# Patient Record
Sex: FEMALE | Race: BLACK OR AFRICAN AMERICAN | NOT HISPANIC OR LATINO | Employment: FULL TIME | URBAN - METROPOLITAN AREA
[De-identification: names, ages, dates, MRNs, and addresses within clinical notes are randomized per-mention and may not be internally consistent; named-entity substitution may affect disease eponyms.]

---

## 2017-02-01 ENCOUNTER — CARE COORDINATION (OUTPATIENT)
Dept: CARE COORDINATION | Facility: CLINIC | Age: 32
End: 2017-02-01

## 2017-02-01 NOTE — LETTER
[facility] CARE COORDINATION  [address] [facility] [address]  Phone: [phone]      February 16, 2017      Arielle Montenegro  [address]    [facility] [address]    Dear Arielle,  I am the Clinic Care Coordinator that works with your primary care provider's clinic. I have been trying to reach you recently to introduce Clinic Care Coordination and to see if there was anything I could assist you with.  Below is a description of what Clinic Care Coordination is and how I can further assist you.     The Clinic Care Coordinator role is a Registered Nurse and/or  who understands the health care system. The goal of Clinic Care Coordination is to help you manage your health and improve access to the [facility] system in the most efficient manner.  The Registered Nurse can assist you in meeting your health care goals by providing education, coordinating services, and strengthening the communication among your providers. The  can assist you with financial, behavioral, psychosocial, and chemical dependency and counseling/psychiatric resources.    Please feel free to keep this letter and contact information to contact me at [phone] with any further questions or concerns that may arise. We at [facility] are focused on providing you with the highest-quality healthcare experience possible and that all starts with you.       Sincerely,     Hue Robless    Enclosed: I have enclosed a copy of a 24 Hour Access Plan. This has helpful phone numbers for you to call when needed. Please keep this in an easy to access place to use as needed.            24 Hour Access Plan    Presenting Problem Signs and Symptoms Treatment Plan    Questions or concerns during clinic hours    I will call the clinic directly     Questions or concerns outside clinic hours    I will call the 24 hour nurse line at [phone]    Patient needs to schedule an appointment    I will call the 24  hour scheduling team at [phone] or clinic directly    Same day treatment     I will call the clinic first, nurse line if after hours, urgent care and express care if needed                          Clinic Care Coordinator: Hue Donis, RN Care Coordinator [phone]

## 2017-02-01 NOTE — PROGRESS NOTES
Clinic Care Coordination Contact  [facility]/Voicemail    Referral Source: Other, specify ([facility] - Patient Relations Staff)    Follow up.     Clinical Data: Care Coordinator Outreach  Outreach attempted x 1.  Left message on voicemail with call back information and requested return call.  Plan:  Care Coordinator will try to reach patient again in 3-5 business days.    Hue Donis, RN  [facility] Care Coordinator - [facility] [facility] Locations   Direct:  [phone] (voicemail available)

## 2017-02-09 NOTE — PROGRESS NOTES
Clinic Care Coordination Contact  [facility]/Voicemail    Referral Source: Other, specify ([facility] - Patient Relations Staff)  Clinical Data: Care Coordinator Outreach  Outreach attempted x 2.  Patient states that she is currently at an appointment for her daughter and unable to speak right now.   She requests return call from CCRN.     Plan:  Care Coordinator will try to reach patient again in 3-5 business days, as CCRN is out of the office on Friday 02/10/2017 for a conference.   Patient agreed with plan.     Hue Donis RN  [facility]  Clinic Care Coordinator - [place] and [place] Locations   Direct:  [phone] (voicemail available)

## 2017-02-16 NOTE — PROGRESS NOTES
Clinic Care Coordination Contact  [facility]/Voicemail    Referral Source: Other, specify ([facility] - Patient Relations Staff)  Clinical Data: Care Coordinator Outreach  Outreach attempted x 3.  Left message on voicemail with call back information and requested return call.  Plan: Care Coordinator will mail out care coordination [facility] letter with care coordinator contact information and explanation of care coordination services. Care Coordinator will do no further outreaches at this time.      Hue Donis, RN  [facility]  Clinic Care Coordinator - [place] and [place] Locations   Direct:  [phone] (voicemail available)

## 2017-02-24 ENCOUNTER — HOSPITAL ENCOUNTER (EMERGENCY)
Facility: CLINIC | Age: 32
Discharge: HOME | End: 2017-02-24
Attending: EMERGENCY MEDICINE | Admitting: EMERGENCY MEDICINE
Payer: COMMERCIAL

## 2017-02-24 VITALS
HEART RATE: 97 BPM | OXYGEN SATURATION: 97 % | RESPIRATION RATE: 18 BRPM | TEMPERATURE: 97.2 F | SYSTOLIC BLOOD PRESSURE: 109 MMHG | DIASTOLIC BLOOD PRESSURE: 86 MMHG

## 2017-02-24 DIAGNOSIS — Z76.0 ENCOUNTER FOR MEDICATION REFILL: ICD-10-CM

## 2017-02-24 LAB — GLUCOSE BLDC GLUCOMTR-MCNC: 224 MG/DL (ref 70–99)

## 2017-02-24 PROCEDURE — 99283 EMERGENCY DEPT VISIT LOW MDM: CPT

## 2017-02-24 PROCEDURE — 00000146 ZZHCL STATISTIC GLUCOSE BY METER IP

## 2017-02-24 NOTE — ED AVS SNAPSHOT
[facility] Emergency Department    [address]    [facility] [address]    Phone:  [phone]    Fax:  [phone]                                       Arielle Montenegro   MRN: [id number]    Department:  [facility] Emergency Department   Date of Visit:  2/24/2017           Patient Information     Date Of Birth          1985        Your diagnoses for this visit were:     Encounter for medication refill        You were seen by Carmita Pina MD.      Follow-up Information     Schedule an appointment as soon as possible for a visit with [facility].    Why:  please refer to your primary doctor for any future refills        Follow up with [facility] Emergency Department.    Specialty:  EMERGENCY MEDICINE    Why:  As needed    Contact information:    [address] [facility] [address] [phone]      Discharge References/Attachments     INSULIN (ENGLISH)      24 Hour Appointment Hotline       To make an appointment at any [facility] call [phone] ([phone]). If you don't have a family doctor or clinic, we will help you find one. [facility] are conveniently located to serve the needs of you and your family.             Review of your medicines      Our records show that you are taking the medicines listed below. If these are incorrect, please call your family doctor or clinic.        Dose / Directions Last dose taken    acetone (Urine) test Strp   Quantity:  100 each        Use one strip as needed to test urine for ketones.  Test urine for ketones when blood sugars are >250 or during illness with fever, abdominal pain, nausea, or vomiting.   Refills:  11        blood glucose monitoring lancets   Quantity:  1 Box        Use to test blood sugar 4 times daily or as directed.   Refills:  11        blood glucose monitoring test strip   Commonly known as:  no brand specified   Quantity:  1 Box         Use to test blood sugars 4 times daily or as directed   Refills:  11        GLUCOSE MANAGEMENT Tabs   Quantity:  100 tablet        4 tabs po for low blood sugar below 70, may repeat q 10-15 minutes as needed   Refills:  prn        ibuprofen 400 MG tablet   Commonly known as:  ADVIL/MOTRIN   Dose:  400 mg   Quantity:  120 tablet        Take 1 tablet (400 mg) by mouth every 6 hours as needed for moderate pain   Refills:  0        insulin glargine 100 UNIT/ML injection   Commonly known as:  LANTUS SOLOSTAR   Dose:  10 Units   Quantity:  1500 mL        Inject 10 Units Subcutaneous 2 times daily Titrate as directed by 1 unit every 3 days to lower fasting average to < 120 mg/dl.   Refills:  0        insulin pen needle 32G X 6 MM   Commonly known as:  NOVOFINE   Quantity:  100 each        Use 4 daily or as directed.   Refills:  11          ASK your doctor about these medications        Dose / Directions Last dose taken    * insulin aspart 100 UNIT/ML injection   Commonly known as:  NovoLOG FLEXPEN   What changed:  Another medication with the same name was added. Make sure you understand how and when to take each.   Quantity:  1500 mL   Ask about: Which instructions should I use?        Take 2 units per carb with each meal plus corrections 1:50 > 150   Refills:  0        * insulin aspart 100 UNIT/ML injection   Commonly known as:  NovoLOG FLEXPEN   What changed:  You were already taking a medication with the same name, and this prescription was added. Make sure you understand how and when to take each.   Quantity:  15 mL   Ask about: Which instructions should I use?        Take 2 units per carb with each meal plus corrections 1:50>150   Refills:  1        * Notice:  This list has 2 medication(s) that are the same as other medications prescribed for you. Read the directions carefully, and ask your doctor or other care provider to review them with you.            Prescriptions were sent or printed at these locations (1  Prescription)                   Other Prescriptions                Printed at Department/Unit printer (1 of 1)         insulin aspart (NOVOLOG FLEXPEN) 100 UNIT/ML injection                Procedures and tests performed during your visit     Glucose by meter      Orders Needing Specimen Collection     None      Pending Results     No orders found from 2/22/2017 to 2/25/2017.            Pending Culture Results     No orders found from 2/22/2017 to 2/25/2017.             Test Results from your hospital stay     2/24/2017  1:06 PM - Interface, Step On Up Graphics Results      Component Results     Component Value Ref Range & Units Status    Glucose 224 (H) 70 - 99 mg/dL Final                Clinical Quality Measure: Blood Pressure Screening     Your blood pressure was checked while you were in the emergency department today. The last reading we obtained was  BP: 109/86 . Please read the guidelines below about what these numbers mean and what you should do about them.  If your systolic blood pressure (the top number) is less than 120 and your diastolic blood pressure (the bottom number) is less than 80, then your blood pressure is normal. There is nothing more that you need to do about it.  If your systolic blood pressure (the top number) is 120-139 or your diastolic blood pressure (the bottom number) is 80-89, your blood pressure may be higher than it should be. You should have your blood pressure rechecked within a year by a primary care provider.  If your systolic blood pressure (the top number) is 140 or greater or your diastolic blood pressure (the bottom number) is 90 or greater, you may have high blood pressure. High blood pressure is treatable, but if left untreated over time it can put you at risk for heart attack, stroke, or kidney failure. You should have your blood pressure rechecked by a primary care provider within the next 4 weeks.  If your provider in the emergency department today gave you specific instructions to  "follow-up with your doctor or provider even sooner than that, you should follow that instruction and not wait for up to 4 weeks for your follow-up visit.        Thank you for choosing [facility]       Thank you for choosing [facility] for your care. Our goal is always to provide you with excellent care. Hearing back from our patients is one way we can continue to improve our services. Please take a few minutes to complete the written survey that you may receive in the mail after you visit with us. Thank you!        [email or web address] Information     [employer] lets you send messages to your doctor, view your test results, renew your prescriptions, schedule appointments and more. To sign up, go to [email or web address] . Click on \"Log in\" on the left side of the screen, which will take you to the Welcome page. Then click on \"Sign up Now\" on the right side of the page.     You will be asked to enter the access code listed below, as well as some personal information. Please follow the directions to create your username and password.     Your access code is: [email or web address]  Expires: 3/29/2017  3:03 PM     Your access code will  in 90 days. If you need help or a new code, please call your [facility] clinic or [phone].        Care EveryWhere ID     This is your Care EveryWhere ID. This could be used by other organizations to access your [facility] medical records  [id number]        After Visit Summary       This is your record. Keep this with you and show to your community pharmacist(s) and doctor(s) at your next visit.                  "

## 2017-02-24 NOTE — ED NOTES
In Triage: ABC's intact. Alert and oriented x 3.  Pt states she is Type 1 diabetic and ran out of her insulin today. Had insulin this morning.  Blood sugar earlier was 180 which she states is normal for her. Pt states she ate at cafeteria prior to coming to ED. Did not call PCP. Denies pain.

## 2017-02-24 NOTE — ED PROVIDER NOTES
History     Chief Complaint:  Medication Refill      HPI   Arielle Montenegro is a 32 year old female with type 1 diabetes who presents for medication refill. She ran out of her Novolog this afternoon. She normally manages her diabetes with Lantus 10 Units BID and Novolog 2 Units:15 g carb with each meal and 1 Unit correction for every 50 points over 150 mg/dl. She did take her Novolog this morning with breakfast. She ran out of her Novolog. Prior to arrival, she did eat lunch at a cafeteria. Her blood sugar prior to arrival to the emergency department was 180 mg/dl which the patient states is normal for her. She has no further concerns at this time.     Allergies:  No Known Allergies     Medications:    Insulin glargine (lantus solostar) 100 unit/ml pen  Insulin aspart (novolog flexpen) 100 unit/ml soln  Blood glucose monitoring (accu-chek multiclix) lancets  Blood glucose monitoring (no brand specified) test strip  Insulin pen needle (novofine) 32g x 6 mm  Nutritional supplements (glucose management) tabs  Acetone, urine, test strp  Ibuprofen (advil,motrin) 400 mg tablet    Past Medical History:    Encounter for long-term (current) use of insulin (H)  External hemorrhoids  Vitamin D deficiency  Diabetes mellitus type 1 (H)  Hypoglycemia unawareness in type 1 diabetes mellitus (H)  Blindness of right eye; Retinopathy and retinal detachment    Past Surgical History:     x 2  Enucleation, right    Family History:  Positive family history for paternal family with diabetes.    Social History:  Marital status: single  Tobacco use: former smoker  Alcohol use: no  Patient presents to the ED with her two children.        Review of Systems   Constitutional:        Positive for medication refill.   All other systems reviewed and are negative.      Physical Exam   First Vitals:  BP: 109/86  Pulse: 97  Temp: 97.2  F (36.2  C)  Resp: 18  SpO2: 97 %    Physical Exam  General/Appearance: appears stated age,  well-groomed, appears comfortable  Cardiovascular: RRR, nl S1S2, no m/r/g, 2+ pulses in all 4 extremities, cap refill <2sec  Respiratory: CTAB, good air movement throughout, no wheezes/rhonchi/rales, no increased WOB, no retractions  Neuro: GCS 15, alert and oriented, no gross focal neuro deficits    Emergency Department Course     Emergency Department Course:  1257 Nursing notes and vitals reviewed. I obtained a history from the patient. I performed an exam of the patient as documented above. We discussed my clinical impression and patient verbalized understanding and agreement     Findings and plan explained to the Patient. Patient discharged home with instructions regarding supportive care, medications, and reasons to return. The importance of close follow-up was reviewed. The patient was prescribed Novolog insulin.     Impression & Plan      Medical Decision Making:  This patient is a 32 year old type 1 diabetic female who ran out of her Novolog insulin today. I will give her a refill here. I encouraged her to follow up with her PCP for further refills.     Diagnosis:    ICD-10-CM    1. Encounter for medication refill Z76.0 Glucose by meter     Glucose by meter       Disposition:  discharged to home    Discharge Medications:  Discharge Medication List as of 2/24/2017  1:09 PM      START taking these medications    Details   !! insulin aspart (NOVOLOG FLEXPEN) 100 UNIT/ML injection Take 2 units per carb with each meal plus corrections 1:50>150, Disp-15 mL, R-1, Local Print       !! - Potential duplicate medications found. Please discuss with provider.        I, Veronica Moore, am serving as a scribe on 2/24/2017 at 12:57 PM to personally document services performed by Carmita Pina MD, based on my observations and the provider's statements to me.        Veronica Moore  2/24/2017   [facility] EMERGENCY DEPARTMENT       Carmita Pina MD  02/24/17 6986

## 2017-02-24 NOTE — ED AVS SNAPSHOT
[facility] Emergency Department    [address]    [facility] [address]    Phone:  [phone]    Fax:  [phone]                                       Arielle Montenegro   MRN: [id number]    Department:  [facility] Emergency Department   Date of Visit:  2/24/2017           After Visit Summary Signature Page     I have received my discharge instructions, and my questions have been answered. I have discussed any challenges I see with this plan with the nurse or doctor.    ..........................................................................................................................................  Patient/Patient Representative Signature      ..........................................................................................................................................  Patient Representative Print Name and Relationship to Patient    ..................................................               ................................................  Date                                            Time    ..........................................................................................................................................  Reviewed by Signature/Title    ...................................................              ..............................................  Date                                                            Time

## 2017-04-22 ENCOUNTER — HOSPITAL ENCOUNTER (EMERGENCY)
Facility: CLINIC | Age: 32
Discharge: HOME | End: 2017-04-22
Attending: EMERGENCY MEDICINE | Admitting: EMERGENCY MEDICINE
Payer: COMMERCIAL

## 2017-04-22 VITALS
TEMPERATURE: 98.4 F | DIASTOLIC BLOOD PRESSURE: 72 MMHG | HEART RATE: 99 BPM | OXYGEN SATURATION: 100 % | RESPIRATION RATE: 20 BRPM | SYSTOLIC BLOOD PRESSURE: 112 MMHG

## 2017-04-22 DIAGNOSIS — J02.0 ACUTE STREPTOCOCCAL PHARYNGITIS: ICD-10-CM

## 2017-04-22 LAB
ALBUMIN UR-MCNC: 100 MG/DL
APPEARANCE UR: ABNORMAL
BILIRUB UR QL STRIP: NEGATIVE
COLOR UR AUTO: YELLOW
DEPRECATED S PYO AG THROAT QL EIA: ABNORMAL
FLUAV+FLUBV AG SPEC QL: NEGATIVE
FLUAV+FLUBV AG SPEC QL: NORMAL
GLUCOSE UR STRIP-MCNC: >499 MG/DL
HGB UR QL STRIP: NEGATIVE
HYALINE CASTS #/AREA URNS LPF: 3 /LPF (ref 0–2)
KETONES UR STRIP-MCNC: NEGATIVE MG/DL
LEUKOCYTE ESTERASE UR QL STRIP: NEGATIVE
MICRO REPORT STATUS: ABNORMAL
MUCOUS THREADS #/AREA URNS LPF: PRESENT /LPF
NITRATE UR QL: NEGATIVE
PH UR STRIP: 5 PH (ref 5–7)
RBC #/AREA URNS AUTO: 3 /HPF (ref 0–2)
SP GR UR STRIP: 1.02 (ref 1–1.03)
SPECIMEN SOURCE: ABNORMAL
SPECIMEN SOURCE: NORMAL
SQUAMOUS #/AREA URNS AUTO: 1 /HPF (ref 0–1)
URN SPEC COLLECT METH UR: ABNORMAL
UROBILINOGEN UR STRIP-MCNC: 0 MG/DL (ref 0–2)
WBC #/AREA URNS AUTO: 1 /HPF (ref 0–2)

## 2017-04-22 PROCEDURE — 40000275 ZZH STATISTIC RCP TIME EA 10 MIN

## 2017-04-22 PROCEDURE — 87880 STREP A ASSAY W/OPTIC: CPT | Performed by: EMERGENCY MEDICINE

## 2017-04-22 PROCEDURE — 81001 URINALYSIS AUTO W/SCOPE: CPT | Performed by: EMERGENCY MEDICINE

## 2017-04-22 PROCEDURE — 99283 EMERGENCY DEPT VISIT LOW MDM: CPT | Mod: 25

## 2017-04-22 PROCEDURE — 94640 AIRWAY INHALATION TREATMENT: CPT

## 2017-04-22 PROCEDURE — 25000125 ZZHC RX 250: Performed by: EMERGENCY MEDICINE

## 2017-04-22 PROCEDURE — 87804 INFLUENZA ASSAY W/OPTIC: CPT | Performed by: EMERGENCY MEDICINE

## 2017-04-22 RX ORDER — AMOXICILLIN 500 MG/1
500 CAPSULE ORAL 3 TIMES DAILY
Qty: 30 CAPSULE | Refills: 0 | Status: SHIPPED | OUTPATIENT
Start: 2017-04-22 | End: 2017-05-02

## 2017-04-22 RX ORDER — ALBUTEROL SULFATE 90 UG/1
2 AEROSOL, METERED RESPIRATORY (INHALATION) EVERY 4 HOURS PRN
Qty: 1 INHALER | Refills: 0 | Status: SHIPPED | OUTPATIENT
Start: 2017-04-22 | End: 2019-02-12

## 2017-04-22 RX ORDER — IPRATROPIUM BROMIDE AND ALBUTEROL SULFATE 2.5; .5 MG/3ML; MG/3ML
3 SOLUTION RESPIRATORY (INHALATION) ONCE
Status: COMPLETED | OUTPATIENT
Start: 2017-04-22 | End: 2017-04-22

## 2017-04-22 RX ADMIN — IPRATROPIUM BROMIDE AND ALBUTEROL SULFATE 3 ML: .5; 3 SOLUTION RESPIRATORY (INHALATION) at 11:36

## 2017-04-22 ASSESSMENT — ENCOUNTER SYMPTOMS
SORE THROAT: 1
COUGH: 1
ACTIVITY CHANGE: 1
FATIGUE: 1
FEVER: 1
CHILLS: 1
SHORTNESS OF BREATH: 1

## 2017-04-22 NOTE — ED PROVIDER NOTES
History     Chief Complaint:  Cough    HPI   Arielle Leeanne Montenegro is a 32 year old female with a history of type 1 diabetes who presents with 5 days of non-productive cough, accompanied by subjective fever, fatigue, shortness of breath, and 9/10 throat pain, prompting her visit to the ED today. Here, she additionally complains of chills, body aches, and a decreased appetite. She denies any ear pain or any other complaints. She has been treating her symptoms at home with Dayquil and Tylenol. Of note, her son recently had influenza and the patient did not get her flu shot this year.     Allergies:  The patient has no known drug allergies.      Medications:    Insulin aspart (novolog flexpen) 100 unit/ml injection  Insulin glargine (lantus solostar) 100 unit/ml pen  Insulin pen needle (novofine) 32g x 6 mm  Nutritional supplements (glucose management) tabs  Ibuprofen (advil,motrin) 400 mg tablet    Past Medical History:    Blindness of right eye  Diabetes mellitus type 1 (H)   Vitamin D deficiency    Past Surgical History:     x2  Enucleation    Family History:    Diabetes    Social History:  Relationship status: Single  Tobacco use: Former smoker  Alcohol use: Neg  The patient presents with her children.      Review of Systems   Constitutional: Positive for activity change, chills, fatigue and fever.        Positive for body aches,   HENT: Positive for sore throat. Negative for ear pain.    Respiratory: Positive for cough and shortness of breath.    All other systems reviewed and are negative.    Physical Exam   First Vitals:  BP: 112/72  Temp: 98.4  F (36.9  C)  Temp src: Oral  Pulse: 99  Resp: 20  SpO2: 100 % ( 1105- 1136)   Physical Exam  Gen: Pleasant, appears stated age. Sightly ill appearing.    Eye:   Left pupil reactive. Right eye is prosthetic.    Sclera non-injected.    ENT:  Moist mucus membranes.  Normal tongue.  Oropharynx without lesions, not erythematous.  No exudate or tonsillar  hypertrophy.   Normal uvula, in midline.  No anterior cervical ARELI.  Tracheal cartilage non-tender.  Full flexion and extension of neck.  Normal voice.   No stridor.  No drooling.    Cardiac:     Normal rate and regular rhythm.    No murmurs, gallops, or rubs.    Pulmonary:     Clear to auscultation bilaterally.    No wheezes, rales, or rhonchi.    Abdomen:     Normal active bowel sounds.     Abdomen is soft and non-distended, without focal tenderness.    Musculoskeletal:     Normal movement of all extremities without evidence for deficit.    Extremities:    No edema.    Skin:   Warm and dry.    Neurologic:    Non-focal exam without asymmetric weakness or numbness.    Normal tone    Psychiatric:     Normal affect with appropriate interaction with examiner.     Emergency Department Course   Laboratory:  Influenza A/B antigen: A:Negative  B:Negative     Rapid strep screen: Positive (A)    UA: Slightly cloudy, yellow urine: Glucose >499 (A), Protein albumin 100 (A), RBC 3 (H), Mucous Present (A), Hyaline casts 3 (H), o/w WNL    Interventions:  1136: Duoneb, 3 mL, Neb    Emergency Department Course:  Nursing notes and vitals reviewed.  I performed an exam of the patient as documented above.  The above workup was undertaken.  1220: I rechecked the patient and discussed results.    Findings and plan explained to the Patient. Patient discharged home, status improved, with instructions regarding supportive care, medications, and reasons to return as well as the importance of close follow-up was reviewed.     Impression & Plan    Medical Decision Making:  Arielle Montenegro is a 32 year old female with type 1 diabetes who presents today with 5 days of cough, sore throat, and general malaise. On exam, she is well appearing, although she has a high heart rate. She notes that her blood sugars have been elevated. Fortunately, UA is negative for ketones. She does have hyperglycemia which is consistent with her history. Her strep  test was reported as positive by lab, although they are having difficulty displaying the result right now. She well be started on amoxicillin. Otherwise, I do not see any evidence for a dangerous bacterial infection such as retropharyngeal abscess, peritonsillar abscess, Lemierre's syndrome, epiglottitis, or uvulitis. She does not have DKA based on UA. I have recommended returning to the ED for failure to improve, increased pain, difficulty swallowing, or for any other symptoms.     Diagnosis:    ICD-10-CM   1. Acute streptococcal pharyngitis J02.0       Disposition:  discharged to home    Discharge Medication List as of 4/22/2017 12:31 PM      START taking these medications    Details   amoxicillin (AMOXIL) 500 MG capsule Take 1 capsule (500 mg) by mouth 3 times daily for 10 days, Disp-30 capsule, R-0, Local Print           Son JORDAN am serving as a scribe on 4/22/2017 at 11:04 AM to personally document services performed by Minal Pryor MD, based on my observations and the provider's statements to me.    [facility] EMERGENCY DEPARTMENT       Minal Pryor MD  04/22/17 7414

## 2017-04-22 NOTE — ED AVS SNAPSHOT
[facility] Emergency Department    [address]    [facility] [address]    Phone:  [phone]    Fax:  [phone]                                       Arielle Montenegro   MRN: [id number]    Department:  [facility] Emergency Department   Date of Visit:  4/22/2017           After Visit Summary Signature Page     I have received my discharge instructions, and my questions have been answered. I have discussed any challenges I see with this plan with the nurse or doctor.    ..........................................................................................................................................  Patient/Patient Representative Signature      ..........................................................................................................................................  Patient Representative Print Name and Relationship to Patient    ..................................................               ................................................  Date                                            Time    ..........................................................................................................................................  Reviewed by Signature/Title    ...................................................              ..............................................  Date                                                            Time

## 2017-04-22 NOTE — ED AVS SNAPSHOT
[facility] Emergency Department    [address]    [facility] [address]    Phone:  [phone]    Fax:  [phone]                                       Arielle Montenegro   MRN: [id number]    Department:  [facility] Emergency Department   Date of Visit:  4/22/2017           Patient Information     Date Of Birth          1985        Your diagnoses for this visit were:     Acute streptococcal pharyngitis        You were seen by Minal Pryor MD.      Follow-up Information     Follow up with [facility] Emergency Department.    Specialty:  EMERGENCY MEDICINE    Why:  immediately , If symptoms worsen    Contact information:    [address]  [facility] [address] [phone]        Follow up with Darcy Purdy In 3 days.    Why:  As needed        Discharge Instructions       Discharge Instructions  Sore Throat  You were seen today for a sore throat.  Most sore throats are caused by a virus. Antibiotics do not help with viral infections, but you can fight off the virus on your own.  In this case, your sore throat would be treated with medications for your pain and fever.    Strep throat is a kind of sore throat caused by Group A streptococcus bacteria.  This type of sore throat is treated with antibiotics.  If you had a rapid test done today for strep throat and it did not show infection, we always do a culture. If the culture shows you have strep throat, we will call you and get you a prescription for antibiotics.    Return to the Emergency Department if:    If you have difficulty breathing.    If you are drooling because you are unable to swallow.    You become dehydrated due to difficulty drinking. Signs of dehydration include weakness, dry mouth, and urinating less than 3 times per day.    If you develop swelling of the neck or tongue.    If you develop a high fever with either headache or stiff neck.    Treatment:   "    Pain relief -- Non-prescription pain medications, such as Tylenol  (acetaminophen) or Motrin , Advil  (ibuprofen) are usually recommended for pain.  Do not use a medicine that you are allergic to, or if your doctor has told you not to use it.   If you have been given a narcotic such as Vicodin  (hydrocodone with acetaminophen), Percocet  (oxycodone with acetaminophen), codeine, do not drive for four hours after you have taken it.  If the narcotic contains Tylenol  (acetaminophen), do not take Tylenol  with it. All narcotics will cause constipation, so eat a high fiber diet.      If you have been placed on antibiotics, watch for signs of allergic reaction.  These include rash, lip swelling, difficulty breathing, wheezing, and dizziness.  If you develop any of these symptoms, stop the antibiotic immediately and go to an Emergency Department or Urgent Care for evaluation.    Probiotics: If you have been given an antibiotic, you may want to also take a probiotic pill or eat yogurt with live cultures. Probiotics have \"good bacteria\" to help your intestines stay healthy. Studies have shown that probiotics help prevent diarrhea and other intestine problems (including C. diff infection) when you take antibiotics. You can buy these without a prescription in the pharmacy section of the store.   If you were given a prescription for medicine here today, be sure to read all of the information (including the package insert) that comes with your prescription.  This will include important information about the medicine, its side effects, and any warnings that you need to know about.  The pharmacist who fills the prescription can provide more information and answer questions you may have about the medicine.  If you have questions or concerns that the pharmacist cannot address, please call or return to the Emergency Department.               24 Hour Appointment Hotline       To make an appointment at any [facility], call " [phone] ([phone]). If you don't have a family doctor or clinic, we will help you find one. [place] [facility] clinics are conveniently located to serve the needs of you and your family.             Review of your medicines      START taking        Dose / Directions Last dose taken    amoxicillin 500 MG capsule   Commonly known as:  AMOXIL   Dose:  500 mg   Quantity:  30 capsule        Take 1 capsule (500 mg) by mouth 3 times daily for 10 days   Refills:  0          Our records show that you are taking the medicines listed below. If these are incorrect, please call your family doctor or clinic.        Dose / Directions Last dose taken    acetone (Urine) test Strp   Quantity:  100 each        Use one strip as needed to test urine for ketones.  Test urine for ketones when blood sugars are >250 or during illness with fever, abdominal pain, nausea, or vomiting.   Refills:  11        blood glucose monitoring lancets   Quantity:  1 Box        Use to test blood sugar 4 times daily or as directed.   Refills:  11        blood glucose monitoring test strip   Commonly known as:  no brand specified   Quantity:  1 Box        Use to test blood sugars 4 times daily or as directed   Refills:  11        GLUCOSE MANAGEMENT Tabs   Quantity:  100 tablet        4 tabs po for low blood sugar below 70, may repeat q 10-15 minutes as needed   Refills:  prn        ibuprofen 400 MG tablet   Commonly known as:  ADVIL/MOTRIN   Dose:  400 mg   Quantity:  120 tablet        Take 1 tablet (400 mg) by mouth every 6 hours as needed for moderate pain   Refills:  0        * insulin aspart 100 UNIT/ML injection   Commonly known as:  NovoLOG FLEXPEN   Quantity:  1500 mL        Take 2 units per carb with each meal plus corrections 1:50 > 150   Refills:  0        * insulin aspart 100 UNIT/ML injection   Commonly known as:  NovoLOG FLEXPEN   Quantity:  15 mL        Take 2 units per carb with each meal plus corrections 1:50>150   Refills:  1         insulin glargine 100 UNIT/ML injection   Commonly known as:  LANTUS SOLOSTAR   Dose:  10 Units   Quantity:  1500 mL        Inject 10 Units Subcutaneous 2 times daily Titrate as directed by 1 unit every 3 days to lower fasting average to < 120 mg/dl.   Refills:  0        insulin pen needle 32G X 6 MM   Commonly known as:  NOVOFINE   Quantity:  100 each        Use 4 daily or as directed.   Refills:  11        * Notice:  This list has 2 medication(s) that are the same as other medications prescribed for you. Read the directions carefully, and ask your doctor or other care provider to review them with you.            Prescriptions were sent or printed at these locations (1 Prescription)                   Other Prescriptions                Printed at Department/Unit printer (1 of 1)         amoxicillin (AMOXIL) 500 MG capsule                Procedures and tests performed during your visit     Influenza A/B antigen    Rapid strep screen    UA with Microscopic      Orders Needing Specimen Collection     None      Pending Results     Date and Time Order Name Status Description    4/22/2017 1116 Rapid strep screen In process             Pending Culture Results     Date and Time Order Name Status Description    4/22/2017 1116 Rapid strep screen In process             Test Results From Your Hospital Stay        4/22/2017 12:11 PM      Component Results     Component Value Ref Range & Units Status    Influenza A/B Agn Specimen Nasal  Final    Influenza A Negative NEG Final    Influenza B  NEG Final    Negative   Test results must be correlated with clinical data. If necessary, results   should be confirmed by a molecular assay or viral culture.           4/22/2017 11:36 AM         4/22/2017 12:17 PM      Component Results     Component Value Ref Range & Units Status    Color Urine Yellow  Final    Appearance Urine Slightly Cloudy  Final    Glucose Urine >499 (A) NEG mg/dL Final    Bilirubin Urine Negative NEG Final    Ketones  Urine Negative NEG mg/dL Final    Specific Gravity Urine 1.024 1.003 - 1.035 Final    Blood Urine Negative NEG Final    pH Urine 5.0 5.0 - 7.0 pH Final    Protein Albumin Urine 100 (A) NEG mg/dL Final    Urobilinogen mg/dL 0.0 0.0 - 2.0 mg/dL Final    Nitrite Urine Negative NEG Final    Leukocyte Esterase Urine Negative NEG Final    Source Midstream Urine  Final    WBC Urine 1 0 - 2 /HPF Final    RBC Urine 3 (H) 0 - 2 /HPF Final    Squamous Epithelial /HPF Urine 1 0 - 1 /HPF Final    Mucous Urine Present (A) NEG /LPF Final    Hyaline Casts 3 (H) 0 - 2 /LPF Final                Clinical Quality Measure: Blood Pressure Screening     Your blood pressure was checked while you were in the emergency department today. The last reading we obtained was  BP: 112/72 . Please read the guidelines below about what these numbers mean and what you should do about them.  If your systolic blood pressure (the top number) is less than 120 and your diastolic blood pressure (the bottom number) is less than 80, then your blood pressure is normal. There is nothing more that you need to do about it.  If your systolic blood pressure (the top number) is 120-139 or your diastolic blood pressure (the bottom number) is 80-89, your blood pressure may be higher than it should be. You should have your blood pressure rechecked within a year by a primary care provider.  If your systolic blood pressure (the top number) is 140 or greater or your diastolic blood pressure (the bottom number) is 90 or greater, you may have high blood pressure. High blood pressure is treatable, but if left untreated over time it can put you at risk for heart attack, stroke, or kidney failure. You should have your blood pressure rechecked by a primary care provider within the next 4 weeks.  If your provider in the emergency department today gave you specific instructions to follow-up with your doctor or provider even sooner than that, you should follow that instruction and  "not wait for up to 4 weeks for your follow-up visit.        Thank you for choosing [facility]       Thank you for choosing [facility] for your care. Our goal is always to provide you with excellent care. Hearing back from our patients is one way we can continue to improve our services. Please take a few minutes to complete the written survey that you may receive in the mail after you visit with us. Thank you!        [employer] Information     [employer] lets you send messages to your doctor, view your test results, renew your prescriptions, schedule appointments and more. To sign up, go to [email or web address] . Click on \"Log in\" on the left side of the screen, which will take you to the Welcome page. Then click on \"Sign up Now\" on the right side of the page.     You will be asked to enter the access code listed below, as well as some personal information. Please follow the directions to create your username and password.     Your access code is: [email or web address]  Expires: 2017 12:31 PM     Your access code will  in 90 days. If you need help or a new code, please call your [facility] clinic or [phone].        Care EveryWhere ID     This is your Care EveryWhere ID. This could be used by other organizations to access your [facility] medical records  [id number]        After Visit Summary       This is your record. Keep this with you and show to your community pharmacist(s) and doctor(s) at your next visit.                  "

## 2017-04-22 NOTE — DISCHARGE INSTRUCTIONS
Discharge Instructions  Sore Throat  You were seen today for a sore throat.  Most sore throats are caused by a virus. Antibiotics do not help with viral infections, but you can fight off the virus on your own.  In this case, your sore throat would be treated with medications for your pain and fever.    Strep throat is a kind of sore throat caused by Group A streptococcus bacteria.  This type of sore throat is treated with antibiotics.  If you had a rapid test done today for strep throat and it did not show infection, we always do a culture. If the culture shows you have strep throat, we will call you and get you a prescription for antibiotics.    Return to the Emergency Department if:    If you have difficulty breathing.    If you are drooling because you are unable to swallow.    You become dehydrated due to difficulty drinking. Signs of dehydration include weakness, dry mouth, and urinating less than 3 times per day.    If you develop swelling of the neck or tongue.    If you develop a high fever with either headache or stiff neck.    Treatment:      Pain relief -- Non-prescription pain medications, such as Tylenol  (acetaminophen) or Motrin , Advil  (ibuprofen) are usually recommended for pain.  Do not use a medicine that you are allergic to, or if your doctor has told you not to use it.   If you have been given a narcotic such as Vicodin  (hydrocodone with acetaminophen), Percocet  (oxycodone with acetaminophen), codeine, do not drive for four hours after you have taken it.  If the narcotic contains Tylenol  (acetaminophen), do not take Tylenol  with it. All narcotics will cause constipation, so eat a high fiber diet.      If you have been placed on antibiotics, watch for signs of allergic reaction.  These include rash, lip swelling, difficulty breathing, wheezing, and dizziness.  If you develop any of these symptoms, stop the antibiotic immediately and go to an Emergency Department or Urgent Care for  "evaluation.    Probiotics: If you have been given an antibiotic, you may want to also take a probiotic pill or eat yogurt with live cultures. Probiotics have \"good bacteria\" to help your intestines stay healthy. Studies have shown that probiotics help prevent diarrhea and other intestine problems (including C. diff infection) when you take antibiotics. You can buy these without a prescription in the pharmacy section of the store.   If you were given a prescription for medicine here today, be sure to read all of the information (including the package insert) that comes with your prescription.  This will include important information about the medicine, its side effects, and any warnings that you need to know about.  The pharmacist who fills the prescription can provide more information and answer questions you may have about the medicine.  If you have questions or concerns that the pharmacist cannot address, please call or return to the Emergency Department.             "

## 2017-05-11 ENCOUNTER — OFFICE VISIT (OUTPATIENT)
Dept: INTERNAL MEDICINE | Facility: CLINIC | Age: 32
End: 2017-05-11
Payer: COMMERCIAL

## 2017-05-11 VITALS
SYSTOLIC BLOOD PRESSURE: 90 MMHG | DIASTOLIC BLOOD PRESSURE: 60 MMHG | HEIGHT: 63 IN | HEART RATE: 80 BPM | WEIGHT: 139 LBS | TEMPERATURE: 98.2 F | BODY MASS INDEX: 24.63 KG/M2 | OXYGEN SATURATION: 97 %

## 2017-05-11 DIAGNOSIS — R53.83 FATIGUE, UNSPECIFIED TYPE: Primary | ICD-10-CM

## 2017-05-11 DIAGNOSIS — E10.65 TYPE 1 DIABETES MELLITUS WITH HYPERGLYCEMIA (H): ICD-10-CM

## 2017-05-11 LAB
BASOPHILS # BLD AUTO: 0 10E9/L (ref 0–0.2)
BASOPHILS NFR BLD AUTO: 0.5 %
DIFFERENTIAL METHOD BLD: NORMAL
EOSINOPHIL # BLD AUTO: 0.2 10E9/L (ref 0–0.7)
EOSINOPHIL NFR BLD AUTO: 3.3 %
ERYTHROCYTE [DISTWIDTH] IN BLOOD BY AUTOMATED COUNT: 13.2 % (ref 10–15)
HBA1C MFR BLD: 10 % (ref 4.3–6)
HCG SERPL QL: NEGATIVE
HCT VFR BLD AUTO: 38.6 % (ref 35–47)
HGB BLD-MCNC: 13.1 G/DL (ref 11.7–15.7)
LYMPHOCYTES # BLD AUTO: 2.2 10E9/L (ref 0.8–5.3)
LYMPHOCYTES NFR BLD AUTO: 40.3 %
MCH RBC QN AUTO: 29.4 PG (ref 26.5–33)
MCHC RBC AUTO-ENTMCNC: 33.9 G/DL (ref 31.5–36.5)
MCV RBC AUTO: 87 FL (ref 78–100)
MONOCYTES # BLD AUTO: 0.2 10E9/L (ref 0–1.3)
MONOCYTES NFR BLD AUTO: 3.5 %
NEUTROPHILS # BLD AUTO: 2.9 10E9/L (ref 1.6–8.3)
NEUTROPHILS NFR BLD AUTO: 52.4 %
PLATELET # BLD AUTO: 233 10E9/L (ref 150–450)
RBC # BLD AUTO: 4.45 10E12/L (ref 3.8–5.2)
WBC # BLD AUTO: 5.5 10E9/L (ref 4–11)

## 2017-05-11 PROCEDURE — 99214 OFFICE O/P EST MOD 30 MIN: CPT | Performed by: FAMILY MEDICINE

## 2017-05-11 PROCEDURE — 36415 COLL VENOUS BLD VENIPUNCTURE: CPT | Performed by: FAMILY MEDICINE

## 2017-05-11 PROCEDURE — 80050 GENERAL HEALTH PANEL: CPT | Performed by: FAMILY MEDICINE

## 2017-05-11 PROCEDURE — 84703 CHORIONIC GONADOTROPIN ASSAY: CPT | Performed by: FAMILY MEDICINE

## 2017-05-11 PROCEDURE — 83036 HEMOGLOBIN GLYCOSYLATED A1C: CPT | Performed by: FAMILY MEDICINE

## 2017-05-11 NOTE — NURSING NOTE
"Chief Complaint   Patient presents with     Fatigue   onset today , needs insulin refilled     Initial There were no vitals taken for this visit. Estimated body mass index is 24.55 kg/(m^2) as calculated from the following:    Height as of 8/29/16: 5' 4\" (1.626 m).    Weight as of 11/3/16: 143 lb (64.9 kg).  Medication Reconciliation: complete    "

## 2017-05-11 NOTE — PROGRESS NOTES
CHIEF COMPLAINT    Diabetes, fatigue.      HISTORY    I haven't seen her before. She has DM 1. Uses Insulin but is out of Lantus. Her A1C's have been high - 10.8 in .    She also c/o being excessively tired for 3-4 weeks. No other specific adverse SX.    She was uncertain of LMP.    Patient Active Problem List   Diagnosis     Blindness of right eye     Diabetes mellitus type 1 (H)     Hypoglycemia unawareness in type 1 diabetes mellitus (H)     Health Care Home     Vitamin D deficiency     External hemorrhoids     Encounter for long-term (current) use of insulin (H)     Current Outpatient Prescriptions   Medication Sig Dispense Refill     insulin aspart (NOVOLOG FLEXPEN) 100 UNIT/ML injection Take 2 units per carb with each meal plus corrections 1:50 > 150 15 mL 2     insulin glargine (LANTUS SOLOSTAR) 100 UNIT/ML injection Inject 50 Units Subcutaneous daily 15 mL 2     blood glucose monitoring (ACCU-CHEK MULTICLIX) lancets Use to test blood sugar 4 times daily or as directed. 1 Box 11     blood glucose monitoring (NO BRAND SPECIFIED) test strip Use to test blood sugars 4 times daily or as directed 1 Box 11     insulin pen needle (NOVOFINE) 32G X 6 MM Use 4 daily or as directed. 100 each 11     Nutritional Supplements (GLUCOSE MANAGEMENT) TABS 4 tabs po for low blood sugar below 70, may repeat q 10-15 minutes as needed 100 tablet prn     albuterol (ALBUTEROL) 108 (90 BASE) MCG/ACT Inhaler Inhale 2 puffs into the lungs every 4 hours as needed for shortness of breath / dyspnea 1 Inhaler 0     [DISCONTINUED] insulin aspart (NOVOLOG FLEXPEN) 100 UNIT/ML injection Take 2 units per carb with each meal plus corrections 1:50>150 15 mL 1     acetone, Urine, test STRP Use one strip as needed to test urine for ketones.  Test urine for ketones when blood sugars are >250 or during illness with fever, abdominal pain, nausea, or vomiting. 100 each 11     [DISCONTINUED] insulin glargine (LANTUS SOLOSTAR) 100 UNIT/ML PEN  "Inject 10 Units Subcutaneous 2 times daily Titrate as directed by 1 unit every 3 days to lower fasting average to < 120 mg/dl. 1500 mL 0     [DISCONTINUED] insulin aspart (NOVOLOG FLEXPEN) 100 UNIT/ML soln Take 2 units per carb with each meal plus corrections 1:50 > 150 1500 mL 0     ibuprofen (ADVIL,MOTRIN) 400 MG tablet Take 1 tablet (400 mg) by mouth every 6 hours as needed for moderate pain 120 tablet 0       REVIEW OF SYSTEMS    No fever  No SOB  No CP  No abd pain  No heavy periods      Past Medical History:   Diagnosis Date     Blindness of right eye 2007     Diabetes mellitus type 1 (H)     Diagnosed as a child       EXAM  BP 90/60  Pulse 80  Temp 98.2  F (36.8  C) (Oral)  Ht 5' 3\" (1.6 m)  Wt 139 lb (63 kg)  LMP   SpO2 97%  BMI 24.62 kg/m2    NAD, alert  R eye shows scarring  Phar: neg  Neck: no mass  Chest: cl  CV: RSR w/o murmur  Abd: non distended, non tender  No edema      (R53.83) Fatigue, unspecified type  (primary encounter diagnosis)  Comment:   Plan: TSH with free T4 reflex, HCG, qualitative            (E10.65) Type 1 diabetes mellitus with hyperglycemia (H)  Comment:   Insulin refilled.  Basic Labs checked.  She could consider seeing Dr Hopper. Closer to her home. Referral made.  Plan: insulin aspart (NOVOLOG FLEXPEN) 100 UNIT/ML         injection, insulin glargine (LANTUS SOLOSTAR)         100 UNIT/ML injection, ENDOCRINOLOGY ADULT         REFERRAL, Comprehensive metabolic panel (BMP +         Alb, Alk Phos, ALT, AST, Total. Bili, TP), CBC         with platelets and differential, TSH with free         T4 reflex, Hemoglobin A1c                  "

## 2017-05-11 NOTE — MR AVS SNAPSHOT
After Visit Summary   5/11/2017    Arielle Montenegro    MRN: [id number]           Patient Information     Date Of Birth          1985        Visit Information        Provider Department      5/11/2017 3:00 PM Shar Hernández MD [facility]        Today's Diagnoses     Fatigue, unspecified type    -  1    Type 1 diabetes mellitus with hyperglycemia (H)          Care Instructions      Dr Parmerkar.  Endocrinologist.        Follow-ups after your visit        Additional Services     ENDOCRINOLOGY ADULT REFERRAL       Your provider has referred you to: FMG: [facility] [phone]   [email or web address]      Please be aware that coverage of these services is subject to the terms and limitations of your health insurance plan.  Call member services at your health plan with any benefit or coverage questions.      Please bring the following to your appointment:    >>   Any x-rays, CTs or MRIs which have been performed.  Contact the facility where they were done to arrange for  prior to your scheduled appointment.    >>   List of current medications   >>   This referral request   >>   Any documents/labs given to you for this referral                  Your next 10 appointments already scheduled     May 11, 2017  3:00 PM CDT   [facility] with Shar [facility]dez [facility]   [facility]    [address] [place] [address] [phone]           Bring a current list of meds and any records pertaining to this visit.  For Physicals, please bring immunization records and any forms needing to be filled out.  Please arrive 10 minutes early to complete paperwork.              Who to contact     If you have questions or need follow up information about today's clinic visit or your schedule please contact [facility] directly at [phone].  Normal or non-critical  "lab and imaging results will be communicated to you by MyChart, letter or phone within 4 business days after the clinic has received the results. If you do not hear from us within 7 days, please contact the clinic through [employer] or phone. If you have a critical or abnormal lab result, we will notify you by phone as soon as possible.  Submit refill requests through "[employer] or call your pharmacy and they will forward the refill request to us. Please allow 3 business days for your refill to be completed.          Additional Information About Your Visit        Xsilon[employer]Know[employer]na Information     "[employer] lets you send messages to your doctor, view your test results, renew your prescriptions, schedule appointments and more. To sign up, go to [email or web address]/"[employer] . Click on \"Log in\" on the left side of the screen, which will take you to the Welcome page. Then click on \"Sign up Now\" on the right side of the page.     You will be asked to enter the access code listed below, as well as some personal information. Please follow the directions to create your username and password.     Your access code is: [email or web address]  Expires: 2017 12:31 PM     Your access code will  in 90 days. If you need help or a new code, please call your [place] clinic or [phone].        Care EveryWhere ID     This is your Care EveryWhere ID. This could be used by other organizations to access your [place] medical records  [id number]        Your Vitals Were     Pulse Temperature Height Pulse Oximetry BMI (Body Mass Index)       80 98.2  F (36.8  C) (Oral) 5' 3\" (1.6 m) 97% 24.62 kg/m2        Blood Pressure from Last 3 Encounters:   17 90/60   17 112/72   17 109/86    Weight from Last 3 Encounters:   17 139 lb (63 kg)   16 143 lb (64.9 kg)   10/28/16 138 lb (62.6 kg)              We Performed the Following     CBC with platelets and differential     Comprehensive metabolic panel (BMP + Alb, Alk Phos, ALT, AST, " Total. Bili, TP)     ENDOCRINOLOGY ADULT REFERRAL     Hemoglobin A1c     TSH with free T4 reflex          Today's Medication Changes          These changes are accurate as of: 5/11/17  2:58 PM.  If you have any questions, ask your nurse or doctor.               These medicines have changed or have updated prescriptions.        Dose/Directions    insulin glargine 100 UNIT/ML injection   Commonly known as:  LANTUS SOLOSTAR   This may have changed:    - how much to take  - when to take this  - additional instructions   Used for:  Type 1 diabetes mellitus with hyperglycemia (H)   Changed by:  Shar Hernández MD        Dose:  50 Units   Inject 50 Units Subcutaneous daily   Quantity:  15 mL   Refills:  2            Where to get your medicines      These medications were sent to [facility] [place], [place] - [address]  [facility] [address] [address]     Phone:  [phone]     insulin aspart 100 UNIT/ML injection    insulin glargine 100 UNIT/ML injection                Primary Care Provider Fax #    [facility] [place] [phone]       No address on file        Thank you!     Thank you for choosing [facility]  for your care. Our goal is always to provide you with excellent care. Hearing back from our patients is one way we can continue to improve our services. Please take a few minutes to complete the written survey that you may receive in the mail after your visit with us. Thank you!             Your Updated Medication List - Protect others around you: Learn how to safely use, store and throw away your medicines at www.disposemymeds.org.          This list is accurate as of: 5/11/17  2:58 PM.  Always use your most recent med list.                   Brand Name Dispense Instructions for use    acetone (Urine) test Strp     100 each    Use one strip as needed to test urine for ketones.  Test urine for ketones when blood sugars are >250 or during illness with  fever, abdominal pain, nausea, or vomiting.       albuterol 108 (90 BASE) MCG/ACT Inhaler    albuterol    1 Inhaler    Inhale 2 puffs into the lungs every 4 hours as needed for shortness of breath / dyspnea       blood glucose monitoring lancets     1 Box    Use to test blood sugar 4 times daily or as directed.       blood glucose monitoring test strip    no brand specified    1 Box    Use to test blood sugars 4 times daily or as directed       GLUCOSE MANAGEMENT Tabs     100 tablet    4 tabs po for low blood sugar below 70, may repeat q 10-15 minutes as needed       ibuprofen 400 MG tablet    ADVIL/MOTRIN    120 tablet    Take 1 tablet (400 mg) by mouth every 6 hours as needed for moderate pain       * insulin aspart 100 UNIT/ML injection    NovoLOG FLEXPEN    15 mL    Take 2 units per carb with each meal plus corrections 1:50>150       * insulin aspart 100 UNIT/ML injection    NovoLOG FLEXPEN    15 mL    Take 2 units per carb with each meal plus corrections 1:50 > 150       insulin glargine 100 UNIT/ML injection    LANTUS SOLOSTAR    15 mL    Inject 50 Units Subcutaneous daily       insulin pen needle 32G X 6 MM    NOVOFINE    100 each    Use 4 daily or as directed.       * Notice:  This list has 2 medication(s) that are the same as other medications prescribed for you. Read the directions carefully, and ask your doctor or other care provider to review them with you.

## 2017-05-12 LAB
ALBUMIN SERPL-MCNC: 3.2 G/DL (ref 3.4–5)
ALP SERPL-CCNC: 103 U/L (ref 40–150)
ALT SERPL W P-5'-P-CCNC: 18 U/L (ref 0–50)
ANION GAP SERPL CALCULATED.3IONS-SCNC: 7 MMOL/L (ref 3–14)
AST SERPL W P-5'-P-CCNC: 10 U/L (ref 0–45)
BILIRUB SERPL-MCNC: 0.4 MG/DL (ref 0.2–1.3)
BUN SERPL-MCNC: 16 MG/DL (ref 7–30)
CALCIUM SERPL-MCNC: 8.6 MG/DL (ref 8.5–10.1)
CHLORIDE SERPL-SCNC: 104 MMOL/L (ref 94–109)
CO2 SERPL-SCNC: 26 MMOL/L (ref 20–32)
CREAT SERPL-MCNC: 0.7 MG/DL (ref 0.52–1.04)
GFR SERPL CREATININE-BSD FRML MDRD: ABNORMAL ML/MIN/1.7M2
GLUCOSE SERPL-MCNC: 393 MG/DL (ref 70–99)
POTASSIUM SERPL-SCNC: 4.2 MMOL/L (ref 3.4–5.3)
PROT SERPL-MCNC: 6.8 G/DL (ref 6.8–8.8)
SODIUM SERPL-SCNC: 137 MMOL/L (ref 133–144)
TSH SERPL DL<=0.005 MIU/L-ACNC: 1.19 MU/L (ref 0.4–4)

## 2017-10-05 ENCOUNTER — OFFICE VISIT (OUTPATIENT)
Dept: FAMILY MEDICINE | Facility: CLINIC | Age: 32
End: 2017-10-05
Payer: COMMERCIAL

## 2017-10-05 VITALS
HEIGHT: 63 IN | TEMPERATURE: 98.1 F | SYSTOLIC BLOOD PRESSURE: 100 MMHG | WEIGHT: 138 LBS | OXYGEN SATURATION: 97 % | DIASTOLIC BLOOD PRESSURE: 64 MMHG | BODY MASS INDEX: 24.45 KG/M2 | HEART RATE: 103 BPM

## 2017-10-05 DIAGNOSIS — E10.65 TYPE 1 DIABETES MELLITUS WITH HYPERGLYCEMIA (H): Primary | ICD-10-CM

## 2017-10-05 LAB — HBA1C MFR BLD: 11.7 % (ref 4.3–6)

## 2017-10-05 PROCEDURE — 99207 C FOOT EXAM  NO CHARGE: CPT | Performed by: FAMILY MEDICINE

## 2017-10-05 PROCEDURE — 83036 HEMOGLOBIN GLYCOSYLATED A1C: CPT | Performed by: FAMILY MEDICINE

## 2017-10-05 PROCEDURE — 36415 COLL VENOUS BLD VENIPUNCTURE: CPT | Performed by: FAMILY MEDICINE

## 2017-10-05 PROCEDURE — 99214 OFFICE O/P EST MOD 30 MIN: CPT | Performed by: FAMILY MEDICINE

## 2017-10-05 RX ORDER — AMPICILLIN TRIHYDRATE 250 MG
500 CAPSULE ORAL
Qty: 180 CAPSULE | Refills: 3 | Status: SHIPPED | OUTPATIENT
Start: 2017-10-05 | End: 2019-09-20

## 2017-10-05 RX ORDER — MULTIPLE VITAMINS W/ MINERALS TAB 9MG-400MCG
1 TAB ORAL DAILY
Qty: 100 TABLET | Refills: 3 | Status: SHIPPED | OUTPATIENT
Start: 2017-10-05 | End: 2019-09-20

## 2017-10-05 NOTE — MR AVS SNAPSHOT
After Visit Summary   10/5/2017    Arielle Montenegro    MRN: [id number]           Patient Information     Date Of Birth          1985        Visit Information        Provider Department      10/5/2017 11:40 AM Ronna Lares,  [facility]        Today's Diagnoses     Type 1 diabetes mellitus with hyperglycemia (H)    -  1       Follow-ups after your visit        Additional Services     DIABETES EDUCATOR REFERRAL       DIABETES SELF MANAGEMENT TRAINING (DSMT)      Your provider has referred you to Diabetes Education: FMG: Diabetes Education - [facility] [phone]   [email or web address]    Type of training and number of hours: Previous Diagnosis: Follow-up DSMT - 2 hours.    Medicare covers: 10 hours of initial DSMT in 12 month period from the time of first visit, plus 2 hours of follow-up DSMT annually, and additional hours as requested for insulin training.    Diabetes Type: Type 1             Diabetes Co-Morbidities: none               A1C Goal:  <7.0       A1C is: Lab Results       Component                Value               Date                       A1C                      11.7                10/05/2017              If an urgent visit is needed or A1C is above 12, Care Team to call the Diabetes Education Team at [phone] or send an In Basket message to the Diabetes Education Pool (P DIAB ED-PATIENT CARE).    Diabetes Education Topics: Comprehensive Knowledge Assessment and Instruction and Continuous Glucose Monitor: Diagnostic CGM (minimum of 72 hours) and Personal CGM    Special Educational Needs Requiring Individual DSMT: None       MEDICAL NUTRITION THERAPY (MNT) for Diabetes    Medical Nutrition Therapy with a Registered Dietitian can be provided in coordination with Diabetes Self-Management Training to assist in achieving optimal diabetes management.    MNT Type and Hours: Do not initiate MNT at  this time.                       Medicare will cover: 3 hours initial MNT in 12 month period after first visit, plus 2 hours of follow-up MNT annually    Please be aware that coverage of these services is subject to the terms and limitations of your health insurance plan.  Call member services at your health plan to determine Diabetes Self-Management Training benefits and ask which blood glucose monitor brands are covered by your plan.      Please bring the following with you to your appointment:    (1)  List of current medications   (2)  List of Blood Glucose Monitor brands that are covered by your insurance plan  (3)  Blood Glucose Monitor and log book  (4)   Food records for the 3 days prior to your visit    The Certified Diabetes Educator may make diabetes medication adjustments per the CDE Protocol and Collaborative Practice Agreement.            ENDOCRINOLOGY ADULT REFERRAL       Your provider has referred you to: FMG: [facility] [phone]   [email or web address]/      Please be aware that coverage of these services is subject to the terms and limitations of your health insurance plan.  Call member services at your health plan with any benefit or coverage questions.      Please bring the following to your appointment:    >>   Any x-rays, CTs or MRIs which have been performed.  Contact the facility where they were done to arrange for  prior to your scheduled appointment.    >>   List of current medications   >>   This referral request   >>   Any documents/labs given to you for this referral                  Your next 10 appointments already scheduled     Oct 13, 2017 11:00 AM CDT   [facility]    [address]  [facility] [id number] [phone]            Oct 13, 2017  1:30 PM CDT   Office Visit with Lanette Antunez   [facility]  "[place] [facility]    [address]  [facility] [address]   [phone]           Bring a current list of meds and any records pertaining to this visit. For Physicals, please bring immunization records and any forms needing to be filled out. Please arrive 10 minutes early to complete paperwork.              Future tests that were ordered for you today     Open Future Orders        Priority Expected Expires Ordered    Albumin Random Urine Quantitative with Creat Ratio Routine  10/5/2018 10/5/2017            Who to contact     If you have questions or need follow up information about today's clinic visit or your schedule please contact [facility] directly at [phone].  Normal or non-critical lab and imaging results will be communicated to you by MyChart, letter or phone within 4 business days after the clinic has received the results. If you do not hear from us within 7 days, please contact the clinic through [employer] or phone. If you have a critical or abnormal lab result, we will notify you by phone as soon as possible.  Submit refill requests through [employer] or call your pharmacy and they will forward the refill request to us. Please allow 3 business days for your refill to be completed.          Additional Information About Your Visit        [employer] Information     [employer] lets you send messages to your doctor, view your test results, renew your prescriptions, schedule appointments and more. To sign up, go to [email or web address] . Click on \"Log in\" on the left side of the screen, which will take you to the Welcome page. Then click on \"Sign up Now\" on the right side of the page.     You will be asked to enter the access code listed below, as well as some personal information. Please follow the directions to create your username and password.     Your access code is: [email or web address]  Expires: 2018  8:03 AM     Your access code will  in 90 days. If you " "need help or a new code, please call your [place] clinic or [phone].        Care EveryWhere ID     This is your Care EveryWhere ID. This could be used by other organizations to access your [place] medical records  [id number]        Your Vitals Were     Pulse Temperature Height Pulse Oximetry BMI (Body Mass Index)       103 98.1  F (36.7  C) (Oral) 5' 3\" (1.6 m) 97% 24.45 kg/m2        Blood Pressure from Last 3 Encounters:   10/05/17 100/64   05/11/17 90/60   04/22/17 112/72    Weight from Last 3 Encounters:   10/05/17 138 lb (62.6 kg)   05/11/17 139 lb (63 kg)   11/03/16 143 lb (64.9 kg)              We Performed the Following     DIABETES EDUCATOR REFERRAL     ENDOCRINOLOGY ADULT REFERRAL     FOOT EXAM     Hemoglobin A1c          Today's Medication Changes          These changes are accurate as of: 10/5/17 11:59 PM.  If you have any questions, ask your nurse or doctor.               Start taking these medicines.        Dose/Directions    blood glucose lancets standard   Commonly known as:  no brand specified   Used for:  Type 1 diabetes mellitus with hyperglycemia (H)   Started by:  Ronna Lares DO        Use to test blood sugar 4 times daily or as directed.   Quantity:  100 each   Refills:  11       cinnamon 500 MG Caps   Used for:  Type 1 diabetes mellitus with hyperglycemia (H)   Started by:  Ronna Lares DO        Dose:  500 mg   Take 500 mg by mouth 2 times daily   Quantity:  180 capsule   Refills:  3       multivitamin, therapeutic with minerals Tabs tablet   Used for:  Type 1 diabetes mellitus with hyperglycemia (H)   Started by:  Ronna Lares DO        Dose:  1 tablet   Take 1 tablet by mouth daily   Quantity:  100 tablet   Refills:  3         These medicines have changed or have updated prescriptions.        Dose/Directions    * blood glucose monitoring test strip   Commonly known as:  no brand specified   This may have changed:  Another medication with the same name was " added. Make sure you understand how and when to take each.   Used for:  Hypoglycemia unawareness in type 1 diabetes mellitus (H), Type 1 diabetes mellitus with hyperglycemia (H), Blindness of right eye   Changed by:  Ainsley Oakley APRN CNP        Use to test blood sugars 4 times daily or as directed   Quantity:  1 Box   Refills:  11       * blood glucose monitoring test strip   Commonly known as:  no brand specified   This may have changed:  You were already taking a medication with the same name, and this prescription was added. Make sure you understand how and when to take each.   Used for:  Type 1 diabetes mellitus with hyperglycemia (H)   Changed by:  Ronna Lares DO        Use to test blood sugars 4 times daily or as directed   Quantity:  100 strip   Refills:  11       insulin glargine 100 UNIT/ML injection   Commonly known as:  LANTUS SOLOSTAR   This may have changed:  how much to take   Used for:  Type 1 diabetes mellitus with hyperglycemia (H)   Changed by:  Ronna Lares DO        Dose:  60 Units   Inject 60 Units Subcutaneous daily   Quantity:  18 mL   Refills:  3       * Notice:  This list has 2 medication(s) that are the same as other medications prescribed for you. Read the directions carefully, and ask your doctor or other care provider to review them with you.         Where to get your medicines      These medications were sent to [facility] [place], [place] - [address]  [address] [facility] [address]     Phone:  [phone]     blood glucose lancets standard    blood glucose monitoring test strip    cinnamon 500 MG Caps    insulin aspart 100 UNIT/ML injection    insulin glargine 100 UNIT/ML injection    insulin pen needle 32G X 6 MM    multivitamin, therapeutic with minerals Tabs tablet                Primary Care Provider Office Phone # Fax #    [facility] [phone] [facility]  [address]        Equal Access to Services     [facility] Center[facility] : Hadii denita estrada rebeccajose raul Aspen, walatishada luqlollyha, qaybta austenkylerdebra gannon, byron marmolejo. So [facility] [phone].    ATENCIÓN: Si habla español, tiene a ayala disposición servicios gratuitos de asistencia lingüística. Llame al [phone].    We comply with applicable federal civil rights laws and [place] laws. We do not discriminate on the basis of race, color, national origin, age, disability, sex, sexual orientation, or gender identity.            Thank you!     Thank you for choosing [facility]  for your care. Our goal is always to provide you with excellent care. Hearing back from our patients is one way we can continue to improve our services. Please take a few minutes to complete the written survey that you may receive in the mail after your visit with us. Thank you!             Your Updated Medication List - Protect others around you: Learn how to safely use, store and throw away your medicines at www.disposemymeds.org.          This list is accurate as of: 10/5/17 11:59 PM.  Always use your most recent med list.                   Brand Name Dispense Instructions for use Diagnosis    acetone (Urine) test Strp     100 each    Use one strip as needed to test urine for ketones.  Test urine for ketones when blood sugars are >250 or during illness with fever, abdominal pain, nausea, or vomiting.    Type 1 diabetes mellitus with complication (H), Uncontrolled type 1 diabetes mellitus with complication (H), Encounter for long-term (current) use of insulin (H), Hypoglycemia unawareness in type 1 diabetes mellitus (H), Type 1 diabetes mellitus with hyperglycemia (H), Blindness of right eye       albuterol 108 (90 BASE) MCG/ACT Inhaler    PROAIR HFA    1 Inhaler    Inhale 2 puffs into the lungs every 4 hours as needed for shortness of breath / dyspnea        blood glucose lancets standard    no brand specified    100  each    Use to test blood sugar 4 times daily or as directed.    Type 1 diabetes mellitus with hyperglycemia (H)       blood glucose monitoring lancets     1 Box    Use to test blood sugar 4 times daily or as directed.    Hypoglycemia unawareness in type 1 diabetes mellitus (H), Type 1 diabetes mellitus with hyperglycemia (H), Blindness of right eye       * blood glucose monitoring test strip    no brand specified    1 Box    Use to test blood sugars 4 times daily or as directed    Hypoglycemia unawareness in type 1 diabetes mellitus (H), Type 1 diabetes mellitus with hyperglycemia (H), Blindness of right eye       * blood glucose monitoring test strip    no brand specified    100 strip    Use to test blood sugars 4 times daily or as directed    Type 1 diabetes mellitus with hyperglycemia (H)       cinnamon 500 MG Caps     180 capsule    Take 500 mg by mouth 2 times daily    Type 1 diabetes mellitus with hyperglycemia (H)       GLUCOSE MANAGEMENT Tabs     100 tablet    4 tabs po for low blood sugar below 70, may repeat q 10-15 minutes as needed    Type 1 diabetes mellitus with complication (H), Uncontrolled type 1 diabetes mellitus with complication (H), Encounter for long-term (current) use of insulin (H), Hypoglycemia unawareness in type 1 diabetes mellitus (H), Type 1 diabetes mellitus with hyperglycemia (H), Blindness of right eye       ibuprofen 400 MG tablet    ADVIL/MOTRIN    120 tablet    Take 1 tablet (400 mg) by mouth every 6 hours as needed for moderate pain    External hemorrhoids       insulin aspart 100 UNIT/ML injection    NovoLOG FLEXPEN    15 mL    Take 2 units per carb with each meal plus corrections 1:50 > 150    Type 1 diabetes mellitus with hyperglycemia (H)       insulin glargine 100 UNIT/ML injection    LANTUS SOLOSTAR    18 mL    Inject 60 Units Subcutaneous daily    Type 1 diabetes mellitus with hyperglycemia (H)       insulin pen needle 32G X 6 MM    NOVOFINE    100 each    Use 4 daily or  as directed.    Type 1 diabetes mellitus with hyperglycemia (H)       multivitamin, therapeutic with minerals Tabs tablet     100 tablet    Take 1 tablet by mouth daily    Type 1 diabetes mellitus with hyperglycemia (H)       * Notice:  This list has 2 medication(s) that are the same as other medications prescribed for you. Read the directions carefully, and ask your doctor or other care provider to review them with you.

## 2017-10-05 NOTE — PROGRESS NOTES
SUBJECTIVE:   Arielle Montenegro is a 32 year old female who presents to clinic today for the following health issues:      Diabetes Follow-up      Patient is checking blood sugars: not at all    Diabetic concerns: discoloration of skin in hand and feet with spots on them     Symptoms of hypoglycemia (low blood sugar): none     Paresthesias (numbness or burning in feet) or sores: No     Date of last diabetic eye exam: few months ago        Amount of exercise or physical activity: None    Problems taking medications regularly: No    Medication side effects: none  Diet: regular (no restrictions)    Type 1 diabetes with very poor control recently.  Patient had an A1C of 7.2 just two years ago and now is at 11.7.  She states she was under good control during her pregnancy and then after delivery stopped taking care of herself.  She is not checking blood sugars at all.  She has noticed multiple hyperpigmented spots on her skin recently.  Admits to some mild numbness and tingling in her hands and feet.  No vision changes, though she is blind in the right eye.      Problem list and histories reviewed & adjusted, as indicated.  Additional history: as documented    Patient Active Problem List   Diagnosis     Blindness of right eye     Diabetes mellitus type 1 (H)     Hypoglycemia unawareness in type 1 diabetes mellitus (H)     Health Care Home     Vitamin D deficiency     External hemorrhoids     Encounter for long-term (current) use of insulin (H)     Past Surgical History:   Procedure Laterality Date      SECTION  13      SECTION N/A 2015    Procedure:  SECTION;  Surgeon: John Hudson MD;  Location:  L+D     ENUCLEATION Right 3/20/2015    Procedure: ENUCLEATION;  Surgeon: Edilson Islas MD;  Location: [facility]       Social History   Substance Use Topics     Smoking status: Former Smoker     Types: Cigarettes     Smokeless tobacco: Never Used      Comment: smokes 2  "cigarettes/day-none for several months     Alcohol use No     Family History   Problem Relation Age of Onset     Family History Negative Mother      Family History Negative Father      DIABETES Other      father's side             Reviewed and updated as needed this visit by clinical staff     Reviewed and updated as needed this visit by Provider         ROS:  Constitutional, HEENT, cardiovascular, pulmonary, gi and gu systems are negative, except as otherwise noted.      OBJECTIVE:   /64 (BP Location: Right arm, Patient Position: Chair, Cuff Size: Adult Regular)  Pulse 103  Temp 98.1  F (36.7  C) (Oral)  Ht 5' 3\" (1.6 m)  Wt 138 lb (62.6 kg)  SpO2 97%  BMI 24.45 kg/m2  Body mass index is 24.45 kg/(m^2).  GENERAL: healthy, alert and no distress  NECK: no adenopathy, no asymmetry, masses, or scars and thyroid normal to palpation  RESP: lungs clear to auscultation - no rales, rhonchi or wheezes  CV: regular rate and rhythm, normal S1 S2, no S3 or S4, no murmur, click or rub, no peripheral edema and peripheral pulses strong  Diabetic foot exam: normal DP and PT pulses, no trophic changes or ulcerative lesions and normal sensory exam  SKIN: Multiple small circular areas of skin hyperpigmentaion on the hands and feet (largest is about 1cm)     Diagnostic Test Results:  Results for orders placed or performed in visit on 10/05/17   Hemoglobin A1c   Result Value Ref Range    Hemoglobin A1C 11.7 (H) 4.3 - 6.0 %       ASSESSMENT/PLAN:     1. Type 1 diabetes mellitus with hyperglycemia (H)  - Uncontrolled diabetes   - Will increase Lantus to 60 U QHS  - Got pt a new glucometer, strips, and lancets and encouraged her to check her sugars QID  - Continue novolog sliding scale  - Will get her in with endocrinology and DM education ASAP to help improve her A1C   - Patient seems motivated to turn around her behavior today.  She was shocked that her A1C was that high and wants to get better   - insulin glargine (LANTUS " SOLOSTAR) 100 UNIT/ML injection; Inject 60 Units Subcutaneous daily  Dispense: 18 mL; Refill: 3  - insulin aspart (NOVOLOG FLEXPEN) 100 UNIT/ML injection; Take 2 units per carb with each meal plus corrections 1:50 > 150  Dispense: 15 mL; Refill: 2  - ENDOCRINOLOGY ADULT REFERRAL  - blood glucose monitoring (NO BRAND SPECIFIED) test strip; Use to test blood sugars 4 times daily or as directed  Dispense: 100 strip; Refill: 11  - blood glucose (NO BRAND SPECIFIED) lancets standard; Use to test blood sugar 4 times daily or as directed.  Dispense: 100 each; Refill: 11  - insulin pen needle (NOVOFINE) 32G X 6 MM; Use 4 daily or as directed.  Dispense: 100 each; Refill: 11  - DIABETES EDUCATOR REFERRAL  - multivitamin, therapeutic with minerals (MULTI-VITAMIN) TABS tablet; Take 1 tablet by mouth daily  Dispense: 100 tablet; Refill: 3  - cinnamon 500 MG CAPS; Take 500 mg by mouth 2 times daily  Dispense: 180 capsule; Refill: 3  - Albumin Random Urine Quantitative with Creat Ratio; Future  - FOOT EXAM    Follow up with me in 1-2 months.     Ronna Lares, DO  [facility]

## 2017-10-05 NOTE — NURSING NOTE
"Chief Complaint   Patient presents with     Diabetes     follow up        Initial /64 (BP Location: Right arm, Patient Position: Chair, Cuff Size: Adult Regular)  Pulse 103  Temp 98.1  F (36.7  C) (Oral)  Ht 5' 3\" (1.6 m)  Wt 138 lb (62.6 kg)  SpO2 97%  BMI 24.45 kg/m2 Estimated body mass index is 24.45 kg/(m^2) as calculated from the following:    Height as of this encounter: 5' 3\" (1.6 m).    Weight as of this encounter: 138 lb (62.6 kg).  Medication Reconciliation: complete   Minal Vazquez, DOV      "

## 2017-10-10 ENCOUNTER — TELEPHONE (OUTPATIENT)
Dept: FAMILY MEDICINE | Facility: CLINIC | Age: 32
End: 2017-10-10

## 2017-10-10 DIAGNOSIS — E10.9 TYPE 1 DIABETES MELLITUS WITHOUT COMPLICATION (H): Primary | ICD-10-CM

## 2017-10-10 NOTE — TELEPHONE ENCOUNTER
METRIC FAILED: DM and CV    RECOMMENDATIONS: add statin, need to update epic, pt ALSO due for annual lipids, routed to MP, can add statin intentionally not prescribed if appropriate    Recent Labs   Lab Test  06/28/16   1030   CHOL  168   HDL  78   LDL  86     Lab Results   Component Value Date    AST 10 05/11/2017     Lab Results   Component Value Date    ALT 18 05/11/2017     Lanette Leyva, RN, BSN  Message handled by Nurse Triage.

## 2017-10-11 NOTE — TELEPHONE ENCOUNTER
Patient informed. She was able to schedule a lab only for tomorrow.     Ericka HORNE RN, BSN, PHN  [facility] RN

## 2017-12-22 ENCOUNTER — TELEPHONE (OUTPATIENT)
Dept: FAMILY MEDICINE | Facility: CLINIC | Age: 32
End: 2017-12-22

## 2017-12-22 DIAGNOSIS — E10.65 TYPE 1 DIABETES MELLITUS WITH HYPERGLYCEMIA (H): ICD-10-CM

## 2017-12-22 NOTE — TELEPHONE ENCOUNTER
Pt calls, wants insulin sent to [facility],agrees to make f/u DM appointment with DR Lares    Lab Results   Component Value Date    A1C 11.7 10/05/2017    A1C 10.0 05/11/2017     Recent Labs   Lab Test  06/28/16   1030   CHOL  168   HDL  78   LDL  86     BP Readings from Last 2 Encounters:   10/05/17 100/64   05/11/17 90/60     Prescription approved per [facility] Refill Protocol.  Lanette Leyva RN, BSN

## 2017-12-26 ENCOUNTER — HOSPITAL ENCOUNTER (EMERGENCY)
Facility: CLINIC | Age: 32
Discharge: LEFT BEFORE DISCHARGE | End: 2017-12-26
Attending: EMERGENCY MEDICINE | Admitting: EMERGENCY MEDICINE
Payer: COMMERCIAL

## 2017-12-26 VITALS
RESPIRATION RATE: 18 BRPM | HEART RATE: 92 BPM | TEMPERATURE: 98.8 F | SYSTOLIC BLOOD PRESSURE: 118 MMHG | DIASTOLIC BLOOD PRESSURE: 69 MMHG | OXYGEN SATURATION: 100 %

## 2017-12-26 DIAGNOSIS — R10.13 ABDOMINAL PAIN, EPIGASTRIC: ICD-10-CM

## 2017-12-26 DIAGNOSIS — R73.9 HYPERGLYCEMIA: ICD-10-CM

## 2017-12-26 LAB
ALBUMIN SERPL-MCNC: 2.9 G/DL (ref 3.4–5)
ALBUMIN UR-MCNC: NEGATIVE MG/DL
ALP SERPL-CCNC: 124 U/L (ref 40–150)
ALT SERPL W P-5'-P-CCNC: 15 U/L (ref 0–50)
ANION GAP SERPL CALCULATED.3IONS-SCNC: 6 MMOL/L (ref 3–14)
APPEARANCE UR: CLEAR
AST SERPL W P-5'-P-CCNC: 13 U/L (ref 0–45)
BASOPHILS # BLD AUTO: 0.1 10E9/L (ref 0–0.2)
BASOPHILS NFR BLD AUTO: 0.5 %
BILIRUB SERPL-MCNC: 0.1 MG/DL (ref 0.2–1.3)
BILIRUB UR QL STRIP: NEGATIVE
BUN SERPL-MCNC: 14 MG/DL (ref 7–30)
CALCIUM SERPL-MCNC: 8.7 MG/DL (ref 8.5–10.1)
CHLORIDE SERPL-SCNC: 100 MMOL/L (ref 94–109)
CO2 SERPL-SCNC: 28 MMOL/L (ref 20–32)
COLOR UR AUTO: ABNORMAL
CREAT SERPL-MCNC: 1.02 MG/DL (ref 0.52–1.04)
DIFFERENTIAL METHOD BLD: NORMAL
EOSINOPHIL # BLD AUTO: 0.2 10E9/L (ref 0–0.7)
EOSINOPHIL NFR BLD AUTO: 1.5 %
ERYTHROCYTE [DISTWIDTH] IN BLOOD BY AUTOMATED COUNT: 13.4 % (ref 10–15)
GFR SERPL CREATININE-BSD FRML MDRD: 63 ML/MIN/1.7M2
GLUCOSE SERPL-MCNC: 372 MG/DL (ref 70–99)
GLUCOSE UR STRIP-MCNC: >499 MG/DL
HCG UR QL: NEGATIVE
HCT VFR BLD AUTO: 36.4 % (ref 35–47)
HGB BLD-MCNC: 12.5 G/DL (ref 11.7–15.7)
HGB UR QL STRIP: NEGATIVE
IMM GRANULOCYTES # BLD: 0 10E9/L (ref 0–0.4)
IMM GRANULOCYTES NFR BLD: 0.3 %
KETONES UR STRIP-MCNC: NEGATIVE MG/DL
LEUKOCYTE ESTERASE UR QL STRIP: NEGATIVE
LIPASE SERPL-CCNC: 186 U/L (ref 73–393)
LYMPHOCYTES # BLD AUTO: 2.6 10E9/L (ref 0.8–5.3)
LYMPHOCYTES NFR BLD AUTO: 23.7 %
MCH RBC QN AUTO: 30 PG (ref 26.5–33)
MCHC RBC AUTO-ENTMCNC: 34.3 G/DL (ref 31.5–36.5)
MCV RBC AUTO: 87 FL (ref 78–100)
MONOCYTES # BLD AUTO: 0.4 10E9/L (ref 0–1.3)
MONOCYTES NFR BLD AUTO: 3.5 %
NEUTROPHILS # BLD AUTO: 7.7 10E9/L (ref 1.6–8.3)
NEUTROPHILS NFR BLD AUTO: 70.5 %
NITRATE UR QL: NEGATIVE
NRBC # BLD AUTO: 0 10*3/UL
NRBC BLD AUTO-RTO: 0 /100
PH UR STRIP: 7 PH (ref 5–7)
PLATELET # BLD AUTO: 296 10E9/L (ref 150–450)
POTASSIUM SERPL-SCNC: 4 MMOL/L (ref 3.4–5.3)
PROT SERPL-MCNC: 6.5 G/DL (ref 6.8–8.8)
RBC # BLD AUTO: 4.17 10E12/L (ref 3.8–5.2)
RBC #/AREA URNS AUTO: 1 /HPF (ref 0–2)
SODIUM SERPL-SCNC: 134 MMOL/L (ref 133–144)
SOURCE: ABNORMAL
SP GR UR STRIP: 1.02 (ref 1–1.03)
SQUAMOUS #/AREA URNS AUTO: 1 /HPF (ref 0–1)
UROBILINOGEN UR STRIP-MCNC: 0 MG/DL (ref 0–2)
WBC # BLD AUTO: 11 10E9/L (ref 4–11)
WBC #/AREA URNS AUTO: <1 /HPF (ref 0–2)

## 2017-12-26 PROCEDURE — 81025 URINE PREGNANCY TEST: CPT | Performed by: EMERGENCY MEDICINE

## 2017-12-26 PROCEDURE — 83690 ASSAY OF LIPASE: CPT | Performed by: EMERGENCY MEDICINE

## 2017-12-26 PROCEDURE — 99283 EMERGENCY DEPT VISIT LOW MDM: CPT

## 2017-12-26 PROCEDURE — 81001 URINALYSIS AUTO W/SCOPE: CPT | Performed by: EMERGENCY MEDICINE

## 2017-12-26 PROCEDURE — 85025 COMPLETE CBC W/AUTO DIFF WBC: CPT | Performed by: EMERGENCY MEDICINE

## 2017-12-26 PROCEDURE — 80053 COMPREHEN METABOLIC PANEL: CPT | Performed by: EMERGENCY MEDICINE

## 2017-12-26 ASSESSMENT — ENCOUNTER SYMPTOMS
BLOOD IN STOOL: 0
VOMITING: 0
BACK PAIN: 1
APPETITE CHANGE: 1
ABDOMINAL PAIN: 1
DIARRHEA: 0
RHINORRHEA: 0
COUGH: 0
FATIGUE: 1
FEVER: 0
DYSURIA: 0
NAUSEA: 1
CONSTIPATION: 0

## 2017-12-26 NOTE — ED PROVIDER NOTES
History     Chief Complaint:  Fatigue and Abdominal Pain      HPI   Arielle Montenegro is a 32 year old female with a history of type I DM on insulin who presents to the emergency department for evaluation of abdominal pain and generalized fatigue. The patient states that she has had approximately one week of generalized epigastric abdominal pain, which radiates to her low  back. This pain is exacerbated after eating. She additionally reports that she has been generally very fatigued with these symptoms, with poor PO intake and nausea as well.     The patient has attempted to take multivitamins for her symptoms, with some relief of her fatigue, though no change in her abdominal pain. She denies any recent fevers, cough, congestion, rhinorrhea, dysuria, vomiting, constipation, diarrhea, or black or bloody stools. She does note a chance of pregnancy, explaining that she generally has irregular periods. No known ill contacts.     Allergies:  No Known Allergies     Medications:    Insulin (Lantus and Novolog)  Albuterol  Multivitamin    Past Medical History:    Blindness  DM, type I  External hemorrhoids  2 children    Past Surgical History:     x2  Enucleation    Family History:    No past pertinent family history.    Social History:  Presents alone.   Former Smoker.   Negative for alcohol use.  Marital Status:  Single [1]    Review of Systems   Constitutional: Positive for appetite change and fatigue. Negative for fever.   HENT: Negative for congestion and rhinorrhea.    Respiratory: Negative for cough.    Gastrointestinal: Positive for abdominal pain and nausea. Negative for blood in stool, constipation, diarrhea and vomiting.   Genitourinary: Negative for dysuria.   Musculoskeletal: Positive for back pain.   All other systems reviewed and are negative.  Patient presents to the ED reporting fatigue and LLQ abdominal pain that radiates through to the back for the past few days.     Physical Exam   First  "Vitals:  BP: 128/90  Pulse: 92  Temp: 98.8  F (37.1  C)  Resp: 18  SpO2: 100 %    Physical Exam  GEN: patient conversive, no distress  HEAD: atraumatic, normocephalic  EYES: pupils reactive, conjunctivae normal, strabismus  ENT: TMs flat and white bilaterally, oropharynx normal with no erythema or exudate, mucus membranes moist  NECK: no cervical LAD  RESPIRATORY: no tachypnea, breath sounds clear to auscultation (no rales, wheezes, rhonchi)  CVS: normal S1/S2, no murmurs/rubs/gallops  ABDOMEN: soft, ?epigastric tenderness, no masses or organomegaly, no rebound, decreased bowel sounds, no RLQ or LLQ tenderness  BACK: no costovertebral angle tenderness  EXTREMITIES: intact pulses x 2 (radial pulses intact),  no edema  MUSCULOSKELETAL: no deformities  SKIN: warm and dry  NEURO:  Overall symmetrical exam  HEME: no bruising     Emergency Department Course   Laboratory:  The following was not available until after the patient left the ED  CBC: WBC: 11.0, HGB: 12.5, PLT: 296  CMP: Glucose 372 (H), Bilirubin 0.1 (L), Albumin 2.9 (L), Protein total 6.5 (L), o/w WNL (Creatinine: 1.02)    Lipase: 186    UA with micro: glucose >499 (H) o/w negative   HCG qualitative urine: Negative     Interventions:  NS 1L IV- ordered, not given (eloped)  Pepcid 20 mg IV - ordered, not given (eloped)    Emergency Department Course:  The patient provided a urine sample here in the emergency department. This was sent for laboratory testing, findings above.     Nursing notes and vitals reviewed. 1719 I performed an exam of the patient as documented above.     Blood drawn. This was sent to the lab for further testing, results above.    1746 Patient pulled out IV, stating to nursing staff that she \"don't have time for an IV. [I] need to leave.\"    The patient has eloped the department without receiving discharge instructions.   patient left the ED prior to recheck by this provider.    Impression & Plan    Medical Decision Making:  Arielle Fallon " Lan is a 32 year old female who complains of fatigue and epigastric abdominal pain. The triage notes that this is left lower quadrant pain, though on palpation, she had some epigastric tenderness but no RLQ or LLQ pain. She states that she is not able to eat very much. She had been in triage for a number of hours and had already given us a urine. She was concerned that she could be pregnant. I did see her and ordered a CBC, a CMP, and a lipase (patient is a type I DM and a sugar had not been checked in triage).   I informed here that her uranalysis was negative for pregnancy (based upon triage UA). She did have a high glucose in the urine at 499, which was concerning, though her pregnancy test was negative. Finger stick sugar was not checked in triage.     Patient's blood was drawn and the results pending when she eloped.    The patient decided to elope after the IV and labs was ordered. She is no longer in the ED. Her vital signs are stable on evaluation, she did not have any peritoneal signs. I did suspect gastritis and possibly some stomach upset from some hyperglycemia. Plan was for IV hydration with IV fluids and to have the patient rechecked, though again, she has eloped.     Labs came back after the patient eloped.    Diagnosis:    ICD-10-CM   1. Abdominal pain, epigastric R10.13   2. Hyperglycemia R73.9     Disposition:  Eloped the department     Kaley JORDAN, am serving as a scribe on 12/26/2017 at 5:19 PM to personally document services performed by Anne Gibbons MD based on my observations and the provider's statements to me.     Kaley Bojorquez  12/26/2017   [facility] EMERGENCY DEPARTMENT       Anne Gibbons MD  12/26/17 [id number]

## 2017-12-26 NOTE — ED NOTES
Patient presents to the ED reporting fatigue and LLQ abdominal pain that radiates through to the back for the past few days.

## 2018-01-18 ENCOUNTER — TELEPHONE (OUTPATIENT)
Dept: FAMILY MEDICINE | Facility: CLINIC | Age: 33
End: 2018-01-18

## 2018-01-18 NOTE — TELEPHONE ENCOUNTER
Panel Management Review      Patient has the following on her problem list:     Diabetes    ASA: Passed    Last A1C  Lab Results   Component Value Date    A1C 11.7 10/05/2017    A1C 10.0 05/11/2017    A1C 10.8 10/28/2016    A1C 10.0 06/28/2016    A1C 9.4 04/24/2015     A1C tested: MONITOR    Last LDL:    Lab Results   Component Value Date    CHOL 168 06/28/2016     Lab Results   Component Value Date    HDL 78 06/28/2016     Lab Results   Component Value Date    LDL 86 06/28/2016     No results found for: TRIG  No results found for: CHOLHDLRATIO  No results found for: NHDL    Is the patient on a Statin? NO             Is the patient on Aspirin? NO        Last three blood pressure readings:  BP Readings from Last 3 Encounters:   12/26/17 118/69   10/05/17 100/64   05/11/17 90/60       Date of last diabetes office visit: has a future diabetes ed 1/31/18     Tobacco History:     History   Smoking Status     Former Smoker     Types: Cigarettes   Smokeless Tobacco     Never Used     Comment: smokes 2 cigarettes/day-none for several months             Composite cancer screening  Chart review shows that this patient is due/due soon for the following Pap Smear  Summary:    Patient is due/failing the following:   LDL, PAP and PHYSICAL    Action needed:   Patient needs office visit for px, pap and ldl.    Type of outreach:    Phone, left message for patient to call back.     Questions for provider review:    None                                                                                                                                    Minal Vazquez CMA       Chart routed to Care Team .

## 2018-01-25 DIAGNOSIS — E10.649 HYPOGLYCEMIA UNAWARENESS IN TYPE 1 DIABETES MELLITUS (H): Primary | ICD-10-CM

## 2018-01-25 NOTE — TELEPHONE ENCOUNTER
Requested Prescriptions   Pending Prescriptions Disp Refills     ACCU-CHEK ACTIVE STRIPS test strip [Pharmacy Med Name: ACCU-CHEK SMART     SEBASTIAN]  Last Written Prescription Date:  10/5/2017  Last Fill Quantity: 100 strip,  # refills: 11   Last Office Visit with [facility] provider:  10/5/2017     Future Office Visit:    Next 5 appointments (look out 90 days)     Feb 09, 2018  2:00 PM CST   [facility]    [address] [place] [id number] [phone]                100 strip 11     Sig: USE ONE STRIP TO CHECK GLUCOSE 4 TIMES DAILY    Diabetic Supplies Protocol Failed    1/25/2018  4:18 PM       Failed - Patient has had appt within past 6 mos    IV to PO - Antibiotics     None             Passed - Patient is 18 years of age or older

## 2018-01-26 ENCOUNTER — TELEPHONE (OUTPATIENT)
Dept: FAMILY MEDICINE | Facility: CLINIC | Age: 33
End: 2018-01-26

## 2018-01-26 DIAGNOSIS — E10.649 HYPOGLYCEMIA UNAWARENESS IN TYPE 1 DIABETES MELLITUS (H): ICD-10-CM

## 2018-01-26 DIAGNOSIS — H54.40 BLINDNESS OF RIGHT EYE: ICD-10-CM

## 2018-01-26 DIAGNOSIS — E10.65 TYPE 1 DIABETES MELLITUS WITH HYPERGLYCEMIA (H): ICD-10-CM

## 2018-01-26 NOTE — TELEPHONE ENCOUNTER
Pharmacist calls, insurance changed.  Needs diabetes testing supplies brand changed.   Prescriptions approved per [facility] Refill Protocol.  Signed Prescriptions:                        Disp   Refills    blood glucose monitoring (NO BRAND SPECIFI*100 st*2        Sig: Use to test blood sugars 4 times daily or as directed  Authorizing Provider: ODALIS LU  Ordering User: SCHUYLER MUÑIZ    blood glucose monitoring (ONE TOUCH DELICA*100 ea*2        Sig: Use to test blood sugars 4 times daily or as           directed.  Authorizing Provider: ODALIS LU  Ordering User: SCHUYLER MUÑIZ    blood glucose monitoring (NO BRAND SPECIFI*1 kit  0        Sig: Use to test blood sugar.  One Touch Ultra or Verio.  Authorizing Provider: ODALIS LU  Ordering User: SCHUYLER MUÑIZ RN

## 2018-01-26 NOTE — TELEPHONE ENCOUNTER
[facility] calling and states she last had Smartview strips.  State these are strips she would like.  Sent new RX to pharmacy and took Accu-Chek Active off list to avoid future issues.  Quiana Baugh RN

## 2018-01-26 NOTE — TELEPHONE ENCOUNTER
Last visit for diabetes 10/10/17 with Dr. Lares.  Advised 1-2 month recheck.  Upcoming appt 2/9/18.  One refill sent.  Quiana Baugh RN

## 2018-02-26 ENCOUNTER — TELEPHONE (OUTPATIENT)
Dept: FAMILY MEDICINE | Facility: CLINIC | Age: 33
End: 2018-02-26

## 2018-02-26 NOTE — TELEPHONE ENCOUNTER
Panel Management Review      Patient has the following on her problem list:     Diabetes    ASA: Not Required     Last A1C  Lab Results   Component Value Date    A1C 11.7 10/05/2017    A1C 10.0 05/11/2017    A1C 10.8 10/28/2016    A1C 10.0 06/28/2016    A1C 9.4 04/24/2015     A1C tested: FAILED    Last LDL:    Lab Results   Component Value Date    CHOL 168 06/28/2016     Lab Results   Component Value Date    HDL 78 06/28/2016     Lab Results   Component Value Date    LDL 86 06/28/2016     No results found for: TRIG  No results found for: CHOLHDLRATIO  No results found for: NHDL    Is the patient on a Statin? NO             Is the patient on Aspirin? NO        Last three blood pressure readings:  BP Readings from Last 3 Encounters:   12/26/17 118/69   10/05/17 100/64   05/11/17 90/60       Date of last diabetes office visit: 10/5/1     Tobacco History:     History   Smoking Status     Former Smoker     Types: Cigarettes   Smokeless Tobacco     Never Used     Comment: smokes 2 cigarettes/day-none for several months           Composite cancer screening  Chart review shows that this patient is due/due soon for the following Pap Smear  Summary:    Patient is due/failing the following:   PAP and PHYSICAL    Action needed:   Patient needs office visit for px and pap.    Type of outreach:    Phone, spoke to patient.  made appt wednesday 2/28/18    Questions for provider review:    None                                                                                                                                    Minal Vazquez CMA       Chart routed to none .

## 2018-05-15 DIAGNOSIS — E10.65 TYPE 1 DIABETES MELLITUS WITH HYPERGLYCEMIA (H): ICD-10-CM

## 2018-05-15 NOTE — TELEPHONE ENCOUNTER
"Requested Prescriptions   Pending Prescriptions Disp Refills     insulin aspart (NOVOLOG FLEXPEN) 100 UNIT/ML injection 15 mL 0     Sig: Take 2 units per carb with each meal plus corrections 1:50 > 150    Short Acting Insulin Protocol Failed    5/15/2018  8:21 AM       Failed - LDL on file in past 12 months    Recent Labs   Lab Test  06/28/16   1030   LDL  86            Failed - Microalbumin on file in past 12 months    Recent Labs   Lab Test  09/30/14   1259   MICROL  172   UMALCR  94.51*            Failed - HgbA1C in past 3 or 6 months    If HgbA1C is 8 or greater, it needs to be on file within the past 3 months.  If less than 8, must be on file within the past 6 months.     Recent Labs   Lab Test  10/05/17   1119   A1C  11.7*            Failed - Recent (6 mo) or future (30 days) visit within the authorizing provider's specialty    Patient had office visit in the last 6 months or has a visit in the next 30 days with authorizing provider or within the authorizing provider's specialty.  See \"Patient Info\" tab in inbasket, or \"Choose Columns\" in Meds & Orders section of the refill encounter.           Passed - Blood pressure less than 140/90 in past 6 months    BP Readings from Last 3 Encounters:   12/26/17 118/69   10/05/17 100/64   05/11/17 90/60                Passed - Serum creatinine on file in past 12 months    Recent Labs   Lab Test  12/26/17   1721   CR  1.02            Passed - Patient is age 18 or older        Routing refill request to provider for review/approval because:  Labs not current:  microalbumin last done 2014, FLP last done 2016.        "

## 2018-05-16 DIAGNOSIS — E10.65 TYPE 1 DIABETES MELLITUS WITH HYPERGLYCEMIA (H): ICD-10-CM

## 2018-05-16 NOTE — TELEPHONE ENCOUNTER
"Requested Prescriptions   Pending Prescriptions Disp Refills     insulin pen needle (NOVOFINE) 32G X 6 MM    Last Written Prescription Date:  10/5/17  Last Fill Quantity: 100,  # refills: 11   Last office visit: No previous visit found with prescribing provider:  Luda 2/9/18   Future Office Visit:   100 each 11     Sig: Use 4 daily or as directed.    Diabetic Supplies Protocol Failed    5/16/2018  8:51 AM       Failed - Recent (6 mo) or future (30 days) visit within the authorizing provider's specialty    Patient had office visit in the last 6 months or has a visit in the next 30 days with authorizing provider.  See \"Patient Info\" tab in inbasket, or \"Choose Columns\" in Meds & Orders section of the refill encounter.           Passed - Patient is 18 years of age or older        "

## 2018-05-16 NOTE — TELEPHONE ENCOUNTER
Per [facility] pharmacy new prescription request.    They are requesting   BD ultra fine pen needles kori 4 mm 1 5/32 32 G  #4oo .    FROM:  insulin pen needle (NOVOFINE) 32G X 6 MM

## 2018-05-17 NOTE — TELEPHONE ENCOUNTER
Prescription approved per [facility] Refill Protocol.    Ericka HORNE RN, BSN, PHN  [place] [facility] RN

## 2018-05-17 NOTE — TELEPHONE ENCOUNTER
Needs to schedule appt ASAP (with any provider) for diabetes check--long overdue and poor control noted according to last A1c.   Please advise patient.

## 2018-06-14 ENCOUNTER — OFFICE VISIT (OUTPATIENT)
Dept: FAMILY MEDICINE | Facility: CLINIC | Age: 33
End: 2018-06-14
Payer: COMMERCIAL

## 2018-06-14 VITALS
HEIGHT: 62 IN | BODY MASS INDEX: 25.8 KG/M2 | TEMPERATURE: 97.9 F | RESPIRATION RATE: 16 BRPM | SYSTOLIC BLOOD PRESSURE: 118 MMHG | HEART RATE: 82 BPM | DIASTOLIC BLOOD PRESSURE: 62 MMHG | WEIGHT: 140.2 LBS

## 2018-06-14 DIAGNOSIS — Z91.199 MEDICAL NON-COMPLIANCE: ICD-10-CM

## 2018-06-14 DIAGNOSIS — E10.43 TYPE 1 DIABETES MELLITUS WITH DIABETIC AUTONOMIC NEUROPATHY (H): Primary | ICD-10-CM

## 2018-06-14 LAB — HBA1C MFR BLD: 11.4 % (ref 0–5.6)

## 2018-06-14 PROCEDURE — 80053 COMPREHEN METABOLIC PANEL: CPT | Performed by: FAMILY MEDICINE

## 2018-06-14 PROCEDURE — 99214 OFFICE O/P EST MOD 30 MIN: CPT | Performed by: FAMILY MEDICINE

## 2018-06-14 PROCEDURE — 83036 HEMOGLOBIN GLYCOSYLATED A1C: CPT | Performed by: FAMILY MEDICINE

## 2018-06-14 PROCEDURE — 36415 COLL VENOUS BLD VENIPUNCTURE: CPT | Performed by: FAMILY MEDICINE

## 2018-06-14 PROCEDURE — 80061 LIPID PANEL: CPT | Performed by: FAMILY MEDICINE

## 2018-06-14 RX ORDER — GABAPENTIN 300 MG/1
300 CAPSULE ORAL 3 TIMES DAILY
Qty: 270 CAPSULE | Refills: 1 | Status: SHIPPED | OUTPATIENT
Start: 2018-06-14 | End: 2019-09-20

## 2018-06-14 RX ORDER — INSULIN GLARGINE 100 [IU]/ML
60 INJECTION, SOLUTION SUBCUTANEOUS DAILY
Qty: 15 ML | Refills: 0 | Status: SHIPPED | OUTPATIENT
Start: 2018-06-14 | End: 2018-10-29

## 2018-06-14 NOTE — MR AVS SNAPSHOT
After Visit Summary   6/14/2018    Arielle Montenegro    MRN: [id number]           Patient Information     Date Of Birth          1985        Visit Information        Provider Department      6/14/2018 11:20 AM Ronna Lares DO [facility]        Today's Diagnoses     Type 1 diabetes mellitus with diabetic autonomic neuropathy (H)    -  1    Type 1 diabetes mellitus with hyperglycemia (H)           Follow-ups after your visit        Follow-up notes from your care team     Return in about 4 weeks (around 7/12/2018) for Diabetes Follow Up.      Your next 10 appointments already scheduled     Jun 14, 2018 11:20 AM CDT   [facility] Visit with Ronna Lares DO   [facility]    [address] [place] [address] [phone]           Bring a current list of meds and any records pertaining to this visit. For Physicals, please bring immunization records and any forms needing to be filled out. Please arrive 10 minutes early to complete paperwork.            Jul 12, 2018 11:00 AM CDT   SHORT with Ronna Lares DO   [facility]    [address] [facility] [address] [phone]              Who to contact     If you have questions or need follow up information about today's clinic visit or your schedule please contact [facility] directly at [phone].  Normal or non-critical lab and imaging results will be communicated to you by MyChart, letter or phone within 4 business days after the clinic has received the results. If you do not hear from us within 7 days, please contact the clinic through MyChart or phone. If you have a critical or abnormal lab result, we will notify you by phone as soon as possible.  Submit refill requests through [employer] or call your pharmacy and they will forward the refill request to us. Please  "allow 3 business days for your refill to be completed.          Additional Information About Your Visit        Care EveryWhere ID     This is your Care EveryWhere ID. This could be used by other organizations to access your [facility] medical records  [id number]        Your Vitals Were     Pulse Temperature Respirations Height BMI (Body Mass Index)       82 97.9  F (36.6  C) (Oral) 16 5' 2\" (1.575 m) 25.64 kg/m2        Blood Pressure from Last 3 Encounters:   06/14/18 118/62   12/26/17 118/69   10/05/17 100/64    Weight from Last 3 Encounters:   06/14/18 140 lb 3.2 oz (63.6 kg)   10/05/17 138 lb (62.6 kg)   05/11/17 139 lb (63 kg)              We Performed the Following     Comprehensive metabolic panel     Hemoglobin A1c     Lipid panel reflex to direct LDL Non-fasting          Today's Medication Changes          These changes are accurate as of 6/14/18 11:17 AM.  If you have any questions, ask your nurse or doctor.               Start taking these medicines.        Dose/Directions    gabapentin 300 MG capsule   Commonly known as:  NEURONTIN   Used for:  Type 1 diabetes mellitus with diabetic autonomic neuropathy (H)   Started by:  Ronna Lares DO        Dose:  300 mg   Take 1 capsule (300 mg) by mouth 3 times daily   Quantity:  270 capsule   Refills:  1         These medicines have changed or have updated prescriptions.        Dose/Directions    blood glucose monitoring test strip   Commonly known as:  no brand specified   This may have changed:  Another medication with the same name was removed. Continue taking this medication, and follow the directions you see here.   Used for:  Type 1 diabetes mellitus with hyperglycemia (H)   Changed by:  Ronna Lares DO        Use to test blood sugars 4 times daily or as directed   Quantity:  100 strip   Refills:  2         Stop taking these medicines if you haven't already. Please contact your care team if you have questions.     blood glucose monitoring " lancets   Stopped by:  Ronna Lares, DO                Where to get your medicines      These medications were sent to [facility], [place] - [address]  [address] [facility] [address]     Phone:  [phone]     blood glucose monitoring test strip    gabapentin 300 MG capsule                Primary Care Provider Office Phone # Fax #    [facility] [phone] [address] [facility] [address]        Equal Access to Services     MATT SAN : Hadii aad ku hadasho Soomaali, waaxda luqadaha, qaybta kaalmada adeegyada, waxay idiin hayaan adeeg kharajesse vega . So [facility] [phone].    ATENCIÓN: Si habla español, tiene a ayala disposición servicios gratuitos de asistencia lingüística. Llame al [phone].    We comply with applicable federal civil rights laws and [place] laws. We do not discriminate on the basis of race, color, national origin, age, disability, sex, sexual orientation, or gender identity.            Thank you!     Thank you for choosing [facility]  for your care. Our goal is always to provide you with excellent care. Hearing back from our patients is one way we can continue to improve our services. Please take a few minutes to complete the written survey that you may receive in the mail after your visit with us. Thank you!             Your Updated Medication List - Protect others around you: Learn how to safely use, store and throw away your medicines at www.disposemymeds.org.          This list is accurate as of 6/14/18 11:17 AM.  Always use your most recent med list.                   Brand Name Dispense Instructions for use Diagnosis    acetone (Urine) test Strp     100 each    Use one strip as needed to test urine for ketones.  Test urine for ketones when blood sugars are >250 or during illness with fever, abdominal pain, nausea, or vomiting.    Type 1 diabetes mellitus with complication  (H), Uncontrolled type 1 diabetes mellitus with complication (H), Encounter for long-term (current) use of insulin (H), Hypoglycemia unawareness in type 1 diabetes mellitus (H), Type 1 diabetes mellitus with hyperglycemia (H), Blindness of right eye       albuterol 108 (90 Base) MCG/ACT Inhaler    PROAIR HFA    1 Inhaler    Inhale 2 puffs into the lungs every 4 hours as needed for shortness of breath / dyspnea        blood glucose lancets standard    no brand specified    100 each    Use to test blood sugar 4 times daily or as directed.    Type 1 diabetes mellitus with hyperglycemia (H)       blood glucose monitoring meter device kit    no brand specified    1 kit    Use to test blood sugar.  One Touch Ultra or Verio.    Type 1 diabetes mellitus with hyperglycemia (H)       blood glucose monitoring test strip    no brand specified    100 strip    Use to test blood sugars 4 times daily or as directed    Type 1 diabetes mellitus with hyperglycemia (H)       cinnamon 500 MG Caps     180 capsule    Take 500 mg by mouth 2 times daily    Type 1 diabetes mellitus with hyperglycemia (H)       gabapentin 300 MG capsule    NEURONTIN    270 capsule    Take 1 capsule (300 mg) by mouth 3 times daily    Type 1 diabetes mellitus with diabetic autonomic neuropathy (H)       GLUCOSE MANAGEMENT Tabs     100 tablet    4 tabs po for low blood sugar below 70, may repeat q 10-15 minutes as needed    Type 1 diabetes mellitus with complication (H), Uncontrolled type 1 diabetes mellitus with complication (H), Encounter for long-term (current) use of insulin (H), Hypoglycemia unawareness in type 1 diabetes mellitus (H), Type 1 diabetes mellitus with hyperglycemia (H), Blindness of right eye       ibuprofen 400 MG tablet    ADVIL/MOTRIN    120 tablet    Take 1 tablet (400 mg) by mouth every 6 hours as needed for moderate pain    External hemorrhoids       insulin aspart 100 UNIT/ML injection    NovoLOG FLEXPEN    15 mL    Take 2 units per  carb with each meal plus corrections 1:50 > 150. Need to schedule appointment within one month for diabetes check.    Type 1 diabetes mellitus with hyperglycemia (H)       insulin glargine 100 UNIT/ML injection    LANTUS SOLOSTAR    18 mL    Inject 60 Units Subcutaneous daily    Type 1 diabetes mellitus with hyperglycemia (H)       * insulin pen needle 32G X 6 MM    NOVOFINE    100 each    Use 4 daily or as directed.    Type 1 diabetes mellitus with hyperglycemia (H)       * insulin pen needle 32G X 4 MM    BD EFRAIN U/F    400 each    Use 4 pen needles daily or as directed.    Type 1 diabetes mellitus with hyperglycemia (H)       multivitamin, therapeutic with minerals Tabs tablet     100 tablet    Take 1 tablet by mouth daily    Type 1 diabetes mellitus with hyperglycemia (H)       * Notice:  This list has 2 medication(s) that are the same as other medications prescribed for you. Read the directions carefully, and ask your doctor or other care provider to review them with you.

## 2018-06-14 NOTE — PROGRESS NOTES
SUBJECTIVE:   Arielle Montenegro is a 33 year old female who presents to clinic today for the following health issues:      History of Present Illness     Diabetes:     Frequency of checking blood sugars::  2 times a day    Diabetic concerns::  Blood sugar frequently over 200    Hypoglycemia symptoms::  None    Paraesthesia present::  YES    Eye Exam in the last year::  Yes        Patient is a type 1 diabetic and has been extremely non-compliant recently.  Prior to her most recent pregnancy her A1C was 7.  Last check her A1C was 11 last .  She has failed to follow up with endocrinology and diabetic education.  She has not seen me since the  despite recommendations to return in a month.  She states that she is taking Lantus, but hasn't filled this Rx since December.  It looks like she has only been taking the Novolog.  She has noticed extreme pain and burning in both her feet for the past 2 months.      Problem list and histories reviewed & adjusted, as indicated.  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 (H)     Health Care Home     Vitamin D deficiency     External hemorrhoids     Encounter for long-term (current) use of insulin (H)     Medical non-compliance     Past Surgical History:   Procedure Laterality Date      SECTION  13      SECTION N/A 2015    Procedure:  SECTION;  Surgeon: John Hudson MD;  Location: [facility] L[facility]     ENUCLEATION Right 3/20/2015    Procedure: ENUCLEATION;  Surgeon: Edilson Islas MD;  Location: [facility]       Social History   Substance Use Topics     Smoking status: Former Smoker     Types: Cigarettes     Smokeless tobacco: Never Used      Comment: smokes 2 cigarettes/day-none for several months     Alcohol use No     Family History   Problem Relation Age of Onset     Family  "History Negative Mother      Family History Negative Father      DIABETES Other      father's side           ROS:  Constitutional, HEENT, cardiovascular, pulmonary, gi and gu systems are negative, except as otherwise noted.    OBJECTIVE:     /62 (BP Location: Right arm, Patient Position: Chair, Cuff Size: Adult Regular)  Pulse 82  Temp 97.9  F (36.6  C) (Oral)  Resp 16  Ht 5' 2\" (1.575 m)  Wt 140 lb 3.2 oz (63.6 kg)  BMI 25.64 kg/m2  Body mass index is 25.64 kg/(m^2).  GENERAL: healthy, alert and no distress  RESP: lungs clear to auscultation - no rales, rhonchi or wheezes  CV: regular rate and rhythm, normal S1 S2, no S3 or S4, no murmur, click or rub, no peripheral edema and peripheral pulses strong  Diabetic foot exam: normal DP and PT pulses, normal monofilament exam, pt reports pain in her feet bilaterally throughout but has worst pain in toes, specifically the left little toe, there is a small callus built up on her lateral left 5th toe.    ASSESSMENT/PLAN:       ICD-10-CM    1. Type 1 diabetes mellitus with diabetic autonomic neuropathy (H) E10.43 Hemoglobin A1c     Comprehensive metabolic panel     Lipid panel reflex to direct LDL Non-fasting     gabapentin (NEURONTIN) 300 MG capsule     BASAGLAR 100 UNIT/ML injection   2. Medical non-compliance Z91.19      Significant medical non-compliance.  Will get labs done today.  Patient insists that she has been taking Lantus, but I confirmed with pharmacy and she hasn't picked up any long acting insulin since December.  Refilled one month of Basaglar and Novolog today.  She MUST come back in next month for further refills.  We had a long discussion about how uncontrolled diabetes has now led to likely permanent neuropathy and could progress to ulcerations, infections, and even amputations.  Patient has been struggling to care for herself since she has been focused on her many children and has been going through a stressful housing situation.  I " encouraged her to prioritize her health now so that she can continue to care for her kids in the future.    Follow up in 1 month.  Ideally I would like her to be seen by endocrinology, but non-compliance has prevented that so far.     Ronna Lares, DO  [facility]

## 2018-06-15 LAB
ALBUMIN SERPL-MCNC: 3.1 G/DL (ref 3.4–5)
ALP SERPL-CCNC: 112 U/L (ref 40–150)
ALT SERPL W P-5'-P-CCNC: 19 U/L (ref 0–50)
ANION GAP SERPL CALCULATED.3IONS-SCNC: 12 MMOL/L (ref 3–14)
AST SERPL W P-5'-P-CCNC: 12 U/L (ref 0–45)
BILIRUB SERPL-MCNC: 0.3 MG/DL (ref 0.2–1.3)
BUN SERPL-MCNC: 18 MG/DL (ref 7–30)
CALCIUM SERPL-MCNC: 9 MG/DL (ref 8.5–10.1)
CHLORIDE SERPL-SCNC: 103 MMOL/L (ref 94–109)
CHOLEST SERPL-MCNC: 196 MG/DL
CO2 SERPL-SCNC: 22 MMOL/L (ref 20–32)
CREAT SERPL-MCNC: 0.64 MG/DL (ref 0.52–1.04)
GFR SERPL CREATININE-BSD FRML MDRD: >90 ML/MIN/1.7M2
GLUCOSE SERPL-MCNC: 387 MG/DL (ref 70–99)
HDLC SERPL-MCNC: 67 MG/DL
LDLC SERPL CALC-MCNC: 113 MG/DL
NONHDLC SERPL-MCNC: 129 MG/DL
POTASSIUM SERPL-SCNC: 3.9 MMOL/L (ref 3.4–5.3)
PROT SERPL-MCNC: 6.8 G/DL (ref 6.8–8.8)
SODIUM SERPL-SCNC: 137 MMOL/L (ref 133–144)
TRIGL SERPL-MCNC: 79 MG/DL

## 2018-06-15 NOTE — PROGRESS NOTES
Patient stated at her appt that she does not wish to know results of her labs.  Will discuss at her visit next month

## 2018-07-20 ENCOUNTER — PATIENT OUTREACH (OUTPATIENT)
Dept: CARE COORDINATION | Facility: CLINIC | Age: 33
End: 2018-07-20

## 2018-07-20 DIAGNOSIS — Z91.199 MEDICAL NON-COMPLIANCE: Primary | ICD-10-CM

## 2018-07-20 NOTE — PROGRESS NOTES
Clinic Care Coordination Contact  [facility]/Voicemail    Referral Source: PCP  Clinical Data: Care Coordinator Outreach - 8 no show visits in the past 12 months, CCRN asked by pcp, clinic administrator to call patient and assess for barriers and see if there is anything care coordination can assist with     Outreach attempted x 1.  Left message on voicemail with call back information and requested return call.  Plan: Care Coordinator will try to reach patient again in 1-2 business days.    Tess Salomon Care Coordinator RN  [facility]  [phone]  July 20, 2018

## 2018-07-20 NOTE — LETTER
Health Care Home - Access Care Plan    About Me  Patient Name:  Arielle Montenegro    YOB: 1985  Age:                             33 year old   [facility] MRN:            [id number] Telephone Information:     Home Phone [phone]   Mobile [phone]   Home Phone [phone]       Address:    [address] [address] Email address:  No e-mail address on record      Emergency Contact(s)  Name Relationship Lgl Grd Work Phone Home Phone Mobile Phone   1. MIGUE SHARMA Brother  none     2. KYLEE SHARMA Relative  none [phone] none             Health Maintenance: Routine Health maintenance Reviewed: Due/Overdue   Health Maintenance Due   Topic Date Due     EYE EXAM Q1 YEAR  01/01/1987     PNEUMOVAX 1X HI RISK PATIENT < 65 (NO IB MSG)  01/01/1988     MICROALBUMIN Q1 YEAR  09/30/2015     PAP SCREENING Q3 YR (SYSTEM ASSIGNED)  07/24/2017       My Access Plan  Medical Emergency 911   Questions or concerns during clinic hours Primary Clinic Line, I will call the clinic directly: [facility] - [phone]   24 Hour Appointment Line [phone] or  [phone]) (toll free)   24 Hour Nurse Line [phone] (toll free)   Questions or concerns outside clinic hours 24 Hour Appointment Line, I will call the after-hours on-call line:   [facility] [phone] or [phone]) (toll-free)   Preferred Urgent Care [facility] Greg Vega, [phone]   Preferred Hospital [facility]  [phone]   Preferred Pharmacy [facility] [place], [place] - [address]     Behavioral Health Crisis Line The National Suicide Prevention Lifeline at 1-144.600.8789 or 911     My Care Team Members  Patient Care Team       Relationship Specialty Notifications Start End    [facility] PCP - General   8/31/17     Phone: [phone] Fax: [phone] [address] [facility]  [place] [address]    Medicine, [facility]     5/27/14     Comment:  Merged    Phone: [phone] Fax: [phone]         [address] [facility] [address]           My Medical and Care Information  Problem List   Patient Active Problem List   Diagnosis     Blindness of right eye     Diabetes mellitus type 1 (H)     Hypoglycemia unawareness in type 1 diabetes mellitus (H)     Health Care Home     Vitamin D deficiency     External hemorrhoids     Encounter for long-term (current) use of insulin (H)     Medical non-compliance      Current Medications and Allergies:  See printed Medication Report

## 2018-07-20 NOTE — LETTER
[facility] CARE COORDINATION  [address] [address]  [phone]    July 25, 2018    Arielle Montenegro  [address] [facility] [address] [address]      Dear Arielle,    I am a clinic care coordinator who works with [facility] I have been trying to reach you recently to introduce Clinic Care Coordination and to see if there was anything I could assist you with.  I recently tried to call and was unable to reach you. I wanted to introduce myself and provide you with my contact information so that you can call me with questions or concerns about your health care. Below is a description of clinic care coordination and how I can further assist you.     The clinic care coordinator is a registered nurse and/or  who understand the health care system. The goal of clinic care coordination is to help you manage your health and improve access to the [facility] system in the most efficient manner. The registered nurse can assist you in meeting your health care goals by providing education, coordinating services, and strengthening the communication among your providers. The  can assist you with financial, behavioral, psychosocial, chemical dependency, counseling, and/or psychiatric resources.    Please feel free to contact me at [phone], with any questions or concerns. We at [facility] are focused on providing you with the highest-quality healthcare experience possible and that all starts with you.     Sincerely,     Tess Salomon Care Coordinator RN  [facility]  [phone]  July 25, 2018     Enclosed: I have enclosed a copy of a 24 Hour Access Plan. This has helpful phone numbers for you to call when needed. Please keep this in an easy to access place to use as needed.

## 2018-07-24 NOTE — PROGRESS NOTES
Clinic Care Coordination Contact  [facility]/Voicemail    Referral Source: PCP  Clinical Data: Care Coordinator Outreach  8 no show visits in the past 12 months, CCRN asked by pcp, clinic administrator to call patient and assess for barriers and see if there is anything care coordination can assist with     Outreach attempted x 2.  Left message on voicemail with call back information and requested return call.    Plan: Care Coordinator CCRN will mail unable to contact letter in 1 business day if no return call from patient.    Tess Salomon Care Coordinator RN  [facility]  [phone]  July 24, 2018

## 2018-07-25 NOTE — PROGRESS NOTES
Clinic Care Coordination Contact  [facility]/Voicemail    Referral Source: PCP  Clinical Data: Care Coordinator Outreach - 8 No Shows in 12 months calling to see if barriers exist and anything care coordination call help with   Outreach attempted x 3.      Plan: Care Coordinator will mail unable to contact letter and [facility] care plan - if patient returns call will assess for barriers otherwise no planned follow up at this time. Care Coordinator will do no further outreaches at this time.    Tess Salomon Care Coordinator RN  [facility]  [phone]  July 25, 2018

## 2018-09-13 ENCOUNTER — TELEPHONE (OUTPATIENT)
Dept: FAMILY MEDICINE | Facility: CLINIC | Age: 33
End: 2018-09-13

## 2018-09-13 NOTE — LETTER
[facility]  [address] [address]  [phone]  September 13, 2018    Arielle Montenegro  [address] [address]    Dear Arielle,    I care about your health and have reviewed your health plan. I have reviewed your medical conditions, medication list, and lab results and am making recommendations based on this review, to better manage your health.    You are in particular need of attention regarding:  -Diabetes  -Cervical Cancer Screening    I am recommending that you:  {recommendations:-schedule a LAB ONLY APPOINTMENT to recheck your: A1c test within the next 1-4 weeks.  -schedule a PAP SMEAR EXAM which is due.  Please disregard this reminder if you have had this exam elsewhere within the last year.  It would be helpful for us to have a copy of your recent pap smear report in our file so that we can best coordinate your care.    Here is a list of Health Maintenance topics that are due now or due soon:  Health Maintenance Due   Topic Date Due     EYE EXAM Q1 YEAR  01/01/1987     PNEUMOVAX 1X HI RISK PATIENT < 65 (NO IB MSG)  01/01/1988     MICROALBUMIN Q1 YEAR  09/30/2015     PAP SCREENING Q3 YR (SYSTEM ASSIGNED)  07/24/2017     INFLUENZA VACCINE (1) 09/01/2018       Please call us at [phone] (or use [employer]) to address the above recommendations.     Thank you for trusting [facility] and we appreciate the opportunity to serve you.  We look forward to supporting your healthcare needs in the future.    Healthy Regards,    Ronna Lares DO/ZA

## 2018-09-13 NOTE — TELEPHONE ENCOUNTER
Panel Management Review      Patient has the following on her problem list:     Diabetes    ASA: Not Required     Last A1C  Lab Results   Component Value Date    A1C 11.4 06/14/2018    A1C 11.7 10/05/2017    A1C 10.0 05/11/2017    A1C 10.8 10/28/2016    A1C 10.0 06/28/2016     A1C tested: FAILED    Last LDL:    Lab Results   Component Value Date    CHOL 196 06/14/2018     Lab Results   Component Value Date    HDL 67 06/14/2018     Lab Results   Component Value Date     06/14/2018     Lab Results   Component Value Date    TRIG 79 06/14/2018     No results found for: CHOLHDLRATIO  Lab Results   Component Value Date    NHDL 129 06/14/2018       Is the patient on a Statin? NO             Is the patient on Aspirin? NO        Last three blood pressure readings:  BP Readings from Last 3 Encounters:   06/14/18 118/62   12/26/17 118/69   10/05/17 100/64       Date of last diabetes office visit: 6/14/2018     Tobacco History:     History   Smoking Status     Former Smoker     Types: Cigarettes   Smokeless Tobacco     Never Used     Comment: smokes 2 cigarettes/day-none for several months           Composite cancer screening  Chart review shows that this patient is due/due soon for the following Pap Smear  Summary:    Patient is due/failing the following:   A1C and PAP    Action needed:   Patient needs office visit for diabetes follow up and pap.    Type of outreach:    routed to panel pool for outreach    Questions for provider review:    None                                                                                                                                    Laura Luna CMA       Chart routed to Care Team .

## 2018-10-29 ENCOUNTER — OFFICE VISIT (OUTPATIENT)
Dept: FAMILY MEDICINE | Facility: CLINIC | Age: 33
End: 2018-10-29
Payer: COMMERCIAL

## 2018-10-29 ENCOUNTER — PATIENT OUTREACH (OUTPATIENT)
Dept: CARE COORDINATION | Facility: CLINIC | Age: 33
End: 2018-10-29

## 2018-10-29 VITALS
SYSTOLIC BLOOD PRESSURE: 107 MMHG | DIASTOLIC BLOOD PRESSURE: 73 MMHG | WEIGHT: 146 LBS | TEMPERATURE: 98.3 F | HEART RATE: 97 BPM | RESPIRATION RATE: 20 BRPM | OXYGEN SATURATION: 97 % | HEIGHT: 62 IN | BODY MASS INDEX: 26.87 KG/M2

## 2018-10-29 DIAGNOSIS — E10.43 TYPE 1 DIABETES MELLITUS WITH DIABETIC AUTONOMIC NEUROPATHY (H): ICD-10-CM

## 2018-10-29 LAB — HBA1C MFR BLD: 11 % (ref 0–5.6)

## 2018-10-29 PROCEDURE — 36415 COLL VENOUS BLD VENIPUNCTURE: CPT | Performed by: FAMILY MEDICINE

## 2018-10-29 PROCEDURE — 80048 BASIC METABOLIC PNL TOTAL CA: CPT | Performed by: FAMILY MEDICINE

## 2018-10-29 PROCEDURE — 83036 HEMOGLOBIN GLYCOSYLATED A1C: CPT | Performed by: FAMILY MEDICINE

## 2018-10-29 PROCEDURE — 99214 OFFICE O/P EST MOD 30 MIN: CPT | Performed by: FAMILY MEDICINE

## 2018-10-29 RX ORDER — GLUCOSAMINE HCL/CHONDROITIN SU 500-400 MG
CAPSULE ORAL
Qty: 100 EACH | Refills: 3 | Status: SHIPPED | OUTPATIENT
Start: 2018-10-29 | End: 2020-12-01

## 2018-10-29 RX ORDER — INSULIN GLARGINE 100 [IU]/ML
60 INJECTION, SOLUTION SUBCUTANEOUS DAILY
Qty: 15 ML | Refills: 0 | Status: SHIPPED | OUTPATIENT
Start: 2018-10-29 | End: 2020-04-21

## 2018-10-29 RX ORDER — LANCETS
EACH MISCELLANEOUS
Qty: 100 EACH | Refills: 6 | Status: SHIPPED | OUTPATIENT
Start: 2018-10-29 | End: 2019-03-05

## 2018-10-29 ASSESSMENT — ACTIVITIES OF DAILY LIVING (ADL): DEPENDENT_IADLS:: INDEPENDENT

## 2018-10-29 NOTE — MR AVS SNAPSHOT
After Visit Summary   10/29/2018    Arielle Montenegro    MRN: [id number]           Patient Information     Date Of Birth          1985        Visit Information        Provider Department      10/29/2018 1:15 PM Diane Bello MD [facility]        Today's Diagnoses     Type 1 diabetes mellitus with diabetic autonomic neuropathy (H)           Follow-ups after your visit        Additional Services     DIABETES EDUCATOR REFERRAL       DIABETES SELF MANAGEMENT TRAINING (DSMT)      Your provider has referred you to Diabetes Education: FMG: Diabetes Education - All [facility] [phone]   [email or web address]     If an urgent visit is needed or A1C is above 12, Care Team to call the Diabetes  Education Team at [phone] or send an In Basket message to the Diabetes Education Pool (P DIAB ED-PATIENT CARE).    A  will call you to make your appointment. If it has been more than 3 business days since your referral was placed, please call the above phone number to schedule.    Type of training and number of hours: Previous Diagnosis: Follow-up DSMT - 2 hours.    Diabetes Type: Type 1 and Pregnant   Medicare covers: 10 hours of initial DSMT in 12 month period from the time of first visit, plus 2 hours of follow-up DSMT annually, and additional hours as requested for insulin training.         Diabetes Co-Morbidities: very poor compliant.              A1C Goal:  <8.0       A1C is: Lab Results       Component                Value               Date                       A1C                      11.4                06/14/2018              MEDICAL NUTRITION THERAPY (MNT) for Diabetes    Medical Nutrition Therapy with a Registered Dietitian can be provided in coordination with Diabetes Self-Management Training to assist in achieving optimal diabetes management.    MNT Type and Hours:                        Medicare will cover: 3  hours initial MNT in 12 month period after first visit, plus 2 hours of follow-up MNT annually        Diabetes Education Topics: Comprehensive Knowledge Assessment and Instruction    Special Educational Needs Requiring Individual DSMT: None      Please be aware that coverage of these services is subject to the terms and limitations of your health insurance plan.  Call member services at your health plan to determine Diabetes Self-Management Training (Codes  and ) and Medical Nutrition Therapy (Codes 08601 and 44734) benefits and ask which blood glucose monitor brands are covered by your plan.  Please bring the following with you to your appointment:    (1)  List of current medications   (2)  List of Blood Glucose Monitor brands that are covered by your insurance plan  (3)  Blood Glucose Monitor and log book  (4)   Food records for the 3 days prior to your visit    The Certified Diabetes Educator may make diabetes medication adjustments per the CDE Protocol and Collaborative Practice Agreement.                  Follow-up notes from your care team     Return in about 4 weeks (around 11/26/2018).      Who to contact     If you have questions or need follow up information about today's clinic visit or your schedule please contact [facility] directly at [phone].  Normal or non-critical lab and imaging results will be communicated to you by [employer], letter or phone within 4 business days after the clinic has received the results. If you do not hear from us within 7 days, please contact the clinic through [employer] or phone. If you have a critical or abnormal lab result, we will notify you by phone as soon as possible.  Submit refill requests through [email or web address] or call your pharmacy and they will forward the refill request to us. Please allow 3 business days for your refill to be completed.          Additional Information About Your Visit        [email or web address] Information     [email or web address] lets you send  "messages to your doctor, view your test results, renew your prescriptions, schedule appointments and more. To sign up, go to [email or web address] . Click on \"Log in\" on the left side of the screen, which will take you to the Welcome page. Then click on \"Sign up Now\" on the right side of the page.     You will be asked to enter the access code listed below, as well as some personal information. Please follow the directions to create your username and password.     Your access code is: [email or web address]  Expires: 2019  1:50 PM     Your access code will  in 90 days. If you need help or a new code, please call your [place] clinic or [phone].        Care EveryWhere ID     This is your Care EveryWhere ID. This could be used by other organizations to access your [place] medical records  [id number]        Your Vitals Were     Pulse Temperature Respirations Height Pulse Oximetry BMI (Body Mass Index)    97 98.3  F (36.8  C) (Oral) 20 5' 2\" (1.575 m) 97% 26.7 kg/m2       Blood Pressure from Last 3 Encounters:   10/29/18 107/73   18 118/62   17 118/69    Weight from Last 3 Encounters:   10/29/18 146 lb (66.2 kg)   18 140 lb 3.2 oz (63.6 kg)   10/05/17 138 lb (62.6 kg)              We Performed the Following     Albumin Random Urine Quantitative with Creat Ratio     Basic metabolic panel     DIABETES EDUCATOR REFERRAL     Hemoglobin A1c          Today's Medication Changes          These changes are accurate as of 10/29/18  1:48 PM.  If you have any questions, ask your nurse or doctor.               Start taking these medicines.        Dose/Directions    alcohol swab prep pads   Used for:  Type 1 diabetes mellitus with diabetic autonomic neuropathy (H)   Started by:  Diane Bello MD        Use to swab area of injection/joelle as directed.   Quantity:  100 each   Refills:  3       blood glucose calibration solution   Commonly known as:  NO BRAND SPECIFIED   Used for:  Type 1 diabetes mellitus " with diabetic autonomic neuropathy (H)   Started by:  Diane Bello MD        To accompany: Blood Glucose Monitor Brands: per insurance.   Quantity:  1 Bottle   Refills:  3       thin lancets   Commonly known as:  NO BRAND SPECIFIED   Used for:  Type 1 diabetes mellitus with diabetic autonomic neuropathy (H)   Started by:  Diane Bello MD        Use with lanceting device. To accompany: Blood Glucose Monitor Brands: per insurance.   Quantity:  100 each   Refills:  6         These medicines have changed or have updated prescriptions.        Dose/Directions    * blood glucose monitoring meter device kit   Commonly known as:  no brand specified   This may have changed:  Another medication with the same name was added. Make sure you understand how and when to take each.   Used for:  Type 1 diabetes mellitus with hyperglycemia (H)   Changed by:  Diane Bello MD        Use to test blood sugar.  One Touch Ultra or Verio.   Quantity:  1 kit   Refills:  0       * blood glucose monitoring meter device kit   Commonly known as:  no brand specified   This may have changed:  You were already taking a medication with the same name, and this prescription was added. Make sure you understand how and when to take each.   Used for:  Type 1 diabetes mellitus with diabetic autonomic neuropathy (H)   Changed by:  Diane Bello MD        Use to test blood sugar 4 times daily or as directed. Preferred blood glucose meter OR supplies to accompany: Blood Glucose Monitor Brands: per insurance.   Quantity:  1 kit   Refills:  0       * blood glucose monitoring test strip   Commonly known as:  no brand specified   This may have changed:  Another medication with the same name was added. Make sure you understand how and when to take each.   Changed by:  Diane Bello MD        Use to test blood sugars 4 times daily or as directed   Quantity:  100 strip   Refills:  2       * blood glucose monitoring test strip   Commonly known as:  no brand specified   This  may have changed:  You were already taking a medication with the same name, and this prescription was added. Make sure you understand how and when to take each.   Used for:  Type 1 diabetes mellitus with diabetic autonomic neuropathy (H)   Changed by:  Diane Bello MD        Use to test blood sugar 4 times daily or as directed. To accompany: Blood Glucose Monitor Brands: per insurance.   Quantity:  100 strip   Refills:  6       * Notice:  This list has 4 medication(s) that are the same as other medications prescribed for you. Read the directions carefully, and ask your doctor or other care provider to review them with you.         Where to get your medicines      These medications were sent to [facility] [address]  [address]  [address]     Phone:  [phone]     alcohol swab prep pads    blood glucose calibration solution    blood glucose monitoring test strip    thin lancets         These medications were sent to [address] [address]     Phone:  [phone]     BASAGLAR 100 UNIT/ML injection         Some of these will need a paper prescription and others can be bought over the counter.  Ask your nurse if you have questions.     Bring a paper prescription for each of these medications     blood glucose monitoring meter device kit                Primary Care Provider Office Phone # Fax #    Diane Bello [phone] [address] [facility] [address]        Equal Access to Services     MATT SAN AH: Hadii aad ku hadasho Sorosa elenaali, waaxda luqadaha, qaybta kaalmada adeegyada, byron marmolejo. So [facility] [phone].    ATENCIÓN: Si habla español, tiene a ayala disposición servicios gratuitos de asistencia lingüística. Llame al [phone].    We comply with applicable federal civil rights laws and [place] laws. We do  not discriminate on the basis of race, color, national origin, age, disability, sex, sexual orientation, or gender identity.            Thank you!     Thank you for choosing [facility]  for your care. Our goal is always to provide you with excellent care. Hearing back from our patients is one way we can continue to improve our services. Please take a few minutes to complete the written survey that you may receive in the mail after your visit with us. Thank you!             Your Updated Medication List - Protect others around you: Learn how to safely use, store and throw away your medicines at www.disposemymeds.org.          This list is accurate as of 10/29/18  1:48 PM.  Always use your most recent med list.                   Brand Name Dispense Instructions for use Diagnosis    acetone (urine) test strip    KETOSTIX    100 each    Use one strip as needed to test urine for ketones.  Test urine for ketones when blood sugars are >250 or during illness with fever, abdominal pain, nausea, or vomiting.    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    Type 1 diabetes mellitus with diabetic autonomic neuropathy (H)       BASAGLAR 100 UNIT/ML injection     15 mL    Inject 60 Units Subcutaneous daily    Type 1 diabetes mellitus with diabetic autonomic neuropathy (H)       blood glucose calibration solution    NO BRAND SPECIFIED    1 Bottle    To accompany: Blood Glucose Monitor Brands: per insurance.     Type 1 diabetes mellitus with diabetic autonomic neuropathy (H)       blood glucose lancets standard    no brand specified    100 each    Use to test blood sugar 4 times daily or as directed.    Type 1 diabetes mellitus with hyperglycemia (H)       * blood glucose monitoring meter device kit    no brand specified    1 kit    Use to test blood sugar.  One Touch Ultra or Verio.    Type 1 diabetes mellitus with hyperglycemia (H)       * blood glucose monitoring meter device kit    no brand specified    1 kit    Use to test blood sugar 4 times daily or as directed. Preferred blood glucose meter OR supplies to accompany: Blood Glucose Monitor Brands: per insurance.    Type 1 diabetes mellitus with diabetic autonomic neuropathy (H)       * blood glucose monitoring test strip    no brand specified    100 strip    Use to test blood sugars 4 times daily or as directed        * blood glucose monitoring test strip    no brand specified    100 strip    Use to test blood sugar 4 times daily or as directed. To accompany: Blood Glucose Monitor Brands: per insurance.    Type 1 diabetes mellitus with diabetic autonomic neuropathy (H)       cinnamon 500 MG Caps     180 capsule    Take 500 mg by mouth 2 times daily    Type 1 diabetes mellitus with hyperglycemia (H)       gabapentin 300 MG capsule    NEURONTIN    270 capsule    Take 1 capsule (300 mg) by mouth 3 times daily    Type 1 diabetes mellitus with diabetic autonomic neuropathy (H)       GLUCOSE MANAGEMENT Tabs     100 tablet    4 tabs po for low blood sugar below 70, may repeat q 10-15 minutes as needed    Type 1 diabetes mellitus with complication (H), Uncontrolled type 1 diabetes mellitus with complication (H), Encounter for long-term (current) use of insulin (H), Hypoglycemia unawareness in type 1 diabetes mellitus (H), Type 1 diabetes mellitus with hyperglycemia (H), Blindness of right eye       ibuprofen 400 MG tablet    ADVIL/MOTRIN    120 tablet    Take 1 tablet (400  mg) by mouth every 6 hours as needed for moderate pain    External hemorrhoids       insulin aspart 100 UNIT/ML injection    NovoLOG FLEXPEN    15 mL    Take 2 units per carb with each meal plus corrections 1:50 > 150. Need to schedule appointment within one month for diabetes check.        * insulin pen needle 32G X 6 MM    NOVOFINE    100 each    Use 4 daily or as directed.    Type 1 diabetes mellitus with hyperglycemia (H)       * insulin pen needle 32G X 4 MM    BD EFRAIN U/F    400 each    Use 4 pen needles daily or as directed.    Type 1 diabetes mellitus with hyperglycemia (H)       multivitamin, therapeutic with minerals Tabs tablet     100 tablet    Take 1 tablet by mouth daily    Type 1 diabetes mellitus with hyperglycemia (H)       thin lancets    NO BRAND SPECIFIED    100 each    Use with lanceting device. To accompany: Blood Glucose Monitor Brands: per insurance.    Type 1 diabetes mellitus with diabetic autonomic neuropathy (H)       * Notice:  This list has 6 medication(s) that are the same as other medications prescribed for you. Read the directions carefully, and ask your doctor or other care provider to review them with you.

## 2018-10-29 NOTE — PROGRESS NOTES
Clinic Care Coordination Contact    Clinic Care Coordination Contact  OUTREACH    Referral Information:  Referral Source: Care Team         Chief Complaint   Patient presents with     Clinic Care Coordination - Initial     Transportation issue        Transportation:  Transportation concerns (GOAL):: Yes    Patient became significantly distressed in clinic due to her medical transportation not arriving on time and her subsequently not being home for her child getting off the school bus. Patient was unwilling to engage with SWCC at this time due to being so distraught over current stressors.    Transportation means:: Medical transport     Psychosocial:  Mental health management concern (GOAL):: Yes    Patient lacks insight into mental health needs and reports minimal social supports.     Advance Care Plan/Directive  Advanced Care Plans/Directives on file:: No  Advanced Care Plan/Directive Status: Not Applicable    Referrals Placed: None     Goals:         Patient/Caregiver understanding: Pt reports understanding and denies any additional questions or concerns at this times. SW CC engaged in AIDET communication during encounter.    Outreach Frequency: weekly      Plan: SWCC will outreach tomorrow to explain SWCC role in more detail and provide support at whatever level patient would like. CC intro letter will be mailed at that time.     Judy Moreno [facility]  Clinic Care Coordinator - [facility]  Ph: [phone]  [email or web address]  (Today's date: 10/29/18)

## 2018-10-29 NOTE — PROGRESS NOTES
SUBJECTIVE:   Arielle Montenegro is a 33 year old female who presents to clinic today for the following health issues:      Diabetes     Diabetes:     Frequency of checking blood sugars::  1 time a day    Diabetic concerns::  None    Hypoglycemia symptoms::  None    Paraesthesia present::  No    Eye Exam in the last year::  NO    Diabetes Management Resources    Diet:  Diabetic  Taking medications regularly:  Yes  Medication side effects:  None  Additional concerns today:  No  History of Present Illness     Diabetes:     Frequency of checking blood sugars::  1 time a day    Diabetic concerns::  None    Hypoglycemia symptoms::  None    Paraesthesia present::  No    Eye Exam in the last year::  NO    Diabetes Management Resources    Diet:  Diabetic  Taking medications regularly:  Yes  Medication side effects:  None  Additional concerns today:  No    Diabetes Follow-up      Patient is checking blood sugars: rarely.  Last time was few months ago.pt has been moving her residence regularly, and has multiple social problems lately, which makes it very hard for her to check her blood sugar, she is asking for glucose monitor and strips and lancets.    Diabetic concerns: blood sugar frequently over 200, and she  have been feeling tired a lot, although her prescription asks for 60 units of long acting insulin, pt only takes about 20 units or so, as she is afraid of running too low.      Symptoms of hypoglycemia (low blood sugar): yes, occasionally she gets low blood sugar so she kept her Basaglar at lower rate than 60 units.     Paresthesias (numbness or burning in feet) or sores: No     Date of last diabetic eye exam: 1 month ago according to the patient.    BP Readings from Last 2 Encounters:   10/29/18 107/73   06/14/18 118/62     Hemoglobin A1C (%)   Date Value   06/14/2018 11.4 (H)   10/05/2017 11.7 (H)     LDL Cholesterol Calculated (mg/dL)   Date Value   06/14/2018 113 (H)     LDL Cholesterol Direct (mg/dL)   Date  Value   2016 86       Diabetes Management Resources  Problem list and histories reviewed & adjusted, as indicated.  Additional history: pt had some problems later finding a ride home which raised a concern about Arielle's         Patient Active Problem List   Diagnosis     Blindness of right eye     Diabetes mellitus type 1 (H)     Hypoglycemia unawareness in type 1 diabetes mellitus (H)     Health Care Home     Vitamin D deficiency     External hemorrhoids     Encounter for long-term (current) use of insulin (H)     Medical non-compliance     Past Surgical History:   Procedure Laterality Date      SECTION  13      SECTION N/A 2015    Procedure:  SECTION;  Surgeon: John Hudson MD;  Location: [facility]     ENUCLEATION Right 3/20/2015    Procedure: ENUCLEATION;  Surgeon: Edilson Islas MD;  Location: [facility]       Social History   Substance Use Topics     Smoking status: Former Smoker     Types: Cigarettes     Smokeless tobacco: Never Used      Comment: smokes 2 cigarettes/day-none for several months     Alcohol use No     Family History   Problem Relation Age of Onset     Family History Negative Mother      Family History Negative Father      Diabetes Other      father's side         Current Outpatient Prescriptions   Medication Sig Dispense Refill     acetone, Urine, test STRP Use one strip as needed to test urine for ketones.  Test urine for ketones when blood sugars are >250 or during illness with fever, abdominal pain, nausea, or vomiting. 100 each 11     albuterol (ALBUTEROL) 108 (90 BASE) MCG/ACT Inhaler Inhale 2 puffs into the lungs every 4 hours as needed for shortness of breath / dyspnea 1 Inhaler 0     alcohol swab prep pads Use to swab area of injection/joelle as directed. 100 each 3     BASAGLAR 100 UNIT/ML injection Inject 60 Units Subcutaneous daily 15 mL 0     blood glucose (NO BRAND SPECIFIED) lancets standard Use to test blood sugar 4 times daily or as  directed. 100 each 11     blood glucose calibration (NO BRAND SPECIFIED) solution To accompany: Blood Glucose Monitor Brands: per insurance. 1 Bottle 3     blood glucose monitoring (NO BRAND SPECIFIED) meter device kit Use to test blood sugar 4 times daily or as directed. Preferred blood glucose meter OR supplies to accompany: Blood Glucose Monitor Brands: per insurance. 1 kit 0     blood glucose monitoring (NO BRAND SPECIFIED) meter device kit Use to test blood sugar.  One Touch Ultra or Verio. 1 kit 0     blood glucose monitoring (NO BRAND SPECIFIED) test strip Use to test blood sugar 4 times daily or as directed. To accompany: Blood Glucose Monitor Brands: per insurance. 100 strip 6     blood glucose monitoring (NO BRAND SPECIFIED) test strip Use to test blood sugars 4 times daily or as directed 100 strip 2     cinnamon 500 MG CAPS Take 500 mg by mouth 2 times daily 180 capsule 3     gabapentin (NEURONTIN) 300 MG capsule Take 1 capsule (300 mg) by mouth 3 times daily 270 capsule 1     ibuprofen (ADVIL,MOTRIN) 400 MG tablet Take 1 tablet (400 mg) by mouth every 6 hours as needed for moderate pain 120 tablet 0     insulin aspart (NOVOLOG FLEXPEN) 100 UNIT/ML injection Take 2 units per carb with each meal plus corrections 1:50 > 150. Need to schedule appointment within one month for diabetes check. 15 mL 0     insulin pen needle (BD EFRAIN U/F) 32G X 4 MM Use 4 pen needles daily or as directed. 400 each 1     insulin pen needle (NOVOFINE) 32G X 6 MM Use 4 daily or as directed. 100 each 11     multivitamin, therapeutic with minerals (MULTI-VITAMIN) TABS tablet Take 1 tablet by mouth daily 100 tablet 3     Nutritional Supplements (GLUCOSE MANAGEMENT) TABS 4 tabs po for low blood sugar below 70, may repeat q 10-15 minutes as needed 100 tablet prn     thin (NO BRAND SPECIFIED) lancets Use with lanceting device. To accompany: Blood Glucose Monitor Brands: per insurance. 100 each 6     [DISCONTINUED] insulin aspart (NOVOLOG  "FLEXPEN) 100 UNIT/ML injection Take 2 units per carb with each meal plus corrections 1:50 > 150. Need to schedule appointment within one month for diabetes check. 15 mL 0     No Known Allergies  Recent Labs   Lab Test  10/29/18   1349  06/14/18   1118  12/26/17   1721  10/05/17   1119  05/11/17   1515   06/28/16   1030   09/30/14   1447   A1C  11.0*  11.4*   --   11.7*  10.0*   < >   --    < >   --    LDL   --   113*   --    --    --    --   86   --    --    HDL   --   67   --    --    --    --   78   --    --    TRIG   --   79   --    --    --    --    --    --    --    ALT   --   19  15   --   18   --    --    < >  16   CR   --   0.64  1.02   --   0.70   < >   --    < >  0.51*   GFRESTIMATED   --   >90  63   --   >90  Non  GFR Calc     < >   --    < >  >90  Non  GFR Calc     GFRESTBLACK   --   >90  76   --   >90   GFR Calc     < >   --    < >  >90   GFR Calc     POTASSIUM   --   3.9  4.0   --   4.2   < >   --    < >  3.6   TSH   --    --    --    --   1.19   --    --    --   1.90    < > = values in this interval not displayed.      BP Readings from Last 3 Encounters:   10/29/18 107/73   06/14/18 118/62   12/26/17 118/69    Wt Readings from Last 3 Encounters:   10/29/18 146 lb (66.2 kg)   06/14/18 140 lb 3.2 oz (63.6 kg)   10/05/17 138 lb (62.6 kg)                    ROS:  CONSTITUTIONAL: NEGATIVE for fever, chills, change in weight  RESP: NEGATIVE for significant cough or SOB  CV: NEGATIVE for chest pain, palpitations or peripheral edema    OBJECTIVE:     /73 (BP Location: Right arm, Patient Position: Chair, Cuff Size: Adult Regular)  Pulse 97  Temp 98.3  F (36.8  C) (Oral)  Resp 20  Ht 5' 2\" (1.575 m)  Wt 146 lb (66.2 kg)  SpO2 97%  BMI 26.7 kg/m2  Body mass index is 26.7 kg/(m^2).  GENERAL: healthy, alert and no distress  RESP: lungs clear to auscultation - no rales, rhonchi or wheezes  CV: regular rate and rhythm, normal S1 S2, no S3 " or S4, no murmur, click or rub, no peripheral edema and peripheral pulses strong  ABDOMEN: soft, nontender, no hepatosplenomegaly, no masses and bowel sounds normal  MS: no gross musculoskeletal defects noted, no edema  Diabetic foot exam: normal DP and PT pulses, no trophic changes or ulcerative lesions and normal sensory exam    Diagnostic Test Results:  Results for orders placed or performed in visit on 10/29/18 (from the past 24 hour(s))   Hemoglobin A1c   Result Value Ref Range    Hemoglobin A1C 11.0 (H) 0 - 5.6 %       ASSESSMENT/PLAN:             1. Type 1 diabetes mellitus with diabetic autonomic neuropathy (H)  Not controlled, I prescribed Basaglar again, and encouraged the patient to use her low dose but slowly increase it by 3 units every few days until her blood sugar under control, also add her short acting insulin to the mix, to much better control on her blood sugar, although pt seems to understand, I still doubt her compliance given her social situation (single mother with 2 children) and her adaptive skills.  Pt lives in [place] now, so I suggested for the patient to transfer care to a clinic close by, but pt was in a [place] to meet with her children.  - BASAGLAR 100 UNIT/ML injection; Inject 60 Units Subcutaneous daily  Dispense: 15 mL; Refill: 0 (renewed same prescription as before)  - Hemoglobin A1c  - DIABETES EDUCATOR REFERRAL  - blood glucose monitoring (NO BRAND SPECIFIED) test strip; Use to test blood sugar 4 times daily or as directed. To accompany: Blood Glucose Monitor Brands: per insurance.  Dispense: 100 strip; Refill: 6  - blood glucose calibration (NO BRAND SPECIFIED) solution; To accompany: Blood Glucose Monitor Brands: per insurance.  Dispense: 1 Bottle; Refill: 3  - thin (NO BRAND SPECIFIED) lancets; Use with lanceting device. To accompany: Blood Glucose Monitor Brands: per insurance.  Dispense: 100 each; Refill: 6  - alcohol swab prep pads; Use to swab area of injection/joelle as  directed.  Dispense: 100 each; Refill: 3  - blood glucose monitoring (NO BRAND SPECIFIED) meter device kit; Use to test blood sugar 4 times daily or as directed. Preferred blood glucose meter OR supplies to accompany: Blood Glucose Monitor Brands: per insurance.  Dispense: 1 kit; Refill: 0  - Basic metabolic panel  - Albumin Random Urine Quantitative with Creat Ratio; Future  - insulin aspart (NOVOLOG FLEXPEN) 100 UNIT/ML injection; Take 2 units per carb with each meal plus corrections 1:50 > 150. Need to schedule appointment within one month for diabetes check.  Dispense: 15 mL; Refill: 0    FUTURE APPOINTMENTS:       - Follow-up visit in 1 month with a new provider in [place] if possible, as it will be easier for her with transportation.  Meanwhile, adjust the Basaglar gradually as above, start on the low dose she was taking (20 units according to patient) and increase it by 3 units until she gets to the optimal dose (as apparently with her latest results her blood sugar has been very high and not controlled on such low dose)    Diane Bello MD  [facility]  Answers for HPI/ROS submitted by the patient on 10/29/2018   PHQ-2 Score: 0

## 2018-10-30 ENCOUNTER — PATIENT OUTREACH (OUTPATIENT)
Dept: CARE COORDINATION | Facility: CLINIC | Age: 33
End: 2018-10-30

## 2018-10-30 ENCOUNTER — TELEPHONE (OUTPATIENT)
Dept: FAMILY MEDICINE | Facility: CLINIC | Age: 33
End: 2018-10-30

## 2018-10-30 DIAGNOSIS — E10.43 TYPE 1 DIABETES MELLITUS WITH DIABETIC AUTONOMIC NEUROPATHY (H): ICD-10-CM

## 2018-10-30 LAB
ANION GAP SERPL CALCULATED.3IONS-SCNC: 11 MMOL/L (ref 3–14)
BUN SERPL-MCNC: 10 MG/DL (ref 7–30)
CALCIUM SERPL-MCNC: 8.8 MG/DL (ref 8.5–10.1)
CHLORIDE SERPL-SCNC: 102 MMOL/L (ref 94–109)
CO2 SERPL-SCNC: 25 MMOL/L (ref 20–32)
CREAT SERPL-MCNC: 0.66 MG/DL (ref 0.52–1.04)
GFR SERPL CREATININE-BSD FRML MDRD: >90 ML/MIN/1.7M2
GLUCOSE SERPL-MCNC: 166 MG/DL (ref 70–99)
POTASSIUM SERPL-SCNC: 3.9 MMOL/L (ref 3.4–5.3)
SODIUM SERPL-SCNC: 138 MMOL/L (ref 133–144)

## 2018-10-30 ASSESSMENT — ACTIVITIES OF DAILY LIVING (ADL): DEPENDENT_IADLS:: INDEPENDENT

## 2018-10-30 NOTE — LETTER
[facility] CARE COORDINATION  [address]  [place] [address]  October 30, 2018    Arielle Montenegro  [address] [address]      Dear Arielle,    I am a clinic care coordinator who works with Diane Bello MD at [facility]. I wanted to thank you for spending the time to talk with me Monday.  I wanted to provide you with my contact information so that you can call me with questions or concerns about your health care. Below is a description of clinic care coordination and how I can further assist you.     The clinic care coordinator is a registered nurse and/or  who understand the health care system. The goal of clinic care coordination is to help you manage your health and improve access to the [facility] system in the most efficient manner. The registered nurse can assist you in meeting your health care goals by providing education, coordinating services, and strengthening the communication among your providers. The  can assist you with financial, behavioral, psychosocial, chemical dependency, counseling, and/or psychiatric resources.    Please feel free to contact me at [phone], with any questions or concerns. We at [facility] are focused on providing you with the highest-quality healthcare experience possible and that all starts with you.     Sincerely,     Judy Moreno    Enclosed: I have enclosed a copy of a 24 Hour Access Plan. This has helpful phone numbers for you to call when needed. Please keep this in an easy to access place to use as needed.

## 2018-10-30 NOTE — PROGRESS NOTES
Clinic Care Coordination Contact  [facility]/Voicemail    Referral Source: Care Team  Clinical Data: Care Coordinator Outreach  Outreach attempted x 1.  Left message on voicemail with call back information and requested return call.  Plan: Care Coordinator will mail out care coordination introduction letter with care coordinator contact information and explanation of care coordination services. Care Coordinator will try to reach patient again in 3-5 business days.    Judy Moreno [facility]  Clinic Care Coordinator - [facility], and [facility]  Ph: [phone]  [email or web address]  (Today's date: 10/30/18)

## 2018-10-30 NOTE — LETTER
Health Care Home - Access Care Plan    About Me  Patient Name:  Arielle Montenegro    YOB: 1985  Age:                             33 year old   [place] MRN:            [id number] Telephone Information:     Home Phone [phone]   Mobile [phone]   Home Phone [phone]       Address:    [address] [address] Email address:  No e-mail address on record      Emergency Contact(s)  Name Relationship Lgl Grd Work Phone Home Phone Mobile Phone   1. ZACHARY,MIGUE Brother  none     2. ZACHARY,KYLEE Relative  none [phone] none             Health Maintenance:      My Access Plan  Medical Emergency 911   Questions or concerns during clinic hours Primary Clinic Line, I will call the clinic directly: [facility] - [phone]   24 Hour Appointment Line [phone] or  [phone]) (toll free)   24 Hour Nurse Line [phone] (toll free)   Questions or concerns outside clinic hours 24 Hour Appointment Line, I will call the after-hours on-call line:   [facility] [phone] or [phone]) (toll-free)   Preferred Urgent Care     Preferred Hospital     Preferred Pharmacy [facility] [place], [place] - [facility] [address]     Behavioral Health Crisis Line The National Suicide Prevention Lifeline at 1-728.912.7219 or 911     My Care Team Members  Patient Care Team       Relationship Specialty Notifications Start End    Diane Bello MD PCP - General Family Practice  10/29/18     Phone: [phone] Fax: [phone]         [address] [facility] [address] [address]    [facility]     5/27/14     Comment:  Merged    Phone: [phone] Fax: [phone]         [address] [address]    Judy Moreno, [facility] Clinic Care Coordinator   10/29/18            My Medical and Care Information  Problem List   Patient Active Problem List   Diagnosis     Blindness of right eye      Diabetes mellitus type 1 (H)     Hypoglycemia unawareness in type 1 diabetes mellitus (H)     Health Care Home     Vitamin D deficiency     External hemorrhoids     Encounter for long-term (current) use of insulin (H)     Medical non-compliance      Current Medications and Allergies:  See printed Medication Report

## 2018-11-01 ENCOUNTER — PATIENT OUTREACH (OUTPATIENT)
Dept: CARE COORDINATION | Facility: CLINIC | Age: 33
End: 2018-11-01

## 2018-11-01 ASSESSMENT — ACTIVITIES OF DAILY LIVING (ADL): DEPENDENT_IADLS:: INDEPENDENT

## 2018-11-01 NOTE — PROGRESS NOTES
Clinic Care Coordination Contact  [facility]/Voicemail    Referral Source: Care Team  Clinical Data: Care Coordinator Outreach  Outreach attempted x 2.  Unable to leave message on voicemail as both numbers were inactive.  Plan: Care Coordinator mailed out care coordination introduction letter on 10/30/18. Care Coordinator will try to reach patient again in 1-2 business days.    Judy Moreno [facility]  Clinic Care Coordinator - [facility], and [facility]  Ph: [phone]  [email or web address]  (Today's date: 11/1/18)

## 2018-11-02 ENCOUNTER — PATIENT OUTREACH (OUTPATIENT)
Dept: CARE COORDINATION | Facility: CLINIC | Age: 33
End: 2018-11-02

## 2018-11-02 ASSESSMENT — ACTIVITIES OF DAILY LIVING (ADL): DEPENDENT_IADLS:: INDEPENDENT

## 2018-11-02 NOTE — PROGRESS NOTES
Clinic Care Coordination Contact  [facility]/Voicemail    Referral Source: Care Team  Clinical Data: Care Coordinator Outreach  Outreach attempted x 3.  Left message on voicemail with call back information and requested return call.  Plan: Care Coordinator mailed out care coordination introduction letter on 10/3/18. Care Coordinator will do no further outreaches at this time.    Judy Moreno [facility]  Clinic Care Coordinator - [facility], and [facility]  Ph: [phone]  [email or web address]  (Today's date: 11/2/18)

## 2018-11-11 ENCOUNTER — HEALTH MAINTENANCE LETTER (OUTPATIENT)
Age: 33
End: 2018-11-11

## 2018-12-03 DIAGNOSIS — E10.43 TYPE 1 DIABETES MELLITUS WITH DIABETIC AUTONOMIC NEUROPATHY (H): ICD-10-CM

## 2018-12-03 NOTE — TELEPHONE ENCOUNTER
"  Requested Prescriptions   Pending Prescriptions Disp Refills     blood glucose monitoring (NO BRAND SPECIFIED) test strip 100 strip 6     Sig: Use to test blood sugar 4 times daily or as directed. To accompany: Blood Glucose Monitor Brands: per insurance.    Diabetic Supplies Protocol Passed    12/3/2018  9:54 AM       Passed - Patient is 18 years of age or older       Passed - Recent (6 mo) or future (30 days) visit within the authorizing provider's specialty    Patient had office visit in the last 6 months or has a visit in the next 30 days with authorizing provider.  See \"Patient Info\" tab in inbasket, or \"Choose Columns\" in Meds & Orders section of the refill encounter.            Last Written Prescription Date:  102918  Last Fill Quantity: 100,  # refills: 6   Last office visit: 10/29/2018 with prescribing provider:  Dr Bello   Future Office Visit:          "

## 2018-12-04 NOTE — TELEPHONE ENCOUNTER
Refill denied with note to César Miguel  pharmacist:We sent 100 strips + 6 refills to [facility] pharmacy in October. Please ask customer if she is requesting Rx transfer.   Kartik Abreu RN

## 2019-02-11 NOTE — PROGRESS NOTES
"  SUBJECTIVE:   Arielle Montenegro is a 34 year old female who presents to clinic today for the following health issues:    Acute Illness   Acute illness concerns: cough  Onset: About 2 weeks ago    Fever: YES- first few days    Chills/Sweats: no     Headache (location?): YES- first few days    Sinus Pressure:no    Conjunctivitis:  no    Ear Pain: no    Rhinorrhea: YES    Congestion: no     Sore Throat: YES     Cough: YES-worsening over time    Wheeze: YES- worsened     Decreased Appetite: no     Nausea: no     Vomiting: no     Diarrhea:  no     Dysuria/Freq.: no    Fatigue/Achiness: YES    Sick/Strep Exposure: no      Therapies Tried and outcome: tried cough medicine, nyquil     when little had bronchitis a lot   In the past inhaler and codeine really helped, coughing at night gets painful. No pain over her heart   No sinus or rhinorrhea now just dry coughing    Diabetic, noted uncontrolled on chart review, type 1, last A1Cs have been above 11, pt does not want lab work done today. She reports her \"sugars are fine\" with this illness and taking all medications as ordered.     No history of heart murmur or cardiac issues that she is aware of   Normal CXR 2016  Pt doesn't want any further testing of murmur today     Establishing at this clinic, not sure who for Primary Care Provider   Has 5 yr old son with ADHD and behavior issues here with her today       Problem list and histories reviewed & adjusted, as indicated.  Additional history: as documented    Patient Active Problem List   Diagnosis     Blindness of right eye     Diabetes mellitus type 1 (H)     Hypoglycemia unawareness in type 1 diabetes mellitus (H)     Health Care Home     Vitamin D deficiency     External hemorrhoids     Encounter for long-term (current) use of insulin (H)     Medical non-compliance     Past Surgical History:   Procedure Laterality Date      SECTION  13      SECTION N/A 2015    Procedure:  SECTION;  " Surgeon: John Hudson MD;  Location: [facility]     ENUCLEATION Right 3/20/2015    Procedure: ENUCLEATION;  Surgeon: Edilson Islas MD;  Location: [facility]       Social History     Tobacco Use     Smoking status: Former Smoker     Types: Cigarettes     Smokeless tobacco: Never Used     Tobacco comment: smokes 2 cigarettes/day-none for several months   Substance Use Topics     Alcohol use: No     Alcohol/week: 0.0 oz     Family History   Problem Relation Age of Onset     Family History Negative Mother      Family History Negative Father      Diabetes Other         father's side         Current Outpatient Medications   Medication Sig Dispense Refill     acetone, Urine, test STRP Use one strip as needed to test urine for ketones.  Test urine for ketones when blood sugars are >250 or during illness with fever, abdominal pain, nausea, or vomiting. 100 each 11     albuterol (PROAIR HFA) 108 (90 Base) MCG/ACT inhaler Inhale 2 puffs into the lungs every 4 hours as needed for shortness of breath / dyspnea 1 Inhaler 0     alcohol swab prep pads Use to swab area of injection/joelle as directed. 100 each 3     BASAGLAR 100 UNIT/ML injection Inject 60 Units Subcutaneous daily 15 mL 0     blood glucose (NO BRAND SPECIFIED) lancets standard Use to test blood sugar 4 times daily or as directed. 100 each 11     blood glucose calibration (NO BRAND SPECIFIED) solution To accompany: Blood Glucose Monitor Brands: per insurance. 1 Bottle 3     blood glucose monitoring (NO BRAND SPECIFIED) meter device kit Use to test blood sugar 4 times daily or as directed. Preferred blood glucose meter OR supplies to accompany: Blood Glucose Monitor Brands: per insurance. 1 kit 0     blood glucose monitoring (NO BRAND SPECIFIED) meter device kit Use to test blood sugar.  One Touch Ultra or Verio. 1 kit 0     blood glucose monitoring (NO BRAND SPECIFIED) test strip Use to test blood sugar 4 times daily or as directed. To accompany: Blood Glucose  Monitor Brands: per insurance. 100 strip 6     blood glucose monitoring (NO BRAND SPECIFIED) test strip Use to test blood sugars 4 times daily or as directed 100 strip 2     cinnamon 500 MG CAPS Take 500 mg by mouth 2 times daily 180 capsule 3     gabapentin (NEURONTIN) 300 MG capsule Take 1 capsule (300 mg) by mouth 3 times daily 270 capsule 1     guaiFENesin-codeine (GUAIFENESIN AC) 100-10 MG/5ML syrup Take 5 mLs by mouth every 4 hours as needed for congestion 120 mL 0     ibuprofen (ADVIL,MOTRIN) 400 MG tablet Take 1 tablet (400 mg) by mouth every 6 hours as needed for moderate pain 120 tablet 0     insulin aspart (NOVOLOG FLEXPEN) 100 UNIT/ML injection Take 2 units per carb with each meal plus corrections 1:50 > 150.   Max is 25 units daily. 15 mL 0     insulin pen needle (BD EFRAIN U/F) 32G X 4 MM Use 4 pen needles daily or as directed. 400 each 1     insulin pen needle (NOVOFINE) 32G X 6 MM Use 4 daily or as directed. 100 each 11     multivitamin, therapeutic with minerals (MULTI-VITAMIN) TABS tablet Take 1 tablet by mouth daily 100 tablet 3     Nutritional Supplements (GLUCOSE MANAGEMENT) TABS 4 tabs po for low blood sugar below 70, may repeat q 10-15 minutes as needed 100 tablet prn     thin (NO BRAND SPECIFIED) lancets Use with lanceting device. To accompany: Blood Glucose Monitor Brands: per insurance. 100 each 6     No Known Allergies  Recent Labs   Lab Test 10/29/18  1349 06/14/18  1118 12/26/17  1721 10/05/17  1119 05/11/17  1515  06/28/16  1030  09/30/14  1447   A1C 11.0* 11.4*  --  11.7* 10.0*   < >  --    < >  --    LDL  --  113*  --   --   --   --  86  --   --    HDL  --  67  --   --   --   --  78  --   --    TRIG  --  79  --   --   --   --   --   --   --    ALT  --  19 15  --  18  --   --    < > 16   CR 0.66 0.64 1.02  --  0.70   < >  --    < > 0.51*   GFRESTIMATED >90 >90 63  --  >90  Non  GFR Calc     < >  --    < > >90  Non  GFR Calc     GFRESTBLACK >90 >90 76  --   ">90   GFR Calc     < >  --    < > >90   GFR Calc     POTASSIUM 3.9 3.9 4.0  --  4.2   < >  --    < > 3.6   TSH  --   --   --   --  1.19  --   --   --  1.90    < > = values in this interval not displayed.      BP Readings from Last 3 Encounters:   02/12/19 100/76   10/29/18 107/73   06/14/18 118/62    Wt Readings from Last 3 Encounters:   02/12/19 67.8 kg (149 lb 6.4 oz)   10/29/18 66.2 kg (146 lb)   06/14/18 63.6 kg (140 lb 3.2 oz)                  Labs reviewed in EPIC    Reviewed and updated as needed this visit by clinical staff       Reviewed and updated as needed this visit by Provider         ROS:  Constitutional, HEENT, cardiovascular, pulmonary, GI, , musculoskeletal, neuro, skin, endocrine systems are negative, except as otherwise noted.    OBJECTIVE:     /76   Pulse 91   Temp 98.2  F (36.8  C) (Oral)   Ht 1.575 m (5' 2\")   Wt 67.8 kg (149 lb 6.4 oz)   SpO2 97%   BMI 27.33 kg/m    Body mass index is 27.33 kg/m .  GENERAL: healthy, alert and no distress  NECK: no adenopathy, no asymmetry, masses, or scars and thyroid normal to palpation  RESP: lungs clear to auscultation - no rales, rhonchi or wheezes  CV: soft systolic murmur, HRR and normal rhythm, No abnormal color and extremities warm, brisk cap refill and normal pulses   ABDOMEN: soft, nontender, no hepatosplenomegaly, no masses and bowel sounds normal  MS: no gross musculoskeletal defects noted, no edema  SKIN: no suspicious lesions or rashes  NEURO: Normal strength and tone, mentation intact and speech normal    Diagnostic Test Results:  Pt declines    ASSESSMENT/PLAN:         ICD-10-CM    1. Post-viral cough syndrome R05    2. Painful cough R05 guaiFENesin-codeine (GUAIFENESIN AC) 100-10 MG/5ML syrup   3. Uncontrolled type 1 diabetes mellitus with hyperglycemia (H) E10.65    4. Bronchitis J40 albuterol (PROAIR HFA) 108 (90 Base) MCG/ACT inhaler   5. Heart murmur R01.1    likely post viral cough. Pt feels " this is bronchitis and in the past inhaler has really helped when she has illnesses or cold weather exacerbates this, needs further follow up and consider Spirometry  Discussed viral versus bacterial illnesses along with typical course of progression, and no signs or symptoms of bacterial infection at this point.    Symptom management: gargle salt water, honey in tea or warm water, plenty of rest and extra fluids. Reviewed signs of dehydration. See patient instructions     New heart murmur discussed sounds benign likely due to illness, monitor for any symptoms and Return to Clinic if having any or if cough not resolving as expected.     Briefly discussed DM 1 uncontrolled risks and she does not want labs today, pt agrees to make an appointment soon for a recheck and will choose Primary Care Provider here    Patient Instructions       Patient Education     Preventing Common Respiratory Infections    Respiratory infections such as colds and influenza ( the flu ) are common in winter. These infections are often caused by viruses. They may share some symptoms, but not all respiratory infections are the same. Some make you more sick than others. You can take steps to prevent common respiratory infections. And if you get sick, you can take care of yourself to keep the infection from getting worse.  What is a cold?    Symptoms include runny nose, coughing and sneezing, and sore throat. Cold symptoms tend to be milder than flu symptoms.    Symptoms tend to come on slowly. They last for a few days to about a week.    With a cold, you can still do most of the things you usually do.  What is the flu?    Symptoms include fever, headache, fatigue, cough, sore throat, runny nose, and muscle aches. Children may have upset stomach and vomiting, but adults usually don t.    Symptoms tend to come on quickly. Some, such as fatigue and cough, can last a few weeks.    With the flu, you may feel worn out and not able to do normal  activities.    It s most likely NOT the flu if an adult has vomiting or diarrhea for a day or two. This so-called  stomach flu  is probably a GI (gastrointestinal) infection.  When the infection gets worse  Without proper care, a respiratory infection can get worse. It can lead to serious complications and death. If you aren t getting better, call your healthcare provider. Complications can include:    Bronchitis (infection of the airways that leads to shortness of breath and coughing up thick yellow or green mucus)    Pneumonia (infection of the lungs in which fluid and mucus settle in the lungs, making breathing difficult)    Worsening of chronic conditions such as heart failure, chronic lung disease, asthma, or diabetes    Severe dehydration (loss of fluids)    Sinus problems    Ear infections   Get a flu vaccine  A flu vaccine protects you from influenza (but not other colds or infections). Get a vaccine each fall, before flu season starts. This can be done at a clinic, healthcare provider s office, drugstore, senior center, or through your workplace.  Get pneumococcal vaccines  Pneumonia can be a complication of influenza. There are 2 pneumococcal pneumonia vaccines that protect against many types of pneumonia. Talk with your healthcare provider about these important vaccines.   Keep germs from spreading  No one likes getting sick. To protect yourself and others from cold and flu germs:    Wash your hands often. Use alcohol-based hand  when you don t have access to soap and water.    Don t touch your eyes, nose, and mouth. This may help you keep germs out of your body.    Try to avoid people with respiratory infections. You may want to stay out of crowds during flu season (winter).    Ask your healthcare provider if you should get a pneumonia vaccination.  How to wash your hands    Use warm water and plenty of soap. Work up a good lather.    Clean your whole hand, under your nails, between your  fingers, and up your wrists. Wash for at least 15 to 20 seconds. Don t just wipe--rub well.    Rinse. Let the water run down your fingertips, not up your wrists.    In a public restroom, use a paper towel to turn off the faucet and open the door.   Date Last Reviewed: 12/1/2016 2000-2018 [employer]. [address] [place] [address], [place] [address]. All rights reserved. This information is not intended as a substitute for professional medical care. Always follow your healthcare professional's instructions.           25 min spent in direct face to face time with this pt, greater than 50% in counseling and coordination of care of above diagnoses      MIKALA Salazar [facility]

## 2019-02-12 ENCOUNTER — OFFICE VISIT (OUTPATIENT)
Dept: FAMILY MEDICINE | Facility: CLINIC | Age: 34
End: 2019-02-12
Payer: COMMERCIAL

## 2019-02-12 VITALS
BODY MASS INDEX: 27.49 KG/M2 | HEART RATE: 91 BPM | TEMPERATURE: 98.2 F | SYSTOLIC BLOOD PRESSURE: 100 MMHG | HEIGHT: 62 IN | WEIGHT: 149.4 LBS | OXYGEN SATURATION: 97 % | DIASTOLIC BLOOD PRESSURE: 76 MMHG

## 2019-02-12 DIAGNOSIS — R01.1 HEART MURMUR: ICD-10-CM

## 2019-02-12 DIAGNOSIS — E10.65 UNCONTROLLED TYPE 1 DIABETES MELLITUS WITH HYPERGLYCEMIA (H): ICD-10-CM

## 2019-02-12 DIAGNOSIS — R05.8 POST-VIRAL COUGH SYNDROME: Primary | ICD-10-CM

## 2019-02-12 DIAGNOSIS — J40 BRONCHITIS: ICD-10-CM

## 2019-02-12 DIAGNOSIS — R52 PAINFUL COUGH: ICD-10-CM

## 2019-02-12 DIAGNOSIS — R05.8 PAINFUL COUGH: ICD-10-CM

## 2019-02-12 PROBLEM — M47.812 DJD (DEGENERATIVE JOINT DISEASE), CERVICAL: Status: ACTIVE | Noted: 2019-02-12

## 2019-02-12 PROCEDURE — 99214 OFFICE O/P EST MOD 30 MIN: CPT | Performed by: NURSE PRACTITIONER

## 2019-02-12 RX ORDER — ALBUTEROL SULFATE 90 UG/1
2 AEROSOL, METERED RESPIRATORY (INHALATION) EVERY 4 HOURS PRN
Qty: 1 INHALER | Refills: 0 | Status: SHIPPED | OUTPATIENT
Start: 2019-02-12 | End: 2020-02-17

## 2019-02-12 RX ORDER — CODEINE PHOSPHATE/GUAIFENESIN 10-100MG/5
5 LIQUID (ML) ORAL EVERY 4 HOURS PRN
Qty: 120 ML | Refills: 0 | Status: SHIPPED | OUTPATIENT
Start: 2019-02-12 | End: 2019-10-18

## 2019-02-12 ASSESSMENT — MIFFLIN-ST. JEOR: SCORE: 1335.92

## 2019-02-12 NOTE — PATIENT INSTRUCTIONS
Patient Education     Preventing Common Respiratory Infections    Respiratory infections such as colds and influenza ( the flu ) are common in winter. These infections are often caused by viruses. They may share some symptoms, but not all respiratory infections are the same. Some make you more sick than others. You can take steps to prevent common respiratory infections. And if you get sick, you can take care of yourself to keep the infection from getting worse.  What is a cold?    Symptoms include runny nose, coughing and sneezing, and sore throat. Cold symptoms tend to be milder than flu symptoms.    Symptoms tend to come on slowly. They last for a few days to about a week.    With a cold, you can still do most of the things you usually do.  What is the flu?    Symptoms include fever, headache, fatigue, cough, sore throat, runny nose, and muscle aches. Children may have upset stomach and vomiting, but adults usually don t.    Symptoms tend to come on quickly. Some, such as fatigue and cough, can last a few weeks.    With the flu, you may feel worn out and not able to do normal activities.    It s most likely NOT the flu if an adult has vomiting or diarrhea for a day or two. This so-called  stomach flu  is probably a GI (gastrointestinal) infection.  When the infection gets worse  Without proper care, a respiratory infection can get worse. It can lead to serious complications and death. If you aren t getting better, call your healthcare provider. Complications can include:    Bronchitis (infection of the airways that leads to shortness of breath and coughing up thick yellow or green mucus)    Pneumonia (infection of the lungs in which fluid and mucus settle in the lungs, making breathing difficult)    Worsening of chronic conditions such as heart failure, chronic lung disease, asthma, or diabetes    Severe dehydration (loss of fluids)    Sinus problems    Ear infections   Get a flu vaccine  A flu vaccine protects  you from influenza (but not other colds or infections). Get a vaccine each fall, before flu season starts. This can be done at a clinic, healthcare provider s office, drugstore, [facility] center, or through your workplace.  Get pneumococcal vaccines  Pneumonia can be a complication of influenza. There are 2 pneumococcal pneumonia vaccines that protect against many types of pneumonia. Talk with your healthcare provider about these important vaccines.   Keep germs from spreading  No one likes getting sick. To protect yourself and others from cold and flu germs:    Wash your hands often. Use alcohol-based hand  when you don t have access to soap and water.    Don t touch your eyes, nose, and mouth. This may help you keep germs out of your body.    Try to avoid people with respiratory infections. You may want to stay out of crowds during flu season (winter).    Ask your healthcare provider if you should get a pneumonia vaccination.  How to wash your hands    Use warm water and plenty of soap. Work up a good lather.    Clean your whole hand, under your nails, between your fingers, and up your wrists. Wash for at least 15 to 20 seconds. Don t just wipe--rub well.    Rinse. Let the water run down your fingertips, not up your wrists.    In a public restroom, use a paper towel to turn off the faucet and open the door.   Date Last Reviewed: 12/1/2016 2000-2018 [employer]. [address] [place] [address], [place] [address]. All rights reserved. This information is not intended as a substitute for professional medical care. Always follow your healthcare professional's instructions.

## 2019-03-05 DIAGNOSIS — E10.43 TYPE 1 DIABETES MELLITUS WITH DIABETIC AUTONOMIC NEUROPATHY (H): ICD-10-CM

## 2019-03-05 NOTE — TELEPHONE ENCOUNTER
"blood glucose monitoring (ONE TOUCH DELICA) lancets  Last Written Prescription Date:  10/29/18  Last Fill Quantity: 100 each,  # refills: 6   Last office visit: 10/29/2018 with prescribing provider:  Ace Alva Office Visit:      ONETOUCH VERIO IQ test strip   Last Written Prescription Date:  10/29/18  Last Fill Quantity: 100 strip,  # refills: 6   Last office visit: 10/29/2018 with prescribing provider:  Ace Alva Office Visit:        Requested Prescriptions   Pending Prescriptions Disp Refills     blood glucose monitoring (ONE TOUCH DELICA) lancets [Pharmacy Med Name: ONE TOUCH DELICA 30G LANCETS 100S] 100 each 0     Sig: USE TO TEST BLOOD GLUCOSE FOUR TIMES DAILY OR AS DIRECTED    Diabetic Supplies Protocol Passed - 3/5/2019 12:14 PM       Passed - Medication is active on med list       Passed - Patient is 18 years of age or older       Passed - Recent (6 mo) or future (30 days) visit within the authorizing provider's specialty    Patient had office visit in the last 6 months or has a visit in the next 30 days with authorizing provider.  See \"Patient Info\" tab in inbasket, or \"Choose Columns\" in Meds & Orders section of the refill encounter.            ONETOUCH VERIO IQ test strip [Pharmacy Med Name: ONE TOUCH VERIO TEST ST(NEW)100'S] 100 strip 0     Sig: USE TO TEST BLOOD SUGAR FOUR TIMES DAILY OR AS DIRECTED    Diabetic Supplies Protocol Passed - 3/5/2019 12:14 PM       Passed - Medication is active on med list       Passed - Patient is 18 years of age or older       Passed - Recent (6 mo) or future (30 days) visit within the authorizing provider's specialty    Patient had office visit in the last 6 months or has a visit in the next 30 days with authorizing provider.  See \"Patient Info\" tab in inbasket, or \"Choose Columns\" in Meds & Orders section of the refill encounter.              "

## 2019-03-05 NOTE — TELEPHONE ENCOUNTER
"insulin aspart (NOVOLOG FLEXPEN) 100 UNIT/ML pen  Last Written Prescription Date:  10/30/18  Last Fill Quantity: 15 mL,  # refills: 0   Last office visit: 10/29/2018 with prescribing provider:  Ace Alva Office Visit:      insulin glargine (BASAGLAR KWIKPEN) 100 UNIT/ML pen  Last Written Prescription Date:  10/29/18  Last Fill Quantity: 15 mL,  # refills: 0   Last office visit: 10/29/2018 with prescribing provider:  Ace Alva Office Visit:        Requested Prescriptions   Pending Prescriptions Disp Refills     insulin aspart (NOVOLOG FLEXPEN) 100 UNIT/ML pen [Pharmacy Med Name: NOVOLOG FLEXPEN INJ 3ML (ORANGE)] 15 mL 0     Sig: INJECT 2 UNITS UNDER THE SKIN PER CARB PLUS CORRECTION 1-50 FOR>150. MAX OF 25 UNITS DAILY    Short Acting Insulin Protocol Failed - 3/5/2019 10:01 AM       Failed - Microalbumin on file in past 12 months    Recent Labs   Lab Test 09/30/14  1259   MICROL 172   UMALCR 94.51*            Failed - HgbA1C in past 3 or 6 months    If HgbA1C is 8 or greater, it needs to be on file within the past 3 months.  If less than 8, must be on file within the past 6 months.     Recent Labs   Lab Test 10/29/18  1349   A1C 11.0*            Passed - Blood pressure less than 140/90 in past 6 months    BP Readings from Last 3 Encounters:   02/12/19 100/76   10/29/18 107/73   06/14/18 118/62                Passed - LDL on file in past 12 months    Recent Labs   Lab Test 06/14/18  1118   *            Passed - Serum creatinine on file in past 12 months    Recent Labs   Lab Test 10/29/18  1349   CR 0.66            Passed - Medication is active on med list       Passed - Patient is age 18 or older       Passed - Recent (6 mo) or future (30 days) visit within the authorizing provider's specialty    Patient had office visit in the last 6 months or has a visit in the next 30 days with authorizing provider or within the authorizing provider's specialty.  See \"Patient Info\" tab in inbasket, or \"Choose " "Columns\" in Meds & Orders section of the refill encounter.            insulin glargine (BASAGLAR KWIKPEN) 100 UNIT/ML pen [Pharmacy Med Name: BASAGLAR 100 U/ML KWIKPEN INJ 3ML]  0     Sig: INJECT 60 UNITS EVERY DAY    Long Acting Insulin Protocol Failed - 3/5/2019 10:01 AM       Failed - Microalbumin on file in past 12 months    Recent Labs   Lab Test 09/30/14  1259   MICROL 172   UMALCR 94.51*            Failed - HgbA1C in past 3 or 6 months    If HgbA1C is 8 or greater, it needs to be on file within the past 3 months.  If less than 8, must be on file within the past 6 months.     Recent Labs   Lab Test 10/29/18  1349   A1C 11.0*            Passed - Blood pressure less than 140/90 in past 6 months    BP Readings from Last 3 Encounters:   02/12/19 100/76   10/29/18 107/73   06/14/18 118/62                Passed - LDL on file in past 12 months    Recent Labs   Lab Test 06/14/18  1118   *            Passed - Serum creatinine on file in past 12 months    Recent Labs   Lab Test 10/29/18  1349   CR 0.66            Passed - Medication is active on med list       Passed - Patient is age 18 or older       Passed - Recent (6 mo) or future (30 days) visit within the authorizing provider's specialty    Patient had office visit in the last 6 months or has a visit in the next 30 days with authorizing provider or within the authorizing provider's specialty.  See \"Patient Info\" tab in inbasket, or \"Choose Columns\" in Meds & Orders section of the refill encounter.              "

## 2019-03-06 RX ORDER — BLOOD SUGAR DIAGNOSTIC
STRIP MISCELLANEOUS
Status: SHIPPED
Start: 2019-03-06 | End: 2020-05-21

## 2019-03-06 NOTE — TELEPHONE ENCOUNTER
Establishing at this clinic, not sure who for Primary Care Provider   Has 5 yr old son with ADHD and behavior issues here with her today     See 2/12/19 appointment, pt has new pcp, sent message informing pharmacy  Lanette Leyva RN, BSN  Message handled by Nurse Triage.

## 2019-03-06 NOTE — TELEPHONE ENCOUNTER
Routing refill request to provider for review/approval because:  Labs out of range:  A1C, FLP  Labs not current:  Microalbumin. Last one was from 2014.     Summer Merino RN -- [facility]

## 2019-03-07 ENCOUNTER — PATIENT OUTREACH (OUTPATIENT)
Dept: CARE COORDINATION | Facility: CLINIC | Age: 34
End: 2019-03-07

## 2019-03-07 DIAGNOSIS — Z79.4 ENCOUNTER FOR LONG-TERM (CURRENT) USE OF INSULIN (H): ICD-10-CM

## 2019-03-07 DIAGNOSIS — Z91.199 MEDICAL NON-COMPLIANCE: ICD-10-CM

## 2019-03-07 DIAGNOSIS — E10.43 TYPE 1 DIABETES MELLITUS WITH DIABETIC AUTONOMIC NEUROPATHY (H): Primary | ICD-10-CM

## 2019-03-07 NOTE — PROGRESS NOTES
Clinic Care Coordination Contact  [facility]/Voicemail    Referral Source: PCP  --Seen by Dr. Bello ([place]) on 10/29/19.    --Seen by Nelda Ramirez CNP ([place]) on 2/12/19.  --Patient now requesting refills for insulins from Dr. Bello, but unclear which provider patient identifies as PCP.  --Request from Dr. Bello to outreach to patient given noted non-compliance and assist in determining PCP.    Clinical Data: Care Coordinator Outreach  Outreach attempted x 1.  Left message on voicemail with call back information and requested return call to further discuss who she identifies as PCP and review request for insulin refills.  Plan: Care Coordinator will try to reach patient again in 1-2 business days.    Kedar Kirkland, RN  Clinic Care Coordinator  [facility]  Ph: [phone]

## 2019-03-07 NOTE — TELEPHONE ENCOUNTER
Can we send this message to care coordination, this is non-compliant  patient with very poor controlled diabetes, which is dangerous, and I think care coordination can call her and find out what's going on.   Diane Bello MD  [facility]  [phone]

## 2019-03-07 NOTE — TELEPHONE ENCOUNTER
Routed to CC, see 2/12/19 visit, informs pt establishing care elsewhere?? See pt address and pharmacy requesting  Lanette Leyva RN, BSN  Message handled by Nurse Triage.

## 2019-03-07 NOTE — Clinical Note
Good morning,I wanted to give you both an update that I was covering for CCC RN Cindy Neil last week so tried reaching the patient to discuss her insulin refill request, but was unable to reach her after a couple of attempts.  Based on the recent conversations between you all, I will not be making any further outreach attempts.  Cindy is aware.  If the patient needs any further Care Coordination involvement moving forward, do not hesitate to reach out.Thank you!Kedar Kirkland, RNClinic Care CoordinatorLutheran Hospital [facility] XerxesPh: [phone]

## 2019-03-08 NOTE — PROGRESS NOTES
Clinic Care Coordination Contact  [facility]/Voicemail    Referral Source: PCP  --Seen by Dr. Bello ([place]) on 10/29/19.  --Seen by Nelda Ramirez CNP ([place]) on 2/12/19.  --Patient now requesting refills for insulins from Dr. Bello, but unclear which provider patient identifies as PCP.  --Request from Dr. Bello to outreach to patient given noted non-compliance and assist in determining PCP.    Clinical Data: Care Coordinator Outreach  Outreach attempted x 2.  Left message on voicemail with call back information and requested return call to further discuss who she identifies as PCP and review request for insulin refills.  Plan: Care Coordinator will try to reach patient again in 1-2 business days.    Kedar Kirkland, RN  Clinic Care Coordinator  [facility]  Ph: [phone]

## 2019-03-08 NOTE — TELEPHONE ENCOUNTER
If patient is establishing somewhere, that will be great, and we  Don't have to refer to care coordination anymore.  Medication will not be approved if pt is establishing somewhere else, she can ask the new provider.  Diane Bello MD  [facility]  [phone]

## 2019-03-11 LAB
ALBUMIN (URINE) MG/SPEC: 4.1 MG/DL
ALBUMIN/CREATININE RATIO: 206 MG/G CREAT
CHOLEST SERPL-MCNC: 147.6 MG/DL (ref 0–200)
CREATININE (URINE): 19.9 MG/DL
HDLC SERPL-MCNC: 52.4 MG/DL
LDLC SERPL CALC-MCNC: 72 MG/DL (ref 0–129)
TRIGL SERPL-MCNC: 115.8 MG/DL (ref 0–150)

## 2019-03-11 NOTE — TELEPHONE ENCOUNTER
Pt needs appointment for DM and to establish care here with a Provider if this is her intention--my last visit with her I understood she did not wish to see me, perhaps would like appointment with other NP here?    We can do short term refill to hold her over as needed.     Thanks  Nelda BACH CNP

## 2019-03-11 NOTE — TELEPHONE ENCOUNTER
CC attempted to reach patient x 2 without a return call.   Forwarded refill request to Nelda Ramirez at [facility].   Kartik Abreu RN

## 2019-03-11 NOTE — TELEPHONE ENCOUNTER
Patient called from the pharmacy, we didn't have patient newest number so I added this to the chart. Patient is out of insulin and needs a refill ASAP. She will call to make an appointment at [place] to [facility]. Care with a provider. Bailee Khan

## 2019-03-11 NOTE — TELEPHONE ENCOUNTER
", earlier we forwarded the refill request to [place] provider, Nelda Ramirez CNP,  that saw Arielle for diabetes 2/12/19.    Schedule shows Nelda is in the office today.   I am not sure what more we can do.  If OK to wait for Nelda, please \"done\" the message. Thanks.   Kartik Abreu, RN    "

## 2019-03-12 RX ORDER — INSULIN GLARGINE 100 [IU]/ML
INJECTION, SOLUTION SUBCUTANEOUS
Qty: 9 ML | Refills: 0 | OUTPATIENT
Start: 2019-03-12

## 2019-03-12 NOTE — TELEPHONE ENCOUNTER
NATASHA Lutz: Spoke with patient. She stated that she has found another provider that she will be seeing for her diabetic medications and they will be writing prescriptions for her insulin. Stated that she has already picked up a refill of her insulin. She stated that she does still want to continue to see you for primary care and was notified that she will need OV to establish care.    Bhumi Berman RN  [facility]

## 2019-03-12 NOTE — TELEPHONE ENCOUNTER
See CC response, appears pt establishing care with new clinic, message reviewed, AA Kedar Causey RN Vogelgesang, Mary, RN             Good morning,     I wanted to give you both an update that I was covering for CCC RN Cindy Neil last week so tried reaching the patient to discuss her insulin refill request, but was unable to reach her after a couple of attempts.  Based on the recent conversations between you all, I will not be making any further outreach attempts.  Cindy is aware.  If the patient needs any further Care Coordination involvement moving forward, do not hesitate to reach out.     Thank you!     Kedar Kirkland RN   Clinic Care Coordinator   [facility]   Ph: [phone]    Lanette Leyva RN, BSN  Message handled by Nurse Triage.

## 2019-04-02 ENCOUNTER — TELEPHONE (OUTPATIENT)
Dept: FAMILY MEDICINE | Facility: CLINIC | Age: 34
End: 2019-04-02

## 2019-04-02 NOTE — TELEPHONE ENCOUNTER
Panel Management Review      Patient has the following on her problem list:     Diabetes    ASA: Passed    Last A1C  Lab Results   Component Value Date    A1C 11.0 10/29/2018    A1C 11.4 06/14/2018    A1C 11.7 10/05/2017    A1C 10.0 05/11/2017    A1C 10.8 10/28/2016     A1C tested: FAILED    Last LDL:    Lab Results   Component Value Date    CHOL 196 06/14/2018     Lab Results   Component Value Date    HDL 67 06/14/2018     Lab Results   Component Value Date     06/14/2018     Lab Results   Component Value Date    TRIG 79 06/14/2018     No results found for: CHOLHDLRATIO  Lab Results   Component Value Date    NHDL 129 06/14/2018       Is the patient on a Statin? YES             Is the patient on Aspirin? YES        Last three blood pressure readings:  BP Readings from Last 3 Encounters:   02/12/19 100/76   10/29/18 107/73   06/14/18 118/62       Date of last diabetes office visit: 2/2019     Tobacco History:     History   Smoking Status     Former Smoker     Types: Cigarettes   Smokeless Tobacco     Never Used     Comment: smokes 2 cigarettes/day-none for several months           Composite cancer screening  Chart review shows that this patient is due/due soon for the following None  Summary:    Patient is due/failing the following:   A1C and eye exam    Action needed:   Patient needs office visit for diabetes.   Per last OV note-patient establishing at [facility] and due for f/u apt.    Type of outreach:    Phone, left message for patient to call back.     Questions for provider review:    None                                                                                                                                    JINNY Morel       Chart routed to Care Team .

## 2019-04-23 NOTE — TELEPHONE ENCOUNTER
Type of outreach:  Phone, spoke to patient.  patient moved to [facility] and goes to different clinic

## 2019-05-30 LAB — TSH SERPL-ACNC: 0.88 UIU/ML (ref 0.36–3.74)

## 2019-08-23 ENCOUNTER — OFFICE VISIT (OUTPATIENT)
Dept: DERMATOLOGY | Facility: CLINIC | Age: 34
End: 2019-08-23
Payer: COMMERCIAL

## 2019-08-23 DIAGNOSIS — R21 RASH: Primary | ICD-10-CM

## 2019-08-23 RX ORDER — CEPHALEXIN 500 MG/1
500 CAPSULE ORAL 3 TIMES DAILY
Qty: 21 CAPSULE | Refills: 0 | Status: SHIPPED | OUTPATIENT
Start: 2019-08-23 | End: 2019-09-20

## 2019-08-23 ASSESSMENT — PAIN SCALES - GENERAL: PAINLEVEL: NO PAIN (0)

## 2019-08-23 NOTE — PATIENT INSTRUCTIONS

## 2019-08-23 NOTE — LETTER
8/23/2019       RE: Arielle Montenegro  [address] [facility] [place] [address]     Dear Colleague,    Thank you for referring your patient, Arielle Montenegro, to the [facility]MATOLOG[facility] at [facility]. Please see a copy of my visit note below.    [facility] Dermatology Note      Dermatology Problem List:  1. NUB, left thumb- s/p bx 8/23/19    Encounter Date: Aug 23, 2019    CC:  Chief Complaint   Patient presents with     Derm Problem     Arielle states she has had a rash for about two years.     History of Present Illness:  Ms. Arielle Montenegro is a 34 year old female who presents today I self referral for a rash evaluation as a new patient.     Today she reports a rash on her hands, which has been present for the last 2 years. Worked at a  during this time. First noticed darkening in her skin tone and tender pimples on her hands. Denies pus filled lesions. Recently noted spreading to her elbows, legs and tops of her feet. Denies involvement of her face. No active lesions today, many are in the healing process. Denies picking at these areas. She requests an antibiotic at this time, as she often gets her hands dirty (, cleaning, cooking) and is worried about an open wound on her hand. She washes her hands often.    Otherwise she is feeling well, without additional skin concerns.     Past Medical History:   Patient Active Problem List   Diagnosis     Blindness of right eye     Diabetes mellitus type 1 (H)     Hypoglycemia unawareness in type 1 diabetes mellitus (H)     Health Care Home     Vitamin D deficiency     External hemorrhoids     Encounter for long-term (current) use of insulin (H)     Medical non-compliance     Heart murmur     DJD (degenerative joint disease), cervical     Past Medical History:   Diagnosis Date     Blindness of right eye 2007     Diabetes mellitus type 1 (H)     Diagnosed as a child     Past Surgical  History:   Procedure Laterality Date      SECTION  13      SECTION N/A 2015    Procedure:  SECTION;  Surgeon: John Hudson MD;  Location: [facility]     ENUCLEATION Right 3/20/2015    Procedure: ENUCLEATION;  Surgeon: Edilson Islas MD;  Location: [facility]       Social History:  Patient reports that she has quit smoking. Her smoking use included cigarettes. She has never used smokeless tobacco. She reports that she does not drink alcohol or use drugs.    Family History:  Family History   Problem Relation Age of Onset     Family History Negative Mother      Family History Negative Father      Diabetes Other         father's side     Denies any family history of skin diseases.   Medications:  Current Outpatient Medications   Medication Sig Dispense Refill     BASAGLAR 100 UNIT/ML injection Inject 60 Units Subcutaneous daily 15 mL 0     gabapentin (NEURONTIN) 300 MG capsule Take 1 capsule (300 mg) by mouth 3 times daily 270 capsule 1     insulin aspart (NOVOLOG FLEXPEN) 100 UNIT/ML injection Take 2 units per carb with each meal plus corrections 1:50 > 150.   Max is 25 units daily. 15 mL 0     acetone, Urine, test STRP Use one strip as needed to test urine for ketones.  Test urine for ketones when blood sugars are >250 or during illness with fever, abdominal pain, nausea, or vomiting. 100 each 11     albuterol (PROAIR HFA) 108 (90 Base) MCG/ACT inhaler Inhale 2 puffs into the lungs every 4 hours as needed for shortness of breath / dyspnea 1 Inhaler 0     alcohol swab prep pads Use to swab area of injection/joelle as directed. 100 each 3     blood glucose (NO BRAND SPECIFIED) lancets standard Use to test blood sugar 4 times daily or as directed. 100 each 11     blood glucose calibration (NO BRAND SPECIFIED) solution To accompany: Blood Glucose Monitor Brands: per insurance. 1 Bottle 3     blood glucose monitoring (NO BRAND SPECIFIED) meter device kit Use to test blood sugar 4 times  daily or as directed. Preferred blood glucose meter OR supplies to accompany: Blood Glucose Monitor Brands: per insurance. 1 kit 0     blood glucose monitoring (NO BRAND SPECIFIED) meter device kit Use to test blood sugar.  One Touch Ultra or Verio. 1 kit 0     blood glucose monitoring (NO BRAND SPECIFIED) test strip Use to test blood sugar 4 times daily or as directed. To accompany: Blood Glucose Monitor Brands: per insurance. 100 strip 6     blood glucose monitoring (NO BRAND SPECIFIED) test strip Use to test blood sugars 4 times daily or as directed 100 strip 2     blood glucose monitoring (ONE TOUCH DELICA) lancets USE TO TEST BLOOD GLUCOSE FOUR TIMES DAILY OR AS DIRECTED       cinnamon 500 MG CAPS Take 500 mg by mouth 2 times daily 180 capsule 3     ibuprofen (ADVIL,MOTRIN) 400 MG tablet Take 1 tablet (400 mg) by mouth every 6 hours as needed for moderate pain 120 tablet 0     insulin pen needle (BD EFRAIN U/F) 32G X 4 MM Use 4 pen needles daily or as directed. 400 each 1     insulin pen needle (NOVOFINE) 32G X 6 MM Use 4 daily or as directed. 100 each 11     multivitamin, therapeutic with minerals (MULTI-VITAMIN) TABS tablet Take 1 tablet by mouth daily 100 tablet 3     Nutritional Supplements (GLUCOSE MANAGEMENT) TABS 4 tabs po for low blood sugar below 70, may repeat q 10-15 minutes as needed 100 tablet prn     ONETOUCH VERIO IQ test strip USE TO TEST BLOOD SUGAR FOUR TIMES DAILY OR AS DIRECTED         No Known Allergies    Review of Systems:  -Constitutional: Otherwise feeling well today, in usual state of health.  -Skin: As above in HPI. No additional skin concerns.    Physical exam:  Vitals: There were no vitals taken for this visit.  GEN: This is a well developed, well-nourished female in no acute distress, in a pleasant mood.    SKIN: Focused examination of the bilateral hands, arms, feet and legs was performed. Significant for:   -There are hyperpigmented somewhat lichenified papules to the dorsal hands,  feet, elbows   - Defers further examination   -No other lesions of concern on areas examined.       Impression/Plan:  1. Painful papular eruption of uncertain behavior on dorsal hands, feet and elbows, pain differential diagnosis to r/o DH vs prurigo nodules vs lichenified vv vs other     Punch biopsy:  After discussion of benefits and risks including but not limited to bleeding/bruising, pain/swelling, infection, scar, incomplete removal, nerve damage/numbness, recurrence, and non-diagnostic biopsy, written consent, verbal consent and photographs were obtained. Time-out was performed. The area was cleaned with isopropyl alcohol. 1 mL of 1% lidocaine with epinephrine was injected to obtain adequate anesthesia of the lesion on the left thumb.  A 3 mm punch biopsy was performed. 4-0 ethilon sutures were utilized to approximate the epidermal edges. White petroleum jelly/Vaseline and a bandage was applied to the wound. Explicit verbal and written wound care instructions were provided. The patient left the Dermatology Clinic in good condition. The patient was counseled to follow up for suture removal in approximately 10-14 days.    Will start pt on oral antibiotic d/t pt concern for infection. Start cephalexin 500 mg TID for 1 week    Discussed avoid soaking hands in dirty water. Keep biopsy dry.     Follow-up in 2 weeks, earlier for new or changing lesions.     Staff Involved:  Scribe/Staff    Scribe Disclosure:   NIKKI, Tracy Hand, am serving as a scribe to document services personally performed by Jeanie Persaud PA-C, based on data collection and the provider's statements to me.    Provider Disclosure:   The documentation recorded by the scribe accurately reflects the services I personally performed and the decisions made by me.    All risks, benefits and alternatives were discussed with patient.  Patient is in agreement and understands the assessment and plan.  All questions were answered.  Sun Screen Education was  given.   Return to Clinic in 2 weeks or sooner as needed.   Jeanie Persaud PA-C   [facility]

## 2019-08-23 NOTE — PROGRESS NOTES
[facility] Dermatology Note      Dermatology Problem List:  1Flavio RANDOLPH, left thumb- s/p bx 19    Encounter Date: Aug 23, 2019    CC:  Chief Complaint   Patient presents with     Derm Problem     Arielle states she has had a rash for about two years.     History of Present Illness:  Ms. Arielle Montenegro is a 34 year old female who presents today I self referral for a rash evaluation as a new patient.     Today she reports a rash on her hands, which has been present for the last 2 years. Worked at a  during this time. First noticed darkening in her skin tone and tender pimples on her hands. Denies pus filled lesions. Recently noted spreading to her elbows, legs and tops of her feet. Denies involvement of her face. No active lesions today, many are in the healing process. Denies picking at these areas. She requests an antibiotic at this time, as she often gets her hands dirty (, cleaning, cooking) and is worried about an open wound on her hand. She washes her hands often.    Otherwise she is feeling well, without additional skin concerns.     Past Medical History:   Patient Active Problem List   Diagnosis     Blindness of right eye     Diabetes mellitus type 1 (H)     Hypoglycemia unawareness in type 1 diabetes mellitus (H)     Health Care Home     Vitamin D deficiency     External hemorrhoids     Encounter for long-term (current) use of insulin (H)     Medical non-compliance     Heart murmur     DJD (degenerative joint disease), cervical     Past Medical History:   Diagnosis Date     Blindness of right eye      Diabetes mellitus type 1 (H)     Diagnosed as a child     Past Surgical History:   Procedure Laterality Date      SECTION  13      SECTION N/A 2015    Procedure:  SECTION;  Surgeon: John Hudson MD;  Location: [facility]     ENUCLEATION Right 3/20/2015    Procedure: ENUCLEATION;  Surgeon: Edilson Islas MD;  Location: [facility]        Social History:  Patient reports that she has quit smoking. Her smoking use included cigarettes. She has never used smokeless tobacco. She reports that she does not drink alcohol or use drugs.    Family History:  Family History   Problem Relation Age of Onset     Family History Negative Mother      Family History Negative Father      Diabetes Other         father's side     Denies any family history of skin diseases.   Medications:  Current Outpatient Medications   Medication Sig Dispense Refill     BASAGLAR 100 UNIT/ML injection Inject 60 Units Subcutaneous daily 15 mL 0     gabapentin (NEURONTIN) 300 MG capsule Take 1 capsule (300 mg) by mouth 3 times daily 270 capsule 1     insulin aspart (NOVOLOG FLEXPEN) 100 UNIT/ML injection Take 2 units per carb with each meal plus corrections 1:50 > 150.   Max is 25 units daily. 15 mL 0     acetone, Urine, test STRP Use one strip as needed to test urine for ketones.  Test urine for ketones when blood sugars are >250 or during illness with fever, abdominal pain, nausea, or vomiting. 100 each 11     albuterol (PROAIR HFA) 108 (90 Base) MCG/ACT inhaler Inhale 2 puffs into the lungs every 4 hours as needed for shortness of breath / dyspnea 1 Inhaler 0     alcohol swab prep pads Use to swab area of injection/joelle as directed. 100 each 3     blood glucose (NO BRAND SPECIFIED) lancets standard Use to test blood sugar 4 times daily or as directed. 100 each 11     blood glucose calibration (NO BRAND SPECIFIED) solution To accompany: Blood Glucose Monitor Brands: per insurance. 1 Bottle 3     blood glucose monitoring (NO BRAND SPECIFIED) meter device kit Use to test blood sugar 4 times daily or as directed. Preferred blood glucose meter OR supplies to accompany: Blood Glucose Monitor Brands: per insurance. 1 kit 0     blood glucose monitoring (NO BRAND SPECIFIED) meter device kit Use to test blood sugar.  One Touch Ultra or Verio. 1 kit 0     blood glucose monitoring (NO BRAND  SPECIFIED) test strip Use to test blood sugar 4 times daily or as directed. To accompany: Blood Glucose Monitor Brands: per insurance. 100 strip 6     blood glucose monitoring (NO BRAND SPECIFIED) test strip Use to test blood sugars 4 times daily or as directed 100 strip 2     blood glucose monitoring (ONE TOUCH DELICA) lancets USE TO TEST BLOOD GLUCOSE FOUR TIMES DAILY OR AS DIRECTED       cinnamon 500 MG CAPS Take 500 mg by mouth 2 times daily 180 capsule 3     ibuprofen (ADVIL,MOTRIN) 400 MG tablet Take 1 tablet (400 mg) by mouth every 6 hours as needed for moderate pain 120 tablet 0     insulin pen needle (BD EFRAIN U/F) 32G X 4 MM Use 4 pen needles daily or as directed. 400 each 1     insulin pen needle (NOVOFINE) 32G X 6 MM Use 4 daily or as directed. 100 each 11     multivitamin, therapeutic with minerals (MULTI-VITAMIN) TABS tablet Take 1 tablet by mouth daily 100 tablet 3     Nutritional Supplements (GLUCOSE MANAGEMENT) TABS 4 tabs po for low blood sugar below 70, may repeat q 10-15 minutes as needed 100 tablet prn     ONETOUCH VERIO IQ test strip USE TO TEST BLOOD SUGAR FOUR TIMES DAILY OR AS DIRECTED         No Known Allergies    Review of Systems:  -Constitutional: Otherwise feeling well today, in usual state of health.  -Skin: As above in HPI. No additional skin concerns.    Physical exam:  Vitals: There were no vitals taken for this visit.  GEN: This is a well developed, well-nourished female in no acute distress, in a pleasant mood.    SKIN: Focused examination of the bilateral hands, arms, feet and legs was performed. Significant for:   -There are hyperpigmented somewhat lichenified papules to the dorsal hands, feet, elbows   - Defers further examination   -No other lesions of concern on areas examined.       Impression/Plan:  1. Painful papular eruption of uncertain behavior on dorsal hands, feet and elbows, pain differential diagnosis to r/o DH vs prurigo nodules vs lichenified vv vs other     Punch  biopsy:  After discussion of benefits and risks including but not limited to bleeding/bruising, pain/swelling, infection, scar, incomplete removal, nerve damage/numbness, recurrence, and non-diagnostic biopsy, written consent, verbal consent and photographs were obtained. Time-out was performed. The area was cleaned with isopropyl alcohol. 1 mL of 1% lidocaine with epinephrine was injected to obtain adequate anesthesia of the lesion on the left thumb.  A 3 mm punch biopsy was performed. 4-0 ethilon sutures were utilized to approximate the epidermal edges. White petroleum jelly/Vaseline and a bandage was applied to the wound. Explicit verbal and written wound care instructions were provided. The patient left the Dermatology Clinic in good condition. The patient was counseled to follow up for suture removal in approximately 10-14 days.    Will start pt on oral antibiotic d/t pt concern for infection. Start cephalexin 500 mg TID for 1 week    Discussed avoid soaking hands in dirty water. Keep biopsy dry.     Follow-up in 2 weeks, earlier for new or changing lesions.     Staff Involved:  Scribe/Staff    Scribe Disclosure:   Tracy JORDAN, am serving as a scribe to document services personally performed by Jeanie Persaud PA-C, based on data collection and the provider's statements to me.    Provider Disclosure:   The documentation recorded by the scribe accurately reflects the services I personally performed and the decisions made by me.    All risks, benefits and alternatives were discussed with patient.  Patient is in agreement and understands the assessment and plan.  All questions were answered.  Sun Screen Education was given.   Return to Clinic in 2 weeks or sooner as needed.   Jeanie Persaud PA-C   [facility]

## 2019-08-23 NOTE — NURSING NOTE
Dermatology Rooming Note    Arielle Montenegro's goals for this visit include:   Chief Complaint   Patient presents with     Derm Problem     Arielle states she has had a rash for about two years.     Valery Garcia LPN

## 2019-08-28 LAB — COPATH REPORT: NORMAL

## 2019-09-04 ENCOUNTER — TELEPHONE (OUTPATIENT)
Dept: DERMATOLOGY | Facility: CLINIC | Age: 34
End: 2019-09-04

## 2019-09-04 NOTE — TELEPHONE ENCOUNTER
[facility]    Phone Message    May a detailed message be left on voicemail: yes    Reason for Call: Other: Pt returning a call from [facility] concerning her test results. Please call Pt back ASAP to discuss. Thanks     Action Taken: Message routed to:  [facility] Derm

## 2019-09-05 NOTE — TELEPHONE ENCOUNTER
[facility]    Phone Message    May a detailed message be left on voicemail: yes    Reason for Call: Other: Pt returned a call from [facility] regarding test results. She is very concerned, and would like to get a call back as soon as possible to discuss the results.     Action Taken: Message routed to:  [facility] Dermatology

## 2019-09-05 NOTE — TELEPHONE ENCOUNTER
I spoke to Arielle Montenegro and gave results. Patient understood and had no further questions or concerns.

## 2019-09-06 ENCOUNTER — OFFICE VISIT (OUTPATIENT)
Dept: DERMATOLOGY | Facility: CLINIC | Age: 34
End: 2019-09-06
Payer: COMMERCIAL

## 2019-09-06 DIAGNOSIS — L87.1 REACTIVE PERFORATING COLLAGENOSIS: Primary | ICD-10-CM

## 2019-09-06 RX ORDER — TRETINOIN 0.5 MG/G
CREAM TOPICAL
Qty: 45 G | Refills: 1 | Status: SHIPPED | OUTPATIENT
Start: 2019-09-06 | End: 2020-01-28

## 2019-09-06 ASSESSMENT — PAIN SCALES - GENERAL: PAINLEVEL: MILD PAIN (2)

## 2019-09-06 NOTE — NURSING NOTE
Dermatology Rooming Note    Arielle Montenegro's goals for this visit include:   Chief Complaint   Patient presents with     Derm Problem     Arielle is here for a rash follow up, states it's the same.        Ronna Walter LPN

## 2019-09-06 NOTE — PROGRESS NOTES
[facility] Dermatology Note      Dermatology Problem List:  1. 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 History of DM1. Associated post inflammatory pigment change. Dorsal hands, feet, elbows.     Encounter Date: Sep 6, 2019    CC:  Chief Complaint   Patient presents with     Derm Problem     Arielle is here for a rash follow up, states it's the same.        History of Present Illness:  Ms. Arielle Montenegro is a 34 year old female who presents as a follow-up for reactive perforating collagenosis on left thumb. The patient was last seen 19 when punch biopsy of left thumb was done. She endorses that her rash on her hands is painful with pressure and has noticed a few new spots on the back of both her hands. She is most concerned about the pigmentation changes associated with these lesions. She reports taking her cephalexin 500mg TID but only for 3 days.    Past Medical History:   Patient Active Problem List   Diagnosis     Blindness of right eye     Diabetes mellitus type 1 (H)     Hypoglycemia unawareness in type 1 diabetes mellitus (H)     Health Care Home     Vitamin D deficiency     External hemorrhoids     Encounter for long-term (current) use of insulin (H)     Medical non-compliance     Heart murmur     DJD (degenerative joint disease), cervical     Past Medical History:   Diagnosis Date     Blindness of right eye      Diabetes mellitus type 1 (H)     Diagnosed as a child     Past Surgical History:   Procedure Laterality Date      SECTION  13      SECTION N/A 2015    Procedure:  SECTION;  Surgeon: John Hudson MD;  Location: [facility]     ENUCLEATION Right 3/20/2015    Procedure: ENUCLEATION;  Surgeon: Edilson Islas MD;  Location: [facility]       Social History:  Patient reports that she has quit smoking. Her smoking use included cigarettes. She has never used smokeless tobacco. She reports that she does not drink alcohol or use  drugs.    Family History:  Family History   Problem Relation Age of Onset     Family History Negative Mother      Family History Negative Father      Diabetes Other         father's side       Medications:  Current Outpatient Medications   Medication Sig Dispense Refill     acetone, Urine, test STRP Use one strip as needed to test urine for ketones.  Test urine for ketones when blood sugars are >250 or during illness with fever, abdominal pain, nausea, or vomiting. 100 each 11     albuterol (PROAIR HFA) 108 (90 Base) MCG/ACT inhaler Inhale 2 puffs into the lungs every 4 hours as needed for shortness of breath / dyspnea 1 Inhaler 0     alcohol swab prep pads Use to swab area of injection/joelle as directed. 100 each 3     BASAGLAR 100 UNIT/ML injection Inject 60 Units Subcutaneous daily 15 mL 0     blood glucose (NO BRAND SPECIFIED) lancets standard Use to test blood sugar 4 times daily or as directed. 100 each 11     blood glucose calibration (NO BRAND SPECIFIED) solution To accompany: Blood Glucose Monitor Brands: per insurance. 1 Bottle 3     blood glucose monitoring (NO BRAND SPECIFIED) meter device kit Use to test blood sugar 4 times daily or as directed. Preferred blood glucose meter OR supplies to accompany: Blood Glucose Monitor Brands: per insurance. 1 kit 0     blood glucose monitoring (NO BRAND SPECIFIED) meter device kit Use to test blood sugar.  One Touch Ultra or Verio. 1 kit 0     blood glucose monitoring (NO BRAND SPECIFIED) test strip Use to test blood sugar 4 times daily or as directed. To accompany: Blood Glucose Monitor Brands: per insurance. 100 strip 6     blood glucose monitoring (NO BRAND SPECIFIED) test strip Use to test blood sugars 4 times daily or as directed 100 strip 2     blood glucose monitoring (ONE TOUCH DELICA) lancets USE TO TEST BLOOD GLUCOSE FOUR TIMES DAILY OR AS DIRECTED       cephALEXin (KEFLEX) 500 MG capsule Take 1 capsule (500 mg) by mouth 3 times daily 21 capsule 0      cinnamon 500 MG CAPS Take 500 mg by mouth 2 times daily 180 capsule 3     gabapentin (NEURONTIN) 300 MG capsule Take 1 capsule (300 mg) by mouth 3 times daily 270 capsule 1     ibuprofen (ADVIL,MOTRIN) 400 MG tablet Take 1 tablet (400 mg) by mouth every 6 hours as needed for moderate pain 120 tablet 0     insulin aspart (NOVOLOG FLEXPEN) 100 UNIT/ML injection Take 2 units per carb with each meal plus corrections 1:50 > 150.   Max is 25 units daily. 15 mL 0     insulin pen needle (BD EFRAIN U/F) 32G X 4 MM Use 4 pen needles daily or as directed. 400 each 1     insulin pen needle (NOVOFINE) 32G X 6 MM Use 4 daily or as directed. 100 each 11     multivitamin, therapeutic with minerals (MULTI-VITAMIN) TABS tablet Take 1 tablet by mouth daily 100 tablet 3     Nutritional Supplements (GLUCOSE MANAGEMENT) TABS 4 tabs po for low blood sugar below 70, may repeat q 10-15 minutes as needed 100 tablet prn     ONETOUCH VERIO IQ test strip USE TO TEST BLOOD SUGAR FOUR TIMES DAILY OR AS DIRECTED       No Known Allergies      Review of Systems:  -As per HPI  -Constitutional: Otherwise feeling well today, in usual state of health.  -HEENT: Patient denies nonhealing oral sores.  -Skin: As above in HPI. No additional skin concerns.    Physical exam:  Vitals: There were no vitals taken for this visit.  GEN: This is a well developed, well-nourished female in no acute distress, in a pleasant mood.    SKIN: Focused examination of the bilateral hands, arms, feet and legs was performed.  -Mon skin type: V  -There are hyperpigmented somewhat lichenified papules with central cores to the dorsal hands, feet, elbows  -No other lesions of concern on areas examined.     Impression/Plan:  1. 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 Can also be seen in renal failure, hyperparathyroidism. Noted that this tends to be a chronic problem with no treatment that works in all cases. Systemic treatment can include oral doxycycline  (patient self-discontinued), isotretinoin. Phototherapy can also be considered. Noted that we will begin with a trial of topical tretinoin as patient is most concerned about pigment changes and tretinoin may help in this regard. As pigment changes are secondary to the primary process, however, there will continue to be dark macules as long as new lesions arise.     Tretinoin (RETIN-A) 0.05 % external cream: To hands and feet nightly    Typical side effects discussed, including dry skin most primarily; usage of moisturizer discussed to prevent this adverse effect    CC Referred Self, MD  No address on file on close of this encounter.  Follow-up in 2 months, earlier for new or changing lesions.     Staff Involved:  I, Valeriy Jo MS3, saw and examined the patient in the presence of Dr. Shayla Vora.    I was present with the medical student who participated in the service and in the documentation of the note.  I have verified the history and personally performed the physical exam and medical decision making.  I agree with the assessment and plan of care as documented in the note.    Shayla Vora MD  Dermatology Staff

## 2019-09-06 NOTE — LETTER
2019       RE: Arielle Montenegro  [address] [id number]     Dear Colleague,    Thank you for referring your patient, Arielle Montenegro, to the [facility] DERMATOLOGY at [facility]. Please see a copy of my visit note below.    [facility] Dermatology Note      Dermatology Problem List:  1. 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 History of DM1. Associated post inflammatory pigment change. Dorsal hands, feet, elbows.     Encounter Date: Sep 6, 2019    CC:  Chief Complaint   Patient presents with     Derm Problem     Arielle is here for a rash follow up, states it's the same.        History of Present Illness:  Ms. Arielle Montenegro is a 34 year old female who presents as a follow-up for reactive perforating collagenosis on left thumb. The patient was last seen 19 when punch biopsy of left thumb was done. She endorses that her rash on her hands is painful with pressure and has noticed a few new spots on the back of both her hands. She is most concerned about the pigmentation changes associated with these lesions. She reports taking her cephalexin 500mg TID but only for 3 days.    Past Medical History:   Patient Active Problem List   Diagnosis     Blindness of right eye     Diabetes mellitus type 1 (H)     Hypoglycemia unawareness in type 1 diabetes mellitus (H)     Health Care Home     Vitamin D deficiency     External hemorrhoids     Encounter for long-term (current) use of insulin (H)     Medical non-compliance     Heart murmur     DJD (degenerative joint disease), cervical     Past Medical History:   Diagnosis Date     Blindness of right eye      Diabetes mellitus type 1 (H)     Diagnosed as a child     Past Surgical History:   Procedure Laterality Date      SECTION  13      SECTION N/A 2015    Procedure:  SECTION;  Surgeon: John Hudson MD;  Location: [facility]      ENUCLEATION Right 3/20/2015    Procedure: ENUCLEATION;  Surgeon: Edilson Islas MD;  Location: [facility]       Social History:  Patient reports that she has quit smoking. Her smoking use included cigarettes. She has never used smokeless tobacco. She reports that she does not drink alcohol or use drugs.    Family History:  Family History   Problem Relation Age of Onset     Family History Negative Mother      Family History Negative Father      Diabetes Other         father's side       Medications:  Current Outpatient Medications   Medication Sig Dispense Refill     acetone, Urine, test STRP Use one strip as needed to test urine for ketones.  Test urine for ketones when blood sugars are >250 or during illness with fever, abdominal pain, nausea, or vomiting. 100 each 11     albuterol (PROAIR HFA) 108 (90 Base) MCG/ACT inhaler Inhale 2 puffs into the lungs every 4 hours as needed for shortness of breath / dyspnea 1 Inhaler 0     alcohol swab prep pads Use to swab area of injection/joelel as directed. 100 each 3     BASAGLAR 100 UNIT/ML injection Inject 60 Units Subcutaneous daily 15 mL 0     blood glucose (NO BRAND SPECIFIED) lancets standard Use to test blood sugar 4 times daily or as directed. 100 each 11     blood glucose calibration (NO BRAND SPECIFIED) solution To accompany: Blood Glucose Monitor Brands: per insurance. 1 Bottle 3     blood glucose monitoring (NO BRAND SPECIFIED) meter device kit Use to test blood sugar 4 times daily or as directed. Preferred blood glucose meter OR supplies to accompany: Blood Glucose Monitor Brands: per insurance. 1 kit 0     blood glucose monitoring (NO BRAND SPECIFIED) meter device kit Use to test blood sugar.  One Touch Ultra or Verio. 1 kit 0     blood glucose monitoring (NO BRAND SPECIFIED) test strip Use to test blood sugar 4 times daily or as directed. To accompany: Blood Glucose Monitor Brands: per insurance. 100 strip 6     blood glucose monitoring (NO BRAND SPECIFIED)  test strip Use to test blood sugars 4 times daily or as directed 100 strip 2     blood glucose monitoring (ONE TOUCH DELICA) lancets USE TO TEST BLOOD GLUCOSE FOUR TIMES DAILY OR AS DIRECTED       cephALEXin (KEFLEX) 500 MG capsule Take 1 capsule (500 mg) by mouth 3 times daily 21 capsule 0     cinnamon 500 MG CAPS Take 500 mg by mouth 2 times daily 180 capsule 3     gabapentin (NEURONTIN) 300 MG capsule Take 1 capsule (300 mg) by mouth 3 times daily 270 capsule 1     ibuprofen (ADVIL,MOTRIN) 400 MG tablet Take 1 tablet (400 mg) by mouth every 6 hours as needed for moderate pain 120 tablet 0     insulin aspart (NOVOLOG FLEXPEN) 100 UNIT/ML injection Take 2 units per carb with each meal plus corrections 1:50 > 150.   Max is 25 units daily. 15 mL 0     insulin pen needle (BD EFRAIN U/F) 32G X 4 MM Use 4 pen needles daily or as directed. 400 each 1     insulin pen needle (NOVOFINE) 32G X 6 MM Use 4 daily or as directed. 100 each 11     multivitamin, therapeutic with minerals (MULTI-VITAMIN) TABS tablet Take 1 tablet by mouth daily 100 tablet 3     Nutritional Supplements (GLUCOSE MANAGEMENT) TABS 4 tabs po for low blood sugar below 70, may repeat q 10-15 minutes as needed 100 tablet prn     ONETOUCH VERIO IQ test strip USE TO TEST BLOOD SUGAR FOUR TIMES DAILY OR AS DIRECTED       No Known Allergies      Review of Systems:  -As per HPI  -Constitutional: Otherwise feeling well today, in usual state of health.  -HEENT: Patient denies nonhealing oral sores.  -Skin: As above in HPI. No additional skin concerns.    Physical exam:  Vitals: There were no vitals taken for this visit.  GEN: This is a well developed, well-nourished female in no acute distress, in a pleasant mood.    SKIN: Focused examination of the bilateral hands, arms, feet and legs was performed.  -Mon skin type: V  -There are hyperpigmented somewhat lichenified papules with central cores to the dorsal hands, feet, elbows  -No other lesions of concern on  areas examined.     Impression/Plan:  1. 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 Can also be seen in renal failure, hyperparathyroidism. Noted that this tends to be a chronic problem with no treatment that works in all cases. Systemic treatment can include oral doxycycline (patient self-discontinued), isotretinoin. Phototherapy can also be considered. Noted that we will begin with a trial of topical tretinoin as patient is most concerned about pigment changes and tretinoin may help in this regard. As pigment changes are secondary to the primary process, however, there will continue to be dark macules as long as new lesions arise.     Tretinoin (RETIN-A) 0.05 % external cream: To hands and feet nightly    Typical side effects discussed, including dry skin most primarily; usage of moisturizer discussed to prevent this adverse effect    CC Referred Self, MD  No address on file on close of this encounter.  Follow-up in 2 months, earlier for new or changing lesions.     Staff Involved:  I, Valeriy Jo MS3, saw and examined the patient in the presence of Dr. Shayla Vora.    I was present with the medical student who participated in the service and in the documentation of the note.  I have verified the history and personally performed the physical exam and medical decision making.  I agree with the assessment and plan of care as documented in the note.    Shayla Vora MD  Dermatology Staff

## 2019-09-12 DIAGNOSIS — E10.43 TYPE 1 DIABETES MELLITUS WITH DIABETIC AUTONOMIC NEUROPATHY (H): ICD-10-CM

## 2019-09-12 NOTE — TELEPHONE ENCOUNTER
Medication is being filled for 1 time refill only due to:  Patient needs to be seen because pt due for f/u appt and lab with PCP.

## 2019-09-12 NOTE — TELEPHONE ENCOUNTER
Called pt- to schedule appointment, offered a couple different options,pt seemed interested and then declined to schedule.    Airam Ackerman/MEETA

## 2019-09-12 NOTE — TELEPHONE ENCOUNTER
"Requested Prescriptions   Pending Prescriptions Disp Refills     insulin aspart (NOVOLOG FLEXPEN) 100 UNIT/ML pen 15 mL 0     Sig: Take 2 units per carb with each meal plus corrections 1:50 > 150.   Max is 25 units daily.       Short Acting Insulin Protocol Failed - 9/12/2019 12:19 PM        Failed - Blood pressure less than 140/90 in past 6 months     BP Readings from Last 3 Encounters:   02/12/19 100/76   10/29/18 107/73   06/14/18 118/62                 Failed - LDL on file in past 12 months     Recent Labs   Lab Test 06/14/18  1118   *             Failed - Microalbumin on file in past 12 months     Recent Labs   Lab Test 09/30/14  1259   MICROL 172   UMALCR 94.51*             Failed - HgbA1C in past 3 or 6 months     If HgbA1C is 8 or greater, it needs to be on file within the past 3 months.  If less than 8, must be on file within the past 6 months.     Recent Labs   Lab Test 10/29/18  1349   A1C 11.0*             Failed - Recent (6 mo) or future (30 days) visit within the authorizing provider's specialty     Patient had office visit in the last 6 months or has a visit in the next 30 days with authorizing provider or within the authorizing provider's specialty.  See \"Patient Info\" tab in inbasket, or \"Choose Columns\" in Meds & Orders section of the refill encounter.            Passed - Serum creatinine on file in past 12 months     Recent Labs   Lab Test 10/29/18  1349   CR 0.66             Passed - Medication is active on med list        Passed - Patient is age 18 or older        "

## 2019-09-20 ENCOUNTER — OFFICE VISIT (OUTPATIENT)
Dept: FAMILY MEDICINE | Facility: CLINIC | Age: 34
End: 2019-09-20
Payer: COMMERCIAL

## 2019-09-20 VITALS
HEART RATE: 94 BPM | SYSTOLIC BLOOD PRESSURE: 104 MMHG | WEIGHT: 129.8 LBS | HEIGHT: 62 IN | TEMPERATURE: 98.2 F | DIASTOLIC BLOOD PRESSURE: 62 MMHG | BODY MASS INDEX: 23.89 KG/M2 | OXYGEN SATURATION: 97 %

## 2019-09-20 DIAGNOSIS — K59.01 SLOW TRANSIT CONSTIPATION: ICD-10-CM

## 2019-09-20 DIAGNOSIS — K29.00 ACUTE SUPERFICIAL GASTRITIS WITHOUT HEMORRHAGE: ICD-10-CM

## 2019-09-20 DIAGNOSIS — L20.81 ATOPIC NEURODERMATITIS: ICD-10-CM

## 2019-09-20 DIAGNOSIS — R11.0 NAUSEA: ICD-10-CM

## 2019-09-20 DIAGNOSIS — E10.40 TYPE 1 DIABETES MELLITUS WITH DIABETIC NEUROPATHY (H): Primary | ICD-10-CM

## 2019-09-20 LAB
ALBUMIN UR-MCNC: NEGATIVE MG/DL
APPEARANCE UR: CLEAR
BILIRUB UR QL STRIP: NEGATIVE
COLOR UR AUTO: YELLOW
ERYTHROCYTE [DISTWIDTH] IN BLOOD BY AUTOMATED COUNT: 13.1 % (ref 10–15)
GLUCOSE UR STRIP-MCNC: >=1000 MG/DL
HBA1C MFR BLD: 12.6 % (ref 0–5.6)
HCT VFR BLD AUTO: 37.4 % (ref 35–47)
HGB BLD-MCNC: 12.9 G/DL (ref 11.7–15.7)
HGB UR QL STRIP: NEGATIVE
KETONES UR STRIP-MCNC: 40 MG/DL
LEUKOCYTE ESTERASE UR QL STRIP: NEGATIVE
MCH RBC QN AUTO: 30.3 PG (ref 26.5–33)
MCHC RBC AUTO-ENTMCNC: 34.5 G/DL (ref 31.5–36.5)
MCV RBC AUTO: 88 FL (ref 78–100)
NITRATE UR QL: NEGATIVE
PH UR STRIP: 5.5 PH (ref 5–7)
PLATELET # BLD AUTO: 186 10E9/L (ref 150–450)
RBC # BLD AUTO: 4.26 10E12/L (ref 3.8–5.2)
SOURCE: ABNORMAL
SP GR UR STRIP: <=1.005 (ref 1–1.03)
UROBILINOGEN UR STRIP-ACNC: 0.2 EU/DL (ref 0.2–1)
WBC # BLD AUTO: 7.6 10E9/L (ref 4–11)

## 2019-09-20 PROCEDURE — 85027 COMPLETE CBC AUTOMATED: CPT | Performed by: FAMILY MEDICINE

## 2019-09-20 PROCEDURE — 36415 COLL VENOUS BLD VENIPUNCTURE: CPT | Performed by: FAMILY MEDICINE

## 2019-09-20 PROCEDURE — 99207 C FOOT EXAM  NO CHARGE: CPT | Performed by: FAMILY MEDICINE

## 2019-09-20 PROCEDURE — 80061 LIPID PANEL: CPT | Performed by: FAMILY MEDICINE

## 2019-09-20 PROCEDURE — 80053 COMPREHEN METABOLIC PANEL: CPT | Performed by: FAMILY MEDICINE

## 2019-09-20 PROCEDURE — 83036 HEMOGLOBIN GLYCOSYLATED A1C: CPT | Performed by: FAMILY MEDICINE

## 2019-09-20 PROCEDURE — 99214 OFFICE O/P EST MOD 30 MIN: CPT | Performed by: FAMILY MEDICINE

## 2019-09-20 PROCEDURE — 81003 URINALYSIS AUTO W/O SCOPE: CPT | Performed by: FAMILY MEDICINE

## 2019-09-20 RX ORDER — MOMETASONE FUROATE 1 MG/G
CREAM TOPICAL DAILY
Qty: 45 G | Refills: 0 | Status: SHIPPED | OUTPATIENT
Start: 2019-09-20 | End: 2020-02-17

## 2019-09-20 RX ORDER — POLYETHYLENE GLYCOL 3350 17 G/17G
1 POWDER, FOR SOLUTION ORAL DAILY
Qty: 100 PACKET | Refills: 3 | Status: SHIPPED | OUTPATIENT
Start: 2019-09-20 | End: 2020-01-28

## 2019-09-20 RX ORDER — ONDANSETRON 4 MG/1
4 TABLET, FILM COATED ORAL EVERY 8 HOURS PRN
Qty: 30 TABLET | Refills: 0 | Status: SHIPPED | OUTPATIENT
Start: 2019-09-20 | End: 2019-10-18

## 2019-09-20 ASSESSMENT — MIFFLIN-ST. JEOR: SCORE: 1247.02

## 2019-09-20 NOTE — PROGRESS NOTES
Subjective     Arielle Montenegro is a 34 year old female who presents to clinic today for the following health issues:    HPI   Diabetes Follow-up      How often are you checking your blood sugar? One time daily    What time of day are you checking your blood sugars (select all that apply)?  Before and after meals    Have you had any blood sugars above 200?  Yes     Have you had any blood sugars below 70?  Yes Sometimes    What symptoms do you notice when your blood sugar is low?  Shaky, Dizzy and Weak    What concerns do you have today about your diabetes? Blood sugar varies too much     Do you have any of these symptoms? (Select all that apply)  Weight loss     Have you had a diabetic eye exam in the last 12 months? No    BP Readings from Last 2 Encounters:   09/20/19 104/62   02/12/19 100/76     Hemoglobin A1C (%)   Date Value   09/20/2019 12.6 (H)   10/29/2018 11.0 (H)     LDL Cholesterol Calculated (mg/dL)   Date Value   06/14/2018 113 (H)     LDL Cholesterol Direct (mg/dL)   Date Value   06/28/2016 86       Diabetes Management Resources      How many servings of fruits and vegetables do you eat daily?  2-3    On average, how many sweetened beverages do you drink each day (soda, juice, sweet tea, etc)?   1    How many days per week do you miss taking your medication? 0      Abdominal Pain      Duration: Few weeks     Description (location/character/radiation): Appetite has been very weak       Associated flank pain: None    Intensity:  moderate    Accompanying signs and symptoms:        Fever/Chills: no        Gas/Bloating: no        Nausea/vomitting: no        Diarrhea: no        Dysuria or Hematuria: no     History (previous similar pain/trauma/previous testing): none    Precipitating or alleviating factors:       Pain worse with eating/BM/urination: none       Pain relieved by BM: no     Therapies tried and outcome: None    LMP:  2 weeks ago    Encounter Diagnoses   Name Primary?     Type 1 diabetes  mellitus with diabetic neuropathy (H) Yes     Atopic neurodermatitis      Slow transit constipation      Acute superficial gastritis without hemorrhage      Nausea          Current Outpatient Medications   Medication Sig Dispense Refill     acetone, Urine, test STRP Use one strip as needed to test urine for ketones.  Test urine for ketones when blood sugars are >250 or during illness with fever, abdominal pain, nausea, or vomiting. 100 each 11     albuterol (PROAIR HFA) 108 (90 Base) MCG/ACT inhaler Inhale 2 puffs into the lungs every 4 hours as needed for shortness of breath / dyspnea 1 Inhaler 0     alcohol swab prep pads Use to swab area of injection/joelle as directed. 100 each 3     BASAGLAR 100 UNIT/ML injection Inject 60 Units Subcutaneous daily 15 mL 0     blood glucose (NO BRAND SPECIFIED) lancets standard Use to test blood sugar 4 times daily or as directed. 100 each 11     blood glucose calibration (NO BRAND SPECIFIED) solution To accompany: Blood Glucose Monitor Brands: per insurance. 1 Bottle 3     blood glucose monitoring (NO BRAND SPECIFIED) meter device kit Use to test blood sugar 4 times daily or as directed. Preferred blood glucose meter OR supplies to accompany: Blood Glucose Monitor Brands: per insurance. 1 kit 0     blood glucose monitoring (NO BRAND SPECIFIED) meter device kit Use to test blood sugar.  One Touch Ultra or Verio. 1 kit 0     blood glucose monitoring (NO BRAND SPECIFIED) test strip Use to test blood sugar 4 times daily or as directed. To accompany: Blood Glucose Monitor Brands: per insurance. 100 strip 6     blood glucose monitoring (NO BRAND SPECIFIED) test strip Use to test blood sugars 4 times daily or as directed 100 strip 2     blood glucose monitoring (ONE TOUCH DELICA) lancets USE TO TEST BLOOD GLUCOSE FOUR TIMES DAILY OR AS DIRECTED       insulin aspart (NOVOLOG FLEXPEN) 100 UNIT/ML pen Take 2 units per carb with each meal plus corrections 1:50 > 150. Max is 25 units daily.  "3 mL 0     insulin pen needle (BD EFRAIN U/F) 32G X 4 MM Use 4 pen needles daily or as directed. 400 each 1     insulin pen needle (NOVOFINE) 32G X 6 MM Use 4 daily or as directed. 100 each 11     mometasone (ELOCON) 0.1 % external cream Apply topically daily for 21 days 45 g 0     ondansetron (ZOFRAN) 4 MG tablet Take 1 tablet (4 mg) by mouth every 8 hours as needed for nausea 30 tablet 0     ONETOUCH VERIO IQ test strip USE TO TEST BLOOD SUGAR FOUR TIMES DAILY OR AS DIRECTED       polyethylene glycol (MIRALAX/GLYCOLAX) packet Take 17 g by mouth daily 100 packet 3     ranitidine (ZANTAC) 300 MG tablet Take 1 tablet (300 mg) by mouth At Bedtime 30 tablet 1     tretinoin (RETIN-A) 0.05 % external cream To hands and feet nightly 45 g 1         Reviewed and updated as needed this visit by Provider         Review of Systems   ROS COMP: Constitutional, HEENT, cardiovascular, pulmonary, gi and gu systems are negative, except as otherwise noted.      Objective    /62   Pulse 94   Temp 98.2  F (36.8  C) (Temporal)   Ht 1.575 m (5' 2\")   Wt 58.9 kg (129 lb 12.8 oz)   LMP 09/06/2019 (Approximate)   SpO2 97%   BMI 23.74 kg/m    Body mass index is 23.74 kg/m .  Physical Exam   GENERAL: alert, no distress and fatigued  NECK: no adenopathy, no asymmetry, masses, or scars and thyroid normal to palpation  RESP: lungs clear to auscultation - no rales, rhonchi or wheezes  CV: regular rate and rhythm, normal S1 S2, no S3 or S4, no murmur, click or rub, no peripheral edema and peripheral pulses strong  ABDOMEN: tenderness epigastric, no organomegaly or masses and bowel sounds normal  MS: no gross musculoskeletal defects noted, no edema  SKIN: papular lesions - feet and hands  NEURO: Normal strength and tone, mentation intact and speech normal  PSYCH: mentation appears normal, affect normal/bright  Diabetic foot exam: normal DP and PT pulses, no trophic changes or ulcerative lesions and normal sensory exam    Diagnostic Test " Results:  Labs reviewed in Epic  Results for orders placed or performed in visit on 09/20/19 (from the past 24 hour(s))   Hemoglobin A1c   Result Value Ref Range    Hemoglobin A1C 12.6 (H) 0 - 5.6 %   CBC with platelets   Result Value Ref Range    WBC 7.6 4.0 - 11.0 10e9/L    RBC Count 4.26 3.8 - 5.2 10e12/L    Hemoglobin 12.9 11.7 - 15.7 g/dL    Hematocrit 37.4 35.0 - 47.0 %    MCV 88 78 - 100 fl    MCH 30.3 26.5 - 33.0 pg    MCHC 34.5 31.5 - 36.5 g/dL    RDW 13.1 10.0 - 15.0 %    Platelet Count 186 150 - 450 10e9/L           Assessment & Plan     1. Type 1 diabetes mellitus with diabetic neuropathy (H)  Uncontrolled   had long talk with her about risk of death/ organ damage if she does not work on DIABETES MELLITUS control she agreed to see Endo/ meet with MTM  Follow up in 1 month with Nelda Ramirez.   - Hemoglobin A1c  - CBC with platelets  - Lipid Profile  - *UA reflex to Microscopic  - Comprehensive metabolic panel  - FOOT EXAM  - ENDOCRINOLOGY ADULT REFERRAL    2. Atopic neurodermatitis  Try  Follow up with consultant as planned.   - mometasone (ELOCON) 0.1 % external cream; Apply topically daily for 21 days  Dispense: 45 g; Refill: 0    3. Slow transit constipation  add  - polyethylene glycol (MIRALAX/GLYCOLAX) packet; Take 17 g by mouth daily  Dispense: 100 packet; Refill: 3    4. Acute superficial gastritis without hemorrhage  try  - ranitidine (ZANTAC) 300 MG tablet; Take 1 tablet (300 mg) by mouth At Bedtime  Dispense: 30 tablet; Refill: 1    5. Nausea  use  - ondansetron (ZOFRAN) 4 MG tablet; Take 1 tablet (4 mg) by mouth every 8 hours as needed for nausea  Dispense: 30 tablet; Refill: 0       Regular exercise  See Patient Instructions    No follow-ups on file.    Festus Monroy MD  [facility] [place]

## 2019-09-21 LAB
ALBUMIN SERPL-MCNC: 3.1 G/DL (ref 3.4–5)
ALP SERPL-CCNC: 105 U/L (ref 40–150)
ALT SERPL W P-5'-P-CCNC: 20 U/L (ref 0–50)
ANION GAP SERPL CALCULATED.3IONS-SCNC: 10 MMOL/L (ref 3–14)
AST SERPL W P-5'-P-CCNC: 16 U/L (ref 0–45)
BILIRUB SERPL-MCNC: 0.5 MG/DL (ref 0.2–1.3)
BUN SERPL-MCNC: 13 MG/DL (ref 7–30)
CALCIUM SERPL-MCNC: 8.5 MG/DL (ref 8.5–10.1)
CHLORIDE SERPL-SCNC: 95 MMOL/L (ref 94–109)
CHOLEST SERPL-MCNC: 181 MG/DL
CO2 SERPL-SCNC: 22 MMOL/L (ref 20–32)
CREAT SERPL-MCNC: 0.68 MG/DL (ref 0.52–1.04)
GFR SERPL CREATININE-BSD FRML MDRD: >90 ML/MIN/{1.73_M2}
GLUCOSE SERPL-MCNC: 531 MG/DL (ref 70–99)
HDLC SERPL-MCNC: 71 MG/DL
LDLC SERPL CALC-MCNC: 101 MG/DL
NONHDLC SERPL-MCNC: 110 MG/DL
POTASSIUM SERPL-SCNC: 4.3 MMOL/L (ref 3.4–5.3)
PROT SERPL-MCNC: 6.1 G/DL (ref 6.8–8.8)
SODIUM SERPL-SCNC: 127 MMOL/L (ref 133–144)
TRIGL SERPL-MCNC: 45 MG/DL

## 2019-09-24 ENCOUNTER — TELEPHONE (OUTPATIENT)
Dept: PHARMACY | Facility: CLINIC | Age: 34
End: 2019-09-24

## 2019-09-30 NOTE — TELEPHONE ENCOUNTER
RECORDS RECEIVED FROM: INTERNAL   DATE RECEIVED: 11/27/2019   NOTES (FOR ALL VISITS) STATUS DETAILS   OFFICE NOTES from referring provider Internal 09/20/2019   OFFICE NOTES from other specialist Internal 09/20/2019   ED NOTES Internal 07/05/2019   OPERATIVE REPORT  (thyroid, pituitary, adrenal, parathyroid) N/A    MEDICATION LIST Internal BASAGLAR  NOVOLOGY     IMAGING      DEXASCAN N/A    MRI (BRAIN) N/A    XR (Chest) Internal 04/02/2016   CT (HEAD/NECK/CHEST/ABDOMEN) N/A    NUCLEAR  N/A    ULTRASOUND (HEAD/NECK) N/A    LABS     DIABETES: HBGA1C, CREATININE, FASTING LIPIDS, MICROALBUMIN URINE, POTASSIUM, TSH, T4    THYROID: TSH, T4, CBC, THYRODLONULIN, TOTAL T3, FREE T4, CALCITONIN, CEA Internal   09/20/2019  07/05/2019  10/29/2018

## 2019-10-18 ENCOUNTER — OFFICE VISIT (OUTPATIENT)
Dept: FAMILY MEDICINE | Facility: CLINIC | Age: 34
End: 2019-10-18
Payer: COMMERCIAL

## 2019-10-18 VITALS
OXYGEN SATURATION: 99 % | SYSTOLIC BLOOD PRESSURE: 102 MMHG | HEART RATE: 88 BPM | BODY MASS INDEX: 25.02 KG/M2 | DIASTOLIC BLOOD PRESSURE: 82 MMHG | TEMPERATURE: 98.2 F | WEIGHT: 136.8 LBS

## 2019-10-18 DIAGNOSIS — N60.12 FIBROCYSTIC BREAST CHANGES OF BOTH BREASTS: ICD-10-CM

## 2019-10-18 DIAGNOSIS — Z02.9 ENCOUNTER FOR ADMINISTRATIVE EXAMINATIONS: ICD-10-CM

## 2019-10-18 DIAGNOSIS — Z00.00 ROUTINE GENERAL MEDICAL EXAMINATION AT A HEALTH CARE FACILITY: Primary | ICD-10-CM

## 2019-10-18 DIAGNOSIS — R45.4 OUTBURSTS OF ANGER: ICD-10-CM

## 2019-10-18 DIAGNOSIS — H54.40 BLINDNESS OF RIGHT EYE, UNSPECIFIED LEFT EYE VISUAL IMPAIRMENT CATEGORY: ICD-10-CM

## 2019-10-18 DIAGNOSIS — E10.65 UNCONTROLLED TYPE 1 DIABETES MELLITUS WITH HYPERGLYCEMIA (H): ICD-10-CM

## 2019-10-18 DIAGNOSIS — N60.11 FIBROCYSTIC BREAST CHANGES OF BOTH BREASTS: ICD-10-CM

## 2019-10-18 PROCEDURE — 99214 OFFICE O/P EST MOD 30 MIN: CPT | Mod: 25 | Performed by: NURSE PRACTITIONER

## 2019-10-18 PROCEDURE — 99395 PREV VISIT EST AGE 18-39: CPT | Performed by: NURSE PRACTITIONER

## 2019-10-18 NOTE — PROGRESS NOTES
"   SUBJECTIVE:   CC: Arielle Montenegro is an 34 year old woman who presents for preventive health visit.     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   requirements of the form are a physical exam, vision and hearing exam and titers Tb test  Recommended she do a complete health maintenance exam today since she is overdue and can get lab work done today, pt agreed with the plan  However, pt then left after physical exam when she was upset we can not get results today and complete her forms that are due today--needed also vision and hearing test for her form and also did not complete this    Dr. Bello listed as her Primary Care Provider, pt reports this is her primary clinic no particular Primary Care Provider     Healthy Habits:    Do you get at least three servings of calcium containing foods daily (dairy, green leafy vegetables, etc.)? \"my diet is fine\"    Amount of exercise or daily activities, outside of work: \"don't care to talk about this today\"     Problems taking medications regularly Won't answer    Medication side effects: Won't answer    Have you had an eye exam in the past two years? Won't answer    Do you see a dentist twice per year? Won't answer    Do you have sleep apnea, excessive snoring or daytime drowsiness?Won't answer    Blind in right eye, states she saw an Eye doctor and wears glasses and then can see fine, not sure the date or where and no records for me to view today     Diabetes Follow-up      Pt stated she does not want to address her DM today, reports her sugars often get very high or very low and were low today so just had a snack and juice so will be high. Became upset and wanted to know why Diabetes is something I need to know about because she doesn't think this is part of her health and doesn't want it on her health record    On Chart review pt with History of uncontrolled DM and last visit with Dr. Vega last month A1c 12.6 up from 11 the previous year. " Last visit Glucose was 531 on 9/20. Lipid panel was normal     Noted was in ER in July for alcohol intoxication she also doesn't wish to discuss further today     History noted of skin rashes and indigestion she reports no current symptoms       BP Readings from Last 2 Encounters:   10/18/19 102/82   09/20/19 104/62     Hemoglobin A1C (%)   Date Value   09/20/2019 12.6 (H)   10/29/2018 11.0 (H)     LDL Cholesterol Calculated (mg/dL)   Date Value   09/20/2019 101 (H)   06/14/2018 113 (H)       Diabetes Management Resources       Today's PHQ-2 Score:   PHQ-2 ( 1999 Pf[employer]) 10/18/2019 2/12/2019   Q1: Little interest or pleasure in doing things 0 0   Q2: Feeling down, depressed or hopeless 0 0   PHQ-2 Score 0 0   Q1: Little interest or pleasure in doing things - -   Q2: Feeling down, depressed or hopeless - -   PHQ-2 Score - -       Abuse: Current or Past(Physical, Sexual or Emotional)- no answer  Do you feel safe in your environment? Yes    Social History     Tobacco Use     Smoking status: Former Smoker     Types: Cigarettes     Smokeless tobacco: Never Used     Tobacco comment: smokes 2 cigarettes/day-none for several months   Substance Use Topics     Alcohol use: No     Alcohol/week: 0.0 standard drinks     If you drink alcohol do you typically have >3 drinks per day or >7 drinks per week? Won't answer                         Reviewed orders with patient.  Reviewed health maintenance and updated orders accordingly - Yes  Lab work is in process  Labs reviewed in EPIC  BP Readings from Last 3 Encounters:   10/18/19 102/82   09/20/19 104/62   02/12/19 100/76    Wt Readings from Last 3 Encounters:   10/18/19 62.1 kg (136 lb 12.8 oz)   09/20/19 58.9 kg (129 lb 12.8 oz)   02/12/19 67.8 kg (149 lb 6.4 oz)                  Patient Active Problem List   Diagnosis     Blindness of right eye     Diabetes mellitus type 1 (H)     Hypoglycemia unawareness in type 1 diabetes mellitus (H)     Health Care Home     Vitamin D  deficiency     External hemorrhoids     Encounter for long-term (current) use of insulin (H)     Medical non-compliance     Heart murmur     DJD (degenerative joint disease), cervical     Past Surgical History:   Procedure Laterality Date      SECTION  13      SECTION N/A 2015    Procedure:  SECTION;  Surgeon: John Hudson MD;  Location: [facility]     ENUCLEATION Right 3/20/2015    Procedure: ENUCLEATION;  Surgeon: Edilson Islas MD;  Location: [facility]       Social History     Tobacco Use     Smoking status: Current Every Day Smoker     Packs/day: 0.00     Types: Cigarettes     Smokeless tobacco: Never Used     Tobacco comment: smokes 2 cigarettes/day-none for several months   Substance Use Topics     Alcohol use: No     Alcohol/week: 0.0 standard drinks     Family History   Problem Relation Age of Onset     Family History Negative Mother      Family History Negative Father      Diabetes Other         father's side         Current Outpatient Medications   Medication Sig Dispense Refill     alcohol swab prep pads Use to swab area of injection/joelle as directed. 100 each 3     BASAGLAR 100 UNIT/ML injection Inject 60 Units Subcutaneous daily 15 mL 0     blood glucose (NO BRAND SPECIFIED) lancets standard Use to test blood sugar 4 times daily or as directed. 100 each 11     blood glucose calibration (NO BRAND SPECIFIED) solution To accompany: Blood Glucose Monitor Brands: per insurance. 1 Bottle 3     blood glucose monitoring (NO BRAND SPECIFIED) meter device kit Use to test blood sugar 4 times daily or as directed. Preferred blood glucose meter OR supplies to accompany: Blood Glucose Monitor Brands: per insurance. 1 kit 0     blood glucose monitoring (NO BRAND SPECIFIED) meter device kit Use to test blood sugar.  One Touch Ultra or Verio. 1 kit 0     blood glucose monitoring (NO BRAND SPECIFIED) test strip Use to test blood sugar 4 times daily or as directed. To accompany:  Blood Glucose Monitor Brands: per insurance. 100 strip 6     blood glucose monitoring (NO BRAND SPECIFIED) test strip Use to test blood sugars 4 times daily or as directed 100 strip 2     blood glucose monitoring (ONE TOUCH DELICA) lancets USE TO TEST BLOOD GLUCOSE FOUR TIMES DAILY OR AS DIRECTED       insulin aspart (NOVOLOG FLEXPEN) 100 UNIT/ML pen Take 2 units per carb with each meal plus corrections 1:50 > 150. Max is 25 units daily. 3 mL 0     insulin pen needle (NOVOFINE) 32G X 6 MM Use 4 daily or as directed. 100 each 11     ONETOUCH VERIO IQ test strip USE TO TEST BLOOD SUGAR FOUR TIMES DAILY OR AS DIRECTED       polyethylene glycol (MIRALAX/GLYCOLAX) packet Take 17 g by mouth daily 100 packet 3     tretinoin (RETIN-A) 0.05 % external cream To hands and feet nightly 45 g 1     albuterol (PROAIR HFA) 108 (90 Base) MCG/ACT inhaler Inhale 2 puffs into the lungs every 4 hours as needed for shortness of breath / dyspnea (Patient not taking: Reported on 10/18/2019) 1 Inhaler 0     insulin pen needle (BD EFRAIN U/F) 32G X 4 MM Use 4 pen needles daily or as directed. 400 each 1     ranitidine (ZANTAC) 300 MG tablet Take 1 tablet (300 mg) by mouth At Bedtime (Patient not taking: Reported on 10/18/2019) 30 tablet 1     No Known Allergies  Recent Labs   Lab Test 09/20/19  1425 10/29/18  1349 06/14/18  1118 12/26/17  1721  05/11/17  1515  06/28/16  1030  09/30/14  1447   A1C 12.6* 11.0* 11.4*  --    < > 10.0*   < >  --    < >  --    *  --  113*  --   --   --   --  86  --   --    HDL 71  --  67  --   --   --   --  78  --   --    TRIG 45  --  79  --   --   --   --   --   --   --    ALT 20  --  19 15  --  18  --   --    < > 16   CR 0.68 0.66 0.64 1.02  --  0.70   < >  --    < > 0.51*   GFRESTIMATED >90 >90 >90 63  --  >90  Non  GFR Calc     < >  --    < > >90  Non  GFR Calc     GFRESTBLACK >90 >90 >90 76  --  >90   GFR Calc     < >  --    < > >90   GFR  Calc     POTASSIUM 4.3 3.9 3.9 4.0  --  4.2   < >  --    < > 3.6   TSH  --   --   --   --   --  1.19  --   --   --  1.90    < > = values in this interval not displayed.        Mammogram not appropriate for this patient based on age.    Pertinent mammograms are reviewed under the imaging tab.  History of abnormal Pap smear: Last 3 Pap and HPV Results:  pt declines pap and pelvic      Reviewed and updated as needed this visit by clinical staff  Tobacco  Allergies  Meds         Reviewed and updated as needed this visit by Provider        Past Medical History:   Diagnosis Date     Blindness of right eye      Diabetes mellitus type 1 (H)     Diagnosed as a child      Past Surgical History:   Procedure Laterality Date      SECTION  13      SECTION N/A 2015    Procedure:  SECTION;  Surgeon: Jhon Hudson MD;  Location: [facility]     ENUCLEATION Right 3/20/2015    Procedure: ENUCLEATION;  Surgeon: Edilson Islas MD;  Location:  [facility]     OB History    Para Term  AB Living   3 2 1 1 0 2   SAB TAB Ectopic Multiple Live Births   0 0 0 0 2      # Outcome Date GA Lbr Eladio/2nd Weight Sex Delivery Anes PTL Lv   3 Term 01/09/15 39w6d  3.09 kg (6 lb 13 oz) M CS-LTranv Spinal N SARAH      Name: Tobi      Apgar1: 8  Apgar5: 9   2  13 36w0d  2.58 kg (5 lb 11 oz) F CS-LTranv  N SARAH      Birth Comments: System Generated. Please review and update pregnancy details.      Name: Sissy Tadeo                Obstetric Comments   Baby was breech.    Pt had preeclampsia       ROS:  CONSTITUTIONAL: NEGATIVE for fever, chills, change in weight  INTEGUMENTARU/SKIN: NEGATIVE for worrisome rashes, moles or lesions  EYES: hpi  ENT: NEGATIVE for ear, mouth and throat problems  RESP: NEGATIVE for significant cough or SOB  BREAST: NEGATIVE for masses, tenderness or discharge  CV: NEGATIVE for chest pain, palpitations or peripheral edema  GI: NEGATIVE for nausea, abdominal  pain, heartburn, or change in bowel habits  : NEGATIVE for unusual urinary or vaginal symptoms. Periods are regular.  MUSCULOSKELETAL: NEGATIVE for significant arthralgias or myalgia  NEURO: hpi   ENDOCRINE: HPI   PSYCHIATRIC: NEGATIVE for changes in mood or affect, denies history of anxiety or depression    OBJECTIVE:   /82   Pulse 88   Temp 98.2  F (36.8  C) (Oral)   Wt 62.1 kg (136 lb 12.8 oz)   LMP 08/18/2019   SpO2 99%   BMI 25.02 kg/m    EXAM:  GENERAL: healthy, alert and no distress  EYES: Eyes abnormal with right not aligned, did not want further tests for EOM or pupils   HENT: ear canals and TM's normal, nose and mouth without ulcers or lesions  NECK: no adenopathy, no asymmetry, masses, or scars and thyroid normal to palpation  RESP: lungs clear to auscultation - no rales, rhonchi or wheezes  BREAST: normal without masses, tenderness or nipple discharge, no palpable axillary masses or adenopathy and fibrocystic changes both  CV: regular rate and rhythm, normal S1 S2, no S3 or S4, no murmur, click or rub, no peripheral edema and peripheral pulses strong  ABDOMEN: soft, nontender, no hepatosplenomegaly, no masses and bowel sounds normal   (female): pt declines   MS: no gross musculoskeletal defects noted, no edema  SKIN: no suspicious lesions or rashes  NEURO: Normal strength and tone, mentation intact and speech normal  PSYCH: mentation appears normal, affect appropriate and cooperative for parts of exam, angry and became very agitated with history questions, agitated and declining other parts of health maintenance exam, seems frustrated with  dtr who is playful and curious for the exam   LYMPH: no cervical, supraclavicular, axillary, or inguinal adenopathy    Diagnostic Test Results:  Labs reviewed in Epic  No results found for this or any previous visit (from the past 24 hour(s)).    ASSESSMENT/PLAN:       ICD-10-CM    1. Routine general medical examination at a [facility]  "facility Z00.00    2. Uncontrolled type 1 diabetes mellitus with hyperglycemia (H) E10.65    3. Encounter for administrative examinations Z02.9 CANCELED: Quantiferon TB Gold Plus     CANCELED: Hepatitis B Surface Antibody     CANCELED: Varicella Zoster Virus Antibody IgG     CANCELED: Mumps Immune Status, IgG     CANCELED: Rubeola Antibody IgG     CANCELED: Rubella Antibody IgG Quantitative   4. 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 Since I have only examined her for a cough and other acute things in the past and health maintenance exam is not utd, also with uncontrolled DM explained a general H & P is required in order to give any opinion on the state of her current health, pt verbalized understanding and agreed with the plan, the physical was completed with as much history of pt's health as she would agree to offer. Pt then became upset at the end of the visit regarding blood work necessary to check control of her DM.   Discussed fibrocystic breasts and home breast exams, come for recheck if any areas change would consider early mammogram for her. declines pap today, she is not sure when last one was or if ever had. Strongly recommended due to smoker and possible risks, she declines std check  Ordered titers, Quant Gold that would be quicker than 2 step mantoux process and she agrees she prefers this option. However, pt then left without getting blood work done, see above and below     Of note, Pt became very upset asking why we ask questions regarding her health history and lifestyle habits she said I did not need to know, pt stated, \"I am very healthy! My Diabetes is not part of my health I can do everything and see just fine\"   Long discussion why Diabetes is actually a large part of her health, especially risks and safety issues if not controlled well--glucose was over 500 last OV with Arsen Vega. " She did not want her glucose checked because she felt low symptoms and needed snack and juice just prior to this appointment, she reports she is feeling better and that she has no symptoms of low or high sugars at this time  I was helping her trouble shoot for future lab draw that could be reasonable, pt initially refused recheck of glucose altogether, then was indecisive whether she wanted this or not, then became very agitated and said she wanted to just leave and forget it all, I said this was her choice either way but would need to recheck glucose at some point. MA then notified me regarding pt yelling at her and saying this clinic is terrible and doesn't want to ever see us again, I then checked on her in room and she yelled at me as well. Then when attempting to address her concerns, she took form back out of my hand. I offered we could make a copy for her in case she changes her mind and wants it completed at a later date when labs are done, she said no she will definitely go elsewhere. As she was leaving, I did also offer she could speak with pt relations if pt desires, and pt apparently did.   Pt discussed with Lori Mata after this appointment, who met with me afterwards to review pt's forms together so she can clarify any with pt what is needed for the forms, and verified at this point we do not have enough info for the forms that cannot be instantly completed, and agreed it is Provider's medical opinion that is needed--most likely other Providers would also likely need to evaluate pt's status of uncontrolled DM and/or possible alcoholism and/or behavior/conduct concerns in order to say she is okay to work.     Suspect anger issues or uncontrolled mental health/ irritability or stress adjustment issues/ is possible angry outbursts could be due to hypoglycemia pt appeared stable but would recommend ER if any concerns  pt denied any mental health/psych history when I asked earlier in visit during ros  "when she was initially cooperative     COUNSELING:        attempted to discuss lifestyle habits but pt was not receptive    Estimated body mass index is 25.02 kg/m  as calculated from the following:    Height as of 9/20/19: 1.575 m (5' 2\").    Weight as of this encounter: 62.1 kg (136 lb 12.8 oz).         reports that she has been smoking cigarettes. She has been smoking about 0.00 packs per day. She has never used smokeless tobacco.  Tobacco Cessation Action Plan: Information offered: Patient not interested at this time    Counseling Resources:  ATP IV Guidelines  Pooled Cohorts Equation Calculator  Breast Cancer Risk Calculator  FRAX Risk Assessment  ICSI Preventive Guidelines  Dietary Guidelines for Americans, 2010  USDA's MyPlate  ASA Prophylaxis  Lung CA Screening    In addition to the physical, I spent 30 minutes of face to face time with Arielle Montenegro of which more than 50% was spent on uncontrolled insulin dependent Diabetes, angry outbursts, alcohol abuse, and admin exams/forms     MIKALA Salazar [facility]  "

## 2019-10-18 NOTE — PROGRESS NOTES
"Subjective     Arielle Leeanne Montenegro is a 34 year old female who presents to clinic today for the following health issues:    HPI   Concern - Forms  Onset: ***    Description:   Needs forms filled out for clinicals and TB testing, unsure if she needs the TB gold.    Intensity: {.:985092}    Progression of Symptoms:  {.:985585}    Accompanying Signs & Symptoms:  ***    Previous history of similar problem:   ***    Precipitating factors:   Worsened by: ***    Alleviating factors:  Improved by: ***    Therapies Tried and outcome: ***  {additonal problems for provider to add (Optional):938055}    {HIST REVIEW/ LINKS 2 (Optional):398212}    {Additional problems for the provider to add (optional):098384}  Reviewed and updated as needed this visit by Provider         Review of Systems   {ROS COMP (Optional):681435}      Objective    /82   Pulse 88   Temp 98.2  F (36.8  C) (Oral)   Wt 62.1 kg (136 lb 12.8 oz)   LMP 08/18/2019   SpO2 99%   BMI 25.02 kg/m    Body mass index is 25.02 kg/m .  Physical Exam   {Exam List (Optional):577633}    {Diagnostic Test Results (Optional):192899::\"Diagnostic Test Results:\",\"Labs reviewed in Epic\"}        {PROVIDER CHARTING PREFERENCE:003194}      "

## 2019-10-18 NOTE — Clinical Note
Syed Pisano, I am not sure if I should add on more time I spent with pt or not, normally I would but pt left angry--can you discuss this with your boss and/or Lori? Physical with uncontrolled DM/pt not compliant with care... thanks

## 2019-11-27 ENCOUNTER — PRE VISIT (OUTPATIENT)
Dept: ENDOCRINOLOGY | Facility: CLINIC | Age: 34
End: 2019-11-27

## 2019-12-19 ENCOUNTER — TRANSFERRED RECORDS (OUTPATIENT)
Dept: HEALTH INFORMATION MANAGEMENT | Facility: CLINIC | Age: 34
End: 2019-12-19

## 2019-12-30 ENCOUNTER — DOCUMENTATION ONLY (OUTPATIENT)
Dept: CARE COORDINATION | Facility: CLINIC | Age: 34
End: 2019-12-30

## 2020-01-02 ENCOUNTER — TRANSFERRED RECORDS (OUTPATIENT)
Dept: HEALTH INFORMATION MANAGEMENT | Facility: CLINIC | Age: 35
End: 2020-01-02

## 2020-01-02 LAB
ALT SERPL-CCNC: 14 IU/L (ref 12–68)
AST SERPL-CCNC: 8 IU/L (ref 12–37)
C TRACH DNA SPEC QL PROBE+SIG AMP: NEGATIVE
CREAT SERPL-MCNC: 0.74 MG/DL (ref 0.55–1.02)
GFR SERPL CREATININE-BSD FRML MDRD: >60 ML/MIN
GLUCOSE SERPL-MCNC: 357 MG/DL (ref 74–106)
HBA1C MFR BLD: 7.8 % (ref 0–5.7)
HIV 1&2 EXT: NORMAL
HPV ABSTRACT: NORMAL
N GONORRHOEA DNA SPEC QL PROBE+SIG AMP: NEGATIVE
PAP-ABSTRACT: NORMAL
POTASSIUM SERPL-SCNC: 4.3 MMOL/L (ref 3.5–5.1)
SPECIMEN DESCRIP: NORMAL
SPECIMEN DESCRIPTION: NORMAL

## 2020-01-24 ENCOUNTER — TRANSFERRED RECORDS (OUTPATIENT)
Dept: MULTI SPECIALTY CLINIC | Facility: CLINIC | Age: 35
End: 2020-01-24

## 2020-01-24 LAB
CREAT SERPL-MCNC: 0.56 MG/DL (ref 0.55–1.02)
GFR SERPL CREATININE-BSD FRML MDRD: >60 ML/MIN
GLUCOSE SERPL-MCNC: 58 MG/DL (ref 74–106)
POTASSIUM SERPL-SCNC: 4 MMOL/L (ref 3.5–5.1)

## 2020-01-27 ENCOUNTER — OFFICE VISIT (OUTPATIENT)
Dept: ENDOCRINOLOGY | Facility: CLINIC | Age: 35
End: 2020-01-27

## 2020-01-27 VITALS
DIASTOLIC BLOOD PRESSURE: 81 MMHG | BODY MASS INDEX: 27.62 KG/M2 | HEART RATE: 114 BPM | HEIGHT: 62 IN | SYSTOLIC BLOOD PRESSURE: 117 MMHG | WEIGHT: 150.1 LBS

## 2020-01-27 DIAGNOSIS — O09.91 HIGH-RISK PREGNANCY IN FIRST TRIMESTER: ICD-10-CM

## 2020-01-27 DIAGNOSIS — E10.649 TYPE 1 DIABETES MELLITUS WITH HYPOGLYCEMIA AND WITHOUT COMA (H): Primary | ICD-10-CM

## 2020-01-27 DIAGNOSIS — Z91.148 POOR COMPLIANCE WITH MEDICATION: ICD-10-CM

## 2020-01-27 RX ORDER — FLASH GLUCOSE SENSOR
1 KIT MISCELLANEOUS CONTINUOUS
Qty: 9 EACH | Refills: 3 | Status: SHIPPED | OUTPATIENT
Start: 2020-01-27 | End: 2020-05-21

## 2020-01-27 RX ORDER — FLASH GLUCOSE SCANNING READER
1 EACH MISCELLANEOUS CONTINUOUS
Qty: 1 DEVICE | Refills: 0 | Status: SHIPPED | OUTPATIENT
Start: 2020-01-27 | End: 2020-05-27

## 2020-01-27 ASSESSMENT — MIFFLIN-ST. JEOR: SCORE: 1334.1

## 2020-01-27 ASSESSMENT — PAIN SCALES - GENERAL: PAINLEVEL: NO PAIN (0)

## 2020-01-27 NOTE — PROGRESS NOTES
- Endocrinology Initial Consultation -    Reason for visit/consult: DM1 pregnancy    Primary care provider: Diane Bello    HPI: A 36 yo female here for the evaluation for her DM1 during the pregnancy.   Patient is currently 17 weeks pregnancy, this is the second pregnancy.   First pregnancy was 6 year ago. She was diagnosed DM1 at age 6-7, she was in [place] and she was sick, she went to local doctor there and diagnosed. Insulin since then.     Her A1C has been constantly high 10-11 range persistently prepregnancy.   During this pregnancy, she mentioned her glucose dropped too much, too often, therefore she self decreased insulin. Currently she is on Lantus 10 units (pre pregnancy she was on 20 units per patient) and Novolog carb counting 1 carb choice 2 units.   Of note, she admitted she is not checking glucose and often feeling passing out.   For example, on previous day, she felt passing out for a few hour in the afternoon, and she checked glucose 5 pm, her glucose was 70.   She is requesting today that she wants Ariane. Also she mentioned she wants to keep her glucose higher side.        Current Diabetic Regimens:  Insulin Novolog  Count carb each carb 2 unit    Lantus 10 units (before pregnant: 20 units, was told 50 unit but not doing)    Life Style:  Wake up 6:30 am   Breakfast : not eating  Lunch: varies  Dinner: pasta, chicken  Bedtime: 9 pm     Exercise:  no    DM complications:  Retinopathy: 5 month ago, was ok  Nephropathy:   Neuropathy: no  Most recent LDL:   LDL Cholesterol Calculated   Date Value Ref Range Status   09/20/2019 101 (H) <100 mg/dL Final     Comment:     Above desirable:  100-129 mg/dl  Borderline High:  130-159 mg/dL  High:             160-189 mg/dL  Very high:       >189 mg/dl         Glucose Log: she is not checking, did not bring glucometer.     A1C trends from previous labs:   Hemoglobin A1C   Date Value Ref Range  Status   2019 12.6 (H) 0 - 5.6 % Final     Comment:     Normal <5.7% Prediabetes 5.7-6.4%  Diabetes 6.5% or higher - adopted from ADA   consensus guidelines.     10/29/2018 11.0 (H) 0 - 5.6 % Final     Comment:     Reviewed: OK with previous  Normal <5.7% Prediabetes 5.7-6.4%  Diabetes 6.5% or higher - adopted from ADA   consensus guidelines.     2018 11.4 (H) 0 - 5.6 % Final     Comment:     Normal <5.7% Prediabetes 5.7-6.4%  Diabetes 6.5% or higher - adopted from ADA   consensus guidelines.  Reviewed: OK with previous     10/05/2017 11.7 (H) 4.3 - 6.0 % Final     Comment:     Reviewed: OK with previous   2017 10.0 (H) 4.3 - 6.0 % Final     Comment:     Reviewed: OK with previous   10/28/2016 10.8 (H) 4.3 - 6.0 % Final        Past Medical/Surgical History:  Past Medical History:   Diagnosis Date     Blindness of right eye      Diabetes mellitus type 1 (H)     Diagnosed as a child     Past Surgical History:   Procedure Laterality Date      SECTION  13      SECTION N/A 2015    Procedure:  SECTION;  Surgeon: John Hudson MD;  Location: [facility]     ENUCLEATION Right 3/20/2015    Procedure: ENUCLEATION;  Surgeon: Edilson Islas MD;  Location[facility]       Allergies:  No Known Allergies    Current Medications   Current Outpatient Medications   Medication     albuterol (PROAIR HFA) 108 (90 Base) MCG/ACT inhaler     alcohol swab prep pads     BASAGLAR 100 UNIT/ML injection     blood glucose (NO BRAND SPECIFIED) lancets standard     blood glucose calibration (NO BRAND SPECIFIED) solution     blood glucose monitoring (NO BRAND SPECIFIED) meter device kit     blood glucose monitoring (NO BRAND SPECIFIED) meter device kit     blood glucose monitoring (NO BRAND SPECIFIED) test strip     blood glucose monitoring (NO BRAND SPECIFIED) test strip     blood glucose monitoring (ONE TOUCH DELICA) lancets     insulin aspart (NOVOLOG FLEXPEN) 100 UNIT/ML pen     insulin  pen needle (BD EFRAIN U/F) 32G X 4 MM     insulin pen needle (NOVOFINE) 32G X 6 MM     ONETOUCH VERIO IQ test strip     polyethylene glycol (MIRALAX/GLYCOLAX) packet     ranitidine (ZANTAC) 300 MG tablet     tretinoin (RETIN-A) 0.05 % external cream     No current facility-administered medications for this visit.        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     ROS:  Full review of systems taken with the help of the intake sheet. Otherwise a complete 14 point review of systems was taken and is negative unless stated in the history above.    Physical Exam:   Vitals: There were no vitals taken for this visit.  BMI= There is no height or weight on file to calculate BMI.   General: well appearing, no acute distress, pleasant and conversant,   Mental Status/neuro: alert and oriented  Face: symmetrical, normal facial color  Eyes: anicteric, PERRL, no proptosis or lid lag  Neck: suppler, no lymphadenopahty  Thyroid: normal size and texture, no nodule palpable, no bruits  Heart: regular rhythm, S1S2, no murmur appreciated  Lung: clear to auscultation bilaterally  Abdomen: soft, NT/ND, no hepatomegaly  Legs: no swelling or edema  Feet: no deformities or ulcers, 2+ DP pulses, normal monofilament sensation      Labs : I reviewed data from epic and extract and summarize the pertinent data here.   Lab Results   Component Value Date      09/20/2019      Lab Results   Component Value Date    POTASSIUM 4.3 09/20/2019     Lab Results   Component Value Date    CHLORIDE 95 09/20/2019     Lab Results   Component Value Date    LAEXANDRO 8.5 09/20/2019     Lab Results   Component Value Date    CO2 22 09/20/2019     Lab Results   Component Value Date    BUN 13 09/20/2019     Lab Results   Component Value Date    CR 0.68 09/20/2019     Lab Results   Component Value Date     09/20/2019     Lab Results   Component Value Date    TSH 1.19 05/11/2017     No results found for: T4  Lab Results   Component Value Date    A1C 12.6 09/20/2019       Assessment and Plan  35 year old female with DM1, currently pregnancy 17 weeks,    A1c has been presistently high 10-11. But today 7.5 significanlt improvement.   She reduced the dose of lantus per patient, I think this result may reflect her compliance to insulin.   Since no objective glucose data today, I will Rx Ariane free style and told her to come back in 2 weeks with Janessa or me.       I spent 45 minutes with this patient face to face and explained the conditions and plans (more than 50% of time was counseling/coordination of care) . The patient understood and is satisfied with today's visit. Return to clinic with me in 2 weeks.         Petty Mooney MD  Staff Physician  Endocrinology and Metabolism  License: [id number]

## 2020-01-27 NOTE — LETTER
1/27/2020       RE: Arielle Montenegro  [address] [address]     Dear Colleague,    Thank you for referring your patient, Arielle Montenegro, to the [facility] ENDOCRINOLOGY at [facility]. Please see a copy of my visit note below.                                                                               - Endocrinology Initial Consultation -    Reason for visit/consult: DM1 pregnancy    Primary care provider: Diane Bello    HPI: A 34 yo female here for the evaluation for her DM1 during the pregnancy.   Patient is currently 17 weeks pregnancy, this is the second pregnancy.   First pregnancy was 6 year ago. She was diagnosed DM1 at age 6-7, she was in [place] and she was sick, she went to local doctor there and diagnosed. Insulin since then.     Her A1C has been constantly high 10-11 range persistently prepregnancy.   During this pregnancy, she mentioned her glucose dropped too much, too often, therefore she self decreased insulin. Currently she is on Lantus 10 units (pre pregnancy she was on 20 units per patient) and Novolog carb counting 1 carb choice 2 units.   Of note, she admitted she is not checking glucose and often feeling passing out.   For example, on previous day, she felt passing out for a few hour in the afternoon, and she checked glucose 5 pm, her glucose was 70.   She is requesting today that she wants Ariane. Also she mentioned she wants to keep her glucose higher side.        Current Diabetic Regimens:  Insulin Novolog  Count carb each carb 2 unit    Lantus 10 units (before pregnant: 20 units, was told 50 unit but not doing)    Life Style:  Wake up 6:30 am   Breakfast : not eating  Lunch: varies  Dinner: pasta, chicken  Bedtime: 9 pm     Exercise:  no    DM complications:  Retinopathy: 5 month ago, was ok  Nephropathy:   Neuropathy: no  Most recent LDL:   LDL Cholesterol Calculated   Date Value Ref Range Status   09/20/2019 101 (H) <100  mg/dL Final     Comment:     Above desirable:  100-129 mg/dl  Borderline High:  130-159 mg/dL  High:             160-189 mg/dL  Very high:       >189 mg/dl         Glucose Log: she is not checking, did not bring glucometer.     A1C trends from previous labs:   Hemoglobin A1C   Date Value Ref Range Status   2019 12.6 (H) 0 - 5.6 % Final     Comment:     Normal <5.7% Prediabetes 5.7-6.4%  Diabetes 6.5% or higher - adopted from ADA   consensus guidelines.     10/29/2018 11.0 (H) 0 - 5.6 % Final     Comment:     Reviewed: OK with previous  Normal <5.7% Prediabetes 5.7-6.4%  Diabetes 6.5% or higher - adopted from ADA   consensus guidelines.     2018 11.4 (H) 0 - 5.6 % Final     Comment:     Normal <5.7% Prediabetes 5.7-6.4%  Diabetes 6.5% or higher - adopted from ADA   consensus guidelines.  Reviewed: OK with previous     10/05/2017 11.7 (H) 4.3 - 6.0 % Final     Comment:     Reviewed: OK with previous   2017 10.0 (H) 4.3 - 6.0 % Final     Comment:     Reviewed: OK with previous   10/28/2016 10.8 (H) 4.3 - 6.0 % Final        Past Medical/Surgical History:  Past Medical History:   Diagnosis Date     Blindness of right eye      Diabetes mellitus type 1 (H)     Diagnosed as a child     Past Surgical History:   Procedure Laterality Date      SECTION  13      SECTION N/A 2015    Procedure:  SECTION;  Surgeon: John Hudson MD;  Location: [facility]     ENUCLEATION Right 3/20/2015    Procedure: ENUCLEATION;  Surgeon: Edilson Islas MD;  Location: [facility]       Allergies:  No Known Allergies    Current Medications   Current Outpatient Medications   Medication     albuterol (PROAIR HFA) 108 (90 Base) MCG/ACT inhaler     alcohol swab prep pads     BASAGLAR 100 UNIT/ML injection     blood glucose (NO BRAND SPECIFIED) lancets standard     blood glucose calibration (NO BRAND SPECIFIED) solution     blood glucose monitoring (NO BRAND SPECIFIED) meter device kit     blood  glucose monitoring (NO BRAND SPECIFIED) meter device kit     blood glucose monitoring (NO BRAND SPECIFIED) test strip     blood glucose monitoring (NO BRAND SPECIFIED) test strip     blood glucose monitoring (ONE TOUCH DELICA) lancets     insulin aspart (NOVOLOG FLEXPEN) 100 UNIT/ML pen     insulin pen needle (BD EFRAIN U/F) 32G X 4 MM     insulin pen needle (NOVOFINE) 32G X 6 MM     ONETOUCH VERIO IQ test strip     polyethylene glycol (MIRALAX/GLYCOLAX) packet     ranitidine (ZANTAC) 300 MG tablet     tretinoin (RETIN-A) 0.05 % external cream     No current facility-administered medications for this visit.        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     ROS:  Full review of systems taken with the help of the intake sheet. Otherwise a complete 14 point review of systems was taken and is negative unless stated in the history above.    Physical Exam:   Vitals: There were no vitals taken for this visit.  BMI= There is no height or weight on file to calculate BMI.   General: well appearing, no acute distress, pleasant and conversant,   Mental Status/neuro: alert and oriented  Face: symmetrical, normal facial color  Eyes: anicteric, PERRL, no proptosis or lid lag  Neck: suppler, no lymphadenopahty  Thyroid: normal size and texture, no nodule palpable, no bruits  Heart: regular rhythm, S1S2, no murmur appreciated  Lung: clear to auscultation  bilaterally  Abdomen: soft, NT/ND, no hepatomegaly  Legs: no swelling or edema  Feet: no deformities or ulcers, 2+ DP pulses, normal monofilament sensation      Labs : I reviewed data from epic and extract and summarize the pertinent data here.   Lab Results   Component Value Date     09/20/2019      Lab Results   Component Value Date    POTASSIUM 4.3 09/20/2019     Lab Results   Component Value Date    CHLORIDE 95 09/20/2019     Lab Results   Component Value Date    ALEXANRDO 8.5 09/20/2019     Lab Results   Component Value Date    CO2 22 09/20/2019     Lab Results   Component Value Date    BUN 13 09/20/2019     Lab Results   Component Value Date    CR 0.68 09/20/2019     Lab Results   Component Value Date     09/20/2019     Lab Results   Component Value Date    TSH 1.19 05/11/2017     No results found for: T4  Lab Results   Component Value Date    A1C 12.6 09/20/2019       Assessment and Plan  35 year old female with DM1, currently pregnancy 17 weeks,    A1c has been presistently high 10-11. But today 7.5 significanlt improvement.   She reduced the dose of lantus per patient, I think this result may reflect her compliance to insulin.   Since no objective glucose data today, I will Rx Ariane free style and told her to come back in 2 weeks with Janessa or me.       I spent 45 minutes with this patient face to face and explained the conditions and plans (more than 50% of time was counseling/coordination of care) . The patient understood and is satisfied with today's visit. Return to clinic with me in 2 weeks.         Petty Mooney MD  Staff Physician  Endocrinology and Metabolism  License: [id number]

## 2020-01-28 ENCOUNTER — PRENATAL OFFICE VISIT (OUTPATIENT)
Dept: NURSING | Facility: CLINIC | Age: 35
End: 2020-01-28
Payer: COMMERCIAL

## 2020-01-28 DIAGNOSIS — O09.90 HIGH-RISK PREGNANCY SUPERVISION: Primary | ICD-10-CM

## 2020-01-28 PROBLEM — F41.1 GENERALIZED ANXIETY DISORDER: Status: ACTIVE | Noted: 2018-09-17

## 2020-01-28 PROBLEM — F33.1 MODERATE EPISODE OF RECURRENT MAJOR DEPRESSIVE DISORDER (H): Status: ACTIVE | Noted: 2018-09-17

## 2020-01-28 PROBLEM — Z87.59 HISTORY OF PRE-ECLAMPSIA: Status: ACTIVE | Noted: 2020-01-24

## 2020-01-28 LAB
ABO + RH BLD: NORMAL
ABO + RH BLD: NORMAL
ALBUMIN UR-MCNC: 30 MG/DL
ALT SERPL W P-5'-P-CCNC: 9 U/L (ref 0–50)
APPEARANCE UR: CLEAR
BILIRUB UR QL STRIP: NEGATIVE
BLD GP AB SCN SERPL QL: NORMAL
BLOOD BANK CMNT PATIENT-IMP: NORMAL
COLOR UR AUTO: YELLOW
CREAT UR-MCNC: 129 MG/DL
ERYTHROCYTE [DISTWIDTH] IN BLOOD BY AUTOMATED COUNT: 13.1 % (ref 10–15)
GLUCOSE UR STRIP-MCNC: >=1000 MG/DL
HBA1C MFR BLD: 7.5 % (ref 0–5.6)
HCT VFR BLD AUTO: 34.1 % (ref 35–47)
HGB BLD-MCNC: 11.8 G/DL (ref 11.7–15.7)
HGB UR QL STRIP: ABNORMAL
KETONES UR STRIP-MCNC: NEGATIVE MG/DL
LEUKOCYTE ESTERASE UR QL STRIP: NEGATIVE
MCH RBC QN AUTO: 30.1 PG (ref 26.5–33)
MCHC RBC AUTO-ENTMCNC: 34.6 G/DL (ref 31.5–36.5)
MCV RBC AUTO: 87 FL (ref 78–100)
NITRATE UR QL: NEGATIVE
PH UR STRIP: 6 PH (ref 5–7)
PLATELET # BLD AUTO: 216 10E9/L (ref 150–450)
PROT UR-MCNC: 0.67 G/L
PROT/CREAT 24H UR: 0.52 G/G CR (ref 0–0.2)
RBC # BLD AUTO: 3.92 10E12/L (ref 3.8–5.2)
SOURCE: ABNORMAL
SP GR UR STRIP: 1.02 (ref 1–1.03)
SPECIMEN EXP DATE BLD: NORMAL
UROBILINOGEN UR STRIP-ACNC: 0.2 EU/DL (ref 0.2–1)
WBC # BLD AUTO: 7.4 10E9/L (ref 4–11)

## 2020-01-28 PROCEDURE — 86850 RBC ANTIBODY SCREEN: CPT | Performed by: OBSTETRICS & GYNECOLOGY

## 2020-01-28 PROCEDURE — 99207 ZZC NO CHARGE NURSE ONLY: CPT

## 2020-01-28 PROCEDURE — 86780 TREPONEMA PALLIDUM: CPT | Performed by: OBSTETRICS & GYNECOLOGY

## 2020-01-28 PROCEDURE — 85027 COMPLETE CBC AUTOMATED: CPT | Performed by: OBSTETRICS & GYNECOLOGY

## 2020-01-28 PROCEDURE — 86901 BLOOD TYPING SEROLOGIC RH(D): CPT | Performed by: OBSTETRICS & GYNECOLOGY

## 2020-01-28 PROCEDURE — 84460 ALANINE AMINO (ALT) (SGPT): CPT | Performed by: OBSTETRICS & GYNECOLOGY

## 2020-01-28 PROCEDURE — 84156 ASSAY OF PROTEIN URINE: CPT | Performed by: OBSTETRICS & GYNECOLOGY

## 2020-01-28 PROCEDURE — 81003 URINALYSIS AUTO W/O SCOPE: CPT | Performed by: OBSTETRICS & GYNECOLOGY

## 2020-01-28 PROCEDURE — 87340 HEPATITIS B SURFACE AG IA: CPT | Performed by: OBSTETRICS & GYNECOLOGY

## 2020-01-28 PROCEDURE — 83036 HEMOGLOBIN GLYCOSYLATED A1C: CPT | Performed by: OBSTETRICS & GYNECOLOGY

## 2020-01-28 PROCEDURE — 86900 BLOOD TYPING SEROLOGIC ABO: CPT | Performed by: OBSTETRICS & GYNECOLOGY

## 2020-01-28 PROCEDURE — 87389 HIV-1 AG W/HIV-1&-2 AB AG IA: CPT | Performed by: OBSTETRICS & GYNECOLOGY

## 2020-01-28 PROCEDURE — 86762 RUBELLA ANTIBODY: CPT | Performed by: OBSTETRICS & GYNECOLOGY

## 2020-01-28 PROCEDURE — 36415 COLL VENOUS BLD VENIPUNCTURE: CPT | Performed by: OBSTETRICS & GYNECOLOGY

## 2020-01-28 PROCEDURE — 87086 URINE CULTURE/COLONY COUNT: CPT | Performed by: OBSTETRICS & GYNECOLOGY

## 2020-01-28 NOTE — LETTER
January 30, 2020      Arielle Leeanne Lan  [facility] [facility] [address]        Dear Lan,    Most of your prenatal labs are normal. The ones that are not are the ones related to your type 1 diabetes.  If you have questions about any of them, we can talk more at your next appointment.    If you have any questions or concerns, please call the clinic at [phone].       Sincerely,    Dr. Cyndi Sheth

## 2020-01-28 NOTE — PROGRESS NOTES
Important Information for Provider:     Patient is ob transfer from [facility].. MAMIE signed today. She is unable to stay for her NOB appointment with Dr Sheth, rescheduled to 2/10/2020 with Dr Perez .  Handouts reviewed and given.  Patient was seen at [facility] for her diabetes, next appointment is 2/13/2020.  Patient states she is around 16 weeks.    Prenatal OB Questionnaire  Patient supplied answers from flow sheet for:  Prenatal OB Questionnaire.  Past Medical History  Diabetes?: (!) Yes  Hypertension : preeclampsia first pregnancy  Heart disease, mitral valve prolapse or rheumatic fever?: No  An autoimmune disease such as lupus or rheumatoid arthritis?: No  Kidney disease or urinary tract infection?: No  Epilepsy, seizures or spells?: No  Migraine headaches?: No  A stroke or loss of function or sensation?: No  Any other neurological problems?: No  Have you ever been treated for depression?: No  Are you having problems with crying spells or loss of self-esteem?: No  Have you ever required psychiatric care?: No  Have you ever had hepatitis, liver disease or jaundice?: No  Have you been treated for blood clots in your veins, deep vein thromosis, inflammation in the veins, thrombosis, phlebitis, pulmonary embolism or varicosities?: No  Have you had excessive bleeding after surgery or dental work?: No  Do you bleed more than other women after a cut or scratch?: No  Do you have a history of anemia?: No  Have you ever had thyroid problems or taken thyroid medication?: No   Do you have any endocrine problems?: No  Have you ever been in a major accident or suffered serious trauma?: No  Within the last year, has anyone hit, slapped, kicked or otherwise hurt you?: No  In the last year, has anyone forced you to have sex when you didn't want to?: No    Past Medical History 2   Have you ever received a blood transfusion?: No  Would you refuse a blood transfusion if a doctor judged it to be medically necessary?: No   If you  answered Yes, would you rather die than receive a blood transfusion?: No  If you answered Yes, is this for Episcopalian reasons?: No  Does anyone in your home smoke?: No  Do you use tobacco products?: No  Do you drink beer, wine or hard liquor?: No  Do you use any of the following: marijuana, speed, cocaine, heroin, hallucinogens or other drugs?: No   Is your blood type Rh negative?: No  Have you ever had abnormal antibodies in your blood?: No  Have you ever had asthma?: No  Have you ever had tuberculosis?: No  Do you have any allergies to drugs or over-the-counter medications?: No  Allergies: Dust Mites, Aspartame, Ethanol, Venlafaxine, Hydrochloride, Sertraline: No  Have you had any breast problems?: No  Have you ever ?:No  Have you had any gynecological surgical procedures such as cervical conization, a LEEP procedure, laser treatment, cryosurgery of the cervix or a dilation and curettage, etc?: No  Have you ever had any other surgical procedures?: No  Have you been hospitalized for a nonsurgical reason excluding normal delivery?: No  Have you ever had any anesthetic complications?: No  Have you ever had an abnormal pap smear?: No    Past Medical History (Continued)  Do you have a history of abnormalities of the uterus?: No  Did your mother take KALLIE or any other hormones when she was pregnant with you?: No  Did it take you more than a year to become pregnant?: No  Have you ever been evaluated or treated for infertility?: No  Is there a history of medical problems in your family, which you feel may be important to this pregnancy?: No  Do you have any other problems we have not asked about which you feel may be important to this pregnancy?: No    Symptoms since last menstrual period  Do you have any of the following symptoms: abdominal pain, blood in stools or urine, chest pain, shortness of breath, coughing or vomiting up blood, your heart racing or skipping beats, nausea and vomiting, pain on urination or  vaginal discharge or bleed: (!) Yes  Will the patient be 35 years old or older at the time of delivery?: No    Has the patient, baby's father or anyone in either family had:  Thalassemia (Italian, Greek, Mediterranean or  background only) and an MCV result less than 80?: No  Neural tube defect such as meningomyelocele, spina bifida or anencephaly?: No  Congenital heart defect?: No  Down's Syndrome?: No  Adama-Sachs disease (Sabianist, Cajun, Sinhala-Berrien)?: No  Sickle cell disease or trait ()?: No  Hemophilia or other inherited problems of blood?: No  Muscular dystrophy?: No  Cystic fibrosis?: No  Michael's chorea?: No  Mental retardation/autism?: No  If yes, was the person tested for fragile X?: No  Any other inherited genetic or chromosomal disorder?: No  Maternal metabolic disorder (e.g Insulin-dependent diabetes, PKU)?: No  A child with birth defects not listed above?: No  Recurrent pregnancy loss or stillbirth?: No   Has the patient had any medications/street drugs/alcohol since her last menstrual period?: No  Does the patient or baby's father have any other genetic risks?: No    Infection History   Do you object to being tested for Hepatitis B?: No  Do you object to being tested for HIV?: No   Do you feel that you are at high risk for coming in contact with the AIDS virus?: No  Have you ever been treated for tuberculosis?: No  Have you ever had a positive skin test for tuberculosis?: No  Do you live with someone who has tuberculosis?: No  Have you ever been exposed to tuberculosis?: No  Do you have genital herpes?: No  Does your partner have genital herpes?: No  Have you had a viral illness since your last period?: No  Have you ever had gonorrhea, chlamydia, syphilis, venereal warts, trichomoniasis, pelvic inflammatory disease or any other sexually transmitted disease?: No  Do you know if you are a genital group B streptococcus carrier?: No  Have you had chicken pox/varicella?: No   Have you been  vaccinated against chicken Pox?: No  Have you had any other infectious diseases?: No      Allergies as of 1/28/2020:    Allergies as of 01/28/2020     (No Known Allergies)       Current medications are:  Current Outpatient Medications   Medication Sig Dispense Refill     albuterol (PROAIR HFA) 108 (90 Base) MCG/ACT inhaler Inhale 2 puffs into the lungs every 4 hours as needed for shortness of breath / dyspnea (Patient not taking: Reported on 10/18/2019) 1 Inhaler 0     alcohol swab prep pads Use to swab area of injection/joelle as directed. 100 each 3     BASAGLAR 100 UNIT/ML injection Inject 60 Units Subcutaneous daily (Patient not taking: Reported on 1/27/2020) 15 mL 0     blood glucose (NO BRAND SPECIFIED) lancets standard Use to test blood sugar 4 times daily or as directed. 100 each 11     blood glucose calibration (NO BRAND SPECIFIED) solution To accompany: Blood Glucose Monitor Brands: per insurance. 1 Bottle 3     blood glucose monitoring (NO BRAND SPECIFIED) meter device kit Use to test blood sugar 4 times daily or as directed. Preferred blood glucose meter OR supplies to accompany: Blood Glucose Monitor Brands: per insurance. 1 kit 0     blood glucose monitoring (NO BRAND SPECIFIED) meter device kit Use to test blood sugar.  One Touch Ultra or Verio. 1 kit 0     blood glucose monitoring (NO BRAND SPECIFIED) test strip Use to test blood sugar 4 times daily or as directed. To accompany: Blood Glucose Monitor Brands: per insurance. 100 strip 6     blood glucose monitoring (NO BRAND SPECIFIED) test strip Use to test blood sugars 4 times daily or as directed 100 strip 2     blood glucose monitoring (ONE TOUCH DELICA) lancets USE TO TEST BLOOD GLUCOSE FOUR TIMES DAILY OR AS DIRECTED       Continuous Blood Gluc  (FREESTYLE NILDA 14 DAY READER) TOMASA 1 Device continuous 1 Device 0     Continuous Blood Gluc Sensor (FREESTYLE NILDA 14 DAY SENSOR) MISC 1 Device continuous 9 each 3     insulin aspart (NOVOLOG  FLEXPEN) 100 UNIT/ML pen Inject 5 Units Subcutaneous       insulin aspart (NOVOLOG FLEXPEN) 100 UNIT/ML pen Take 2 units per carb with each meal plus corrections 1:50 > 150. Max is 25 units daily. 3 mL 0     insulin glargine (LANTUS SOLOSTAR) 100 UNIT/ML pen Inject 20 Units Subcutaneous       insulin pen needle (BD EFRAIN U/F) 32G X 4 MM Use 4 pen needles daily or as directed. 400 each 1     insulin pen needle (NOVOFINE) 32G X 6 MM Use 4 daily or as directed. 100 each 11     ONETOUCH VERIO IQ test strip USE TO TEST BLOOD SUGAR FOUR TIMES DAILY OR AS DIRECTED       ranitidine (ZANTAC) 300 MG tablet Take 1 tablet (300 mg) by mouth At Bedtime (Patient not taking: Reported on 10/18/2019) 30 tablet 1         Early ultrasound screening tool:    Does patient have irregular periods?  No  Did patient use hormonal birth control in the three months prior to positive urine pregnancy test? No  Is the patient breastfeeding?  No  Is the patient 10 weeks or greater at time of education visit?  No

## 2020-01-29 VITALS
TEMPERATURE: 98.6 F | SYSTOLIC BLOOD PRESSURE: 105 MMHG | BODY MASS INDEX: 27.68 KG/M2 | HEART RATE: 91 BPM | WEIGHT: 150.4 LBS | DIASTOLIC BLOOD PRESSURE: 79 MMHG | HEIGHT: 62 IN

## 2020-01-29 LAB
HBV SURFACE AG SERPL QL IA: NONREACTIVE
HIV 1+2 AB+HIV1 P24 AG SERPL QL IA: NONREACTIVE
RUBV IGG SERPL IA-ACNC: 41 IU/ML
T PALLIDUM AB SER QL: NONREACTIVE

## 2020-01-29 ASSESSMENT — MIFFLIN-ST. JEOR: SCORE: 1335.46

## 2020-01-31 LAB
BACTERIA SPEC CULT: NORMAL
SPECIMEN SOURCE: NORMAL

## 2020-02-17 ENCOUNTER — PRENATAL OFFICE VISIT (OUTPATIENT)
Dept: OBGYN | Facility: CLINIC | Age: 35
End: 2020-02-17
Payer: COMMERCIAL

## 2020-02-17 VITALS
TEMPERATURE: 98.1 F | HEART RATE: 93 BPM | BODY MASS INDEX: 28.77 KG/M2 | DIASTOLIC BLOOD PRESSURE: 84 MMHG | SYSTOLIC BLOOD PRESSURE: 122 MMHG | WEIGHT: 157.3 LBS

## 2020-02-17 DIAGNOSIS — O09.292 HISTORY OF PRE-ECLAMPSIA IN PRIOR PREGNANCY, CURRENTLY PREGNANT IN SECOND TRIMESTER: ICD-10-CM

## 2020-02-17 DIAGNOSIS — O24.012 TYPE 1 DIABETES MELLITUS COMPLICATING PREGNANCY, ANTEPARTUM, SECOND TRIMESTER: Primary | ICD-10-CM

## 2020-02-17 PROCEDURE — 99207 ZZC FIRST OB VISIT: CPT | Performed by: OBSTETRICS & GYNECOLOGY

## 2020-02-17 RX ORDER — PRENATAL VIT/IRON FUM/FOLIC AC 27MG-0.8MG
1 TABLET ORAL DAILY
COMMUNITY
End: 2020-03-09

## 2020-02-17 NOTE — Clinical Note
Pap smear done on this date: 1/2/20 (approximately), by this group: [facility], results were Normal.

## 2020-02-17 NOTE — Clinical Note
Please abstract the following data from this visit with this patient into the appropriate field in Epic:Tests that can be patient reported without a hard copy:Other Tests found in the patient's chart through Chart Review/Care Everywhere:[facility], HPV done by [facility] on this date: 1/2/2020 and TSH done by [facility] on this date: [id number] to Abstraction: If this section is blank, no results were found via Chart Review/Care Everywhere.

## 2020-02-17 NOTE — NURSING NOTE
"Chief Complaint   Patient presents with     Prenatal Care     approx 16 weeks       Initial /84   Pulse 93   Temp 98.1  F (36.7  C) (Oral)   Wt 71.4 kg (157 lb 4.8 oz)   LMP 2019 (Approximate)   BMI 28.77 kg/m   Estimated body mass index is 28.77 kg/m  as calculated from the following:    Height as of 20: 1.575 m (5' 2\").    Weight as of this encounter: 71.4 kg (157 lb 4.8 oz).  BP completed using cuff size: regular    Questioned patient about current smoking habits.  Pt. quit smoking some time ago.          The following HM Due: NONE      The following patient reported/Care Every where data was sent to:  P ABSTRACT QUALITY INITIATIVES [85055]  Pap smear done on this date: 20 (approximately), by this group: [facility], results were Normal.       patient has appointment for today  Cindy Manning                "

## 2020-02-17 NOTE — PROGRESS NOTES
SUBJECTIVE: Arielle Monteengro is a 35 year old   here for initial OB visit.  Doing ok.   Has had some care at [facility]. Records have been requested, some labs available through [facility].  Longstanding Type 1 DM. Control prior to pregnancy was not good but has been improving.   Has seen nephrology for elevated Cr and urine protein. PrCr ratio 1.08 in 2020 at [place]. Cr 0.56. Has had microalbuminuria dating back to at least .   No recent echo in Epic or Care Everywhere.     Past Medical History:   Diagnosis Date     Blindness of right eye      Diabetes mellitus type 1 (H)     Diagnosed as a child       Past Surgical History:   Procedure Laterality Date      SECTION  13      SECTION N/A 2015    Procedure:  SECTION;  Surgeon: John Hudson MD;  Location: [facility]     ENUCLEATION Right 3/20/2015    Procedure: ENUCLEATION;  Surgeon: Edilson Islas MD;  Location: [facility]       Family History   Problem Relation Age of Onset     Family History Negative Mother      Family History Negative Father      Diabetes Other         father's side       Social History     Socioeconomic History     Marital status: Single     Spouse name: Not on file     Number of children: 1     Years of education: Not on file     Highest education level: Not on file   Occupational History     Employer: UNEMPLOYED   Social Needs     Financial resource strain: Not on file     Food insecurity:     Worry: Not on file     Inability: Not on file     Transportation needs:     Medical: Not on file     Non-medical: Not on file   Tobacco Use     Smoking status: Former Smoker     Packs/day: 0.00     Types: Cigarettes     Smokeless tobacco: Never Used     Tobacco comment: smokes 2 cigarettes/day-none for several months   Substance and Sexual Activity     Alcohol use: No     Alcohol/week: 0.0 standard drinks     Drug use: No     Sexual activity: Yes     Partners: Male     Birth  control/protection: None   Lifestyle     Physical activity:     Days per week: Not on file     Minutes per session: Not on file     Stress: Not on file   Relationships     Social connections:     Talks on phone: Not on file     Gets together: Not on file     Attends [employer]ism service: Not on file     Active member of club or organization: Not on file     Attends meetings of clubs or organizations: Not on file     Relationship status: Not on file     Intimate partner violence:     Fear of current or ex partner: Not on file     Emotionally abused: Not on file     Physically abused: Not on file     Forced sexual activity: Not on file   Other Topics Concern     Parent/sibling w/ CABG, MI or angioplasty before 65F 55M? Not Asked   Social History Narrative    ** Merged History Encounter **         Caffeine intake/servings daily - 0    Calcium intake/servings daily - 3    Exercise 7 times weekly - describe walking    Sunscreen used - No    Seatbelts used - Yes    Guns stored in the home - No    Self Breast Exam - Yes    Pap test up to date -  No    Eye exam up to date -  Yes    Dental exam up to date -  Yes    DEXA scan up to date -  Not Applicable    Flex Sig/Colonoscopy up to date -  Not Applicable    Mammography up to date -  Not Applicable    Immunizations reviewed and up to date - No    Abuse: Current or Past (Physical, Sexual or Emotional) - No    Do you feel safe in your environment - Yes    Do you cope well with stress - Yes    Do you suffer from insomnia - No    Last updated by: KARL PELAEZ  7/18/2012                         Current Outpatient Medications:      aspirin (ASA) 81 MG EC tablet, Take 1 tablet (81 mg) by mouth daily, Disp: 100 tablet, Rfl: 3     Prenatal Vit-Fe Fumarate-FA (PRENATAL MULTIVITAMIN W/IRON) 27-0.8 MG tablet, Take 1 tablet by mouth daily, Disp: , Rfl:      alcohol swab prep pads, Use to swab area of injection/joelle as directed., Disp: 100 each, Rfl: 3     BASAGLAR 100 UNIT/ML  injection, Inject 60 Units Subcutaneous daily (Patient not taking: Reported on 1/27/2020), Disp: 15 mL, Rfl: 0     blood glucose (NO BRAND SPECIFIED) lancets standard, Use to test blood sugar 4 times daily or as directed., Disp: 100 each, Rfl: 11     blood glucose calibration (NO BRAND SPECIFIED) solution, To accompany: Blood Glucose Monitor Brands: per insurance., Disp: 1 Bottle, Rfl: 3     blood glucose monitoring (NO BRAND SPECIFIED) meter device kit, Use to test blood sugar 4 times daily or as directed. Preferred blood glucose meter OR supplies to accompany: Blood Glucose Monitor Brands: per insurance., Disp: 1 kit, Rfl: 0     blood glucose monitoring (NO BRAND SPECIFIED) meter device kit, Use to test blood sugar.  One Touch Ultra or Verio., Disp: 1 kit, Rfl: 0     blood glucose monitoring (NO BRAND SPECIFIED) test strip, Use to test blood sugar 4 times daily or as directed. To accompany: Blood Glucose Monitor Brands: per insurance., Disp: 100 strip, Rfl: 6     blood glucose monitoring (NO BRAND SPECIFIED) test strip, Use to test blood sugars 4 times daily or as directed, Disp: 100 strip, Rfl: 2     blood glucose monitoring (ONE TOUCH DELICA) lancets, USE TO TEST BLOOD GLUCOSE FOUR TIMES DAILY OR AS DIRECTED, Disp: , Rfl:      Continuous Blood Gluc  (FREESTYLE NILDA 14 DAY READER) TOMASA, 1 Device continuous, Disp: 1 Device, Rfl: 0     Continuous Blood Gluc Sensor (FREESTYLE NILDA 14 DAY SENSOR) MISC, 1 Device continuous, Disp: 9 each, Rfl: 3     insulin aspart (NOVOLOG FLEXPEN) 100 UNIT/ML pen, Inject 5 Units Subcutaneous, Disp: , Rfl:      insulin aspart (NOVOLOG FLEXPEN) 100 UNIT/ML pen, Take 2 units per carb with each meal plus corrections 1:50 > 150. Max is 25 units daily., Disp: 3 mL, Rfl: 0     insulin glargine (LANTUS SOLOSTAR) 100 UNIT/ML pen, Inject 20 Units Subcutaneous, Disp: , Rfl:      insulin pen needle (BD EFRAIN U/F) 32G X 4 MM, Use 4 pen needles daily or as directed., Disp: 400 each, Rfl:  1     insulin pen needle (NOVOFINE) 32G X 6 MM, Use 4 daily or as directed., Disp: 100 each, Rfl: 11     ONETOUCH VERIO IQ test strip, USE TO TEST BLOOD SUGAR FOUR TIMES DAILY OR AS DIRECTED, Disp: , Rfl:     No Known Allergies      Past Medical History of Father of Baby: No significant medical history    Review of Systems:   Constitutional, HEENT, cardiovascular, pulmonary, gi and gu systems are negative, except as otherwise noted.     History Since Last Menstrual Period: No Problems    EXAM:   Vitals:    20 0955   BP: 122/84   Pulse: 93   Temp: 98.1  F (36.7  C)   TempSrc: Oral   Weight: 71.4 kg (157 lb 4.8 oz)     Body mass index is 28.77 kg/m .  GENERAL APPEARANCE: healthy, alert and no distress  EYES: EOMI,  PERRL  HENT: Nose and mouth without ulcers or lesions  NECK: no adenopathy, no asymmetry, masses, or scars and thyroid normal to palpation  RESP: lungs clear to auscultation - no rales, rhonchi or wheezes  BREAST: normal without masses, tenderness or nipple discharge and no palpable axillary masses or adenopathy  CV: regular rates and rhythm, normal S1 S2, no S3 or S4 and no murmur, click or rub -  ABDOMEN:  soft, nontender, no HSM or masses and bowel sounds normal  : normal cervix, adnexae, and uterus without masses or discharge  MS: extremities normal- no gross deformities noted, no evidence of inflammation in joints, FROM in all extremities.  SKIN: no suspicious lesions or rashes  NEURO: Normal strength and tone, sensory exam grossly normal, mentation intact and speech normal  PSYCH: mentation appears normal and affect normal/bright  LYMPHATICS: No axillary, cervical, inguinal, or supraclavicular nodes    Pelvix exam:  Deferred    Ultrasound: 19 showed 11 weeks 0 days, inconsistent with EDC by LMP. EDC 2020.     ASSESSMENT/ PLAN:  Arielle Montenegro is a 35 year old   at 19 weeks 4 days by 11 week ultrasound.  Follow up in 4 weeks. Has endocrine follow-up next week .  Normal  exercise.  Normal sexual activity.  Prenatal vitamins.  Anticipated weight gain:  BMI 25-29.9: 15-25 pounds  Flu shot done previously   TDaP 27-36 weeks  Oriented to practice, PNC  Discussed aneuploidy screening, will schedule MFM ultrasound and consult given the complications with her diabetes.   Discussed  will be recommended as she has had two and will likely need delivery prior to labor.   Discussed ultrasound monitoring of pregnancies complicated by diabetes.   H/o MRSA, will need nasal swabs  Recommended low dose ASA for pre-eclampsia recurrence reduction.

## 2020-02-18 ENCOUNTER — TELEPHONE (OUTPATIENT)
Dept: ENDOCRINOLOGY | Facility: CLINIC | Age: 35
End: 2020-02-18

## 2020-02-19 ENCOUNTER — TELEPHONE (OUTPATIENT)
Dept: ENDOCRINOLOGY | Facility: CLINIC | Age: 35
End: 2020-02-19

## 2020-02-19 NOTE — TELEPHONE ENCOUNTER
----- Message from Petty Mooney MD sent at 2/19/2020  8:41 AM CST -----  Regarding: RE: Reschdule high risk pregnant type 1  Thank you.   Endo team: could you try to reach her?    Petty  ----- Message -----  From: Lroeta Jorge  Sent: 2/18/2020   5:37 PM CST  To: Janessa Ordoñez PA-C, Nya Todd, #  Subject: RE: Reschdule high risk pregnant type 1          Hello,    Thank you both for attempting to get the patient to come.    Perhaps a nurse or provider could call her if she is at risk?    Please advise if there is anything else you would like the clinic coordinators to try.    Thank you.  Loreta Jorge, Clinic Coordinator Manager    ----- Message -----  From: Heidi Fan  Sent: 2/18/2020   9:38 AM CST  To: Janessa Ordoñez PA-C, Nya Todd, #  Subject: RE: Reschdule high risk pregnant type 1          I spoke to this patient last week and also tried to get her in urgently and she refused. I responded with that information to whoever had sent the original message. Thanks.  ----- Message -----  From: Nya Todd  Sent: 2/18/2020   8:06 AM CST  To: Janessa Ordoñez PA-C, Petty Mooney MD, #  Subject: RE: Reschdule high risk pregnant type 1          I spoke with Arielle, I tried to schedule her, we have openings today and during the week but she refused and she said she is doing great, and she does not want to see any more doctors.  She is scheduled for 2/27.  Thank you,  Nya  ----- Message -----  From: Loreta Jorge  Sent: 2/17/2020   7:00 PM CST  To: Janessa Ordoñez PA-C, Nya Todd, #  Subject: RE: Reschdule high risk pregnant type 1          Seyd Fay,    Can you please try to give this patient a call tonight and then Reply All on this message what the outcome is for rescheduling.    Thank you.  Loreta  ----- Message -----  From: Janessa Ordoñez PA-C  Sent: 2/16/2020   9:12 PM CST  To: Petty Mooney MD, Clinic [id number]  Subject: RE: Reschdule high risk pregnant type 1          Indeed!'Very high risk  for complications!  ----- Message -----  From: Petty Mooney MD  Sent: 2/13/2020   3:02 PM CST  To: Janessa Ordoñez PA-C, #  Subject: RE: Reschdule high risk pregnant type 1          Thank you, Janessa! This is our worry list patients.  ----- Message -----  From: Janessa Ordoñez PA-C  Sent: 2/13/2020   2:40 PM CST  To: Petty Mooney MD, Clinic [id number]  Subject: Reschdule high risk pregnant type 1              Please advise that I am very worried that she missed her appointment today and about complications for her.  Please see if she is willing to reschedule with myself or any provider next available in clinic.  She also likely needs to see Madina or Ros sooner than later.  Ask her if she has been able to get her Ariane - we really would like her to have Dexcom and hopefully she is willing to meet with Madina /Ros to expedite that process and learn how to use.    Thank you!    Janessa

## 2020-02-19 NOTE — TELEPHONE ENCOUNTER
"Spoke w/ Pt: explained how important it is that she be assessed; states she is checking blood sugar and feels it is \"ok\", A1c \"ok\", has been busy this week and can't come to clinic, will keep appt next week, and is willing to see DE at that time. Sent to clinic coordinators for scheduling.  Belkys Carey RN on 2/19/2020 at 9:18 AM       PLEASE HELP SCHEDULE THE FOLLOWING APPT(S): Diabetes Education Appointment with Nurse Educator ONLY    TIME FRAME NEEDED BY: Other (specify in comments.)       SCHEDULING COMMENTS (optional): same day when here for appt with Janessa Ordoñez, has children in school and can only come to clinic late morning.     "

## 2020-02-19 NOTE — TELEPHONE ENCOUNTER
Ros is out of clinic next week and Madina's schedule is full.    Madina, please advise if you can add this patient on, on 2/27. Patient sees Janessa at 9:30am.    Thanks,  Heidi

## 2020-02-21 ENCOUNTER — AMBULATORY - HEALTHEAST (OUTPATIENT)
Dept: MATERNAL FETAL MEDICINE | Facility: HOSPITAL | Age: 35
End: 2020-02-21

## 2020-02-21 DIAGNOSIS — O26.90 PREGNANCY, ANTEPARTUM, COMPLICATIONS: ICD-10-CM

## 2020-02-28 ENCOUNTER — AMBULATORY - HEALTHEAST (OUTPATIENT)
Dept: MATERNAL FETAL MEDICINE | Facility: HOSPITAL | Age: 35
End: 2020-02-28

## 2020-03-02 ENCOUNTER — OFFICE VISIT - HEALTHEAST (OUTPATIENT)
Dept: MATERNAL FETAL MEDICINE | Facility: HOSPITAL | Age: 35
End: 2020-03-02

## 2020-03-02 ENCOUNTER — RECORDS - HEALTHEAST (OUTPATIENT)
Dept: ULTRASOUND IMAGING | Facility: HOSPITAL | Age: 35
End: 2020-03-02

## 2020-03-02 ENCOUNTER — RECORDS - HEALTHEAST (OUTPATIENT)
Dept: ADMINISTRATIVE | Facility: OTHER | Age: 35
End: 2020-03-02

## 2020-03-02 ENCOUNTER — TRANSCRIBE ORDERS (OUTPATIENT)
Dept: MATERNAL FETAL MEDICINE | Facility: CLINIC | Age: 35
End: 2020-03-02

## 2020-03-02 ENCOUNTER — COMMUNICATION - HEALTHEAST (OUTPATIENT)
Dept: MATERNAL FETAL MEDICINE | Facility: HOSPITAL | Age: 35
End: 2020-03-02

## 2020-03-02 DIAGNOSIS — O26.90 PREGNANCY RELATED CONDITION, ANTEPARTUM: Primary | ICD-10-CM

## 2020-03-02 DIAGNOSIS — O26.90 PREGNANCY, ANTEPARTUM, COMPLICATIONS: ICD-10-CM

## 2020-03-02 DIAGNOSIS — O26.90 PREGNANCY RELATED CONDITIONS, UNSPECIFIED, UNSPECIFIED TRIMESTER: ICD-10-CM

## 2020-03-02 DIAGNOSIS — O24.012 TYPE 1 DIABETES MELLITUS DURING PREGNANCY IN SECOND TRIMESTER: ICD-10-CM

## 2020-03-09 ENCOUNTER — OFFICE VISIT (OUTPATIENT)
Dept: FAMILY MEDICINE | Facility: CLINIC | Age: 35
End: 2020-03-09
Payer: COMMERCIAL

## 2020-03-09 VITALS
TEMPERATURE: 98.1 F | HEART RATE: 107 BPM | BODY MASS INDEX: 28.62 KG/M2 | OXYGEN SATURATION: 97 % | WEIGHT: 156.5 LBS | DIASTOLIC BLOOD PRESSURE: 82 MMHG | SYSTOLIC BLOOD PRESSURE: 120 MMHG

## 2020-03-09 DIAGNOSIS — F32.5 DEPRESSION, MAJOR, IN REMISSION (H): ICD-10-CM

## 2020-03-09 DIAGNOSIS — O24.312 PRE-EXISTING DIABETES MELLITUS DURING PREGNANCY IN SECOND TRIMESTER: ICD-10-CM

## 2020-03-09 DIAGNOSIS — J45.40 MODERATE PERSISTENT REACTIVE AIRWAY DISEASE WITHOUT COMPLICATION: Primary | ICD-10-CM

## 2020-03-09 DIAGNOSIS — R05.9 COUGH: ICD-10-CM

## 2020-03-09 PROBLEM — F33.1 MODERATE EPISODE OF RECURRENT MAJOR DEPRESSIVE DISORDER (H): Status: RESOLVED | Noted: 2018-09-17 | Resolved: 2020-03-09

## 2020-03-09 PROCEDURE — 99214 OFFICE O/P EST MOD 30 MIN: CPT | Performed by: INTERNAL MEDICINE

## 2020-03-09 RX ORDER — ALBUTEROL SULFATE 90 UG/1
2 AEROSOL, METERED RESPIRATORY (INHALATION) EVERY 6 HOURS
Qty: 1 INHALER | Refills: 3 | Status: SHIPPED | OUTPATIENT
Start: 2020-03-09 | End: 2020-12-01

## 2020-03-09 RX ORDER — PRENATAL VIT/IRON FUM/FOLIC AC 27MG-0.8MG
1 TABLET ORAL DAILY
Qty: 90 TABLET | Refills: 1 | Status: SHIPPED | OUTPATIENT
Start: 2020-03-09 | End: 2020-05-26

## 2020-03-09 NOTE — ASSESSMENT & PLAN NOTE
This is a chronic health problem that is well controlled with current medications and lifestyle measures.  Patient states that she was having depression in the past in 2018 but currently she is absolutely fine and denies any depression.  Not on any medications.  PHQ 2 score is 0.

## 2020-03-09 NOTE — PATIENT INSTRUCTIONS
Please take the medications prescribed and sent to your pharmacy.      NON PRESCRIPTION TREATMENT UPPER RESPIRATORY INFECTION    Increase humidity to 30-40% in bedroom at night - vaporizer  Avoid decongestant  Saline nasal spray as needed  Increase fluid intake with warm to hot soups.  Benadryl 25mg 1/2 - 1 hour before bed time  Maintain 8 hr minimum of sleep at night  Return to clinic  if no improvement or worsening symptoms after 7-10 days    ===========================    My Asthma Action Plan    Name: Arielle Montenegro   YOB: 1985  Date: 3/9/2020   My doctor: Bibi Lai MD   My clinic: [facility]        My Control Medicine: COMBINATION INHALED CORTICOSTEROIDS/LONG-ACTING BETA  AGONIST  My Rescue Medicine: Albuterol (Proair/Ventolin/Proventil HFA) 2-4 puffs EVERY 4 HOURS as needed. Use a spacer if recommended by your provider.  COMBINATION SHORT ACTING BETA AGONIST/MUSCARINIC ANTAGONIST  My Oral Steroid Medicine: None at this time My Asthma Severity:   Moderate Persistent  Know your asthma triggers: upper respiratory infections               GREEN ZONE   Good Control    I feel good    No cough or wheeze    Can work, sleep and play without asthma symptoms       Take your asthma control medicine every day.     1. If exercise triggers your asthma, take your rescue medication    15 minutes before exercise or sports, and    During exercise if you have asthma symptoms  2. Spacer to use with inhaler: If you have a spacer, make sure to use it with your inhaler             YELLOW ZONE Getting Worse  I have ANY of these:    I do not feel good    Cough or wheeze    Chest feels tight    Wake up at night   1. Keep taking your Green Zone medications  2. Start taking your rescue medicine:    every 20 minutes for up to 1 hour. Then every 4 hours for 24-48 hours.  3. If you stay in the Yellow Zone for more than 12-24 hours, contact your doctor.  4. If you do not return to  the Green Zone in 12-24 hours or you get worse, start taking your oral steroid medicine if prescribed by your provider.           RED ZONE Medical Alert - Get Help  I have ANY of these:    I feel awful    Medicine is not helping    Breathing getting harder    Trouble walking or talking    Nose opens wide to breathe       1. Take your rescue medicine NOW  2. If your provider has prescribed an oral steroid medicine, start taking it NOW  3. Call your doctor NOW  4. If you are still in the Red Zone after 20 minutes and you have not reached your doctor:    Take your rescue medicine again and    Call 911 or go to the emergency room right away    See your regular doctor within 2 weeks of an Emergency Room or Urgent Care visit for follow-up treatment.          Annual Reminders:  Meet with Asthma Educator,  Flu Shot in the Fall, consider Pneumonia Vaccination for patients with asthma (aged 19 and older).    Pharmacy:    [facility] [place], [place] - [facility] [address]  [id number] - [place], [place] - [address] [facility] AT [id number] - [place], [place] - [address] AT [facility] [address]    Electronically signed by Bibi Lai MD   Date: 03/09/20                      Asthma Triggers  How To Control Things That Make Your Asthma Worse    Triggers are things that make your asthma worse.  Look at the list below to help you find your triggers and what you can do about them.  You can help prevent asthma flare-ups by staying away from your triggers.      Trigger                                                          What you can do   Cigarette Smoke  Tobacco smoke can make asthma worse. Do not allow smoking in your home, car or around you.  Be sure no one smokes at a child s day care or school.  If you smoke, ask your health care provider for ways to help you quit.  Ask family members to quit too.  Ask your health care provider for a referral to  Quit Plan to help you quit smoking, or call 3-637-024-PLAN.     Colds, Flu, Bronchitis  These are common triggers of asthma. Wash your hands often.  Don t touch your eyes, nose or mouth.  Get a flu shot every year.     Dust Mites  These are tiny bugs that live in cloth or carpet. They are too small to see. Wash sheets and blankets in hot water every week.   Encase pillows and mattress in dust mite proof covers.  Avoid having carpet if you can. If you have carpet, vacuum weekly.   Use a dust mask and HEPA vacuum.   Pollen and Outdoor Mold  Some people are allergic to trees, grass, or weed pollen, or molds. Try to keep your windows closed.  Limit time out doors when pollen count is high.   Ask you health care provider about taking medicine during allergy season.     Animal Dander  Some people are allergic to skin flakes, urine or saliva from pets with fur or feathers. Keep pets with fur or feathers out of your home.    If you can t keep the pet outdoors, then keep the pet out of your bedroom.  Keep the bedroom door closed.  Keep pets off cloth furniture and away from stuffed toys.     Mice, Rats, and Cockroaches   Some people are allergic to the waste from these pests.   Cover food and garbage.  Clean up spills and food crumbs.  Store grease in the refrigerator.   Keep food out of the bedroom.   Indoor Mold  This can be a trigger if your home has high moisture. Fix leaking faucets, pipes, or other sources of water.   Clean moldy surfaces.  Dehumidify basement if it is damp and smelly.   Smoke, Strong Odors, and Sprays  These can reduce air quality. Stay away from strong odors and sprays, such as perfume, powder, hair spray, paints, smoke incense, paint, cleaning products, candles and new carpet.   Exercise or Sports  Some people with asthma have this trigger. Be active!  Ask your doctor about taking medicine before sports or exercise to prevent symptoms.    Warm up for 5-10 minutes before and after sports or  exercise.     Other Triggers of Asthma  Cold air:  Cover your nose and mouth with a scarf.  Sometimes laughing or crying can be a trigger.  Some medicines and food can trigger asthma.

## 2020-03-09 NOTE — PROGRESS NOTES
Subjective     Arielle Montenegro is a 35 year old female who presents to clinic today for the following health issues:    HPI   cough      Duration: days, gets every year    Description (location/character/radiation): chest    Intensity:  mild    Accompanying signs and symptoms: cough    History (similar episodes/previous evaluation): pt usually gets a inhaler    Precipitating or alleviating factors: None    Therapies tried and outcome: None     Pre-existing diabetes mellitus during pregnancy  Patient has type 1 diabetes mellitus, pt states that this is the 2nd trimester and visiting OBGYN regularly.  Taking her insulin Lantus and also NovoLog regularly. Recent A1C in 01/2020 was stable at 7.5 improved from the past.     Cough  This is a new new problem started since 3/5/2020 with agravting as exposed to cold air. Pt states that she has visited hospital every year. Pt states that she did not take any medication this episode. Pt has productive cough with white sputum Does states having fever once on the day 1 with fatigue but due to feeling okay. Denies any Sick contacts , rhinitis or Sore throat.  Does have nighttime awakenings with severe coughing, patient is out of her albuterol inhaler which she was using in the past.  She does have triggers with severe attacks every year for which she manages only with albuterol inhaler.    Patient does have a history of hospitalization around 10 years back with recurrent hospitalizations at that time due to respiratory issues always with seasonal changes, does not remember if she was tested for asthma in the past.  No PFTs on file.    Depression, major, in remission (H)  This is a chronic health problem that is well controlled with current medications and lifestyle measures.  Patient states that she was having depression in the past in 2018 but currently she is absolutely fine and denies any depression.  Not on any medications.  PHQ 2 score is 0.      Patient Active Problem  List   Diagnosis     Blindness of right eye     Diabetes mellitus type 1 (H)     Hypoglycemia unawareness in type 1 diabetes mellitus (H)     Health Care Home     Vitamin D deficiency     External hemorrhoids     Encounter for long-term (current) use of insulin (H)     Medical non-compliance     Heart murmur     DJD (degenerative joint disease), cervical     Fibrocystic breast changes of both breasts     Diabetes mellitus with ketoacidosis (H)     Endophthalmitis     Uveitis     Generalized anxiety disorder     History of pre-eclampsia     Pre-existing diabetes mellitus during pregnancy     Diabetes mellitus (H)     Type 1 diabetes mellitus (H)     High-risk pregnancy     Episodic mood disorder (H)     Cannabis abuse     Cough     Depression, major, in remission (H)     Past Surgical History:   Procedure Laterality Date      SECTION  13      SECTION N/A 2015    Procedure:  SECTION;  Surgeon: John Hudson MD;  Location: [facility]     ENUCLEATION Right 3/20/2015    Procedure: ENUCLEATION;  Surgeon: Edilson Islas MD;  Location: [facility]       Social History     Tobacco Use     Smoking status: Former Smoker     Packs/day: 0.00     Types: Cigarettes     Smokeless tobacco: Never Used     Tobacco comment: smokes 2 cigarettes/day-none for several months   Substance Use Topics     Alcohol use: No     Alcohol/week: 0.0 standard drinks     Family History   Problem Relation Age of Onset     Family History Negative Mother      Family History Negative Father      Diabetes Other         father's side           Reviewed and updated as needed this visit by Provider  Tobacco  Allergies  Meds  Problems  Med Hx  Surg Hx  Fam Hx         Review of Systems  Constitutional: Denies fevers or chills  Eyes: Denies changes in vision  Ears/Nose/Throat/Mouth: Denies nasal congestion or sore throat   Cardiovascular: Denies chest pain or palpitations   Respiratory: As mentioned in HPI  above  Gastrointestinal/Hepatic: Denies abd pain, nausea, vomiting   Genitourinary: Denies dysuria or frequency  Musculoskeletal/Rheum: Denies joint pain and swelling   Neurological: Denies headache  Psychiatric: Denies mood disorder   Endocrine: Denies hx of diabetes or thyroid dysfunction  Heme/Oncology/Lymph Nodes: Denies weight changes or enlarged LNs.   Allergic/Immunologic: As mentioned in HPI above      PHYSICAL EXAM  VITAL SIGNS:   /82 (Cuff Size: Adult Regular)   Pulse 107   Temp 98.1  F (36.7  C) (Tympanic)   Wt 71 kg (156 lb 8 oz)   LMP 09/06/2019 (Approximate)   SpO2 97%   BMI 28.62 kg/m    Constitutional: Well developed, Well nourished, No acute distress, Non-toxic appearance.    HENT: Normocephalic, Atraumatic, Bilateral external ears normal, Oropharynx moist, No oral exudates, Nose normal.   Eyes: EOMI, Conjunctiva normal, No discharge.    Neck: Normal range of motion, No tenderness, Supple, No stridor.    Lymphatic: Bilateral Jugulodigastric lymphadenopathy noted 0.5 cm in diameter, nontender.    Cardiovascular: Regular rate and rhythm,  No murmurs, No rubs, No gallops.    Thorax & Lungs: Normal breath sounds, No respiratory distress, No wheezing, No chest tenderness.    Abdomen: Bowel sounds normal, Soft, No tenderness, No masses, No pulsatile masses.  Positive for gravid uterus.   Psychiatric: Affect normal, Judgment normal, Mood normal.        Diagnostic Test Results:  Labs reviewed in Epic        Assessment and Plan  1. Moderate persistent reactive airway disease without complication  2. Cough  New problem, differential diagnosis moderate persistent asthma.  We will give her rescue inhaler and also scheduled Advair Diskus for improvement.  I do not think she needs oral steroids or antibiotics at this time as there are no constitutional symptoms and negative physical exam findings at this time.  Given instructions to call us if no improvement so that we can send an empiric antibiotic  at that time which patient understood and agreed.    - Emphasized on getting the pulmonary function test done after her current pregnancy for a definitive diagnosis which she understood and agreed.  - fluticasone-salmeterol (ADVAIR) 100-50 MCG/DOSE inhaler; Inhale 1 puff into the lungs every 12 hours  Dispense: 1 Inhaler; Refill: 3  - albuterol (PROAIR HFA/PROVENTIL HFA/VENTOLIN HFA) 108 (90 Base) MCG/ACT inhaler; Inhale 2 puffs into the lungs every 6 hours  Dispense: 1 Inhaler; Refill: 3      3. Pre-existing diabetes mellitus during pregnancy in second trimester  Improving, Continue the current Insulin Lantus and Novolog. Counseled on avoiding teratogenic medications.  - Prenatal Vit-Fe Fumarate-FA (PRENATAL MULTIVITAMIN W/IRON) 27-0.8 MG tablet; Take 1 tablet by mouth daily  Dispense: 90 tablet; Refill: 1    4. Depression, major, in remission (H)  Well-controlled, continue current lifestyle modifications.  Given the depression action plan which was not given to the patient in the past.       Patient Instructions     Please take the medications prescribed and sent to your pharmacy.      NON PRESCRIPTION TREATMENT UPPER RESPIRATORY INFECTION    Increase humidity to 30-40% in bedroom at night - vaporizer  Avoid decongestant  Saline nasal spray as needed  Increase fluid intake with warm to hot soups.  Benadryl 25mg 1/2 - 1 hour before bed time  Maintain 8 hr minimum of sleep at night  Return to clinic  if no improvement or worsening symptoms after 7-10 days    ===========================    My Asthma Action Plan    Name: Arielle Montenegro   YOB: 1985  Date: 3/9/2020   My doctor: Bibi Lai MD   My clinic: [facility]        My Control Medicine: COMBINATION INHALED CORTICOSTEROIDS/LONG-ACTING BETA  AGONIST  My Rescue Medicine: Albuterol (Proair/Ventolin/Proventil HFA) 2-4 puffs EVERY 4 HOURS as needed. Use a spacer if recommended by your provider.  COMBINATION  SHORT ACTING BETA AGONIST/MUSCARINIC ANTAGONIST  My Oral Steroid Medicine: None at this time My Asthma Severity:   Moderate Persistent  Know your asthma triggers: upper respiratory infections               GREEN ZONE   Good Control    I feel good    No cough or wheeze    Can work, sleep and play without asthma symptoms       Take your asthma control medicine every day.     1. If exercise triggers your asthma, take your rescue medication    15 minutes before exercise or sports, and    During exercise if you have asthma symptoms  2. Spacer to use with inhaler: If you have a spacer, make sure to use it with your inhaler             YELLOW ZONE Getting Worse  I have ANY of these:    I do not feel good    Cough or wheeze    Chest feels tight    Wake up at night   1. Keep taking your Green Zone medications  2. Start taking your rescue medicine:    every 20 minutes for up to 1 hour. Then every 4 hours for 24-48 hours.  3. If you stay in the Yellow Zone for more than 12-24 hours, contact your doctor.  4. If you do not return to the Green Zone in 12-24 hours or you get worse, start taking your oral steroid medicine if prescribed by your provider.           RED ZONE Medical Alert - Get Help  I have ANY of these:    I feel awful    Medicine is not helping    Breathing getting harder    Trouble walking or talking    Nose opens wide to breathe       1. Take your rescue medicine NOW  2. If your provider has prescribed an oral steroid medicine, start taking it NOW  3. Call your doctor NOW  4. If you are still in the Red Zone after 20 minutes and you have not reached your doctor:    Take your rescue medicine again and    Call 911 or go to the emergency room right away    See your regular doctor within 2 weeks of an Emergency Room or Urgent Care visit for follow-up treatment.          Annual Reminders:  Meet with Asthma Educator,  Flu Shot in the Fall, consider Pneumonia Vaccination for patients with asthma (aged 19 and  older).    Pharmacy:    [place], [place] - [address]  [id number] - [place], [place] - [address]  [id number] - [place], [place] - [address]    Electronically signed by Bibi Lai MD   Date: 03/09/20                      Asthma Triggers  How To Control Things That Make Your Asthma Worse    Triggers are things that make your asthma worse.  Look at the list below to help you find your triggers and what you can do about them.  You can help prevent asthma flare-ups by staying away from your triggers.      Trigger                                                          What you can do   Cigarette Smoke  Tobacco smoke can make asthma worse. Do not allow smoking in your home, car or around you.  Be sure no one smokes at a child s day care or school.  If you smoke, ask your health care provider for ways to help you quit.  Ask family members to quit too.  Ask your health care provider for a referral to Quit Plan to help you quit smoking, or call 1-178-752-PLAN.     Colds, Flu, Bronchitis  These are common triggers of asthma. Wash your hands often.  Don t touch your eyes, nose or mouth.  Get a flu shot every year.     Dust Mites  These are tiny bugs that live in cloth or carpet. They are too small to see. Wash sheets and blankets in hot water every week.   Encase pillows and mattress in dust mite proof covers.  Avoid having carpet if you can. If you have carpet, vacuum weekly.   Use a dust mask and HEPA vacuum.   Pollen and Outdoor Mold  Some people are allergic to trees, grass, or weed pollen, or molds. Try to keep your windows closed.  Limit time out doors when pollen count is high.   Ask you health care provider about taking medicine during allergy season.     Animal Dander  Some people are allergic to skin flakes, urine or saliva from pets with fur or feathers. Keep pets with fur or  feathers out of your home.    If you can t keep the pet outdoors, then keep the pet out of your bedroom.  Keep the bedroom door closed.  Keep pets off cloth furniture and away from stuffed toys.     Mice, Rats, and Cockroaches   Some people are allergic to the waste from these pests.   Cover food and garbage.  Clean up spills and food crumbs.  Store grease in the refrigerator.   Keep food out of the bedroom.   Indoor Mold  This can be a trigger if your home has high moisture. Fix leaking faucets, pipes, or other sources of water.   Clean moldy surfaces.  Dehumidify basement if it is damp and smelly.   Smoke, Strong Odors, and Sprays  These can reduce air quality. Stay away from strong odors and sprays, such as perfume, powder, hair spray, paints, smoke incense, paint, cleaning products, candles and new carpet.   Exercise or Sports  Some people with asthma have this trigger. Be active!  Ask your doctor about taking medicine before sports or exercise to prevent symptoms.    Warm up for 5-10 minutes before and after sports or exercise.     Other Triggers of Asthma  Cold air:  Cover your nose and mouth with a scarf.  Sometimes laughing or crying can be a trigger.  Some medicines and food can trigger asthma.         Return in about 10 days (around 3/19/2020), or if symptoms worsen or fail to improve, for If symptoms persist.    Bibi Lai MD  [facility]

## 2020-03-09 NOTE — LETTER
My Depression Action Plan  Name: Arielle Montenegro   Date of Birth 1985  Date: 3/9/2020    My doctor: Diane Bello   My clinic: [address]  [facility] [address] [phone]          GREEN    ZONE   Good Control    What it looks like:     Things are going generally well. You have normal ups and downs. You may even feel depressed from time to time, but bad moods usually last less than a day.   What you need to do:  1. Continue to care for yourself (see self care plan)  2. Check your depression survival kit and update it as needed  3. Follow your physician s recommendations including any medication.  4. Do not stop taking medication unless you consult with your physician first.           YELLOW         ZONE Getting Worse    What it looks like:     Depression is starting to interfere with your life.     It may be hard to get out of bed; you may be starting to isolate yourself from others.    Symptoms of depression are starting to last most all day and this has happened for several days.     You may have suicidal thoughts but they are not constant.   What you need to do:     1. Call your care team. Your response to treatment will improve if you keep your care team informed of your progress. Yellow periods are signs an adjustment may need to be made.     2. Continue your self-care.  Just get dressed and ready for the day.  Don't give yourself time to talk yourself out of it.    3. Talk to someone in your support network.    4. Open up your Depression Self-Care Plan/Wellness Kit.           RED    ZONE Medical Alert - Get Help    What it looks like:     Depression is seriously interfering with your life.     You may experience these or other symptoms: You can t get out of bed most days, can t work or engage in other necessary activities, you have trouble taking care of basic hygiene, or basic responsibilities, thoughts of suicide or  death that will not go away, self-injurious behavior.     What you need to do:  1. Call your care team and request a same-day appointment. If they are not available (weekends or after hours) call your local crisis line, emergency room or 911.            Depression Self-Care Plan / Wellness Kit    Self-Care for Depression  Here s the deal. Your body and mind are really not as separate as most people think.  What you do and think affects how you feel and how you feel influences what you do and think. This means if you do things that people who feel good do, it will help you feel better.  Sometimes this is all it takes.  There is also a place for medication and therapy depending on how severe your depression is, so be sure to consult with your medical provider and/ or Behavioral Health Consultant if your symptoms are worsening or not improving.     In order to better manage my stress, I will:    Exercise  Get some form of exercise, every day. This will help reduce pain and release endorphins, the  feel good  chemicals in your brain. This is almost as good as taking antidepressants!  This is not the same as joining a gym and then never going! (they count on that by the way ) It can be as simple as just going for a walk or doing some gardening, anything that will get you moving.      Hygiene   Maintain good hygiene (get out of bed in the morning, make your bed, brush your teeth, take a shower, and get dressed like you were going to work, even if you are unemployed).  If your clothes don't fit try to get ones that do.    Diet  Strive to eat foods that are good for me, drink plenty of water, and avoid excessive sugar, caffeine, alcohol, and other mood-altering substances.  Some foods that are helpful in depression are: complex carbohydrates, B vitamins, flaxseed, fish or fish oil, fresh fruits and vegetables.    Psychotherapy  Agree to participate in Individual Therapy (if recommended).    Medication  If prescribed  medications, I agree to take them.  Missing doses can result in serious side effects.  I understand that drinking alcohol, or other illicit drug use, may cause potential side effects.  I will not stop my medication abruptly without first discussing it with my provider.    Staying Connected With Others  Stay in touch with my friends, family members, and my primary care provider/team.    Use your imagination  Be creative.  We all have a creative side; it doesn t matter if it s oil painting, sand castles, or mud pies! This will also kick up the endorphins.    Witness Beauty  (AKA stop and smell the roses) Take a look outside, even in mid-winter. Notice colors, textures. Watch the squirrels and birds.     Service to others  Be of service to others.  There is always someone else in need.  By helping others we can  get out of ourselves  and remember the really important things.  This also provides opportunities for practicing all the other parts of the program.    Humor  Laugh and be silly!  Adjust your TV habits for less news and crime-drama and more comedy.    Control your stress  Try breathing deep, massage therapy, biofeedback, and meditation. Find time to relax each day.     Crisis Text Line  http://www.crisistextline.org    The Crisis Text Line serves anyone, in any type of crisis, providing access to free, 24/7 support and information via the medium people already use and trust:    Here's how it works:  1.  Text 433-622 from anywhere in the [place], anytime, about any type of crisis.  2.  A live, trained Crisis Counselor receives the text and responds quickly.  3.  The volunteer Crisis Counselor will help you move from a 'hot moment to a cool moment'.    My support system    Clinic Contact:  Phone number:    Contact 1:  Phone number:    Contact 2:  Phone number:    Judaism/:  Phone number:    Therapist:  Phone number:    Local crisis center:    Phone number:    Other community support:  Phone number:

## 2020-03-09 NOTE — ASSESSMENT & PLAN NOTE
Patient has type 1 diabetes mellitus, pt states that this is the 2nd trimester and visiting OBGYN regularly.  Taking her insulin Lantus and also NovoLog regularly. Recent A1C in 01/2020 was stable at 7.5 improved from the past.

## 2020-03-09 NOTE — ASSESSMENT & PLAN NOTE
This is a new new problem started since 3/5/2020 with agravting as exposed to cold air. Pt states that she has visited hospital every year. Pt states that she did not take any medication this episode. Pt has productive cough with white sputum Does states having fever once on the day 1 with fatigue but due to feeling okay. Denies any Sick contacts , rhinitis or Sore throat.  Does have nighttime awakenings with severe coughing, patient is out of her albuterol inhaler which she was using in the past.  She does have triggers with severe attacks every year for which she manages only with albuterol inhaler.    Patient does have a history of hospitalization around 10 years back with recurrent hospitalizations at that time due to respiratory issues always with seasonal changes, does not remember if she was tested for asthma in the past.  No PFTs on file.

## 2020-03-12 ENCOUNTER — OFFICE VISIT (OUTPATIENT)
Dept: ENDOCRINOLOGY | Facility: CLINIC | Age: 35
End: 2020-03-12

## 2020-03-12 VITALS
SYSTOLIC BLOOD PRESSURE: 104 MMHG | HEART RATE: 114 BPM | HEIGHT: 62 IN | BODY MASS INDEX: 28.49 KG/M2 | DIASTOLIC BLOOD PRESSURE: 74 MMHG | WEIGHT: 154.8 LBS

## 2020-03-12 DIAGNOSIS — E10.649 TYPE 1 DIABETES MELLITUS WITH HYPOGLYCEMIA AND WITHOUT COMA (H): Primary | ICD-10-CM

## 2020-03-12 RX ORDER — FLASH GLUCOSE SENSOR
KIT MISCELLANEOUS
Qty: 2 EACH | Refills: 11 | Status: SHIPPED | OUTPATIENT
Start: 2020-03-12 | End: 2020-05-27

## 2020-03-12 ASSESSMENT — PAIN SCALES - GENERAL: PAINLEVEL: NO PAIN (0)

## 2020-03-12 ASSESSMENT — MIFFLIN-ST. JEOR: SCORE: 1355.42

## 2020-03-12 NOTE — LETTER
3/12/2020       RE: Arielle Montenegro  [address]  [place] [address]     Dear Colleague,    Thank you for referring your patient, Arielle Montenegro, to the [facility] ENDOCRINOLOGY at [facility]. Please see a copy of my visit note below.    [facility]  Endocrinology  Janessa Ordoñez PA-C  2020      Chief Complaint:   Diabetes    History of Present Illness:   Arielle Montenegro is a 35 year old female  23w0d gestation with a history of type 1 diabetes and a heart murmur who presents for a follow-up on her diabetes. She was diagnosed DM1 at age 6-7, when she became sick while living in [place]. She was diagnosed with diabetes and started on insulin since then.     She established care with Dr. Mooney on 2020 where she described that during her current pregnancy her glucose dropped too much, too often, therefore she self decreased insulin. Her A1C has been constantly high 10-11 range persistently prepregnancy, however at this visit it had dropped to 7.5. At this point she was taking Lantus 10 units (pre pregnancy she was on 20 units per patient) and Novolog carb counting 1 carb choice 2 units.     Interval history: Her [facility] CGM showed that her A1c from - was 7.6%. patient  She has not had a CGM since then, but is picking up her refill today. She also has not been checking her BG at all since then and has mostly just been monitoring her BG by how her body feels. Overall, she can feel if her BG is elevated (thirsty, nauseous) or too low.      She was taking Lantus 10 units, but in the last couple of days she has been taking 20 units due to high BG. She typically takes her Lantus in the morning, but at times forgets to take this and takes this in the evening. She prefers to take her Lantus in the morning in case she passes out from it causing low blood sugar. Regarding her Novolog, she feels she is guessing how much to take as she has not  "been checking her BG. If she wakes up if the morning feeling as if she has elevated BG then she will take 5-7 units of Novolog. It typically takes 30 minutes to an hour to feel better.  She does not like to eat breakfast, but typically has coffee or a banana. She does give Novolog for this, however is unsure how much she gives.  She was unable to say how much she has given for this today or in the past.   Her first meal of the day is typically lunch, and she often eats out at a variety of different restaurants as the food at home does not sound appetizing. Notably, she can feel her BG raise dramatically with eating very little. Typically she takes her Novolog after eating as she is unsure how much she will be able to eat. Most of the times she takes Novolog 8 units with meals; she believes her previous carb ratio was 2 units per serving of carbs. She also describes that she has been waking up with low BG at night. She has not been to a diabetes educator since she was a teenager. She feels no provider has told her exactly how much insulin to take and and she really does not know to take and reports \"It is not my fault that my blood sugar is high; I have diabetes.\"    In the last 3 days she has been very tired and wanting to sleep. She does endorse that her child has been moving around frequently. She is concerned about her child 'catching' diabetes through breast milk.      She notes that has two children with developmental delay and wonders what possible consequences of high BG during this pregnancy would be.      Blood Glucose Monitoring:  We reviewed CGM [facility] data together from -.  % time CGM is active 73%  Glucose management indicator (GMI): 7.6%  Average B mg/dL  Glucose variability: 53.1%  Very high (>250): 25% of time  High (181-250 mg/dL): 24% of time  Target range ( mg/dL): 36% of time  Low (54-69 mg/dL): 5%  Very low (<54 mg/dL): 10%    Diabetes monitoring and complications:  CAD: " No  Last eye exam results: 6 mo ago, no retinopathy  Microalbuminuria: 94.51 in 2014  Neuropathy: No  HTN: No  On Statin: No  On Aspirin: Yes  Depression: Yes    Review of Systems:   Pertinent items are noted in HPI.  All other systems are negative.    Active Medications:      albuterol (PROAIR HFA/PROVENTIL HFA/VENTOLIN HFA) 108 (90 Base) MCG/ACT inhaler, Inhale 2 puffs into the lungs every 6 hours, Disp: 1 Inhaler, Rfl: 3     aspirin (ASA) 81 MG EC tablet, Take 1 tablet (81 mg) by mouth daily, Disp: 100 tablet, Rfl: 3     BASAGLAR 100 UNIT/ML injection, Inject 60 Units Subcutaneous daily, Disp: 15 mL, Rfl: 0     fluticasone-salmeterol (ADVAIR) 100-50 MCG/DOSE inhaler, Inhale 1 puff into the lungs every 12 hours, Disp: 1 Inhaler, Rfl: 3     insulin aspart (NOVOLOG FLEXPEN) 100 UNIT/ML pen, Inject 5 Units Subcutaneous, Disp: , Rfl:      insulin aspart (NOVOLOG FLEXPEN) 100 UNIT/ML pen, Take 2 units per carb with each meal plus corrections 1:50 > 150. Max is 25 units daily., Disp: 3 mL, Rfl: 0     insulin glargine (LANTUS SOLOSTAR) 100 UNIT/ML pen, Inject 20 Units Subcutaneous, Disp: , Rfl:      Prenatal Vit-Fe Fumarate-FA (PRENATAL MULTIVITAMIN W/IRON) 27-0.8 MG tablet, Take 1 tablet by mouth daily, Disp: 90 tablet, Rfl: 1      Allergies:   Patient has no known allergies.      Past Medical History:  Blindness of right eye  Type 1 diabetes  Hypoglycemia unawareness  Vitamin D deficiency  External hemorrhoids  Heart murmur  Fibrocystic breast changes bilateral  Degenerative joint disease cervical  Diabetes with ketoacidosis  Endophthalmitis  Anxiety  Pre-eclampsia  Episodic mood disorder  Cannabis use  Depression      Past Surgical History:   section - 2013, 2015  Enucleation right - 2015    Family History:   Diabetes - paternal side of family       Social History:   This patient presented alone.   Smoking status: former smoker of cigarettes  Smokeless tobacco: never  Alcohol use: no  Drug use: no     Physical  "Exam:   /74   Pulse 114   Ht 1.575 m (5' 2\")   Wt 70.2 kg (154 lb 12.8 oz)   LMP 09/06/2019 (Approximate)   BMI 28.31 kg/m       Wt Readings from Last 10 Encounters:   03/12/20 70.2 kg (154 lb 12.8 oz)   03/09/20 71 kg (156 lb 8 oz)   02/17/20 71.4 kg (157 lb 4.8 oz)   01/29/20 68.2 kg (150 lb 6.4 oz)   01/27/20 68.1 kg (150 lb 1.6 oz)   10/18/19 62.1 kg (136 lb 12.8 oz)   09/20/19 58.9 kg (129 lb 12.8 oz)   02/12/19 67.8 kg (149 lb 6.4 oz)   10/29/18 66.2 kg (146 lb)   06/14/18 63.6 kg (140 lb 3.2 oz)        General: Pleasant, well nourished and hydrated female in NAD.   Psych:  Mood is \"good,\" affect is appropriate.  Thought form and content are fluid and coherent.    HEENT: Eyes and sclera are clear. Extraocular movements are grossly intact without proptosis.  Nares are patent, mucous membranes moist.  Neck: No masses or JVD are noted.    Resp: Easy and unlabored breathing.   Neuro: Alert and oriented, communicating clearly.   Ext: no swelling or edema      Data:  Lab Results   Component Value Date     (L) 09/20/2019    POTASSIUM 4.0 01/24/2020    CHLORIDE 95 09/20/2019    CO2 22 09/20/2019    ANIONGAP 10 09/20/2019    GLC 58 (L) 01/24/2020    BUN 13 09/20/2019    CR 0.56 01/24/2020    ALEXANDRO 8.5 09/20/2019     Lab Results   Component Value Date    GFRESTIMATED >60 01/24/2020    GFRESTIMATED >60 01/02/2020    GFRESTIMATED >90 09/20/2019    GFRESTBLACK >60 01/24/2020    GFRESTBLACK >60 01/02/2020    GFRESTBLACK >90 09/20/2019      Lab Results   Component Value Date    MICROL 172 09/30/2014    UMALCR 94.51 (H) 09/30/2014        Lab Results   Component Value Date    A1C 7.5 (H) 01/28/2020    A1C 7.8 (H) 01/02/2020    A1C 12.6 (H) 09/20/2019    A1C 11.0 (H) 10/29/2018    A1C 11.4 (H) 06/14/2018     Lab Results   Component Value Date    CPEPT <0.1 (L) 09/30/2014       Lab Results   Component Value Date    CHOL 181 09/20/2019    CHOL 147.6 03/11/2019    TRIG 45 09/20/2019    TRIG 115.8 03/11/2019    HDL " 71 09/20/2019    HDL 52.4 03/11/2019     (H) 09/20/2019    LDL 72 03/11/2019    NHDL 110 09/20/2019    NHDL 129 06/14/2018       Assessment and Plan:  Type 1 diabetes mellitus with hypoglycemia and without coma (H)  Arielle has type 1 diabetes and is 23 weeks gestation with BG far above goal with A1C today at 9.1 after 7.6 just one month ago, though notably without meter or CGM and taking Lantus irregularly for the last month. The refill on her CGM ran out about 1 month ago, and she has not been checking her BG since 2/20/2020.     She has been dosing her insulin based on how she feels. She increased her Lantus from 10 to 20 units in the morning, but reports that it is not uncommon for her to take this in the evening when she forgets.     Today:   - I accompanied Arielle to pharmacy and she was bel to procure sensor which Ros was bale to teach her to apply.   - I downloaded [employer] Link with and sent email invite to share data with myself.      - Requested she record insulin doses and carb servings she is eating in [facility]; Ros agreed to review with her.     - Reviewed concerns about complications for her and pregnancy including early delivery, fetal cardiac deformity, eye disease, and developmental delay.       -  At this point it is challenging to make any recommendations to insulin dosing until there is a more clear picture of her BG trend throughout the day. Therefore, I asked her to scan BG before each meal and record her insulin doses return with her CGM in the next week so that we can establish a more concrete plan for dosing monitoring her diabetes. She agrees to meet with me next Tuesday morning.    - If she cannot consistently enter Novolog dose in [facility] we should quickly move to InPen.       - She was also given a referral to CDE and nutrition as she struggles with carb counting, feels never has actually learned and she is initially agreeable to this, but later deferred scheduling.    -  NUTRITION REFERRAL  - AMBULATORY ADULT DIABETES EDUCATOR REFERRAL  - Continuous Blood Gluc Sensor (FREESTYLE [employer]IL[employer] 14 DAY SENSOR) MISC  Dispense: 2 each; Refill: 11     For future:   - Recommended Dexcom CGMS which she is interested in.  I do believe that this could be very helpful for her.       Follow-up: Return in about 1 week (around 3/19/2020).     >50% of 45 minute visit spent in face to face counseling, education and coordination of care related to options for better glycemic control as well as preventing, detecting, and treating hypoglycemia.      It is my privilege to be involved in the care of the above patient.     Janessa Ordoñez PA-C, [facility]  Diabetes, Endocrinology, and Metabolism  [phone] Appointments/Nurse  [phone] Fax  [phone] pager  [phone] nurse line       Scribe Disclosure:  I, Madison Dela Cruz, am serving as a scribe to document services personally performed by Janessa Ordoñez PA-C at this visit, based upon the provider's statements to me. All documentation has been reviewed by the aforementioned provider prior to being entered into the official medical record.     Portions of this medical record were completed by a scribe. UPON MY REVIEW AND AUTHENTICATION BY ELECTRONIC SIGNATURE, this confirms (a) I performed the applicable clinical services, and (b) the record is accurate.        Again, thank you for allowing me to participate in the care of your patient.      Sincerely,    Janessa Ordoñez PA-C

## 2020-03-12 NOTE — PATIENT INSTRUCTIONS
Dear Arielle,'It is good to see you!@    Please get your sensor on today.  If you accept my invitation to [facility], I will be able to see your data and discuss even when you can't come to clinic.    More importantly, please resume checking your blood sugar frequently and entering your doses and how many servings of carbohydrate you are eating.      My best wishes,    Janessa Ordoñez PA-C, MPAS  [facility]  Diabetes, Endocrinology, and Metabolism  [phone] Appointments/Nurse  [phone] Fax  [phone] nurse line  [phone] URGENTafter hours/weekend Endocrinologist on call

## 2020-03-12 NOTE — PROGRESS NOTES
[facility]  Endocrinology  Janessa Ordoñez PA-C  2020      Chief Complaint:   Diabetes    History of Present Illness:   Arielle Montenegro is a 35 year old female  23w0d gestation with a history of type 1 diabetes and a heart murmur who presents for a follow-up on her diabetes. She was diagnosed DM1 at age 6-7, when she became sick while living in [place]. She was diagnosed with diabetes and started on insulin since then.     She established care with Dr. Mooney on 2020 where she described that during her current pregnancy her glucose dropped too much, too often, therefore she self decreased insulin. Her A1C has been constantly high 10-11 range persistently prepregnancy, however at this visit it had dropped to 7.5. At this point she was taking Lantus 10 units (pre pregnancy she was on 20 units per patient) and Novolog carb counting 1 carb choice 2 units.     Interval history: Her [facility] CGM showed that her A1c from - was 7.6%. patient  She has not had a CGM since then, but is picking up her refill today. She also has not been checking her BG at all since then and has mostly just been monitoring her BG by how her body feels. Overall, she can feel if her BG is elevated (thirsty, nauseous) or too low.      She was taking Lantus 10 units, but in the last couple of days she has been taking 20 units due to high BG. She typically takes her Lantus in the morning, but at times forgets to take this and takes this in the evening. She prefers to take her Lantus in the morning in case she passes out from it causing low blood sugar. Regarding her Novolog, she feels she is guessing how much to take as she has not been checking her BG. If she wakes up if the morning feeling as if she has elevated BG then she will take 5-7 units of Novolog. It typically takes 30 minutes to an hour to feel better.  She does not like to eat breakfast, but typically has coffee or a banana. She does give Novolog for this, however  "is unsure how much she gives.  She was unable to say how much she has given for this today or in the past.   Her first meal of the day is typically lunch, and she often eats out at a variety of different restaurants as the food at home does not sound appetizing. Notably, she can feel her BG raise dramatically with eating very little. Typically she takes her Novolog after eating as she is unsure how much she will be able to eat. Most of the times she takes Novolog 8 units with meals; she believes her previous carb ratio was 2 units per serving of carbs. She also describes that she has been waking up with low BG at night. She has not been to a diabetes educator since she was a teenager. She feels no provider has told her exactly how much insulin to take and and she really does not know to take and reports \"It is not my fault that my blood sugar is high; I have diabetes.\"    In the last 3 days she has been very tired and wanting to sleep. She does endorse that her child has been moving around frequently. She is concerned about her child 'catching' diabetes through breast milk.      She notes that has two children with developmental delay and wonders what possible consequences of high BG during this pregnancy would be.      Blood Glucose Monitoring:  We reviewed CGM [facility]ane data together from -.  % time CGM is active 73%  Glucose management indicator (GMI): 7.6%  Average B mg/dL  Glucose variability: 53.1%  Very high (>250): 25% of time  High (181-250 mg/dL): 24% of time  Target range ( mg/dL): 36% of time  Low (54-69 mg/dL): 5%  Very low (<54 mg/dL): 10%    Diabetes monitoring and complications:  CAD: No  Last eye exam results: 6 mo ago, no retinopathy  Microalbuminuria: 94.51 in   Neuropathy: No  HTN: No  On Statin: No  On Aspirin: Yes  Depression: Yes    Review of Systems:   Pertinent items are noted in HPI.  All other systems are negative.    Active Medications:      albuterol (PROAIR " "HFA/PROVENTIL HFA/VENTOLIN HFA) 108 (90 Base) MCG/ACT inhaler, Inhale 2 puffs into the lungs every 6 hours, Disp: 1 Inhaler, Rfl: 3     aspirin (ASA) 81 MG EC tablet, Take 1 tablet (81 mg) by mouth daily, Disp: 100 tablet, Rfl: 3     BASAGLAR 100 UNIT/ML injection, Inject 60 Units Subcutaneous daily, Disp: 15 mL, Rfl: 0     fluticasone-salmeterol (ADVAIR) 100-50 MCG/DOSE inhaler, Inhale 1 puff into the lungs every 12 hours, Disp: 1 Inhaler, Rfl: 3     insulin aspart (NOVOLOG FLEXPEN) 100 UNIT/ML pen, Inject 5 Units Subcutaneous, Disp: , Rfl:      insulin aspart (NOVOLOG FLEXPEN) 100 UNIT/ML pen, Take 2 units per carb with each meal plus corrections 1:50 > 150. Max is 25 units daily., Disp: 3 mL, Rfl: 0     insulin glargine (LANTUS SOLOSTAR) 100 UNIT/ML pen, Inject 20 Units Subcutaneous, Disp: , Rfl:      Prenatal Vit-Fe Fumarate-FA (PRENATAL MULTIVITAMIN W/IRON) 27-0.8 MG tablet, Take 1 tablet by mouth daily, Disp: 90 tablet, Rfl: 1      Allergies:   Patient has no known allergies.      Past Medical History:  Blindness of right eye  Type 1 diabetes  Hypoglycemia unawareness  Vitamin D deficiency  External hemorrhoids  Heart murmur  Fibrocystic breast changes bilateral  Degenerative joint disease cervical  Diabetes with ketoacidosis  Endophthalmitis  Anxiety  Pre-eclampsia  Episodic mood disorder  Cannabis use  Depression      Past Surgical History:   section - , 2015  Enucleation right - 2015    Family History:   Diabetes - paternal side of family       Social History:   This patient presented alone.   Smoking status: former smoker of cigarettes  Smokeless tobacco: never  Alcohol use: no  Drug use: no     Physical Exam:   /74   Pulse 114   Ht 1.575 m (5' 2\")   Wt 70.2 kg (154 lb 12.8 oz)   LMP 2019 (Approximate)   BMI 28.31 kg/m       Wt Readings from Last 10 Encounters:   20 70.2 kg (154 lb 12.8 oz)   20 71 kg (156 lb 8 oz)   20 71.4 kg (157 lb 4.8 oz)   20 68.2 " "kg (150 lb 6.4 oz)   01/27/20 68.1 kg (150 lb 1.6 oz)   10/18/19 62.1 kg (136 lb 12.8 oz)   09/20/19 58.9 kg (129 lb 12.8 oz)   02/12/19 67.8 kg (149 lb 6.4 oz)   10/29/18 66.2 kg (146 lb)   06/14/18 63.6 kg (140 lb 3.2 oz)        General: Pleasant, well nourished and hydrated female in NAD.   Psych:  Mood is \"good,\" affect is appropriate.  Thought form and content are fluid and coherent.    HEENT: Eyes and sclera are clear. Extraocular movements are grossly intact without proptosis.  Nares are patent, mucous membranes moist.  Neck: No masses or JVD are noted.    Resp: Easy and unlabored breathing.   Neuro: Alert and oriented, communicating clearly.   Ext: no swelling or edema      Data:  Lab Results   Component Value Date     (L) 09/20/2019    POTASSIUM 4.0 01/24/2020    CHLORIDE 95 09/20/2019    CO2 22 09/20/2019    ANIONGAP 10 09/20/2019    GLC 58 (L) 01/24/2020    BUN 13 09/20/2019    CR 0.56 01/24/2020    ALEXANDRO 8.5 09/20/2019     Lab Results   Component Value Date    GFRESTIMATED >60 01/24/2020    GFRESTIMATED >60 01/02/2020    GFRESTIMATED >90 09/20/2019    GFRESTBLACK >60 01/24/2020    GFRESTBLACK >60 01/02/2020    GFRESTBLACK >90 09/20/2019      Lab Results   Component Value Date    MICROL 172 09/30/2014    UMALCR 94.51 (H) 09/30/2014        Lab Results   Component Value Date    A1C 7.5 (H) 01/28/2020    A1C 7.8 (H) 01/02/2020    A1C 12.6 (H) 09/20/2019    A1C 11.0 (H) 10/29/2018    A1C 11.4 (H) 06/14/2018     Lab Results   Component Value Date    CPEPT <0.1 (L) 09/30/2014       Lab Results   Component Value Date    CHOL 181 09/20/2019    CHOL 147.6 03/11/2019    TRIG 45 09/20/2019    TRIG 115.8 03/11/2019    HDL 71 09/20/2019    HDL 52.4 03/11/2019     (H) 09/20/2019    LDL 72 03/11/2019    NHDL 110 09/20/2019    NHDL 129 06/14/2018       Assessment and Plan:  Type 1 diabetes mellitus with hypoglycemia and without coma (H)  Arielle has type 1 diabetes and is 23 weeks gestation with BG far above " goal with A1C today at 9.1 after 7.6 just one month ago, though notably without meter or CGM and taking Lantus irregularly for the last month. The refill on her CGM ran out about 1 month ago, and she has not been checking her BG since 2/20/2020.     She has been dosing her insulin based on how she feels. She increased her Lantus from 10 to 20 units in the morning, but reports that it is not uncommon for her to take this in the evening when she forgets.     Today:   - I accompanied Arielle to pharmacy and she was bel to procure sensor which Ros was bale to teach her to apply.   - I downloaded [employer] Link with and sent email invite to share data with myself.      - Requested she record insulin doses and carb servings she is eating in [employer] softwatre; Ros agreed to review with her.     - Reviewed concerns about complications for her and pregnancy including early delivery, fetal cardiac deformity, eye disease, and developmental delay.       -  At this point it is challenging to make any recommendations to insulin dosing until there is a more clear picture of her BG trend throughout the day. Therefore, I asked her to scan BG before each meal and record her insulin doses return with her CGM in the next week so that we can establish a more concrete plan for dosing monitoring her diabetes. She agrees to meet with me next Tuesday morning.    - If she cannot consistently enter Novolog dose in [facility] we should quickly move to InPen.       - She was also given a referral to CDE and nutrition as she struggles with carb counting, feels never has actually learned and she is initially agreeable to this, but later deferred scheduling.    - NUTRITION REFERRAL  - AMBULATORY ADULT DIABETES EDUCATOR REFERRAL  - Continuous Blood Gluc Sensor ([employer] [facility] 14 DAY SENSOR) [facility]  Dispense: 2 each; Refill: 11     For future:   - Recommended Dexcom CGMS which she is interested in.  I do believe that this could be very helpful for her.        Follow-up: Return in about 1 week (around 3/19/2020).     >50% of 45 minute visit spent in face to face counseling, education and coordination of care related to options for better glycemic control as well as preventing, detecting, and treating hypoglycemia.      It is my privilege to be involved in the care of the above patient.     Janessa Ordoñez PA-C, [facility]  [facility]  Diabetes, Endocrinology, and Metabolism  [phone] Appointments/Nurse  [phone] Fax  [phone] pager  [phone] nurse line       Scribe Disclosure:  I, Madison Dela Cruz, am serving as a scribe to document services personally performed by Janessa Ordoñez PA-C at this visit, based upon the provider's statements to me. All documentation has been reviewed by the aforementioned provider prior to being entered into the official medical record.     Portions of this medical record were completed by a scribe. UPON MY REVIEW AND AUTHENTICATION BY ELECTRONIC SIGNATURE, this confirms (a) I performed the applicable clinical services, and (b) the record is accurate.

## 2020-03-18 ENCOUNTER — HOSPITAL ENCOUNTER (OUTPATIENT)
Dept: CARDIOLOGY | Facility: CLINIC | Age: 35
Discharge: HOME | End: 2020-03-18
Attending: OBSTETRICS & GYNECOLOGY | Admitting: OBSTETRICS & GYNECOLOGY
Payer: COMMERCIAL

## 2020-03-18 DIAGNOSIS — E10.43 TYPE 1 DIABETES MELLITUS WITH DIABETIC AUTONOMIC NEUROPATHY (H): ICD-10-CM

## 2020-03-18 DIAGNOSIS — O09.92 HIGH-RISK PREGNANCY IN SECOND TRIMESTER: ICD-10-CM

## 2020-03-18 PROCEDURE — 93325 DOPPLER ECHO COLOR FLOW MAPG: CPT

## 2020-03-18 NOTE — PROGRESS NOTES
Fetal Cardiology Consultation    Patient:  Arielle Montenegro MRN:  [id number]   YOB: 1985 Age:  35 year old   Date of Visit:  3/18/2020 PCP:  Diane Bello MD   TRINITY: 7/9/2020, by Ultrasound EGA: 23w6d weeks     Dear Dr. Webber:    I had the pleasure of seeing Arielle Montenegro at the [facility] in [place] on 3/18/2020 in consultation for fetal echocardiography results. She presented today by herself. As you know, she is a 35 year old female with T2DM.    The fetal echocardiogram was technically challenging. Likely normal fetal echocardiogram. Normal fetal cardiac anatomy. Normal right and left ventricular size and function without hypertrophy. No evidence of diastolic dysfunction. No pericardial effusion. No arrhythmia.     I reviewed and interpreted the fetal echocardiogram today. I discussed the normal results with Ms. Montenegro. While these results are normal, it is important to note that fetal echocardiography cannot exclude small atrial or ventricular septal defects, persistent ductus arteriosus, mild coarctation of the aorta, partial anomalous pulmonary venous return, minor anatomic valve anomalies, or coronary artery anomalies.     Thank you for allowing me to participate in Ms. Montenegro's care. Please don't hesitate to contact me or the Fetal Cardiology team at [facility] with any questions or concerns.     I spent a total of 10 minutes face-to-face with Ms. Montenegro during today's office visit. Over 50% of this time was spent counseling the patient and/or coordinating care regarding the fetal echocardiography results.     Yasmany York MD  Pediatric Cardiology  [facility]  Phone [phone]

## 2020-03-20 ENCOUNTER — TELEPHONE (OUTPATIENT)
Dept: MATERNAL FETAL MEDICINE | Facility: CLINIC | Age: 35
End: 2020-03-20

## 2020-03-20 NOTE — TELEPHONE ENCOUNTER
3/20/2020-    Arielle called [facility] in regards to current symptom of kidney pain. I encouraged Arielle to call her primary OB about this concern, and that we will see her Monday for her [facility] visit. Arielle said that she was getting a phone call on Monday from her OB, and expressed her concerns as she is a diabetic. I told her to contact her primary MD or who she sees in regards to her diabetes, to ensure she is getting proper care over the weekend. Pt stated she would do so.    Oly BATISTA   [facility]

## 2020-03-23 ENCOUNTER — HOSPITAL ENCOUNTER (OUTPATIENT)
Dept: ULTRASOUND IMAGING | Facility: CLINIC | Age: 35
End: 2020-03-23
Payer: COMMERCIAL

## 2020-03-23 ENCOUNTER — OFFICE VISIT (OUTPATIENT)
Dept: MATERNAL FETAL MEDICINE | Facility: CLINIC | Age: 35
End: 2020-03-23
Payer: COMMERCIAL

## 2020-03-23 DIAGNOSIS — O24.012 TYPE 1 DIABETES MELLITUS DURING PREGNANCY IN SECOND TRIMESTER: Primary | ICD-10-CM

## 2020-03-23 DIAGNOSIS — O26.90 PREGNANCY RELATED CONDITION, ANTEPARTUM: ICD-10-CM

## 2020-03-23 PROCEDURE — 76816 OB US FOLLOW-UP PER FETUS: CPT

## 2020-03-23 NOTE — PROGRESS NOTES
"Maternal Fetal Medicine Consultation    Dear Dr. Webber,    Thank you for referring Arielle Montenegro for a consult for type 1 diabetes.    The patient is a 36 yo  at 24w4d with type 1 diabetes diagnosed at age 7 yo. She is followed by [facility] and has had difficulty with optimal BS control. She uses a sensor and takes both long acting insulin as well as meal short acting insulin using carbohydrate counting. Her HgbA1c on  was elevated at 7.5. She has had a normal fetal echo and L2 ultrasound. She has proteinuria of 1.08 grams per P/Cr ratio. Her serum Cr is normal. She has right eye disease. She does not have HTN. She does not have neuropathy.    Her PMHx is otherwise remarkable for a CS at 36 weeks due to preeclampsia. She had a follow-up CS at term with no preeclampsia. She has no other medical problems.    A/P:  We recommend that the patient work with Endocrinology to keep her FBS < 95 and 1 hour PP < 140. The risk for preeclampsia include excessive fetal growth, fetal distress and fetal death, preeclampsia, and  delivery. We recommend the followin) Baseline EKG  2) TSH screen for hypothyroidism  3) Fetal growth every 4 weeks  4) Weekly BPP at 28 weeks and then twice weekly at 32 weeks  5) Delivery by CS at 36-39 weeks pending level of control of diabetes.  6) ASA 81 mg daily  7) BP weekly at 28 weeks    Please see \"Imaging\" tab under \"Chart Review\" for details of today's US at the [facility].    Total time spent in face-to-face counseling was 15 minutes (>50% for medical management discussion and plan of care). A copy of this consult was sent to your office.    Rajendra Ramon MD  Maternal-Fetal Medicine      "

## 2020-03-26 ENCOUNTER — TELEPHONE (OUTPATIENT)
Dept: OBGYN | Facility: CLINIC | Age: 35
End: 2020-03-26

## 2020-03-26 DIAGNOSIS — K59.00 CONSTIPATION: Primary | ICD-10-CM

## 2020-03-26 RX ORDER — DOCUSATE SODIUM 100 MG/1
100 CAPSULE, LIQUID FILLED ORAL 2 TIMES DAILY
Qty: 30 CAPSULE | Refills: 3 | Status: SHIPPED | OUTPATIENT
Start: 2020-03-26 | End: 2020-06-22

## 2020-03-26 NOTE — TELEPHONE ENCOUNTER
Patient is calling for a prescription for stool softeners. Rx sent per verbal order from provider.  Lanie Sapp RN

## 2020-04-01 ENCOUNTER — PATIENT OUTREACH (OUTPATIENT)
Dept: EDUCATION SERVICES | Facility: CLINIC | Age: 35
End: 2020-04-01

## 2020-04-01 NOTE — PROGRESS NOTES
"DIABETES EDUCATOR NOTE:    This is a telephone visit, converted from an in-person visit due to the current Covid-19 pandemic.     Subjective/Objective:  Arielle's history of diabetes is detailed elsewhere in the medical record.    Diagnosed at age 6-7 in [place], while living there.   She uses MDI for control.    Currently taking 20 units of Lantus daily, varying times.  She takes Novolog to cover meals, however the amounts that she is taking vary.  Today she states that she is taking 2 units for every 15 grams CHO.  She states that she hasn't been eating and that she is having a lot of lows.  She states that her Ariane has been reading \"LO\" at times (less than 40).  She asked me today what is considered low.  She states that when she doesn't eat, she has lows throughout the day.  She states \"I've been doing really good with controlling this.\"  She states that her fasting blood glucoses are \"mostly under 100\" and states that her post-prandial readings are \"under 200, but not always.\"  She states that she will not be returning to either the endocrinology clinic or her OB-GYN because she is afriad of getting the Corona virus.      Since she is having lows throughout the day on 20 units of Lantus, it seems likely that she is over-basalized as she states she isn't eating much.  Could not ascertain if the reason she is not eating much is because she isn't hungry or because she doesn't have access to food.  She states that she is afraid to go out to get groceries, etc, so suspect that she may be food-insecure.  I suggested that she is probably taking too much Lantus insulin, and encouraged her to reduce the dose to about 18 units to prevent frequent hypoglycemia, especially since she has very little awareness of her lows, it sounds like.    Assessment/Plan:  A previous invitation to upload her Ariane data by Janessa Ordoñez went unanswered.  I asked her for her email address so we could send the invite again, however she hung up " the phone while we were talking.  I attempted to call her back but got voice mail, so left a message with my contact information and asked her to call back.        Any diabetes medication dose changes were made via the CDE Protocol and Collaborative Practice Agreement with [facility].   A copy of this encounter was provided to the patient's primary care provider.      Time spent in this visit:  20 minutes.

## 2020-04-06 ENCOUNTER — TELEPHONE (OUTPATIENT)
Dept: OBGYN | Facility: CLINIC | Age: 35
End: 2020-04-06

## 2020-04-06 NOTE — TELEPHONE ENCOUNTER
Please contact patient and find out if she would be open to having the care coordinator (social work) give her a call to see if there is anything we can do to make it easier for her to attend visits.    Thanks,  Cyndi

## 2020-04-06 NOTE — TELEPHONE ENCOUNTER
Virgie from [facility] calling regarding patients upcoming appointments on 4/8 and 4/21. Pt is 26w4d.  Pt is canceling as she does not have  and cannot come to the appointments. [facility] wanted us to let you know.   Candice Gibbs RN-BSN

## 2020-04-13 NOTE — TELEPHONE ENCOUNTER
Pt called back. Number given for  to see if she can get assistance. Pt refuses. States she does not want anyone watching her kids. She will not be coming in for appointments.   Candice Gibbs RN-BSN

## 2020-04-14 ENCOUNTER — TELEPHONE (OUTPATIENT)
Dept: OBGYN | Facility: CLINIC | Age: 35
End: 2020-04-14

## 2020-04-14 NOTE — TELEPHONE ENCOUNTER
Patient is 27w5d, c/o diarrhea x 4 days and having some heart burn. Having some abdominal pain this morning. Discussed with patient she most likely has a GI bug with the diarrhea. Do not want her coming in to clinic even though we have been trying to get her to schedule a prenatal appointment for months. Pt asked what she could take for the heart burn. Advised Tums, Pepcid AC, Zantac OTC. Advised that patient push her fluids. Pt asked if there was anything she could take for diarrhea - discussed ImodiumAD. Reiterated to patient that we really need to see her for a prenatal appointment to check blood pressure, etc. Pt stated that she will not be coming in.   Candice Gibbs, RN-BSN

## 2020-04-21 ENCOUNTER — TELEPHONE (OUTPATIENT)
Dept: ENDOCRINOLOGY | Facility: CLINIC | Age: 35
End: 2020-04-21

## 2020-04-21 ENCOUNTER — VIRTUAL VISIT (OUTPATIENT)
Dept: ENDOCRINOLOGY | Facility: CLINIC | Age: 35
End: 2020-04-21

## 2020-04-21 DIAGNOSIS — E10.649 HYPOGLYCEMIA UNAWARENESS IN TYPE 1 DIABETES MELLITUS (H): ICD-10-CM

## 2020-04-21 DIAGNOSIS — O09.93 HIGH-RISK PREGNANCY IN THIRD TRIMESTER: Primary | ICD-10-CM

## 2020-04-21 DIAGNOSIS — O24.313 PRE-EXISTING DIABETES MELLITUS DURING PREGNANCY IN THIRD TRIMESTER: ICD-10-CM

## 2020-04-21 DIAGNOSIS — E10.43 TYPE 1 DIABETES MELLITUS WITH DIABETIC AUTONOMIC NEUROPATHY (H): ICD-10-CM

## 2020-04-21 RX ORDER — INSULIN ASPART 100 [IU]/ML
INJECTION, SOLUTION INTRAVENOUS; SUBCUTANEOUS
Qty: 15 ML | Refills: 1 | Status: SHIPPED | OUTPATIENT
Start: 2020-04-21 | End: 2020-05-05 | Stop reason: ALTCHOICE

## 2020-04-21 RX ORDER — PROCHLORPERAZINE 25 MG/1
1 SUPPOSITORY RECTAL
Qty: 1 EACH | Refills: 3 | Status: SHIPPED | OUTPATIENT
Start: 2020-04-21 | End: 2020-08-25 | Stop reason: ALTCHOICE

## 2020-04-21 RX ORDER — PROCHLORPERAZINE 25 MG/1
1 SUPPOSITORY RECTAL ONCE
Qty: 1 DEVICE | Refills: 0 | Status: SHIPPED | OUTPATIENT
Start: 2020-04-21 | End: 2020-08-25

## 2020-04-21 RX ORDER — INSULIN GLARGINE 100 [IU]/ML
30 INJECTION, SOLUTION SUBCUTANEOUS DAILY
Qty: 15 ML | Refills: 1 | Status: SHIPPED | OUTPATIENT
Start: 2020-04-21 | End: 2020-05-27

## 2020-04-21 RX ORDER — PROCHLORPERAZINE 25 MG/1
1 SUPPOSITORY RECTAL
Qty: 3 EACH | Refills: 11 | Status: SHIPPED | OUTPATIENT
Start: 2020-04-21 | End: 2020-08-25 | Stop reason: ALTCHOICE

## 2020-04-21 NOTE — PATIENT INSTRUCTIONS
It is good to speak with you!    I know this is difficult, but I am glad to see you are checking frequently and you clearly are working hard at this.    We need to avoid more dangerous lows.  Please:   - reduce Basaglar to just 27 units daily   -  do try to give Novolog 15 - 20 minutes before meals.  Give 7-8 units if BG is >140 before the meal.     - resist giving too much insulin.  Wait 4 hours between doses.     - enter your short and long acting insulin doses into Ariane    I look forward to talking to you Thusrday.    My best wishes,    Janessa Ordoñez PA-C, MPAS  [facility]  Diabetes, Endocrinology, and Metabolism  [phone] Appointments/Nurse  [phone] Fax  [phone]  (Slovenian, [place], all others)  [phone] URGENTafter hours/weekend Endocrinologist on call

## 2020-04-21 NOTE — TELEPHONE ENCOUNTER
Arielle calls today that she will need more Novolog for tomorrow . Pharmacy is saying it's too soon to fill.  She is scheduled  for a telephone visit today  at 3 PM . She is gestational  and due to day care she  has been unable to come in to be seen. She tells me she uses  about 8 units per meal and 4 extra units daily for snacks Approx 30 units daily . I will wait for Janessa to send in new orders after her visit today. Will  work on getting Ariane information to Janessa.Sunshine Jaime RN on 4/21/2020 at 9:37 AM

## 2020-04-21 NOTE — TELEPHONE ENCOUNTER
Please contact patient to be able to upload her Freestyle  Sensor data for a 3 PM appointment today with Janessa Ordoñez . Clinic coordinators to  Add appointment in on hold. Telephone call. Sunshine Jaime RN on 4/21/2020 at 9:32 AM

## 2020-04-21 NOTE — PROGRESS NOTES
"Arielle Montenegro is a 35 year old female who is being evaluated via a billable telephone visit.      The patient has been notified of following:     \"This telephone visit will be conducted via a call between you and your physician/provider. We have found that certain health care needs can be provided without the need for a physical exam.  This service lets us provide the care you need with a short phone conversation.  If a prescription is necessary we can send it directly to your pharmacy.  If lab work is needed we can place an order for that and you can then stop by our lab to have the test done at a later time.    Telephone visits are billed at different rates depending on your insurance coverage. During this emergency period, for some insurers they may be billed the same as an in-person visit.  Please reach out to your insurance provider with any questions.    If during the course of the call the physician/provider feels a telephone visit is not appropriate, you will not be charged for this service.\"    Patient has given verbal consent for Telephone visit?  Yes    How would you like to obtain your AVS? Mail a copy     [facility]  Endocrinology  Janessa Ordoñez PA-C  2020      Chief Complaint:   Diabetes    History of Present Illness:   Arielle Montenegro is a 35 year old female  28w gestation with a history of type 1 diabetes and a heart murmur who presents for a follow-up on her diabetes after last being seen  for just the 2nd visit in this pregnancy in early March. She was diagnosed DM1 at age 6-7, when she became sick while living in [place]. She was diagnosed with diabetes and started on insulin since then.     She established care with Dr. Mooney on 2020 where she described that during her current pregnancy her glucose dropped too much, too often, therefore she self decreased insulin. Her A1C has been constantly high 10-11 range persistently prepregnancy, however at this visit it had " dropped to 7.5. At this point she was taking Lantus 10 units (pre pregnancy she was on 20 units per patient) and Novolog carb counting 1 carb choice 2 units. I saw her in early March and per Ariane CGM BG had come down to likely A1C of 7.6%.  Arielle reported being astounded at how much insulin she was using, up to 20 units of Lantus and correcting with 5-7 units of Novolog when high, 8 with meals.  She reported having two children with developmental delay and asked about possible consequences of high BG during this pregnancy.  She had not been able to procure her own ariane sensors, so I accompanied her to the pharmacy will be able to get some, and RUFUS Mina was able to meet with her and help her learn to apply them.  She agreed to schedule with both nutrition and CDE in follow-up, but when MA sat down to do this with her she stated she was unable to do so.  She had agreed to return to clinic the following week to review her dosing, but did not do so.    Interval history:   Arielle reports that she is working very hard to keep her blood sugar in goal and is giving a lot more insulin than she ever has previously in her life.  She is giving Basaglar 30 to 35 units daily, mostly 30.  She is giving 8 units of NovoLog with most of her meals, 2 units for a snack.  Sometimes she sees her blood sugar high and will give 2 or 4  units again.  She is frightened because she became unconscious with a low blood sugar and the paramedics had to be called.  She is uncertain how long she was unconscious, but reports she thinks her BG was 29 and she was given IV glucose.          Blood Glucose Monitoring:      Checking BG 7-25 times/day. Avg daily BG ranges 79 (with 4 low glucose events this day)  - 203.    Diabetes monitoring and complications:  CAD: No  Last eye exam results: 6 mo ago, no retinopathy  Microalbuminuria: 94.51 in 2014  Neuropathy: No  HTN: No  On Statin: No  On Aspirin: Yes  Depression: Yes    Review of Systems:    Pertinent items are noted in HPI.  All other systems are negative.    Active Medications:      albuterol (PROAIR HFA/PROVENTIL HFA/VENTOLIN HFA) 108 (90 Base) MCG/ACT inhaler, Inhale 2 puffs into the lungs every 6 hours, Disp: 1 Inhaler, Rfl: 3     aspirin (ASA) 81 MG EC tablet, Take 1 tablet (81 mg) by mouth daily, Disp: 100 tablet, Rfl: 3     BASAGLAR 100 UNIT/ML injection, Inject 60 Units Subcutaneous daily, Disp: 15 mL, Rfl: 0     fluticasone-salmeterol (ADVAIR) 100-50 MCG/DOSE inhaler, Inhale 1 puff into the lungs every 12 hours, Disp: 1 Inhaler, Rfl: 3     insulin aspart (NOVOLOG FLEXPEN) 100 UNIT/ML pen, Inject 5 Units Subcutaneous, Disp: , Rfl:      insulin aspart (NOVOLOG FLEXPEN) 100 UNIT/ML pen, Take 2 units per carb with each meal plus corrections 1:50 > 150. Max is 25 units daily., Disp: 3 mL, Rfl: 0     insulin glargine (LANTUS SOLOSTAR) 100 UNIT/ML pen, Inject 20 Units Subcutaneous, Disp: , Rfl:      Prenatal Vit-Fe Fumarate-FA (PRENATAL MULTIVITAMIN W/IRON) 27-0.8 MG tablet, Take 1 tablet by mouth daily, Disp: 90 tablet, Rfl: 1      Allergies:   Patient has no known allergies.      Past Medical History:  Blindness of right eye  Type 1 diabetes  Hypoglycemia unawareness  Vitamin D deficiency  External hemorrhoids  Heart murmur  Fibrocystic breast changes bilateral  Degenerative joint disease cervical  Diabetes with ketoacidosis  Endophthalmitis  Anxiety  Pre-eclampsia  Episodic mood disorder  Cannabis use  Depression      Past Surgical History:   section - , 2015  Enucleation right -     Family History:   Diabetes - paternal side of family       Social History:   This patient presented alone.   Smoking status: former smoker of cigarettes  Smokeless tobacco: never  Alcohol use: no  Drug use: no     Physical Exam:   LMP 2019 (Approximate)      Wt Readings from Last 10 Encounters:   20 70.2 kg (154 lb 12.8 oz)   20 71 kg (156 lb 8 oz)   20 71.4 kg (157 lb 4.8 oz)  "  01/29/20 68.2 kg (150 lb 6.4 oz)   01/27/20 68.1 kg (150 lb 1.6 oz)   10/18/19 62.1 kg (136 lb 12.8 oz)   09/20/19 58.9 kg (129 lb 12.8 oz)   02/12/19 67.8 kg (149 lb 6.4 oz)   10/29/18 66.2 kg (146 lb)   06/14/18 63.6 kg (140 lb 3.2 oz)        General: Pleasant, well nourished and hydrated female in NAD.   Psych:  Mood is \"good,\" affect is appropriate.  Thought form and content are fluid and coherent.    HEENT: Eyes and sclera are clear. Extraocular movements are grossly intact without proptosis.  Nares are patent, mucous membranes moist.  Neck: No masses or JVD are noted.    Resp: Easy and unlabored breathing.   Neuro: Alert and oriented, communicating clearly.   Ext: no swelling or edema      Data:  Lab Results   Component Value Date     (L) 09/20/2019    POTASSIUM 4.0 01/24/2020    CHLORIDE 95 09/20/2019    CO2 22 09/20/2019    ANIONGAP 10 09/20/2019    GLC 58 (L) 01/24/2020    BUN 13 09/20/2019    CR 0.56 01/24/2020    ALEXANDRO 8.5 09/20/2019     Lab Results   Component Value Date    GFRESTIMATED >60 01/24/2020    GFRESTIMATED >60 01/02/2020    GFRESTIMATED >90 09/20/2019    GFRESTBLACK >60 01/24/2020    GFRESTBLACK >60 01/02/2020    GFRESTBLACK >90 09/20/2019      Lab Results   Component Value Date    MICROL 172 09/30/2014    UMALCR 94.51 (H) 09/30/2014        Lab Results   Component Value Date    A1C 7.5 (H) 01/28/2020    A1C 7.8 (H) 01/02/2020    A1C 12.6 (H) 09/20/2019    A1C 11.0 (H) 10/29/2018    A1C 11.4 (H) 06/14/2018     Lab Results   Component Value Date    CPEPT <0.1 (L) 09/30/2014       Lab Results   Component Value Date    CHOL 181 09/20/2019    CHOL 147.6 03/11/2019    TRIG 45 09/20/2019    TRIG 115.8 03/11/2019    HDL 71 09/20/2019    HDL 52.4 03/11/2019     (H) 09/20/2019    LDL 72 03/11/2019    NHDL 110 09/20/2019    NHDL 129 06/14/2018       Assessment and Plan:  Type 1 diabetes mellitus with hypoglycemia and without coma (H)    Arielle has type 1 diabetes and is now 28 weeks pregnant " with A1c near goal at estimated 6.8% per latoya CGM, but with frequent and unfortunately severe hypoglycemia resulting in loss of consciousness and recent paramedic visit.      She is very open to feedback at this point in time and has shared her latoya download with me.  She states that she would be interested in a CGM S with alarms and he also inquires about a pump.  She asks about a pump and I previously told her about that would shut off when she was low and states she is willing to meet with the diabetic educator to learn more about this.    At this point I encouraging her in her diligent work.  She is clearly checking her blood sugar frequently as we see on her latoya she is checking 7-25 times daily.  Her blood sugar does remain labile.  Extended counseling in regards to this.  We will reduce her Basaglar by 10% to 27 units daily.  We spent a lot of time talking about the fact that NovoLog will begin to act in 30 to 60 minutes, but can continue to lower her blood sugar for up to 5 hours.  Thus I am encouraging her to give her NovoLog 15 to 20 minutes before a meal when her blood sugar is high, and emphasizing the need to wait 4 hours between doses when she is trying to bring a high blood sugar down.  We also discussed that when she is treating a a low blood sugar, I advised that she get out of her glucometer, as her blood sugar will rise 15 to 20 minutes before this can be detected on her latoya and this can help her avoid very high blood sugars when treating a low.    She verbalizes good understanding of this and would like to follow-up these changes in 2 days.    Follow-up: Return in about 1 week (around 4/28/2020).     >50% of 45 minute visit spent in counseling, education and coordination of care related to options for better glycemic control as well as preventing, detecting, and treating hypoglycemia.      It is my privilege to be involved in the care of the above patient.     Janessa Ordoñez PA-C,  MPAS  [facility]  Diabetes, Endocrinology, and Metabolism  [phone] Appointments/Nurse  [phone] Fax  [phone] pager  [phone] nurse line

## 2020-04-21 NOTE — TELEPHONE ENCOUNTER
Action Needed --  I've placed a hold that needs scheduling. Please see below.  Department: Diabetes/ Endocrine   Provider: Janessa CONNELLY   Date/Time: Today 4/21/20 3:00 PM   RFV Offered both video and telephone  Wants telephone call .   Freestyle  Sensor will work with staff for download. Sunshine Jaime RN on 4/21/2020 at 9:30 AM

## 2020-04-23 ENCOUNTER — VIRTUAL VISIT (OUTPATIENT)
Dept: ENDOCRINOLOGY | Facility: CLINIC | Age: 35
End: 2020-04-23

## 2020-04-23 DIAGNOSIS — E10.649 TYPE 1 DIABETES MELLITUS WITH HYPOGLYCEMIA AND WITHOUT COMA (H): Primary | ICD-10-CM

## 2020-04-23 RX ORDER — PROCHLORPERAZINE 25 MG/1
1 SUPPOSITORY RECTAL
Qty: 3 EACH | Refills: 11 | Status: SHIPPED | OUTPATIENT
Start: 2020-04-23 | End: 2020-12-01 | Stop reason: ALTCHOICE

## 2020-04-23 RX ORDER — PROCHLORPERAZINE 25 MG/1
1 SUPPOSITORY RECTAL ONCE
Qty: 1 DEVICE | Refills: 0 | Status: SHIPPED | OUTPATIENT
Start: 2020-04-23 | End: 2020-08-25 | Stop reason: ALTCHOICE

## 2020-04-23 RX ORDER — PROCHLORPERAZINE 25 MG/1
1 SUPPOSITORY RECTAL
Qty: 1 EACH | Refills: 3 | Status: SHIPPED | OUTPATIENT
Start: 2020-04-23 | End: 2020-12-01 | Stop reason: ALTCHOICE

## 2020-04-23 NOTE — PROGRESS NOTES
"Arielle Montenegro is a 35 year old female who is being evaluated via a billable telephone visit.      The patient has been notified of following:     \"This telephone visit will be conducted via a call between you and your physician/provider. We have found that certain health care needs can be provided without the need for a physical exam.  This service lets us provide the care you need with a short phone conversation.  If a prescription is necessary we can send it directly to your pharmacy.  If lab work is needed we can place an order for that and you can then stop by our lab to have the test done at a later time.    Telephone visits are billed at different rates depending on your insurance coverage. During this emergency period, for some insurers they may be billed the same as an in-person visit.  Please reach out to your insurance provider with any questions.    If during the course of the call the physician/provider feels a telephone visit is not appropriate, you will not be charged for this service.\"    Patient has given verbal consent for Telephone visit?  Yes    How would you like to obtain your AVS? Mail a copy    Phone call duration: 30 minutes        [facility]  Endocrinology  Janessa Ordoñez PA-C  2020      Chief Complaint:   Diabetes Type 1 with hypoglycemia unawareness    History of Present Illness:   Arielle Montenegro is a 35 year old female  29w0d gestation with a history of type 1 diabetes and a heart murmur who presents for a follow-up on her diabetes after last being seen earlier this week and reporting EMS visit for hypoglycemia with loss of consciousness and a blood glucose of just 32.  She has had just 2 prior visits about her diabetes and this pregnancy and very limited previous diabetic education in her life.  She was diagnosed DM1 at age 6-7, when she became sick while living in [place]. She was diagnosed with diabetes and started on insulin since then.     She established care " with Dr. Mooney on 1/27/2020 where she described that during her current pregnancy her glucose dropped too much, too often, therefore she self decreased insulin. Her A1C has been constantly high 10-11 range persistently prepregnancy, however at this visit it had dropped to 7.5. At this point she was taking Lantus 10 units (pre pregnancy she was on 20 units per patient) and Novolog carb counting 1 carb choice 2 units. I saw her in early March and per Ariane CGM BG had come down to likely A1C of 7.6%.  Arielle reported being astounded at how much insulin she was using, up to 20 units of Lantus and correcting with 5-7 units of Novolog when high, 8 with meals.  She reported having two children with developmental delay and asked about possible consequences of high BG during this pregnancy.  She had not been able to procure her own ariane sensors, so I accompanied her to the pharmacy will be able to get some, and CDE Ros Mina was able to meet with her and help her learn to apply them.  She agreed to schedule with both nutrition and CDE in follow-up, but when MA sat down to do this with her she stated she was unable to do so.  She had agreed to return to clinic the following week to review her dosing, but did not do so.  I last met with her by telephone earlier this week.  She was checking her blood sugars 7-25 times daily with a ariane and unfortunately reported recent loss of consciousness with paramedic visit and IV glucose, though I am unable to find verify this in care everywhere.  Possibly this was with [facility] and we do not have a current authorization to view those records.    Interval history:   Arielle reports that her blood sugar was 95 this morning.  She was happy to see that but a bit anxious as well.  She does not believe she gave any NovoLog with her breakfast, because she was worried that her blood sugar was already low.  When she sat her blood sugar above 200 she did then give 5 units, and her  blood sugar is now low at 68.  Yesterday morning her blood sugar was high and she was nervous about eating breakfast, so she did not eat until the afternoon and she does not recall what dose of NovoLog she gave.  She knows her blood sugar was high overnight, and she does not remember what dose she gave for dinner, but she did eat something later in the evening again for fear of having low blood sugar overnight and did not give a dose for that.  She does think it would be helpful to record her doses and latoya but she keeps forgetting.  Would like a CGM S that has alarms but her pharmacy does not have anything yet though we did try to order on Tuesday.    She states that she would be interested in a pump.  She is happy with her Basaglar.          Blood Glucose Monitoring:        Average blood glucose has risen from 1 47-1 49 in the last 2 days.  Time in the very low range has decreased from 13 to 10%, time and ranges increased from 52 to 56%.        Blood glucose was at goal at 95 this morning upon rising, without any appreciable overnight low.        Diabetes monitoring and complications:  CAD: No  Last eye exam results: 6 mo ago, no retinopathy  Microalbuminuria: 94.51 in 2014  Neuropathy: No  HTN: No  On Statin: No  On Aspirin: Yes  Depression: Yes    Review of Systems:   Pertinent items are noted in HPI.  All other systems are negative.    Active Medications:      albuterol (PROAIR HFA/PROVENTIL HFA/VENTOLIN HFA) 108 (90 Base) MCG/ACT inhaler, Inhale 2 puffs into the lungs every 6 hours, Disp: 1 Inhaler, Rfl: 3     aspirin (ASA) 81 MG EC tablet, Take 1 tablet (81 mg) by mouth daily, Disp: 100 tablet, Rfl: 3     BASAGLAR 100 UNIT/ML injection, Inject 60 Units Subcutaneous daily, Disp: 15 mL, Rfl: 0     fluticasone-salmeterol (ADVAIR) 100-50 MCG/DOSE inhaler, Inhale 1 puff into the lungs every 12 hours, Disp: 1 Inhaler, Rfl: 3     insulin aspart (NOVOLOG FLEXPEN) 100 UNIT/ML pen, Inject 5 Units Subcutaneous, Disp: ,  "Rfl:      insulin aspart (NOVOLOG FLEXPEN) 100 UNIT/ML pen, Take 2 units per carb with each meal plus corrections 1:50 > 150. Max is 25 units daily., Disp: 3 mL, Rfl: 0     insulin glargine (LANTUS SOLOSTAR) 100 UNIT/ML pen, Inject 20 Units Subcutaneous, Disp: , Rfl:      Prenatal Vit-Fe Fumarate-FA (PRENATAL MULTIVITAMIN W/IRON) 27-0.8 MG tablet, Take 1 tablet by mouth daily, Disp: 90 tablet, Rfl: 1      Allergies:   Patient has no known allergies.      Past Medical History:  Blindness of right eye  Type 1 diabetes  Hypoglycemia unawareness  Vitamin D deficiency  External hemorrhoids  Heart murmur  Fibrocystic breast changes bilateral  Degenerative joint disease cervical  Diabetes with ketoacidosis  Endophthalmitis  Anxiety  Pre-eclampsia  Episodic mood disorder  Cannabis use  Depression      Past Surgical History:   section - ,   Enucleation right -     Family History:   Diabetes - paternal side of family       Social History:   This patient presented alone.   Smoking status: former smoker of cigarettes  Smokeless tobacco: never  Alcohol use: no  Drug use: no     Physical Exam:   LMP 2019 (Approximate)      Wt Readings from Last 10 Encounters:   20 70.2 kg (154 lb 12.8 oz)   20 71 kg (156 lb 8 oz)   20 71.4 kg (157 lb 4.8 oz)   20 68.2 kg (150 lb 6.4 oz)   20 68.1 kg (150 lb 1.6 oz)   10/18/19 62.1 kg (136 lb 12.8 oz)   19 58.9 kg (129 lb 12.8 oz)   19 67.8 kg (149 lb 6.4 oz)   10/29/18 66.2 kg (146 lb)   18 63.6 kg (140 lb 3.2 oz)      Initially Arielle and I were able to greet each other by video, but with , we had a phone visit.    Majority phone visit with [facility] .   General: Pleasant, well nourished and hydrated female in NAD with bright warm affect.   Psych:  Mood is \"good,\" affect is appropriate.  Thought form and content are fluid and coherent.    HEENT: Eyes and sclera are clear. Extraocular movements are grossly " intact without proptosis.  Nares are patent, mucous membranes moist.  Resp: Easy and unlabored breathing.   Neuro: Alert and oriented, communicating clearly.        Data:  Lab Results   Component Value Date     (L) 09/20/2019    POTASSIUM 4.0 01/24/2020    CHLORIDE 95 09/20/2019    CO2 22 09/20/2019    ANIONGAP 10 09/20/2019    GLC 58 (L) 01/24/2020    BUN 13 09/20/2019    CR 0.56 01/24/2020    ALEXANDRO 8.5 09/20/2019     Lab Results   Component Value Date    GFRESTIMATED >60 01/24/2020    GFRESTIMATED >60 01/02/2020    GFRESTIMATED >90 09/20/2019    GFRESTBLACK >60 01/24/2020    GFRESTBLACK >60 01/02/2020    GFRESTBLACK >90 09/20/2019      Lab Results   Component Value Date    MICROL 172 09/30/2014    UMALCR 94.51 (H) 09/30/2014        Lab Results   Component Value Date    A1C 7.5 (H) 01/28/2020    A1C 7.8 (H) 01/02/2020    A1C 12.6 (H) 09/20/2019    A1C 11.0 (H) 10/29/2018    A1C 11.4 (H) 06/14/2018     Lab Results   Component Value Date    CPEPT <0.1 (L) 09/30/2014       Lab Results   Component Value Date    CHOL 181 09/20/2019    CHOL 147.6 03/11/2019    TRIG 45 09/20/2019    TRIG 115.8 03/11/2019    HDL 71 09/20/2019    HDL 52.4 03/11/2019     (H) 09/20/2019    LDL 72 03/11/2019    NHDL 110 09/20/2019    NHDL 129 06/14/2018       Assessment and Plan:  Type 1 diabetes mellitus with hypoglycemia and without coma (H)    Arielle has type 1 diabetes and is now 29 weeks pregnant with A1c near goal at 6.8% per latoya CGM, but unfortunately with ongoing hypoglycemia, and difficulty tracking her NovoLog doses.    She is happy to learn all that she can via video on phone visits.  And I did send a CGM S out as well as some videos she can watch to learn about it, and she will meet with CDE  to learn more.  We will work closely with her.  She is going to try harder to put her doses into latoya, but is interested in pump or pen that would track this.  If we cannot get her into CDE I believe Asha or walk if in pen would  be willing to work with her to learn to use in pen.  Cici feels very comfortable downloading gael on her phone and using that.  She really does like her latoya and how easy it is to check her blood sugar.    Follow-up: No follow-ups on file.     >50% of 25 minute visit spent in counseling, education and coordination of care related to options for better glycemic control as well as preventing, detecting, and treating hypoglycemia.      It is my privilege to be involved in the care of the above patient.     Janessa Ordoñez PA-C, MPAS  [facility]  Diabetes, Endocrinology, and Metabolism  [phone] Appointments/Nurse  [phone] Fax  [phone] pager  [phone] nurse line

## 2020-04-23 NOTE — PATIENT INSTRUCTIONS
It is always been to speak with you!    Please check with your [facility] in [place] to see if your Dexcom and the implant are ready to be picked up.  The implant will need cartridges in it and we can help you with that.    Please see the following websites and videos to learn about Dexcom and in pen:    Setting up gael for Dexcom  Https://www.youBaynoteube.com/watch?v=IOIjyFLQZ1d    Inserting Dexcom  https://www.youtube.com/watch?v=dBOgdsfeM-A    InPen website  https://[email or web address].com/why-inpen/    Educator will meet with you next week.      Keep up your great work!  Please enter doses into Ariane so we can figure out what is working.      My best wishes,    Janessa Ordoñez PA-C, MPAS  [facility]  Diabetes, Endocrinology, and Metabolism  [phone] Appointments/Nurse  [phone] Fax  [phone]  (Divehi, Russian, all others)  [phone] URGENTafter hours/weekend Endocrinologist on call

## 2020-04-24 ENCOUNTER — TELEPHONE (OUTPATIENT)
Dept: ENDOCRINOLOGY | Facility: CLINIC | Age: 35
End: 2020-04-24

## 2020-04-24 DIAGNOSIS — E10.649 TYPE 1 DIABETES MELLITUS WITH HYPOGLYCEMIA AND WITHOUT COMA (H): Primary | ICD-10-CM

## 2020-04-24 RX ORDER — LANCETS
EACH MISCELLANEOUS
Qty: 100 EACH | Refills: 3 | Status: SHIPPED | OUTPATIENT
Start: 2020-04-24 | End: 2022-04-20

## 2020-04-24 NOTE — TELEPHONE ENCOUNTER
I reached Arielle by telephone as Ariane revealed low BG again overnight and today.  She notes woke up with the 40 today.  Guessed something worng but didn't feel BG as low as 40.  She has misplaced her glucometer so has been unable to check if real, but she did not feel that low.     Last night, she did give some Novolog with dinner, but not a lot she thinks.      Low again in afternoon and took some time to come up with juice, she ate and saw at 142, but now high.  She never gave Novolog. She is very distracted with her children.      Discussed giving lower dose with meal when treating a low.  Her pharmacy has Dexcom for her.  She will pick it up next week when does her shopping as does just once weekly.    Will further reduce Lantus to just  25 units.    Baby is moving regularly.      I am concerned about possible placental insufficiency as it seems insulin needs may be coming down which is unexpected.  Not clear as she cannot recall doses.      Will attempt close follow-up.    Sending rx for glucometer.    It is my privilege to be involved in the care of the above patient.     Janessa Ordoñez PA-C, MPAS  [facility]  Diabetes, Endocrinology, and Metabolism  [phone] Appointments/Nurse  [phone] Fax  [phone] pager  [phone] nurse line

## 2020-04-27 ENCOUNTER — TELEPHONE (OUTPATIENT)
Dept: ENDOCRINOLOGY | Facility: CLINIC | Age: 35
End: 2020-04-27

## 2020-04-27 DIAGNOSIS — E10.649 TYPE 1 DIABETES MELLITUS WITH HYPOGLYCEMIA AND WITHOUT COMA (H): Primary | ICD-10-CM

## 2020-04-27 RX ORDER — INSULIN ASPART 100 [IU]/ML
INJECTION, SOLUTION INTRAVENOUS; SUBCUTANEOUS
Qty: 45 ML | Refills: 3 | Status: SHIPPED | OUTPATIENT
Start: 2020-04-27 | End: 2020-05-21

## 2020-04-27 NOTE — TELEPHONE ENCOUNTER
[facility] Call Center    Phone Message    May a detailed message be left on voicemail: no     Reason for Call: Medication Question or concern regarding medication   Prescription Clarification  Name of Medication: Insulin pen and cartridge  Prescribing Provider: Smita   Pharmacy: [facility] on file   What on the order needs clarification? The pharmacy is needing to have new Rx's sent. They only have received a Rx for an echo and not any insulin. Last week they received a different Rx pen without insulin. The pharmacy is needing matching insulin cartridge and pen. They can not be different brands. Please followup with pharmacy when available. Thank you        Action Taken: Message routed to:  [facility] ([facility]): Diabetes, Endocrine    Travel Screening: Not Applicable

## 2020-04-29 DIAGNOSIS — E10.649 TYPE 1 DIABETES MELLITUS WITH HYPOGLYCEMIA AND WITHOUT COMA (H): Primary | ICD-10-CM

## 2020-04-30 ENCOUNTER — OFFICE VISIT (OUTPATIENT)
Dept: MATERNAL FETAL MEDICINE | Facility: CLINIC | Age: 35
End: 2020-04-30
Attending: OBSTETRICS & GYNECOLOGY
Payer: COMMERCIAL

## 2020-04-30 ENCOUNTER — HOSPITAL ENCOUNTER (OUTPATIENT)
Dept: ULTRASOUND IMAGING | Facility: CLINIC | Age: 35
End: 2020-04-30
Attending: OBSTETRICS & GYNECOLOGY
Payer: COMMERCIAL

## 2020-04-30 DIAGNOSIS — O24.013 TYPE 1 DIABETES MELLITUS DURING PREGNANCY IN THIRD TRIMESTER: Primary | ICD-10-CM

## 2020-04-30 DIAGNOSIS — O24.012 TYPE 1 DIABETES MELLITUS DURING PREGNANCY IN SECOND TRIMESTER: ICD-10-CM

## 2020-04-30 PROCEDURE — 76819 FETAL BIOPHYS PROFIL W/O NST: CPT | Performed by: OBSTETRICS & GYNECOLOGY

## 2020-04-30 PROCEDURE — 76816 OB US FOLLOW-UP PER FETUS: CPT

## 2020-04-30 NOTE — PROGRESS NOTES
"Please see \"Imaging\" tab under \"Chart Review\" for details of today's US.    Carmita Puga, DO    "

## 2020-05-05 ENCOUNTER — VIRTUAL VISIT (OUTPATIENT)
Dept: ENDOCRINOLOGY | Facility: CLINIC | Age: 35
End: 2020-05-05

## 2020-05-05 DIAGNOSIS — E10.649 TYPE 1 DIABETES MELLITUS WITH HYPOGLYCEMIA AND WITHOUT COMA (H): Primary | ICD-10-CM

## 2020-05-05 RX ORDER — INSULIN ASPART 100 [IU]/ML
2-12 INJECTION, SOLUTION INTRAVENOUS; SUBCUTANEOUS
Qty: 30 ML | Refills: 1 | Status: SHIPPED | OUTPATIENT
Start: 2020-05-05 | End: 2020-08-25 | Stop reason: ALTCHOICE

## 2020-05-05 NOTE — PATIENT INSTRUCTIONS
Dear Arielle,     It is always nice to talk with you.    1. Please DECREASE Lantus to 26 units daily.  Give Novolog before you eat.  If your BG is >120, give at least 15 - 20 minutes BEFORE you eat.        2. See: https://provider.dexcom.com/education-research/cgm-education-use/videos/dexcom-g6-auto-insertion-video    https://provider.Spindle.Edaixi/education-research/cgm-education-use/videos/getting-started-dexcom-g6-and-setting-g6-gael    My best wishes,    Janessa Ordoñez PA-C, MPAS  [facility]  Diabetes, Endocrinology, and Metabolism  [phone] Appointments/Nurse  [phone] Fax  [phone] [place]  [phone] [place]raini  [phone] URGENTafter hours/weekend Endocrinologist on call

## 2020-05-05 NOTE — PROGRESS NOTES
"Arielle Montenegro is a 35 year old female who is being evaluated via a billable telephone visit.      The patient has been notified of following:     \"This telephone visit will be conducted via a call between you and your physician/provider. We have found that certain health care needs can be provided without the need for a physical exam.  This service lets us provide the care you need with a short phone conversation.  If a prescription is necessary we can send it directly to your pharmacy.  If lab work is needed we can place an order for that and you can then stop by our lab to have the test done at a later time.    Telephone visits are billed at different rates depending on your insurance coverage. During this emergency period, for some insurers they may be billed the same as an in-person visit.  Please reach out to your insurance provider with any questions.    If during the course of the call the physician/provider feels a telephone visit is not appropriate, you will not be charged for this service.\"    Patient has given verbal consent for Telephone visit?  Yes    What phone number would you like to be contacted at? preferred    How would you like to obtain your AVS? Mail a copy    Phone call duration: 30 minutes      Arielle Montenegro is a 35 year old female who is being evaluated via a billable telephone visit.            [facility]  Endocrinology  Janessa Ordoñez PA-C  2020      Chief Complaint:   Diabetes Type 1 with hypoglycemia unawareness    History of Present Illness:   Arielle Montenegro is a 35 year old female  29w0d gestation with a history of type 1 diabetes and a heart murmur who presents for a follow-up on her diabetes after last being seen earlier this week and reporting EMS visit for hypoglycemia with loss of consciousness and a blood glucose of just 32.  She has had just 2 prior visits about her diabetes and this pregnancy and very limited previous diabetic education in her " life.  She was diagnosed DM1 at age 6-7, when she became sick while living in [place]. She was diagnosed with diabetes and started on insulin since then.     She established care with Dr. Mooney on 1/27/2020 where she described that during her current pregnancy her glucose dropped too much, too often, therefore she self decreased insulin. Her A1C has been constantly high 10-11 range persistently prepregnancy, however at this visit it had dropped to 7.5. At this point she was taking Lantus 10 units (pre pregnancy she was on 20 units per patient) and Novolog carb counting 1 carb choice 2 units. I saw her in early March and per Ariane CGM BG had come down to likely A1C of 7.6%.  Arielle reported being astounded at how much insulin she was using, up to 20 units of Lantus and correcting with 5-7 units of Novolog when high, 8 with meals.  She reported having two children with developmental delay and asked about possible consequences of high BG during this pregnancy.  She had not been able to procure her own ariane sensors, so I accompanied her to the pharmacy will be able to get some, and CDE Ros Mina was able to meet with her and help her learn to apply them.  She agreed to schedule with both nutrition and CDE in follow-up, but when MA sat down to do this with her she stated she was unable to do so.  She had agreed to return to clinic the following week to review her dosing, but did not do so.  I last met with her by telephone earlier this week.  She was checking her blood sugars 7-25 times daily with a ariane and unfortunately reported recent loss of consciousness with paramedic visit and IV glucose, though I am unable to find verify this in care everywhere.  Possibly this was with [facility] and we do not have a current authorization to view those records.    At our last visit on April 23 hold on reported that she was glad to see a blood sugar was 95 this morning.  She she noted that when she sat her blood sugar  over 200 she attempted to correct with 5 and then ended up low at 68.  She did think it would be helpful to record her doses and latoya but she keeps forgetting.  Would like a CGM S that has alarms and also stated she be interested in a pump.  She was hesitant to decrease her glargine dose.      The next day however I noted that she had a low blood sugar and contacted her to decrease her Basaglar.  I advised her to give her insulin doses before eating.      Today:  Nader reports that she is worried about very high blood sugars.  She did get Dexcom and is excited about having alarms but a little bit anxious as it looks large and she has no idea how to put it on.  She also did get an in pen and an echo pen but no cartridges.   She has been taking Lantus 27 units daily.  She agrees to reduce this to 26.  She has not been giving insulin before meals, or recording it in her latoya.  She thinks she is giving about 20 - 27 units of NovoLog insulin daily.  She has been able to treat all hypoglycemia on her own.        Blood Glucose Monitoring:            She is checking her blood sugar 5-25 times daily.     Her average blood glucose has risen from 1  in the last 2 weeks.  Her time very low however is markedly decreased from 10% to 4%.  Her time in very high range is increased from 7% to 12%.  Other ranges are essentially unchanged.  She continues to have some prolonged overnight hyper hypoglycemia, and markedly high postprandial blood sugars.            Diabetes monitoring and complications:  CAD: No  Last eye exam results: 6 mo ago, no retinopathy  Microalbuminuria: 94.51 in 2014  Neuropathy: No  HTN: No  On Statin: No  On Aspirin: Yes  Depression: Yes    Review of Systems:   Pertinent items are noted in HPI.  All other systems are negative.    Active Medications:      albuterol (PROAIR HFA/PROVENTIL HFA/VENTOLIN HFA) 108 (90 Base) MCG/ACT inhaler, Inhale 2 puffs into the lungs every 6 hours, Disp: 1 Inhaler, Rfl: 3     " aspirin (ASA) 81 MG EC tablet, Take 1 tablet (81 mg) by mouth daily, Disp: 100 tablet, Rfl: 3     BASAGLAR 100 UNIT/ML injection, Inject 60 Units Subcutaneous daily, Disp: 15 mL, Rfl: 0     fluticasone-salmeterol (ADVAIR) 100-50 MCG/DOSE inhaler, Inhale 1 puff into the lungs every 12 hours, Disp: 1 Inhaler, Rfl: 3     insulin aspart (NOVOLOG FLEXPEN) 100 UNIT/ML pen, Inject 5 Units Subcutaneous, Disp: , Rfl:      insulin aspart (NOVOLOG FLEXPEN) 100 UNIT/ML pen, Take 2 units per carb with each meal plus corrections 1:50 > 150. Max is 25 units daily., Disp: 3 mL, Rfl: 0     insulin glargine (LANTUS SOLOSTAR) 100 UNIT/ML pen, Inject 20 Units Subcutaneous, Disp: , Rfl:      Prenatal Vit-Fe Fumarate-FA (PRENATAL MULTIVITAMIN W/IRON) 27-0.8 MG tablet, Take 1 tablet by mouth daily, Disp: 90 tablet, Rfl: 1      Allergies:   Patient has no known allergies.      Past Medical History:  Blindness of right eye  Type 1 diabetes  Hypoglycemia unawareness  Vitamin D deficiency  Anxiety     Past Surgical History:   section - , 2015  Enucleation right -     Family History:   Diabetes - paternal side of family       Social History:   She is home caring for her-2 children ages 5 and 7.  Her son has ADHD and it is somewhat stressful.     Physical Exam:   LMP 2019 (Approximate)      Wt Readings from Last 10 Encounters:   20 70.2 kg (154 lb 12.8 oz)   20 71 kg (156 lb 8 oz)   20 71.4 kg (157 lb 4.8 oz)   20 68.2 kg (150 lb 6.4 oz)   20 68.1 kg (150 lb 1.6 oz)   10/18/19 62.1 kg (136 lb 12.8 oz)   19 58.9 kg (129 lb 12.8 oz)   19 67.8 kg (149 lb 6.4 oz)   10/29/18 66.2 kg (146 lb)   18 63.6 kg (140 lb 3.2 oz)      PHONE VISIT    Arielle is alerted and oriented.  Mood is \"good,\" affect is congruent.  Thoughtful form and content are fluid and coherent.  No signs of distress are appreciated.         Data:  Lab Results   Component Value Date     (L) 2019    " POTASSIUM 4.0 01/24/2020    CHLORIDE 95 09/20/2019    CO2 22 09/20/2019    ANIONGAP 10 09/20/2019    GLC 58 (L) 01/24/2020    BUN 13 09/20/2019    CR 0.56 01/24/2020    ALEXANDRO 8.5 09/20/2019     Lab Results   Component Value Date    GFRESTIMATED >60 01/24/2020    GFRESTIMATED >60 01/02/2020    GFRESTIMATED >90 09/20/2019    GFRESTBLACK >60 01/24/2020    GFRESTBLACK >60 01/02/2020    GFRESTBLACK >90 09/20/2019      Lab Results   Component Value Date    MICROL 172 09/30/2014    UMALCR 94.51 (H) 09/30/2014        Lab Results   Component Value Date    A1C 7.5 (H) 01/28/2020    A1C 7.8 (H) 01/02/2020    A1C 12.6 (H) 09/20/2019    A1C 11.0 (H) 10/29/2018    A1C 11.4 (H) 06/14/2018     Lab Results   Component Value Date    CPEPT <0.1 (L) 09/30/2014       Lab Results   Component Value Date    CHOL 181 09/20/2019    CHOL 147.6 03/11/2019    TRIG 45 09/20/2019    TRIG 115.8 03/11/2019    HDL 71 09/20/2019    HDL 52.4 03/11/2019     (H) 09/20/2019    LDL 72 03/11/2019    NHDL 110 09/20/2019    NHDL 129 06/14/2018       Assessment and Plan:  Type 1 diabetes mellitus with hypoglycemia and without coma (H)    Arielle has type 1 diabetes and is now 30 weeks pregnant with A1c rising slightly to an estimated 7.2% per latoya.  Fortunately she has markedly reduced hypoglycemia and no recent severe hypoglycemia.    Today again discussed giving insulin doses before she eats these likely will be smaller.  She commonly is giving 5 to 8 units after eating, and advised that she give 4 to 6 units before eating.      She does like technology and I think she will like the in pen and Dexcom, though if she can keep appointments with diabetic educator, we might consider moving straight from Dexcom to tandem pump with control IQ if they think that is feasible.      If we do set up in pen in the short-term I suspect she will need a correction of about 1 unit per 40 and carbohydrate coverage about 1 unit per 10 g ( to start may be closer to  1:8)based on weight and current dosing, with an estimated total daily dosage of 50 to 52 units, though educators able to better discern what she is actually giving obviously we would change this.      Follow-up:With Cde next available. 1 week.     >50% of 25 minute visit spent in counseling, education and coordination of care related to options for better glycemic control as well as preventing, detecting, and treating hypoglycemia.      It is my privilege to be involved in the care of the above patient.     Janessa Ordoñez PA-C, MPAS  [facility]  Diabetes, Endocrinology, and Metabolism  [phone] Appointments/Nurse  [phone] Fax  [phone] pager  [phone] nurse line

## 2020-05-06 ENCOUNTER — PRENATAL OFFICE VISIT (OUTPATIENT)
Dept: OBGYN | Facility: CLINIC | Age: 35
End: 2020-05-06
Payer: COMMERCIAL

## 2020-05-06 ENCOUNTER — HOSPITAL ENCOUNTER (OUTPATIENT)
Dept: ULTRASOUND IMAGING | Facility: CLINIC | Age: 35
End: 2020-05-06
Attending: OBSTETRICS & GYNECOLOGY
Payer: COMMERCIAL

## 2020-05-06 ENCOUNTER — OFFICE VISIT (OUTPATIENT)
Dept: MATERNAL FETAL MEDICINE | Facility: CLINIC | Age: 35
End: 2020-05-06
Attending: OBSTETRICS & GYNECOLOGY
Payer: COMMERCIAL

## 2020-05-06 VITALS
HEART RATE: 117 BPM | DIASTOLIC BLOOD PRESSURE: 82 MMHG | WEIGHT: 164.2 LBS | SYSTOLIC BLOOD PRESSURE: 124 MMHG | BODY MASS INDEX: 30.03 KG/M2

## 2020-05-06 DIAGNOSIS — O24.013 TYPE 1 DIABETES MELLITUS IN PREGNANCY, THIRD TRIMESTER: Primary | ICD-10-CM

## 2020-05-06 DIAGNOSIS — O34.219 PREVIOUS CESAREAN DELIVERY, ANTEPARTUM CONDITION OR COMPLICATION: ICD-10-CM

## 2020-05-06 DIAGNOSIS — O24.013 TYPE 1 DIABETES MELLITUS DURING PREGNANCY IN THIRD TRIMESTER: ICD-10-CM

## 2020-05-06 DIAGNOSIS — O24.013 TYPE 1 DIABETES MELLITUS DURING PREGNANCY IN THIRD TRIMESTER: Primary | ICD-10-CM

## 2020-05-06 PROCEDURE — 99207 ZZC PRENATAL VISIT: CPT | Performed by: OBSTETRICS & GYNECOLOGY

## 2020-05-06 PROCEDURE — 76819 FETAL BIOPHYS PROFIL W/O NST: CPT

## 2020-05-06 NOTE — PROGRESS NOTES
30w6d has not been seen since new OB visit.  Has seen endocrine and BS have been better during pregnancy than prior, although dealing with some lows.  Better after adjustments to insulin last week.  Last Hgb A1c 6.8. Growth us 2 weeks ago AGA, 65%tile and AC<HC.    She is asking about delivering at [facility] and I explained she would need to transfer her care, unsure what she plans to do.  Regardless I stress the importance of regular visits as we recommend.  She had many questions that were not related to pregnancy, most about dirt in her belly button and why that is happening.    We discussed need to schedule c/s, will plan for 39wks but I explained if we are having difficulty controlling her BS we may need to deliver before then.    Discussed COVID testing policy in hospital either upon admit or 2-3 days prior to scheduled induction or surgery.  Discussed universal masking and need for support person and self to wear mask at all times when any staff in room.  Encouraged to bring masks from home if available, otherwise they will be provided by the hospital. Discussed full PPE if COVID + and limited staff interaction as appropriate.  Discussed support person can remain with her if asymptomatic and currently lives with her, if no to either, would need to leave.    RTC 2 weeks  tristan

## 2020-05-07 ENCOUNTER — TELEPHONE (OUTPATIENT)
Dept: OBGYN | Facility: CLINIC | Age: 35
End: 2020-05-07

## 2020-05-07 ENCOUNTER — VIRTUAL VISIT (OUTPATIENT)
Dept: EDUCATION SERVICES | Facility: CLINIC | Age: 35
End: 2020-05-07

## 2020-05-07 DIAGNOSIS — O24.319 PRE-EXISTING DIABETES MELLITUS DURING PREGNANCY: Primary | ICD-10-CM

## 2020-05-07 DIAGNOSIS — Z11.59 ENCOUNTER FOR SCREENING FOR OTHER VIRAL DISEASES: Primary | ICD-10-CM

## 2020-05-07 NOTE — TELEPHONE ENCOUNTER
Type of surgery: ob  Location of surgery: [facility] OR  Date and time of surgery: 7/3/20 1030a  Surgeon: Milly  Pre-Op Appt Date: surgeon  Post-Op Appt Date: tbd   Packet sent out: Yes  Pre-cert/Authorization completed:  Not Applicable  Date: 05/07/20  Yumiko Ramirez,

## 2020-05-08 ENCOUNTER — VIRTUAL VISIT (OUTPATIENT)
Dept: EDUCATION SERVICES | Facility: CLINIC | Age: 35
End: 2020-05-08

## 2020-05-08 DIAGNOSIS — O24.319 PRE-EXISTING DIABETES MELLITUS DURING PREGNANCY: Primary | ICD-10-CM

## 2020-05-11 ENCOUNTER — VIRTUAL VISIT (OUTPATIENT)
Dept: EDUCATION SERVICES | Facility: CLINIC | Age: 35
End: 2020-05-11

## 2020-05-11 ENCOUNTER — TELEPHONE (OUTPATIENT)
Dept: ENDOCRINOLOGY | Facility: CLINIC | Age: 35
End: 2020-05-11

## 2020-05-11 DIAGNOSIS — O24.319 PRE-EXISTING DIABETES MELLITUS DURING PREGNANCY: Primary | ICD-10-CM

## 2020-05-11 NOTE — PROGRESS NOTES
"Arielle Montenegro is a 35 year old female who is being evaluated via a billable video visit.      The patient has been notified of following:     \"This video visit will be conducted via a call between you and your physician/provider. We have found that certain health care needs can be provided without the need for an in-person physical exam.  This service lets us provide the care you need with a video conversation.  If a prescription is necessary we can send it directly to your pharmacy.  If lab work is needed we can place an order for that and you can then stop by our lab to have the test done at a later time.    Video visits are billed at different rates depending on your insurance coverage.  Please reach out to your insurance provider with any questions.    If during the course of the call the physician/provider feels a video visit is not appropriate, you will not be charged for this service.\"    Patient has given verbal consent for Video visit? Yes    Video-Visit Details    Type of service:  Video Visit    Video Start Time: 2:10pm  Video End Time:  3: 05pm    Originating Location (pt. Location): Home    Distant Location (provider location):  [facility]     Platform used for Video Visit: [employer]    Madina Soto RN      Diabetes Self-Management Education & Support    Presents for:  Start Dexcom G6      SUBJECTIVE/OBJECTIVE:     Cultural Influences/Ethnic Background:  [place]  Type 1 Diabetes diagnosed at age 6.    This is her third pregnancy, currently at 31 weeks.   The purpose of today's visit is to help her get her Dexcom G6 started.  She has had several  Episodes of severe low blood glucose requiring paramedic intervention    Diabetes Symptoms & Complications:          Patient Problem List and Family Medical History reviewed for relevant medical history, current medical status, and diabetes risk factors.    Vitals:  LMP 09/06/2019 (Approximate)   Estimated body mass index is 30.03 kg/m  as " "calculated from the following:    Height as of 3/12/20: 1.575 m (5' 2\").    Weight as of 5/6/20: 74.5 kg (164 lb 3.2 oz).   Last 3 BP:   BP Readings from Last 3 Encounters:   05/06/20 124/82   03/12/20 104/74   03/09/20 120/82       History   Smoking Status     Former Smoker     Packs/day: 0.00     Types: Cigarettes   Smokeless Tobacco     Never Used     Comment: smokes 2 cigarettes/day-none for several months       Labs:  Lab Results   Component Value Date    A1C 7.5 01/28/2020     Lab Results   Component Value Date    GLC 58 01/24/2020     Lab Results   Component Value Date     09/20/2019     HDL Cholesterol   Date Value Ref Range Status   09/20/2019 71 >49 mg/dL Final   ]  GFR Estimate   Date Value Ref Range Status   01/24/2020 >60 >60 mL/min Final     GFR Estimate If Black   Date Value Ref Range Status   01/24/2020 >60 >60 mL/min Final     Lab Results   Component Value Date    CR 0.56 01/24/2020       Taking Medications:  Diabetes Medication(s)     Insulin       insulin aspart (NOVOLOG FLEXPEN) 100 UNIT/ML pen    Inject 5 Units Subcutaneous     insulin aspart (NOVOPEN ECHO) 100 UNIT/ML cartridge    Inject 1-12 Units Subcutaneous 4 times daily (with meals and nightly) For use with INPen device     insulin glargine (BASAGLAR KWIKPEN) 100 UNIT/ML pen    Inject 30 Units Subcutaneous daily     insulin glargine (LANTUS SOLOSTAR) 100 UNIT/ML pen    Inject 25 Units Subcutaneous daily - Subcutaneous     insulin glargine (LANTUS SOLOSTAR) 100 UNIT/ML pen    Inject 20 Units Subcutaneous     NOVOLOG PENFILL 100 UNIT/ML soln    Inject 2-12 Units Subcutaneous 2 times daily (before meals)     NOVOLOG PENFILL 100 UNIT/ML soln    Inject  6-12 units 4 times daily with IN PEN  Approx  50 units daily        Diabetes knowledge and skills assessment:   Patient is knowledgeable in diabetes management concepts related to: unable to assess    Patient needs further education on the following diabetes management concepts: " "information specific to using a continuous glucose monitor.     Based on learning assessment above, most appropriate setting for further diabetes education would be: Individual setting.    INTERVENTIONS:  Sensor was inserted with no resistance or bleeding at insertion site.    Education provided today on:    CGM-specific education:   Dexcom sensor: insertion technique, sensor site location and rotation, insulin administration in relation to sensor placement and Dexcom : setting alerts/alarms       I attempted to have her pair her transmitter with her her phone, however she was using her phone for the video visit and had no other means of conducting the call.  She agreed to have another follow up visit the next day to connect this with her phone so we can continue to follow her blood glucoses.  When I attempted to help her get the Dexcom G6 application loaded on her phone, she was unable to locate it or find her [employer] application on her phone.  So the plan was to have a Dexcom tech support person on the line with us to assist her with making this connection.  This will be the only way that we can see her data as apparently the only laptop that they have at home is her daughters laptop that she is using for school and not sure if Arielle can download the  she needs in order to upload her     I called her the following day for our scheduled appointment and she was not at home at that time.  She wanted to make an appointment on Monday, May 11 at a time when her son was not at home to try this again.  Will need to coordinate with Dexcom tech support to get this done.  She did say at this time, however, that the device was \"working good,\" so at least she is now getting the benefit of warnings for hypoglycemia.      Pt verbalized understanding of concepts discussed and recommendations provided today.    PLAN  See Patient Instructions for co-developed, patient-stated behavior change goals.  AVS printed and " provided to patient today. See Follow-Up section for recommended follow-up.    Time Spent: 55 minutes  Encounter Type: Individual    Any diabetes medication dose changes were made via the CDE Protocol and Collaborative Practice Agreement with the patient's referring provider. A copy of this encounter was shared with the provider.

## 2020-05-11 NOTE — PROGRESS NOTES
Attempted to contact the patient for scheduled appointment.  She informed me that she was unable to meet because she was not at home.  Requested to reschedule the appointment on Monday, May 11.  Rescheduled.     No charge.

## 2020-05-11 NOTE — TELEPHONE ENCOUNTER
CLINIC COORDINATOR SCHEDULING NOTES    LVM for pt to schedule:    -1st available TELEPHONE/VIDEO VISIT NEW with Lanette Donis in diabetic education for carb counting  -2-3 week from 5/5 TELEPHONE/VIDEO VISIT RETURN with Janessa

## 2020-05-12 NOTE — PROGRESS NOTES
Diabetes Education Telephone Visit    Called patient for scheduled phone visit.   Patient told me she was sleeping and would need to call me back.        No charge.   Will need to call her again.

## 2020-05-14 ENCOUNTER — OFFICE VISIT (OUTPATIENT)
Dept: MATERNAL FETAL MEDICINE | Facility: CLINIC | Age: 35
End: 2020-05-14
Attending: OBSTETRICS & GYNECOLOGY
Payer: COMMERCIAL

## 2020-05-14 ENCOUNTER — HOSPITAL ENCOUNTER (OUTPATIENT)
Dept: ULTRASOUND IMAGING | Facility: CLINIC | Age: 35
End: 2020-05-14
Attending: OBSTETRICS & GYNECOLOGY
Payer: COMMERCIAL

## 2020-05-14 DIAGNOSIS — O24.013 TYPE 1 DIABETES MELLITUS DURING PREGNANCY IN THIRD TRIMESTER: Primary | ICD-10-CM

## 2020-05-14 DIAGNOSIS — O24.013 TYPE 1 DIABETES MELLITUS DURING PREGNANCY IN THIRD TRIMESTER: ICD-10-CM

## 2020-05-14 PROCEDURE — 76819 FETAL BIOPHYS PROFIL W/O NST: CPT

## 2020-05-15 NOTE — PROGRESS NOTES
"Please see \"Imaging\" tab under Chart Review for full details.    Kylah Ruff MD  Maternal Fetal Medicine    "

## 2020-05-19 ENCOUNTER — VIRTUAL VISIT (OUTPATIENT)
Dept: EDUCATION SERVICES | Facility: CLINIC | Age: 35
End: 2020-05-19

## 2020-05-19 DIAGNOSIS — O24.319 PRE-EXISTING DIABETES MELLITUS DURING PREGNANCY: Primary | ICD-10-CM

## 2020-05-19 NOTE — PROGRESS NOTES
"Diabetes Education Note:    Called Arielle yesterday to see how she was doing with the Dexcom sensor, estimating that she probably needs to replace her sensor and to help her get the Dexcom set up on her phone.  When I spoke to her yesterday she told me that she didn't like it and she wanted to go back to \"that other thing\" that she was using before (referring to the Ariane sensor).  She states that the sensor fell off her arm and she didn't like carrying a  as well as her phone.  Explained that it is possible to use the Dexcom with her phone as well, and that if we can get the phone gael downloaded, she would probably find it relatively easy to use.  We set up an appointment today to help her understand how to do this, and in preparation for this, I asked her to download the phone application for Dexcom G6.  I sent the instructions to her via a text, at her request.  She did not respond.  Today, I called her at the appointment time, but no answer.  Tried calling again a little later, and a friend answered the phone and stated that Arielle was in a store and couldn't come to the phone.      I'd be willing to meet her in the clinic with safe distancing.  Not sure if she would be willing to do this.    I would very much like to help her learn to use this, but difficult because of the frequent no-shows for virtual visits.     No charge for this visit.         "

## 2020-05-20 NOTE — TELEPHONE ENCOUNTER
CLINIC COORDINATOR SCHEDULING NOTES     LVM x2 for pt to schedule:     -1st available TELEPHONE/VIDEO VISIT NEW with Lanette Donis in diabetic education for carb counting  -2-3 week from 5/5 TELEPHONE/VIDEO VISIT RETURN with Janessa    Pt manuel Painter on 5/19.

## 2020-05-21 ENCOUNTER — PRENATAL OFFICE VISIT (OUTPATIENT)
Dept: OBGYN | Facility: CLINIC | Age: 35
End: 2020-05-21
Payer: COMMERCIAL

## 2020-05-21 ENCOUNTER — PREP FOR PROCEDURE (OUTPATIENT)
Dept: OBGYN | Facility: CLINIC | Age: 35
End: 2020-05-21

## 2020-05-21 ENCOUNTER — HOSPITAL ENCOUNTER (OUTPATIENT)
Dept: ULTRASOUND IMAGING | Facility: CLINIC | Age: 35
End: 2020-05-21
Attending: OBSTETRICS & GYNECOLOGY
Payer: COMMERCIAL

## 2020-05-21 ENCOUNTER — OFFICE VISIT (OUTPATIENT)
Dept: MATERNAL FETAL MEDICINE | Facility: CLINIC | Age: 35
End: 2020-05-21
Attending: OBSTETRICS & GYNECOLOGY
Payer: COMMERCIAL

## 2020-05-21 VITALS
BODY MASS INDEX: 30.73 KG/M2 | HEART RATE: 112 BPM | DIASTOLIC BLOOD PRESSURE: 83 MMHG | SYSTOLIC BLOOD PRESSURE: 123 MMHG | HEIGHT: 62 IN | WEIGHT: 167 LBS | TEMPERATURE: 97.4 F

## 2020-05-21 DIAGNOSIS — O09.292 HISTORY OF PRE-ECLAMPSIA IN PRIOR PREGNANCY, CURRENTLY PREGNANT IN SECOND TRIMESTER: ICD-10-CM

## 2020-05-21 DIAGNOSIS — O24.013 TYPE 1 DIABETES MELLITUS DURING PREGNANCY IN THIRD TRIMESTER: ICD-10-CM

## 2020-05-21 DIAGNOSIS — O24.013 TYPE 1 DIABETES MELLITUS DURING PREGNANCY IN THIRD TRIMESTER: Primary | ICD-10-CM

## 2020-05-21 DIAGNOSIS — O09.93 HIGH-RISK PREGNANCY IN THIRD TRIMESTER: Primary | ICD-10-CM

## 2020-05-21 DIAGNOSIS — O34.219 PREVIOUS CESAREAN DELIVERY, ANTEPARTUM CONDITION OR COMPLICATION: ICD-10-CM

## 2020-05-21 DIAGNOSIS — O24.013 TYPE 1 DIABETES MELLITUS IN PREGNANCY, THIRD TRIMESTER: ICD-10-CM

## 2020-05-21 PROCEDURE — 76819 FETAL BIOPHYS PROFIL W/O NST: CPT

## 2020-05-21 PROCEDURE — 99207 ZZC PRENATAL VISIT: CPT | Performed by: OBSTETRICS & GYNECOLOGY

## 2020-05-21 ASSESSMENT — MIFFLIN-ST. JEOR: SCORE: 1410.76

## 2020-05-25 NOTE — PROGRESS NOTES
33w0d  No complaints.  Reports that baby is moving well. Had MFM US today for BPP which was 8. Baby was Cephalic and normal fluid.  Does not have glc log today.   Reports that she is in contact with diabetes team, JCARLOS Ordoñez.  She is Taking  lantus 27 U qPM and covers her carbs with novolog. She states she is using the continuous glc monitor but is not wearing it now.   She wanted to meet the doc who is going to do her  section and was told it was me. Hoping to have her  section on 7/3/20 which will be 39 wks. Declined tdap today. Was in a hurry to leave d/t childcare issues. Recommend weekly clinic visits only.  RR

## 2020-05-26 DIAGNOSIS — O24.312 PRE-EXISTING DIABETES MELLITUS DURING PREGNANCY IN SECOND TRIMESTER: ICD-10-CM

## 2020-05-26 RX ORDER — PRENATAL VIT/IRON FUM/FOLIC AC 27MG-0.8MG
1 TABLET ORAL DAILY
Qty: 90 TABLET | Refills: 1 | Status: SHIPPED | OUTPATIENT
Start: 2020-05-26 | End: 2023-01-22

## 2020-05-27 ENCOUNTER — PRENATAL OFFICE VISIT (OUTPATIENT)
Dept: OBGYN | Facility: CLINIC | Age: 35
End: 2020-05-27
Payer: COMMERCIAL

## 2020-05-27 DIAGNOSIS — O09.93 HIGH-RISK PREGNANCY IN THIRD TRIMESTER: Primary | ICD-10-CM

## 2020-05-27 DIAGNOSIS — O24.312 PRE-EXISTING DIABETES MELLITUS DURING PREGNANCY IN SECOND TRIMESTER: ICD-10-CM

## 2020-05-27 DIAGNOSIS — O34.219 PREVIOUS CESAREAN DELIVERY, ANTEPARTUM CONDITION OR COMPLICATION: ICD-10-CM

## 2020-05-27 PROCEDURE — 99207 ZZC PRENATAL VISIT: CPT | Performed by: OBSTETRICS & GYNECOLOGY

## 2020-05-27 NOTE — PROGRESS NOTES
33w6d  No complaints. Baby's movements vary. Always moves a lot with drinking cold water.   Trying to control her BG levels.  Has been hard and needing more insulin.  Fasting this morning was 120.  Has not been checking for the past few days because the glc monitor that she had did not work.  Then she tried one that would alarm if sugars went too low, but she did not like that one. Now she uses the continuous Glc monitor which attaches to her arm and works with her phone gael.  She likes this one.  Right now her BG is 96 while we were on the phone.   She reports that she tries not to go too low at night,  d/t blood sugar dropping and she won't feel it and then will pass out.  Agrees to call endocrine today about adjusting her insulin. We reviewed risks of uncontrolled BG levels including NICU admission for baby.   discussed need for next visit in clinic.  Will need to do EKG, GBS.  She is taking baby ASA.  Has US at next visit on 6/4.  Not able to comply with twice weekly BPP's  D/t  issues.  RR

## 2020-06-04 ENCOUNTER — TELEPHONE (OUTPATIENT)
Dept: OBGYN | Facility: CLINIC | Age: 35
End: 2020-06-04

## 2020-06-04 ENCOUNTER — OFFICE VISIT (OUTPATIENT)
Dept: MATERNAL FETAL MEDICINE | Facility: CLINIC | Age: 35
End: 2020-06-04
Attending: OBSTETRICS & GYNECOLOGY
Payer: COMMERCIAL

## 2020-06-04 ENCOUNTER — HOSPITAL ENCOUNTER (OUTPATIENT)
Dept: ULTRASOUND IMAGING | Facility: CLINIC | Age: 35
End: 2020-06-04
Attending: OBSTETRICS & GYNECOLOGY
Payer: COMMERCIAL

## 2020-06-04 DIAGNOSIS — O24.013 TYPE 1 DIABETES MELLITUS DURING PREGNANCY IN THIRD TRIMESTER: Primary | ICD-10-CM

## 2020-06-04 DIAGNOSIS — O24.013 TYPE 1 DIABETES MELLITUS DURING PREGNANCY IN THIRD TRIMESTER: ICD-10-CM

## 2020-06-04 PROCEDURE — 76819 FETAL BIOPHYS PROFIL W/O NST: CPT

## 2020-06-04 PROCEDURE — 76816 OB US FOLLOW-UP PER FETUS: CPT

## 2020-06-11 ENCOUNTER — PRENATAL OFFICE VISIT (OUTPATIENT)
Dept: OBGYN | Facility: CLINIC | Age: 35
End: 2020-06-11
Payer: COMMERCIAL

## 2020-06-11 VITALS
SYSTOLIC BLOOD PRESSURE: 114 MMHG | WEIGHT: 177.4 LBS | HEIGHT: 62 IN | HEART RATE: 120 BPM | DIASTOLIC BLOOD PRESSURE: 82 MMHG | TEMPERATURE: 98.6 F | BODY MASS INDEX: 32.65 KG/M2

## 2020-06-11 DIAGNOSIS — Z87.59 HISTORY OF PRE-ECLAMPSIA: ICD-10-CM

## 2020-06-11 DIAGNOSIS — O34.219 PREVIOUS CESAREAN DELIVERY, ANTEPARTUM CONDITION OR COMPLICATION: ICD-10-CM

## 2020-06-11 DIAGNOSIS — O09.93 HIGH-RISK PREGNANCY IN THIRD TRIMESTER: Primary | ICD-10-CM

## 2020-06-11 DIAGNOSIS — O24.312 PRE-EXISTING DIABETES MELLITUS DURING PREGNANCY IN SECOND TRIMESTER: ICD-10-CM

## 2020-06-11 LAB
ERYTHROCYTE [DISTWIDTH] IN BLOOD BY AUTOMATED COUNT: 14.3 % (ref 10–15)
HBA1C MFR BLD: 6.4 % (ref 0–5.6)
HCT VFR BLD AUTO: 32.1 % (ref 35–47)
HGB BLD-MCNC: 10.9 G/DL (ref 11.7–15.7)
MCH RBC QN AUTO: 30 PG (ref 26.5–33)
MCHC RBC AUTO-ENTMCNC: 34 G/DL (ref 31.5–36.5)
MCV RBC AUTO: 88 FL (ref 78–100)
PLATELET # BLD AUTO: 129 10E9/L (ref 150–450)
RBC # BLD AUTO: 3.63 10E12/L (ref 3.8–5.2)
WBC # BLD AUTO: 7.4 10E9/L (ref 4–11)

## 2020-06-11 PROCEDURE — 36415 COLL VENOUS BLD VENIPUNCTURE: CPT | Performed by: OBSTETRICS & GYNECOLOGY

## 2020-06-11 PROCEDURE — 99207 ZZC PRENATAL VISIT: CPT | Performed by: OBSTETRICS & GYNECOLOGY

## 2020-06-11 PROCEDURE — 87653 STREP B DNA AMP PROBE: CPT | Performed by: OBSTETRICS & GYNECOLOGY

## 2020-06-11 PROCEDURE — 83036 HEMOGLOBIN GLYCOSYLATED A1C: CPT | Performed by: OBSTETRICS & GYNECOLOGY

## 2020-06-11 PROCEDURE — 85027 COMPLETE CBC AUTOMATED: CPT | Performed by: OBSTETRICS & GYNECOLOGY

## 2020-06-11 ASSESSMENT — MIFFLIN-ST. JEOR: SCORE: 1457.93

## 2020-06-11 NOTE — Clinical Note
Syed Galvinan saw me today and according to her glucometer,she has had 4 events in the past week between 3 and 6 AM where the BG levels are dropping too low.  Told me today that she actually passed out and did not wake up at her normal time. Could you silver call her and review her blood sugars.  She only wants to talk to you.

## 2020-06-12 LAB
GP B STREP DNA SPEC QL NAA+PROBE: NEGATIVE
SPECIMEN SOURCE: NORMAL

## 2020-06-12 RX ORDER — FERROUS SULFATE 325(65) MG
325 TABLET ORAL
Qty: 90 TABLET | Refills: 3 | Status: SHIPPED | OUTPATIENT
Start: 2020-06-12 | End: 2020-12-01

## 2020-06-22 ENCOUNTER — OFFICE VISIT (OUTPATIENT)
Dept: MATERNAL FETAL MEDICINE | Facility: CLINIC | Age: 35
End: 2020-06-22
Attending: OBSTETRICS & GYNECOLOGY
Payer: COMMERCIAL

## 2020-06-22 ENCOUNTER — PRENATAL OFFICE VISIT (OUTPATIENT)
Dept: OBGYN | Facility: CLINIC | Age: 35
End: 2020-06-22

## 2020-06-22 ENCOUNTER — HOSPITAL ENCOUNTER (OUTPATIENT)
Dept: ULTRASOUND IMAGING | Facility: CLINIC | Age: 35
End: 2020-06-22
Attending: OBSTETRICS & GYNECOLOGY
Payer: COMMERCIAL

## 2020-06-22 VITALS
TEMPERATURE: 99.2 F | BODY MASS INDEX: 33.71 KG/M2 | SYSTOLIC BLOOD PRESSURE: 128 MMHG | HEART RATE: 111 BPM | WEIGHT: 183.2 LBS | DIASTOLIC BLOOD PRESSURE: 86 MMHG | HEIGHT: 62 IN

## 2020-06-22 DIAGNOSIS — O34.219 PREVIOUS CESAREAN DELIVERY, ANTEPARTUM CONDITION OR COMPLICATION: ICD-10-CM

## 2020-06-22 DIAGNOSIS — O09.93 HIGH-RISK PREGNANCY IN THIRD TRIMESTER: Primary | ICD-10-CM

## 2020-06-22 DIAGNOSIS — K59.00 CONSTIPATION, UNSPECIFIED CONSTIPATION TYPE: ICD-10-CM

## 2020-06-22 DIAGNOSIS — O24.313 PRE-EXISTING DIABETES MELLITUS DURING PREGNANCY IN THIRD TRIMESTER: ICD-10-CM

## 2020-06-22 DIAGNOSIS — O24.013 TYPE 1 DIABETES MELLITUS DURING PREGNANCY IN THIRD TRIMESTER: Primary | ICD-10-CM

## 2020-06-22 DIAGNOSIS — O24.013 TYPE 1 DIABETES MELLITUS DURING PREGNANCY IN THIRD TRIMESTER: ICD-10-CM

## 2020-06-22 LAB
ERYTHROCYTE [DISTWIDTH] IN BLOOD BY AUTOMATED COUNT: 15 % (ref 10–15)
HCT VFR BLD AUTO: 33.8 % (ref 35–47)
HGB BLD-MCNC: 11.4 G/DL (ref 11.7–15.7)
MCH RBC QN AUTO: 30.1 PG (ref 26.5–33)
MCHC RBC AUTO-ENTMCNC: 33.7 G/DL (ref 31.5–36.5)
MCV RBC AUTO: 89 FL (ref 78–100)
PLATELET # BLD AUTO: 132 10E9/L (ref 150–450)
RBC # BLD AUTO: 3.79 10E12/L (ref 3.8–5.2)
WBC # BLD AUTO: 6.3 10E9/L (ref 4–11)

## 2020-06-22 PROCEDURE — 36415 COLL VENOUS BLD VENIPUNCTURE: CPT | Performed by: OBSTETRICS & GYNECOLOGY

## 2020-06-22 PROCEDURE — 59425 ANTEPARTUM CARE ONLY: CPT | Performed by: OBSTETRICS & GYNECOLOGY

## 2020-06-22 PROCEDURE — 99207 ZZC PRENATAL VISIT: CPT | Performed by: OBSTETRICS & GYNECOLOGY

## 2020-06-22 PROCEDURE — 76819 FETAL BIOPHYS PROFIL W/O NST: CPT

## 2020-06-22 PROCEDURE — 85027 COMPLETE CBC AUTOMATED: CPT | Performed by: OBSTETRICS & GYNECOLOGY

## 2020-06-22 RX ORDER — DOCUSATE SODIUM 100 MG/1
100 CAPSULE, LIQUID FILLED ORAL 2 TIMES DAILY
Qty: 30 CAPSULE | Refills: 3 | Status: ON HOLD | OUTPATIENT
Start: 2020-06-22 | End: 2020-07-05

## 2020-06-22 ASSESSMENT — MIFFLIN-ST. JEOR: SCORE: 1484.24

## 2020-06-22 NOTE — PROGRESS NOTES
37w4d  complains of swelling in legs.  Baby is moving ok.  Reports blood sugars are ok. Phone gael reviewed with 21% > target and 27% below 70.    Does still have some low spells that can occur at any time of the day. Not eating as much these days.  Is adjusting her insulin down when not eating.   Patient request refill of colace.  Wants to try breast feeding, did not work out for her the last 2 times. Gave her references on breast feeding and will plan on working with lactation in the hospital.   discussed contraception.  she is not planning on being with FOB.  She has not been with him since she conceived.  Does not want to go on any hormones.    Reviewed labs from last visit.  A1c 6.4, hgb low and she is taking iron now.  plts 129 --> will repeat today.    Reviewed planned  section. BPP's normal.  Last growth was 79%.  Cephalic. Patient definitely wants no students and wants the staff doc to do the surgery.    Reviewed covid visitor restrictions.  TEENA

## 2020-06-26 NOTE — PROGRESS NOTES
36w0d   Tired and ready to be done.  Baby is moving ok.  Blood sugars still really variable.  Patient does not have much help at home, so stressed.  FOB is not involved, they are not in a relationship.  Same FOB x 3.   GBS and CBC today, Hgb A1c due to large variability in BG range.  EKG done today as well.   hgb 10.9 today and patient given rx for iron. RTC weekly now.  RR

## 2020-07-01 ENCOUNTER — ALLIED HEALTH/NURSE VISIT (OUTPATIENT)
Dept: NURSING | Facility: CLINIC | Age: 35
End: 2020-07-01
Payer: COMMERCIAL

## 2020-07-01 ENCOUNTER — TELEPHONE (OUTPATIENT)
Dept: OBGYN | Facility: CLINIC | Age: 35
End: 2020-07-01

## 2020-07-01 ENCOUNTER — PATIENT OUTREACH (OUTPATIENT)
Dept: CARE COORDINATION | Facility: CLINIC | Age: 35
End: 2020-07-01

## 2020-07-01 DIAGNOSIS — O09.93 HIGH-RISK PREGNANCY IN THIRD TRIMESTER: ICD-10-CM

## 2020-07-01 DIAGNOSIS — O09.93 HIGH-RISK PREGNANCY IN THIRD TRIMESTER: Primary | ICD-10-CM

## 2020-07-01 DIAGNOSIS — O34.219 PREVIOUS CESAREAN DELIVERY, ANTEPARTUM CONDITION OR COMPLICATION: ICD-10-CM

## 2020-07-01 DIAGNOSIS — Z01.818 PRE-OP EXAM: ICD-10-CM

## 2020-07-01 LAB
ABO + RH BLD: NORMAL
ABO + RH BLD: NORMAL
BLD GP AB SCN SERPL QL: NORMAL
BLOOD BANK CMNT PATIENT-IMP: NORMAL
ERYTHROCYTE [DISTWIDTH] IN BLOOD BY AUTOMATED COUNT: 15.2 % (ref 10–15)
HCT VFR BLD AUTO: 33.9 % (ref 35–47)
HGB BLD-MCNC: 11.6 G/DL (ref 11.7–15.7)
MCH RBC QN AUTO: 30.4 PG (ref 26.5–33)
MCHC RBC AUTO-ENTMCNC: 34.2 G/DL (ref 31.5–36.5)
MCV RBC AUTO: 89 FL (ref 78–100)
PLATELET # BLD AUTO: 117 10E9/L (ref 150–450)
RBC # BLD AUTO: 3.81 10E12/L (ref 3.8–5.2)
SARS-COV-2 PCR COMMENT: NORMAL
SARS-COV-2 RNA SPEC QL NAA+PROBE: NEGATIVE
SARS-COV-2 RNA SPEC QL NAA+PROBE: NORMAL
SPECIMEN EXP DATE BLD: NORMAL
SPECIMEN SOURCE: NORMAL
SPECIMEN SOURCE: NORMAL
WBC # BLD AUTO: 6.6 10E9/L (ref 4–11)

## 2020-07-01 PROCEDURE — 86900 BLOOD TYPING SEROLOGIC ABO: CPT | Performed by: OBSTETRICS & GYNECOLOGY

## 2020-07-01 PROCEDURE — 86850 RBC ANTIBODY SCREEN: CPT | Performed by: OBSTETRICS & GYNECOLOGY

## 2020-07-01 PROCEDURE — 86901 BLOOD TYPING SEROLOGIC RH(D): CPT | Performed by: OBSTETRICS & GYNECOLOGY

## 2020-07-01 PROCEDURE — U0003 INFECTIOUS AGENT DETECTION BY NUCLEIC ACID (DNA OR RNA); SEVERE ACUTE RESPIRATORY SYNDROME CORONAVIRUS 2 (SARS-COV-2) (CORONAVIRUS DISEASE [COVID-19]), AMPLIFIED PROBE TECHNIQUE, MAKING USE OF HIGH THROUGHPUT TECHNOLOGIES AS DESCRIBED BY CMS-2020-01-R: HCPCS | Performed by: OBSTETRICS & GYNECOLOGY

## 2020-07-01 PROCEDURE — 86780 TREPONEMA PALLIDUM: CPT | Performed by: OBSTETRICS & GYNECOLOGY

## 2020-07-01 PROCEDURE — 36415 COLL VENOUS BLD VENIPUNCTURE: CPT | Performed by: OBSTETRICS & GYNECOLOGY

## 2020-07-01 PROCEDURE — 85027 COMPLETE CBC AUTOMATED: CPT | Performed by: OBSTETRICS & GYNECOLOGY

## 2020-07-01 NOTE — PROGRESS NOTES
Called and spoke with this pt today. She is scheduled for a  on Friday at 8:30 am. Pt has no one to watch her 7 and 4 y/o sons. Pt said she is very emotional, worried and concerned about this situation.     I talked to pt about everything she has tried--contacted her cousin but she isn't responding to pt's calls. Pt tried asking a friend but the friend can't get off of work over the weekend to help. The father of the pt's children is not involved. Pt reached out to his family to ask for help, and his sister was the only one who responded and said she is unavailable. Pt has offered to pay everyone and still is not getting anywhere.     Pt said she called and spoke with the [facility] about two weeks ago. Pt said they told her she has to be a [facility] resident in order for them to help. I offered to call them and to look into [facility] resources, which pt said she hasn't done yet. Pt said she would prefer if her kids are in [place] but is open to anything at this point.     Told pt I would connect with CC SW and we would brainstorm other options. Offered to call pt back tomorrow, and she agreed. I also offered to follow up with her OB/GYN.     Called and left a message with the [facility] ([email or web address]).     Called the [facility] ([email or web address] and spoke with Sade. Pt is not eligible because she is not a [facility] resident. Sade suggested calling [facility] ([email or web address]/, 743.161.5863). Called and left a message with my contact information and general description of the situation. Asked for a call back today or tomorrow.    Called [facility] chapter ([email or web address] and left a message asking for a call back.     Routing to OB/GYN and CC SW.     Reason for Referral: pt is scheduled for C/S on 7/3/2020 but doesn't have  for her  child. Wants care coordination referral.  Additional pertinent details:   Clinical Staff have discussed the Care Coordination Referral with the patient and/or caregiver: yes

## 2020-07-02 ENCOUNTER — ANESTHESIA EVENT (OUTPATIENT)
Dept: OBGYN | Facility: CLINIC | Age: 35
End: 2020-07-02
Payer: COMMERCIAL

## 2020-07-02 LAB — T PALLIDUM AB SER QL: NONREACTIVE

## 2020-07-02 ASSESSMENT — LIFESTYLE VARIABLES: TOBACCO_USE: 1

## 2020-07-02 NOTE — PROGRESS NOTES
Called pt to follow up. Gave her the phone numbers for the [facility], [facility] and the [facility]' chapter. Pt said she's been trying to call the [facility] organization and hasn't gotten calls back.     I let pt know that I alerted Dr. Atkins to this situation and that she said there's a possibility that pt's  could be pushed back to , when OB/GYN is in the hospital. Dr. Atkins thought that could give the pt more time to make arrangements for childcare. I told pt to call and check in with the clinic today to speak with OB/GYN and give her an update.    Pt started getting overwhelmed during call and said that she can't talk about this anymore. She said nothing is going to work out and hung up.    I called pt back. She was very upset and said she's ready to be done. I asked her if she felt safe. She said yes. I asked her to sit down and take a few breaths. I offered to call [facility] back and follow up. Pt said that would help. I heard her other kids in the background and asked how they are doing. She said they're okay and that they've been in their rooms playing.     Pt said she needs a break and hung up again.     Huddled with MICHAEL PATHAK after pt call. Left another vm for [facility] asking for a call back. MICHAEL PATHAK will research options through [facility].

## 2020-07-02 NOTE — PROGRESS NOTES
Talked with pt. She finally got in touch with her cousin, who said her phone has been broken. Pt's cousin has agreed to watch her kids while she goes to the hospital tomorrow for her . Pt said she is so relieved and thanked me for following up. Pt said she did get a call from Krista from [facility] this morning to gather more information and that Krista called her back to let her know they were not able to help.    I offered to call pt next week to check in, and she agreed. She thanked me again for all of the help and time. Pt said she did call the clinic to notify them that she has childcare arranged. I told pt I would get in touch with Dr. Atkins just to confirm that pt would like to keep her  for tomorrow morning.    Routing to CC SW, CC RN and OB/GYN.    Will offer CC services to pt when I call and follow up next week.

## 2020-07-02 NOTE — PROGRESS NOTES
Received call back from Ronna from [employer] [employer], and she said with no advance notice they are unable to assist at this time.     Krista from [facility] called back and did outreach to pt for more information. Krista responded back to me and said that unfortunately they are unable to assist. She said that many of their volunteer host families are out of town or unavailable due to the holiday weekend. She said her family would otherwise volunteer to host pt's children but they are on call for another pregnant woman who has 4 kids with no place to go when she goes into the hospital to deliver her baby. Krista said she called me first and will now call pt back to give her the news.    Reached out to CC LAWSON to check in. CC SW will text OB/GYN back to update. CC LAWSON suggested asking pt if she is open to creating a profile on [email or web address] and seeing if she can find a possible . CC LAWSON also shared a link to a parent offering overnight childcare on [employer].

## 2020-07-02 NOTE — ANESTHESIA PREPROCEDURE EVALUATION
Anesthesia Pre-Procedure Evaluation    Patient: Arielle Montenegro   MRN:     [id number] Gender:   female   Age:    35 year old :      1985        Preoperative Diagnosis: Previous  delivery, antepartum condition or complication [O34.219]   Procedure(s):   SECTION     LABS:  CBC:   Lab Results   Component Value Date    WBC 6.6 2020    WBC 6.3 2020    HGB 11.6 (L) 2020    HGB 11.4 (L) 2020    HCT 33.9 (L) 2020    HCT 33.8 (L) 2020     (L) 2020     (L) 2020     BMP:   Lab Results   Component Value Date     (L) 2019     10/29/2018    POTASSIUM 4.0 2020    POTASSIUM 4.3 2020    CHLORIDE 95 2019    CHLORIDE 102 10/29/2018    CO2 22 2019    CO2 25 10/29/2018    BUN 13 2019    BUN 10 10/29/2018    CR 0.56 2020    CR 0.74 2020    GLC 58 (L) 2020     (H) 2020     COAGS:   Lab Results   Component Value Date    PTT 25 2009    INR 0.90 2009     POC:   Lab Results   Component Value Date     (H) 2017    HCG Negative 2017    HCGS Negative 2017     OTHER:   Lab Results   Component Value Date    A1C 6.4 (H) 2020    ALEXANDRO 8.5 2019    PHOS 4.0 2008    MAG 1.9 2008    ALBUMIN 3.1 (L) 2019    PROTTOTAL 6.1 (L) 2019    ALT 9 2020    AST 8 (L) 2020    ALKPHOS 105 2019    BILITOTAL 0.5 2019    LIPASE 186 2017    TSH 0.877 2019    SED 80 (H) 2007        Preop Vitals    BP Readings from Last 3 Encounters:   20 128/86   20 114/82   20 129/79    Pulse Readings from Last 3 Encounters:   20 111   20 120   20 111      Resp Readings from Last 3 Encounters:   10/29/18 20   18 16   17 18    SpO2 Readings from Last 3 Encounters:   20 97%   10/18/19 99%   19 97%      Temp Readings from Last 1 Encounters:   20 37.3  " C (99.2  F) (Oral)    Ht Readings from Last 1 Encounters:   20 1.575 m (5' 2\")      Wt Readings from Last 1 Encounters:   20 83.1 kg (183 lb 3.2 oz)    Estimated body mass index is 33.51 kg/m  as calculated from the following:    Height as of 20: 1.575 m (5' 2\").    Weight as of 20: 83.1 kg (183 lb 3.2 oz).     LDA:  Peripheral IV 17 Left Upper forearm (Active)   Number of days: 919        Past Medical History:   Diagnosis Date     Blindness of right eye      Diabetes mellitus type 1 (H)     Diagnosed as a child      Past Surgical History:   Procedure Laterality Date      SECTION  13      SECTION N/A 2015    Procedure:  SECTION;  Surgeon: John Hudson MD;  Location: [facility] L+D     ENUCLEATION Right 3/20/2015    Procedure: ENUCLEATION;  Surgeon: Edilson Islas MD;  Location: [facility]      No Known Allergies     Anesthesia Evaluation     . Pt has had prior anesthetic. Type: Regional    No history of anesthetic complications          ROS/MED HX    ENT/Pulmonary:     (+)tobacco use, Past use , . .    Neurologic: Comment: Rt eye blind      Cardiovascular:  - neg cardiovascular ROS       METS/Exercise Tolerance:     Hematologic: Comments: Plt 117 (20)    (+) Anemia, -      Musculoskeletal: Comment: DJD cervical spine        GI/Hepatic:  - neg GI/hepatic ROS       Renal/Genitourinary:  - ROS Renal section negative       Endo:     (+) type I DM, Using insulin - not using insulin pump Obesity, .      Psychiatric: Comment: Cannabis use    (+) psychiatric history anxiety and depression      Infectious Disease: Comment:  COVID negative        Malignancy:         Other: Comment: H/o pre-eclampsia                        PHYSICAL EXAM:   Mental Status/Neuro: A/A/O   Airway: Facies: Feasible  Mallampati: I  Mouth/Opening: Full  TM distance: > 6 cm  Neck ROM: Full   Respiratory: Auscultation: CTAB     Resp. Rate: Normal     Resp. Effort: Normal      CV: " Rhythm: Regular  Rate: Age appropriate  Heart: Normal Sounds  Edema: None   Comments:      Dental: Normal Dentition                Assessment:   ASA SCORE: 3      Smoking Status:  Non-Smoker/Unknown        Plan:   Anes. Type:  MAC; Spinal; Peripheral Nerve Block; For Post-op pain in coordination with surgeon     Block Details: TAP; Exparel; Single Shot   Pre-Medication: None   Induction:  N/a   Airway: Native Airway   Access/Monitoring: PIV   Maintenance: N/a     Postop Plan:   Postop Pain: Regional  Postop Sedation/Airway: Not planned  Disposition: Inpatient/Admit     PONV Management:   Adult Risk Factors: Female, Non-Smoker   Prevention: Ondansetron     CONSENT: Direct conversation   Plan and risks discussed with: Patient   Blood Products: Consented (ALL Blood Products)                   Heather Coffman MD

## 2020-07-02 NOTE — TELEPHONE ENCOUNTER
Patient called clinic and spoke with . She states she was able to get childcare for tomorrow, so will be arriving for her CS as planned. NATASHA. Candice Villegas RN

## 2020-07-03 ENCOUNTER — ANESTHESIA (OUTPATIENT)
Dept: OBGYN | Facility: CLINIC | Age: 35
End: 2020-07-03
Payer: COMMERCIAL

## 2020-07-03 ENCOUNTER — HOSPITAL ENCOUNTER (INPATIENT)
Facility: CLINIC | Age: 35
LOS: 2 days | Discharge: HOME | End: 2020-07-05
Attending: OBSTETRICS & GYNECOLOGY | Admitting: OBSTETRICS & GYNECOLOGY
Payer: COMMERCIAL

## 2020-07-03 DIAGNOSIS — O34.219 PREVIOUS CESAREAN DELIVERY, ANTEPARTUM CONDITION OR COMPLICATION: ICD-10-CM

## 2020-07-03 DIAGNOSIS — O13.3 GESTATIONAL HYPERTENSION, THIRD TRIMESTER: Primary | ICD-10-CM

## 2020-07-03 LAB
ALT SERPL W P-5'-P-CCNC: 30 U/L (ref 0–50)
AMPHETAMINES UR QL SCN: NEGATIVE
AST SERPL W P-5'-P-CCNC: 39 U/L (ref 0–45)
CANNABINOIDS UR QL: NEGATIVE
COCAINE UR QL: NEGATIVE
CREAT SERPL-MCNC: 0.98 MG/DL (ref 0.52–1.04)
ERYTHROCYTE [DISTWIDTH] IN BLOOD BY AUTOMATED COUNT: 14.6 % (ref 10–15)
GFR SERPL CREATININE-BSD FRML MDRD: 75 ML/MIN/{1.73_M2}
GLUCOSE BLDC GLUCOMTR-MCNC: 107 MG/DL (ref 70–99)
GLUCOSE BLDC GLUCOMTR-MCNC: 119 MG/DL (ref 70–99)
GLUCOSE BLDC GLUCOMTR-MCNC: 126 MG/DL (ref 70–99)
GLUCOSE BLDC GLUCOMTR-MCNC: 56 MG/DL (ref 70–99)
GLUCOSE BLDC GLUCOMTR-MCNC: 60 MG/DL (ref 70–99)
GLUCOSE BLDC GLUCOMTR-MCNC: 60 MG/DL (ref 70–99)
GLUCOSE BLDC GLUCOMTR-MCNC: 62 MG/DL (ref 70–99)
GLUCOSE BLDC GLUCOMTR-MCNC: 69 MG/DL (ref 70–99)
GLUCOSE BLDC GLUCOMTR-MCNC: 97 MG/DL (ref 70–99)
GLUCOSE BLDC GLUCOMTR-MCNC: 98 MG/DL (ref 70–99)
GLUCOSE BLDC GLUCOMTR-MCNC: 99 MG/DL (ref 70–99)
GLUCOSE SERPL-MCNC: 91 MG/DL (ref 70–99)
HCT VFR BLD AUTO: 32.8 % (ref 35–47)
HGB BLD-MCNC: 10.9 G/DL (ref 11.7–15.7)
MCH RBC QN AUTO: 29.9 PG (ref 26.5–33)
MCHC RBC AUTO-ENTMCNC: 33.2 G/DL (ref 31.5–36.5)
MCV RBC AUTO: 90 FL (ref 78–100)
OPIATES UR QL SCN: NEGATIVE
PCP UR QL SCN: NEGATIVE
PLATELET # BLD AUTO: 128 10E9/L (ref 150–450)
RBC # BLD AUTO: 3.64 10E12/L (ref 3.8–5.2)
WBC # BLD AUTO: 7.1 10E9/L (ref 4–11)

## 2020-07-03 PROCEDURE — 25000128 H RX IP 250 OP 636: Performed by: STUDENT IN AN ORGANIZED HEALTH CARE EDUCATION/TRAINING PROGRAM

## 2020-07-03 PROCEDURE — 27210794 ZZH OR GENERAL SUPPLY STERILE: Performed by: OBSTETRICS & GYNECOLOGY

## 2020-07-03 PROCEDURE — 36415 COLL VENOUS BLD VENIPUNCTURE: CPT | Performed by: STUDENT IN AN ORGANIZED HEALTH CARE EDUCATION/TRAINING PROGRAM

## 2020-07-03 PROCEDURE — 25000566 ZZH SEVOFLURANE, EA 15 MIN: Performed by: OBSTETRICS & GYNECOLOGY

## 2020-07-03 PROCEDURE — 36000059 ZZH SURGERY LEVEL 3 EA 15 ADDTL MIN UMMC: Performed by: OBSTETRICS & GYNECOLOGY

## 2020-07-03 PROCEDURE — 25800025 ZZH RX 258: Performed by: STUDENT IN AN ORGANIZED HEALTH CARE EDUCATION/TRAINING PROGRAM

## 2020-07-03 PROCEDURE — 25000125 ZZHC RX 250: Performed by: STUDENT IN AN ORGANIZED HEALTH CARE EDUCATION/TRAINING PROGRAM

## 2020-07-03 PROCEDURE — 25000132 ZZH RX MED GY IP 250 OP 250 PS 637: Performed by: STUDENT IN AN ORGANIZED HEALTH CARE EDUCATION/TRAINING PROGRAM

## 2020-07-03 PROCEDURE — 84460 ALANINE AMINO (ALT) (SGPT): CPT | Performed by: STUDENT IN AN ORGANIZED HEALTH CARE EDUCATION/TRAINING PROGRAM

## 2020-07-03 PROCEDURE — 88307 TISSUE EXAM BY PATHOLOGIST: CPT | Performed by: STUDENT IN AN ORGANIZED HEALTH CARE EDUCATION/TRAINING PROGRAM

## 2020-07-03 PROCEDURE — 25000125 ZZHC RX 250: Performed by: OBSTETRICS & GYNECOLOGY

## 2020-07-03 PROCEDURE — 25000128 H RX IP 250 OP 636: Performed by: CLINICAL NURSE SPECIALIST

## 2020-07-03 PROCEDURE — 27110028 ZZH OR GENERAL SUPPLY NON-STERILE: Performed by: OBSTETRICS & GYNECOLOGY

## 2020-07-03 PROCEDURE — 71000017 ZZH RECOVERY PHASE 1 LEVEL 3 EA ADDTL HR: Performed by: OBSTETRICS & GYNECOLOGY

## 2020-07-03 PROCEDURE — 82565 ASSAY OF CREATININE: CPT | Performed by: STUDENT IN AN ORGANIZED HEALTH CARE EDUCATION/TRAINING PROGRAM

## 2020-07-03 PROCEDURE — 25000128 H RX IP 250 OP 636: Performed by: ANESTHESIOLOGY

## 2020-07-03 PROCEDURE — 36000057 ZZH SURGERY LEVEL 3 1ST 30 MIN - UMMC: Performed by: OBSTETRICS & GYNECOLOGY

## 2020-07-03 PROCEDURE — 37000009 ZZH ANESTHESIA TECHNICAL FEE, EACH ADDTL 15 MIN: Performed by: OBSTETRICS & GYNECOLOGY

## 2020-07-03 PROCEDURE — 82947 ASSAY GLUCOSE BLOOD QUANT: CPT | Performed by: STUDENT IN AN ORGANIZED HEALTH CARE EDUCATION/TRAINING PROGRAM

## 2020-07-03 PROCEDURE — 71000016 ZZH RECOVERY PHASE 1 LEVEL 3 FIRST HR: Performed by: OBSTETRICS & GYNECOLOGY

## 2020-07-03 PROCEDURE — 85027 COMPLETE CBC AUTOMATED: CPT | Performed by: STUDENT IN AN ORGANIZED HEALTH CARE EDUCATION/TRAINING PROGRAM

## 2020-07-03 PROCEDURE — 40000170 ZZH STATISTIC PRE-PROCEDURE ASSESSMENT II: Performed by: OBSTETRICS & GYNECOLOGY

## 2020-07-03 PROCEDURE — 84450 TRANSFERASE (AST) (SGOT): CPT | Performed by: STUDENT IN AN ORGANIZED HEALTH CARE EDUCATION/TRAINING PROGRAM

## 2020-07-03 PROCEDURE — 25800025 ZZH RX 258: Performed by: ANESTHESIOLOGY

## 2020-07-03 PROCEDURE — 25000132 ZZH RX MED GY IP 250 OP 250 PS 637: Performed by: OBSTETRICS & GYNECOLOGY

## 2020-07-03 PROCEDURE — 25800030 ZZH RX IP 258 OP 636: Performed by: STUDENT IN AN ORGANIZED HEALTH CARE EDUCATION/TRAINING PROGRAM

## 2020-07-03 PROCEDURE — 12000001 ZZH R&B MED SURG/OB UMMC

## 2020-07-03 PROCEDURE — 80307 DRUG TEST PRSMV CHEM ANLYZR: CPT | Performed by: STUDENT IN AN ORGANIZED HEALTH CARE EDUCATION/TRAINING PROGRAM

## 2020-07-03 PROCEDURE — 59515 CESAREAN DELIVERY: CPT | Performed by: OBSTETRICS & GYNECOLOGY

## 2020-07-03 PROCEDURE — 00000146 ZZHCL STATISTIC GLUCOSE BY METER IP

## 2020-07-03 PROCEDURE — 25000128 H RX IP 250 OP 636: Performed by: OBSTETRICS & GYNECOLOGY

## 2020-07-03 PROCEDURE — 40000977 ZZH STATISTIC ATTENDANCE AT DELIVERY

## 2020-07-03 PROCEDURE — 37000008 ZZH ANESTHESIA TECHNICAL FEE, 1ST 30 MIN: Performed by: OBSTETRICS & GYNECOLOGY

## 2020-07-03 PROCEDURE — C1765 ADHESION BARRIER: HCPCS | Performed by: OBSTETRICS & GYNECOLOGY

## 2020-07-03 RX ORDER — LIDOCAINE 40 MG/G
CREAM TOPICAL
Status: DISCONTINUED | OUTPATIENT
Start: 2020-07-03 | End: 2020-07-05

## 2020-07-03 RX ORDER — LABETALOL 20 MG/4 ML (5 MG/ML) INTRAVENOUS SYRINGE
40 EVERY 10 MIN PRN
Status: DISCONTINUED | OUTPATIENT
Start: 2020-07-03 | End: 2020-07-05 | Stop reason: HOSPADM

## 2020-07-03 RX ORDER — NALBUPHINE HYDROCHLORIDE 20 MG/ML
2.5-5 INJECTION, SOLUTION INTRAMUSCULAR; INTRAVENOUS; SUBCUTANEOUS EVERY 6 HOURS PRN
Status: DISCONTINUED | OUTPATIENT
Start: 2020-07-03 | End: 2020-07-05 | Stop reason: HOSPADM

## 2020-07-03 RX ORDER — OXYCODONE HYDROCHLORIDE 5 MG/1
5 TABLET ORAL EVERY 4 HOURS PRN
Status: DISCONTINUED | OUTPATIENT
Start: 2020-07-03 | End: 2020-07-05 | Stop reason: HOSPADM

## 2020-07-03 RX ORDER — KETOROLAC TROMETHAMINE 30 MG/ML
30 INJECTION, SOLUTION INTRAMUSCULAR; INTRAVENOUS EVERY 6 HOURS
Status: COMPLETED | OUTPATIENT
Start: 2020-07-03 | End: 2020-07-04

## 2020-07-03 RX ORDER — CALCIUM GLUCONATE 94 MG/ML
1 INJECTION, SOLUTION INTRAVENOUS
Status: DISCONTINUED | OUTPATIENT
Start: 2020-07-03 | End: 2020-07-05 | Stop reason: HOSPADM

## 2020-07-03 RX ORDER — ONDANSETRON 4 MG/1
4 TABLET, ORALLY DISINTEGRATING ORAL EVERY 30 MIN PRN
Status: DISCONTINUED | OUTPATIENT
Start: 2020-07-03 | End: 2020-07-03 | Stop reason: HOSPADM

## 2020-07-03 RX ORDER — IBUPROFEN 800 MG/1
800 TABLET, FILM COATED ORAL EVERY 6 HOURS
Status: DISCONTINUED | OUTPATIENT
Start: 2020-07-04 | End: 2020-07-05 | Stop reason: HOSPADM

## 2020-07-03 RX ORDER — OXYTOCIN/0.9 % SODIUM CHLORIDE 30/500 ML
340 PLASTIC BAG, INJECTION (ML) INTRAVENOUS CONTINUOUS PRN
Status: DISCONTINUED | OUTPATIENT
Start: 2020-07-03 | End: 2020-07-05 | Stop reason: HOSPADM

## 2020-07-03 RX ORDER — DEXTROSE MONOHYDRATE 100 MG/ML
INJECTION, SOLUTION INTRAVENOUS CONTINUOUS PRN
Status: DISCONTINUED | OUTPATIENT
Start: 2020-07-03 | End: 2020-07-05 | Stop reason: HOSPADM

## 2020-07-03 RX ORDER — MAGNESIUM SULFATE HEPTAHYDRATE 40 MG/ML
2 INJECTION, SOLUTION INTRAVENOUS
Status: DISCONTINUED | OUTPATIENT
Start: 2020-07-03 | End: 2020-07-05 | Stop reason: HOSPADM

## 2020-07-03 RX ORDER — BISACODYL 10 MG
10 SUPPOSITORY, RECTAL RECTAL DAILY PRN
Status: DISCONTINUED | OUTPATIENT
Start: 2020-07-05 | End: 2020-07-05 | Stop reason: HOSPADM

## 2020-07-03 RX ORDER — LABETALOL 20 MG/4 ML (5 MG/ML) INTRAVENOUS SYRINGE
20 EVERY 10 MIN PRN
Status: DISCONTINUED | OUTPATIENT
Start: 2020-07-03 | End: 2020-07-05 | Stop reason: HOSPADM

## 2020-07-03 RX ORDER — ONDANSETRON 2 MG/ML
4 INJECTION INTRAMUSCULAR; INTRAVENOUS EVERY 6 HOURS PRN
Status: DISCONTINUED | OUTPATIENT
Start: 2020-07-03 | End: 2020-07-05 | Stop reason: HOSPADM

## 2020-07-03 RX ORDER — DEXTROSE MONOHYDRATE 25 G/50ML
25-50 INJECTION, SOLUTION INTRAVENOUS
Status: DISCONTINUED | OUTPATIENT
Start: 2020-07-03 | End: 2020-07-04

## 2020-07-03 RX ORDER — MAGNESIUM SULFATE HEPTAHYDRATE 500 MG/ML
4 INJECTION, SOLUTION INTRAMUSCULAR; INTRAVENOUS
Status: DISCONTINUED | OUTPATIENT
Start: 2020-07-03 | End: 2020-07-05 | Stop reason: HOSPADM

## 2020-07-03 RX ORDER — LORAZEPAM 2 MG/ML
2 INJECTION INTRAMUSCULAR
Status: DISCONTINUED | OUTPATIENT
Start: 2020-07-03 | End: 2020-07-05 | Stop reason: HOSPADM

## 2020-07-03 RX ORDER — SODIUM CHLORIDE, SODIUM LACTATE, POTASSIUM CHLORIDE, CALCIUM CHLORIDE 600; 310; 30; 20 MG/100ML; MG/100ML; MG/100ML; MG/100ML
INJECTION, SOLUTION INTRAVENOUS
Status: DISCONTINUED
Start: 2020-07-03 | End: 2020-07-03 | Stop reason: HOSPADM

## 2020-07-03 RX ORDER — SODIUM CHLORIDE, SODIUM LACTATE, POTASSIUM CHLORIDE, CALCIUM CHLORIDE 600; 310; 30; 20 MG/100ML; MG/100ML; MG/100ML; MG/100ML
INJECTION, SOLUTION INTRAVENOUS CONTINUOUS PRN
Status: DISCONTINUED | OUTPATIENT
Start: 2020-07-03 | End: 2020-07-03

## 2020-07-03 RX ORDER — CEFAZOLIN SODIUM 2 G/100ML
2 INJECTION, SOLUTION INTRAVENOUS
Status: COMPLETED | OUTPATIENT
Start: 2020-07-03 | End: 2020-07-03

## 2020-07-03 RX ORDER — HYDROMORPHONE HYDROCHLORIDE 1 MG/ML
.3-.5 INJECTION, SOLUTION INTRAMUSCULAR; INTRAVENOUS; SUBCUTANEOUS EVERY 10 MIN PRN
Status: DISCONTINUED | OUTPATIENT
Start: 2020-07-03 | End: 2020-07-03 | Stop reason: HOSPADM

## 2020-07-03 RX ORDER — PROPOFOL 10 MG/ML
INJECTION, EMULSION INTRAVENOUS PRN
Status: DISCONTINUED | OUTPATIENT
Start: 2020-07-03 | End: 2020-07-03

## 2020-07-03 RX ORDER — ONDANSETRON 2 MG/ML
4 INJECTION INTRAMUSCULAR; INTRAVENOUS EVERY 30 MIN PRN
Status: DISCONTINUED | OUTPATIENT
Start: 2020-07-03 | End: 2020-07-03 | Stop reason: HOSPADM

## 2020-07-03 RX ORDER — PHENYLEPHRINE HCL IN 0.9% NACL 1 MG/10 ML
SYRINGE (ML) INTRAVENOUS CONTINUOUS PRN
Status: DISCONTINUED | OUTPATIENT
Start: 2020-07-03 | End: 2020-07-03

## 2020-07-03 RX ORDER — SODIUM CHLORIDE, SODIUM LACTATE, POTASSIUM CHLORIDE, CALCIUM CHLORIDE 600; 310; 30; 20 MG/100ML; MG/100ML; MG/100ML; MG/100ML
10-125 INJECTION, SOLUTION INTRAVENOUS CONTINUOUS
Status: DISCONTINUED | OUTPATIENT
Start: 2020-07-03 | End: 2020-07-05 | Stop reason: HOSPADM

## 2020-07-03 RX ORDER — NALOXONE HYDROCHLORIDE 0.4 MG/ML
.1-.4 INJECTION, SOLUTION INTRAMUSCULAR; INTRAVENOUS; SUBCUTANEOUS
Status: DISCONTINUED | OUTPATIENT
Start: 2020-07-03 | End: 2020-07-05

## 2020-07-03 RX ORDER — DEXTROSE, SODIUM CHLORIDE, SODIUM LACTATE, POTASSIUM CHLORIDE, AND CALCIUM CHLORIDE 5; .6; .31; .03; .02 G/100ML; G/100ML; G/100ML; G/100ML; G/100ML
INJECTION, SOLUTION INTRAVENOUS CONTINUOUS
Status: DISCONTINUED | OUTPATIENT
Start: 2020-07-03 | End: 2020-07-05 | Stop reason: HOSPADM

## 2020-07-03 RX ORDER — ONDANSETRON 2 MG/ML
INJECTION INTRAMUSCULAR; INTRAVENOUS PRN
Status: DISCONTINUED | OUTPATIENT
Start: 2020-07-03 | End: 2020-07-03

## 2020-07-03 RX ORDER — LABETALOL 20 MG/4 ML (5 MG/ML) INTRAVENOUS SYRINGE
80 EVERY 10 MIN PRN
Status: DISCONTINUED | OUTPATIENT
Start: 2020-07-03 | End: 2020-07-05 | Stop reason: HOSPADM

## 2020-07-03 RX ORDER — NIFEDIPINE 10 MG/1
10 CAPSULE ORAL
Status: DISCONTINUED | OUTPATIENT
Start: 2020-07-03 | End: 2020-07-05 | Stop reason: HOSPADM

## 2020-07-03 RX ORDER — ACETAMINOPHEN 325 MG/1
975 TABLET ORAL EVERY 6 HOURS
Status: DISCONTINUED | OUTPATIENT
Start: 2020-07-03 | End: 2020-07-05 | Stop reason: HOSPADM

## 2020-07-03 RX ORDER — SODIUM CHLORIDE, SODIUM LACTATE, POTASSIUM CHLORIDE, CALCIUM CHLORIDE 600; 310; 30; 20 MG/100ML; MG/100ML; MG/100ML; MG/100ML
INJECTION, SOLUTION INTRAVENOUS CONTINUOUS
Status: DISCONTINUED | OUTPATIENT
Start: 2020-07-03 | End: 2020-07-03 | Stop reason: HOSPADM

## 2020-07-03 RX ORDER — OXYTOCIN/0.9 % SODIUM CHLORIDE 30/500 ML
PLASTIC BAG, INJECTION (ML) INTRAVENOUS CONTINUOUS PRN
Status: DISCONTINUED | OUTPATIENT
Start: 2020-07-03 | End: 2020-07-03

## 2020-07-03 RX ORDER — DEXTROSE, SODIUM CHLORIDE, SODIUM LACTATE, POTASSIUM CHLORIDE, AND CALCIUM CHLORIDE 5; .6; .31; .03; .02 G/100ML; G/100ML; G/100ML; G/100ML; G/100ML
INJECTION, SOLUTION INTRAVENOUS CONTINUOUS
Status: DISCONTINUED | OUTPATIENT
Start: 2020-07-03 | End: 2020-07-04

## 2020-07-03 RX ORDER — NALOXONE HYDROCHLORIDE 0.4 MG/ML
.1-.4 INJECTION, SOLUTION INTRAMUSCULAR; INTRAVENOUS; SUBCUTANEOUS
Status: DISCONTINUED | OUTPATIENT
Start: 2020-07-03 | End: 2020-07-05 | Stop reason: HOSPADM

## 2020-07-03 RX ORDER — MAGNESIUM SULFATE HEPTAHYDRATE 40 MG/ML
4 INJECTION, SOLUTION INTRAVENOUS
Status: DISCONTINUED | OUTPATIENT
Start: 2020-07-03 | End: 2020-07-05 | Stop reason: HOSPADM

## 2020-07-03 RX ORDER — OXYTOCIN/0.9 % SODIUM CHLORIDE 30/500 ML
100 PLASTIC BAG, INJECTION (ML) INTRAVENOUS CONTINUOUS
Status: DISCONTINUED | OUTPATIENT
Start: 2020-07-03 | End: 2020-07-05 | Stop reason: HOSPADM

## 2020-07-03 RX ORDER — SIMETHICONE 80 MG
80 TABLET,CHEWABLE ORAL 4 TIMES DAILY PRN
Status: DISCONTINUED | OUTPATIENT
Start: 2020-07-03 | End: 2020-07-05 | Stop reason: HOSPADM

## 2020-07-03 RX ORDER — NALOXONE HYDROCHLORIDE 0.4 MG/ML
.1-.4 INJECTION, SOLUTION INTRAMUSCULAR; INTRAVENOUS; SUBCUTANEOUS
Status: ACTIVE | OUTPATIENT
Start: 2020-07-03 | End: 2020-07-04

## 2020-07-03 RX ORDER — MAGNESIUM SULFATE HEPTAHYDRATE 40 MG/ML
4 INJECTION, SOLUTION INTRAVENOUS ONCE
Status: COMPLETED | OUTPATIENT
Start: 2020-07-03 | End: 2020-07-03

## 2020-07-03 RX ORDER — SODIUM CHLORIDE, SODIUM LACTATE, POTASSIUM CHLORIDE, CALCIUM CHLORIDE 600; 310; 30; 20 MG/100ML; MG/100ML; MG/100ML; MG/100ML
INJECTION, SOLUTION INTRAVENOUS CONTINUOUS
Status: DISCONTINUED | OUTPATIENT
Start: 2020-07-03 | End: 2020-07-05 | Stop reason: HOSPADM

## 2020-07-03 RX ORDER — OXYTOCIN 10 [USP'U]/ML
10 INJECTION, SOLUTION INTRAMUSCULAR; INTRAVENOUS
Status: DISCONTINUED | OUTPATIENT
Start: 2020-07-03 | End: 2020-07-05 | Stop reason: HOSPADM

## 2020-07-03 RX ORDER — AMOXICILLIN 250 MG
2 CAPSULE ORAL 2 TIMES DAILY
Status: DISCONTINUED | OUTPATIENT
Start: 2020-07-03 | End: 2020-07-05 | Stop reason: HOSPADM

## 2020-07-03 RX ORDER — EPHEDRINE SULFATE 50 MG/ML
5 INJECTION, SOLUTION INTRAMUSCULAR; INTRAVENOUS; SUBCUTANEOUS
Status: DISCONTINUED | OUTPATIENT
Start: 2020-07-03 | End: 2020-07-05 | Stop reason: HOSPADM

## 2020-07-03 RX ORDER — CEFAZOLIN SODIUM 1 G/3ML
1 INJECTION, POWDER, FOR SOLUTION INTRAMUSCULAR; INTRAVENOUS SEE ADMIN INSTRUCTIONS
Status: DISCONTINUED | OUTPATIENT
Start: 2020-07-03 | End: 2020-07-03

## 2020-07-03 RX ORDER — MORPHINE SULFATE 1 MG/ML
INJECTION, SOLUTION EPIDURAL; INTRATHECAL; INTRAVENOUS PRN
Status: DISCONTINUED | OUTPATIENT
Start: 2020-07-03 | End: 2020-07-03

## 2020-07-03 RX ORDER — NICOTINE POLACRILEX 4 MG
15-30 LOZENGE BUCCAL
Status: DISCONTINUED | OUTPATIENT
Start: 2020-07-03 | End: 2020-07-04

## 2020-07-03 RX ORDER — HYDROCORTISONE 2.5 %
CREAM (GRAM) TOPICAL 3 TIMES DAILY PRN
Status: DISCONTINUED | OUTPATIENT
Start: 2020-07-03 | End: 2020-07-05 | Stop reason: HOSPADM

## 2020-07-03 RX ORDER — SODIUM CHLORIDE 9 MG/ML
INJECTION, SOLUTION INTRAVENOUS CONTINUOUS
Status: DISCONTINUED | OUTPATIENT
Start: 2020-07-03 | End: 2020-07-05 | Stop reason: HOSPADM

## 2020-07-03 RX ORDER — AMOXICILLIN 250 MG
1 CAPSULE ORAL 2 TIMES DAILY
Status: DISCONTINUED | OUTPATIENT
Start: 2020-07-03 | End: 2020-07-05 | Stop reason: HOSPADM

## 2020-07-03 RX ORDER — LIDOCAINE 40 MG/G
CREAM TOPICAL
Status: DISCONTINUED | OUTPATIENT
Start: 2020-07-03 | End: 2020-07-05 | Stop reason: HOSPADM

## 2020-07-03 RX ORDER — CITRIC ACID/SODIUM CITRATE 334-500MG
30 SOLUTION, ORAL ORAL
Status: COMPLETED | OUTPATIENT
Start: 2020-07-03 | End: 2020-07-03

## 2020-07-03 RX ORDER — DEXTROSE MONOHYDRATE 25 G/50ML
INJECTION, SOLUTION INTRAVENOUS
Status: DISCONTINUED
Start: 2020-07-03 | End: 2020-07-03 | Stop reason: HOSPADM

## 2020-07-03 RX ORDER — DIPHENHYDRAMINE HYDROCHLORIDE 50 MG/ML
12.5 INJECTION INTRAMUSCULAR; INTRAVENOUS EVERY 6 HOURS PRN
Status: DISCONTINUED | OUTPATIENT
Start: 2020-07-03 | End: 2020-07-03 | Stop reason: HOSPADM

## 2020-07-03 RX ORDER — FENTANYL CITRATE 50 UG/ML
25-50 INJECTION, SOLUTION INTRAMUSCULAR; INTRAVENOUS
Status: DISCONTINUED | OUTPATIENT
Start: 2020-07-03 | End: 2020-07-03 | Stop reason: HOSPADM

## 2020-07-03 RX ORDER — FENTANYL CITRATE 50 UG/ML
INJECTION, SOLUTION INTRAMUSCULAR; INTRAVENOUS PRN
Status: DISCONTINUED | OUTPATIENT
Start: 2020-07-03 | End: 2020-07-03

## 2020-07-03 RX ORDER — LABETALOL 20 MG/4 ML (5 MG/ML) INTRAVENOUS SYRINGE
10
Status: DISCONTINUED | OUTPATIENT
Start: 2020-07-03 | End: 2020-07-03 | Stop reason: HOSPADM

## 2020-07-03 RX ORDER — BUPIVACAINE HYDROCHLORIDE 7.5 MG/ML
INJECTION, SOLUTION INTRASPINAL PRN
Status: DISCONTINUED | OUTPATIENT
Start: 2020-07-03 | End: 2020-07-03

## 2020-07-03 RX ORDER — DEXTROSE MONOHYDRATE 25 G/50ML
25 INJECTION, SOLUTION INTRAVENOUS ONCE
Status: COMPLETED | OUTPATIENT
Start: 2020-07-03 | End: 2020-07-03

## 2020-07-03 RX ORDER — MAGNESIUM SULFATE IN WATER 40 MG/ML
2 INJECTION, SOLUTION INTRAVENOUS CONTINUOUS
Status: DISCONTINUED | OUTPATIENT
Start: 2020-07-03 | End: 2020-07-05 | Stop reason: HOSPADM

## 2020-07-03 RX ORDER — HYDROMORPHONE HYDROCHLORIDE 1 MG/ML
.3-.5 INJECTION, SOLUTION INTRAMUSCULAR; INTRAVENOUS; SUBCUTANEOUS EVERY 5 MIN PRN
Status: DISCONTINUED | OUTPATIENT
Start: 2020-07-03 | End: 2020-07-03 | Stop reason: HOSPADM

## 2020-07-03 RX ORDER — ALBUTEROL SULFATE 90 UG/1
AEROSOL, METERED RESPIRATORY (INHALATION) PRN
Status: DISCONTINUED | OUTPATIENT
Start: 2020-07-03 | End: 2020-07-03

## 2020-07-03 RX ORDER — MAGNESIUM HYDROXIDE 1200 MG/15ML
LIQUID ORAL PRN
Status: DISCONTINUED | OUTPATIENT
Start: 2020-07-03 | End: 2020-07-05 | Stop reason: HOSPADM

## 2020-07-03 RX ORDER — DIPHENHYDRAMINE HYDROCHLORIDE 50 MG/ML
12.5 INJECTION INTRAMUSCULAR; INTRAVENOUS ONCE
Status: COMPLETED | OUTPATIENT
Start: 2020-07-03 | End: 2020-07-03

## 2020-07-03 RX ORDER — MODIFIED LANOLIN
OINTMENT (GRAM) TOPICAL
Status: DISCONTINUED | OUTPATIENT
Start: 2020-07-03 | End: 2020-07-05 | Stop reason: HOSPADM

## 2020-07-03 RX ORDER — NALBUPHINE HYDROCHLORIDE 20 MG/ML
5 INJECTION, SOLUTION INTRAMUSCULAR; INTRAVENOUS; SUBCUTANEOUS ONCE
Status: DISCONTINUED | OUTPATIENT
Start: 2020-07-03 | End: 2020-07-03 | Stop reason: HOSPADM

## 2020-07-03 RX ADMIN — FENTANYL CITRATE 50 MCG: 50 INJECTION, SOLUTION INTRAMUSCULAR; INTRAVENOUS at 12:04

## 2020-07-03 RX ADMIN — Medication 80 MG: at 11:55

## 2020-07-03 RX ADMIN — KETOROLAC TROMETHAMINE 30 MG: 30 INJECTION, SOLUTION INTRAMUSCULAR at 22:56

## 2020-07-03 RX ADMIN — PROPOFOL 100 MG: 10 INJECTION, EMULSION INTRAVENOUS at 11:55

## 2020-07-03 RX ADMIN — MAGNESIUM SULFATE IN WATER 4 G: 40 INJECTION, SOLUTION INTRAVENOUS at 19:17

## 2020-07-03 RX ADMIN — Medication: at 12:40

## 2020-07-03 RX ADMIN — FENTANYL CITRATE 35 MCG: 50 INJECTION, SOLUTION INTRAMUSCULAR; INTRAVENOUS at 12:27

## 2020-07-03 RX ADMIN — SODIUM CITRATE AND CITRIC ACID MONOHYDRATE 30 ML: 500; 334 SOLUTION ORAL at 10:58

## 2020-07-03 RX ADMIN — NALBUPHINE HYDROCHLORIDE 5 MG: 20 INJECTION, SOLUTION INTRAMUSCULAR; INTRAVENOUS; SUBCUTANEOUS at 20:34

## 2020-07-03 RX ADMIN — MORPHINE SULFATE 0.1 MG: 1 INJECTION, SOLUTION EPIDURAL; INTRATHECAL; INTRAVENOUS at 11:30

## 2020-07-03 RX ADMIN — ACETAMINOPHEN 975 MG: 325 TABLET, FILM COATED ORAL at 20:38

## 2020-07-03 RX ADMIN — KETOROLAC TROMETHAMINE 30 MG: 30 INJECTION, SOLUTION INTRAMUSCULAR at 17:15

## 2020-07-03 RX ADMIN — HYDROMORPHONE HYDROCHLORIDE 0.4 MG: 1 INJECTION, SOLUTION INTRAMUSCULAR; INTRAVENOUS; SUBCUTANEOUS at 14:31

## 2020-07-03 RX ADMIN — LABETALOL 20 MG/4 ML (5 MG/ML) INTRAVENOUS SYRINGE 20 MG: at 15:15

## 2020-07-03 RX ADMIN — Medication 2 G: at 11:30

## 2020-07-03 RX ADMIN — Medication: at 12:39

## 2020-07-03 RX ADMIN — NALBUPHINE HYDROCHLORIDE 2.5 MG: 20 INJECTION, SOLUTION INTRAMUSCULAR; INTRAVENOUS; SUBCUTANEOUS at 16:09

## 2020-07-03 RX ADMIN — DEXTROSE MONOHYDRATE 25 ML: 25 INJECTION, SOLUTION INTRAVENOUS at 12:40

## 2020-07-03 RX ADMIN — PHENYLEPHRINE HYDROCHLORIDE 25 MCG: 10 INJECTION INTRAVENOUS at 11:33

## 2020-07-03 RX ADMIN — DEXTROSE MONOHYDRATE 25 ML: 25 INJECTION, SOLUTION INTRAVENOUS at 10:27

## 2020-07-03 RX ADMIN — SODIUM CHLORIDE, POTASSIUM CHLORIDE, SODIUM LACTATE AND CALCIUM CHLORIDE: 600; 310; 30; 20 INJECTION, SOLUTION INTRAVENOUS at 11:14

## 2020-07-03 RX ADMIN — DIPHENHYDRAMINE HYDROCHLORIDE 12.5 MG: 50 INJECTION, SOLUTION INTRAMUSCULAR; INTRAVENOUS at 15:13

## 2020-07-03 RX ADMIN — PROPOFOL 50 MG: 10 INJECTION, EMULSION INTRAVENOUS at 12:49

## 2020-07-03 RX ADMIN — PROPOFOL 50 MG: 10 INJECTION, EMULSION INTRAVENOUS at 12:38

## 2020-07-03 RX ADMIN — FENTANYL CITRATE 15 MCG: 50 INJECTION, SOLUTION INTRAMUSCULAR; INTRAVENOUS at 11:30

## 2020-07-03 RX ADMIN — ALBUTEROL SULFATE 8 PUFF: 90 AEROSOL, METERED RESPIRATORY (INHALATION) at 12:07

## 2020-07-03 RX ADMIN — SODIUM CHLORIDE, POTASSIUM CHLORIDE, SODIUM LACTATE AND CALCIUM CHLORIDE: 600; 310; 30; 20 INJECTION, SOLUTION INTRAVENOUS at 12:23

## 2020-07-03 RX ADMIN — MAGNESIUM SULFATE IN WATER 2 G/HR: 40 INJECTION, SOLUTION INTRAVENOUS at 19:50

## 2020-07-03 RX ADMIN — DIPHENHYDRAMINE HYDROCHLORIDE 12.5 MG: 50 INJECTION, SOLUTION INTRAMUSCULAR; INTRAVENOUS at 14:40

## 2020-07-03 RX ADMIN — ONDANSETRON 4 MG: 2 INJECTION INTRAMUSCULAR; INTRAVENOUS at 12:09

## 2020-07-03 RX ADMIN — Medication 100 ML/HR: at 14:44

## 2020-07-03 RX ADMIN — DEXTROSE MONOHYDRATE 25 ML: 25 INJECTION, SOLUTION INTRAVENOUS at 15:09

## 2020-07-03 RX ADMIN — BUPIVACAINE HYDROCHLORIDE IN DEXTROSE 1.5 ML: 7.5 INJECTION, SOLUTION SUBARACHNOID at 11:30

## 2020-07-03 RX ADMIN — SODIUM CHLORIDE, SODIUM LACTATE, POTASSIUM CHLORIDE, CALCIUM CHLORIDE AND DEXTROSE MONOHYDRATE: 5; 600; 310; 30; 20 INJECTION, SOLUTION INTRAVENOUS at 14:30

## 2020-07-03 RX ADMIN — OXYTOCIN-SODIUM CHLORIDE 0.9% IV SOLN 30 UNIT/500ML 600 ML/HR: 30-0.9/5 SOLUTION at 11:58

## 2020-07-03 RX ADMIN — Medication 50 MCG/MIN: at 11:32

## 2020-07-03 NOTE — PROVIDER NOTIFICATION
07/03/20 1023   Provider Notification   Provider Name/Title WALESKA Petersen and Dr Atkins   Method of Notification In Department   Notification Reason Lab/Diagnostic Study   Repeat BG 60 fifteen minutes after 2 apple juice (30g carbs). Remains sleepy. Requesting order for IV  Dextrose 50%.

## 2020-07-03 NOTE — OR NURSING
Pts blood sugar is 56, pts blood pressure is elevated. Pt continues to cough intermittently, lungs are clear. Pt states she continues to feel very itchy.  Dr Martin notified. Orders received. OB resident notified as well- further orders received. 1515 CNS Laura Engle at bedside to discuss a plan for blood sugar management with pt.

## 2020-07-03 NOTE — CONSULTS
Diabetes/Hyperglycemia Management Consult    Chief Complaint glycemic management transition plan for post-partum in uncontrolled type 1 diabetes  Consult requested by: Misty Clifford MD  History of Present Illness Arielle Montenegro is a 35 year old  at 39w1d with type 1 diabetes, s/p repeat  section today 7/3/2020, under general anesthesia, and possibly aspirated and developed severe range hypertension.  Seen in PACU but expected to go to [facility] once blood pressure improved.  Arielle complains of constant itching, hunger, and some abdominal pain.  She was intermittently sleepy and answering nonsensically at times.  Much of history is from chart review and will need confirmation with patient tomorrow when she has further recovered.  Historically has had A1Cs >10%.  Improved to 7% range in January and to 6.4% earlier in .  Was having problematic hypoglycemia in the spring, including requiring EMS assistance.    [facility] Diabetes notes are from May and she hasn't seen anyone else since then.  She says she's been keeping her numbers lower rather than higher, eating out more due to being fatigued.    She took her glargine 27 units last night 2100.  Has been hypoglycemic to the 60s several times, responding to juice and half amps D50W then dipping again.  Dextrose infusion started around 1430.    Recent Labs   Lab 20  1311 20  1226 20  1040 20  1023 20  1004   BGM 99 62* 119* 60* 60*         Diabetes Type: 1  Diabetes Duration: 28 years, dx at age 6-7 in [place]  Usual Diabetes Regimen:   Fasting and post-prandial BG monitoring, via Dexcom G6  (for a long time she judged insulin dosing by symptoms)  Lantus 27 units at HS (used to take it in the morning to limit overnight hypoglycemia)  Novolog 2 units per carb choice  Recommended correction 1:50 > 150-- pt unable to confirm dosing  pre pregnancy she was on 20 units glargine, per outpt note.  Also up to 60 units glargine  "on older med lists.  Ability to [place] Prescribed Regimen: many barriers described in outpatient notes  Diabetes Control:   H&P- FBG have been . She thinks her 1hr PP ranges from 110-160 but also reports: \"I don't know they are always so different and I know my doctor wants me to check them before I eat and take insulin before meals but I usually just do it after\".   5/2020- some prolonged overnight hyper hypoglycemia, and markedly high postprandial blood sugars.  Lab Results   Component Value Date    A1C 6.4 06/11/2020    A1C 7.5 01/28/2020    A1C 7.8 01/02/2020    A1C 12.6 09/20/2019    A1C 11.0 10/29/2018     Diabetes Complications: blind in R eye (enucleation 2015) from retinopathy?, microalbuminuria 2014  History of DKA:  yes  Able to Detect Hypoglycemia: EMS visit for hypoglycemia with loss of consciousness and a blood glucose of just 32.  Usual Diabetes Care Provider: Janessa Ordoñez PA-C, but not seen since May  Factors Impacting Glucose Control: poor baseline control, delivery,       Review of Systems  10 point ROS completed with pertinent positives and negatives noted in the HPI    Past medical, family and social histories are reviewed and updated.    Past Medical History  Past Medical History:   Diagnosis Date     Blindness of right eye 2007     Diabetes mellitus type 1 (H)     Diagnosed as a child       Family History  Family History   Problem Relation Age of Onset     Family History Negative Mother      Family History Negative Father      Diabetes Other         father's side       Social History  Social History     Socioeconomic History     Marital status: Single     Spouse name: Not on file     Number of children: 1     Years of education: Not on file     Highest education level: Not on file   Occupational History     Employer: UNEMPLOYED   Social Needs     Financial resource strain: Not on file     Food insecurity     Worry: Not on file     Inability: Not on file     Transportation needs     " Medical: Not on file     Non-medical: Not on file   Tobacco Use     Smoking status: Former Smoker     Packs/day: 0.00     Types: Cigarettes     Smokeless tobacco: Never Used     Tobacco comment: smokes 2 cigarettes/day-none for several months   Substance and Sexual Activity     Alcohol use: No     Alcohol/week: 0.0 standard drinks     Drug use: No     Sexual activity: Yes     Partners: Male     Birth control/protection: None   Lifestyle     Physical activity     Days per week: Not on file     Minutes per session: Not on file     Stress: Not on file   Relationships     Social connections     Talks on phone: Not on file     Gets together: Not on file     Attends Druze service: Not on file     Active member of club or organization: Not on file     Attends meetings of clubs or organizations: Not on file     Relationship status: Not on file     Intimate partner violence     Fear of current or ex partner: Not on file     Emotionally abused: Not on file     Physically abused: Not on file     Forced sexual activity: Not on file   Other Topics Concern     Parent/sibling w/ CABG, MI or angioplasty before 65F 55M? Not Asked   Social History Narrative    ** Merged History Encounter **         Caffeine intake/servings daily - 0    Calcium intake/servings daily - 3    Exercise 7 times weekly - describe walking    Sunscreen used - No    Seatbelts used - Yes    Guns stored in the home - No    Self Breast Exam - Yes    Pap test up to date -  No    Eye exam up to date -  Yes    Dental exam up to date -  Yes    DEXA scan up to date -  Not Applicable    Flex Sig/Colonoscopy up to date -  Not Applicable    Mammography up to date -  Not Applicable    Immunizations reviewed and up to date - No    Abuse: Current or Past (Physical, Sexual or Emotional) - No    Do you feel safe in your environment - Yes    Do you cope well with stress - Yes    Do you suffer from insomnia - No    Last updated by: KARL PELAEZ  7/18/2012                          Physical Exam  Temp: 97.9  F (36.6  C) Temp src: Oral BP: (!) 152/114 Pulse: 96 Heart Rate: 98 Resp: 17 SpO2: 93 % O2 Device: Nasal cannula Oxygen Delivery: 2 LPM  Wt Readings from Last 4 Encounters:   06/22/20 83.1 kg (183 lb 3.2 oz)   06/11/20 80.5 kg (177 lb 6.4 oz)   06/04/20 77.7 kg (171 lb 4.8 oz)   05/21/20 75.8 kg (167 lb)       General:  resting in bed, constantly itching.  Baby at bedside  HEENT: NC/AT, PER and anicteric, non-injected, oral mucous membranes moist.   Lungs: unlabored respiration,  no cough. Supp O2 via NC  ABD: recently gravid  Skin: very dry, small lesion on mid abd.  LUE Dexcom sensor site  MSK:  fluid movement of all extremities  Lymp:  Some LE edema   Mental status:  Answers to verbal, spontaneous eye opening, drifting off and tangential, communicating needs clearly  Psych:  Appropriate to situation    Laboratory  Recent Labs   Lab Test 01/24/20 01/02/20 09/20/19  1425 10/29/18  1349   NA  --   --  127* 138   POTASSIUM 4.0 4.3 4.3 3.9   CHLORIDE  --   --  95 102   CO2  --   --  22 25   ANIONGAP  --   --  10 11   GLC 58* 357* 531* 166*   BUN  --   --  13 10   CR 0.56 0.74 0.68 0.66   ALEXANDRO  --   --  8.5 8.8     CBC RESULTS:   Recent Labs   Lab Test 07/01/20  1342   WBC 6.6   RBC 3.81   HGB 11.6*   HCT 33.9*   MCV 89   MCH 30.4   MCHC 34.2   RDW 15.2*   *       Liver Function Studies -   Recent Labs   Lab Test 01/28/20  1055 01/02/20 09/20/19  1425   PROTTOTAL  --   --  6.1*   ALBUMIN  --   --  3.1*   BILITOTAL  --   --  0.5   ALKPHOS  --   --  105   AST  --  8* 16   ALT 9 14 20       Active Medications  Current Facility-Administered Medications   Medication     dextrose 5% in lactated ringers infusion     dextrose 5% in lactated ringers infusion     glucose gel 15-30 g    Or     dextrose 50 % injection 25-50 mL    Or     glucagon injection 1 mg     lactated ringers infusion     sodium chloride 0.9% (bottle) irrigation     sodium chloride 0.9% infusion     No current  outpatient medications on file.       Current Diet  None        Assessment  Arielle Montenegro is a 35 year old  at 39w1d with uncontrolled type 1 diabetes, s/p repeat  section today 7/3/2020, under general anesthesia, possibly aspirated and developed severe range hypertension.   Risk for hypoglycemia increased in post-partum setting while has subcutaneous insulin on board.    Plan    -D5LR at 125 ml/h for hypoglycemia prevention, x 9 hours (glargine last given last evening).  May stop if BG >150 x 2.  -if unable to maintain glucose >80 using D5LR, D10W at 80 ml/h  -regular adult IV insulin infusion ordered for   -aspart 1 unit per 10 grams carb with meals and snacks ordered to start tomorrow  -will transition to glargine tomorrow    -needs close outpatient follow up for management.  Unclear if she will return to [facility] provider  - needs follow up diabetes education re: InPen, ambulatory insulin pump    Laura Engle APRN -[id number]    Diabetes Management Team job code: [id number]  I spent a total of 85 minutes bedside and on the inpatient unit managing the glycemic care of Arielle Montenegro. Over 50% of my time on the unit was spent counseling the patient  and/or coordinating care regarding acute glucose management.  See note for details.

## 2020-07-03 NOTE — PLAN OF CARE
Pt prepped for scheduled 1030 C/S. Awaiting consent for anesthesia and surgery to be signed. Receiving IV fluids per ERAS protocol. Pt drank 1/2 gatorade at 0815 and had 3 units novolog ( prior to admission).

## 2020-07-03 NOTE — ADDENDUM NOTE
Addendum  created 07/03/20 1505 by Radames Martin, DO    Order list changed, Order sets accessed

## 2020-07-03 NOTE — ADDENDUM NOTE
Addendum  created 07/03/20 1621 by Heather Coffman MD    Clinical Note Signed, Intraprocedure Blocks edited, Intraprocedure Flowsheets edited, Intraprocedure LDAs edited, LDA properties accepted

## 2020-07-03 NOTE — H&P
"L&D History and Physical   July 3, 2020   Arielle Montenegro  [id number]      HPI: Arielle Montenegro is a 35 year old  at 39w1d who presents for a scheduled repeat  section    She states that she is feeling well today, did feel a little sweaty this morning with her BG in the 60's here, better now.  She denies headache, vision changes, chest pain, shortness of breath, fever, chills, nausea, vomiting or other systemic complaints. She denies vaginal bleeding or loss of fluid and is feeling normal fetal movement.      She states her FBG have been . She thinks her 1hr PP ranges from 110-160 but also reports: \"I don't know they are always so different and I know my doctor wants me to check them before I eat and take insulin before meals but I usually just do it after\". Reports she takes 2u per carb.    Her pregnancy is notable for:  -Scant PNC   -Hx CS x2  -Type 1 diabetic   -Hx of Preeclampsia   -MRSA positive  -R eye blindness    ROS: No headaches, vision changes, nausea, vomiting, fevers, chills, chest pain, SOB, abdominal pain, constipation, diarrhea, dysuria, changes in vaginal discharge or edema in extremities noted.     OBHX:   OB History    Para Term  AB Living   3 2 1 1 0 2   SAB TAB Ectopic Multiple Live Births   0 0 0 0 2      # Outcome Date GA Lbr Eladio/2nd Weight Sex Delivery Anes PTL Lv   3 Current            2 Term 01/09/15 39w6d  3.09 kg (6 lb 13 oz) M CS-LTranv Spinal N SARAH      Name: Tobi      Apgar1: 8  Apgar5: 9   1  13 36w0d  2.58 kg (5 lb 11 oz) F CS-LTranv  N SARAH      Birth Comments: System Generated. Please review and update pregnancy details.      Name: Sissy      Obstetric Comments   Baby was breech.    Pt had preeclampsia   First baby born in [place]    Second in [facility]       Past Medical History:   Diagnosis Date     Blindness of right eye      Diabetes mellitus type 1 (H)     Diagnosed as a child       Past Surgical History: "   Procedure Laterality Date      SECTION  13      SECTION N/A 2015    Procedure:  SECTION;  Surgeon: John Hudson MD;  Location: [facility] L[facility]     ENUCLEATION Right 3/20/2015    Procedure: ENUCLEATION;  Surgeon: Edilson Islas MD;  Location: [facility]       Medications:   No current facility-administered medications on file prior to encounter.   albuterol (PROAIR HFA/PROVENTIL HFA/VENTOLIN HFA) 108 (90 Base) MCG/ACT inhaler, Inhale 2 puffs into the lungs every 6 hours (Patient not taking: Reported on 2020)  alcohol swab prep pads, Use to swab area of injection/joelle as directed.  aspirin (ASA) 81 MG EC tablet, Take 1 tablet (81 mg) by mouth daily  blood glucose (NO BRAND SPECIFIED) test strip, Use to test blood sugar 4 times daily or as directed.  blood glucose calibration (NO BRAND SPECIFIED) solution, To accompany: Blood Glucose Monitor Brands: per insurance.  blood glucose monitoring (NO BRAND SPECIFIED) meter device kit, Use to test blood sugar 4 times daily or as directed.  blood glucose monitoring (NO BRAND SPECIFIED) meter device kit, Use to test blood sugar.  One Touch Ultra or Verio.  blood glucose monitoring (NO BRAND SPECIFIED) test strip, Use to test blood sugar 4 times daily or as directed. To accompany: Blood Glucose Monitor Brands: per insurance.  blood glucose monitoring (NO BRAND SPECIFIED) test strip, Use to test blood sugars 4 times daily or as directed  Continuous Blood Gluc  (DEXCOM G6 ) TOMASA, 1 each once for 1 dose Use to read blood sugars as per 's instructions.  Continuous Blood Gluc  (DEXCOM G6 ) TOMASA, 1 each once for 1 dose Use to read blood sugars as per 's instructions.  Continuous Blood Gluc Sensor (DEXCOM G6 SENSOR) MISC, 1 each every 10 days Change every 10 days.  Continuous Blood Gluc Sensor (DEXCOM G6 SENSOR) MISC, 1 each every 10 days Change every 10 days.  Continuous Blood Gluc Transmit  (DEXCOM G6 TRANSMITTER) MISC, 1 each every 3 months Change every 3 months.  Continuous Blood Gluc Transmit (DEXCOM G6 TRANSMITTER) MISC, 1 each every 3 months Change every 3 months.  fluticasone-salmeterol (ADVAIR) 100-50 MCG/DOSE inhaler, Inhale 1 puff into the lungs every 12 hours (Patient not taking: Reported on 6/4/2020)  Injection Device for insulin (INPEN 100-GREY-SAMANTHA) TOMASA, 1 each 8 times daily  insulin aspart (NOVOPEN ECHO) 100 UNIT/ML cartridge, Inject 1-12 Units Subcutaneous 4 times daily (with meals and nightly) For use with INPen device  insulin glargine (LANTUS SOLOSTAR) 100 UNIT/ML pen, Inject 25 Units Subcutaneous daily - Subcutaneous  NOVOLOG PENFILL 100 UNIT/ML soln, Inject 2-12 Units Subcutaneous 2 times daily (before meals)  thin (NO BRAND SPECIFIED) lancets, Use to test blood sugar 4 times daily or as directed.        No Known Allergies    Family History   Problem Relation Age of Onset     Family History Negative Mother      Family History Negative Father      Diabetes Other         father's side       SocialHX:   Social History     Tobacco Use     Smoking status: Former Smoker     Packs/day: 0.00     Types: Cigarettes     Smokeless tobacco: Never Used     Tobacco comment: smokes 2 cigarettes/day-none for several months   Substance Use Topics     Alcohol use: No     Alcohol/week: 0.0 standard drinks     Drug use: No       ROS: 10-point ROS negative except as indicated in HPI.    Physical Exam:  Vitals:    07/03/20 0841   BP: 124/77   Resp: 18   Temp: 98.3  F (36.8  C)   TempSrc: Oral     General: alert, oriented female, resting in bed in NAD  CV: regular rate and rhythm   Lungs: clear bilaterally, no crackles or wheezes.   Abdomen: soft, gravid, non-tender, EFW 7#.  Extremities: bilateral lower extremities non-tender with trace edema    SVE: Deferred  Membranes: Intact    Presentation: Not assessed     FHT: baseline 120, moderate variability, accelerations present, decelerations absent, reactive  NST  [place]: no contractions       Prenatal Labs:   Lab Results   Component Value Date    ABO O 2020    RH Pos 2020    AS Neg 2020    HEPBANG Nonreactive 2020    CHPCRT Negative 2020    GCPCRT Negative 2020    TREPAB Negative 2012    RUBELLAABIGG 134 2012    HGB 11.6 (L) 2020    HIV Negative 2012       GBS Status:   Lab Results   Component Value Date    GBS Negative 2020       No results found for: PAP    Labs:   Results for orders placed or performed during the hospital encounter of 20 (from the past 24 hour(s))   Glucose by meter   Result Value Ref Range    Glucose 60 (L) 70 - 99 mg/dL   Glucose by meter   Result Value Ref Range    Glucose 60 (L) 70 - 99 mg/dL       Assessment: 35 year old  at 39w1d by11w US not c/w LMP , here for  Scheduled repeat  section.  EFW 7#.   Pregnancy also complicated by: MRSA positive, type 1 diabetes with right eye blindness and proteinuria, thrombocytopenia, scant prenatal care, hx of THC use, hx preeclampsia     Plan:    #-PNC:     -Rh positive, Rubella immune  -GBS neg     #Thrombocytopenia  -213>129>132>117 ()    #Hx PreE  -Normotensive on arrival  -Denies sxs of preE  -Normotensive during her  visit appts (7 visits) -129/79-86  -Elevated UPC 0.52 (2020) and low plt 117; ALT/AST normal in [place]  -HELLP labs prn    #Type 1 diabetes  #Proteinuria   #R eye blindness  -Longstanding Type 1 DM, diagnosed age 6  -Does not seem to have a good understanding of her BG versus not being very engaged in discussion this morning/hard to interview   -PrCr ratio 1.08 in 2020 at [place]. Cr 0.56. Has had microalbuminuria dating back to at least 2004  -A1c on  7.5 () > 6.4 ()  -Normal fetal echo and L2 US  -Pt reports she is on lantus 27u at bedtime and 2u per carb  -BG 60s x 2 this morning on arrival, treated w apple juice and D50, now 119 prior to CS  -Endocrine consult placed     #Scant  [facility]  #Hx THC use  -UDS  -SW consult    #Anxiety/depression  -Not on medication  -Patient states she would like to start antidepressant in the PP period, she feels that she did have postpartum depression after her last delivery     # section:  -Admitted to L&D for scheduled repeat  section.  -Previous CS x 2.   -Op note available   -Consent obtained: The risks, benefits, and alternatives of  section were discussed, including the risks of bleeding, infection, injury to surrounding organs, fetal injury, and remote risks of hysterectomy. She consented to a blood transfusion in the event of a life threatening amount of bleeding. She had time to ask questions and agreed to proceed. Surgical consent was signed. Blood transfusion consent signed.   -Labs: CBC, T&S  -Fen/GI: NPO, IVFs, Bender.  -Pain: Spinal per anesthesia.   -ID: Ancef for maikel-op antibiotics.  -PPX: SCDs prior to surgery     #Fetal Well Being  -Category I FHT.  Continue EFM.      Patient care plan discussed under supervision of Dr. Atkins.    MATHIEU Swenson MD  Obstetrics and Gyncology, PGY-2    Physician Attestation   I, Aparna Atkins MD, personally examined and evaluated this patient.  I discussed the patient with the resident and care team, and agree with the assessment and plan of care as documented in the note above.   I personally reviewed vital signs, medications, labs, fetal heart tones and toco.  Key findings: Patient is here for a scheduled  section. Fetal status is reassuring with a reactive NST. She was not feeling very well and BG was 60 and remained there even after apple juice.  She was given iv dextrose with improvement in her sugars. We reviewed the surgical consent and blood transfusion consent together and informed consent was obtained.    Aparna Atkins MD   2020

## 2020-07-03 NOTE — PROVIDER NOTIFICATION
07/03/20 1005   Provider Notification   Provider Name/Title Anesthesia Team   Method of Notification Phone   Notification Reason Lab/Diagnostic Study   BG 60 and symptomatic. Pt has IV access but no order for IV glucose. Giving apple juice 240mls (30 g carbs) now with recheck in 15 minutes.

## 2020-07-03 NOTE — ANESTHESIA CARE TRANSFER NOTE
Patient: Arielle Montenegro    Procedure(s):   SECTION    Diagnosis: Previous  delivery, antepartum condition or complication [O34.219]  Diagnosis Additional Information: No value filed.    Anesthesia Type:   MAC, Spinal, Peripheral Nerve Block, For Post-op pain in coordination with surgeon     Note:  Airway :Face Mask  Patient transferred to:PACU  Comments: VSS. Breathing spontaneously at a regular rate with adequate tidal volumes and maintaining O2 sats on 6L. Denies nausea or pain, is coughing quite a bit and complains of a dry throat.      Heather Coffman MD [facility] pager [phone]  Anesthesia CA-1  Handoff Report: Identifed the Patient, Identified the Reponsible Provider, Reviewed the pertinent medical history, Discussed the surgical course, Reviewed Intra-OP anesthesia mangement and issues during anesthesia, Set expectations for post-procedure period and Allowed opportunity for questions and acknowledgement of understanding      Vitals: (Last set prior to Anesthesia Care Transfer)    CRNA VITALS  7/3/2020 1253 - 7/3/2020 1342      7/3/2020             Pulse:  107    Ht Rate:  106    SpO2:  99 %                Electronically Signed By: Heather Coffman MD  July 3, 2020  1:42 PM

## 2020-07-03 NOTE — BRIEF OP NOTE
[facility]   BRIEF OPERATIVE NOTE:  SECTION     Surgery Date:  July 3, 2020   Surgeon(s): Aparna Atkins MD  Assistants:  Misty Clifford MD, [facility], PGY-2    Preoperative Diagnoses:  - at 39w1d   -History of  section x  -Scant PNC   -Hx CS x2  -Type 1 diabetic   -Hx of Preeclampsia   -MRSA positive  -R eye blindness    Postoperative diagnoses:  - at 39w1d, now delivered    Procedure performed:  Repeat low segment transverse  section via Pfannenstiel skin incision with double layer uterine closure    Anesthesia:  Spinal with duramorph converted to GETA  QBL (mL): Pending EBL ~500cc   Fluid replacement: 900 mL crystalloid.   Specimens: Placenta (not sent);cord gases   Complications: Patient experienced nausea and emesis after the spinal, she then started coughing and likely aspirated so she was converted to general     Operative findings:   -Single, liveborn male infant at 1202 hours on 7/3/2020. Apgars of 8 and 9 at one and five minutes. Birth weight: pending.  Fetal presentation: Vertex. Amniotic fluid: Clear.    -Cord gases pending     -Placenta intact with 3 vessel cord.     -Normal appearing uterus, fallopian tubes, ovaries.   -Bladder adherent to lower uterine segment, bladder flap easily created.   -Moderate recto-fascial adhesions. No intraabdominal adhesions aside from filmy adhesions of bladder on the lower uterine segment  No abdominal wall adhesions.   -Good uterine tone.     Transferred to PACU in stable condition.     Misty Clifford MD, [facility] Obstetrics and Gynecology - PGY-2  20   1:53 PM

## 2020-07-03 NOTE — ADDENDUM NOTE
Addendum  created 07/03/20 1556 by Radames Martin, DO    Order list changed, Order sets accessed

## 2020-07-03 NOTE — ANESTHESIA POSTPROCEDURE EVALUATION
Anesthesia POST Procedure Evaluation    Patient: Arielle Montenegro   MRN:     [id number] Gender:   female   Age:    35 year old :      1985        Preoperative Diagnosis: Previous  delivery, antepartum condition or complication [O34.219]   Procedure(s):   SECTION   Postop Comments: No value filed.     Anesthesia Type: MAC, Spinal, Peripheral Nerve Block, For Post-op pain in coordination with surgeon          Postop Pain Control: Uneventful            Sign Out: Well controlled pain   PONV: No   Neuro/Psych: Uneventful            Sign Out: Acceptable/Baseline neuro status   Airway/Respiratory: Uneventful            Sign Out: Acceptable/Baseline resp. status   CV/Hemodynamics: Uneventful            Sign Out: Acceptable CV status   Other NRE: NONE   DID A NON-ROUTINE EVENT OCCUR? No    Event details/Postop Comments:  Patient refuse TAP Block         Last Anesthesia Record Vitals:  CRNA VITALS  7/3/2020 1253 - 7/3/2020 1353      7/3/2020             Pulse:  107    Ht Rate:  106    SpO2:  99 %          Last PACU Vitals:  Vitals Value Taken Time   /108 7/3/2020  2:50 PM   Temp 36.6  C (97.9  F) 7/3/2020  2:00 PM   Pulse 96 7/3/2020  2:50 PM   Resp 12 7/3/2020  2:53 PM   SpO2 96 % 7/3/2020  2:53 PM   Temp src     NIBP     Pulse     SpO2     Resp     Temp     Ht Rate     Temp 2     Vitals shown include unvalidated device data.      Electronically Signed By: Radames Martin DO, July 3, 2020, 2:54 PM

## 2020-07-03 NOTE — ANESTHESIA PROCEDURE NOTES
Spinal/LP Procedure Note    Spinal Block  Staff -   Anesthesiologist:  Radames Martin DO    CRNA: Heather Coffman MD    Performed By: resident        Location: OB  Procedure Start/Stop Times:     patient identified, IV checked, site marked, risks and benefits discussed, informed consent, monitors and equipment checked, pre-op evaluation, at physician/surgeon's request and post-op pain management      Correct Patient: Yes      Correct Position: Yes      Correct Site: Yes      Correct Procedure: Yes      Correct Laterality:  Yes    Site Marked:  Yes  Procedure:     Procedure:  Intrathecal    ASA:  3    Position:  Sitting    Sterile Prep: chloraprep      Insertion site:  L4-5    Approach:  Midline    Needle Type:  Pencan    Needle gauge (G):  25    Local Skin Infiltration:  1% lidocaine    amount (ml):  3    Needle Length (in):  5    Introducer used: Yes      Introducer gauge:  20 G    Attempts:  1    Redirects:  0    CSF:  Clear    Paresthesias:  No  Assessment/Narrative:     Sensory Level:  T4        
none

## 2020-07-04 LAB
ALT SERPL W P-5'-P-CCNC: 27 U/L (ref 0–50)
AST SERPL W P-5'-P-CCNC: 47 U/L (ref 0–45)
CAPILLARY BLOOD COLLECTION: NORMAL
CREAT SERPL-MCNC: 0.88 MG/DL (ref 0.52–1.04)
ERYTHROCYTE [DISTWIDTH] IN BLOOD BY AUTOMATED COUNT: 14.9 % (ref 10–15)
GFR SERPL CREATININE-BSD FRML MDRD: 85 ML/MIN/{1.73_M2}
GLUCOSE BLDC GLUCOMTR-MCNC: 114 MG/DL (ref 70–99)
GLUCOSE BLDC GLUCOMTR-MCNC: 120 MG/DL (ref 70–99)
GLUCOSE BLDC GLUCOMTR-MCNC: 126 MG/DL (ref 70–99)
GLUCOSE BLDC GLUCOMTR-MCNC: 134 MG/DL (ref 70–99)
GLUCOSE BLDC GLUCOMTR-MCNC: 191 MG/DL (ref 70–99)
GLUCOSE BLDC GLUCOMTR-MCNC: 202 MG/DL (ref 70–99)
GLUCOSE BLDC GLUCOMTR-MCNC: 230 MG/DL (ref 70–99)
GLUCOSE BLDC GLUCOMTR-MCNC: 36 MG/DL (ref 70–99)
GLUCOSE BLDC GLUCOMTR-MCNC: 45 MG/DL (ref 70–99)
GLUCOSE BLDC GLUCOMTR-MCNC: 70 MG/DL (ref 70–99)
GLUCOSE BLDC GLUCOMTR-MCNC: 97 MG/DL (ref 70–99)
HBA1C MFR BLD: 5.8 % (ref 0–5.6)
HCT VFR BLD AUTO: 30 % (ref 35–47)
HGB BLD-MCNC: 10.1 G/DL (ref 11.7–15.7)
MAGNESIUM SERPL-MCNC: 5 MG/DL (ref 1.6–2.3)
MCH RBC QN AUTO: 30.2 PG (ref 26.5–33)
MCHC RBC AUTO-ENTMCNC: 33.7 G/DL (ref 31.5–36.5)
MCV RBC AUTO: 90 FL (ref 78–100)
PLATELET # BLD AUTO: 120 10E9/L (ref 150–450)
RBC # BLD AUTO: 3.34 10E12/L (ref 3.8–5.2)
WBC # BLD AUTO: 11.1 10E9/L (ref 4–11)

## 2020-07-04 PROCEDURE — 25800025 ZZH RX 258: Performed by: STUDENT IN AN ORGANIZED HEALTH CARE EDUCATION/TRAINING PROGRAM

## 2020-07-04 PROCEDURE — 25000128 H RX IP 250 OP 636: Performed by: STUDENT IN AN ORGANIZED HEALTH CARE EDUCATION/TRAINING PROGRAM

## 2020-07-04 PROCEDURE — 25000132 ZZH RX MED GY IP 250 OP 250 PS 637: Performed by: STUDENT IN AN ORGANIZED HEALTH CARE EDUCATION/TRAINING PROGRAM

## 2020-07-04 PROCEDURE — 00000146 ZZHCL STATISTIC GLUCOSE BY METER IP

## 2020-07-04 PROCEDURE — 83735 ASSAY OF MAGNESIUM: CPT | Performed by: STUDENT IN AN ORGANIZED HEALTH CARE EDUCATION/TRAINING PROGRAM

## 2020-07-04 PROCEDURE — 83036 HEMOGLOBIN GLYCOSYLATED A1C: CPT | Performed by: STUDENT IN AN ORGANIZED HEALTH CARE EDUCATION/TRAINING PROGRAM

## 2020-07-04 PROCEDURE — 87081 CULTURE SCREEN ONLY: CPT | Performed by: OBSTETRICS & GYNECOLOGY

## 2020-07-04 PROCEDURE — 84460 ALANINE AMINO (ALT) (SGPT): CPT | Performed by: STUDENT IN AN ORGANIZED HEALTH CARE EDUCATION/TRAINING PROGRAM

## 2020-07-04 PROCEDURE — 36416 COLLJ CAPILLARY BLOOD SPEC: CPT | Performed by: STUDENT IN AN ORGANIZED HEALTH CARE EDUCATION/TRAINING PROGRAM

## 2020-07-04 PROCEDURE — 85027 COMPLETE CBC AUTOMATED: CPT | Performed by: STUDENT IN AN ORGANIZED HEALTH CARE EDUCATION/TRAINING PROGRAM

## 2020-07-04 PROCEDURE — 36415 COLL VENOUS BLD VENIPUNCTURE: CPT | Performed by: STUDENT IN AN ORGANIZED HEALTH CARE EDUCATION/TRAINING PROGRAM

## 2020-07-04 PROCEDURE — 12000001 ZZH R&B MED SURG/OB UMMC

## 2020-07-04 PROCEDURE — 82565 ASSAY OF CREATININE: CPT | Performed by: STUDENT IN AN ORGANIZED HEALTH CARE EDUCATION/TRAINING PROGRAM

## 2020-07-04 PROCEDURE — 25000131 ZZH RX MED GY IP 250 OP 636 PS 637: Performed by: STUDENT IN AN ORGANIZED HEALTH CARE EDUCATION/TRAINING PROGRAM

## 2020-07-04 PROCEDURE — 84450 TRANSFERASE (AST) (SGOT): CPT | Performed by: STUDENT IN AN ORGANIZED HEALTH CARE EDUCATION/TRAINING PROGRAM

## 2020-07-04 RX ORDER — DEXTROSE MONOHYDRATE 25 G/50ML
25-50 INJECTION, SOLUTION INTRAVENOUS
Status: DISCONTINUED | OUTPATIENT
Start: 2020-07-04 | End: 2020-07-05 | Stop reason: HOSPADM

## 2020-07-04 RX ORDER — DEXTROSE MONOHYDRATE 100 MG/ML
INJECTION, SOLUTION INTRAVENOUS CONTINUOUS
Status: DISCONTINUED | OUTPATIENT
Start: 2020-07-04 | End: 2020-07-04

## 2020-07-04 RX ORDER — DIPHENHYDRAMINE HCL 25 MG
25 CAPSULE ORAL EVERY 6 HOURS PRN
Status: DISCONTINUED | OUTPATIENT
Start: 2020-07-04 | End: 2020-07-05 | Stop reason: HOSPADM

## 2020-07-04 RX ORDER — NICOTINE POLACRILEX 4 MG
15-30 LOZENGE BUCCAL
Status: DISCONTINUED | OUTPATIENT
Start: 2020-07-04 | End: 2020-07-05 | Stop reason: HOSPADM

## 2020-07-04 RX ORDER — NIFEDIPINE 30 MG/1
30 TABLET, EXTENDED RELEASE ORAL DAILY
Status: DISCONTINUED | OUTPATIENT
Start: 2020-07-04 | End: 2020-07-05 | Stop reason: HOSPADM

## 2020-07-04 RX ORDER — DIPHENHYDRAMINE HYDROCHLORIDE 50 MG/ML
25 INJECTION INTRAMUSCULAR; INTRAVENOUS EVERY 6 HOURS PRN
Status: DISCONTINUED | OUTPATIENT
Start: 2020-07-04 | End: 2020-07-05 | Stop reason: HOSPADM

## 2020-07-04 RX ORDER — FUROSEMIDE 10 MG/ML
5 INJECTION INTRAMUSCULAR; INTRAVENOUS ONCE
Status: COMPLETED | OUTPATIENT
Start: 2020-07-04 | End: 2020-07-04

## 2020-07-04 RX ADMIN — INSULIN GLARGINE 12 UNITS: 100 INJECTION, SOLUTION SUBCUTANEOUS at 09:29

## 2020-07-04 RX ADMIN — INSULIN ASPART 2 UNITS: 100 INJECTION, SOLUTION INTRAVENOUS; SUBCUTANEOUS at 13:13

## 2020-07-04 RX ADMIN — NIFEDIPINE 30 MG: 30 TABLET, FILM COATED, EXTENDED RELEASE ORAL at 12:24

## 2020-07-04 RX ADMIN — IBUPROFEN 800 MG: 800 TABLET, FILM COATED ORAL at 17:23

## 2020-07-04 RX ADMIN — KETOROLAC TROMETHAMINE 30 MG: 30 INJECTION, SOLUTION INTRAMUSCULAR at 05:12

## 2020-07-04 RX ADMIN — ACETAMINOPHEN 975 MG: 325 TABLET, FILM COATED ORAL at 09:33

## 2020-07-04 RX ADMIN — IBUPROFEN 800 MG: 800 TABLET, FILM COATED ORAL at 23:45

## 2020-07-04 RX ADMIN — SIMETHICONE 80 MG: 80 TABLET, CHEWABLE ORAL at 23:54

## 2020-07-04 RX ADMIN — FUROSEMIDE 5 MG: 10 INJECTION, SOLUTION INTRAMUSCULAR; INTRAVENOUS at 02:01

## 2020-07-04 RX ADMIN — DEXTROSE MONOHYDRATE: 100 INJECTION, SOLUTION INTRAVENOUS at 05:42

## 2020-07-04 RX ADMIN — DOCUSATE SODIUM 50 MG AND SENNOSIDES 8.6 MG 2 TABLET: 8.6; 5 TABLET, FILM COATED ORAL at 20:54

## 2020-07-04 RX ADMIN — IBUPROFEN 800 MG: 800 TABLET, FILM COATED ORAL at 12:24

## 2020-07-04 RX ADMIN — INSULIN ASPART 2 UNITS: 100 INJECTION, SOLUTION INTRAVENOUS; SUBCUTANEOUS at 09:56

## 2020-07-04 RX ADMIN — DIPHENHYDRAMINE HYDROCHLORIDE 25 MG: 25 CAPSULE ORAL at 01:01

## 2020-07-04 RX ADMIN — ACETAMINOPHEN 975 MG: 325 TABLET, FILM COATED ORAL at 20:54

## 2020-07-04 RX ADMIN — ACETAMINOPHEN 975 MG: 325 TABLET, FILM COATED ORAL at 02:00

## 2020-07-04 RX ADMIN — DOCUSATE SODIUM 50 MG AND SENNOSIDES 8.6 MG 1 TABLET: 8.6; 5 TABLET, FILM COATED ORAL at 09:33

## 2020-07-04 RX ADMIN — NALBUPHINE HYDROCHLORIDE 5 MG: 20 INJECTION, SOLUTION INTRAMUSCULAR; INTRAVENOUS; SUBCUTANEOUS at 03:15

## 2020-07-04 ASSESSMENT — ACTIVITIES OF DAILY LIVING (ADL)
DRESS: 0-->INDEPENDENT
SWALLOWING: 0-->SWALLOWS FOODS/LIQUIDS WITHOUT DIFFICULTY
RETIRED_COMMUNICATION: 0-->UNDERSTANDS/COMMUNICATES WITHOUT DIFFICULTY
RETIRED_EATING: 0-->INDEPENDENT
AMBULATION: 0-->INDEPENDENT
TOILETING: 0-->INDEPENDENT
COGNITION: 0 - NO COGNITION ISSUES REPORTED
BATHING: 0-->INDEPENDENT
TRANSFERRING: 0-->INDEPENDENT

## 2020-07-04 NOTE — BRIEF OP NOTE
[facility], [place]    Brief Operative Note      Arielle Montenegro  1985  [id number]     Pre-operative diagnosis:    Intrauterine pregnancy at 39 weeks 1 day  Previous  deliveries  Type 1 diabetes mellitus  History of Pre Eclampsia in previous pregnancy    Post-operative diagnosis Same as pre-operative diagnosis, delivered    Procedure: Procedure(s):   SECTION  Surgeon: Surgeon(s) and Role:     * Aparna Atkins MD - Primary   First assistant:  Misty Clifford MD  Anesthesia: Spinal Converted to general   Estimated blood loss: 568     Specimens:   ID Type Source Tests Collected by Time Destination   1 :  Cord blood, arterial Umbilical Cord OR DOCUMENTATION ONLY Aparna Atkins MD 7/3/2020 12:55 PM    2 :  Cord blood, venous Umbilical Cord OR DOCUMENTATION ONLY Aparna Atkins MD 7/3/2020 12:55 PM    3 :  Placenta Placenta SEE PROVIDERS ORDERS Aparna Atkins MD 7/3/2020 12:56 PM      Findings:   viable male infant delivered from vertex presentation, clear amniotic fluid, apgars 8 and 9, weight 3487 gms,   Complications: maternal nausea and vomiting causing aspiration which required intubation and general anesthesia. .    Aparna Atkins MD   Dictated job ID # [id number]  July 3, 2020

## 2020-07-04 NOTE — PLAN OF CARE
Data: Arielle Montenegro transferred to 7121 via cart at 1825. Baby transferred via parent's arms.  Action: Receiving unit notified of transfer: Yes. Patient and family notified of room change. Report given to Pita RN at bedside. Belongings sent to receiving unit. Accompanied by Registered Nurse. Oriented patient to surroundings. Call light within reach. ID bands double-checked with receiving RN.  Response: Patient tolerated transfer and is stable.

## 2020-07-04 NOTE — PROVIDER NOTIFICATION
"   07/04/20 0048   Provider Notification   Provider Name/Title Md Lim    Method of Notification At Bedside   MD Lim at the bedside to evaluate patient; Mg+ check. O2 sats during hourly checks were reading 90 and 91% for two readings. Per MD pt aspirated during procedure and lungs \"sound like junk\". Stat Mg+ level placed precautionary to r/o Mg+ toxicity. Bedside RN at the patients bedside and aware of plan.   Charge RN discussed with MD Lim if she would like Mg+ stopped until level returns. MD Lim awaiting MD to return page discussed between MD Lim and Charge RN she has a low suspicion for Mg+ toxicity vs. Operative Aspiration with low O2 sats. MD iLm gave ok to stop Mg+ until Mg+ level returns.     Spoke to MD Lim Mg+ level is 5.0 she would like Mg+ to remain stopped. Keep O2 sats >92%. IV Dose lasix ordered. No abx needed at this time per MD Lim for aspiration. MD lim would like all fluids including D5LR to be discontinued despite hypoglycemia episodes due to concern of fluid overload. Writer discussed with MD when should RN recheck BG and MD advised between 3-4 hrs. Bedside RN aware of plan and will recheck BG between 6308-0841 or sooner if pt is symptomatic.   "

## 2020-07-04 NOTE — PROVIDER NOTIFICATION
07/04/20 0507   Provider Notification   Provider Name/Title Md Lim    Method of Notification Electronic Page   Request Evaluate in Person   Notification Reason Lab Results   7121 H.W pt repeat BG is 45 please call unit pt states she is not drinking anything else. thank you   Winnie [phone]

## 2020-07-04 NOTE — PROVIDER NOTIFICATION
07/04/20 0457   Provider Notification   Provider Name/Title MD Lim    Method of Notification Electronic Page   Notification Reason Lab Results   Would you like a stat serum BG from lab thank you Winnie [phone]    MD Lim would like us to follow typical hypoglycemia protocol and follow back up with her with recheck of BG.

## 2020-07-04 NOTE — DISCHARGE SUMMARY
"[facility] Discharge Summary    Arielle Montenegro MRN# [id number]   Age: 35 year old YOB: 1985     Date of Admission:  7/3/2020  Date of Discharge:  2020  Admitting Physician:  Aparna Atkins MD  Discharge Physician:  Aparna Atkins MD    Admit Dx:   -Intrauterine pregnancy at 39w1d  -Scant PNC   -Hx CS x2  -Type 1 diabetic   -Hx of Preeclampsia   -MRSA positive  -R eye blindness    Discharge Dx:  -Same as above, now delivered  -Preeclampsia w/SF     Procedures:  - Repeat low transverse  section with double layer uterine closure via Pfannenstiel incision  - GETA    Admit HPI:  Arielle Montenegro is a 35 year old  at 39w1d who presents for a scheduled repeat  section     She states that she is feeling well today, did feel a little sweaty this morning with her BG in the 60's here, better now.  She denies headache, vision changes, chest pain, shortness of breath, fever, chills, nausea, vomiting or other systemic complaints. She denies vaginal bleeding or loss of fluid and is feeling normal fetal movement.       She states her FBG have been . She thinks her 1hr PP ranges from 110-160 but also reports: \"I don't know they are always so different and I know my doctor wants me to check them before I eat and take insulin before meals but I usually just do it after\". Reports she takes 2u per carb.    Please see her admit H&P for full details of her PMH, PSH, Meds, Allergies and exam on admit.    Operative Course:  Surgery was complicated by aspiration. EBL from the delivery was 569. Please see her  Section Operative Note for full details regarding her delivery.    Operative Findings:   Viable male infant delivered from vertex presentation, clear amniotic fluid, apgars 8 and 9, weight 3487 gms  Complications: maternal nausea and vomiting causing aspiration which required intubation and general anesthesia.     Postoperative Course:  Her " postoperative course was complicated by preeclampsia w/SF. She received magnesium for seizure ppx, which was discontinued early due to crackles, worsening swelling on exam. She received a dose of IV lasix with good diuresis. Her blood pressures were high mild, and she was started on procardia 30 XL. Her HELLP labs were remarkable for Cr of 0.98, which improved to 0.91. Platelets were stable at 128.     Her T1DM was managed by endocrine. She was hypoglycemic on POD#1, requiring brief D10W infusion. Thereafter, she was hyperglycemic. Endocrinology managed insulin inpatient and advised patient on insulin regimen for discharge.     Psych was consulted for poor social situation, single mother with concern for odd behavior and possible depression. They advised patient safe for discharge and offered resources.    On POD#2, she was meeting all of her postpartum goals and requested discharge. Advised her to stay an additional day for BP monitoring and BG management, but she insisted. She was voiding without difficulty, tolerating a regular diet without nausea and vomiting, her pain was well controlled on oral pain medicines and her lochia was appropriate. Her hemoglobin prior to delivery was 10.9 and after delivery was 10.6. Her Rh status was positive and Rhogam was not indicated.    Discharge Medications:     Review of your medicines      START taking      Dose / Directions   acetaminophen 325 MG tablet  Commonly known as:  TYLENOL  Used for:   delivery delivered      Dose:  325-650 mg  Take 1-2 tablets (325-650 mg) by mouth every 6 hours as needed for mild pain  Quantity:  1 Bottle  Refills:  0     bisacodyl 10 MG suppository  Commonly known as:  DULCOLAX  Used for:   delivery delivered      Dose:  10 mg  Place 1 suppository (10 mg) rectally daily as needed for constipation  Quantity:  10 suppository  Refills:  1     ibuprofen 600 MG tablet  Commonly known as:  ADVIL/MOTRIN  Used for:   delivery  delivered      Dose:  600 mg  Take 1 tablet (600 mg) by mouth every 6 hours as needed for moderate pain  Quantity:  60 tablet  Refills:  1     NIFEdipine ER OSMOTIC 30 MG 24 hr tablet  Commonly known as:  PROCARDIA XL  Used for:  Gestational hypertension, third trimester      Dose:  30 mg  Take 1 tablet (30 mg) by mouth daily  Quantity:  30 tablet  Refills:  1     senna-docusate 8.6-50 MG tablet  Commonly known as:  SENOKOT-S/PERICOLACE  Used for:   delivery delivered      Dose:  1 tablet  Take 1 tablet by mouth daily  Quantity:  60 tablet  Refills:  1        CONTINUE these medicines which have NOT CHANGED      Dose / Directions   albuterol 108 (90 Base) MCG/ACT inhaler  Commonly known as:  PROAIR HFA/PROVENTIL HFA/VENTOLIN HFA  Used for:  Moderate persistent reactive airway disease without complication      Dose:  2 puff  Inhale 2 puffs into the lungs every 6 hours  Quantity:  1 Inhaler  Refills:  3     alcohol swab prep pads  Used for:  Type 1 diabetes mellitus with diabetic autonomic neuropathy (H)      Use to swab area of injection/joelle as directed.  Quantity:  100 each  Refills:  3     blood glucose calibration solution  Commonly known as:  NO BRAND SPECIFIED  Used for:  Type 1 diabetes mellitus with diabetic autonomic neuropathy (H)      To accompany: Blood Glucose Monitor Brands: per insurance.  Quantity:  1 Bottle  Refills:  3     * blood glucose monitoring meter device kit  Commonly known as:  NO BRAND SPECIFIED  Used for:  Type 1 diabetes mellitus with hyperglycemia (H)      Use to test blood sugar.  One Touch Ultra or Verio.  Quantity:  1 kit  Refills:  0     * blood glucose monitoring meter device kit  Commonly known as:  NO BRAND SPECIFIED  Used for:  Type 1 diabetes mellitus with hypoglycemia and without coma (H)      Use to test blood sugar 4 times daily or as directed.  Quantity:  1 kit  Refills:  0     * blood glucose test strip  Commonly known as:  NO BRAND SPECIFIED      Use to test blood  sugars 4 times daily or as directed  Quantity:  100 strip  Refills:  2     * blood glucose test strip  Commonly known as:  NO BRAND SPECIFIED  Used for:  Type 1 diabetes mellitus with diabetic autonomic neuropathy (H)      Use to test blood sugar 4 times daily or as directed. To accompany: Blood Glucose Monitor Brands: per insurance.  Quantity:  100 strip  Refills:  6     * blood glucose test strip  Commonly known as:  NO BRAND SPECIFIED  Used for:  Type 1 diabetes mellitus with hypoglycemia and without coma (H)      Use to test blood sugar 4 times daily or as directed.  Quantity:  100 strip  Refills:  6     * Dexcom G6  Jackie  Used for:  Type 1 diabetes mellitus with diabetic autonomic neuropathy (H)      Dose:  1 each  1 each once for 1 dose Use to read blood sugars as per 's instructions.  Quantity:  1 Device  Refills:  0     * Dexcom G6  Jackie  Used for:  Type 1 diabetes mellitus with hypoglycemia and without coma (H)      Dose:  1 each  1 each once for 1 dose Use to read blood sugars as per 's instructions.  Quantity:  1 Device  Refills:  0     * Dexcom G6 Sensor Misc  Used for:  Type 1 diabetes mellitus with diabetic autonomic neuropathy (H)      Dose:  1 each  1 each every 10 days Change every 10 days.  Quantity:  3 each  Refills:  11     * Dexcom G6 Sensor Misc  Used for:  Type 1 diabetes mellitus with hypoglycemia and without coma (H)      Dose:  1 each  1 each every 10 days Change every 10 days.  Quantity:  3 each  Refills:  11     * Dexcom G6 Transmitter Misc  Used for:  Type 1 diabetes mellitus with diabetic autonomic neuropathy (H)      Dose:  1 each  1 each every 3 months Change every 3 months.  Quantity:  1 each  Refills:  3     * Dexcom G6 Transmitter Misc  Used for:  Type 1 diabetes mellitus with hypoglycemia and without coma (H)      Dose:  1 each  1 each every 3 months Change every 3 months.  Quantity:  1 each  Refills:  3     ferrous sulfate 325 (65 Fe) MG  tablet  Commonly known as:  FEROSUL  Used for:  High-risk pregnancy in third trimester      Dose:  325 mg  Take 1 tablet (325 mg) by mouth daily (with breakfast)  Quantity:  90 tablet  Refills:  3     fluticasone-salmeterol 100-50 MCG/DOSE inhaler  Commonly known as:  ADVAIR  Used for:  Moderate persistent reactive airway disease without complication      Dose:  1 puff  Inhale 1 puff into the lungs every 12 hours  Quantity:  1 Inhaler  Refills:  3     InPen 100-Grey-Luis Alberto Jackie  Used for:  Type 1 diabetes mellitus with hypoglycemia and without coma (H)  Generic drug:  Injection Device for insulin      Dose:  1 Device  1 each 8 times daily  Quantity:  1 each  Refills:  0     * insulin aspart 100 UNIT/ML cartridge  Commonly known as:  NOVOPEN ECHO  Used for:  Type 1 diabetes mellitus with hypoglycemia and without coma (H)      Dose:  1-12 Units  Inject 1-12 Units Subcutaneous 4 times daily (with meals and nightly) For use with INPen device  Quantity:  15 mL  Refills:  1     * NovoLOG PENFILL 100 UNIT/ML cartridge  Used for:  Type 1 diabetes mellitus with hypoglycemia and without coma (H)  Generic drug:  insulin aspart      Dose:  2-12 Units  Inject 2-12 Units Subcutaneous 2 times daily (before meals)  Quantity:  30 mL  Refills:  1     insulin glargine 100 UNIT/ML pen  Commonly known as:  Lantus SoloStar  Used for:  Type 1 diabetes mellitus with hypoglycemia and without coma (H)      Inject 25 Units Subcutaneous daily - Subcutaneous  Quantity:  15 mL  Refills:  3     prenatal multivitamin w/iron 27-0.8 MG tablet  Used for:  Pre-existing diabetes mellitus during pregnancy in second trimester      Dose:  1 tablet  Take 1 tablet by mouth daily  Quantity:  90 tablet  Refills:  1     thin lancets  Commonly known as:  NO BRAND SPECIFIED  Used for:  Type 1 diabetes mellitus with hypoglycemia and without coma (H)      Use to test blood sugar 4 times daily or as directed.  Quantity:  100 each  Refills:  3         * This list has  13 medication(s) that are the same as other medications prescribed for you. Read the directions carefully, and ask your doctor or other care provider to review them with you.            STOP taking    aspirin 81 MG EC tablet  Commonly known as:  ASA        docusate sodium 100 MG capsule  Commonly known as:  COLACE              Where to get your medicines      These medications were sent to [facility] [place], [place] - [address]  [address], [facility] [address]    Phone:  [phone]     acetaminophen 325 MG tablet    bisacodyl 10 MG suppository    ibuprofen 600 MG tablet    NIFEdipine ER OSMOTIC 30 MG 24 hr tablet    senna-docusate 8.6-50 MG tablet         Discharge/Disposition:  Arielle Montenegro was discharged to home in stable condition with the following instructions/medications:  1) Call for temperature > 100.4, bright red vaginal bleeding >1 pad an hour x 2 hours, foul smelling vaginal discharge, pain not controlled by usual oral pain meds, persistent nausea and vomiting not controlled on medications, drainage or redness from incision site  2) She declined contraception.  3) For feeding she decided to breastfeed.  4) She was instructed to follow-up with her primary OB in 6 weeks for a routine postpartum visit and BP check in 1 week.  5) Discharge activity:  No heavy lifting >15 lbs or strenuous activity for 6 weeks, pelvic rest for 6 weeks, no driving or operating machinery while on narcotics.    Jose Lim MD  OB/GYN Resident, PGY-3  7/5/2020 9:21 PM       Physician Attestation   I, Aparna Atkins, have seen and examined this patient.  I have reviewed the above note and agree with discharge plan as documented in the above note.     Aparna Atkins MD  Date of Service (when I saw the patient): 07/05/20

## 2020-07-04 NOTE — PROGRESS NOTES
"CLINICAL NUTRITION SERVICES - ASSESSMENT NOTE     Nutrition Prescription    RECOMMENDATIONS FOR MDs/PROVIDERS TO ORDER:  None today    Malnutrition Status:    Unable to determine due to no NFPA completed     Recommendations already ordered by Registered Dietitian (RD):  Diet educaiton    Future/Additional Recommendations:  Monitor intakes and wt  Additional diet education as allowed by pt   Unable to obtain in-person nutrition history or nutrition focused physical assessment (NFPA) from patient to limit exposure and to minimize use of PPE during COVID-19 pandemic. Spoke to pt over the phone    REASON FOR ASSESSMENT  Arielle Montenegro is a 35 year old female assessed by the dietitian for Admission Nutrition Risk Screen for new/uncontrolled diabetes  PMH significant for type 1 DM and anxiety, depression,  s/p c section 7/3  NUTRITION HISTORY  Pt reports good appetite and intakes PTA. Stated prepregnancy wt was around 135#. Noted 48# wt gain during pregnancy. Pt with h/o uncontrolled DM, Most recent a1c improved at 5.8. Pt stated she has received DM diet education in the past and eats healthy/follows a diabetic diet at home.    CURRENT NUTRITION ORDERS  Diet: Moderate Consistent Carbohydrate  Intake/Tolerance: Pt reports good appetite since hospitalization. No documented meal intakes however pt reports eating 100% of meals. Discussed adding snacks, pt requested fruit between lunch and dinner. Followed up on diabetic diet education. Pt stated items such as \"carbohydrates, sugar, oil and fat\" raise blood sugar. Provided education on carbohydrate containing foods and informed pt that oil and fat would not raise blood sugar. Offered to discuss carb counting however pt declined stating she has received this education in the past.     LABS  Labs reviewed  Results for ARIELLE MONTENEGRO (MRN [id number]) as of 7/4/2020 12:16   Ref. Range 1/10/2015 07:19 1/11/2015 06:07 4/24/2015 12:03 6/28/2016 09:00 10/28/2016 12:53 " "5/11/2017 15:15 10/5/2017 11:19 6/14/2018 11:18 10/29/2018 13:49 9/20/2019 14:25 1/2/2020 00:00 1/28/2020 10:56 6/11/2020 15:20 7/4/2020 01:05   Hemoglobin A1C Latest Ref Range: 0 - 5.6 % 7.2 (H) 7.3 (H) 9.4 (H) 10.0 (H) 10.8 (H) 10.0 (H) 11.7 (H) 11.4 (H) 11.0 (H) 12.6 (H) 7.8 (H) 7.5 (H) 6.4 (H) 5.8 (H)       MEDICATIONS  Medications reviewed:  novolog  tasha Ruffin    ANTHROPOMETRICS  Height: 157.5 kg (5'2\")  Most Recent Weight: 83.1 kg (183 lb 3.2 oz)-from 6/22      IBW: 50 kg  BMI: 24.9 kg/m^2, Normal BMI-using prepregnancy wt  Weight History: 47# wt gain during pregnancy. Recommended wt gain is 25-35#  Wt Readings from Last 10 Encounters:   06/22/20 83.1 kg (183 lb 3.2 oz)   06/11/20 80.5 kg (177 lb 6.4 oz)   06/04/20 77.7 kg (171 lb 4.8 oz)   05/21/20 75.8 kg (167 lb)   05/06/20 74.5 kg (164 lb 3.2 oz)   03/12/20 70.2 kg (154 lb 12.8 oz)   03/09/20 71 kg (156 lb 8 oz)   02/17/20 71.4 kg (157 lb 4.8 oz)   01/29/20 68.2 kg (150 lb 6.4 oz)   01/27/20 68.1 kg (150 lb 1.6 oz)   11/18/19 62.1 kg (136 lb 12.8 oz)     Dosing Weight: 62.1 kg (prepregnancy wt)    ASSESSED NUTRITION NEEDS  Estimated Energy Needs: 4284-5363 kcals/day (25 - 30 kcals/kg +330kcal)  Justification: Increased needs (lactation)  Estimated Protein Needs: 75-87 grams protein/day (0.8 - 1 grams of pro/kg +25g)  Justification: Increased needs (lactation)  Estimated Fluid Needs: 3800 mL/day   Justification: Increased needs (lactation)    PHYSICAL FINDINGS  See malnutrition section below.    MALNUTRITION  % Intake: No decreased intake noted  % Weight Loss: None noted  Subcutaneous Fat Loss: Unable to assess  Muscle Loss: Unable to assess  Fluid Accumulation/Edema: 2+ (BLE)  Malnutrition Diagnosis: Unable to determine due to no NFPA completed     NUTRITION DIAGNOSIS  None at this time    INTERVENTIONS  Implementation  Nutrition Education: Discussed carbohydrate containing foods, pt declined further educaiton   2pm snack: fresh fruit "     Goals  Patient to consume % of nutritionally adequate meal trays TID, or the equivalent with supplements/snacks.     Monitoring/Evaluation  Progress toward goals will be monitored and evaluated per protocol.    Ronna Zee MS, RD, LDN  Unit Pager [phone]

## 2020-07-04 NOTE — PLAN OF CARE
"VSS except for high BPs, not within severe range but close. Postpartum checks WDL. Up with SBA. Voided x1 after jeffery removal this morning. Ambulating in room independently. Pain managed with tylenol and ibuprofen. Incisional drsg removed in shower, steri-strips in place. Old blood present on right side of steri-strips but intact. Pt and writer had a phone conference with Dr. Roca regarding plan for BG checks and insulin today. Current plan is to check BGs before meals, HS, and 0200. Lantus and Novolog restarted as well. BGs in the 200s before breakfast and lunch. Good appetite. Pt's mood seems to change fast when staff in the room. She has continued to state that she  during her . She also stated to writer, \"they need to stop taking pictures of dead people.\" When writer asked what she meant, pt didn't reply. Pt is bonding appropriately with her infant at times, but has been upset when he cries, stating \"if he doesn't knock that shit off, I'm not taking him home.\" Social work consult placed.   "

## 2020-07-04 NOTE — PROVIDER NOTIFICATION
07/04/20 0744   Provider Notification   Provider Name/Title Endocrinology    Method of Notification Phone   Request Evaluate in Person   Notification Reason Status Update   spoke to endocrinologist and he will check into her readings and history and call back in 10 min to give further instructions on how to care for patients blood sugars.   MD advised no need to recheck blood sugars at this moment until further instruction. Last  without interventions or eating. Pt requesting insulin writer reviewed this request with MD and he stated do not give any insulin until further notice.

## 2020-07-04 NOTE — PLAN OF CARE
"VSS- mid range BP's. Postpartum assessment  WNL except breath sounds- see flow sheet. Scant lochia rubra. Fundus firm, midline at U/1. Incision dressing is stained with dried lochia but is dry and intact. Pain managed on tylenol ibuprofen. Urinary catheter is patent and output is adequate. Patient exhibiting expiatory wheezing. Oxygen Sats WNL maintaining 95% and above overnight. Patient has cough with minimal loosproduction. Reflexes and clonus are absent. Patient is bottle feeding infant with formula. No support person at the bedside.  Patient's mood is labile and anxious. Patient insists on bottle feeding infant every hour. RN educated patient on bottle feeding infant on cue or at least every 2-3 hours. Patient needs reinforcement with education. Will provide support as needed.     D5LR and Magnesium IV fluids discontinued at 0200 per MD to minimize fluid overload and Mag level was 5.0. (see note in chart). Patient blood sugar dropped to 34 this morning at 0445. Patient was lucid and cognizant, just complaining of \"extreme hunger\". She remained asymptomatic except she feeling cold. Patient ate crackers, granola bar and drank milk and apple juice - see flowsheet. 15 minute BS checks were done and at 0606, final blood sugar 134. Patient denies symptoms. D10 IV fluid was running at 80ml/hr until IV became infiltrated. Endocrinologist to evaluate patient. Will continue to monitor.      "

## 2020-07-04 NOTE — PROGRESS NOTES
Diabetes Consult Daily Progress Note          Assessment/Plan:     Arielle Montenegro is a 35 year old  at 39w1d with type 1 diabetes, s/p repeat  section 7/3/2020, under general anesthesia, and possibly aspirated.     1. Type 1 DM:    She had hypoglycemia this morning at 36mg/dl (finger stick only). Unclear reason, likely post delivery, needing reduced insulin requirement. She did not have symptoms with this reading.     Will dose her insulin conservatively due to that and start lantus to avoid DKA in type 1 DM and her last lantus dose is more than 36 hours away now. BG target is ~140 - 180mg during hospitalization.    Plan:     - Start Lantus 0.15u/kg -  12units once daily, first dose stat - I ordered it  - Start Novolog 1u:20g carb with meals and snacks  - Start sliding scale insulin with 1u:50mg for BG>150mg  - BG check AC,HS, 0200  - Hypoglycemia protocol    Explained to patient this regimen might need to be titrated based on further BG readings.    Patient is seen by video visit and staffed with Dr.Bantle Guru Abelino MD  PGY 4 - Endocrinology Fellow  Pager: [phone]  Call ** [phone] then enter job code [phone] for on call person.    Visit/Communication Style   VIRTUAL (VIDEO) communication was used to evaluate Arielle due to the COVID pandemic.    Arielle consented to the use of video communication: yes  Video START time: 8.09am, 2020  Video STOP time: 8.24am, 2020   Patient's location: [facility]   Provider's location during the visit: Home           HPI:  Arielle Montenegro is a 35 year old  at 39w1d with type 1 diabetes, s/p repeat  section  7/3/2020, under general anesthesia, and possibly aspirated.         Interval History:     She is seen by polygom video. She is awake, alert. She has hypoglycemia by RN check this morning 36mg/dl, she didn't have any hypoglycemia Sx. She was started on D10 fluid which extravasated and she doesn't  want IVF due to fluid overload. She was also refusing to start IV line on other hand.     She asks to restart lantus and novolog due to her h/o type 1 DM and to avoid DKA. Considering her hypoglycemia and post delivery, explained it is difficult to know her insulin requirement now and IV insulin drip will help us understand it. She refuses to start insulin drip.    She is hungry, says she is ready to eat.      Current Diabetes Medications:  None    PTA:  Type: 1 Diabetes Duration for 28 years, dx at age 6-7 in [place]  Usual Diabetes Regimen:   Fasting and post-prandial BG monitoring, via Dexcom G6  (for a long time she judged insulin dosing by symptoms)  Lantus 27 units at HS (used to take it in the morning to limit overnight hypoglycemia) but her last dose is July 2nd ~9pm of 27u Lantus.  Novolog 2 units per carb choice  Recommended correction 1:50 > 150-- pt unable to confirm dosing  pre pregnancy she was on 20 units glargine, per outpt note.  Also up to 60 units glargine on older med lists.    Other Pertinent Medications: None      Orders Placed This Encounter      Moderate Consistent CHO Diet       ROS:    Review of pertinent systems was negative except as noted above.          Exam:      Blood pressure (!) 152/93, pulse 92, temperature 97.6  F (36.4  C), temperature source Oral, resp. rate 18, last menstrual period 09/06/2019, SpO2 99 %, unknown if currently breastfeeding.    General: Alert, resting in bed, NAD.   HEENT: NC/AT, mucous membranes are moist.  Resp: Unlabored breathing.   Neuro: Alert and oriented, communicating clearly.          Data:     Lab Results   Component Value Date    A1C 6.4 06/11/2020    A1C 7.5 01/28/2020    A1C 7.8 01/02/2020    A1C 12.6 09/20/2019    A1C 11.0 10/29/2018       Recent Labs   Lab 07/04/20  0901 07/04/20  0733 07/04/20  0602 07/04/20  0536 07/04/20  0519 07/04/20  0503  07/03/20  1528   GLC  --   --   --   --   --   --   --  91   * 191* 134* 97 70 45*   < >  --      < > = values in this interval not displayed.         Physician Attestation   I saw this patient (virtual visit) and discussed management with the fellow, and agree with the fellow's findings and plan of care as documented in the note.      I personally reviewed vital signs, medications and labs.    Date of Service: 7/4/2020    Ruth Wyman MD  Endocrinology and Diabetes   [phone]

## 2020-07-04 NOTE — PROGRESS NOTES
[facility]  Magnesium Check Note    Subjective:     Patient is feeling ok. Complains of itching. She denies headache, vision changes/spots in vision, nausea/vomiting, chest pain, shortness of breath, RUQ pain.    Objective:  Patient Vitals for the past 4 hrs:   BP Temp Temp src Pulse Resp SpO2   20 0048 (!) 140/72 -- -- -- -- --   20 2245 (!) 148/73 97.7  F (36.5  C) Oral 92 16 91 %   20 2100 133/81 97.7  F (36.5  C) Oral 88 16 94 %     Gen: NAD. A&Ox3.  CV:  RRR, no murmurs  Pulm:   Crackles in bilateral anterior lung fields R>L. Normal respiratory effort.  Abd:  Soft, non-tender  Ext:  Patellar reflexes absent. No clonus. 2+ lower extremity edema.    Labs:  UPC 0.52 (chronic proteinuria)  Cr 0.98  Plt 128  AST/ALT    Assessment/Plan:  Arielle Montenegro is a 35 year old  on POD# 1 s/p RLTCS c/b aspiration. She developed preE w/SF postpartum. Pregnancy otherwise complicated by T1DM, right eye blindness, scant PNC.     #PreE w/SF  - UPC 0.52; Cr 0.98, Plts 128, AST/ALT wnl  - IV Labetalol 20 (7/3 1515)    - Sx: denies  - Exam: Absent reflexes, crackles on lung exam (did aspirate during CS)  - Mag 4g (1917)> 2 g/hr> STOP now with stat mag level due to absent reflexes, crackles  -  ml/h, adequate. Strict I/O.  - Daily weights.    Will discuss with Dr. Atkins.    Jose Lim MD  OB/GYN Resident, PGY-3  2020 12:52 AM

## 2020-07-04 NOTE — PROGRESS NOTES
Post Partum Progress Note  POD#1, s/p RLTCS under GETA c/b aspiration    Subjective:  Patient is very irritated this AM. States she had bad nurses overnight that wouldn't take care of her. States she needs help holding her baby while she breastfeeds. Pain is controlled. Tolerating PO intake. Bender catheter in place. Has not ambulated. No flatus. Denies headache, changes in vision, chest pain, shortness of breath, RUQ pain, or worsening edema. Plans to breastfeed.    Objective:  Vitals:    20 0200 20 0435 20 0745 20 1000   BP: (!) 135/97 (!) 136/97 (!) 152/93 (!) 151/93   Pulse:       Resp:  18 18 16   Temp:   97.6  F (36.4  C) 97.7  F (36.5  C)   TempSrc:  Oral Oral Oral   SpO2:  99%  100%     Gen:      NAD. A&Ox3.  CV:       RRR, no murmurs  Pulm:    Crackles and wheezing in bilateral anterior lung fields R>L. Normal respiratory effort.  Abd:      Soft, non-tender  Ext:       Patellar reflexes absent. No clonus. 2+ lower extremity edema.    UOP: 300 ml/hr     Labs:  UPC 0.52 (chronic proteinuria)  Cr 0.98  Plt 128  AST/ALT     Repeat HELLP labs ordered this AM    Assessment/Plan:  Arielle Montenegro is a 35 year old  female who is POD#1 s/p RLTCS c/b aspiration. She developed preeclampsia w/SF postpartum. Pregnancy complicated by MRSA positive, Type 1 DM, R eye blindness, scant PNC.     #PreE w/SF  - UPC 0.52; Cr 0.98, Plts 128, AST/ALT wnl  - IV Labetalol 20 (7/3 1515). Will start PO procardia 30 XL now for high mild range BPs.  - Sx: Denies  - Exam: Absent reflexes, crackles on lung exam, 2+ edema, unchanged from prior  - Mag previously turned off due to suspected fluid overload. IV lasix given with excellent UOP.   -  ml/h, adequate. Strict I/O.  - Daily weights    #T1DM  - Hypoglycemic overnight, started on D10W. IV infiltrated with significant arm swelling, refusing replacement.   - Endocrinology following and will adjust regimen today     #Scant PNC  - SW  "consult    #Postpartum cares  - Encourage routine post-operative goals including ambulation and incentive spirometry  - PNC: Rh positive. Rubella immune. No intervention indicated.  - Pain: Controlled on oral medications  - Heme: Hgb 10.9> > AM pending.  If <10 will discharge home with iron.  - GI: Continue anti-emetics and stool softeners as needed.  - : Bender in place, remove today  - Infant: Stable in room  - Feeding: Plans on breastfeeding.  - BC: Declines    Discharge to home likely POD#3    Jose Lim MD  OB/GYN Resident, PGY-3  2020 10:50 AM        Physician Attestation   I, Bernarda Webber MD, saw this patient with the resident and agree with the resident/fellow's findings and plan of care as documented in the note.      I personally reviewed vital signs, medications, labs and exam.    Key findings: Patient stated she was doing well when I rounded and seemed happy. She is looking forward to seeing her ob-gyn, Dr. Atkins, again tomorrow and discussing the events of surgery. She tells me that \"she \" yesterday, which I explained did not happen.   We discussed post-operative care for her incision. Shower today.   Likely here until at least POD#3 for blood sugar and blood pressure management.   MRSA culture today. H/o MRSA in .   Social work consult  Appreciate endocrinology management of DM1.     Bernarda Webber MD  Date of Service (when I saw the patient): 20    "

## 2020-07-04 NOTE — LACTATION NOTE
This note was copied from a baby's chart.  Consult for: Third baby but this is first time breastfeeding.    History:   delivery @ 39w1d, AGA infant @ 7# 11 oz. birthweight, 4.2% loss at 24 hours, no bilirubin results yet.  Maternal history of Type 1 DM, episodic mood disorder with FERMIN and MDD, anemia, scant prenatal care, fibrocystic changes in breast, cannabis abuse noted in . Maternal UDS negative on admission, cord results pending.    Breast exam of mom: Soft, concical shaped, left slightly larger than right with intact, everted nipples bilaterally.  Arielle noted breast growth during pregnancy.     Oral exam of baby: Moderately high arch, palpable lingual frenulum but good lift and fairly good extension noted. Disorganized suck on finger, some tongue thrusting.    Feeding assessment:  Infant latched shallow on arrival, head turned over shoulder towards mom & coming off, re-laching. With permission, adjust positioning and demo tips for getting deeper latch. In laid back position Arielle was able to latch him independently, took a couple tries before he sustained good feeding. Able to point out both nutritive and non nutritive sucking and how to hear swallows.     Education provided: Discussed positioning with good support, anatomy of breast and infant mouth, tips to get and maintain deeper latch, breast compressions to enhance milk transfer, point out nutritive vs. non-nutritive suck and how to hear swallows, benefits of skin to skin and feeding on cue, supply and demand, benefits of and how to do breast massage, hand expression & hands on pumping.    Plan: Please encourage frequent skin to skin, breastfeed on cue but no longer than 3 hours between (maternal DM) & continued hand expressing after feedings until milk is in, pumping each time formula given to stimulate supply. Please encourage follow up with outpatient lactation consultant within a week of discharge for support with maximizing supply, first  time breastfeeding, mom with multiple risk factors and infant with early formula supplements.

## 2020-07-04 NOTE — PLAN OF CARE
Postpartum stable. Blood pressures have been elevated, but not in the treatable range. PIV infusing Mg 2 g at 50ml/hr and D5 LR at 75mL/hr. Pain is tolerable with tylenol and toradol. Incision is c/d/I. BG's stable. Bender is patent and draining. Pt desires to try breastfeeding, attempted to latch baby but she said she was worried the writer couldn't stay in and hold the baby the whole time and was feeling paranoid. Plan to attempt pumping/breastfeeding again. Plan to get her OOB after she gets some rest. Continue POC.

## 2020-07-04 NOTE — OP NOTE
Operative Report        Arielle Montenegro   1985  [id number]        Procedure Date: 2020      PREOPERATIVE DIAGNOSES:   1.  Intrauterine pregnancy at 39 weeks and 1 day gestation.   2.  Previous  delivery x 2   3.  Type 1 diabetes, poorly controlled.   4.  History of preeclampsia in previous pregnancy.      POSTOPERATIVE DIAGNOSES:   1.  Intrauterine pregnancy at 39 weeks and 1 day gestation.   2.  Previous  delivery x 2   3.  Type 1 diabetes, poorly controlled.   4.  History of preeclampsia in previous pregnancy.   5.  Delivered.      PROCEDURE:  Repeat low segment transverse  section with a double-layered uterine closure.      SURGEON:  Aparna Atkins MD      FIRST ASSISTANT:  Misty Clifford MD      ANESTHESIA:  Spinal converted to general.      INDICATIONS:  Arielle is a 35-year-old female, now  3, para 2-1-0-3, who presents to the birth place at 39 weeks and 1 day gestation for a scheduled repeat  section.  Pregnancy has been complicated by poor blood sugar control from her type 1 diabetes.  The patient has frequent high and low blood sugars.  She presents today for repeat  section due to her history of 2 prior  sections.      OPERATIVE FINDINGS:  At the time of surgery, there was a moderate amount of rectal fascial adhesions, filmy adhesions of the bladder to the lower uterine segment.  At 12:02 PM, she delivered a viable male infant from vertex presentation with clear amniotic fluid noted.  Apgars 8 and 9 at 1 and 5 minutes respectively.  Weight 3487 grams.  Uterus, fallopian tubes and ovaries appeared normal.      OPERATIVE PROCEDURE IN DETAIL:  Arielle was taken to the operating room, where spinal anesthesia was given.  She was then placed in the supine position, and fetal heart tones were documented.  Bender catheter was placed into the bladder.  She was then prepped and draped in the usual sterile fashion.  A timeout was held to verify  correct patient and procedure.  Just prior to making the uterine incision, the patient began vomiting. She aspirated secretions while vomiting and had difficulty breathing.  At that point, the anesthesiologist decided to intubate her, which was performed rapidly without difficulty.  Once the patient was stable on the ventilator, an incision was then made in the lower abdomen through the previous Pfannenstiel scars.  This incision was carried down sharply through the subcutaneous tissue, and the fascia was incised horizontally in the midline.  The fascial incision was extended bilaterally.  The rectus abdominis muscles were then bluntly and sharply dissected away from the fascia superiorly and inferiorly to the pubic symphysis.  The peritoneum was identified and bluntly entered.  A vertical peritoneal incision was then made and carried superiorly and inferiorly to the upper edge of the bladder.  The bladder blade was then placed.  A bladder flap was created, and an incision was then made horizontally in the lower uterine segment.  This was extended bilaterally with bandage scissors.  The infant was delivered from a cephalic presentation without difficulty.  Mouth and nares were suctioned and the cord doubly clamped and cut and the infant passed off to the NICU team in attendance with findings as noted above.  The placenta was manually extracted and the uterus wiped free of clots and membranes. The uterus was exteriorized.  The uterine incision was then closed with a double layer of 0 Monocryl, the second layer imbricating the first layer.  Hemostasis was noted.  Uterus was replaced into the abdomen and pelvis, and the abdomen and pelvis were then inspected and hemostasis achieved using electrocautery.  Seprafilm was placed over the anterior surface of the uterus, and the peritoneum was closed with 3-0 Vicryl suture.  Rectus muscles were inspected and hemostasis achieved using electrocautery.  A second layer of  Seprafilm was placed over the rectus muscles, and the fascia was closed with a 0 Vicryl suture.  Subcutaneous tissue was copiously irrigated and hemostasis achieved using electrocautery.  The skin edges were reapproximated using 4-0 Monocryl subcuticular stitch.      COMPLICATIONS:  Maternal nausea and vomiting which resulted in aspiration and subsequent intubation for general anesthesia.  The patient was extubated at the end of the case and was taken to the recovery room in satisfactory condition.        TOTAL IV FLUIDS:  1100 mL of crystalloid.      URINE OUTPUT:  50 mL.      QUANTITATIVE BLOOD LOSS:  568 Ml,      DISPOSITION:  The patient was extubated in the operating room at the conclusion of the case and was taken to the recovery room in satisfactory condition.         ELÍAS CHRISTINE MD             D: 2020   T: 2020   MT: ERNESTINA      Name:     CARLO DAWSON   MRN:      [id number]        Account:        [id number]   :      1985           Procedure Date: 2020      Document: [id number]

## 2020-07-05 ENCOUNTER — TELEPHONE (OUTPATIENT)
Dept: BEHAVIORAL HEALTH | Facility: CLINIC | Age: 35
End: 2020-07-05

## 2020-07-05 VITALS
TEMPERATURE: 98 F | BODY MASS INDEX: 34.06 KG/M2 | WEIGHT: 186.2 LBS | DIASTOLIC BLOOD PRESSURE: 78 MMHG | RESPIRATION RATE: 18 BRPM | HEART RATE: 107 BPM | OXYGEN SATURATION: 98 % | SYSTOLIC BLOOD PRESSURE: 143 MMHG

## 2020-07-05 LAB
ALT SERPL W P-5'-P-CCNC: 24 U/L (ref 0–50)
AST SERPL W P-5'-P-CCNC: 36 U/L (ref 0–45)
CREAT SERPL-MCNC: 0.91 MG/DL (ref 0.52–1.04)
ERYTHROCYTE [DISTWIDTH] IN BLOOD BY AUTOMATED COUNT: 15.2 % (ref 10–15)
GFR SERPL CREATININE-BSD FRML MDRD: 82 ML/MIN/{1.73_M2}
GLUCOSE BLDC GLUCOMTR-MCNC: 100 MG/DL (ref 70–99)
GLUCOSE BLDC GLUCOMTR-MCNC: 154 MG/DL (ref 70–99)
GLUCOSE BLDC GLUCOMTR-MCNC: 192 MG/DL (ref 70–99)
GLUCOSE BLDC GLUCOMTR-MCNC: 97 MG/DL (ref 70–99)
HCT VFR BLD AUTO: 32.7 % (ref 35–47)
HGB BLD-MCNC: 10.6 G/DL (ref 11.7–15.7)
MCH RBC QN AUTO: 30.4 PG (ref 26.5–33)
MCHC RBC AUTO-ENTMCNC: 32.4 G/DL (ref 31.5–36.5)
MCV RBC AUTO: 94 FL (ref 78–100)
PLATELET # BLD AUTO: 128 10E9/L (ref 150–450)
RBC # BLD AUTO: 3.49 10E12/L (ref 3.8–5.2)
WBC # BLD AUTO: 11.6 10E9/L (ref 4–11)

## 2020-07-05 PROCEDURE — 00000146 ZZHCL STATISTIC GLUCOSE BY METER IP

## 2020-07-05 PROCEDURE — 84460 ALANINE AMINO (ALT) (SGPT): CPT | Performed by: STUDENT IN AN ORGANIZED HEALTH CARE EDUCATION/TRAINING PROGRAM

## 2020-07-05 PROCEDURE — 25000132 ZZH RX MED GY IP 250 OP 250 PS 637: Performed by: STUDENT IN AN ORGANIZED HEALTH CARE EDUCATION/TRAINING PROGRAM

## 2020-07-05 PROCEDURE — 99221 1ST HOSP IP/OBS SF/LOW 40: CPT | Performed by: NURSE PRACTITIONER

## 2020-07-05 PROCEDURE — 84450 TRANSFERASE (AST) (SGOT): CPT | Performed by: STUDENT IN AN ORGANIZED HEALTH CARE EDUCATION/TRAINING PROGRAM

## 2020-07-05 PROCEDURE — 36415 COLL VENOUS BLD VENIPUNCTURE: CPT | Performed by: STUDENT IN AN ORGANIZED HEALTH CARE EDUCATION/TRAINING PROGRAM

## 2020-07-05 PROCEDURE — 85027 COMPLETE CBC AUTOMATED: CPT | Performed by: STUDENT IN AN ORGANIZED HEALTH CARE EDUCATION/TRAINING PROGRAM

## 2020-07-05 PROCEDURE — 82565 ASSAY OF CREATININE: CPT | Performed by: STUDENT IN AN ORGANIZED HEALTH CARE EDUCATION/TRAINING PROGRAM

## 2020-07-05 RX ORDER — NIFEDIPINE 30 MG/1
30 TABLET, EXTENDED RELEASE ORAL DAILY
Qty: 30 TABLET | Refills: 1 | Status: SHIPPED | OUTPATIENT
Start: 2020-07-05 | End: 2022-02-08

## 2020-07-05 RX ORDER — IBUPROFEN 600 MG/1
600 TABLET, FILM COATED ORAL EVERY 6 HOURS PRN
Qty: 60 TABLET | Refills: 1 | Status: SHIPPED | OUTPATIENT
Start: 2020-07-05 | End: 2020-12-01

## 2020-07-05 RX ORDER — AMOXICILLIN 250 MG
1 CAPSULE ORAL DAILY
Qty: 60 TABLET | Refills: 1 | Status: SHIPPED | OUTPATIENT
Start: 2020-07-05 | End: 2020-12-01

## 2020-07-05 RX ORDER — ACETAMINOPHEN 325 MG/1
325-650 TABLET ORAL EVERY 6 HOURS PRN
Qty: 1 BOTTLE | Refills: 0 | Status: SHIPPED | OUTPATIENT
Start: 2020-07-05 | End: 2020-12-01

## 2020-07-05 RX ORDER — BISACODYL 10 MG
10 SUPPOSITORY, RECTAL RECTAL DAILY PRN
Qty: 10 SUPPOSITORY | Refills: 1 | Status: SHIPPED | OUTPATIENT
Start: 2020-07-05 | End: 2020-12-01

## 2020-07-05 RX ADMIN — ACETAMINOPHEN 975 MG: 325 TABLET, FILM COATED ORAL at 03:40

## 2020-07-05 RX ADMIN — DOCUSATE SODIUM 50 MG AND SENNOSIDES 8.6 MG 2 TABLET: 8.6; 5 TABLET, FILM COATED ORAL at 07:23

## 2020-07-05 RX ADMIN — ACETAMINOPHEN 975 MG: 325 TABLET, FILM COATED ORAL at 10:04

## 2020-07-05 RX ADMIN — INSULIN ASPART 1 UNITS: 100 INJECTION, SOLUTION INTRAVENOUS; SUBCUTANEOUS at 13:21

## 2020-07-05 RX ADMIN — NIFEDIPINE 30 MG: 30 TABLET, FILM COATED, EXTENDED RELEASE ORAL at 07:54

## 2020-07-05 RX ADMIN — IBUPROFEN 800 MG: 800 TABLET, FILM COATED ORAL at 13:04

## 2020-07-05 RX ADMIN — INSULIN GLARGINE 12 UNITS: 100 INJECTION, SOLUTION SUBCUTANEOUS at 07:16

## 2020-07-05 RX ADMIN — BISACODYL 10 MG: 10 SUPPOSITORY RECTAL at 00:35

## 2020-07-05 RX ADMIN — IBUPROFEN 800 MG: 800 TABLET, FILM COATED ORAL at 05:30

## 2020-07-05 NOTE — PROGRESS NOTES
Diabetes Consult Daily Progress Note  Date of Service: 20     Visit/Communication Style   PHONE communication was used to talk with Arielle due to the COVID pandemic.  I did not personally see this patient.  Arielle consented to the use of phone communication: yes  Time spent speaking with the patient: 5-10 minutes              Assessment/Plan:     Arielle Montenegro is a 35 year old  at 39w1d with type 1 diabetes, s/p repeat  section 7/3/2020, under general anesthesia, and possibly aspirated.     1. Type 1 DM:    She had hypoglycemia yesterday morning at 36mg/dl (finger stick only). Unclear reason, likely post delivery, needing reduced insulin requirement. She did not have symptoms with this reading. She was dosed  conservatively due to that, since then, her BG is under goal, 100mg this am and overnight 192mg.     BG is in goal and target is ~140 - 180mg during hospitalization. RN notes she is using her own insulin and self administering it today and RN stored her own medicines in med bin.    Plan:     - Reduce Lantus from 12units to 10units once daily   - Continue Novolog 1u:20g carb with meals and  snacks  - Continue sliding scale insulin with 1u:50mg for BG>150mg  - BG check AC,HS, 0200  - Hypoglycemia protocol    Discharge DM recs:    - Lantus 10units once daily   - Novolog 1u:20g carb with meals and  snacks  - CGM monitor BG.  - Outpatient follow-up with [facility] endocrinology in 2-3 weeks - we have asked our clinic to schedule.    Discussed with .     Guru Abelino MD  PGY 5 - Endocrinology Fellow  Pager: [phone]  Call ** [phone] then enter job code [phone] for on call person.      Physician Attestation   I saw this patient with the fellow and agree with the fellow's findings and plan of care as documented in the note. Telephone visit.     I personally reviewed medications and labs.    Date of Service: 2020    Ruth Wyman MD  Endocrinology and Diabetes  "  [phone]      ====================================================    HPI:  Arielle Montenegro is a 35 year old  at 39w1d with type 1 diabetes, s/p repeat  section  7/3/2020, under general anesthesia, and possibly aspirated.  She had hypoglycemia by RN check 20 at 36mg/dl by fingerstick, she didn't have any hypoglycemia Sx. She was started on D10 fluid which extravasated and she doesn't want IVF due to fluid overload. She was also refusing to start IV line on other hand. She asks to restart lantus and novolog due to her h/o type 1 DM and to avoid DKA. Considering her hypoglycemia and post delivery, explained it is difficult to know her insulin requirement now and IV insulin drip will help us understand it. She refused to start insulin drip and lantus is started conservatively 20.    Interval History:    She is doing well. She admits early ~3am, ~200mg, took her own insulin 3u insulin. She ate breakfast ~16g carbs,  BG 97mg, but didn't get premeal coverage. She thinks lantus was not given properly as well. Her own meter BG is 96mg.    She denies N/Vomiting.    Per RN note,\"Resource RN came to advise Charge RN Dorys pt admitted to self administering insulin 3 USP Novolog. Pt was using self monitor and noticed BG rising to 202 and gave herself the insulin. Bedside RN requested all home medications from patient to store in patient med bin until discharge and discussed with patient importance of not self administering insulin. Bedside RN will update MD\"    Current Diabetes Medications:  Lantus 12u daily  Novolog 1u:20g carb with meals and snacks  Sliding scale insulin with 1u:50mg for BG>150mg    PTA:  Type: 1 Diabetes Duration for 28 years, dx at age 6-7 in [place]  Usual Diabetes Regimen:   Fasting and post-prandial BG monitoring, via Dexcom G6  (for a long time she judged insulin dosing by symptoms)  Lantus 27 units at HS (used to take it in the morning to limit overnight hypoglycemia) but " her last dose is July 2nd ~9pm of 27u Lantus.  Novolog 2 units per carb choice  Recommended correction 1:50 > 150-- pt unable to confirm dosing  pre pregnancy she was on 20 units glargine, per outpt note.  Also up to 60 units glargine on older med lists.    Other Pertinent Medications: None      Orders Placed This Encounter      Moderate Consistent CHO Diet       ROS:    Review of pertinent systems was negative except as noted above.          Exam:      Blood pressure 137/79, pulse 97, temperature 98.5  F (36.9  C), temperature source Oral, resp. rate 16, weight 84.5 kg (186 lb 3.2 oz), last menstrual period 09/06/2019, SpO2 98 %, unknown if currently breastfeeding.    Telephone visit         Data:     Lab Results   Component Value Date    A1C 6.4 06/11/2020    A1C 7.5 01/28/2020    A1C 7.8 01/02/2020    A1C 12.6 09/20/2019    A1C 11.0 10/29/2018       Recent Labs   Lab 07/05/20  0712 07/05/20  0213 07/04/20  2205 07/04/20  1657 07/04/20  1240 07/04/20  0901  07/03/20  1528   GLC  --   --   --   --   --   --   --  91   * 192* 126* 120* 202* 230*   < >  --     < > = values in this interval not displayed.

## 2020-07-05 NOTE — TELEPHONE ENCOUNTER
1126am - Intake received a call that an order has been put in for a psych consult, the pt is going to discharge this evening and the staff would inquire if the pt could be seen this evening before she discharges.   1134pm - Intake connected with Master, she will see all pts as soon as she can.

## 2020-07-05 NOTE — PLAN OF CARE
"Data: Vital signs within normal limits except for one elevated BP, recent BP was WNL. Patient denies headache, vision changes, shortness of breath, and/or epigastric pain. Postpartum checks within normal limits - see flow record. Patient eating and drinking normally. Continuing blood sugar monitoring due to Type 1 Diabetes. Patient able to empty bladder independently and is up ambulating with stand-by assist. No apparent signs of infection. Incision healing well. Patient performing self cares and is able to care for infant. Occasionally breastfeeding baby, has declined most of the shift to breastfeed saying that she has no breast milk. Discussed ways to support a healthy breast milk supply: brining baby to the breast and skin to skin with baby. Formula feeding via a bottle, encouraging patient to participate more in baby cares. Patient declining to pump saying she is too tired.   Action: Pain has been adequately managed with oral medications. Patient occasionally declines scheduled pain medications saying they make her \"sleepy.\" Reports that she mostly has pain when getting out of bed. Incentive spirometry device given to patient to help with deep breathing/cough, discussed how to use incentive spirometry device and with some guidance patient was able to adequately demonstrate use of incentive spirometry device. Nasal swab collected for MRSA culture per provider, Dr. Webber, order.   Response: Positive attachment behaviors observed with infant although at times patient will get frustrated with baby crying and demand that nurse bottle feed the baby. No support person.   Plan: Continue with the plan of care.   "

## 2020-07-05 NOTE — PROGRESS NOTES
Psych consult recently entered is acknowledged. I understand service would like me to see that patient by 4 today, per U as the patient is planning on discharge this afternoon.   I will make an effort to do so, but it will likely not be before 3 pm.   Please call with urgent concerns [phone]

## 2020-07-05 NOTE — CONSULTS
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 diagnosis of ADHD and apparently has some behavioral problems.  Patient states that he has services and is doing quite well.  She notes a previous history of postpartum depression after the birth of her son but denies any current symptoms with me although her Rayne was 4.  She agrees to monitor her mood symptoms and her problems with irritability closely and a she agrees to reach out to her providers without delay if she were to have a any difficult mood symptoms.          Summary of Recommendations:   1.  Safe to discharge to home from a psychiatric perspective  2.  The patient agrees to monitor for symptoms of postpartum depression and education was provided.  Thank you for this referral    Please page me if you have any question [phone]

## 2020-07-05 NOTE — PROVIDER NOTIFICATION
07/05/20 0228   Provider Notification   Provider Name/Title g2 hank   Method of Notification Electronic Page   Request Evaluate-Remote   Notification Reason Status Update   2 am . No dosing is available for this timeframe.    Dr responded, does not wish to do anything at this time, stated endocrine may want to increase her insulin amounts during day.

## 2020-07-05 NOTE — PLAN OF CARE
Data: VSS, postpartum assessments WNL. She is voiding without difficulty, up ad carole, had BM, passing gas, eating and drinking normally. Blood pressures have been WDL and she denies headache, vision changes, SOB, RUQ pain. Reflexes +1, no clonus. Incision appears to be healing well, lochia WNL, no s/s infection.   Mother states she is breastfeeding; offered to help with latch at each feeding; she declined. Carefree fed between 20-35 ml formula at each feed, tolerating feeds well.  Mother tends to want to feed baby whenever it fusses. Calls nurse to change diapers.   Taking ibuprofen and tylenol for pain and using the following non-pharmacologic methods: given belly band, but she removed it shortly thereafter, heat packs, walking around. Reports good pain management.   Action: Education provided on self-care, plan of care  Response: Pt is agreeable with her plan of care. Positive attachment behaviors observed with infant. No support person present. Anticipate discharge per care plan. She vascilates between saying the baby is cute and saying she isn't going to take him home, he is too noisy and this pregnancy (he) is harder on her body than her other babies.She stated she was unable to change baby or swaddle him. She did do the bottle feeds when I filled the bottle and brought it and the baby to her.  Reports good pain management with PO medications. Will continue with plan of care.

## 2020-07-05 NOTE — PLAN OF CARE
VSS. PP assessment wnl. Voiding without difficulty.  Ambulating well and tolerating a diet.  No fever.  Breastfeeding and formula feeding.  Infant is stable.  Bonding with infant. Patient's After Visit Summary was reviewed with patient Patient verbalized understanding of After Visit Summary, recommended follow up and was given an opportunity to ask questions. Discharge medications sent home with patient. Discharged home 7/05/2020.

## 2020-07-05 NOTE — PROGRESS NOTES
Post Partum Progress Note  POD#2, s/p RLTCS under GETA c/b aspiration.    Subjective:  She is resting comfortably in bed this morning. She complains of feeling anxious about getting home and would like to leave today if possible. Pain is improving and well controlled on current medication regimen. She is tolerating PO intake. Lochia present and lightening, at this point lighter than menses.  She is voiding without difficulty. She has passed flatus and has had a BM. She is ambulating without dizziness or difficulty.  She denies headache, changes in vision, nausea/vomiting, chest pain, shortness of breath, RUQ pain, or worsening edema.  Plans to breastfeed.    Objective:  Vitals:    20 2357 20 0330 20 0345 20 0711   BP: 118/80 (!) 147/90 (!) 146/88    Pulse: 101 103     Resp:       Temp: 98.5  F (36.9  C) 98.2  F (36.8  C)     TempSrc: Oral Oral     SpO2:       Weight:    84.5 kg (186 lb 3.2 oz)       General: NAD, resting comfortably  CV: Regular rate, well perfused.   Pulm: Normal respiratory effort.  Abd: Soft, non-tender, non-distended. Fundus is firm and 1 cm below the umbilicus.    Incision: incision is clean, dry, intact  Ext: 1+ lower extremity edema bilaterally. No calf tenderness.    Assessment/Plan:  Arielle Montenegro is a 35 year old  female who is POD#2 s/p RLTCS under GETA c/b aspiration. Doing well postpartum.    #PreE w/SF  - UPC 0.52; Cr 0.98, Plts 128, AST/ALT wnl  - IV Labetalol 20 (7/3 1515).Continues to be low mild hypertensive. Continue current regimen.  - Sx: Denies  - Exam: Absent reflexes, 1+ edema, unchanged from prior  - Mag previously turned off due to suspected fluid overload. IV lasix given with excellent UOP.   - .29 ml/h, adequate. Strict I/O.  - Daily weights     #T1DM  - Hyperglycemic overnight.  - Endocrinology following and will adjust regimen today      #Scant PNC  - SW consult     #Postpartum cares  - Encourage routine post-operative goals  including ambulation and incentive spirometry  - PNC: Rh positive. Rubella immune. No intervention indicated.  - Pain: Controlled on oral medications  - Heme: Hgb 10.9> > AM pending.  If <10 will discharge home with iron.  - GI: Continue anti-emetics and stool softeners as needed.  - : Bender in place, remove today  - Infant: Stable in room  - Feeding: Plans on breastfeeding.  - BC: Declines  Anticipate discharge to home on POD#3    Hair Laguna MD  Obstetrics and Gyncology, PGY-1  July 5, 2020 , 7:21 AM         Physician Attestation   I, Aparna Atkins, personally saw and evaluated Arielle Montenegro.  I have reviewed the above note and agree with details. Patient is anxious to go home today.  She has a special needs child who is 5 and is missing her.  She has 2 cousins who will stay with her and help her with the infant.  She was screened for PP depression and also psych consult was done today. Overall feeling is that she can be safely discharged today with close follow up in clinic.  She will be discharged on procardia 30 mg XL to take daily for her blood pressure.  She should follow-up in the OB clinic in 1 to 2 weeks for a blood pressure check.  She will call if she feels symptoms of postpartum depression.Discussed postpartum instructions/restrictions and follow up.    Hemoglobin   Date Value Ref Range Status   07/05/2020 10.6 (L) 11.7 - 15.7 g/dL Final   07/04/2020 10.1 (L) 11.7 - 15.7 g/dL Final      Aparna Atkins MD  July 5, 2020

## 2020-07-05 NOTE — PROGRESS NOTES
ZOYA Saul: Resource RN came to advise Charge RN Dorys pt admitted to self administering insulin 3 USP Novolog. Pt was using self monitor and noticed BG rising to 202 and gave herself the insulin. Bedside RN requested all home medications from patient to store in patient med bin until discharge and discussed with patient importance of not self administering insulin. Bedside RN will update MD.     ZOYA Coyle: I spoke with endocrine and they were fine with the removing of the insulin from the patient's room and said they would visit patient later. Weren't concerned with holding of insulin when BG was only 97 after breakfast.

## 2020-07-05 NOTE — PROVIDER NOTIFICATION
07/05/20 1356   Provider Notification   Provider Name/Title Dr. Ware   Method of Notification Electronic Page   Request Evaluate-Remote   Notification Reason Vital Signs Change   /84 P 104, 15 min Repeat 143/78 P 107

## 2020-07-06 LAB
BACTERIA SPEC CULT: NORMAL
Lab: NORMAL
SPECIMEN SOURCE: NORMAL

## 2020-07-07 ENCOUNTER — NURSE TRIAGE (OUTPATIENT)
Dept: NURSING | Facility: CLINIC | Age: 35
End: 2020-07-07

## 2020-07-07 NOTE — TELEPHONE ENCOUNTER
"TC to patient. States last night it \"felt weird to urinate\". She was feeling like she needed to urinate but couldn't. Patient reports feeling better this morning. Says she is urinating and large amounts. Worried that she will get that feeling back, but happy that it is gone. Patient denies burning with urination, urgency, or any urinary symptoms at this time. Patient was advised that if she becomes unable to urinate she would need to go to the emergency room. Continue to hydrate. Patient agreeable to plan. Will call back the clinic if other concerns arise. Candice Villegas RN    "

## 2020-07-07 NOTE — TELEPHONE ENCOUNTER
Arielle give birth on July 3rd.  Since midnight feeling like she has to urinate and is not urinating.  Arielle is retaining urine.  Arielle is requesting to speak with on call MD. LOPEZ paged on call MD Stephen to phone Arielle back at 6:48 am at [phone].       Reason for Disposition    [1] Unable to urinate (or only a few drops) > 4 hours AND     [2] bladder feels very full (e.g., palpable bladder or strong urge to urinate)    Additional Information    Negative: Shock suspected (e.g., cold/pale/clammy skin, too weak to stand, low BP, rapid pulse)    Negative: Sounds like a life-threatening emergency to the triager    Protocols used: URINARY SYMPTOMS-A-AH

## 2020-07-09 ENCOUNTER — TELEPHONE (OUTPATIENT)
Dept: ENDOCRINOLOGY | Facility: CLINIC | Age: 35
End: 2020-07-09

## 2020-07-09 LAB — COPATH REPORT: NORMAL

## 2020-07-09 NOTE — TELEPHONE ENCOUNTER
CLINIC COORDINATOR SCHEDULING NOTES    CALL RESULT: LVM    APPT TYPE: VIRTUAL VISIT RETURN    PROVIDER: Smita    DATE APPT NEEDED: 2-3 weeks    ADDITIONAL NOTES: DM1 post delivery follow up

## 2020-07-09 NOTE — TELEPHONE ENCOUNTER
----- Message from Dee Roca MD sent at 7/5/2020 11:34 AM CDT -----  Regarding: Appt  Hi,  Please make follow-up appt in 2-3 weeks with Michelle for DM type 1 post delivery follow-up.    Thanks  Guru Roca

## 2020-07-10 ENCOUNTER — PATIENT OUTREACH (OUTPATIENT)
Dept: CARE COORDINATION | Facility: CLINIC | Age: 35
End: 2020-07-10

## 2020-07-10 NOTE — PROGRESS NOTES
Clinic Care Coordination Contact  [facility]/Voicemail    Clinical Data: Care Coordinator Outreach  Outreach attempted x 1.  Left message on patient's voicemail with call back information and requested return call.  Plan: Care Coordinator will try to reach patient again in 1-2 business days.

## 2020-07-10 NOTE — PROGRESS NOTES
Pt returned my call and said she appreciated the follow up. Baby is doing well, pt said she is doing great and feels good. She said she thinks she has everything she needs right now but will reach out if that changes. Will do no further outreaches to pt at this time--pt has my contact information and can reach out if needed.

## 2020-07-17 ENCOUNTER — PATIENT OUTREACH (OUTPATIENT)
Dept: CARE COORDINATION | Facility: CLINIC | Age: 35
End: 2020-07-17

## 2020-07-17 NOTE — PROGRESS NOTES
Clinic Care Coordination Contact  [facility]/Voicemail    Clinical Data: Care Coordinator Outreach  Outreach attempted x 1.  Left message on patient's voicemail with call back information and requested return call.  Plan: Care Coordinator will try to reach patient again in 3-5 business days.

## 2020-07-20 ENCOUNTER — NURSE TRIAGE (OUTPATIENT)
Dept: NURSING | Facility: CLINIC | Age: 35
End: 2020-07-20

## 2020-07-20 DIAGNOSIS — E10.649 TYPE 1 DIABETES MELLITUS WITH HYPOGLYCEMIA AND WITHOUT COMA (H): Primary | ICD-10-CM

## 2020-07-20 RX ORDER — GLUCOSAMINE HCL/CHONDROITIN SU 500-400 MG
CAPSULE ORAL
Qty: 100 EACH | Refills: 3 | Status: CANCELLED
Start: 2020-07-20

## 2020-07-20 RX ORDER — INSULIN ASPART 100 [IU]/ML
INJECTION, SOLUTION INTRAVENOUS; SUBCUTANEOUS
Qty: 30 ML | Refills: 3 | Status: SHIPPED | OUTPATIENT
Start: 2020-07-20 | End: 2020-08-25

## 2020-07-20 RX ORDER — FLURBIPROFEN SODIUM 0.3 MG/ML
SOLUTION/ DROPS OPHTHALMIC
Qty: 100 EACH | Refills: 3 | Status: CANCELLED
Start: 2020-07-20

## 2020-07-20 RX ORDER — ALBUTEROL SULFATE 108 UG/1
AEROSOL, METERED RESPIRATORY (INHALATION)
Qty: 100 EACH | Refills: 3 | Status: CANCELLED
Start: 2020-07-20

## 2020-07-20 RX ORDER — METHYLPREDNISOLONE SODIUM SUCCINATE 125 MG/2ML
INJECTION, POWDER, FOR SOLUTION INTRAMUSCULAR; INTRAVENOUS
Qty: 100 EACH | Refills: 3 | Status: CANCELLED
Start: 2020-07-20

## 2020-07-20 RX ORDER — INSULIN ASPART 100 [IU]/ML
INJECTION, SOLUTION INTRAVENOUS; SUBCUTANEOUS
Refills: 3 | Status: CANCELLED | OUTPATIENT
Start: 2020-07-20

## 2020-07-20 RX ORDER — PEN NEEDLE, DIABETIC 32GX 5/32"
NEEDLE, DISPOSABLE MISCELLANEOUS
Qty: 100 EACH | Refills: 3 | Status: CANCELLED
Start: 2020-07-20

## 2020-07-20 NOTE — TELEPHONE ENCOUNTER
Pt has been at the pharmacy for over 2 hours waiting for an order of the insulin and pen needles to be sent to the pharmacy for Pt care.    Pt crying because she has not heard anything back at the pharmacy for Pt care.     Called the back line to the clinic for further assistance.       Lanie Gamez RN  Central Triage Red Flags/Med Refills      Additional Information    Negative: Drug overdose and triager unable to answer question    Negative: Caller requesting information unrelated to medicine    Negative: Caller requesting a prescription for Strep throat and has a positive culture result    Negative: Rash while taking a medication or within 3 days of stopping it    Negative: Immunization reaction suspected    Negative: Asthma and having symptoms of asthma (cough, wheezing, etc.)    Negative: Breastfeeding questions about mother's medicines and diet    Negative: MORE THAN A DOUBLE DOSE of a prescription or over-the-counter (OTC) drug    Negative: DOUBLE DOSE (an extra dose or lesser amount) of over-the-counter (OTC) drug and any symptoms (e.g., dizziness, nausea, pain, sleepiness)    Negative: DOUBLE DOSE (an extra dose or lesser amount) of prescription drug and any symptoms (e.g., dizziness, nausea, pain, sleepiness)    Negative: Took another person's prescription drug    Negative: DOUBLE DOSE (an extra dose or lesser amount) of prescription drug and NO symptoms (Exception: a double dose of antibiotics)    Negative: Diabetes drug error or overdose (e.g., took wrong type of insulin or took extra dose)    Negative: Caller has medication question about med not prescribed by PCP and triager unable to answer question (e.g., compatibility with other med, storage)    Negative: Request for URGENT new prescription or refill of 'essential' medication (i.e., likelihood of harm to patient if not taken) and triager unable to fill per department policy    Negative: Prescription not at pharmacy and was prescribed today by PCP     Negative: Pharmacy calling with prescription questions and triager unable to answer question    Caller has urgent medication question about med that PCP prescribed and triager unable to answer question    Protocols used: MEDICATION QUESTION CALL-A-[place]

## 2020-07-20 NOTE — TELEPHONE ENCOUNTER
NovoLog FlexPen  Last Written Prescription Date:  ?  Last Fill Quantity: ?,   # refills: ?  Last Office Visit : 5/5/2020  Future Office visit:  None  Routing refill request to provider for review/approval because:  [facility] requesting Novolog Flex Pen and needles to go along with the Flex Pen for Pt care.   Pt lost the cartridge browning to hold the insulin cartridges for Pt care.        Please send a new order for Novolog Flex Pen and needles per Providers recommendations for Pt care.   Thank you     Lanie Gamez RN  Central Triage Red Flags/Med Refills

## 2020-07-20 NOTE — TELEPHONE ENCOUNTER
Novolog flexpen and pen needles sent to pharmacy.  I should be receiving a call on these issues. Sunshine Jaime RN on 7/20/2020 at 3:39 PM

## 2020-07-22 NOTE — TELEPHONE ENCOUNTER
CLINIC COORDINATOR SCHEDULING NOTES     CALL RESULT: LVM x2     APPT TYPE: VIRTUAL VISIT RETURN     PROVIDER: Smita     DATE APPT NEEDED: ASAP     ADDITIONAL NOTES: DM1 post delivery follow up

## 2020-07-24 NOTE — PROGRESS NOTES
Clinic Care Coordination Contact  [facility]/Voicemail       Clinical Data: Care Coordinator Outreach  Outreach attempted x 2.  Left message on patient's voicemail with call back information and requested return call.  Plan: Care Coordinator will do no further outreaches at this time.    Left my contact information in  on pt's phone. Asked her to give me a call and let her know that I would wait to hear back from her.

## 2020-08-04 ENCOUNTER — TELEPHONE (OUTPATIENT)
Dept: OBGYN | Facility: CLINIC | Age: 35
End: 2020-08-04

## 2020-08-04 RX ORDER — BREAST PUMP
1 EACH MISCELLANEOUS DAILY
Qty: 1 EACH | Refills: 0 | Status: SHIPPED | OUTPATIENT
Start: 2020-08-04 | End: 2020-12-01

## 2020-08-04 NOTE — TELEPHONE ENCOUNTER
Breast pump was lost.  Pt requesting rx be sent to home medical supply store.  rx faxed.  Hina Combs RN

## 2020-08-20 ENCOUNTER — TELEPHONE (OUTPATIENT)
Dept: ENDOCRINOLOGY | Facility: CLINIC | Age: 35
End: 2020-08-20

## 2020-08-20 DIAGNOSIS — E10.649 TYPE 1 DIABETES MELLITUS WITH HYPOGLYCEMIA AND WITHOUT COMA (H): ICD-10-CM

## 2020-08-20 NOTE — TELEPHONE ENCOUNTER
insulin glargine (LANTUS SOLOSTAR) 100 UNIT/ML pen       Last Written Prescription Date:  4-  Last Fill Quantity: 15 ml,   # refills: 3  Last Office Visit : 5-5-2020  Future Office visit:  8-    Routing refill request to provider for review/approval because:  Insulin - refilled per clinic        Kathleen M Doege RN

## 2020-08-20 NOTE — TELEPHONE ENCOUNTER
[facility] Call Center    Phone Message    May a detailed message be left on voicemail: no     Reason for Call: Medication Refill Request    Has the patient contacted the pharmacy for the refill? Yes   Name of medication being requested: insulin glargine (LANTUS SOLOSTAR) 100 UNIT/ML pen  Provider who prescribed the medication: Smita  Pharmacy: [facility] [address] [place] [address] E  Date medication is needed: ASAP, Pt stated that she is nearly out.        Action Taken: Message routed to:  [facility] ([facility]): med refills team    Travel Screening: Not Applicable

## 2020-08-20 NOTE — TELEPHONE ENCOUNTER
CLINIC COORDINATOR SCHEDULING NOTES    CALL RESULT: LVM    APPT TYPE: VIRTUAL VISIT RETURN    PROVIDER: Smita    DATE APPT NEEDED: 1st available    ADDITIONAL NOTES: Multiple previous attempts made for scheduling.

## 2020-08-24 NOTE — PROGRESS NOTES
Arielle Montenegro is a 35 year old female who is being evaluated via a billable telephone visit.          [facility]  Endocrinology  Janessa Ordoñez PA-C  2020      Chief Complaint:   Diabetes Type 1 with hypoglycemia unawareness    History of Present Illness:   Arielle Montenegro is a 35 year old female  with a history of type 1 diabetes and a heart murmur who presents for a follow-up on her diabetes.  She has had just 2 prior visits about her diabetes and this pregnancy and very limited previous diabetic education in her life.  She was diagnosed DM1 at age 6-7, when she became sick while living in [place]. She was diagnosed with diabetes and started on insulin since then.   She recently delivered on July 3 by  at 39 weeks.  Her pregnancy was complicated limited diabetic care, preeclampsia and hypoglycemia with loss of consciousness.      She established care with Dr. Mooney on 2020 where she described that during her current pregnancy her glucose dropped too much, too often, therefore she self decreased insulin. Her A1C has been constantly high 10-11 range persistently prepregnancy, however at this visit it had dropped to 7.5. At this point she was taking Lantus 10 units (pre pregnancy she was on 20 units per patient) and Novolog carb counting 1 carb choice 2 units. I saw her in early March and per Ariane CGM BG had come down to likely A1C of 7.6%.  Arielle reported being astounded at how much insulin she was using, up to 20 units of Lantus and correcting with 5-7 units of Novolog when high, 8 with meals.  She reported having two children with developmental delay and asked about possible consequences of high BG during this pregnancy.  She had not been able to procure her own ariane sensors, so I accompanied her to the pharmacy will be able to get some, and RUFUS Mina was able to meet with her and help her learn to apply them.  She agreed to schedule with both nutrition and CDE in  follow-up, but when MA sat down to do this with her she stated she was unable to do so.  She had agreed to return to clinic the following week to review her dosing, but did not do so.  I last met with her by telephone earlier this week.  She was checking her blood sugars 7-25 times daily with a latoya and unfortunately reported recent loss of consciousness with paramedic visit and IV glucose, though I am unable to find verify this in care everywhere.  Possibly this was with [facility] and we do not have a current authorization to view those records.    I last saw her during pregnancy on May 5 when she had had EMS called because of loss of consciousness due to hypoglycemia.  At that time we reduced her long-acting insulin to 26 units and also reduced her mealtime insulin.  Following delivery  advised her to decrease her Lantus to just 10 units daily, and use 1 unit per 20 g of carb with meals and snacks, as well as correct just 1 unit of insulin per 50 greater than 150.     Today:  I see on latoya that her average blood sugar has increased to 254 which is consistent with an estimated A1c of 9.4%.  Her blood glucose is quite labile with 21% of blood sugars in target range, 48% above 250, and nonetheless 4% % below 54.      She tells me that things are going well following delivery.  She is not breastfeeding.    She is giving just 6 units of Lantus.  She decreased quickly after discharge and blood sugar was decreasing.  She found InPen hard to use and doesn't want.  They tried to use in hospital and she doesn't think the insulin was going in well.  She also found Dexcom challenging.  She tells me she is interested in pump.  She is taking about 1 unit per 20 g of carb with meals and snacks, as well as correct just 1 unit of insulin per 50 greater than 150.       She forgot about her visit and is not at home, she is with her kids outside and one is misbehaving so hard to talk right now.  She needs  refills. Last time got just one of each pen and very scared to lose and be without.        Blood Glucose Monitoring:              Diabetes monitoring and complications:  CAD: No  Last eye exam results: 6 mo ago, no retinopathy  Microalbuminuria: 94.51 in 2014  Neuropathy: No  HTN: No  On Statin: No  On Aspirin: Yes  Depression: Yes    Review of Systems:   Pertinent items are noted in HPI.  All other systems are negative.    Active Medications:      albuterol (PROAIR HFA/PROVENTIL HFA/VENTOLIN HFA) 108 (90 Base) MCG/ACT inhaler, Inhale 2 puffs into the lungs every 6 hours, Disp: 1 Inhaler, Rfl: 3     aspirin (ASA) 81 MG EC tablet, Take 1 tablet (81 mg) by mouth daily, Disp: 100 tablet, Rfl: 3     BASAGLAR 100 UNIT/ML injection, Inject 60 Units Subcutaneous daily, Disp: 15 mL, Rfl: 0     fluticasone-salmeterol (ADVAIR) 100-50 MCG/DOSE inhaler, Inhale 1 puff into the lungs every 12 hours, Disp: 1 Inhaler, Rfl: 3     insulin aspart (NOVOLOG FLEXPEN) 100 UNIT/ML pen, Inject 5 Units Subcutaneous, Disp: , Rfl:      insulin aspart (NOVOLOG FLEXPEN) 100 UNIT/ML pen, Take 2 units per carb with each meal plus corrections 1:50 > 150. Max is 25 units daily., Disp: 3 mL, Rfl: 0     insulin glargine (LANTUS SOLOSTAR) 100 UNIT/ML pen, Inject 20 Units Subcutaneous, Disp: , Rfl:      Prenatal Vit-Fe Fumarate-FA (PRENATAL MULTIVITAMIN W/IRON) 27-0.8 MG tablet, Take 1 tablet by mouth daily, Disp: 90 tablet, Rfl: 1      Allergies:   Patient has no known allergies.      Past Medical History:  Blindness of right eye  Type 1 diabetes  Hypoglycemia unawareness  Vitamin D deficiency  Anxiety     Past Surgical History:   section - 2013, 2015  Enucleation right - 2015    Family History:   Diabetes - paternal side of family       Social History:   She is home caring for her-2 children ages 5 and 7.  Her son has ADHD and it is somewhat stressful.     Physical Exam:   LMP 2019 (Approximate)      Wt Readings from Last 10 Encounters:  "  07/05/20 84.5 kg (186 lb 3.2 oz)   06/22/20 83.1 kg (183 lb 3.2 oz)   06/11/20 80.5 kg (177 lb 6.4 oz)   06/04/20 77.7 kg (171 lb 4.8 oz)   05/21/20 75.8 kg (167 lb)   05/06/20 74.5 kg (164 lb 3.2 oz)   03/12/20 70.2 kg (154 lb 12.8 oz)   03/09/20 71 kg (156 lb 8 oz)   02/17/20 71.4 kg (157 lb 4.8 oz)   01/29/20 68.2 kg (150 lb 6.4 oz)      PHONE VISIT    Arielle is alerted and oriented.  Mood is \"good,\" affect is congruent.  Thoughtful form and content are fluid and coherent.  She is interrupted a number of times and is calling t her children multiple times.           Data:  Lab Results   Component Value Date     (L) 09/20/2019    POTASSIUM 4.0 01/24/2020    CHLORIDE 95 09/20/2019    CO2 22 09/20/2019    ANIONGAP 10 09/20/2019    GLC 91 07/03/2020    BUN 13 09/20/2019    CR 0.91 07/05/2020    ALEXANDRO 8.5 09/20/2019     Lab Results   Component Value Date    GFRESTIMATED 82 07/05/2020    GFRESTIMATED 85 07/04/2020    GFRESTIMATED 75 07/03/2020    GFRESTBLACK >90 07/05/2020    GFRESTBLACK >90 07/04/2020    GFRESTBLACK 87 07/03/2020      Lab Results   Component Value Date    MICROL 172 09/30/2014    UMALCR 94.51 (H) 09/30/2014        Lab Results   Component Value Date    A1C 5.8 (H) 07/04/2020    A1C 6.4 (H) 06/11/2020    A1C 7.5 (H) 01/28/2020    A1C 7.8 (H) 01/02/2020    A1C 12.6 (H) 09/20/2019     Lab Results   Component Value Date    CPEPT <0.1 (L) 09/30/2014       Lab Results   Component Value Date    CHOL 181 09/20/2019    CHOL 147.6 03/11/2019    TRIG 45 09/20/2019    TRIG 115.8 03/11/2019    HDL 71 09/20/2019    HDL 52.4 03/11/2019     (H) 09/20/2019    LDL 72 03/11/2019    NHDL 110 09/20/2019    NHDL 129 06/14/2018       Assessment and Plan:  Type 1 diabetes mellitus with hypoglycemia and without coma (H)    Arielle has type 1 diabetes and is now 30 weeks pregnant with A1c increased post partum.  She denies recent severe hypoglycemia, but reports her blood sugar \"is crazy\" and affecting sleep energy and " "светлана.    Advised decrease Lantus to 5 units daily.     Advised consider InPen or Dexcom (ideally with low suspend pump.)  She states that she would like to see educator regarding pump; ideally by phone though it appears needs undistracted visit.          Follow-up:With Cde next available. Self 3 months.      >50% of 25 minute visit spent in counseling, education and coordination of care related to options for better glycemic control as well as preventing, detecting, and treating hypoglycemia.      It is my privilege to be involved in the care of the above patient.     Janessa Ordoñez PA-C, [facility]  [facility]  Diabetes, Endocrinology, and Metabolism  [phone] Appointments/Nurse  [phone] Fax  [phone] pager  [phone] nurse dulce Guzmán Leeanne Montenegro is a 35 year old female who is being evaluated via a billable telephone visit.      The patient has been notified of following:     \"This telephone visit will be conducted via a call between you and your physician/provider. We have found that certain health care needs can be provided without the need for a physical exam.  This service lets us provide the care you need with a short phone conversation.  If a prescription is necessary we can send it directly to your pharmacy.  If lab work is needed we can place an order for that and you can then stop by our lab to have the test done at a later time.    Telephone visits are billed at different rates depending on your insurance coverage. During this emergency period, for some insurers they may be billed the same as an in-person visit.  Please reach out to your insurance provider with any questions.    If during the course of the call the physician/provider feels a telephone visit is not appropriate, you will not be charged for this service.\"    Patient has given verbal consent for Telephone visit?  Yes    What phone number would you like to be contacted at? " [phone]    How would you like to obtain your AVS? Mail a copy    Laura Bruce MA    Patients Glucose Data was Sent via Email

## 2020-08-25 ENCOUNTER — VIRTUAL VISIT (OUTPATIENT)
Dept: ENDOCRINOLOGY | Facility: CLINIC | Age: 35
End: 2020-08-25

## 2020-08-25 DIAGNOSIS — E10.649 TYPE 1 DIABETES MELLITUS WITH HYPOGLYCEMIA AND WITHOUT COMA (H): ICD-10-CM

## 2020-08-25 RX ORDER — INSULIN ASPART 100 [IU]/ML
INJECTION, SOLUTION INTRAVENOUS; SUBCUTANEOUS
Qty: 15 ML | Refills: 0 | Status: SHIPPED | OUTPATIENT
Start: 2020-08-25 | End: 2020-09-16

## 2020-08-25 NOTE — PATIENT INSTRUCTIONS
Dear Arielle,    Please decrease Lantus to 5 units daily.  Please give Novolog 10 - 15 minutes before you eat to prevent very high blood sugars.    Please consider Dexcom sensor so you can have alarms for low blood sugars and possibly get a pump that will shut insulin off to prevent low blood sugar.  Please do see diabetic educator to discuss this further.    I have sent your prescriptions to [facility] in [facility].    Please let me know of any other questions or concerns.    My best wishes,    Janessa Ordoñez PA-C, MPAS  [facility]  Diabetes, Endocrinology, and Metabolism  [phone] Appointments/Nurse  [phone] Fax  [phone] URGENTafter hours/weekend Endocrinologist on call

## 2020-08-25 NOTE — LETTER
2020       RE: Arielle Montenegro  [address]  [facility] [facility] [address]     Dear Colleague,    Thank you for referring your patient, Arielle Montenegro, to the [facility] ENDOCRINOLOGY at [facility]. Please see a copy of my visit note below.    Arielle Montenegro is a 35 year old female who is being evaluated via a billable telephone visit.          [facility]  Endocrinology  Janessa Ordoñez PA-C  2020      Chief Complaint:   Diabetes Type 1 with hypoglycemia unawareness    History of Present Illness:   Arielle Montenegro is a 35 year old female  with a history of type 1 diabetes and a heart murmur who presents for a follow-up on her diabetes.  She has had just 2 prior visits about her diabetes and this pregnancy and very limited previous diabetic education in her life.  She was diagnosed DM1 at age 6-7, when she became sick while living in [place]. She was diagnosed with diabetes and started on insulin since then.   She recently delivered on July 3 by  at 39 weeks.  Her pregnancy was complicated limited diabetic care, preeclampsia and hypoglycemia with loss of consciousness.      She established care with Dr. Mooney on 2020 where she described that during her current pregnancy her glucose dropped too much, too often, therefore she self decreased insulin. Her A1C has been constantly high 10-11 range persistently prepregnancy, however at this visit it had dropped to 7.5. At this point she was taking Lantus 10 units (pre pregnancy she was on 20 units per patient) and Novolog carb counting 1 carb choice 2 units. I saw her in early March and per Ariane CGM BG had come down to likely A1C of 7.6%.  Arielle reported being astounded at how much insulin she was using, up to 20 units of Lantus and correcting with 5-7 units of Novolog when high, 8 with meals.  She reported having two children with developmental delay and asked about possible  consequences of high BG during this pregnancy.  She had not been able to procure her own latoya sensors, so I accompanied her to the pharmacy will be able to get some, and RUFUS Mina was able to meet with her and help her learn to apply them.  She agreed to schedule with both nutrition and CDE in follow-up, but when MA sat down to do this with her she stated she was unable to do so.  She had agreed to return to clinic the following week to review her dosing, but did not do so.  I last met with her by telephone earlier this week.  She was checking her blood sugars 7-25 times daily with a latoya and unfortunately reported recent loss of consciousness with paramedic visit and IV glucose, though I am unable to find verify this in care everywhere.  Possibly this was with [facility] and we do not have a current authorization to view those records.    I last saw her during pregnancy on May 5 when she had had EMS called because of loss of consciousness due to hypoglycemia.  At that time we reduced her long-acting insulin to 26 units and also reduced her mealtime insulin.  Following delivery  advised her to decrease her Lantus to just 10 units daily, and use 1 unit per 20 g of carb with meals and snacks, as well as correct just 1 unit of insulin per 50 greater than 150.     Today:  I see on latoya that her average blood sugar has increased to 254 which is consistent with an estimated A1c of 9.4%.  Her blood glucose is quite labile with 21% of blood sugars in target range, 48% above 250, and nonetheless 4% % below 54.      She tells me that things are going well following delivery.  She is not breastfeeding.    She is giving just 6 units of Lantus.  She decreased quickly after discharge and blood sugar was decreasing.  She found InPen hard to use and doesn't want.  They tried to use in hospital and she doesn't think the insulin was going in well.  She also found Dexcom challenging.  She tells me she  is interested in pump.  She is taking about 1 unit per 20 g of carb with meals and snacks, as well as correct just 1 unit of insulin per 50 greater than 150.       She forgot about her visit and is not at home, she is with her kids outside and one is misbehaving so hard to talk right now.  She needs refills. Last time got just one of each pen and very scared to lose and be without.        Blood Glucose Monitoring:              Diabetes monitoring and complications:  CAD: No  Last eye exam results: 6 mo ago, no retinopathy  Microalbuminuria: 94.51 in 2014  Neuropathy: No  HTN: No  On Statin: No  On Aspirin: Yes  Depression: Yes    Review of Systems:   Pertinent items are noted in HPI.  All other systems are negative.    Active Medications:      albuterol (PROAIR HFA/PROVENTIL HFA/VENTOLIN HFA) 108 (90 Base) MCG/ACT inhaler, Inhale 2 puffs into the lungs every 6 hours, Disp: 1 Inhaler, Rfl: 3     aspirin (ASA) 81 MG EC tablet, Take 1 tablet (81 mg) by mouth daily, Disp: 100 tablet, Rfl: 3     BASAGLAR 100 UNIT/ML injection, Inject 60 Units Subcutaneous daily, Disp: 15 mL, Rfl: 0     fluticasone-salmeterol (ADVAIR) 100-50 MCG/DOSE inhaler, Inhale 1 puff into the lungs every 12 hours, Disp: 1 Inhaler, Rfl: 3     insulin aspart (NOVOLOG FLEXPEN) 100 UNIT/ML pen, Inject 5 Units Subcutaneous, Disp: , Rfl:      insulin aspart (NOVOLOG FLEXPEN) 100 UNIT/ML pen, Take 2 units per carb with each meal plus corrections 1:50 > 150. Max is 25 units daily., Disp: 3 mL, Rfl: 0     insulin glargine (LANTUS SOLOSTAR) 100 UNIT/ML pen, Inject 20 Units Subcutaneous, Disp: , Rfl:      Prenatal Vit-Fe Fumarate-FA (PRENATAL MULTIVITAMIN W/IRON) 27-0.8 MG tablet, Take 1 tablet by mouth daily, Disp: 90 tablet, Rfl: 1      Allergies:   Patient has no known allergies.      Past Medical History:  Blindness of right eye  Type 1 diabetes  Hypoglycemia unawareness  Vitamin D deficiency  Anxiety     Past Surgical History:   section - ,  "2015  Enucleation right - 2015    Family History:   Diabetes - paternal side of family       Social History:   She is home caring for her-2 children ages 5 and 7.  Her son has ADHD and it is somewhat stressful.     Physical Exam:   LMP 09/06/2019 (Approximate)      Wt Readings from Last 10 Encounters:   07/05/20 84.5 kg (186 lb 3.2 oz)   06/22/20 83.1 kg (183 lb 3.2 oz)   06/11/20 80.5 kg (177 lb 6.4 oz)   06/04/20 77.7 kg (171 lb 4.8 oz)   05/21/20 75.8 kg (167 lb)   05/06/20 74.5 kg (164 lb 3.2 oz)   03/12/20 70.2 kg (154 lb 12.8 oz)   03/09/20 71 kg (156 lb 8 oz)   02/17/20 71.4 kg (157 lb 4.8 oz)   01/29/20 68.2 kg (150 lb 6.4 oz)      PHONE VISIT    Arielle is alerted and oriented.  Mood is \"good,\" affect is congruent.  Thoughtful form and content are fluid and coherent.  She is interrupted a number of times and is calling t her children multiple times.           Data:  Lab Results   Component Value Date     (L) 09/20/2019    POTASSIUM 4.0 01/24/2020    CHLORIDE 95 09/20/2019    CO2 22 09/20/2019    ANIONGAP 10 09/20/2019    GLC 91 07/03/2020    BUN 13 09/20/2019    CR 0.91 07/05/2020    ALEXANDRO 8.5 09/20/2019     Lab Results   Component Value Date    GFRESTIMATED 82 07/05/2020    GFRESTIMATED 85 07/04/2020    GFRESTIMATED 75 07/03/2020    GFRESTBLACK >90 07/05/2020    GFRESTBLACK >90 07/04/2020    GFRESTBLACK 87 07/03/2020      Lab Results   Component Value Date    MICROL 172 09/30/2014    UMALCR 94.51 (H) 09/30/2014        Lab Results   Component Value Date    A1C 5.8 (H) 07/04/2020    A1C 6.4 (H) 06/11/2020    A1C 7.5 (H) 01/28/2020    A1C 7.8 (H) 01/02/2020    A1C 12.6 (H) 09/20/2019     Lab Results   Component Value Date    CPEPT <0.1 (L) 09/30/2014       Lab Results   Component Value Date    CHOL 181 09/20/2019    CHOL 147.6 03/11/2019    TRIG 45 09/20/2019    TRIG 115.8 03/11/2019    HDL 71 09/20/2019    HDL 52.4 03/11/2019     (H) 09/20/2019    LDL 72 03/11/2019    NHDL 110 09/20/2019    NHDL 129 " "06/14/2018       Assessment and Plan:  Type 1 diabetes mellitus with hypoglycemia and without coma (H)    Arielle has type 1 diabetes and is now 30 weeks pregnant with A1c increased post partum.  She denies recent severe hypoglycemia, but reports her blood sugar \"is crazy\" and affecting sleep energy and mnood.    Advised decrease Lantus to 5 units daily.     Advised consider InPen or Dexcom (ideally with low suspend pump.)  She states that she would like to see educator regarding pump; ideally by phone though it appears needs undistracted visit.          Follow-up:With Cde next available. Self 3 months.      >50% of 25 minute visit spent in counseling, education and coordination of care related to options for better glycemic control as well as preventing, detecting, and treating hypoglycemia.      It is my privilege to be involved in the care of the above patient.     Janessa Ordoñez PA-C, [facility]  [facility]  Diabetes, Endocrinology, and Metabolism  [phone] Appointments/Nurse  [phone] Fax  [phone] pager  [phone] nurse line                         Arielle Montenegro is a 35 year old female who is being evaluated via a billable telephone visit.      The patient has been notified of following:     \"This telephone visit will be conducted via a call between you and your physician/provider. We have found that certain health care needs can be provided without the need for a physical exam.  This service lets us provide the care you need with a short phone conversation.  If a prescription is necessary we can send it directly to your pharmacy.  If lab work is needed we can place an order for that and you can then stop by our lab to have the test done at a later time.    Telephone visits are billed at different rates depending on your insurance coverage. During this emergency period, for some insurers they may be billed the same as an in-person visit.  Please reach out to your insurance " "provider with any questions.    If during the course of the call the physician/provider feels a telephone visit is not appropriate, you will not be charged for this service.\"    Patient has given verbal consent for Telephone visit?  Yes    What phone number would you like to be contacted at? [phone]    How would you like to obtain your AVS? Mail a copy    Laura Bruce MA    Patients Glucose Data was Sent via Email        "

## 2020-09-16 ENCOUNTER — TELEPHONE (OUTPATIENT)
Dept: ENDOCRINOLOGY | Facility: CLINIC | Age: 35
End: 2020-09-16

## 2020-09-16 DIAGNOSIS — E10.649 TYPE 1 DIABETES MELLITUS WITH HYPOGLYCEMIA AND WITHOUT COMA (H): ICD-10-CM

## 2020-09-16 RX ORDER — INSULIN ASPART 100 [IU]/ML
INJECTION, SOLUTION INTRAVENOUS; SUBCUTANEOUS
Qty: 30 ML | Refills: 1 | Status: SHIPPED | OUTPATIENT
Start: 2020-09-16 | End: 2020-09-23

## 2020-09-23 DIAGNOSIS — E10.649 TYPE 1 DIABETES MELLITUS WITH HYPOGLYCEMIA AND WITHOUT COMA (H): ICD-10-CM

## 2020-09-23 RX ORDER — INSULIN ASPART 100 [IU]/ML
INJECTION, SOLUTION INTRAVENOUS; SUBCUTANEOUS
Qty: 30 ML | Refills: 1 | Status: SHIPPED | OUTPATIENT
Start: 2020-09-23 | End: 2020-12-22

## 2020-09-27 ENCOUNTER — TELEPHONE (OUTPATIENT)
Dept: ENDOCRINOLOGY | Facility: CLINIC | Age: 35
End: 2020-09-27

## 2020-09-27 NOTE — TELEPHONE ENCOUNTER
----- Message from Janessa Ordoñez PA-C sent at 9/16/2020  3:16 PM CDT -----  Regarding: PLease schedule with Ros or JARAD jolly type 1 DM  Thank you

## 2020-09-28 ENCOUNTER — VIRTUAL VISIT (OUTPATIENT)
Dept: FAMILY MEDICINE | Facility: CLINIC | Age: 35
End: 2020-09-28
Payer: COMMERCIAL

## 2020-09-28 DIAGNOSIS — E10.9 TYPE 1 DIABETES MELLITUS WITHOUT COMPLICATION (H): ICD-10-CM

## 2020-09-28 DIAGNOSIS — M54.12 CERVICAL RADICULOPATHY: Primary | ICD-10-CM

## 2020-09-28 PROCEDURE — 99214 OFFICE O/P EST MOD 30 MIN: CPT | Mod: 95 | Performed by: PHYSICIAN ASSISTANT

## 2020-09-28 RX ORDER — PREDNISONE 20 MG/1
TABLET ORAL
Qty: 20 TABLET | Refills: 0 | Status: SHIPPED | OUTPATIENT
Start: 2020-09-28 | End: 2020-12-01

## 2020-09-28 RX ORDER — BACLOFEN 10 MG/1
5-10 TABLET ORAL 3 TIMES DAILY PRN
Qty: 30 TABLET | Refills: 0 | Status: SHIPPED | OUTPATIENT
Start: 2020-09-28 | End: 2020-10-05

## 2020-09-28 NOTE — PROGRESS NOTES
"Arielle Montenegro is a 35 year old female who is being evaluated via a billable telephone visit.      The patient has been notified of following:     \"This telephone visit will be conducted via a call between you and your physician/provider. We have found that certain health care needs can be provided without the need for a physical exam.  This service lets us provide the care you need with a short phone conversation.  If a prescription is necessary we can send it directly to your pharmacy.  If lab work is needed we can place an order for that and you can then stop by our lab to have the test done at a later time.    Telephone visits are billed at different rates depending on your insurance coverage. During this emergency period, for some insurers they may be billed the same as an in-person visit.  Please reach out to your insurance provider with any questions.    If during the course of the call the physician/provider feels a telephone visit is not appropriate, you will not be charged for this service.\"    Patient has given verbal consent for Telephone visit?  Yes    What phone number would you like to be contacted at? [phone]    How would you like to obtain your AVS? Declined     Subjective     Arielle Montenegro is a 35 year old female who presents via phone visit today for the following health issues:    HPI    Musculoskeletal problem/pain/Neuropathy   Onset/Duration: Episode began 2 weeks ago  Description  Location: hand - left  Joint Swelling: no  Redness: no  Pain: YES  Warmth: no  Numbness/tingling: no  Intensity:  moderate, severe varies  Progression of Symptoms:  worsening and constant  Accompanying signs and symptoms:   Fevers: no  Numbness/tingling/weakness: YES  History  Trauma to the area: no  Recent illness:  no  Previous similar problem: YES- had an episode 9 yrs ago where she was hospitalized to get IV steroids due to her hand/arm going completely numb and unable to use, wants to avoid this " happening again because she cares for a young child.  Previous evaluation:  YES  Precipitating or alleviating factors:  Aggravating factors include: unsure what brings these episodes on other then neuropathy dx  Therapies tried and outcome: rest/inactivity, immobilization, massage, stretching, acetaminophen and Ibuprofen     Reports no weakness or numbness right now  Usually it's just her neck and shoulder that hurt, but now it's starting to go down her arm to her hand which is how it progressed last time  Pain is about a 10/10 currently  When she gets a massage it helps  Also cracking her neck helps  Just had a baby so she hasn't seen a chiropractor in a while  Also has been through physical therapy which intermittently helps        Review of Systems   CONSTITUTIONAL: NEGATIVE for fever, chills, change in weight  INTEGUMENTARY/SKIN: NEGATIVE for worrisome rashes, moles or lesions  EYES: NEGATIVE for vision changes or irritation  ENT/MOUTH: NEGATIVE for ear, mouth and throat problems  RESP: NEGATIVE for significant cough or SOB  BREAST: NEGATIVE for masses, tenderness or discharge  CV: NEGATIVE for chest pain, palpitations or peripheral edema  GI: NEGATIVE for nausea, abdominal pain, heartburn, or change in bowel habits  : NEGATIVE for frequency, dysuria, or hematuria  ENDOCRINE: NEGATIVE for temperature intolerance, skin/hair changes  HEME: NEGATIVE for bleeding problems  PSYCHIATRIC: NEGATIVE for changes in mood or affect       Objective          Vitals:  No vitals were obtained today due to virtual visit.    healthy, alert and no distress  PSYCH: Alert and oriented times 3; coherent speech, normal   rate and volume, able to articulate logical thoughts, able   to abstract reason, no tangential thoughts, no hallucinations   or delusions  Her affect is normal  RESP: No cough, no audible wheezing, able to talk in full sentences  Remainder of exam unable to be completed due to telephone visits             Assessment/Plan:      ICD-10-CM    1. Cervical radiculopathy  M54.12 Orthopedic & Spine  Referral     predniSONE (DELTASONE) 20 MG tablet     baclofen (LIORESAL) 10 MG tablet   2. Type 1 diabetes mellitus without complication (H)  E10.9        With recurrence of cervical radiculopathy. She does not want to go in for an office visit due to covid and currently doesn't have any neurological symptoms. I've advised her we can proceed with prednisone but she runs the risk of severe hyperglycemia. She assures me she will tightly control her sugars with a sliding scale of insulin and frequent blood sugar checks. Return to clinic for any new or worsening symptoms or go to ER Urgent care in off hours            Phone call duration:  12 minutes

## 2020-09-30 NOTE — TELEPHONE ENCOUNTER
DIAGNOSIS: Cervical radiculopathy /Carmita Wilder PA-C in  PRIMARY CARE/no images/[facility]/ortho con   APPOINTMENT DATE: 10/20   NOTES STATUS DETAILS   OFFICE NOTE from referring provider Internal Carmita Wilder PA-C   OFFICE NOTE from other specialist Internal    DISCHARGE SUMMARY from hospital N/A    DISCHARGE REPORT from the ER N/A    OPERATIVE REPORT N/A    MEDICATION LIST N/A    MRI N/A    CT SCAN N/A    XRAYS (IMAGES & REPORTS) N/A

## 2020-10-13 ENCOUNTER — VIRTUAL VISIT (OUTPATIENT)
Dept: ORTHOPEDICS | Facility: CLINIC | Age: 35
End: 2020-10-13
Payer: COMMERCIAL

## 2020-10-13 DIAGNOSIS — M54.12 CERVICAL RADICULOPATHY: ICD-10-CM

## 2020-10-13 PROCEDURE — 99202 OFFICE O/P NEW SF 15 MIN: CPT | Mod: 95 | Performed by: PREVENTIVE MEDICINE

## 2020-10-13 RX ORDER — MELOXICAM 15 MG/1
15 TABLET ORAL DAILY
Qty: 30 TABLET | Refills: 1 | Status: SHIPPED | OUTPATIENT
Start: 2020-10-13 | End: 2020-12-01

## 2020-10-13 ASSESSMENT — PATIENT HEALTH QUESTIONNAIRE - PHQ9: SUM OF ALL RESPONSES TO PHQ QUESTIONS 1-9: 5

## 2020-10-13 NOTE — LETTER
"10/13/2020       RE: Arielle Montenegro  [address]  [place] [address]     Dear Colleague,    Thank you for referring your patient, Arielle Montenegro, to the [facility] [place] at [facility]. Please see a copy of my visit note below.    Arielle Montenegro is a 35 year old female who is being evaluated via a billable video visit.      The patient has been notified of following:     \"This video visit will be conducted via a call between you and your physician/provider. We have found that certain health care needs can be provided without the need for an in-person physical exam.  This service lets us provide the care you need with a video conversation.  If a prescription is necessary we can send it directly to your pharmacy.  If lab work is needed we can place an order for that and you can then stop by our lab to have the test done at a later time.    Video visits are billed at different rates depending on your insurance coverage.  Please reach out to your insurance provider with any questions.    If during the course of the call the physician/provider feels a video visit is not appropriate, you will not be charged for this service.\"    Patient has given verbal consent for Video visit? Yes  How would you like to obtain your AVS? MyChart  If you are dropped from the video visit, the video invite should be resent to: Text to cell phone: 22  Will anyone else be joining your video visit? No      HISTORY OF PRESENT ILLNESS  Ms. Montenegro is a pleasant 35 year old year old female who presents to discuss her chronic neck pain with radicular symptoms  Arielle explains that she has had 'steroids in the past with a hospitalization' for neck pain that caused numbness in her arm  Has completed several rounds of PT in the past for this problem  Location: neck and upper back and shoulders  Quality:  achy pain    Severity: 9/10 at " worst    Duration: years, worse over past several months  Timing: occurs intermittently  Context: occurs while exercising and lifting  Modifying factors:  resting and non-use makes it better, movement and use makes it worse  Associated signs & symptoms: radiation of pain into shoulders and arms    MEDICAL HISTORY  Patient Active Problem List   Diagnosis     Blindness of right eye     Diabetes mellitus type 1 (H)     Hypoglycemia unawareness in type 1 diabetes mellitus (H)     Health Care Home     Vitamin D deficiency     External hemorrhoids     Encounter for long-term (current) use of insulin (H)     Medical non-compliance     Heart murmur     DJD (degenerative joint disease), cervical     Fibrocystic breast changes of both breasts     Diabetes mellitus with ketoacidosis (H)     Endophthalmitis     Uveitis     Generalized anxiety disorder     History of pre-eclampsia     Pre-existing diabetes mellitus during pregnancy     Diabetes mellitus (H)     Type 1 diabetes mellitus in pregnancy, third trimester     High-risk pregnancy     Episodic mood disorder (H)     Cannabis abuse     Cough     Depression, major, in remission (H)     Previous  delivery, antepartum condition or complication      delivery delivered       Current Outpatient Medications   Medication Sig Dispense Refill     meloxicam (MOBIC) 15 MG tablet Take 1 tablet (15 mg) by mouth daily 30 tablet 1     tiZANidine (ZANAFLEX) 4 MG tablet Take 1 tablet (4 mg) by mouth nightly as needed for muscle spasms 30 tablet 1     acetaminophen (TYLENOL) 325 MG tablet Take 1-2 tablets (325-650 mg) by mouth every 6 hours as needed for mild pain (Patient not taking: Reported on 10/13/2020) 1 Bottle 0     albuterol (PROAIR HFA/PROVENTIL HFA/VENTOLIN HFA) 108 (90 Base) MCG/ACT inhaler Inhale 2 puffs into the lungs every 6 hours (Patient not taking: Reported on 2020) 1 Inhaler 3     alcohol swab prep pads Use to swab area of injection/joelle as directed.  (Patient not taking: Reported on 10/13/2020) 100 each 3     bisacodyl (DULCOLAX) 10 MG suppository Place 1 suppository (10 mg) rectally daily as needed for constipation (Patient not taking: Reported on 10/13/2020) 10 suppository 1     blood glucose (NO BRAND SPECIFIED) test strip Use to test blood sugar 4 times daily or as directed. (Patient not taking: Reported on 10/13/2020) 100 strip 6     blood glucose calibration (NO BRAND SPECIFIED) solution To accompany: Blood Glucose Monitor Brands: per insurance. (Patient not taking: Reported on 10/13/2020) 1 Bottle 3     blood glucose monitoring (NO BRAND SPECIFIED) meter device kit Use to test blood sugar 4 times daily or as directed. (Patient not taking: Reported on 10/13/2020) 1 kit 0     blood glucose monitoring (NO BRAND SPECIFIED) meter device kit Use to test blood sugar.  One Touch Ultra or Verio. (Patient not taking: Reported on 10/13/2020) 1 kit 0     blood glucose monitoring (NO BRAND SPECIFIED) test strip Use to test blood sugar 4 times daily or as directed. To accompany: Blood Glucose Monitor Brands: per insurance. (Patient not taking: Reported on 10/13/2020) 100 strip 6     blood glucose monitoring (NO BRAND SPECIFIED) test strip Use to test blood sugars 4 times daily or as directed (Patient not taking: Reported on 10/13/2020) 100 strip 2     Continuous Blood Gluc Sensor (DEXCOM G6 SENSOR) MISC 1 each every 10 days Change every 10 days. (Patient not taking: Reported on 8/24/2020) 3 each 11     Continuous Blood Gluc Transmit (DEXCOM G6 TRANSMITTER) MISC 1 each every 3 months Change every 3 months. (Patient not taking: Reported on 8/24/2020) 1 each 3     ferrous sulfate (FEROSUL) 325 (65 Fe) MG tablet Take 1 tablet (325 mg) by mouth daily (with breakfast) (Patient not taking: Reported on 10/13/2020) 90 tablet 3     fluticasone-salmeterol (ADVAIR) 100-50 MCG/DOSE inhaler Inhale 1 puff into the lungs every 12 hours (Patient not taking: Reported on 6/4/2020) 1  Inhaler 3     ibuprofen (ADVIL/MOTRIN) 600 MG tablet Take 1 tablet (600 mg) by mouth every 6 hours as needed for moderate pain (Patient not taking: Reported on 10/13/2020) 60 tablet 1     insulin glargine (LANTUS SOLOSTAR) 100 UNIT/ML pen Inject 6 Units Subcutaneous daily. Titrate as directed. (Patient not taking: Reported on 10/13/2020) 15 mL 11     insulin pen needle (31G X 5 MM) 31G X 5 MM miscellaneous Use 3 270 pen needles daily or as directed. (Patient not taking: Reported on 10/13/2020) 270 each 3     Misc. Devices (BREAST PUMP) MISC 1 each daily (Patient not taking: Reported on 10/13/2020) 1 each 0     NIFEdipine ER OSMOTIC (PROCARDIA XL) 30 MG 24 hr tablet Take 1 tablet (30 mg) by mouth daily (Patient not taking: Reported on 10/13/2020) 30 tablet 1     NOVOLOG FLEXPEN 100 UNIT/ML soln Inject 2-12 units with meals TID  and at bedtime.approx 55 units daily (Patient not taking: Reported on 10/13/2020) 30 mL 1     predniSONE (DELTASONE) 20 MG tablet Take 3 tabs by mouth daily x 3 days, then 2 tabs daily x 3 days, then 1 tab daily x 3 days, then 1/2 tab daily x 3 days. (Patient not taking: Reported on 10/13/2020) 20 tablet 0     Prenatal Vit-Fe Fumarate-FA (PRENATAL MULTIVITAMIN W/IRON) 27-0.8 MG tablet Take 1 tablet by mouth daily (Patient not taking: Reported on 10/13/2020) 90 tablet 1     senna-docusate (SENOKOT-S/PERICOLACE) 8.6-50 MG tablet Take 1 tablet by mouth daily (Patient not taking: Reported on 10/13/2020) 60 tablet 1     thin (NO BRAND SPECIFIED) lancets Use to test blood sugar 4 times daily or as directed. (Patient not taking: Reported on 10/13/2020) 100 each 3       No Known Allergies    Family History   Problem Relation Age of Onset     Family History Negative Mother      Family History Negative Father      Diabetes Other         father's side     Social History     Socioeconomic History     Marital status: Single     Spouse name: Not on file     Number of children: 1     Years of education: Not  on file     Highest education level: Not on file   Occupational History     Employer: UNEMPLOYED   Social Needs     Financial resource strain: Not on file     Food insecurity     Worry: Not on file     Inability: Not on file     Transportation needs     Medical: Not on file     Non-medical: Not on file   Tobacco Use     Smoking status: Former Smoker     Packs/day: 0.00     Types: Cigarettes     Smokeless tobacco: Never Used     Tobacco comment: smokes 2 cigarettes/day-none for several months   Substance and Sexual Activity     Alcohol use: No     Alcohol/week: 0.0 standard drinks     Drug use: No     Sexual activity: Yes     Partners: Male     Birth control/protection: None   Lifestyle     Physical activity     Days per week: Not on file     Minutes per session: Not on file     Stress: Not on file   Relationships     Social connections     Talks on phone: Not on file     Gets together: Not on file     Attends Gnosticist service: Not on file     Active member of club or organization: Not on file     Attends meetings of clubs or organizations: Not on file     Relationship status: Not on file     Intimate partner violence     Fear of current or ex partner: Not on file     Emotionally abused: Not on file     Physically abused: Not on file     Forced sexual activity: Not on file   Other Topics Concern     Parent/sibling w/ CABG, MI or angioplasty before 65F 55M? Not Asked   Social History Narrative    ** Merged History Encounter **         Caffeine intake/servings daily - 0    Calcium intake/servings daily - 3    Exercise 7 times weekly - describe walking    Sunscreen used - No    Seatbelts used - Yes    Guns stored in the home - No    Self Breast Exam - Yes    Pap test up to date -  No    Eye exam up to date -  Yes    Dental exam up to date -  Yes    DEXA scan up to date -  Not Applicable    Flex Sig/Colonoscopy up to date -  Not Applicable    Mammography up to date -  Not Applicable    Immunizations reviewed and up to  date - No    Abuse: Current or Past (Physical, Sexual or Emotional) - No    Do you feel safe in your environment - Yes    Do you cope well with stress - Yes    Do you suffer from insomnia - No    Last updated by: KARL PELAEZ  7/18/2012                       Additional medical/Social/S[facility] and updated as appropriate.     REVIEW OF SYSTEMS (10/13/2020)  10 point ROS of systems including Constitutional, Eyes, Respiratory, Cardiovascular, Gastroenterology, Genitourinary, Integumentary, Musculoskeletal, Psychiatric, Allergic/Immunologic were all negative except for pertinent positives noted in my HPI.     PHYSICAL EXAM    General  - normal appearance, in no obvious distress  HEENT  - conjunctivae not injected, moist mucous membranes, normocephalic/atraumatic head, ears normal appearance, no lesions, mouth normal appearance, no scars, normal dentition and teeth present  CV  - normal peripheral perfusion  Pulm  - normal respiratory pattern, non-labored    Musculoskeletal - CERVICAL SPINE  - stance and posture:slightly forward shoulders, head balanced normally on trunk  - inspection: normal bone and joint alignment, no obvious kyphosis    - ROM: normal and painless flexion, extension, left and right sidebending and rotation with some discomfort      Neuro  - biceps, triceps, supinator DTRs 2+ bilaterally, no sensory or motor deficit, grossly normal coordination, normal muscle tone  Skin  - no ecchymosis, erythema, warmth, or induration, no obvious rash  Psych  - interactive, appropriate, normal mood and affect  ASSESSMENT & PLAN  34 yo female with cervical radicular pain  Reviewed plan to give her HEP, she has already completed PT in the past and difficulty going to appointments due to   Start mobic and tizanadine  F/u in 3-4 weeks and get cervical xrays and examine in person  Shreyas Dowling MD, CAQSM      Video-Visit Details    Type of service:  Video Visit    Video Start Time:  1:11 PM  Video End Time: 1:24 PM    Originating Location (pt. Location): Home    Distant Location (provider location):  [facility]     Platform used for Video Visit: Glenys Dowling MD

## 2020-10-13 NOTE — LETTER
"  10/13/2020      RE: Arielle Montenegro  [address]  [place] [address]       Arielle Montenegro is a 35 year old female who is being evaluated via a billable video visit.      The patient has been notified of following:     \"This video visit will be conducted via a call between you and your physician/provider. We have found that certain health care needs can be provided without the need for an in-person physical exam.  This service lets us provide the care you need with a video conversation.  If a prescription is necessary we can send it directly to your pharmacy.  If lab work is needed we can place an order for that and you can then stop by our lab to have the test done at a later time.    Video visits are billed at different rates depending on your insurance coverage.  Please reach out to your insurance provider with any questions.    If during the course of the call the physician/provider feels a video visit is not appropriate, you will not be charged for this service.\"    Patient has given verbal consent for Video visit? Yes  How would you like to obtain your AVS? MyChart  If you are dropped from the video visit, the video invite should be resent to: Text to cell phone: 22  Will anyone else be joining your video visit? No      HISTORY OF PRESENT ILLNESS  Ms. Montenegro is a pleasant 35 year old year old female who presents to discuss her chronic neck pain with radicular symptoms  Arielle explains that she has had 'steroids in the past with a hospitalization' for neck pain that caused numbness in her arm  Has completed several rounds of PT in the past for this problem  Location: neck and upper back and shoulders  Quality:  achy pain    Severity: 9/10 at worst    Duration: years, worse over past several months  Timing: occurs intermittently  Context: occurs while exercising and lifting  Modifying factors:  resting and non-use makes it better, movement and use makes it worse  Associated signs & " symptoms: radiation of pain into shoulders and arms    MEDICAL HISTORY  Patient Active Problem List   Diagnosis     Blindness of right eye     Diabetes mellitus type 1 (H)     Hypoglycemia unawareness in type 1 diabetes mellitus (H)     Health Care Home     Vitamin D deficiency     External hemorrhoids     Encounter for long-term (current) use of insulin (H)     Medical non-compliance     Heart murmur     DJD (degenerative joint disease), cervical     Fibrocystic breast changes of both breasts     Diabetes mellitus with ketoacidosis (H)     Endophthalmitis     Uveitis     Generalized anxiety disorder     History of pre-eclampsia     Pre-existing diabetes mellitus during pregnancy     Diabetes mellitus (H)     Type 1 diabetes mellitus in pregnancy, third trimester     High-risk pregnancy     Episodic mood disorder (H)     Cannabis abuse     Cough     Depression, major, in remission (H)     Previous  delivery, antepartum condition or complication      delivery delivered       Current Outpatient Medications   Medication Sig Dispense Refill     meloxicam (MOBIC) 15 MG tablet Take 1 tablet (15 mg) by mouth daily 30 tablet 1     tiZANidine (ZANAFLEX) 4 MG tablet Take 1 tablet (4 mg) by mouth nightly as needed for muscle spasms 30 tablet 1     acetaminophen (TYLENOL) 325 MG tablet Take 1-2 tablets (325-650 mg) by mouth every 6 hours as needed for mild pain (Patient not taking: Reported on 10/13/2020) 1 Bottle 0     albuterol (PROAIR HFA/PROVENTIL HFA/VENTOLIN HFA) 108 (90 Base) MCG/ACT inhaler Inhale 2 puffs into the lungs every 6 hours (Patient not taking: Reported on 2020) 1 Inhaler 3     alcohol swab prep pads Use to swab area of injection/joelle as directed. (Patient not taking: Reported on 10/13/2020) 100 each 3     bisacodyl (DULCOLAX) 10 MG suppository Place 1 suppository (10 mg) rectally daily as needed for constipation (Patient not taking: Reported on 10/13/2020) 10 suppository 1     blood  glucose (NO BRAND SPECIFIED) test strip Use to test blood sugar 4 times daily or as directed. (Patient not taking: Reported on 10/13/2020) 100 strip 6     blood glucose calibration (NO BRAND SPECIFIED) solution To accompany: Blood Glucose Monitor Brands: per insurance. (Patient not taking: Reported on 10/13/2020) 1 Bottle 3     blood glucose monitoring (NO BRAND SPECIFIED) meter device kit Use to test blood sugar 4 times daily or as directed. (Patient not taking: Reported on 10/13/2020) 1 kit 0     blood glucose monitoring (NO BRAND SPECIFIED) meter device kit Use to test blood sugar.  One Touch Ultra or Verio. (Patient not taking: Reported on 10/13/2020) 1 kit 0     blood glucose monitoring (NO BRAND SPECIFIED) test strip Use to test blood sugar 4 times daily or as directed. To accompany: Blood Glucose Monitor Brands: per insurance. (Patient not taking: Reported on 10/13/2020) 100 strip 6     blood glucose monitoring (NO BRAND SPECIFIED) test strip Use to test blood sugars 4 times daily or as directed (Patient not taking: Reported on 10/13/2020) 100 strip 2     Continuous Blood Gluc Sensor (DEXCOM G6 SENSOR) MISC 1 each every 10 days Change every 10 days. (Patient not taking: Reported on 8/24/2020) 3 each 11     Continuous Blood Gluc Transmit (DEXCOM G6 TRANSMITTER) MISC 1 each every 3 months Change every 3 months. (Patient not taking: Reported on 8/24/2020) 1 each 3     ferrous sulfate (FEROSUL) 325 (65 Fe) MG tablet Take 1 tablet (325 mg) by mouth daily (with breakfast) (Patient not taking: Reported on 10/13/2020) 90 tablet 3     fluticasone-salmeterol (ADVAIR) 100-50 MCG/DOSE inhaler Inhale 1 puff into the lungs every 12 hours (Patient not taking: Reported on 6/4/2020) 1 Inhaler 3     ibuprofen (ADVIL/MOTRIN) 600 MG tablet Take 1 tablet (600 mg) by mouth every 6 hours as needed for moderate pain (Patient not taking: Reported on 10/13/2020) 60 tablet 1     insulin glargine (LANTUS SOLOSTAR) 100 UNIT/ML pen Inject  6 Units Subcutaneous daily. Titrate as directed. (Patient not taking: Reported on 10/13/2020) 15 mL 11     insulin pen needle (31G X 5 MM) 31G X 5 MM miscellaneous Use 3 270 pen needles daily or as directed. (Patient not taking: Reported on 10/13/2020) 270 each 3     Misc. Devices (BREAST PUMP) MISC 1 each daily (Patient not taking: Reported on 10/13/2020) 1 each 0     NIFEdipine ER OSMOTIC (PROCARDIA XL) 30 MG 24 hr tablet Take 1 tablet (30 mg) by mouth daily (Patient not taking: Reported on 10/13/2020) 30 tablet 1     NOVOLOG FLEXPEN 100 UNIT/ML soln Inject 2-12 units with meals TID  and at bedtime.approx 55 units daily (Patient not taking: Reported on 10/13/2020) 30 mL 1     predniSONE (DELTASONE) 20 MG tablet Take 3 tabs by mouth daily x 3 days, then 2 tabs daily x 3 days, then 1 tab daily x 3 days, then 1/2 tab daily x 3 days. (Patient not taking: Reported on 10/13/2020) 20 tablet 0     Prenatal Vit-Fe Fumarate-FA (PRENATAL MULTIVITAMIN W/IRON) 27-0.8 MG tablet Take 1 tablet by mouth daily (Patient not taking: Reported on 10/13/2020) 90 tablet 1     senna-docusate (SENOKOT-S/PERICOLACE) 8.6-50 MG tablet Take 1 tablet by mouth daily (Patient not taking: Reported on 10/13/2020) 60 tablet 1     thin (NO BRAND SPECIFIED) lancets Use to test blood sugar 4 times daily or as directed. (Patient not taking: Reported on 10/13/2020) 100 each 3       No Known Allergies    Family History   Problem Relation Age of Onset     Family History Negative Mother      Family History Negative Father      Diabetes Other         father's side     Social History     Socioeconomic History     Marital status: Single     Spouse name: Not on file     Number of children: 1     Years of education: Not on file     Highest education level: Not on file   Occupational History     Employer: UNEMPLOYED   Social Needs     Financial resource strain: Not on file     Food insecurity     Worry: Not on file     Inability: Not on file     Transportation  needs     Medical: Not on file     Non-medical: Not on file   Tobacco Use     Smoking status: Former Smoker     Packs/day: 0.00     Types: Cigarettes     Smokeless tobacco: Never Used     Tobacco comment: smokes 2 cigarettes/day-none for several months   Substance and Sexual Activity     Alcohol use: No     Alcohol/week: 0.0 standard drinks     Drug use: No     Sexual activity: Yes     Partners: Male     Birth control/protection: None   Lifestyle     Physical activity     Days per week: Not on file     Minutes per session: Not on file     Stress: Not on file   Relationships     Social connections     Talks on phone: Not on file     Gets together: Not on file     Attends [employer] service: Not on file     Active member of club or organization: Not on file     Attends meetings of clubs or organizations: Not on file     Relationship status: Not on file     Intimate partner violence     Fear of current or ex partner: Not on file     Emotionally abused: Not on file     Physically abused: Not on file     Forced sexual activity: Not on file   Other Topics Concern     Parent/sibling w/ CABG, MI or angioplasty before 65F 55M? Not Asked   Social History Narrative    ** Merged History Encounter **         Caffeine intake/servings daily - 0    Calcium intake/servings daily - 3    Exercise 7 times weekly - describe walking    Sunscreen used - No    Seatbelts used - Yes    Guns stored in the home - No    Self Breast Exam - Yes    Pap test up to date -  No    Eye exam up to date -  Yes    Dental exam up to date -  Yes    DEXA scan up to date -  Not Applicable    Flex Sig/Colonoscopy up to date -  Not Applicable    Mammography up to date -  Not Applicable    Immunizations reviewed and up to date - No    Abuse: Current or Past (Physical, Sexual or Emotional) - No    Do you feel safe in your environment - Yes    Do you cope well with stress - Yes    Do you suffer from insomnia - No    Last updated by: KARL PELAEZ  7/18/2012                        Additional medical/Social/Surgical histories reviewed in [facility] and updated as appropriate.     REVIEW OF SYSTEMS (10/13/2020)  10 point ROS of systems including Constitutional, Eyes, Respiratory, Cardiovascular, Gastroenterology, Genitourinary, Integumentary, Musculoskeletal, Psychiatric, Allergic/Immunologic were all negative except for pertinent positives noted in my HPI.     PHYSICAL EXAM    General  - normal appearance, in no obvious distress  HEENT  - conjunctivae not injected, moist mucous membranes, normocephalic/atraumatic head, ears normal appearance, no lesions, mouth normal appearance, no scars, normal dentition and teeth present  CV  - normal peripheral perfusion  Pulm  - normal respiratory pattern, non-labored    Musculoskeletal - CERVICAL SPINE  - stance and posture:slightly forward shoulders, head balanced normally on trunk  - inspection: normal bone and joint alignment, no obvious kyphosis    - ROM: normal and painless flexion, extension, left and right sidebending and rotation with some discomfort      Neuro  - biceps, triceps, supinator DTRs 2+ bilaterally, no sensory or motor deficit, grossly normal coordination, normal muscle tone  Skin  - no ecchymosis, erythema, warmth, or induration, no obvious rash  Psych  - interactive, appropriate, normal mood and affect  ASSESSMENT & PLAN  34 yo female with cervical radicular pain  Reviewed plan to give her HEP, she has already completed PT in the past and difficulty going to appointments due to   Start mobic and tizanadine  F/u in 3-4 weeks and get cervical xrays and examine in person  Shreyas Dowling MD, CAQSM      Video-Visit Details    Type of service:  Video Visit    Video Start Time: 1:11 PM  Video End Time: 1:24 PM    Originating Location (pt. Location): Home    Distant Location (provider location):  [facility]IC[facility]     Platform used for Video Visit: Glenys Dowling,  MD

## 2020-10-13 NOTE — PROGRESS NOTES
"Arielle Leeanne Montenegro is a 35 year old female who is being evaluated via a billable video visit.      The patient has been notified of following:     \"This video visit will be conducted via a call between you and your physician/provider. We have found that certain health care needs can be provided without the need for an in-person physical exam.  This service lets us provide the care you need with a video conversation.  If a prescription is necessary we can send it directly to your pharmacy.  If lab work is needed we can place an order for that and you can then stop by our lab to have the test done at a later time.    Video visits are billed at different rates depending on your insurance coverage.  Please reach out to your insurance provider with any questions.    If during the course of the call the physician/provider feels a video visit is not appropriate, you will not be charged for this service.\"    Patient has given verbal consent for Video visit? Yes  How would you like to obtain your AVS? MyChart  If you are dropped from the video visit, the video invite should be resent to: Text to cell phone: 22  Will anyone else be joining your video visit? No      HISTORY OF PRESENT ILLNESS  Ms. Montenegro is a pleasant 35 year old year old female who presents to discuss her chronic neck pain with radicular symptoms  Arielle explains that she has had 'steroids in the past with a hospitalization' for neck pain that caused numbness in her arm  Has completed several rounds of PT in the past for this problem  Location: neck and upper back and shoulders  Quality:  achy pain    Severity: 9/10 at worst    Duration: years, worse over past several months  Timing: occurs intermittently  Context: occurs while exercising and lifting  Modifying factors:  resting and non-use makes it better, movement and use makes it worse  Associated signs & symptoms: radiation of pain into shoulders and arms    MEDICAL HISTORY  Patient Active Problem List "   Diagnosis     Blindness of right eye     Diabetes mellitus type 1 (H)     Hypoglycemia unawareness in type 1 diabetes mellitus (H)     Health Care Home     Vitamin D deficiency     External hemorrhoids     Encounter for long-term (current) use of insulin (H)     Medical non-compliance     Heart murmur     DJD (degenerative joint disease), cervical     Fibrocystic breast changes of both breasts     Diabetes mellitus with ketoacidosis (H)     Endophthalmitis     Uveitis     Generalized anxiety disorder     History of pre-eclampsia     Pre-existing diabetes mellitus during pregnancy     Diabetes mellitus (H)     Type 1 diabetes mellitus in pregnancy, third trimester     High-risk pregnancy     Episodic mood disorder (H)     Cannabis abuse     Cough     Depression, major, in remission (H)     Previous  delivery, antepartum condition or complication      delivery delivered       Current Outpatient Medications   Medication Sig Dispense Refill     meloxicam (MOBIC) 15 MG tablet Take 1 tablet (15 mg) by mouth daily 30 tablet 1     tiZANidine (ZANAFLEX) 4 MG tablet Take 1 tablet (4 mg) by mouth nightly as needed for muscle spasms 30 tablet 1     acetaminophen (TYLENOL) 325 MG tablet Take 1-2 tablets (325-650 mg) by mouth every 6 hours as needed for mild pain (Patient not taking: Reported on 10/13/2020) 1 Bottle 0     albuterol (PROAIR HFA/PROVENTIL HFA/VENTOLIN HFA) 108 (90 Base) MCG/ACT inhaler Inhale 2 puffs into the lungs every 6 hours (Patient not taking: Reported on 2020) 1 Inhaler 3     alcohol swab prep pads Use to swab area of injection/joelle as directed. (Patient not taking: Reported on 10/13/2020) 100 each 3     bisacodyl (DULCOLAX) 10 MG suppository Place 1 suppository (10 mg) rectally daily as needed for constipation (Patient not taking: Reported on 10/13/2020) 10 suppository 1     blood glucose (NO BRAND SPECIFIED) test strip Use to test blood sugar 4 times daily or as directed. (Patient  not taking: Reported on 10/13/2020) 100 strip 6     blood glucose calibration (NO BRAND SPECIFIED) solution To accompany: Blood Glucose Monitor Brands: per insurance. (Patient not taking: Reported on 10/13/2020) 1 Bottle 3     blood glucose monitoring (NO BRAND SPECIFIED) meter device kit Use to test blood sugar 4 times daily or as directed. (Patient not taking: Reported on 10/13/2020) 1 kit 0     blood glucose monitoring (NO BRAND SPECIFIED) meter device kit Use to test blood sugar.  One Touch Ultra or Verio. (Patient not taking: Reported on 10/13/2020) 1 kit 0     blood glucose monitoring (NO BRAND SPECIFIED) test strip Use to test blood sugar 4 times daily or as directed. To accompany: Blood Glucose Monitor Brands: per insurance. (Patient not taking: Reported on 10/13/2020) 100 strip 6     blood glucose monitoring (NO BRAND SPECIFIED) test strip Use to test blood sugars 4 times daily or as directed (Patient not taking: Reported on 10/13/2020) 100 strip 2     Continuous Blood Gluc Sensor (DEXCOM G6 SENSOR) MISC 1 each every 10 days Change every 10 days. (Patient not taking: Reported on 8/24/2020) 3 each 11     Continuous Blood Gluc Transmit (DEXCOM G6 TRANSMITTER) MISC 1 each every 3 months Change every 3 months. (Patient not taking: Reported on 8/24/2020) 1 each 3     ferrous sulfate (FEROSUL) 325 (65 Fe) MG tablet Take 1 tablet (325 mg) by mouth daily (with breakfast) (Patient not taking: Reported on 10/13/2020) 90 tablet 3     fluticasone-salmeterol (ADVAIR) 100-50 MCG/DOSE inhaler Inhale 1 puff into the lungs every 12 hours (Patient not taking: Reported on 6/4/2020) 1 Inhaler 3     ibuprofen (ADVIL/MOTRIN) 600 MG tablet Take 1 tablet (600 mg) by mouth every 6 hours as needed for moderate pain (Patient not taking: Reported on 10/13/2020) 60 tablet 1     insulin glargine (LANTUS SOLOSTAR) 100 UNIT/ML pen Inject 6 Units Subcutaneous daily. Titrate as directed. (Patient not taking: Reported on 10/13/2020) 15 mL 11      insulin pen needle (31G X 5 MM) 31G X 5 MM miscellaneous Use 3 270 pen needles daily or as directed. (Patient not taking: Reported on 10/13/2020) 270 each 3     Misc. Devices (BREAST PUMP) MISC 1 each daily (Patient not taking: Reported on 10/13/2020) 1 each 0     NIFEdipine ER OSMOTIC (PROCARDIA XL) 30 MG 24 hr tablet Take 1 tablet (30 mg) by mouth daily (Patient not taking: Reported on 10/13/2020) 30 tablet 1     NOVOLOG FLEXPEN 100 UNIT/ML soln Inject 2-12 units with meals TID  and at bedtime.approx 55 units daily (Patient not taking: Reported on 10/13/2020) 30 mL 1     predniSONE (DELTASONE) 20 MG tablet Take 3 tabs by mouth daily x 3 days, then 2 tabs daily x 3 days, then 1 tab daily x 3 days, then 1/2 tab daily x 3 days. (Patient not taking: Reported on 10/13/2020) 20 tablet 0     Prenatal Vit-Fe Fumarate-FA (PRENATAL MULTIVITAMIN W/IRON) 27-0.8 MG tablet Take 1 tablet by mouth daily (Patient not taking: Reported on 10/13/2020) 90 tablet 1     senna-docusate (SENOKOT-S/PERICOLACE) 8.6-50 MG tablet Take 1 tablet by mouth daily (Patient not taking: Reported on 10/13/2020) 60 tablet 1     thin (NO BRAND SPECIFIED) lancets Use to test blood sugar 4 times daily or as directed. (Patient not taking: Reported on 10/13/2020) 100 each 3       No Known Allergies    Family History   Problem Relation Age of Onset     Family History Negative Mother      Family History Negative Father      Diabetes Other         father's side     Social History     Socioeconomic History     Marital status: Single     Spouse name: Not on file     Number of children: 1     Years of education: Not on file     Highest education level: Not on file   Occupational History     Employer: UNEMPLOYED   Social Needs     Financial resource strain: Not on file     Food insecurity     Worry: Not on file     Inability: Not on file     Transportation needs     Medical: Not on file     Non-medical: Not on file   Tobacco Use     Smoking status: Former  Smoker     Packs/day: 0.00     Types: Cigarettes     Smokeless tobacco: Never Used     Tobacco comment: smokes 2 cigarettes/day-none for several months   Substance and Sexual Activity     Alcohol use: No     Alcohol/week: 0.0 standard drinks     Drug use: No     Sexual activity: Yes     Partners: Male     Birth control/protection: None   Lifestyle     Physical activity     Days per week: Not on file     Minutes per session: Not on file     Stress: Not on file   Relationships     Social connections     Talks on phone: Not on file     Gets together: Not on file     Attends Muslim service: Not on file     Active member of club or organization: Not on file     Attends meetings of clubs or organizations: Not on file     Relationship status: Not on file     Intimate partner violence     Fear of current or ex partner: Not on file     Emotionally abused: Not on file     Physically abused: Not on file     Forced sexual activity: Not on file   Other Topics Concern     Parent/sibling w/ CABG, MI or angioplasty before 65F 55M? Not Asked   Social History Narrative    ** Merged History Encounter **         Caffeine intake/servings daily - 0    Calcium intake/servings daily - 3    Exercise 7 times weekly - describe walking    Sunscreen used - No    Seatbelts used - Yes    Guns stored in the home - No    Self Breast Exam - Yes    Pap test up to date -  No    Eye exam up to date -  Yes    Dental exam up to date -  Yes    DEXA scan up to date -  Not Applicable    Flex Sig/Colonoscopy up to date -  Not Applicable    Mammography up to date -  Not Applicable    Immunizations reviewed and up to date - No    Abuse: Current or Past (Physical, Sexual or Emotional) - No    Do you feel safe in your environment - Yes    Do you cope well with stress - Yes    Do you suffer from insomnia - No    Last updated by: KARL PELAEZ  7/18/2012                       Additional medical/Social/Surgical histories reviewed in EPIC and updated as  appropriate.     REVIEW OF SYSTEMS (10/13/2020)  10 point ROS of systems including Constitutional, Eyes, Respiratory, Cardiovascular, Gastroenterology, Genitourinary, Integumentary, Musculoskeletal, Psychiatric, Allergic/Immunologic were all negative except for pertinent positives noted in my HPI.     PHYSICAL EXAM    General  - normal appearance, in no obvious distress  HEENT  - conjunctivae not injected, moist mucous membranes, normocephalic/atraumatic head, ears normal appearance, no lesions, mouth normal appearance, no scars, normal dentition and teeth present  CV  - normal peripheral perfusion  Pulm  - normal respiratory pattern, non-labored    Musculoskeletal - CERVICAL SPINE  - stance and posture:slightly forward shoulders, head balanced normally on trunk  - inspection: normal bone and joint alignment, no obvious kyphosis    - ROM: normal and painless flexion, extension, left and right sidebending and rotation with some discomfort      Neuro  - biceps, triceps, supinator DTRs 2+ bilaterally, no sensory or motor deficit, grossly normal coordination, normal muscle tone  Skin  - no ecchymosis, erythema, warmth, or induration, no obvious rash  Psych  - interactive, appropriate, normal mood and affect  ASSESSMENT & PLAN  36 yo female with cervical radicular pain  Reviewed plan to give her HEP, she has already completed PT in the past and difficulty going to appointments due to   Start mobic and tizanadine  F/u in 3-4 weeks and get cervical xrays and examine in person  Shreyas Dowling MD, CAM      Video-Visit Details    Type of service:  Video Visit    Video Start Time: 1:11 PM  Video End Time: 1:24 PM    Originating Location (pt. Location): Home    Distant Location (provider location):  [facility]     Platform [facility] for [facility]: [facility]    Shreyas Dowling MD

## 2020-10-20 ENCOUNTER — PRE VISIT (OUTPATIENT)
Dept: ORTHOPEDICS | Facility: CLINIC | Age: 35
End: 2020-10-20

## 2020-11-23 ENCOUNTER — VIRTUAL VISIT (OUTPATIENT)
Dept: FAMILY MEDICINE | Facility: CLINIC | Age: 35
End: 2020-11-23
Payer: COMMERCIAL

## 2020-11-23 DIAGNOSIS — F41.8 POSTPARTUM ANXIETY: ICD-10-CM

## 2020-11-23 PROCEDURE — 99214 OFFICE O/P EST MOD 30 MIN: CPT | Mod: 95 | Performed by: PHYSICIAN ASSISTANT

## 2020-11-23 RX ORDER — BUPROPION HYDROCHLORIDE 150 MG/1
150 TABLET ORAL EVERY MORNING
Qty: 30 TABLET | Refills: 1 | Status: SHIPPED | OUTPATIENT
Start: 2020-11-23 | End: 2021-01-27

## 2020-11-23 ASSESSMENT — ANXIETY QUESTIONNAIRES
5. BEING SO RESTLESS THAT IT IS HARD TO SIT STILL: NOT AT ALL
GAD7 TOTAL SCORE: 12
2. NOT BEING ABLE TO STOP OR CONTROL WORRYING: NEARLY EVERY DAY
6. BECOMING EASILY ANNOYED OR IRRITABLE: NEARLY EVERY DAY
1. FEELING NERVOUS, ANXIOUS, OR ON EDGE: NEARLY EVERY DAY
7. FEELING AFRAID AS IF SOMETHING AWFUL MIGHT HAPPEN: NOT AT ALL
3. WORRYING TOO MUCH ABOUT DIFFERENT THINGS: NEARLY EVERY DAY

## 2020-11-23 ASSESSMENT — PATIENT HEALTH QUESTIONNAIRE - PHQ9
5. POOR APPETITE OR OVEREATING: NOT AT ALL
SUM OF ALL RESPONSES TO PHQ QUESTIONS 1-9: 23

## 2020-11-23 NOTE — PROGRESS NOTES
"Arielle Montenegro is a 35 year old female who is being evaluated via a billable telephone visit.      The patient has been notified of following:     \"This telephone visit will be conducted via a call between you and your physician/provider. We have found that certain health care needs can be provided without the need for a physical exam.  This service lets us provide the care you need with a short phone conversation.  If a prescription is necessary we can send it directly to your pharmacy.  If lab work is needed we can place an order for that and you can then stop by our lab to have the test done at a later time.    Telephone visits are billed at different rates depending on your insurance coverage. During this emergency period, for some insurers they may be billed the same as an in-person visit.  Please reach out to your insurance provider with any questions.    If during the course of the call the physician/provider feels a telephone visit is not appropriate, you will not be charged for this service.\"    Patient has given verbal consent for Telephone visit?  Yes    What phone number would you like to be contacted at? [phone]    How would you like to obtain your AVS? Mail a copy    Subjective     Arielle Montenegro is a 35 year old female who presents via phone visit today for the following health issues:    HPI      Will talk to provider about post partum depression.   Had her baby July 3, 2020  She thinks she had depression with her other 2 children, but just didn't address it    Feels very emotional, tired, sleepy  Feels depressed  Also feeling anxiety  Her son has ADHD, and she has less patience with him now  Doesn't like loud noises anymore    Says she's sleeping pretty well but feels tired all of the time                   Review of Systems   CONSTITUTIONAL: NEGATIVE for fever, chills, change in weight  INTEGUMENTARY/SKIN: NEGATIVE for worrisome rashes, moles or lesions  EYES: NEGATIVE for vision " changes or irritation  ENT/MOUTH: NEGATIVE for ear, mouth and throat problems  RESP: NEGATIVE for significant cough or SOB  BREAST: NEGATIVE for masses, tenderness or discharge  CV: NEGATIVE for chest pain, palpitations or peripheral edema  GI: NEGATIVE for nausea, abdominal pain, heartburn, or change in bowel habits  : NEGATIVE for frequency, dysuria, or hematuria  MUSCULOSKELETAL: NEGATIVE for significant arthralgias or myalgia  NEURO: NEGATIVE for weakness, dizziness or paresthesias  ENDOCRINE: NEGATIVE for temperature intolerance, skin/hair changes  HEME: NEGATIVE for bleeding problems  PSYCHIATRIC: thoughts of hurting someone else and thoughts of self harm       Objective          Vitals:  No vitals were obtained today due to virtual visit.    healthy, alert and no distress  PSYCH: Alert and oriented times 3; coherent speech, normal   rate and volume, able to articulate logical thoughts, able   to abstract reason, no tangential thoughts, no hallucinations   or delusions  Her affect is flat  RESP: No cough, no audible wheezing, able to talk in full sentences  Remainder of exam unable to be completed due to telephone visits            Assessment/Plan:      ICD-10-CM    1. Postpartum depression  O99.345 buPROPion (WELLBUTRIN XL) 150 MG 24 hr tablet    F53.0 sertraline (ZOLOFT) 50 MG tablet   2. Postpartum anxiety  O99.345 buPROPion (WELLBUTRIN XL) 150 MG 24 hr tablet    F41.8 sertraline (ZOLOFT) 50 MG tablet     Patient Instructions   Start sertraline   Hold off on wellbutrin, we'll start that in 1 month  Follow up in 4-5 weeks for video visit  Return to clinic for any new or worsening symptoms or go to ER Urgent care in off hours            Phone call duration:  18 minutes

## 2020-11-23 NOTE — PATIENT INSTRUCTIONS
Start sertraline   Hold off on wellbutrin, we'll start that in 1 month  Follow up in 4-5 weeks for video visit  Return to clinic for any new or worsening symptoms or go to ER Urgent care in off hours

## 2020-11-24 ASSESSMENT — ANXIETY QUESTIONNAIRES: GAD7 TOTAL SCORE: 12

## 2020-11-30 NOTE — PATIENT INSTRUCTIONS
Dear Arielle,  To replace sensors please contact [facility]:  Telephone: [phone]    [facility]  [facility]  [facility]    Telephone: [phone] (Customer Care)    [email or web address]  [email or web address]    PLEASE call us whenever you are unable to get any of your supplies, so you can continue to take care of your self.    Please be sure to give your Lantus once daily every day.  Please continue to check your blood sugar at least three times and daily and give a small amount of insulin before each of your meals to keep blood between 100 - 200.    My best wishes,    Janessa Ordoñez PA-C, MPAS  [facility]  Diabetes, Endocrinology, and Metabolism  [phone] Appointments/Nurse  [phone] Fax  [phone] URGENTafter hours/weekend Endocrinologist on call        We appreciate your assistance in coordinating your healthcare.     Please upload your insulin pump, blood sugar meter and/or continuous glucose monitor at home 1-2 days before your next diabetes-related appointment.   This will allow your provider to review your  data before your scheduled virtual visit.    To ask a question to your Endocrine care team, please send them a [employer] message, or reach them by phone at [phone]     To expedite your medication refill(s), please contact your pharmacy and have them   fax a refill request to: [phone].  *Please allow 3 business days for routine medication refills.  *Please allow 5 business days for controlled substance medication refills.    For after-hours urgent Endocrine issues, that do not require 131, please dial [phone], and ask to speak with the Endocrinologist On-Call

## 2020-11-30 NOTE — PROGRESS NOTES
Outcome for 20 12:36 PM :Left Voicemail for patient to call back     Outcome for 20 11:52 AM :Left Voicemail for patient to call back     Arielle Montenegro is a 35 year old female who is being evaluated via a billable telephone visit.          [facility]  Endocrinology  Janessa Ordoñez PA-C  2020      Chief Complaint:   Diabetes Type 1 with hypoglycemia unawareness    History of Present Illness:   Arielle Montenegro is a 35 year old female  with a history of type 1 diabetes and a heart murmur who presents for a follow-up on her diabetes.  She has had just 2 prior visits about her diabetes and this pregnancy and very limited previous diabetic education in her life.  She was diagnosed DM1 at age 6-7, when she became sick while living in [place]. She was diagnosed with diabetes and started on insulin since then.   She recently delivered on July 3 by  at 39 weeks.  Her pregnancy was complicated limited diabetic care, preeclampsia and hypoglycemia with loss of consciousness.      She established care with Dr. Mooney on 2020 where she described that during her current pregnancy her glucose dropped too much, too often, therefore she self decreased insulin. Her A1C has been constantly high 10-11 range persistently prepregnancy, however at this visit it had dropped to 7.5. At this point she was taking Lantus 10 units (pre pregnancy she was on 20 units per patient) and Novolog carb counting 1 carb choice 2 units. I saw her in early March and per Ariane CGM BG had come down to likely A1C of 7.6%.  Arielle reported being astounded at how much insulin she was using, up to 20 units of Lantus and correcting with 5-7 units of Novolog when high, 8 with meals.  She reported having two children with developmental delay and asked about possible consequences of high BG during this pregnancy.  She had not been able to procure her own ariane sensors, so I accompanied her to the pharmacy will be able to  get some, and RUFUS Mina was able to meet with her and help her learn to apply them.  She agreed to schedule with both nutrition and CDE in follow-up, but when MA sat down to do this with her she stated she was unable to do so.  She had agreed to return to clinic the following week to review her dosing, but did not do so.  I last met with her by telephone earlier this week.  She was checking her blood sugars 7-25 times daily with a latoya and unfortunately reported recent loss of consciousness with paramedic visit and IV glucose, though I am unable to find verify this in care everywhere.  Possibly this was with [facility] and we do not have a current authorization to view those records.    I last saw her during pregnancy on May 5 when she had had EMS called because of loss of consciousness due to hypoglycemia.  At that time we reduced her long-acting insulin to 26 units and also reduced her mealtime insulin.  Following delivery  advised her to decrease her Lantus to just 10 units daily, and use 1 unit per 20 g of carb with meals and snacks, as well as correct just 1 unit of insulin per 50 greater than 150.     Today:  Arielle tells me that she received multiple sensors that would not initialize or work so she has been out frequently.      She is more overweight and tired.  She is eating a lot     She thinks she had post partum depression.  She started medication and is feeling much better, sleeping much better  She is gaining weight though.      She is giving just 5 or 6 units of Lantus every day, mostly 6 as if she gives more she will pass out.  She may miss Lantus, maybe once weekly, then BG will be high and she will recall.     She is guessing how much to give, Now that she is giving her insulin before eating and it is working well.  She did not like InPen as her kids play with it and they did not give her enough insulin cartridges.  She is giving 5-6 units short acting insulin with  meals.  Mostly 1-2 units if an actual, sometimes an extra unit if she knows she is high, but cautious with this.      Tells me they almost killed her while she was having her baby.  She couldn't breathe and she told anesthesiologist this, but they didn't believe her.  She wanted to cough because she couldn't move her abdominal muscles, and then she had intubated.  She had nightmares over the summer but is feeling better, sleeping well now.  Defers further support at this time.        Blood Glucose Monitoring:          Corrections work on 11/27, but not on the 30th.  Appears may have missed long acting insulin.          Diabetes monitoring and complications:  CAD: No  Last eye exam results: 6 mo ago, no retinopathy  Microalbuminuria: 94.51 in 2014  Neuropathy: No  HTN: No  On Statin: No  On Aspirin: Yes  Depression: Yes    Review of Systems:   Pertinent items are noted in HPI.  All other systems are negative.    Active Medications:      albuterol (PROAIR HFA/PROVENTIL HFA/VENTOLIN HFA) 108 (90 Base) MCG/ACT inhaler, Inhale 2 puffs into the lungs every 6 hours, Disp: 1 Inhaler, Rfl: 3     aspirin (ASA) 81 MG EC tablet, Take 1 tablet (81 mg) by mouth daily, Disp: 100 tablet, Rfl: 3     BASAGLAR 100 UNIT/ML injection, Inject 60 Units Subcutaneous daily, Disp: 15 mL, Rfl: 0     fluticasone-salmeterol (ADVAIR) 100-50 MCG/DOSE inhaler, Inhale 1 puff into the lungs every 12 hours, Disp: 1 Inhaler, Rfl: 3     insulin aspart (NOVOLOG FLEXPEN) 100 UNIT/ML pen, Inject 5 Units Subcutaneous, Disp: , Rfl:      insulin aspart (NOVOLOG FLEXPEN) 100 UNIT/ML pen, Take 2 units per carb with each meal plus corrections 1:50 > 150. Max is 25 units daily., Disp: 3 mL, Rfl: 0     insulin glargine (LANTUS SOLOSTAR) 100 UNIT/ML pen, Inject 20 Units Subcutaneous, Disp: , Rfl:      Prenatal Vit-Fe Fumarate-FA (PRENATAL MULTIVITAMIN W/IRON) 27-0.8 MG tablet, Take 1 tablet by mouth daily, Disp: 90 tablet, Rfl: 1      Allergies:   Patient has no  "known allergies.      Past Medical History:  Blindness of right eye  Type 1 diabetes  Hypoglycemia unawareness  Vitamin D deficiency  Anxiety     Past Surgical History:   section - , 2015  Enucleation right - 2015    Family History:   Diabetes - paternal side of family       Social History:   She is home caring for her-2 children ages 5 and 7.  Her son has ADHD and it is somewhat stressful.     Physical Exam:   There were no vitals taken for this visit.     Wt Readings from Last 10 Encounters:   20 84.5 kg (186 lb 3.2 oz)   20 83.1 kg (183 lb 3.2 oz)   20 80.5 kg (177 lb 6.4 oz)   20 77.7 kg (171 lb 4.8 oz)   20 75.8 kg (167 lb)   20 74.5 kg (164 lb 3.2 oz)   20 70.2 kg (154 lb 12.8 oz)   20 71 kg (156 lb 8 oz)   20 71.4 kg (157 lb 4.8 oz)   20 68.2 kg (150 lb 6.4 oz)      PHONE VISIT    Arielle is alerted and oriented.  Mood is \"good,\" affect is congruent.  Thoughtful form and content are fluid and coherent.  Voice is clear.  No cough or distress appreciated.        Data:  Lab Results   Component Value Date     (L) 2019    POTASSIUM 4.0 2020    CHLORIDE 95 2019    CO2 22 2019    ANIONGAP 10 2019    GLC 91 2020    BUN 13 2019    CR 0.91 2020    ALEXANDRO 8.5 2019     Lab Results   Component Value Date    GFRESTIMATED 82 2020    GFRESTIMATED 85 2020    GFRESTIMATED 75 2020    GFRESTBLACK >90 2020    GFRESTBLACK >90 2020    GFRESTBLACK 87 2020      Lab Results   Component Value Date    MICROL 172 2014    UMALCR 94.51 (H) 2014        Lab Results   Component Value Date    A1C 5.8 (H) 2020    A1C 6.4 (H) 2020    A1C 7.5 (H) 2020    A1C 7.8 (H) 2020    A1C 12.6 (H) 2019     Lab Results   Component Value Date    CPEPT <0.1 (L) 2014       Lab Results   Component Value Date    CHOL 181 2019    CHOL 147.6 2019 "    TRIG 45 09/20/2019    TRIG 115.8 03/11/2019    HDL 71 09/20/2019    HDL 52.4 03/11/2019     (H) 09/20/2019    LDL 72 03/11/2019    NHDL 110 09/20/2019    NHDL 129 06/14/2018     TSH   Date Value Ref Range Status   05/30/2019 0.877 0.358 - 3.740 UIU/mL Final       Assessment and Plan:  Type 1 diabetes mellitus with hypoglycemia and without coma (H)    Arielle has type 1 diabetes and is now postpartum with ongoing labile BG control.      She is willing to diabetes education and does need greater support. Possibly would do better on Tresiba, but right now needs support to consistently have BG data to manage her type 1 DM. She also describes traumatic delivery requiring intubation due to anesthesiology error  has started medication for depression and anxiety.  She describes good relationship with PCP but may benefit from therapy to assist with tools to better manage stress and health care.      For now advise continue Lantus 5-6 units daily (no more ever) and Novolog 1 unit / meal, 2 only for high carb meal and /or BG >200.          Follow-up:With CDE next available. Self  1-3 months.      >50% of 25 minute visit spent in counseling, education and coordination of care related to options for better glycemic control as well as preventing, detecting, and treating hypoglycemia.      It is my privilege to be involved in the care of the above patient.     Janessa Ordoñez PA-C, MPAS  [facility]  Diabetes, Endocrinology, and Metabolism  [phone] Appointments/Nurse  [phone] Fax  [phone] pager  [phone] nurse line

## 2020-12-01 ENCOUNTER — VIRTUAL VISIT (OUTPATIENT)
Dept: ENDOCRINOLOGY | Facility: CLINIC | Age: 35
End: 2020-12-01
Payer: COMMERCIAL

## 2020-12-01 DIAGNOSIS — E10.649 TYPE 1 DIABETES MELLITUS WITH HYPOGLYCEMIA AND WITHOUT COMA (H): Primary | ICD-10-CM

## 2020-12-01 PROCEDURE — 99214 OFFICE O/P EST MOD 30 MIN: CPT | Mod: 95 | Performed by: PHYSICIAN ASSISTANT

## 2020-12-01 NOTE — LETTER
2020       RE: Arielle Montenergo  [address]  [place] [address]     Dear Colleague,    Thank you for referring your patient, Arielle Montenegro, to the [facility] ENDOCRINOLOG[facility] [place] at [facility]. Please see a copy of my visit note below.    Outcome for 20 12:36 PM :Left Voicemail for patient to call back     Outcome for 20 11:52 AM :Left Voicemail for patient to call back     Arielle Montenegro is a 35 year old female who is being evaluated via a billable telephone visit.          [facility]  Endocrinology  Janessa Ordoñez PA-C  2020      Chief Complaint:   Diabetes Type 1 with hypoglycemia unawareness    History of Present Illness:   Arielle Montenegro is a 35 year old female  with a history of type 1 diabetes and a heart murmur who presents for a follow-up on her diabetes.  She has had just 2 prior visits about her diabetes and this pregnancy and very limited previous diabetic education in her life.  She was diagnosed DM1 at age 6-7, when she became sick while living in [place]. She was diagnosed with diabetes and started on insulin since then.   She recently delivered on July 3 by  at 39 weeks.  Her pregnancy was complicated limited diabetic care, preeclampsia and hypoglycemia with loss of consciousness.      She established care with Dr. Mooney on 2020 where she described that during her current pregnancy her glucose dropped too much, too often, therefore she self decreased insulin. Her A1C has been constantly high 10-11 range persistently prepregnancy, however at this visit it had dropped to 7.5. At this point she was taking Lantus 10 units (pre pregnancy she was on 20 units per patient) and Novolog carb counting 1 carb choice 2 units. I saw her in early March and per Ariane CGM BG had come down to likely A1C of 7.6%.  Arielle reported being astounded at how much insulin she was using, up to 20  units of Lantus and correcting with 5-7 units of Novolog when high, 8 with meals.  She reported having two children with developmental delay and asked about possible consequences of high BG during this pregnancy.  She had not been able to procure her own latoya sensors, so I accompanied her to the pharmacy will be able to get some, and DAVIDAE Ros Michellee was able to meet with her and help her learn to apply them.  She agreed to schedule with both nutrition and CDE in follow-up, but when MA sat down to do this with her she stated she was unable to do so.  She had agreed to return to clinic the following week to review her dosing, but did not do so.  I last met with her by telephone earlier this week.  She was checking her blood sugars 7-25 times daily with a latoya and unfortunately reported recent loss of consciousness with paramedic visit and IV glucose, though I am unable to find verify this in care everywhere.  Possibly this was with [facility] and we do not have a current authorization to view those records.    I last saw her during pregnancy on May 5 when she had had EMS called because of loss of consciousness due to hypoglycemia.  At that time we reduced her long-acting insulin to 26 units and also reduced her mealtime insulin.  Following delivery  advised her to decrease her Lantus to just 10 units daily, and use 1 unit per 20 g of carb with meals and snacks, as well as correct just 1 unit of insulin per 50 greater than 150.     Today:  Arielle tells me that she received multiple sensors that would not initialize or work so she has been out frequently.      She is more overweight and tired.  She is eating a lot     She thinks she had post partum depression.  She started medication and is feeling much better, sleeping much better  She is gaining weight though.      She is giving just 5 or 6 units of Lantus every day, mostly 6 as if she gives more she will pass out.  She may miss Lantus, maybe  once weekly, then BG will be high and she will recall.     She is guessing how much to give, Now that she is giving her insulin before eating and it is working well.  She did not like InPen as her kids play with it and they did not give her enough insulin cartridges.  She is giving 5-6 units short acting insulin with meals.  Mostly 1-2 units if an actual, sometimes an extra unit if she knows she is high, but cautious with this.      Tells me they almost killed her while she was having her baby.  She couldn't breathe and she told anesthesiologist this, but they didn't believe her.  She wanted to cough because she couldn't move her abdominal muscles, and then she had intubated.  She had nightmares over the summer but is feeling better, sleeping well now.  Defers further support at this time.        Blood Glucose Monitoring:          Corrections work on 11/27, but not on the 30th.  Appears may have missed long acting insulin.          Diabetes monitoring and complications:  CAD: No  Last eye exam results: 6 mo ago, no retinopathy  Microalbuminuria: 94.51 in 2014  Neuropathy: No  HTN: No  On Statin: No  On Aspirin: Yes  Depression: Yes    Review of Systems:   Pertinent items are noted in HPI.  All other systems are negative.    Active Medications:      albuterol (PROAIR HFA/PROVENTIL HFA/VENTOLIN HFA) 108 (90 Base) MCG/ACT inhaler, Inhale 2 puffs into the lungs every 6 hours, Disp: 1 Inhaler, Rfl: 3     aspirin (ASA) 81 MG EC tablet, Take 1 tablet (81 mg) by mouth daily, Disp: 100 tablet, Rfl: 3     BASAGLAR 100 UNIT/ML injection, Inject 60 Units Subcutaneous daily, Disp: 15 mL, Rfl: 0     fluticasone-salmeterol (ADVAIR) 100-50 MCG/DOSE inhaler, Inhale 1 puff into the lungs every 12 hours, Disp: 1 Inhaler, Rfl: 3     insulin aspart (NOVOLOG FLEXPEN) 100 UNIT/ML pen, Inject 5 Units Subcutaneous, Disp: , Rfl:      insulin aspart (NOVOLOG FLEXPEN) 100 UNIT/ML pen, Take 2 units per carb with each meal plus corrections 1:50  "> 150. Max is 25 units daily., Disp: 3 mL, Rfl: 0     insulin glargine (LANTUS SOLOSTAR) 100 UNIT/ML pen, Inject 20 Units Subcutaneous, Disp: , Rfl:      Prenatal Vit-Fe Fumarate-FA (PRENATAL MULTIVITAMIN W/IRON) 27-0.8 MG tablet, Take 1 tablet by mouth daily, Disp: 90 tablet, Rfl: 1      Allergies:   Patient has no known allergies.      Past Medical History:  Blindness of right eye  Type 1 diabetes  Hypoglycemia unawareness  Vitamin D deficiency  Anxiety     Past Surgical History:   section - ,   Enucleation right -     Family History:   Diabetes - paternal side of family       Social History:   She is home caring for her-2 children ages 5 and 7.  Her son has ADHD and it is somewhat stressful.     Physical Exam:   There were no vitals taken for this visit.     Wt Readings from Last 10 Encounters:   20 84.5 kg (186 lb 3.2 oz)   20 83.1 kg (183 lb 3.2 oz)   20 80.5 kg (177 lb 6.4 oz)   20 77.7 kg (171 lb 4.8 oz)   20 75.8 kg (167 lb)   20 74.5 kg (164 lb 3.2 oz)   20 70.2 kg (154 lb 12.8 oz)   20 71 kg (156 lb 8 oz)   20 71.4 kg (157 lb 4.8 oz)   20 68.2 kg (150 lb 6.4 oz)      PHONE VISIT    Arielle is alerted and oriented.  Mood is \"good,\" affect is congruent.  Thoughtful form and content are fluid and coherent.  Voice is clear.  No cough or distress appreciated.        Data:  Lab Results   Component Value Date     (L) 2019    POTASSIUM 4.0 2020    CHLORIDE 95 2019    CO2 22 2019    ANIONGAP 10 2019    GLC 91 2020    BUN 13 2019    CR 0.91 2020    ALEXANDRO 8.5 2019     Lab Results   Component Value Date    GFRESTIMATED 82 2020    GFRESTIMATED 85 2020    GFRESTIMATED 75 2020    GFRESTBLACK >90 2020    GFRESTBLACK >90 2020    GFRESTBLACK 87 2020      Lab Results   Component Value Date    MICROL 172 2014    UMALCR 94.51 (H) 2014        Lab " Results   Component Value Date    A1C 5.8 (H) 07/04/2020    A1C 6.4 (H) 06/11/2020    A1C 7.5 (H) 01/28/2020    A1C 7.8 (H) 01/02/2020    A1C 12.6 (H) 09/20/2019     Lab Results   Component Value Date    CPEPT <0.1 (L) 09/30/2014       Lab Results   Component Value Date    CHOL 181 09/20/2019    CHOL 147.6 03/11/2019    TRIG 45 09/20/2019    TRIG 115.8 03/11/2019    HDL 71 09/20/2019    HDL 52.4 03/11/2019     (H) 09/20/2019    LDL 72 03/11/2019    NHDL 110 09/20/2019    NHDL 129 06/14/2018     TSH   Date Value Ref Range Status   05/30/2019 0.877 0.358 - 3.740 UIU/mL Final       Assessment and Plan:  Type 1 diabetes mellitus with hypoglycemia and without coma (H)    Arielle has type 1 diabetes and is now postpartum with ongoing labile BG control.      She is willing to diabetes education and does need greater support. Possibly would do better on Tresiba, but right now needs support to consistently have BG data to manage her type 1 DM. She also describes traumatic delivery requiring intubation due to anesthesiology error  has started medication for depression and anxiety.  She describes good relationship with PCP but may benefit from therapy to assist with tools to better manage stress and health care.      For now advise continue Lantus 5-6 units daily (no more ever) and Novolog 1 unit / meal, 2 only for high carb meal and /or BG >200.          Follow-up:With CDE next available. Self  1-3 months.      >50% of 25 minute visit spent in counseling, education and coordination of care related to options for better glycemic control as well as preventing, detecting, and treating hypoglycemia.      It is my privilege to be involved in the care of the above patient.     Janessa Ordoñez PA-C, MPAS  [facility]  Diabetes, Endocrinology, and Metabolism  [phone] Appointments/Nurse  [phone] Fax  [phone] pager  [phone] nurse line

## 2020-12-22 DIAGNOSIS — E10.649 TYPE 1 DIABETES MELLITUS WITH HYPOGLYCEMIA AND WITHOUT COMA (H): ICD-10-CM

## 2020-12-22 RX ORDER — INSULIN ASPART 100 [IU]/ML
INJECTION, SOLUTION INTRAVENOUS; SUBCUTANEOUS
Qty: 30 ML | Refills: 3 | Status: SHIPPED | OUTPATIENT
Start: 2020-12-22 | End: 2021-07-20

## 2020-12-22 NOTE — TELEPHONE ENCOUNTER
NOVOLOG FLEXPEN 100 UNIT/ML soln  Last Written Prescription Date:  9/23/20  Last Fill Quantity:30ml   # refills: 1  Last Office Visit : 12/1/20  [facility]  Future Office visit:  none    Routing refill request to provider for review/approval because:  endo triage to fill.

## 2020-12-30 ENCOUNTER — VIRTUAL VISIT (OUTPATIENT)
Dept: FAMILY MEDICINE | Facility: CLINIC | Age: 35
End: 2020-12-30
Payer: COMMERCIAL

## 2020-12-30 DIAGNOSIS — Z11.59 SCREENING FOR VIRAL DISEASE: ICD-10-CM

## 2020-12-30 DIAGNOSIS — J98.01 COLD INDUCED BRONCHOSPASM: ICD-10-CM

## 2020-12-30 DIAGNOSIS — R05.9 COUGH: Primary | ICD-10-CM

## 2020-12-30 PROCEDURE — 99213 OFFICE O/P EST LOW 20 MIN: CPT | Mod: 95 | Performed by: PHYSICIAN ASSISTANT

## 2020-12-30 RX ORDER — ALBUTEROL SULFATE 90 UG/1
2 AEROSOL, METERED RESPIRATORY (INHALATION) EVERY 6 HOURS PRN
Qty: 1 INHALER | Refills: 3 | Status: SHIPPED | OUTPATIENT
Start: 2020-12-30 | End: 2021-07-12

## 2020-12-30 NOTE — PROGRESS NOTES
"Arielle Montenegro is a 35 year old female who is being evaluated via a billable telephone visit.      The patient has been notified of following:     \"This telephone visit will be conducted via a call between you and your physician/provider. We have found that certain health care needs can be provided without the need for a physical exam.  This service lets us provide the care you need with a short phone conversation.  If a prescription is necessary we can send it directly to your pharmacy.  If lab work is needed we can place an order for that and you can then stop by our lab to have the test done at a later time.    Telephone visits are billed at different rates depending on your insurance coverage. During this emergency period, for some insurers they may be billed the same as an in-person visit.  Please reach out to your insurance provider with any questions.    If during the course of the call the physician/provider feels a telephone visit is not appropriate, you will not be charged for this service.\"    Patient has given verbal consent for Telephone visit?  Yes    What phone number would you like to be contacted at? [phone] (J    How would you like to obtain your AVS? Mail a copy    Subjective     Arielle Montenegro is a 35 year old female who presents via phone visit today for the following health issues:    HPI     Acute Illness  Acute illness concerns: URI   Onset/Duration: yesterday   Symptoms:  Fever: no  Chills/Sweats: no  Headache (location?): no  Sinus Pressure: no  Conjunctivitis:  no  Ear Pain: no  Rhinorrhea: no  Congestion: no  Sore Throat: no  Cough: YES-wet cough especially around cold air with some SOB   Wheeze: no  Decreased Appetite: no  Nausea: no  Vomiting: no  Diarrhea: no  Dysuria/Freq.: no  Dysuria or Hematuria: no  Fatigue/Achiness: no  Sick/Strep Exposure: no  Therapies tried and outcome: None    Symptoms started yesterday. Has noticed a cough which seems like is triggered by cold " air. History of bronchitis when a child. No fevers or chills. Mild runny nose.     No known sick contact. Everyone else is staying healthy at home.         Review of Systems   Constitutional, HEENT, cardiovascular, pulmonary, gi and gu systems are negative, except as otherwise noted.       Objective          Vitals:  No vitals were obtained today due to virtual visit.    healthy, alert and no distress  PSYCH: Alert and oriented times 3; coherent speech, normal   rate and volume, able to articulate logical thoughts, able   to abstract reason, no tangential thoughts, no hallucinations   or delusions  Her affect is normal  RESP: No cough, no audible wheezing, able to talk in full sentences  Remainder of exam unable to be completed due to telephone visits            Assessment/Plan:    Assessment & Plan   Problem List Items Addressed This Visit        Respiratory    Cough - Primary    Relevant Medications    albuterol (PROAIR HFA/PROVENTIL HFA/VENTOLIN HFA) 108 (90 Base) MCG/ACT inhaler      Other Visit Diagnoses     Cold induced bronchospasm        Relevant Medications    albuterol (PROAIR HFA/PROVENTIL HFA/VENTOLIN HFA) 108 (90 Base) MCG/ACT inhaler    Screening for viral disease             Arielle declines COVID-19 testing today -- she is afraid of catching COVID at one of the testing sites and does not like swabs being placed up her nose     Symptoms feel like classic cold air induced cough which she reports getting nearly every year  Would like to try inhaler     If symptoms worsen she will let us know and will consider COVID testing at that time.    Return for if not improving or worsening.     Mahin Mai PA-C  [facility]    Phone call duration:  8 minutes

## 2021-01-26 ENCOUNTER — TELEPHONE (OUTPATIENT)
Dept: FAMILY MEDICINE | Facility: CLINIC | Age: 36
End: 2021-01-26

## 2021-01-26 DIAGNOSIS — F41.8 POSTPARTUM ANXIETY: ICD-10-CM

## 2021-01-27 NOTE — TELEPHONE ENCOUNTER
dsNote from 11/23/2020 virtual visit:    Patient Instructions   Start sertraline   Hold off on wellbutrin, we'll start that in 1 month  Follow up in 4-5 weeks for video visit  Return to clinic for any new or worsening symptoms or go to ER Urgent care in off hours       VM left asking patient to call clinic  Is she taking bupropion?  Needs follow up visit    Laura Cain RN

## 2021-02-02 RX ORDER — BUPROPION HYDROCHLORIDE 150 MG/1
TABLET ORAL
Qty: 30 TABLET | Refills: 0 | Status: SHIPPED | OUTPATIENT
Start: 2021-02-02 | End: 2022-02-08

## 2021-02-03 NOTE — TELEPHONE ENCOUNTER
"Requested Prescriptions   Pending Prescriptions Disp Refills     buPROPion (WELLBUTRIN XL) 150 MG 24 hr tablet [Pharmacy Med Name: BUPROPION XL 150MG TABLETS (24 H)] 30 tablet 0     Sig: TAKE 1 TABLET(150 MG) BY MOUTH EVERY MORNING       SSRIs Protocol Failed - 1/27/2021 10:47 AM        Failed - PHQ-9 score less than 5 in past 6 months     Please review last PHQ-9 score.     PHQ 10/13/2020 11/23/2020   PHQ-9 Total Score 5 23   Q9: Thoughts of better off dead/self-harm past 2 weeks Not at all Not at all               Passed - Medication is Bupropion     If the medication is Bupropion (Wellbutrin), and the patient is taking for smoking cessation; OK to refill.          Passed - Medication is active on med list        Passed - Patient is age 18 or older        Passed - No active pregnancy on record        Passed - No positive pregnancy test in last 12 months        Passed - Recent (6 mo) or future (30 days) visit within the authorizing provider's specialty     Patient had office visit in the last 6 months or has a visit in the next 30 days with authorizing provider or within the authorizing provider's specialty.  See \"Patient Info\" tab in inbasket, or \"Choose Columns\" in Meds & Orders section of the refill encounter.               Routing refill request to provider for review/approval because:  PHQ9>5, greater than 72 hourse    Triage - please clarify if possible    "

## 2021-02-23 ENCOUNTER — TELEPHONE (OUTPATIENT)
Dept: EDUCATION SERVICES | Facility: CLINIC | Age: 36
End: 2021-02-23

## 2021-02-23 NOTE — TELEPHONE ENCOUNTER
[facility] Call Center    Phone Message    May a detailed message be left on voicemail: yes     Reason for Call: Other: Per Patient is wanting to get a call back in regards to knowing if able to do the apt for Diabetes Education through virtual. Patient is not wanting to come to the clinic, please advise.       Action Taken: Message routed to:  [facility] Endo    Travel Screening: Not Applicable

## 2021-02-23 NOTE — TELEPHONE ENCOUNTER
[facility] Call Center    Phone Message    May a detailed message be left on voicemail: yes     Reason for Call: Other: Pt calling to get an appt over the phone. She refuses to come to the clinic due to the corona virus, children at home. She needs help with her latoya links to check her blood sugars and she got a new phone and tried to down load the application to her new phone. Please call her to discuss further. Meanwhile she is not feeling good due to her high blood sugars. She has no other way to check her blood sugars. She would like to have Madina call her back.     Action Taken: Message routed to:  [facility] ENDO    Travel Screening: Not Applicable

## 2021-02-24 DIAGNOSIS — F41.8 POSTPARTUM ANXIETY: ICD-10-CM

## 2021-02-24 NOTE — TELEPHONE ENCOUNTER
Routing refill request to provider for review/approval because:  PHQ 10/13/2020 11/23/2020   PHQ-9 Total Score 5 23   Q9: Thoughts of better off dead/self-harm past 2 weeks Not at all Not at all     Kaley Morrow RN

## 2021-02-24 NOTE — TELEPHONE ENCOUNTER
I think this should go to her GYN. I haven't seen this patient for more than 1 year.  Diane Bello MD  [facility]  [phone]

## 2021-02-24 NOTE — TELEPHONE ENCOUNTER
CLINIC COORDINATOR SCHEDULING NOTES    CALL RESULT: LVM    APPT TYPE: VIDEO VISIT RETURN    PROVIDER: Madina Phan    DATE APPT NEEDED: 1st available    ADDITIONAL NOTES: OK to schedule virtual or in person per pt preference.

## 2021-02-25 NOTE — TELEPHONE ENCOUNTER
LMOVM informing [facility] sent to prescribing provider, denied,  call if ?'s, see last rx sent from Carmita Kapadia, they also informed was last refill needed virtual visit for ongoing refills   Lanette Leyva RN, BSN  Message handled by CLINIC NURSE.

## 2021-02-26 NOTE — TELEPHONE ENCOUNTER
Syed Brito, when patients are calling because they want an appointment with one of the educators, please just forward to the clinic coordinators to schedule.  Thanks. Madina.

## 2021-03-01 DIAGNOSIS — E10.649 TYPE 1 DIABETES MELLITUS WITH HYPOGLYCEMIA AND WITHOUT COMA (H): ICD-10-CM

## 2021-03-01 RX ORDER — FLASH GLUCOSE SENSOR
KIT MISCELLANEOUS
Qty: 2 EACH | Refills: 3 | Status: SHIPPED | OUTPATIENT
Start: 2021-03-01 | End: 2021-08-03

## 2021-03-01 NOTE — TELEPHONE ENCOUNTER
Continuous Blood Gluc Sensor (Soteria SystemsSTYLE NILDA 14 DAY SENSOR) MISC     Change every 14 days.    Last Written Prescription Date:  3/12/20-5/27/20  Last Fill Quantity: 2 ea,   # refills: 11  Last Office Visit : 12/1/20  Future Office visit:  none    Routing refill request to provider for review/approval because:    Not active on med list.

## 2021-03-26 ENCOUNTER — VIRTUAL VISIT (OUTPATIENT)
Dept: EDUCATION SERVICES | Facility: CLINIC | Age: 36
End: 2021-03-26
Payer: COMMERCIAL

## 2021-03-26 DIAGNOSIS — E10.43 TYPE 1 DIABETES MELLITUS WITH DIABETIC AUTONOMIC NEUROPATHY (H): Primary | ICD-10-CM

## 2021-03-26 PROCEDURE — 99207 PR NO BILLABLE SERVICE THIS VISIT: CPT | Mod: TEL

## 2021-03-31 DIAGNOSIS — E10.649 TYPE 1 DIABETES MELLITUS WITH HYPOGLYCEMIA AND WITHOUT COMA (H): ICD-10-CM

## 2021-06-24 ENCOUNTER — APPOINTMENT (OUTPATIENT)
Dept: GENERAL RADIOLOGY | Facility: CLINIC | Age: 36
End: 2021-06-24
Attending: FAMILY MEDICINE
Payer: COMMERCIAL

## 2021-06-24 ENCOUNTER — HOSPITAL ENCOUNTER (EMERGENCY)
Facility: CLINIC | Age: 36
Discharge: HOME | End: 2021-06-24
Attending: FAMILY MEDICINE | Admitting: FAMILY MEDICINE
Payer: COMMERCIAL

## 2021-06-24 VITALS
HEART RATE: 88 BPM | RESPIRATION RATE: 16 BRPM | OXYGEN SATURATION: 100 % | TEMPERATURE: 98.2 F | DIASTOLIC BLOOD PRESSURE: 80 MMHG | SYSTOLIC BLOOD PRESSURE: 114 MMHG

## 2021-06-24 DIAGNOSIS — M79.672 LEFT FOOT PAIN: ICD-10-CM

## 2021-06-24 DIAGNOSIS — E10.43 TYPE 1 DIABETES MELLITUS WITH DIABETIC AUTONOMIC NEUROPATHY (H): ICD-10-CM

## 2021-06-24 PROCEDURE — 99284 EMERGENCY DEPT VISIT MOD MDM: CPT | Performed by: FAMILY MEDICINE

## 2021-06-24 PROCEDURE — 73620 X-RAY EXAM OF FOOT: CPT | Mod: LT

## 2021-06-24 PROCEDURE — 99283 EMERGENCY DEPT VISIT LOW MDM: CPT | Mod: 25 | Performed by: FAMILY MEDICINE

## 2021-06-24 RX ORDER — CEPHALEXIN 500 MG/1
500 CAPSULE ORAL 4 TIMES DAILY
Qty: 28 CAPSULE | Refills: 0 | Status: SHIPPED | OUTPATIENT
Start: 2021-06-24 | End: 2021-07-01

## 2021-06-24 RX ORDER — TRAMADOL HYDROCHLORIDE 50 MG/1
50 TABLET ORAL EVERY 8 HOURS PRN
Qty: 15 TABLET | Refills: 0 | Status: SHIPPED | OUTPATIENT
Start: 2021-06-24 | End: 2022-04-08 | Stop reason: ALTCHOICE

## 2021-06-24 ASSESSMENT — ENCOUNTER SYMPTOMS
MYALGIAS: 1
NECK STIFFNESS: 0
BRUISES/BLEEDS EASILY: 0
WOUND: 0
SHORTNESS OF BREATH: 0
ABDOMINAL PAIN: 0
COLOR CHANGE: 0
DYSPHORIC MOOD: 0
NUMBNESS: 0
TROUBLE SWALLOWING: 0
NAUSEA: 0
DYSURIA: 0
JOINT SWELLING: 0
FEVER: 0
ACTIVITY CHANGE: 1
DECREASED CONCENTRATION: 0
WEAKNESS: 0
ARTHRALGIAS: 0

## 2021-06-24 NOTE — DISCHARGE INSTRUCTIONS
Home.  Your xray did not show any sign of fracture or foreign body.  Possible strain or sprain?  Take the Keflex for infection prevention.  Tylenol for pain.  Ultram for pain if needed.  See MD for follow up.  REturn if any concerns.      Results for orders placed or performed during the hospital encounter of 06/24/21   XR Foot Port Left 2 Views     Status: None (Preliminary result)    Narrative    XR PORTABLE LEFT FOOT TWO VIEWS   6/24/2021 3:24 PM     HISTORY: Pain in left 4-5 toes and forefoot. Question trauma, question  foreign body.      COMPARISON: None.      Impression    IMPRESSION: No evidence of acute fracture or foreign body.

## 2021-06-24 NOTE — ED TRIAGE NOTES
Pt states she stepped on something with her left foot two days ago and is having increasing pain since. She denies any cut from the object but is concerened since the pain is getting worse and states it hurts to walk on it. Pt states she has been taking 975mg tylenol for the pain. Pt states she is also a diabetic.

## 2021-06-24 NOTE — ED PROVIDER NOTES
ED Provider Note  [facility]      History     Chief Complaint   Patient presents with     Foot Pain     pt states she stepped on something 2 days ago with her left foot and she states there is no cut but the pain is getting worse and its difficult to walk     The history is provided by the patient and medical records.     Arielle Montenegro is a 36 year old female with a past medical history significant for type 1 diabetes who presents to the Emergency Department for evaluation of a left foot injury. Patient reports that two days ago she stepped on something with her left foot and is in a lot of pain. She states that when she stepped on the object she did not notice any blood and cannot see a visible cut. Patient reports that the fourth and fifth toes on her left foot hurt very much, but endorses pain on the bottom of the foot also.  Patient been taking Tylenol for pain control.  No fevers or chills no gross swelling no bleeding disorder etc.  Patient does have neuropathy with her diabetes.  Having some pain with walking.      Past Medical History  Past Medical History:   Diagnosis Date     Blindness of right eye      Diabetes mellitus type 1 (H)     Diagnosed as a child     Past Surgical History:   Procedure Laterality Date      SECTION  13      SECTION N/A 2015    Procedure:  SECTION;  Surgeon: John Hudson MD;  Location: [facility] L+[facility]      SECTION N/A 7/3/2020    Procedure:  SECTION;  Surgeon: Aparna Atkins MD;  Location: [facility]     ENUCLEATION Right 3/20/2015    Procedure: ENUCLEATION;  Surgeon: Edilson Islas MD;  Location: [facility]     cephALEXin (KEFLEX) 500 MG capsule  traMADol (ULTRAM) 50 MG tablet  albuterol (PROAIR HFA/PROVENTIL HFA/VENTOLIN HFA) 108 (90 Base) MCG/ACT inhaler  blood glucose (NO BRAND SPECIFIED) test strip  blood glucose calibration (NO BRAND SPECIFIED) solution  blood glucose monitoring (NO BRAND  SPECIFIED) meter device kit  blood glucose monitoring (NO BRAND SPECIFIED) test strip  buPROPion (WELLBUTRIN XL) 150 MG 24 hr tablet  Continuous Blood Gluc Sensor (FREESTYLE NILDA 14 DAY SENSOR) MISC  insulin glargine (LANTUS SOLOSTAR) 100 UNIT/ML pen  insulin pen needle (31G X 5 MM) 31G X 5 MM miscellaneous  NIFEdipine ER OSMOTIC (PROCARDIA XL) 30 MG 24 hr tablet  NOVOLOG FLEXPEN 100 UNIT/ML soln  Prenatal Vit-Fe Fumarate-FA (PRENATAL MULTIVITAMIN W/IRON) 27-0.8 MG tablet  sertraline (ZOLOFT) 50 MG tablet  thin (NO BRAND SPECIFIED) lancets      No Known Allergies  Family History  Family History   Problem Relation Age of Onset     Family History Negative Mother      Family History Negative Father      Diabetes Other         father's side     Social History   Social History     Tobacco Use     Smoking status: Former Smoker     Packs/day: 0.00     Types: Cigarettes     Smokeless tobacco: Never Used     Tobacco comment: smokes 2 cigarettes/day-none for several months   Substance Use Topics     Alcohol use: No     Alcohol/week: 0.0 standard drinks     Drug use: No      Past medical history, past surgical history, medications, allergies, family history, and social history were reviewed with the patient. No additional pertinent items.       Review of Systems   Constitutional: Positive for activity change. Negative for fever.        Painful with weightbearing   HENT: Negative for trouble swallowing.    Eyes: Negative for visual disturbance.   Respiratory: Negative for shortness of breath.    Cardiovascular: Negative for chest pain.   Gastrointestinal: Negative for abdominal pain and nausea.   Genitourinary: Negative for dysuria.   Musculoskeletal: Positive for gait problem and myalgias. Negative for arthralgias, joint swelling and neck stiffness.        Positive for left foot pain   Skin: Negative for color change, rash and wound.   Allergic/Immunologic: Negative for immunocompromised state.   Neurological: Negative for  weakness and numbness.        Diabetic neuropathy feet   Hematological: Does not bruise/bleed easily.   Psychiatric/Behavioral: Negative for decreased concentration and dysphoric mood.   All other systems reviewed and are negative.      Physical Exam   BP: 114/80  Pulse: 88  Temp: 98.2  F (36.8  C)  Resp: 16  SpO2: 100 %  Physical Exam  Vitals signs and nursing note reviewed.   Constitutional:       General: She is in acute distress.      Appearance: She is well-developed. She is not toxic-appearing or diaphoretic.      Comments: Patient here with 3 children.  Patient uncomfortable because of pain   HENT:      Head: Normocephalic and atraumatic.      Nose: Nose normal.      Mouth/Throat:      Mouth: Mucous membranes are moist.   Eyes:      General: No scleral icterus.     Comments: Chronic visual impairment   Neck:      Musculoskeletal: Normal range of motion and neck supple.   Cardiovascular:      Rate and Rhythm: Normal rate.   Pulmonary:      Effort: No respiratory distress.   Abdominal:      Tenderness: There is no guarding.   Musculoskeletal:         General: Tenderness present. No swelling or signs of injury.      Comments: Patient's left foot fourth and fifth toe revealed no intertriginous type of injury no sign of laceration no marked swelling but some generalized tenderness no deformity noted.  No vascular compromise noted.  Rest of foot otherwise negative no lymphangitic spread.  No crepitus.   Skin:     General: Skin is warm and dry.      Capillary Refill: Capillary refill takes less than 2 seconds.      Coloration: Skin is not pale.      Findings: No erythema or rash.   Neurological:      General: No focal deficit present.      Mental Status: She is alert and oriented to person, place, and time. Mental status is at baseline.   Psychiatric:      Comments: Mild anxiousness otherwise appropriate         ED Course     3:01 PM  The patient was seen and examined by Christopher Garber MD in Room ED02.    Patient  valuated here in the ER.  X-rays were done there is no foreign body no fracture no subcu air etc.  Discussed with patient the swelling appears nontoxic with her diabetic nephropathy she could have caused some kind of microtrauma I do want to getting infected she agreed at this point will place her on Keflex for infection prevention also Ultram if needed for pain control continue Tylenol and follow-up with MD for recheck and return if fever swelling or other concerns.  This point patient agrees with plan comfortably discharge.      Procedures               Results for orders placed or performed during the hospital encounter of 06/24/21   XR Foot Port Left 2 Views     Status: None    Narrative    XR PORTABLE LEFT FOOT TWO VIEWS   6/24/2021 3:24 PM     HISTORY: Pain in left 4-5 toes and forefoot. Question trauma, question  foreign body.      COMPARISON: None.      Impression    IMPRESSION: No evidence of acute fracture or foreign body.    MICHELLE RONDON MD     Medications - No data to display     Assessments & Plan (with Medical Decision Making)  36-year-old female with diabetic neuropathy of the feet presents with left foot injury a few days ago.  Patient stepped on something does not know if anything broke the skin but there is no bleeding continues to have pain taking Tylenol.  Evaluation as she has had diabetic neuropathy reveals no focal findings of injury no vascular compromise no other crepitus or other deformity.  X-rays are negative the left foot.  There is no foreign body there is no fracture no subcu air.  At this point there may be microtrauma or may be more of a strain or sprain will treat with both infection prevention with course of Keflex which patient agrees with along with this Ultram if needed for more pain control continue Tylenol ice elevate follow-up with MD for recheck return if any concerns at all patient agrees with plan comfortable discharge       I have reviewed the nursing notes. I have  reviewed the findings, diagnosis, plan and need for follow up with the patient.    Discharge Medication List as of 6/24/2021  3:45 PM      START taking these medications    Details   cephALEXin (KEFLEX) 500 MG capsule Take 1 capsule (500 mg) by mouth 4 times daily for 7 days, Disp-28 capsule, R-0, Local Print      traMADol (ULTRAM) 50 MG tablet Take 1 tablet (50 mg) by mouth every 8 hours as needed for moderate to severe pain, Disp-15 tablet, R-0, Local Print             Final diagnoses:   Left foot pain   Type 1 diabetes mellitus with diabetic autonomic neuropathy (H)     IYoly, am serving as a trained medical scribe to document services personally performed by Christopher Garber MD, based on the provider's statements to me.     IChristopher MD, was physically present and have reviewed and verified the accuracy of this note documented by Yoly Richardson.    --  Christopher Garber MD  [facility] EMERGENCY DEPARTMENT  6/24/2021    This note was created at least in part by the use of dragon voice dictation system. Inadvertent typographical errors may still exist.  Christopher Garber MD.    Patient evaluated in the emergency department during the COVID-19 pandemic period. Careful attention to patients safety was addressed throughout the evaluation. Evaluation and treatment management was initiated with disposition made efficiently and appropriate as possible to minimize any risk of potential exposure to patient during this evaluation.       Christopher Garber MD  06/24/21 1946

## 2021-07-12 DIAGNOSIS — R05.9 COUGH: ICD-10-CM

## 2021-07-12 DIAGNOSIS — J98.01 COLD INDUCED BRONCHOSPASM: ICD-10-CM

## 2021-07-12 RX ORDER — ALBUTEROL SULFATE 90 UG/1
2 AEROSOL, METERED RESPIRATORY (INHALATION) EVERY 6 HOURS PRN
Qty: 18 G | Refills: 0 | Status: SHIPPED | OUTPATIENT
Start: 2021-07-12 | End: 2021-12-17

## 2021-07-12 NOTE — TELEPHONE ENCOUNTER
Team Coordinators-Please contact patient:  1. If still experiencing a cough and/or symptoms returned, patient will need a follow up visit for further evaluation.  In-person visit preferred.    Prescription approved per [facility] Refill Protocol.    Thank you!  RENETTA SantosN, RN  [facility]

## 2021-07-20 ENCOUNTER — TELEPHONE (OUTPATIENT)
Dept: ENDOCRINOLOGY | Facility: CLINIC | Age: 36
End: 2021-07-20

## 2021-07-20 DIAGNOSIS — E10.649 TYPE 1 DIABETES MELLITUS WITH HYPOGLYCEMIA AND WITHOUT COMA (H): ICD-10-CM

## 2021-07-20 RX ORDER — INSULIN ASPART 100 [IU]/ML
INJECTION, SOLUTION INTRAVENOUS; SUBCUTANEOUS
Qty: 30 ML | Refills: 3 | Status: SHIPPED | OUTPATIENT
Start: 2021-07-20 | End: 2022-02-08

## 2021-07-20 NOTE — TELEPHONE ENCOUNTER
Refills for Lantus and Novolog just filled  Per request with refills added.Sunshine Jaime RN on 7/20/2021 at 1:08 PM

## 2021-07-20 NOTE — TELEPHONE ENCOUNTER
[facility] Call Center    Phone Message    May a detailed message be left on voicemail: yes     Reason for Call: Medication Refill Request    Has the patient contacted the pharmacy for the refill? Yes     Name of medication being requested:   * insulin glargine (LANTUS SOLOSTAR) 100 UNIT/ML pen  * NOVOLOG FLEXPEN 100 UNIT/ML soln    Provider who prescribed the medication: Smita    Pharmacy: [facility] [id number] [facility] - [place], [place] - [address]    Date medication is needed: 7/20/2021, Patient is out of both of them and has not taken a dosage today.        Action Taken: Message routed to:  [facility]): Endo    Travel Screening: Not Applicable

## 2021-08-03 ENCOUNTER — TELEPHONE (OUTPATIENT)
Dept: ENDOCRINOLOGY | Facility: CLINIC | Age: 36
End: 2021-08-03

## 2021-08-03 DIAGNOSIS — E10.649 TYPE 1 DIABETES MELLITUS WITH HYPOGLYCEMIA AND WITHOUT COMA (H): ICD-10-CM

## 2021-08-03 RX ORDER — FLASH GLUCOSE SENSOR
KIT MISCELLANEOUS
Qty: 2 EACH | Refills: 1 | Status: SHIPPED | OUTPATIENT
Start: 2021-08-03 | End: 2021-11-09

## 2021-08-03 NOTE — TELEPHONE ENCOUNTER
Continuous Blood Gluc Sensor (FREESTYLE NILDA 14 DAY SENSOR) [facility]  Last Written Prescription Date:  3/1/21  Last Fill Quantity: 2 each ,   # refills: 3  Last Office Visit : 12/1/20  Future Office visit:  none     Appt due, Scheduling has been notified to contact the pt for appointment.

## 2021-08-03 NOTE — TELEPHONE ENCOUNTER
[facility] Call Center    Phone Message    May a detailed message be left on voicemail: no     Reason for Call: Medication Refill Request    Has the patient contacted the pharmacy for the refill? Yes   Name of medication being requested: Continuous Blood Gluc Sensor (FREESTYLE NILDA 14 DAY SENSOR) [facility]  Provider who prescribed the medication: Smita  Pharmacy: [facility] [id number] [facility] - [place], [place] - [address]  Date medication is needed: asap         Action Taken: Message routed to:  [facility] ([facility] med refillas    Travel Screening: Not Applicable

## 2021-08-04 ENCOUNTER — TELEPHONE (OUTPATIENT)
Dept: EDUCATION SERVICES | Facility: CLINIC | Age: 36
End: 2021-08-04

## 2021-08-04 NOTE — TELEPHONE ENCOUNTER
----- Message from Lyudmila Montes RN sent at 8/3/2021  3:35 PM CDT -----  Regarding: Appt due  Follow-up:With CDE next available. Self  1-3 months. Per 12/1/20 Smita  Please call to schedule.    Thanks    I do not need any follow up on the scheduling of this appointment unless the patient will no longer be receiving care in our clinic.

## 2021-08-09 ENCOUNTER — VIRTUAL VISIT (OUTPATIENT)
Dept: URGENT CARE | Facility: CLINIC | Age: 36
End: 2021-08-09
Payer: COMMERCIAL

## 2021-08-09 DIAGNOSIS — J06.9 UPPER RESPIRATORY TRACT INFECTION, UNSPECIFIED TYPE: Primary | ICD-10-CM

## 2021-08-09 DIAGNOSIS — R05.9 COUGH: ICD-10-CM

## 2021-08-09 DIAGNOSIS — R06.2 WHEEZING: ICD-10-CM

## 2021-08-09 PROCEDURE — 99213 OFFICE O/P EST LOW 20 MIN: CPT | Performed by: PHYSICIAN ASSISTANT

## 2021-08-09 RX ORDER — ALBUTEROL SULFATE 90 UG/1
2 AEROSOL, METERED RESPIRATORY (INHALATION) EVERY 6 HOURS
Qty: 18 G | Refills: 0 | Status: SHIPPED | OUTPATIENT
Start: 2021-08-09 | End: 2021-10-07

## 2021-08-09 RX ORDER — BENZONATATE 200 MG/1
200 CAPSULE ORAL 3 TIMES DAILY PRN
Qty: 30 CAPSULE | Refills: 0 | Status: SHIPPED | OUTPATIENT
Start: 2021-08-09 | End: 2021-10-07

## 2021-08-09 NOTE — PROGRESS NOTES
Arielle is a 36 year old who is being evaluated via a billable telephone visit.      Assessment & Plan     Upper respiratory tract infection, unspecified type    Cough  - albuterol (PROAIR HFA/PROVENTIL HFA/VENTOLIN HFA) 108 (90 Base) MCG/ACT inhaler; Inhale 2 puffs into the lungs every 6 hours  - benzonatate (TESSALON) 200 MG capsule; Take 1 capsule (200 mg) by mouth 3 times daily as needed for cough    Wheezing  - albuterol (PROAIR HFA/PROVENTIL HFA/VENTOLIN HFA) 108 (90 Base) MCG/ACT inhaler; Inhale 2 puffs into the lungs every 6 hours    I will treat for wheezing/reactive airway. Patient declines any lab testing. She will follow up if new or worsening symptoms or other questions or concerns.     Alexandrea Laughlin PA-C  Virtual Urgent Care  [facility] VIRTUAL URGENT CARE    Subjective   Arielle is a 36 year old who presents for the following health issues : cough    HPI - Patient is having a telephone visit due to illness. She reports that her 1 year old got sick. She took him to the hospital on Saturday but the doctor didn't do much and she is unsure what he has going on. She is having a cough, no fever, she is having a headache, her throat is sore and swollen. She is getting some asthma symptoms that she says she gets wheezy so she is hoping to have an inhaler to help with that.     Review of Systems   Constitutional, HEENT, cardiovascular, pulmonary, gi and gu systems are negative, except as otherwise noted.      Objective           Vitals:  No vitals were obtained today due to virtual visit.    Physical Exam   alert and no distress  PSYCH: Alert and oriented times 3; coherent speech, normal   rate and volume, able to articulate logical thoughts, able   to abstract reason, no tangential thoughts, no hallucinations   or delusions  Her affect is normal  RESP: No cough, no audible wheezing, able to talk in full sentences  Remainder of exam unable to be completed due to telephone visits        Phone call  duration: 11 minutes

## 2021-08-12 ENCOUNTER — APPOINTMENT (OUTPATIENT)
Dept: GENERAL RADIOLOGY | Facility: CLINIC | Age: 36
End: 2021-08-12
Attending: INTERNAL MEDICINE
Payer: COMMERCIAL

## 2021-08-12 ENCOUNTER — HOSPITAL ENCOUNTER (EMERGENCY)
Facility: CLINIC | Age: 36
Discharge: HOME | End: 2021-08-13
Attending: INTERNAL MEDICINE | Admitting: INTERNAL MEDICINE
Payer: COMMERCIAL

## 2021-08-12 DIAGNOSIS — E10.9 TYPE 1 DIABETES MELLITUS WITHOUT COMPLICATION (H): ICD-10-CM

## 2021-08-12 DIAGNOSIS — J18.9 PNEUMONIA DUE TO INFECTIOUS ORGANISM, UNSPECIFIED LATERALITY, UNSPECIFIED PART OF LUNG: ICD-10-CM

## 2021-08-12 LAB
ALBUMIN SERPL-MCNC: 2.3 G/DL (ref 3.4–5)
ALBUMIN UR-MCNC: 300 MG/DL
ALP SERPL-CCNC: 138 U/L (ref 40–150)
ALT SERPL W P-5'-P-CCNC: 12 U/L (ref 0–50)
ANION GAP SERPL CALCULATED.3IONS-SCNC: 7 MMOL/L (ref 3–14)
APPEARANCE UR: ABNORMAL
AST SERPL W P-5'-P-CCNC: 10 U/L (ref 0–45)
ATRIAL RATE - MUSE: 117 BPM
BACTERIA #/AREA URNS HPF: ABNORMAL /HPF
BASOPHILS # BLD AUTO: 0 10E3/UL (ref 0–0.2)
BASOPHILS NFR BLD AUTO: 0 %
BILIRUB SERPL-MCNC: 0.9 MG/DL (ref 0.2–1.3)
BILIRUB UR QL STRIP: ABNORMAL
BUN SERPL-MCNC: 12 MG/DL (ref 7–30)
CALCIUM SERPL-MCNC: 8.5 MG/DL (ref 8.5–10.1)
CHLORIDE BLD-SCNC: 100 MMOL/L (ref 94–109)
CO2 SERPL-SCNC: 25 MMOL/L (ref 20–32)
COLOR UR AUTO: YELLOW
CREAT SERPL-MCNC: 1.13 MG/DL (ref 0.52–1.04)
CRP SERPL-MCNC: 400 MG/L (ref 0–8)
DIASTOLIC BLOOD PRESSURE - MUSE: NORMAL MMHG
EOSINOPHIL # BLD AUTO: 0 10E3/UL (ref 0–0.7)
EOSINOPHIL NFR BLD AUTO: 0 %
ERYTHROCYTE [DISTWIDTH] IN BLOOD BY AUTOMATED COUNT: 13.8 % (ref 10–15)
ERYTHROCYTE [SEDIMENTATION RATE] IN BLOOD BY WESTERGREN METHOD: 94 MM/HR (ref 0–20)
GFR SERPL CREATININE-BSD FRML MDRD: 63 ML/MIN/1.73M2
GLUCOSE BLD-MCNC: 285 MG/DL (ref 70–99)
GLUCOSE UR STRIP-MCNC: 500 MG/DL
HCG UR QL: NEGATIVE
HCO3 BLDV-SCNC: 18 MMOL/L (ref 21–28)
HCT VFR BLD AUTO: 35.8 % (ref 35–47)
HGB BLD-MCNC: 11.6 G/DL (ref 11.7–15.7)
HGB UR QL STRIP: ABNORMAL
HOLD SPECIMEN: NORMAL
HYALINE CASTS: 5 /LPF
IMM GRANULOCYTES # BLD: 0.1 10E3/UL
IMM GRANULOCYTES NFR BLD: 1 %
INR PPP: 1.16 (ref 0.86–1.14)
INTERPRETATION ECG - MUSE: NORMAL
KETONES UR STRIP-MCNC: 80 MG/DL
LACTATE BLD-SCNC: 1.1 MMOL/L
LACTATE SERPL-SCNC: 1.8 MMOL/L (ref 0.7–2)
LEUKOCYTE ESTERASE UR QL STRIP: NEGATIVE
LYMPHOCYTES # BLD AUTO: 1.8 10E3/UL (ref 0.8–5.3)
LYMPHOCYTES NFR BLD AUTO: 13 %
MAGNESIUM SERPL-MCNC: 1.9 MG/DL (ref 1.6–2.3)
MCH RBC QN AUTO: 28.9 PG (ref 26.5–33)
MCHC RBC AUTO-ENTMCNC: 32.4 G/DL (ref 31.5–36.5)
MCV RBC AUTO: 89 FL (ref 78–100)
MONOCYTES # BLD AUTO: 1.3 10E3/UL (ref 0–1.3)
MONOCYTES NFR BLD AUTO: 9 %
NEUTROPHILS # BLD AUTO: 11.2 10E3/UL (ref 1.6–8.3)
NEUTROPHILS NFR BLD AUTO: 77 %
NITRATE UR QL: NEGATIVE
NRBC # BLD AUTO: 0 10E3/UL
NRBC BLD AUTO-RTO: 0 /100
P AXIS - MUSE: 75 DEGREES
PCO2 BLDV: 31 MM HG (ref 40–50)
PH BLDV: 7.36 [PH] (ref 7.32–7.43)
PH UR STRIP: 6 [PH] (ref 5–7)
PHOSPHATE SERPL-MCNC: 2.9 MG/DL (ref 2.5–4.5)
PLATELET # BLD AUTO: 176 10E3/UL (ref 150–450)
PO2 BLDV: 37 MM HG (ref 25–47)
POTASSIUM BLD-SCNC: 3.8 MMOL/L (ref 3.4–5.3)
PR INTERVAL - MUSE: 130 MS
PROT SERPL-MCNC: 7.4 G/DL (ref 6.8–8.8)
QRS DURATION - MUSE: 62 MS
QT - MUSE: 292 MS
QTC - MUSE: 407 MS
R AXIS - MUSE: 49 DEGREES
RBC # BLD AUTO: 4.01 10E6/UL (ref 3.8–5.2)
RBC URINE: 2 /HPF
SAO2 % BLDV: 69 % (ref 94–100)
SARS-COV-2 RNA RESP QL NAA+PROBE: NEGATIVE
SODIUM SERPL-SCNC: 132 MMOL/L (ref 133–144)
SP GR UR STRIP: 1.03 (ref 1–1.03)
SQUAMOUS EPITHELIAL: 8 /HPF
SYSTOLIC BLOOD PRESSURE - MUSE: NORMAL MMHG
T AXIS - MUSE: 45 DEGREES
TRANSITIONAL EPI: <1 /HPF
TROPONIN I SERPL-MCNC: <0.015 UG/L (ref 0–0.04)
UROBILINOGEN UR STRIP-MCNC: 4 MG/DL
VENTRICULAR RATE- MUSE: 117 BPM
WBC # BLD AUTO: 14.5 10E3/UL (ref 4–11)
WBC URINE: 6 /HPF

## 2021-08-12 PROCEDURE — 96361 HYDRATE IV INFUSION ADD-ON: CPT

## 2021-08-12 PROCEDURE — 81025 URINE PREGNANCY TEST: CPT | Performed by: INTERNAL MEDICINE

## 2021-08-12 PROCEDURE — 250N000011 HC RX IP 250 OP 636: Performed by: INTERNAL MEDICINE

## 2021-08-12 PROCEDURE — 36415 COLL VENOUS BLD VENIPUNCTURE: CPT | Performed by: INTERNAL MEDICINE

## 2021-08-12 PROCEDURE — 96365 THER/PROPH/DIAG IV INF INIT: CPT

## 2021-08-12 PROCEDURE — 81003 URINALYSIS AUTO W/O SCOPE: CPT | Performed by: INTERNAL MEDICINE

## 2021-08-12 PROCEDURE — 83605 ASSAY OF LACTIC ACID: CPT | Performed by: INTERNAL MEDICINE

## 2021-08-12 PROCEDURE — 84484 ASSAY OF TROPONIN QUANT: CPT | Performed by: INTERNAL MEDICINE

## 2021-08-12 PROCEDURE — 99285 EMERGENCY DEPT VISIT HI MDM: CPT | Mod: 25

## 2021-08-12 PROCEDURE — 86140 C-REACTIVE PROTEIN: CPT | Performed by: INTERNAL MEDICINE

## 2021-08-12 PROCEDURE — 71045 X-RAY EXAM CHEST 1 VIEW: CPT

## 2021-08-12 PROCEDURE — 93005 ELECTROCARDIOGRAM TRACING: CPT

## 2021-08-12 PROCEDURE — 85610 PROTHROMBIN TIME: CPT | Performed by: INTERNAL MEDICINE

## 2021-08-12 PROCEDURE — 96367 TX/PROPH/DG ADDL SEQ IV INF: CPT

## 2021-08-12 PROCEDURE — 82803 BLOOD GASES ANY COMBINATION: CPT

## 2021-08-12 PROCEDURE — 87635 SARS-COV-2 COVID-19 AMP PRB: CPT | Performed by: INTERNAL MEDICINE

## 2021-08-12 PROCEDURE — 84100 ASSAY OF PHOSPHORUS: CPT | Performed by: INTERNAL MEDICINE

## 2021-08-12 PROCEDURE — 85652 RBC SED RATE AUTOMATED: CPT | Performed by: INTERNAL MEDICINE

## 2021-08-12 PROCEDURE — 93010 ELECTROCARDIOGRAM REPORT: CPT | Performed by: INTERNAL MEDICINE

## 2021-08-12 PROCEDURE — 83605 ASSAY OF LACTIC ACID: CPT

## 2021-08-12 PROCEDURE — 258N000003 HC RX IP 258 OP 636: Performed by: INTERNAL MEDICINE

## 2021-08-12 PROCEDURE — 85025 COMPLETE CBC W/AUTO DIFF WBC: CPT | Performed by: INTERNAL MEDICINE

## 2021-08-12 PROCEDURE — 80053 COMPREHEN METABOLIC PANEL: CPT | Performed by: INTERNAL MEDICINE

## 2021-08-12 PROCEDURE — 83735 ASSAY OF MAGNESIUM: CPT | Performed by: INTERNAL MEDICINE

## 2021-08-12 PROCEDURE — 87040 BLOOD CULTURE FOR BACTERIA: CPT | Performed by: INTERNAL MEDICINE

## 2021-08-12 PROCEDURE — C9803 HOPD COVID-19 SPEC COLLECT: HCPCS

## 2021-08-12 RX ORDER — AZITHROMYCIN 250 MG/1
TABLET, FILM COATED ORAL
Qty: 6 TABLET | Refills: 0 | Status: SHIPPED | OUTPATIENT
Start: 2021-08-12 | End: 2021-10-07

## 2021-08-12 RX ORDER — CEFTRIAXONE 2 G/1
2 INJECTION, POWDER, FOR SOLUTION INTRAMUSCULAR; INTRAVENOUS ONCE
Status: COMPLETED | OUTPATIENT
Start: 2021-08-12 | End: 2021-08-12

## 2021-08-12 RX ORDER — NAPROXEN 500 MG/1
500 TABLET ORAL 2 TIMES DAILY PRN
Qty: 30 TABLET | Refills: 0 | Status: SHIPPED | OUTPATIENT
Start: 2021-08-12 | End: 2023-11-14

## 2021-08-12 RX ORDER — KETOROLAC TROMETHAMINE 15 MG/ML
15 INJECTION, SOLUTION INTRAMUSCULAR; INTRAVENOUS ONCE
Status: COMPLETED | OUTPATIENT
Start: 2021-08-12 | End: 2021-08-12

## 2021-08-12 RX ORDER — SODIUM CHLORIDE 9 MG/ML
INJECTION, SOLUTION INTRAVENOUS CONTINUOUS
Status: DISCONTINUED | OUTPATIENT
Start: 2021-08-12 | End: 2021-08-13 | Stop reason: HOSPADM

## 2021-08-12 RX ORDER — AZITHROMYCIN 500 MG/5ML
500 INJECTION, POWDER, LYOPHILIZED, FOR SOLUTION INTRAVENOUS ONCE
Status: COMPLETED | OUTPATIENT
Start: 2021-08-12 | End: 2021-08-13

## 2021-08-12 RX ADMIN — SODIUM CHLORIDE 1000 ML: 9 INJECTION, SOLUTION INTRAVENOUS at 21:10

## 2021-08-12 RX ADMIN — CEFTRIAXONE SODIUM 2 G: 2 INJECTION, POWDER, FOR SOLUTION INTRAMUSCULAR; INTRAVENOUS at 21:50

## 2021-08-12 RX ADMIN — AZITHROMYCIN MONOHYDRATE 500 MG: 500 INJECTION, POWDER, LYOPHILIZED, FOR SOLUTION INTRAVENOUS at 23:01

## 2021-08-12 RX ADMIN — KETOROLAC TROMETHAMINE 15 MG: 15 INJECTION, SOLUTION INTRAMUSCULAR; INTRAVENOUS at 21:11

## 2021-08-12 RX ADMIN — SODIUM CHLORIDE: 9 INJECTION, SOLUTION INTRAVENOUS at 21:11

## 2021-08-12 RX ADMIN — SODIUM CHLORIDE 1000 ML: 9 INJECTION, SOLUTION INTRAVENOUS at 21:49

## 2021-08-12 ASSESSMENT — ENCOUNTER SYMPTOMS
ARTHRALGIAS: 0
COUGH: 1
COLOR CHANGE: 0
CONFUSION: 0
HEADACHES: 0
ACTIVITY CHANGE: 1
DIFFICULTY URINATING: 0
ABDOMINAL PAIN: 0
NECK STIFFNESS: 0
SHORTNESS OF BREATH: 1
EYE REDNESS: 0
FEVER: 1

## 2021-08-12 NOTE — ED PROVIDER NOTES
[facility] [place] EMERGENCY DEPARTMENT ([facility])   2021  History     Chief Complaint   Patient presents with     Generalized Weakness     1.5 weeks ago baby was sick with fever but covid negative, now she feels sick, no energy, not able to sleep, coughing and was given pill for cough and inhaler by her pcp that has helped     The history is provided by the patient and medical records.   Generalized Weakness  Associated symptoms: cough, fever and shortness of breath    Associated symptoms: no abdominal pain, no arthralgias, no chest pain and no headaches      Arielle Montenegro is a 36 year old female with PMH significant for type 1 diabetes who presents to the Emergency Department for evaluation of generalized weakness.  Patient states about 1 week ago her baby was sick with a fever, but was Covid negative.  Patient indicates she now feels sick with cough, shortness of breath, fever, decreased energy, and inability to sleep.  She indicates the symptoms are worsening.  She denies nausea, vomiting, or diarrhea.  She states that she has been able to eat normally.  She reports she was given an albuterol inhaler by her PCP that has helped.  Patient is not vaccinated for COVID-19.      PAST MEDICAL HISTORY:   Past Medical History:   Diagnosis Date     Blindness of right eye      Diabetes mellitus type 1 (H)     Diagnosed as a child       PAST SURGICAL HISTORY:   Past Surgical History:   Procedure Laterality Date      SECTION  13      SECTION N/A 2015    Procedure:  SECTION;  Surgeon: John Hudson MD;  Location: [facility] L+D      SECTION N/A 7/3/2020    Procedure:  SECTION;  Surgeon: Aparna Atkins MD;  Location: [facility] L+D     ENUCLEATION Right 3/20/2015    Procedure: ENUCLEATION;  Surgeon: Edilson Islas MD;  Location: [facility]       Past medical history, past surgical history, medications, and allergies were reviewed with the patient. Additional  pertinent items: None    FAMILY HISTORY:   Family History   Problem Relation Age of Onset     Family History Negative Mother      Family History Negative Father      Diabetes Other         father's side       SOCIAL HISTORY:   Social History     Tobacco Use     Smoking status: Former Smoker     Packs/day: 0.00     Types: Cigarettes     Smokeless tobacco: Never Used     Tobacco comment: smokes 2 cigarettes/day-none for several months   Substance Use Topics     Alcohol use: No     Alcohol/week: 0.0 standard drinks     Social history was reviewed with the patient. Additional pertinent items: None      Patient's Medications   New Prescriptions    AZITHROMYCIN (ZITHROMAX Z-ASHLEY) 250 MG TABLET    As directed    NAPROXEN (NAPROSYN) 500 MG TABLET    Take 1 tablet (500 mg) by mouth 2 times daily as needed for moderate pain   Previous Medications    ALBUTEROL (PROAIR HFA/PROVENTIL HFA/VENTOLIN HFA) 108 (90 BASE) MCG/ACT INHALER    Inhale 2 puffs into the lungs every 6 hours as needed for shortness of breath / dyspnea OFFICE VISIT NEEDED BEFORE FURTHER REFILLS    ALBUTEROL (PROAIR HFA/PROVENTIL HFA/VENTOLIN HFA) 108 (90 BASE) MCG/ACT INHALER    Inhale 2 puffs into the lungs every 6 hours    BENZONATATE (TESSALON) 200 MG CAPSULE    Take 1 capsule (200 mg) by mouth 3 times daily as needed for cough    BLOOD GLUCOSE (NO BRAND SPECIFIED) TEST STRIP    Use to test blood sugar 4 times daily or as directed.    BLOOD GLUCOSE CALIBRATION (NO BRAND SPECIFIED) SOLUTION    To accompany: Blood Glucose Monitor Brands: per insurance.    BLOOD GLUCOSE MONITORING (NO BRAND SPECIFIED) METER DEVICE KIT    Use to test blood sugar.  One Touch Ultra or Verio.    BLOOD GLUCOSE MONITORING (NO BRAND SPECIFIED) TEST STRIP    Use to test blood sugars 4 times daily or as directed    BUPROPION (WELLBUTRIN XL) 150 MG 24 HR TABLET    TAKE 1 TABLET(150 MG) BY MOUTH EVERY MORNING    CONTINUOUS BLOOD GLUC SENSOR (FREESTYLE NILDA 14 DAY SENSOR) MISC    Change  every 14 days.    INSULIN GLARGINE (LANTUS SOLOSTAR) 100 UNIT/ML PEN    Inject 6 Units Subcutaneous daily. Titrate as directed.    INSULIN PEN NEEDLE (31G X 5 MM) 31G X 5 MM MISCELLANEOUS    Use 3 270 pen needles daily or as directed.    NIFEDIPINE ER OSMOTIC (PROCARDIA XL) 30 MG 24 HR TABLET    Take 1 tablet (30 mg) by mouth daily    NOVOLOG FLEXPEN 100 UNIT/ML SOLN    Inject 2-12 units with meals TID  and at bedtime.approx 55 units daily    PRENATAL VIT-FE FUMARATE-FA (PRENATAL MULTIVITAMIN W/IRON) 27-0.8 MG TABLET    Take 1 tablet by mouth daily    SERTRALINE (ZOLOFT) 50 MG TABLET    Take 1 tablet (50 mg) by mouth daily Needs virtual visit prior to refills    THIN (NO BRAND SPECIFIED) LANCETS    Use to test blood sugar 4 times daily or as directed.    TRAMADOL (ULTRAM) 50 MG TABLET    Take 1 tablet (50 mg) by mouth every 8 hours as needed for moderate to severe pain   Modified Medications    No medications on file   Discontinued Medications    No medications on file        No Known Allergies     Review of Systems   Constitutional: Positive for activity change (decrease) and fever.   HENT: Negative for congestion.    Eyes: Negative for redness.   Respiratory: Positive for cough and shortness of breath.    Cardiovascular: Negative for chest pain.   Gastrointestinal: Negative for abdominal pain.   Genitourinary: Negative for difficulty urinating.   Musculoskeletal: Negative for arthralgias and neck stiffness.   Skin: Negative for color change.   Neurological: Negative for headaches.   Psychiatric/Behavioral: Negative for confusion.         Physical Exam   BP: 121/71  Pulse: 120  Temp: (!) 103.1  F (39.5  C)  Resp: 16  Weight: 73.5 kg (162 lb)  SpO2: 95 %      Physical Exam  Constitutional:       General: She is not in acute distress.     Appearance: She is not diaphoretic.   HENT:      Head: Atraumatic.      Mouth/Throat:      Mouth: Mucous membranes are dry.      Pharynx: No oropharyngeal exudate.   Eyes:       General: No scleral icterus.     Pupils: Pupils are equal, round, and reactive to light.   Cardiovascular:      Rate and Rhythm: Regular rhythm. Tachycardia present.      Heart sounds: Normal heart sounds. No murmur heard.   No friction rub. No gallop.    Pulmonary:      Effort: Pulmonary effort is normal. No respiratory distress.      Breath sounds: Normal breath sounds. No stridor. No wheezing, rhonchi or rales.   Chest:      Chest wall: No tenderness.   Abdominal:      General: Abdomen is flat. Bowel sounds are normal. There is no distension.      Palpations: Abdomen is soft. There is no mass.      Tenderness: There is no abdominal tenderness. There is no right CVA tenderness, left CVA tenderness, guarding or rebound.      Hernia: No hernia is present.   Musculoskeletal:         General: No tenderness.      Cervical back: Neck supple.   Skin:     General: Skin is warm.      Findings: No rash.   Neurological:      General: No focal deficit present.      Cranial Nerves: No cranial nerve deficit.         ED Course   6:55 PM  The patient was seen and examined by Michael Koo MD in Room ED03.        Procedures            EKG Interpretation:      Interpreted by Michael Koo MD, MD  Time reviewed: on arrival  Symptoms at time of EKG: cough fever weakness   Rhythm: sinus tachycardia  Rate: Tachycardia  Axis: Normal  Ectopy: none  Conduction: normal  ST Segments/ T Waves: No ST-T wave changes  Q Waves: none  Comparison to prior: Unchanged    Clinical Impression: sinus tachycardia        Critical Care time was 30 minutes for this patient excluding procedures.            Results for orders placed or performed during the hospital encounter of 08/12/21 (from the past 24 hour(s))   EKG 12-lead, tracing only   Result Value Ref Range    Systolic Blood Pressure  mmHg    Diastolic Blood Pressure  mmHg    Ventricular Rate 117 BPM    Atrial Rate 117 BPM    SC Interval 130 ms    QRS Duration 62 ms     ms    QTc 407 ms    P  Axis 75 degrees    R AXIS 49 degrees    T Axis 45 degrees    Interpretation ECG       Sinus tachycardia  Possible Left atrial enlargement  Borderline ECG  Unconfirmed report - interpretation of this ECG is computer generated - see medical record for final interpretation  Confirmed by - EMERGENCY ROOM, PHYSICIAN ([id number]),  FLOYD LARA ([id number]) on 8/12/2021 9:08:38 PM     INR   Result Value Ref Range    INR 1.16 (H) 0.86 - 1.14   Lactic acid whole blood STAT   Result Value Ref Range    Lactic Acid 1.8 0.7 - 2.0 mmol/L   CBC with platelets differential    Narrative    The following orders were created for panel order CBC with platelets differential.  Procedure                               Abnormality         Status                     ---------                               -----------         ------                     CBC with platelets and d...[[id number]]  Abnormal            Final result                 Please view results for these tests on the individual orders.   Comprehensive metabolic panel   Result Value Ref Range    Sodium 132 (L) 133 - 144 mmol/L    Potassium 3.8 3.4 - 5.3 mmol/L    Chloride 100 94 - 109 mmol/L    Carbon Dioxide (CO2) 25 20 - 32 mmol/L    Anion Gap 7 3 - 14 mmol/L    Urea Nitrogen 12 7 - 30 mg/dL    Creatinine 1.13 (H) 0.52 - 1.04 mg/dL    Calcium 8.5 8.5 - 10.1 mg/dL    Glucose 285 (H) 70 - 99 mg/dL    Alkaline Phosphatase 138 40 - 150 U/L    AST 10 0 - 45 U/L    ALT 12 0 - 50 U/L    Protein Total 7.4 6.8 - 8.8 g/dL    Albumin 2.3 (L) 3.4 - 5.0 g/dL    Bilirubin Total 0.9 0.2 - 1.3 mg/dL    GFR Estimate 63 >60 mL/min/1.73m2   Troponin I   Result Value Ref Range    Troponin I <0.015 0.000 - 0.045 ug/L   Erythrocyte sedimentation rate auto   Result Value Ref Range    Erythrocyte Sedimentation Rate 94 (H) 0 - 20 mm/hr   CRP inflammation   Result Value Ref Range    CRP Inflammation 400.0 (H) 0.0 - 8.0 mg/L   Magnesium   Result Value Ref Range    Magnesium 1.9 1.6 - 2.3 mg/dL    Phosphorus   Result Value Ref Range    Phosphorus 2.9 2.5 - 4.5 mg/dL   CBC with platelets and differential   Result Value Ref Range    WBC Count 14.5 (H) 4.0 - 11.0 10e3/uL    RBC Count 4.01 3.80 - 5.20 10e6/uL    Hemoglobin 11.6 (L) 11.7 - 15.7 g/dL    Hematocrit 35.8 35.0 - 47.0 %    MCV 89 78 - 100 fL    MCH 28.9 26.5 - 33.0 pg    MCHC 32.4 31.5 - 36.5 g/dL    RDW 13.8 10.0 - 15.0 %    Platelet Count 176 150 - 450 10e3/uL    % Neutrophils 77 %    % Lymphocytes 13 %    % Monocytes 9 %    % Eosinophils 0 %    % Basophils 0 %    % Immature Granulocytes 1 %    NRBCs per 100 WBC 0 <1 /100    Absolute Neutrophils 11.2 (H) 1.6 - 8.3 10e3/uL    Absolute Lymphocytes 1.8 0.8 - 5.3 10e3/uL    Absolute Monocytes 1.3 0.0 - 1.3 10e3/uL    Absolute Eosinophils 0.0 0.0 - 0.7 10e3/uL    Absolute Basophils 0.0 0.0 - 0.2 10e3/uL    Absolute Immature Granulocytes 0.1 (H) <=0.0 10e3/uL    Absolute NRBCs 0.0 10e3/uL   Extra Tube ([place] Draw)    Narrative    The following orders were created for panel order Extra Tube ([place] Draw).  Procedure                               Abnormality         Status                     ---------                               -----------         ------                     Extra Red Top Tube[[id number]]                               Final result                 Please view results for these tests on the individual orders.   Extra Red Top Tube   Result Value Ref Range    Hold Specimen [facility]    Symptomatic COVID-19 Virus (Coronavirus) by PCR Nasopharyngeal    Specimen: Nasopharyngeal; Swab    Narrative    The following orders were created for panel order Symptomatic COVID-19 Virus (Coronavirus) by PCR Nasopharyngeal.  Procedure                               Abnormality         Status                     ---------                               -----------         ------                     SARS-COV2 (COVID-19) Vir...[[id number]]  Normal              Final result                 Please view results for  these tests on the individual orders.   SARS-COV2 (COVID-19) Virus RT-PCR    Specimen: Nasopharyngeal; Swab   Result Value Ref Range    SARS CoV2 PCR Negative Negative    Narrative    Testing was performed using the melonie  SARS-CoV-2 & Influenza A/B Assay on the melonie  Ne  System.  This test should be ordered for the detection of SARS-COV-2 in individuals who meet SARS-CoV-2 clinical and/or epidemiological criteria. Test performance is unknown in asymptomatic patients.  This test is for in vitro diagnostic use under the FDA EUA for laboratories certified under CLIA to perform moderate and/or high complexity testing. This test has not been FDA cleared or approved.  A negative test does not rule out the presence of PCR inhibitors in the specimen or target RNA in concentration below the limit of detection for the assay. The possibility of a false negative should be considered if the patient's recent exposure or clinical presentation suggests COVID-19.  [facility] are certified under the Clinical Laboratory Improvement Amendments of 1988 (CLIA-88) as qualified to perform moderate and/or high complexity laboratory testing.   XR Chest Port 1 View    Narrative    XR CHEST PORT 1 VIEW  8/12/2021 9:05 PM       INDICATION: cough fever  COMPARISON: 4-16       Impression    IMPRESSION: Patchy bilateral pulmonary infiltrates are new from  previous and suggest Covid 19 pneumonia.    RYLAN BRUSH MD         SYSTEM ID:  [id number]   iStat Gases (lactate) venous, POCT   Result Value Ref Range    Lactic Acid POCT 1.1 <=2.0 mmol/L    Bicarbonate Venous POCT 18 (L) 21 - 28 mmol/L    O2 Sat, Venous POCT 69 (L) 94 - 100 %    pCO2V Venous POCT 31 (L) 40 - 50 mm Hg    pH Venous POCT 7.36 7.32 - 7.43    pO2 Venous POCT 37 25 - 47 mm Hg   HCG qualitative urine (UPT)   Result Value Ref Range    hCG Urine Qualitative Negative Negative   UA with Microscopic reflex to Culture    Specimen: Urine, Midstream   Result Value  Ref Range    Color Urine Yellow Colorless, Straw, Light Yellow, Yellow    Appearance Urine Slightly Cloudy (A) Clear    Glucose Urine 500  (A) Negative mg/dL    Bilirubin Urine Small (A) Negative    Ketones Urine 80  (A) Negative mg/dL    Specific Gravity Urine 1.026 1.003 - 1.035    Blood Urine Moderate (A) Negative    pH Urine 6.0 5.0 - 7.0    Protein Albumin Urine 300  (A) Negative mg/dL    Urobilinogen Urine 4.0 (A) Normal, 2.0 mg/dL    Nitrite Urine Negative Negative    Leukocyte Esterase Urine Negative Negative    Bacteria Urine Few (A) None Seen /HPF    RBC Urine 2 <=2 /HPF    WBC Urine 6 (H) <=5 /HPF    Squamous Epithelials Urine 8 (H) <=1 /HPF    Transitional Epithelials Urine <1 <=1 /HPF    Hyaline Casts Urine 5 (H) <=2 /LPF    Narrative    Urine Culture not indicated     Medications   0.9% sodium chloride BOLUS (0 mLs Intravenous Stopped 8/12/21 2212)     Followed by   sodium chloride 0.9% infusion ( Intravenous Stopped 8/12/21 2354)   sodium chloride (PF) 0.9% PF flush 3 mL (has no administration in time range)   azithromycin 500 mg (ZITHROMAX) in 0.9% NaCl 250 mL intermittent infusion 500 mg (500 mg Intravenous New Bag 8/12/21 2301)   ketorolac (TORADOL) injection 15 mg (15 mg Intravenous Given 8/12/21 2111)   cefTRIAXone (ROCEPHIN) 2 g vial to attach to  ml bag for ADULTS or NS 50 ml bag for PEDS (0 g Intravenous Stopped 8/12/21 2212)   0.9% sodium chloride BOLUS (0 mLs Intravenous Stopped 8/12/21 2203)             Assessments & Plan (with Medical Decision Making)    CAP in the pt with type 1 DM, not vaccinated for COVID-but COVID neg today, CXR pos for PNA possibly consistent with COVID per Radiology, no suggestion of Sepsis with stable HD and normal lactic acid, WBC14 but no tachypnea and no no altered MS, normal O2 sat, HR initially 120's sbut down [id number] after IVFand no sign of DKA, cx'ed and started rocephin and zithro, improved after toradol and IVF, offered ED OBS but pt wish to be  discahrged with po abx-given z pack and follow up with her PMD within one week.         I have reviewed the nursing notes.    I have reviewed the findings, diagnosis, plan and need for follow up with the patient.    New Prescriptions    AZITHROMYCIN (ZITHROMAX Z-ASHLEY) 250 MG TABLET    As directed    NAPROXEN (NAPROSYN) 500 MG TABLET    Take 1 tablet (500 mg) by mouth 2 times daily as needed for moderate pain       Final diagnoses:   Pneumonia due to infectious organism, unspecified laterality, unspecified part of lung   Type 1 diabetes mellitus without complication (H)   I, Lyle Saeed, am serving as a trained medical scribe to document services personally performed by Michael Koo MD, based on the provider's statements to me.     I, Michael Koo MD, was physically present and have reviewed and verified the accuracy of this note documented by Lyle Saeed.      Michael Koo MD    8/12/2021   [facility] EMERGENCY DEPARTMENT     Michael Koo MD  08/13/21 0004

## 2021-08-12 NOTE — ED NOTES
Sepsis BPA alert has fired and lactate was ordered Yes     Vitals 24 hr (last day)     Date/Time Temp Resp Pulse Pulse Rate Source BP    08/12/21 1842  (!) 103.1 (39.5)  16  120  Monitor  121/71            WBC   Date Value Ref Range Status   07/05/2020 11.6 (H) 4.0 - 11.0 10e9/L Final

## 2021-08-13 ENCOUNTER — VIRTUAL VISIT (OUTPATIENT)
Dept: FAMILY MEDICINE | Facility: CLINIC | Age: 36
End: 2021-08-13
Payer: COMMERCIAL

## 2021-08-13 VITALS
DIASTOLIC BLOOD PRESSURE: 68 MMHG | OXYGEN SATURATION: 98 % | HEART RATE: 106 BPM | RESPIRATION RATE: 17 BRPM | BODY MASS INDEX: 29.63 KG/M2 | SYSTOLIC BLOOD PRESSURE: 105 MMHG | WEIGHT: 162 LBS | TEMPERATURE: 100.1 F

## 2021-08-13 DIAGNOSIS — J18.9 PNEUMONIA OF BOTH LUNGS DUE TO INFECTIOUS ORGANISM, UNSPECIFIED PART OF LUNG: Primary | ICD-10-CM

## 2021-08-13 PROCEDURE — 99213 OFFICE O/P EST LOW 20 MIN: CPT | Mod: 95 | Performed by: PHYSICIAN ASSISTANT

## 2021-08-13 ASSESSMENT — PATIENT HEALTH QUESTIONNAIRE - PHQ9: SUM OF ALL RESPONSES TO PHQ QUESTIONS 1-9: 0

## 2021-08-13 NOTE — PROGRESS NOTES
Arielle is a 36 year old who is being evaluated via a billable telephone visit.      What phone number would you like to be contacted at? [phone]   How would you like to obtain your AVS? Mail a copy    Assessment & Plan     Pneumonia of both lungs due to infectious organism, unspecified part of lung  Strongly advised that patient  z pack and begin taking this today for her bilateral pneumonia.  This was consistent with COVID pneumonia but her COVID test is negative and so antibiotics are recommended at this time. Also advised that she get a pulse oximter at the pharmacy and closely monitor her 02.  Today, she reports that her symptoms are not worsening since yesterday.  Advised that her symptoms could still progress and worsen. Advised that if 02 < 94%  or if high fevers, worsening dyspnea that she present back to the ER.  She is agreeable    Return in about 1 week (around 8/20/2021) for if new or worsening symptoms.    NEIL Wharton [facility]   Arielle is a 36 year old who presents for the following health issues  HPI     ED/UC Followup:    Facility:  [facility]   Date of visit: 8/12/2021   Reason for visit: pneumonia   Current Status: still very fatigue          Arielle presents to the clinic via telephone visit to follow up on her ER visit yesterday   where she was diagnosed with bilateral pneumonia, consistent with COVID 19.  She has not been vaccinated yet.  Her COVID 19 test was negative.  She was given Rocephin in the ER but was also advised to begin a z pack.  She has not yet picked up the z pack from the pharmacy.  Today, she reports that he is still very fatigued and she feels slightly SOB when walking upstairs.  She does not have a pulse oximeter at home.  She denies dyspnea at rest fevers or other new symptoms, cough is mild and non productive        Review of Systems   Constitutional, HEENT, cardiovascular, pulmonary, GI, , musculoskeletal,  neuro, skin, endocrine and psych systems are negative, except as otherwise noted.      Objective           Vitals:  No vitals were obtained today due to virtual visit.    Physical Exam   healthy, alert and no distress  PSYCH: Alert and oriented times 3; coherent speech, normal   rate and volume, able to articulate logical thoughts, able   to abstract reason, no tangential thoughts, no hallucinations   or delusions  Her affect is normal  RESP: No cough, no audible wheezing, able to talk in full sentences  Remainder of exam unable to be completed due to telephone visits          Phone call duration: 15 minutes

## 2021-08-17 LAB — BACTERIA BLD CULT: NO GROWTH

## 2021-08-18 ENCOUNTER — VIRTUAL VISIT (OUTPATIENT)
Dept: FAMILY MEDICINE | Facility: CLINIC | Age: 36
End: 2021-08-18
Payer: COMMERCIAL

## 2021-08-18 DIAGNOSIS — J18.9 PNEUMONIA OF BOTH LUNGS DUE TO INFECTIOUS ORGANISM, UNSPECIFIED PART OF LUNG: Primary | ICD-10-CM

## 2021-08-18 DIAGNOSIS — E10.65 UNCONTROLLED TYPE 1 DIABETES MELLITUS WITH HYPERGLYCEMIA (H): ICD-10-CM

## 2021-08-18 LAB — BACTERIA BLD CULT: NO GROWTH

## 2021-08-18 PROCEDURE — 99207 PR NO CHARGE LOS: CPT | Performed by: NURSE PRACTITIONER

## 2021-08-18 NOTE — PROGRESS NOTES
Arielle is a 36 year old who is being evaluated via a billable telephone visit.      What phone number would you like to be contacted at? [phone] (M)   How would you like to obtain your AVS? MyChart    Assessment & Plan     Pneumonia of both lungs due to infectious organism, unspecified part of lung    Type 1 diabetes, uncontrolled.    Advised she needs to be seen in the clinic for physical exam with recheck of lungs and VS if she is sick enough that needs more antibiotics being high risk with uncontrolled type 1 diabetes.         No follow-ups on file.    MIKALA Carlin CNP  [facility]    Josr Guzmán is a 36 year old who presents for the following health issues     HPI     ED/UC Followup:    Facility:  [facility] Emergency Department  Date of visit: 8/12/2021-8/13/2021  Reason for visit: Pneumonia due to infectious organism, unspecified laterality, unspecified part of lung  Current Status: has been doing better since her discharge ,no new symptoms      Pt reports she is feeling better, still coughing a bit  Requesting more antibiotics.    Does not think she got enough antibiotics to resolve the pneumonia.    Does not have a thermometer to take her temp but reports she has not been feeling very hot of very cold.      Review of Systems   Constitutional, HEENT, cardiovascular, pulmonary, GI, , musculoskeletal, neuro, skin, endocrine and psych systems are negative, except as otherwise noted.      Objective           Vitals:  No vitals were obtained today due to virtual visit.    Physical Exam   healthy and alert  PSYCH: Alert and oriented times 3; coherent speech, normal   rate and volume, able to articulate logical thoughts, able   to abstract reason, no tangential thoughts, no hallucinations   or delusions  Her affect is flat  RESP: No cough, no audible wheezing, able to talk in full sentences  Remainder of exam unable to be completed due to telephone  visits              Phone call duration: 4 minutes

## 2021-08-18 NOTE — PROGRESS NOTES
"Arielle is a 36 year old who is being evaluated via a billable video visit.      How would you like to obtain your AVS? {AVS Preference:501363}  If the video visit is dropped, the invitation should be resent by: {video visit invitation:676249}  Will anyone else be joining your video visit? {:011505}  {If patient encounters technical issues they should call [phone] :299956}    Video Start Time: {video visit start/end time for provider to select:405146}    {PROVIDER CHARTING PREFERENCE:918383}    Subjective   Arielle is a 36 year old who presents for the following health issues {ACCOMPANIED BY STATEMENT (Optional):985200}    HPI     ED/UC Followup:    Facility:  [facility] Emergency Department  Date of visit: 8/12/2021-8/13/2021  Reason for visit: Pneumonia due to infectious organism, unspecified laterality, unspecified part of lung     Type 1 diabetes mellitus without complication (H)    Current Status: better since she went        {additonal problems for provider to add (Optional):319309}    Review of Systems   {ROS COMP (Optional):708759}      Objective           Vitals:  No vitals were obtained today due to virtual visit.    Physical Exam   {video visit exam brief selected:992488::\"GENERAL: Healthy, alert and no distress\",\"EYES: Eyes grossly normal to inspection.  No discharge or erythema, or obvious scleral/conjunctival abnormalities.\",\"RESP: No audible wheeze, cough, or visible cyanosis.  No visible retractions or increased work of breathing.  \",\"SKIN: Visible skin clear. No significant rash, abnormal pigmentation or lesions.\",\"NEURO: Cranial nerves grossly intact.  Mentation and speech appropriate for age.\",\"PSYCH: Mentation appears normal, affect normal/bright, judgement and insight intact, normal speech and appearance well-groomed.\"}    {Diagnostic Test Results (Optional):591185}    {AMBULATORY ATTESTATION (Optional):313725}        Video-Visit Details    Type of service:  Video Visit    Video End " "Time:{video visit start/end time for provider to select:046452}    Originating Location (pt. Location): {video visit patient location:686694::\"Home\"}    Distant Location (provider location):  [facility]     Platform used for Video Visit: {Virtual Visit Platforms:890196::\"AmWell\"}  "

## 2021-09-29 ENCOUNTER — VIRTUAL VISIT (OUTPATIENT)
Dept: FAMILY MEDICINE | Facility: CLINIC | Age: 36
End: 2021-09-29
Payer: COMMERCIAL

## 2021-09-29 DIAGNOSIS — R05.9 COUGH: Primary | ICD-10-CM

## 2021-09-29 PROCEDURE — 99213 OFFICE O/P EST LOW 20 MIN: CPT | Mod: 95 | Performed by: FAMILY MEDICINE

## 2021-09-29 NOTE — PROGRESS NOTES
"Arielle is a 36 year old who is being evaluated via a billable video visit.      How would you like to obtain your AVS? Mail a copy  If the video visit is dropped, the invitation should be resent by: Text to cell phone: [phone]  Will anyone else be joining your video visit? No      Video Start Time: 1:03 PM    Assessment & Plan     Cough  - Symptomatic COVID-19 Virus (Coronavirus) by PCR  - RSV rapid antigen  For now, supportive cares.  Practice self-observation and remain alert for worsening symptoms.  Will check RSV because of daughter's history of \"positive respiratory infection\".  Take your temperature daily.  Self isolate in the home until result is made available and your symptoms are better.   Of note, she was very distracted and hung up on me twice because she wanted a \"respiratory test\" but could not specify the diagnosis that her daughter had.    Florencio Boles MD  [facility]   Arielle is a 36 year old who presents for the following health issues     HPI     Yesterday felt feverish, runny nose, cough, abdominal pain. Took tylenol and felt better.  No sore throat  No SOB, no wheeze   No rash  No loss of taste or smell  Kids COVID19 pending because their teachers positive for COVID19  Her daughter tested positive for a \"respiratory test\"        Objective           Vitals:  No vitals were obtained today due to virtual visit.    Physical Exam   Constitutional: Patient is oriented to person, place, and time. Patient appears well-developed and well-nourished. No distress.   Head: Normocephalic and atraumatic.   Right Ear: External ear normal.   Left Ear: External ear normal.   Eyes: Conjunctivae and EOM are normal. Right eye exhibits no discharge. Left eye exhibits no discharge. No scleral icterus.   Neurological: Patient is alert and oriented to person, place, and time.  Skin: No rash noted. Patient is not diaphoretic. No erythema. No " derik.    Video-Visit Details    Type of service:  Video Visit    Video End Time:1:14 PM    Originating Location (pt. Location): Home    Distant Location (provider location):  [facility]     Platform used for Video Visit: Glenys

## 2021-09-30 ENCOUNTER — VIRTUAL VISIT (OUTPATIENT)
Dept: FAMILY MEDICINE | Facility: CLINIC | Age: 36
End: 2021-09-30
Payer: COMMERCIAL

## 2021-09-30 DIAGNOSIS — J06.9 VIRAL UPPER RESPIRATORY TRACT INFECTION: ICD-10-CM

## 2021-09-30 DIAGNOSIS — Z20.822 EXPOSURE TO COVID-19 VIRUS: Primary | ICD-10-CM

## 2021-09-30 PROCEDURE — 99213 OFFICE O/P EST LOW 20 MIN: CPT | Mod: 95 | Performed by: PHYSICIAN ASSISTANT

## 2021-09-30 RX ORDER — BENZONATATE 200 MG/1
200 CAPSULE ORAL 3 TIMES DAILY PRN
Qty: 30 CAPSULE | Refills: 0 | Status: SHIPPED | OUTPATIENT
Start: 2021-09-30 | End: 2021-10-07

## 2021-09-30 NOTE — PROGRESS NOTES
Arielle is a 36 year old who is being evaluated via a billable telephone visit.      What phone number would you like to be contacted at? [phone]  How would you like to obtain your AVS? Mail a copy    Assessment and Plan:     (Z93.644) Exposure to COVID-19 virus  (primary encounter diagnosis)  Comment: daughter with positive test, unvaccinated  Plan: Symptomatic COVID-19 Virus (Coronavirus) by PCR      (J06.9) Viral upper respiratory tract infection  Comment: symptoms started 3 days ago, better today, no shortness of breath, requesting tessalon perles in case she develops a cough  Plan: Symptomatic COVID-19 Virus (Coronavirus) by         PCR, benzonatate (TESSALON) 200 MG capsule        Symptomatic cares w/tylenol, fluids, rest  Discussed reasons to be seen in the ED, she understands  Self isolate and safe to assume the entire family is positive and should isolate per CDC guidlines    Abi Martin PA-C        Subjective   Arielle is a 36 year old who presents for the following health issues     HPI     Chief Complaint   Patient presents with     LAB REQUEST     COVID exposure (daughter) - cough, fever, loss of smell sx started on Tuesday        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    Physical Exam     No exam, phone visit        Phone call duration: 12 minutes

## 2021-10-01 ENCOUNTER — VIRTUAL VISIT (OUTPATIENT)
Dept: FAMILY MEDICINE | Facility: CLINIC | Age: 36
End: 2021-10-01
Payer: COMMERCIAL

## 2021-10-01 ENCOUNTER — NURSE TRIAGE (OUTPATIENT)
Dept: NURSING | Facility: CLINIC | Age: 36
End: 2021-10-01

## 2021-10-01 DIAGNOSIS — U07.1 CLINICAL DIAGNOSIS OF COVID-19: ICD-10-CM

## 2021-10-01 DIAGNOSIS — H66.90 ACUTE OTITIS MEDIA, UNSPECIFIED OTITIS MEDIA TYPE: Primary | ICD-10-CM

## 2021-10-01 PROCEDURE — 99214 OFFICE O/P EST MOD 30 MIN: CPT | Mod: 95 | Performed by: FAMILY MEDICINE

## 2021-10-01 RX ORDER — AMOXICILLIN 500 MG/1
500 CAPSULE ORAL 3 TIMES DAILY
Qty: 30 CAPSULE | Refills: 0 | Status: SHIPPED | OUTPATIENT
Start: 2021-10-01 | End: 2021-10-07

## 2021-10-01 NOTE — NURSING NOTE
"Chief Complaint   Patient presents with     Ear Problem     left ear last night, covid test set up for tomorrow     initial There were no vitals taken for this visit. Estimated body mass index is 29.63 kg/m  as calculated from the following:    Height as of 6/22/20: 1.575 m (5' 2\").    Weight as of 8/12/21: 73.5 kg (162 lb).  BP completed using cuff size: .  L  R arm      Health Maintenance that is potentially due pending provider review:      Geoffrey Tarango ma  "

## 2021-10-01 NOTE — PATIENT INSTRUCTIONS
Instructions for Patients    Thank you for limiting contact with others, wearing a simple mask to cover your cough, practice good hand hygiene habits and accessing our virtual services where possible to limit the spread of this virus.    For more information about COVID19 and options for caring for yourself at home, please visit the CDC website at https://www.cdc.gov/coronavirus/2019-ncov/about/steps-when-sick.html  For more options for care at [facility], please visit our website at [email or web address]

## 2021-10-01 NOTE — PROGRESS NOTES
"Arielle is a 36 year old who is being evaluated via a billable video visit.      How would you like to obtain your AVS? MyChart  If the video visit is dropped, the invitation should be resent by: Text to cell phone: [phone]  Will anyone else be joining your video visit? No      Video Start Time: 10:52    Assessment & Plan     Acute otitis media, unspecified otitis media type  Sent in amoxicillin    Clinical diagnosis of COVID-19  Daughter with diagnosed covid (improving) and ear infection.  Other son with similar symptoms although milder (seen on video today and added to schedule).      Her main symptom is ear pain similar to previous ear infections (see below for additional)    Recommend covid testing and get well loop which she agreed to.     All should quarantine for 10 days after last person's initial symptoms and at least 24 hours after feeling substantially better and fever free without medications.   - GetWell Loop - Daily outreach email for English-speaking adults/peds               BMI:   Estimated body mass index is 29.63 kg/m  as calculated from the following:    Height as of 10/7/21: 1.575 m (5' 2\").    Weight as of 10/7/21: 73.5 kg (162 lb).           No follow-ups on file.    Joel Daniel Wegener, MD  [facility]    Subjective   Arielle is a 36 year old who presents for the following health issues     HPI     Acute Illness  Acute illness concerns: ear pain   Onset/Duration: last night  Symptoms:  Fever: no  Chills/Sweats: no  Headache (location?): no  Sinus Pressure: YES  Conjunctivitis:  no  Ear Pain: YES- left  Rhinorrhea: no  Congestion: no  Sore Throat: no  Cough: no  Wheeze: no  Decreased Appetite: no  Nausea: no  Vomiting: no  Diarrhea: no  Dysuria/Freq.: no  Dysuria or Hematuria: no  Fatigue/Achiness: YES  Sick/Strep Exposure: YES duaghter has strep  Therapies tried and outcome: None      Review of Systems         Objective           Vitals:  No vitals were obtained today due " to virtual visit.    Physical Exam   GENERAL: Healthy, alert and no distress  EYES: Eyes grossly normal to inspection.  No discharge or erythema, or obvious scleral/conjunctival abnormalities.  RESP: No audible wheeze, cough, or visible cyanosis.  No visible retractions or increased work of breathing.    SKIN: Visible skin clear. No significant rash, abnormal pigmentation or lesions.  NEURO: Cranial nerves grossly intact.  Mentation and speech appropriate for age.  PSYCH: Mentation appears normal, affect normal/bright, judgement and insight intact, normal speech and appearance well-groomed.                Video-Visit Details    Type of service:  Video Visit    Video End Time:11:01    Originating Location (pt. Location): Home    Distant Location (provider location):  [facility]     Platform used for Video Visit: [employer]

## 2021-10-01 NOTE — TELEPHONE ENCOUNTER
Triage call:     Reports that her left ear is very painful  Crying from the pain  Reports her ear started feeling scratchy a couple days ago but was worse today when she woke up  Reports that she is able to hear an echo when she is talking   Reports she has been taking tylenol for a fever - On and off for the last couple days   Patient states that her last dose of tylenol was last night before she went to bed  - Advised her to take another dose of tylenol and try cold/heat (her preference) to help with the pain.     Advised a virtual visit today- patient states she is being tested for COVID as well today- reviewed additional care advice with patient and she verbalizes understanding. Assisted in transferring to scheduling.     Fabiana Arteaga RN BSN 10/1/2021 6:31 AM    COVID 19 Nurse Triage Plan/Patient Instructions    Please be aware that novel coronavirus (COVID-19) may be circulating in the community. If you develop symptoms such as fever, cough, or SOB or if you have concerns about the presence of another infection including coronavirus (COVID-19), please contact your health care provider or visit [email or web address].     Disposition/Instructions    Virtual Visit with provider recommended. Reference Visit Selection Guide.    Thank you for taking steps to prevent the spread of this virus.  o Limit your contact with others.  o Wear a simple mask to cover your cough.  o Wash your hands well and often.    Resources    [facility]: About COVID-19: [email or web address]    CDC: What to Do If You're Sick: www.cdc.gov/coronavirus/2019-ncov/about/steps-when-sick.html    CDC: Ending Home Isolation: www.cdc.gov/coronavirus/2019-ncov/hcp/disposition-in-home-patients.html     CDC: Caring for Someone: www.cdc.gov/coronavirus/2019-ncov/if-you-are-sick/care-for-someone.html     [facility]: Interim Guidance for Hospital Discharge to Home:  www.health.[facility].[place]/diseases/coronavirus/hcp/hospdischarge.pdf    [facility] clinical trials (COVID-19 research studies): clinicalaffairs.[email or web address]/umn-clinical-trials     Below are the COVID-19 hotlines at the [facility] Department of Health ([facility]). Interpreters are available.   o For health questions: Call [phone] or [phone] (7 a.m. to 7 p.m.)  o For questions about schools and childcare: Call [phone] or [phone] (7 a.m. to 7 p.m.)                       Reason for Disposition    Earache  (Exceptions: brief ear pain of < 60 minutes duration, earache occurring during air travel    Additional Information    Negative: Moving the earlobe or touching the ear clearly increases the pain    Negative: Foreign body struck in the ear (e.g., bug, piece of cotton)    Negative: Followed an ear injury    Negative: [1] Recently diagnosed with otitis media AND [2] currently on oral antibiotics    Negative: [1] Stiff neck (unable to touch chin to chest) AND [2] fever    Negative: [1] Bony area of skull behind the ear is pink or swollen AND [2] fever    Negative: Fever > 104 F (40 C)    Negative: Patient sounds very sick or weak to the triager    Negative: [1] SEVERE pain AND [2] not improved 2 hours after taking analgesic medication (e.g., ibuprofen or acetaminophen)    Negative: Walking is very unsteady    Negative: Sudden onset of ear pain after long - thin object was inserted into the ear canal (e.g., pencil, Q-tip)    Negative: Diabetes mellitus or weak immune system (e.g., HIV positive, cancer chemo, splenectomy, organ transplant, chronic steroids)    Negative: New blurred vision or vision changes    Negative: White, yellow, or green discharge    Negative: Bloody discharge or unexplained bleeding from ear canal    Protocols used: EARACHE-A-AH

## 2021-10-02 ENCOUNTER — LAB (OUTPATIENT)
Dept: FAMILY MEDICINE | Facility: CLINIC | Age: 36
End: 2021-10-02
Attending: PHYSICIAN ASSISTANT
Payer: COMMERCIAL

## 2021-10-02 DIAGNOSIS — Z20.822 EXPOSURE TO COVID-19 VIRUS: ICD-10-CM

## 2021-10-02 DIAGNOSIS — J06.9 VIRAL UPPER RESPIRATORY TRACT INFECTION: ICD-10-CM

## 2021-10-02 PROCEDURE — U0003 INFECTIOUS AGENT DETECTION BY NUCLEIC ACID (DNA OR RNA); SEVERE ACUTE RESPIRATORY SYNDROME CORONAVIRUS 2 (SARS-COV-2) (CORONAVIRUS DISEASE [COVID-19]), AMPLIFIED PROBE TECHNIQUE, MAKING USE OF HIGH THROUGHPUT TECHNOLOGIES AS DESCRIBED BY CMS-2020-01-R: HCPCS

## 2021-10-02 PROCEDURE — U0005 INFEC AGEN DETEC AMPLI PROBE: HCPCS

## 2021-10-03 ENCOUNTER — TELEPHONE (OUTPATIENT)
Dept: FAMILY MEDICINE | Facility: CLINIC | Age: 36
End: 2021-10-03

## 2021-10-03 ENCOUNTER — TELEPHONE (OUTPATIENT)
Dept: NURSING | Facility: CLINIC | Age: 36
End: 2021-10-03

## 2021-10-03 LAB — SARS-COV-2 RNA RESP QL NAA+PROBE: POSITIVE

## 2021-10-03 NOTE — TELEPHONE ENCOUNTER
See Addendum to Telephone encounter: HOMA Medina, RN  [facility]

## 2021-10-03 NOTE — TELEPHONE ENCOUNTER
"-Coronavirus (COVID-19) Notification    Spoke to pt, very teary eyed, screaming in fear \"I'm going to die! We're dying\" as she's telling others to come grab her children from her as she didn't want them to be exposed. She sounds very concerned about questions along the lines of \"when will I feel better, we will get better, etc.\" I mentioned to her a couple times that she needs to discuss these concerns w/ pcp. Pt hysterically crying, had to redirect her back to the phone call a couple times, when I repeated myself w/ bringing concerns to her pcp due to me not being able to give her answers, she replied \"ok then bye.\" pt ended the call.    Caller Name (Patient, parent, daughter/son, grandparent, etc)  Pt    Reason for call  Notify of Positive Coronavirus (COVID-19) lab results, assess symptoms,  review [facility] recommendations    Lab Result    Lab test:  2019-nCoV rRt-PCR or SARS-CoV-2 PCR    Oropharyngeal AND/OR nasopharyngeal swabs is POSITIVE for 2019-nCoV RNA/SARS-COV-2 PCR (COVID-19 virus)    RN Recommendations/Instructions per [facility] Coronavirus COVID-19 recommendations    Brief introduction script  Introduce self then review script:  \"I am calling on behalf of [employer].  We were notified that your Coronavirus test (COVID-19) for was POSITIVE for the virus.  I have some information to relay to you but first I wanted to mention that the [place] Dept of Health will be contacting you shortly [it's possible MD already called Patient] to talk to you more about how you are feeling and other people you have had contact with who might now also have the virus.  Also,  [facility] is Partnering with the [facility] for Covid-19 research, you may be contacted directly by research staff.\"    Assessment (Inquire about Patient's current symptoms)   Assessment   Current Symptoms at time of phone call: (if no symptoms, document No symptoms] Coughing, excessive saliva, fever, fatigue, loss " "smell   Symptoms onset (if applicable) 9/28     If at time of call, Patients symptoms hare worsened, the Patient should contact 911 or have someone drive them to Emergency Dept promptly:      If Patient calling 911, inform 911 personal that you have tested positive for the Coronavirus (COVID-19).  Place mask on and await 911 to arrive.    If Emergency Dept, If possible, please have another adult drive you to the Emergency Dept but you need to wear mask when in contact with other people.      Monoclonal Antibody Administration    You may be eligible to receive a new treatment with a monoclonal antibody for preventing hospitalization in patients at high risk for complications from COVID-19.   This medication is still experimental and available on a limited basis; it is given through an IV and must be given at an infusion center. Please note that not all people who are eligible will receive the medication since it is in limited supply.     Are you interested in being considered for this medication?  No.   Does the patient fit the criteria: No pt hung up before discussing    If patient qualifies based on above criteria:  \"You will be contacted if you are selected to receive this treatment in the next 1-2 business days.   This is time sensitive and if you are not selected in the next 1-2 business days, you will not receive the medication.  If you do not receive a call to schedule, you have not been selected.\"      Review information with Patient    Your result was positive. This means you have COVID-19 (coronavirus).  We have sent you a letter that reviews the information that I'll be reviewing with you now.    How can I protect others?    If you have symptoms: stay home and away from others (self-isolate) until:    You've had no fever--and no medicine that reduces fever--for 1 full day (24 hours). And       Your other symptoms have gotten better. For example, your cough or breathing has improved. And     At least 10 " days have passed since your symptoms started. (If you've been told by a doctor that you have a weak immune system, wait 20 days.)     If you don't have symptoms: Stay home and away from others (self-isolate) until at least 10 days have passed since your first positive COVID-19 test. (Date test collected)    During this time:    Stay in your own room, including for meals. Use your own bathroom if you can.    Stay away from others in your home. No hugging, kissing or shaking hands. No visitors.     Don't go to work, school or anywhere else.     Clean  high touch  surfaces often (doorknobs, counters, handles, etc.). Use a household cleaning spray or wipes. You'll find a full list on the EPA website at www.epa.gov/pesticide-registration/list-n-disinfectants-use-against-sars-cov-2.     Cover your mouth and nose with a mask, tissue or other face covering to avoid spreading germs.    Wash your hands and face often with soap and water.    Make a list of people you have been in close contact with recently, even if either of you wore a face covering.   ; Start your list from 2 days before you became ill or had a positive test.  ; Include anyone that was within 6 feet of you for a cumulative total of 15 minutes or more in 24 hours. (Example: if you sat next to Otf for 5 minutes in the morning and 10 minutes in the afternoon, then you were in close contact for 15 minutes total that day. Otf would be added to your list.)    A public health worker will call or text you. It is important that you answer. They will ask you questions about possible exposures to COVID-19, such as people you have been in direct contact with and places you have visited.    Tell the people on your list that you have COVID-19; they should stay away from others for 14 days starting from the last time they were in contact with you (unless you are told something different from a public health worker).     Caregivers in these groups are at risk for severe  illness due to COVID-19:  o People 65 years and older  o People who live in a nursing home or long-term care facility  o People with chronic disease (lung, heart, cancer, diabetes, kidney, liver, immunologic)  o People who have a weakened immune system, including those who:  - Are in cancer treatment  - Take medicine that weakens the immune system, such as corticosteroids  - Had a bone marrow or organ transplant  - Have an immune deficiency  - Have poorly controlled HIV or AIDS  - Are obese (body mass index of 40 or higher)  - Smoke regularly    Caregivers should wear gloves while washing dishes, handling laundry and cleaning bedrooms and bathrooms.    Wash and dry laundry with special caution. Don't shake dirty laundry, and use the warmest water setting you can.    If you have a weakened immune system, ask your doctor about other actions you should take.    For more tips, go to www.cdc.gov/coronavirus/2019-ncov/downloads/10Things.pdf.    You should not go back to work until you meet the guidelines above for ending your home isolation. You don't need to be retested for COVID-19 before going back to work--studies show that you won't spread the virus if it's been at least 10 days since your symptoms started (or 20 days, if you have a weak immune system).    Employers: This document serves as formal notice of your employee's medical guidelines for going back to work. They must meet the above guidelines before going back to work in person.    How can I take care of myself?    1. Get lots of rest. Drink extra fluids (unless a doctor has told you not to).    2. Take Tylenol (acetaminophen) for fever or pain. If you have liver or kidney problems, ask your family doctor if it's okay to take Tylenol.     Take either:     650 mg (two 325 mg pills) every 4 to 6 hours, or     1,000 mg (two 500 mg pills) every 8 hours as needed.     Note: Don't take more than 3,000 mg in one day. Acetaminophen is found in many medicines (both  prescribed and over-the-counter medicines). Read all labels to be sure you don't take too much.    For children, check the Tylenol bottle for the right dose (based on their age or weight).    3. If you have other health problems (like cancer, heart failure, an organ transplant or severe kidney disease): Call your specialty clinic if you don't feel better in the next 2 days.    4. Know when to call 911: Emergency warning signs include:    Trouble breathing or shortness of breath    Pain or pressure in the chest that doesn't go away    Feeling confused like you haven't felt before, or not being able to wake up    Bluish-colored lips or face    5. Sign up for [employer]. We know it's scary to hear that you have COVID-19. We want to track your symptoms to make sure you're okay over the next 2 weeks. Please look for an email from [employer]--this is a free, online program that we'll use to keep in touch. To sign up, follow the link in the email. Learn more at [email or web address].    Where can I get more information?    [facility]: [email or web address]    Coronavirus Basics: www.health.[facility].[place]/diseases/coronavirus/basics.html    What to Do If You're Sick: www.cdc.gov/coronavirus/2019-ncov/about/steps-when-sick.html    Ending Home Isolation: www.cdc.gov/coronavirus/2019-ncov/hcp/disposition-in-home-patients.html     Caring for Someone with COVID-19: www.cdc.gov/coronavirus/2019-ncov/if-you-are-sick/care-for-someone.html     [facility] clinical trials (COVID-19 research studies): clinicalaffairs.[facility]/n-clinical-trials     A Positive COVID-19 letter will be sent via [employer] or the mail. (Exception, no letters sent to Presurgerical/Preprocedure Patients)    Lynn Montgomery

## 2021-10-03 NOTE — TELEPHONE ENCOUNTER
Arielle was notified today that she is Covid positive.    She is concerned that Covid will remain in her home indefinitely. Notified:  studies show that you won't spread the virus if it's been at least 10 days since your symptoms started (or 20 days, if you have a weak immune system).    Info given from [facility] re: Surface Survival:  https://www.cdc.gov/coronavirus/2019-ncov/more/science-and-research/surface-transmission.html    Surface survival  Numerous researchers have studied how long SARS-CoV-2 can survive on a variety of porous and non-porous surfaces 10, 11, 12, 13, 14, 15. On porous surfaces, studies report inability to detect viable virus within minutes to hours; on non-porous surfaces, viable virus can be detected for days to weeks. The apparent, relatively faster inactivation of SARS-CoV-2 on porous compared with non-porous surfaces might be attributable to capillary action within pores and faster aerosol droplet evaporation 16.    Data from surface survival studies indicate that a 99% reduction in infectious SARS-CoV-2 and other coronaviruses can be expected under typical indoor environmental conditions within 3 days (72 hours) on common non-porous surfaces like stainless steel, plastic, and glass 10, 11, 12, 13, 15. However, experimental conditions on both porous and non-porous surfaces do not necessarily reflect real-world conditions, such as initial virus amount (e.g., viral load in respiratory droplets) and factors that can remove or degrade the virus, such as ventilation and changing environmental conditions 8, 9. They also do not account for inefficiencies in transfer of the virus between surfaces to hands and from hands to mouth, nose, and eyes 8, 9. In fact, laboratory studies try to optimize the recovery of viruses from surfaces (e.g., purposefully swabbing the surface multiple times or soaking the contaminated surface in viral transport medium before swabbing). When accounting for both surface  survival data and real-world transmission factors, the risk of fomite transmission after a person with COVID-19 has been in an indoor space is minor after 3 days (72 hours), regardless of when it was last cleaned    Lizzie Rodriguez RN  [facility]

## 2021-10-04 NOTE — RESULT ENCOUNTER NOTE
Results discussed directly with patient on the phone.  Symptomatic cares and when to seek emergent care discussed.  We also reviewed the need to self-isolate.  Any further details documented in the note.   Abi Martin PA-C

## 2021-10-07 ENCOUNTER — TELEPHONE (OUTPATIENT)
Dept: FAMILY MEDICINE | Facility: CLINIC | Age: 36
End: 2021-10-07

## 2021-10-07 ENCOUNTER — VIRTUAL VISIT (OUTPATIENT)
Dept: FAMILY MEDICINE | Facility: CLINIC | Age: 36
End: 2021-10-07
Payer: COMMERCIAL

## 2021-10-07 VITALS — HEIGHT: 62 IN | WEIGHT: 162 LBS | BODY MASS INDEX: 29.81 KG/M2

## 2021-10-07 DIAGNOSIS — U07.1 INFECTION DUE TO 2019 NOVEL CORONAVIRUS: Primary | ICD-10-CM

## 2021-10-07 DIAGNOSIS — E10.649 TYPE 1 DIABETES MELLITUS WITH HYPOGLYCEMIA AND WITHOUT COMA (H): ICD-10-CM

## 2021-10-07 DIAGNOSIS — R05.9 COUGH: ICD-10-CM

## 2021-10-07 PROCEDURE — 99213 OFFICE O/P EST LOW 20 MIN: CPT | Mod: 95 | Performed by: PHYSICIAN ASSISTANT

## 2021-10-07 RX ORDER — ALBUTEROL SULFATE 90 UG/1
1-2 AEROSOL, METERED RESPIRATORY (INHALATION) EVERY 4 HOURS PRN
Qty: 18 G | Refills: 1 | Status: SHIPPED | OUTPATIENT
Start: 2021-10-07 | End: 2022-10-18

## 2021-10-07 RX ORDER — BENZONATATE 200 MG/1
200 CAPSULE ORAL 3 TIMES DAILY PRN
Qty: 30 CAPSULE | Refills: 0 | Status: SHIPPED | OUTPATIENT
Start: 2021-10-07 | End: 2021-12-17

## 2021-10-07 RX ORDER — PREDNISONE 20 MG/1
40 TABLET ORAL DAILY
Qty: 10 TABLET | Refills: 0 | Status: SHIPPED | OUTPATIENT
Start: 2021-10-07 | End: 2021-10-12

## 2021-10-07 ASSESSMENT — MIFFLIN-ST. JEOR: SCORE: 1383.08

## 2021-10-07 ASSESSMENT — PAIN SCALES - GENERAL: PAINLEVEL: WORST PAIN (10)

## 2021-10-07 NOTE — TELEPHONE ENCOUNTER
Please email patient's covid result to:    [email or web address]@"[employer]    [place]

## 2021-10-07 NOTE — PROGRESS NOTES
"Arielle is a 36 year old who is being evaluated via a billable telephone visit.      What phone number would you like to be contacted at? [phone]  How would you like to obtain your AVS? Mail a copy    Assessment & Plan     Infection due to 2019 novel coronavirus  Supportive cares. Fluids and rest.    - predniSONE (DELTASONE) 20 MG tablet; Take 2 tablets (40 mg) by mouth daily for 5 days  - benzonatate (TESSALON) 200 MG capsule; Take 1 capsule (200 mg) by mouth 3 times daily as needed for cough    Cough    - albuterol (PROAIR HFA/PROVENTIL HFA/VENTOLIN HFA) 108 (90 Base) MCG/ACT inhaler; Inhale 1-2 puffs into the lungs every 4 hours as needed for shortness of breath / dyspnea or wheezing    Type 1 diabetes mellitus with hypoglycemia and without coma (H)  Monitor sugars closely. Refilled needles.   - insulin pen needle (31G X 5 MM) 31G X 5 MM miscellaneous; Use 3 pen needles daily or as directed.             BMI:   Estimated body mass index is 29.63 kg/m  as calculated from the following:    Height as of this encounter: 1.575 m (5' 2\").    Weight as of this encounter: 73.5 kg (162 lb).           No follow-ups on file.    NEIL Nogueira [facility]    Subjective   Arielle is a 36 year old who presents for the following health issues     HPI       Concern for COVID-19  About how many days ago did these symptoms start? Sept 28  Is this your first visit for this illness? No   How would you describe your symptoms since your last visit? My symptoms have stayed the same  In the 14 days before your symptoms started, have you had close contact with someone with COVID-19 (Coronavirus)? Yes, I have been in contact with someone who has COVID-19/Coronavirus (confirmed by lab test).  Do you have a fever or chills? No  Are you having new or worsening difficulty breathing? Yes because of cough  Please describe what kind of difficulty you are having breathing:Mild dyspnea (able to do ADLs without " difficulty, mild shortness of breath with activities such as climbing one or two flights of stairs or walking briskly)  Do you have new or worsening cough? Yes, I am coughing up mucus.  Have you had any new or unexplained body aches? YES    Have you experienced any of the following NEW symptoms?    Headache: YES    Sore throat: YES    Loss of taste or smell: YES    Chest pain:no    Diarrhea: no    Rash: No  What treatments have you tried? Tylenol   Who do you live with? Pt with 3 kids --kids have covid as well.   Are you, or a household member, a healthcare worker or a ? No  Do you live in a nursing home, group home, or shelter? No  Do you have a way to get food/medications if quarantined? Yes, I have a friend or family member who can help me.    Sugars have been up and down. Patient has type 1 DM        Review of Systems   Constitutional, HEENT, cardiovascular, pulmonary, gi and gu systems are negative, except as otherwise noted.      Objective           Vitals:  No vitals were obtained today due to virtual visit.    Physical Exam   healthy, alert and no distress  PSYCH: Alert and oriented times 3; coherent speech, normal   rate and volume, able to articulate logical thoughts, able   to abstract reason, no tangential thoughts, no hallucinations   or delusions  Her affect is pleasant  RESP: No cough, no audible wheezing, able to talk in full sentences  Remainder of exam unable to be completed due to telephone visits                Phone call duration: 13 minutes

## 2021-10-08 ASSESSMENT — ASTHMA QUESTIONNAIRES: ACT_TOTALSCORE: 15

## 2021-10-22 ENCOUNTER — VIRTUAL VISIT (OUTPATIENT)
Dept: INTERNAL MEDICINE | Facility: CLINIC | Age: 36
End: 2021-10-22
Payer: COMMERCIAL

## 2021-10-22 DIAGNOSIS — Z53.9 ERRONEOUS ENCOUNTER--DISREGARD: Primary | ICD-10-CM

## 2021-10-22 NOTE — PROGRESS NOTES
"Arielle is a 36 year old who is being evaluated via a billable video visit.      How would you like to obtain your AVS? Mail a copy  If the video visit is dropped, the invitation should be resent by: Text to cell phone: [phone]  Will anyone else be joining your video visit? {:272938}  {If patient encounters technical issues they should call [phone] :745606}    Video Start Time: {video visit start/end time for provider to select:152948}    {PROVIDER CHARTING PREFERENCE:484054}    Subjective   Arielle is a 36 year old who presents for the following health issues {ACCOMPANIED BY STATEMENT (Optional):501353}    HPI     {SUPERLIST (Optional):530317}  {additonal problems for provider to add (Optional):444820}    Review of Systems   {ROS COMP (Optional):066434}      Objective           Vitals:  No vitals were obtained today due to virtual visit.    Physical Exam   {video visit exam brief selected:991548::\"GENERAL: Healthy, alert and no distress\",\"EYES: Eyes grossly normal to inspection.  No discharge or erythema, or obvious scleral/conjunctival abnormalities.\",\"RESP: No audible wheeze, cough, or visible cyanosis.  No visible retractions or increased work of breathing.  \",\"SKIN: Visible skin clear. No significant rash, abnormal pigmentation or lesions.\",\"NEURO: Cranial nerves grossly intact.  Mentation and speech appropriate for age.\",\"PSYCH: Mentation appears normal, affect normal/bright, judgement and insight intact, normal speech and appearance well-groomed.\"}    {Diagnostic Test Results (Optional):072823}    {AMBULATORY ATTESTATION (Optional):148275}        Video-Visit Details    Type of service:  Video Visit    Video End Time:{video visit start/end time for provider to select:152948}    Originating Location (pt. Location): {video visit patient location:558055::\"Home\"}    Distant Location (provider location):  [facility]     Platform used for Video Visit: {Virtual Visit " "Platforms:626580::\"Dawn\"}  "

## 2021-10-25 ENCOUNTER — NURSE TRIAGE (OUTPATIENT)
Dept: FAMILY MEDICINE | Facility: CLINIC | Age: 36
End: 2021-10-25

## 2021-10-25 NOTE — TELEPHONE ENCOUNTER
BACKGROUND:   Neck, Shoulder, upper back, and hand pain.     SITUATION:   Patient states that she has pain and tingling that is occurring in her neck, shoulder, upper back and down to her hands. She mentions that the left side is starting to feel weak where she fears eventually she won't be able to move it. Patient is able to lift things. Patient is was teraful and stated her pain was a 10/10. During conversation the patient denied chest pain, difficult breathing, fevers, facial drooping, confusion, or any other symptoms.   She states when she gets a massage on one side the pain will shift to the other side where she didn't have a massage completed.     HOME TREATMENTS:  Tylenol    HEALTH HISTORY:  Neuropathy  DM- TYPE 1     NURSE RECOMMENDATION:   Recommend evaluation.     PATIENT REQUEST:   Wants a steroid to help decrease her symptoms. She asked that of the RN who informed her that I was unable to prescribe.     PLAN:   Patient wants to schedule a visit with a clinic provider. Patient refused being seen at  or ER.          RENETTA LillyN, RN, PHN  [place] [facility]  October 25, 2021    Reason for Disposition    SEVERE pain (e.g., excruciating, unable to do any normal activities)    Additional Information    Negative: Shock suspected (e.g., cold/pale/clammy skin, too weak to stand, low BP, rapid pulse)    Negative: Similar pain previously and it was from 'heart attack'    Negative: Similar pain previously and it was from 'angina' and not relieved by nitroglycerin    Negative: Sounds like a life-threatening emergency to the triager    Negative: Chest pain    Negative: Followed an injury to shoulder    Negative: Difficulty breathing or unusual sweating (e.g., sweating without exertion)    Negative: Pain lasting > 5 minutes and pain also present in chest (Exception: pain is clearly made worse by movement)    Negative: Age > 40 and no obvious cause and pain even when not moving the arm  (Exception: pain is clearly made worse by moving arm or bending neck)    Negative: Red area or streak and fever    Negative: Swollen joint and fever    Negative: Entire arm is swollen    Negative: Patient sounds very sick or weak to the triager    Protocols used: SHOULDER PAIN-A-OH

## 2021-10-26 ENCOUNTER — VIRTUAL VISIT (OUTPATIENT)
Dept: FAMILY MEDICINE | Facility: CLINIC | Age: 36
End: 2021-10-26
Payer: COMMERCIAL

## 2021-10-26 DIAGNOSIS — M54.12 CERVICAL RADICULOPATHY: Primary | ICD-10-CM

## 2021-10-26 DIAGNOSIS — E10.40 TYPE 1 DIABETES MELLITUS WITH DIABETIC NEUROPATHY (H): ICD-10-CM

## 2021-10-26 PROCEDURE — 99214 OFFICE O/P EST MOD 30 MIN: CPT | Mod: 95 | Performed by: NURSE PRACTITIONER

## 2021-10-26 RX ORDER — GABAPENTIN 100 MG/1
100 CAPSULE ORAL 2 TIMES DAILY PRN
Qty: 30 CAPSULE | Refills: 0 | Status: SHIPPED | OUTPATIENT
Start: 2021-10-26 | End: 2022-02-08

## 2021-10-26 NOTE — PATIENT INSTRUCTIONS
- Gabapentin x 2 weeks to help with the neuropathy.   - Call and schedule with neurology as discussed.   Call clinic with any questions or concerns.

## 2021-10-26 NOTE — PROGRESS NOTES
"Arielle is a 36 year old who is being evaluated via a billable telephone visit.      What phone number would you like to be contacted at? [phone]  How would you like to obtain your AVS? Mail a copy    Assessment & Plan     Cervical radiculopathy  Type 1 diabetes mellitus with diabetic neuropathy (H)  Worsening neuropathy. Patient has not follow-up with sports medicine as previously instructed for imaging and an in person evaluation from 10/13/2020. Unclear if her neuropathy is solely cervical or due to her diabetes which am unclear about the control of her diabetes.   Patient does reports stable blood sugars 150-200. Am still hesitant to sent a prescription of steroid at this time.   - Discussed a trial of gabapentin for 2 weeks and scheduling a follow-up appointment with neurology.   - Adult Neurology Referral; Future  - gabapentin (NEURONTIN) 100 MG capsule; Take 1 capsule (100 mg) by mouth 2 times daily as needed for neuropathic pain    Ordering of each unique test  Prescription drug management  I spent a total of 30 minutes on the day of the visit.   Time spent doing chart review, history and exam, documentation and further activities per the note       BMI:   Estimated body mass index is 29.63 kg/m  as calculated from the following:    Height as of 10/7/21: 1.575 m (5' 2\").    Weight as of 10/7/21: 73.5 kg (162 lb).     Patient Instructions   - Gabapentin x 2 weeks to help with the neuropathy.   - Call and schedule with neurology as discussed.   Call clinic with any questions or concerns.         No follow-ups on file.    MIKALA Peterson CNP  [facility]    Subjective   Arielle is a 36 year old who presents for the following health issues :     HPI     Neck, shoulder, upper back and hand pain  Patient states that she has neuropathy for sometime now and sometimes the pain is too much and sometimes needs steroids to help with managing this pain.  Patient reports that this " started about 10 yrs ago; and sometimes has no feelings on left hand.   Patient states that she is aware that she needs to control blood sugars while on steroids.  Was seen 9/28/2021 and treated with steroids at the time. Followed up with sports medicine 10/13/2020.   ASSESSMENT & PLAN  34 yo female with cervical radicular pain  Reviewed plan to give her HEP, she has already completed PT in the past and difficulty going to appointments due to   Start mobic and tizanadine  F/u in 3-4 weeks and get cervical xrays and examine in person  Shreyas Dowling MD, [facility]      Diabetes: Reports that her blood sugars ranges between 150-200. Checking BG 3 times or more per day. States that she is currenlty on insulin for her diabetes management. No follow-up with endocrine in records.     Lab Results   Component Value Date    A1C 5.8 07/04/2020    A1C 6.4 06/11/2020    A1C 7.5 01/28/2020    A1C 7.8 01/02/2020    A1C 12.6 09/20/2019         Review of Systems   Constitutional, HEENT, cardiovascular, pulmonary, gi and gu systems are negative, except as otherwise noted.      Objective           Vitals:  No vitals were obtained today due to virtual visit.    Physical Exam   healthy, alert and no distress  PSYCH: Alert and oriented times 3; coherent speech, normal   rate and volume, able to articulate logical thoughts, able   to abstract reason, no tangential thoughts, no hallucinations   or delusions  Her affect is normal  RESP: No cough, no audible wheezing, able to talk in full sentences  Remainder of exam unable to be completed due to telephone visits    Labs Reviewed.       Phone call duration: 10 minutes

## 2021-11-10 DIAGNOSIS — E10.43 TYPE 1 DIABETES MELLITUS WITH DIABETIC AUTONOMIC NEUROPATHY (H): Primary | ICD-10-CM

## 2021-12-17 ENCOUNTER — VIRTUAL VISIT (OUTPATIENT)
Dept: FAMILY MEDICINE | Facility: CLINIC | Age: 36
End: 2021-12-17
Payer: COMMERCIAL

## 2021-12-17 DIAGNOSIS — R05.9 COUGH: ICD-10-CM

## 2021-12-17 DIAGNOSIS — U07.1 INFECTION DUE TO 2019 NOVEL CORONAVIRUS: ICD-10-CM

## 2021-12-17 DIAGNOSIS — J98.01 COLD INDUCED BRONCHOSPASM: ICD-10-CM

## 2021-12-17 PROCEDURE — 99214 OFFICE O/P EST MOD 30 MIN: CPT | Mod: 95 | Performed by: FAMILY MEDICINE

## 2021-12-17 RX ORDER — FLUTICASONE PROPIONATE AND SALMETEROL XINAFOATE 115; 21 UG/1; UG/1
2 AEROSOL, METERED RESPIRATORY (INHALATION) 2 TIMES DAILY
Qty: 8 G | Refills: 0 | Status: SHIPPED | OUTPATIENT
Start: 2021-12-17 | End: 2023-10-18

## 2021-12-17 RX ORDER — BENZONATATE 200 MG/1
200 CAPSULE ORAL 3 TIMES DAILY PRN
Qty: 30 CAPSULE | Refills: 0 | Status: SHIPPED | OUTPATIENT
Start: 2021-12-17 | End: 2022-02-08

## 2021-12-17 RX ORDER — ALBUTEROL SULFATE 90 UG/1
2 AEROSOL, METERED RESPIRATORY (INHALATION) EVERY 6 HOURS PRN
Qty: 18 G | Refills: 0 | Status: SHIPPED | OUTPATIENT
Start: 2021-12-17 | End: 2022-11-03

## 2021-12-17 NOTE — PROGRESS NOTES
Arielle is a 36 year old who is being evaluated via a billable telephone visit.      What phone number would you like to be contacted at? [phone]  How would you like to obtain your AVS? MyChart    Assessment & Plan     Infection due to 2019 novel coronavirus/Cough/Cold induced bronchospasm  - benzonatate (TESSALON) 200 MG capsule  Dispense: 30 capsule; Refill: 0    She is using albuterol frequently, every few hours.  She also says she had to use albuterol with cold exposure or sometimes when she gets a upper respiratory infection .  Discussed she should not be using so frequently, and difference between controller and rescue inhaler.  I'd like her to try Advair - actually looking through her meds it seems she was prescribed this once before - and also have a discussion with her PCP when she is over current illness about ongoing control, if Advair proves helpful this time  - fluticasone-salmeterol (ADVAIR-HFA) 115-21 MCG/ACT inhaler  Dispense: 8 g; Refill: 0  - albuterol (PROAIR HFA/PROVENTIL HFA/VENTOLIN HFA) 108 (90 Base) MCG/ACT inhaler  Dispense: 18 g; Refill: 0      Return if symptoms worsen or fail to improve.    Lanette Arcos MD  [facility]          Subjective   Arielle is a 36 year old who presents for the following health issues     HPI   Arielle's children have Covid19 (at least one tested positive) and she has the same symptoms.    - cough   - no fever   - some shortness of breath    She had Covid-19 before and got a cough medication that was helpful.  She does not feel as sick this time as she did last time  She has also used an inhaler in the pas when she has had bronchitis, but does not find this helpful for very long with her current illness    I queried more about her inhaler use. She says she feels she need to use it seasonally and sometimes when she gets a cold.  She has not been diagnosed with asthma.  She has never used a maintenance inhaler, but finds herself  using albuterol every few hours for cough with current illness          Objective           Vitals:  No vitals were obtained today due to virtual visit.    Physical Exam     PSYCH: Per telephone: Alert and oriented times 3; coherent speech, normal   rate and volume, able to articulate logical thoughts, able   to abstract reason, no tangential thoughts, no hallucinations   or delusions  Her affect sounds normal   RESP: occasional productive cough, no audible wheezing, able to talk in full sentences  Remainder of exam unable to be completed due to telephone visits        Phone call duration: 17 minutes  12:04 - 12:21

## 2022-01-04 ENCOUNTER — VIRTUAL VISIT (OUTPATIENT)
Dept: FAMILY MEDICINE | Facility: CLINIC | Age: 37
End: 2022-01-04
Payer: COMMERCIAL

## 2022-01-04 DIAGNOSIS — R53.83 FEELING TIRED: ICD-10-CM

## 2022-01-04 PROCEDURE — 99442 PR PHYSICIAN TELEPHONE EVALUATION 11-20 MIN: CPT | Performed by: FAMILY MEDICINE

## 2022-01-04 ASSESSMENT — ASTHMA QUESTIONNAIRES
QUESTION_1 LAST FOUR WEEKS HOW MUCH OF THE TIME DID YOUR ASTHMA KEEP YOU FROM GETTING AS MUCH DONE AT WORK, SCHOOL OR AT HOME: NONE OF THE TIME
ACT_TOTALSCORE: 24
QUESTION_2 LAST FOUR WEEKS HOW OFTEN HAVE YOU HAD SHORTNESS OF BREATH: NOT AT ALL
QUESTION_3 LAST FOUR WEEKS HOW OFTEN DID YOUR ASTHMA SYMPTOMS (WHEEZING, COUGHING, SHORTNESS OF BREATH, CHEST TIGHTNESS OR PAIN) WAKE YOU UP AT NIGHT OR EARLIER THAN USUAL IN THE MORNING: NOT AT ALL
QUESTION_4 LAST FOUR WEEKS HOW OFTEN HAVE YOU USED YOUR RESCUE INHALER OR NEBULIZER MEDICATION (SUCH AS ALBUTEROL): NOT AT ALL
QUESTION_5 LAST FOUR WEEKS HOW WOULD YOU RATE YOUR ASTHMA CONTROL: WELL CONTROLLED

## 2022-01-04 NOTE — PATIENT INSTRUCTIONS
So nice to meet you Arielle!    I think it is important for us to get you plugged in to see a primary care physician, Dr Gaxiola is outstanding    I would like you to come in to have blood work drawn a week prior to seeing her  We will check your thyroid  We will check some vitamin levels  We will check your iron levels  We will check your blood counts    I will also start you on fluoxetine which is more of an activating antidepressant medication  20 mg 1 daily in the morning  For to work well you need to take it every day    Ensure you are getting rest, drinking plenty of water and working with your endocrinologist on your blood sugars    I have also put in a referral for psychology, so you can have a therapist as a support person as well.    Sandrita

## 2022-01-04 NOTE — ASSESSMENT & PLAN NOTE
"Hard to do things   Lose things a lot  Cleaning   Cooking   Taking a shower   Brushing teeth     How long?    After Baby July 3. 2021   At few months \"emotional\"  Medication 2 medication \"zoloft\" and \"energy\"    Single      \"I have no friends\"  \"I do not like to be around\"  \"I have noticed you are so different\"  Not suicidal    Did take Zoloft when I am was younger    Plan   Fluoxetine 20 mg 1 daily   See Dr Gaxiola    TSH  Iron /Ferritin  Vitamin D   Vitamin B12   TPO             "

## 2022-01-04 NOTE — PROGRESS NOTES
"Arielle is a 37 year old who is being evaluated via a billable telephone visit.      What phone number would you like to be contacted at? [phone]  How would you like to obtain your AVS? Mail a copy    Problem List Items Addressed This Visit     Postpartum depression - Primary     Hard to do things   Lose things a lot  Cleaning   Cooking   Taking a shower   Brushing teeth     How long?    After Baby July 3. 2021   At few months \"emotional\"  Medication 2 medication \"zoloft\" and \"energy\"    Single      \"I have no friends\"  \"I do not like to be around\"  \"I have noticed you are so different\"  Not suicidal    Did take Zoloft when I am was younger    Plan   Fluoxetine 20 mg 1 daily   See Dr Gaxiola    TSH  Iron /Ferritin  Vitamin D   Vitamin B12   TPO                    Relevant Medications    FLUoxetine (PROZAC) 20 MG capsule    Other Relevant Orders    Adult Mental Health Referral    TSH with free T4 reflex    Thyroid peroxidase antibody    Vitamin B12    Vitamin D Deficiency    Iron and iron binding capacity    Ferritin      Other Visit Diagnoses     Feeling tired        Relevant Medications    FLUoxetine (PROZAC) 20 MG capsule    Other Relevant Orders    TSH with free T4 reflex    Thyroid peroxidase antibody    Vitamin B12    Vitamin D Deficiency    Iron and iron binding capacity    Ferritin    CBC with platelets        Patient Instructions   So nice to meet you Arielle!    I think it is important for us to get you plugged in to see a primary care physician, Dr Gaxiola is outstanding    I would like you to come in to have blood work drawn a week prior to seeing her  We will check your thyroid  We will check some vitamin levels  We will check your iron levels  We will check your blood counts    I will also start you on fluoxetine which is more of an activating antidepressant medication  20 mg 1 daily in the morning  For to work well you need to take it every day    Ensure you are getting rest, drinking plenty of water " and working with your endocrinologist on your blood sugars    I have also put in a referral for psychology, so you can have a therapist as a support person as well.    Sandrita Guzámn is a 37 year old who presents for the following health issues {    HPI       Review of Systems         Objective           Vitals:  No vitals were obtained today due to virtual visit.    Physical Exam           Phone call duration: 17  minutes

## 2022-01-05 ASSESSMENT — ASTHMA QUESTIONNAIRES: ACT_TOTALSCORE: 24

## 2022-01-07 ENCOUNTER — TELEPHONE (OUTPATIENT)
Dept: PSYCHOLOGY | Facility: CLINIC | Age: 37
End: 2022-01-07
Payer: COMMERCIAL

## 2022-01-07 NOTE — TELEPHONE ENCOUNTER
Attempted to connect with Arielle for today's therapy session via text invite to video session and phone call x2. Unable to reach client. Encouraged client to call Behavior Health Intake, [phone] to reschedule appointment if she is still interested in services.     ANKUR Castorena

## 2022-01-09 ENCOUNTER — HEALTH MAINTENANCE LETTER (OUTPATIENT)
Age: 37
End: 2022-01-09

## 2022-01-14 ENCOUNTER — VIRTUAL VISIT (OUTPATIENT)
Dept: PSYCHOLOGY | Facility: CLINIC | Age: 37
End: 2022-01-14
Payer: COMMERCIAL

## 2022-01-14 DIAGNOSIS — F33.1 MDD (MAJOR DEPRESSIVE DISORDER), RECURRENT EPISODE, MODERATE (H): Primary | ICD-10-CM

## 2022-01-14 PROCEDURE — 90834 PSYTX W PT 45 MINUTES: CPT | Mod: 95
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
## 2022-01-20 ASSESSMENT — COLUMBIA-SUICIDE SEVERITY RATING SCALE - C-SSRS
2. HAVE YOU ACTUALLY HAD ANY THOUGHTS OF KILLING YOURSELF LIFETIME?: NO
2. HAVE YOU ACTUALLY HAD ANY THOUGHTS OF KILLING YOURSELF?: NO
1. IN THE PAST MONTH, HAVE YOU WISHED YOU WERE DEAD OR WISHED YOU COULD GO TO SLEEP AND NOT WAKE UP?: NO
1. IN THE PAST MONTH, HAVE YOU WISHED YOU WERE DEAD OR WISHED YOU COULD GO TO SLEEP AND NOT WAKE UP?: NO
4. HAVE YOU HAD THESE THOUGHTS AND HAD SOME INTENTION OF ACTING ON THEM?: NO
4. HAVE YOU HAD THESE THOUGHTS AND HAD SOME INTENTION OF ACTING ON THEM?: NO
3. HAVE YOU BEEN THINKING ABOUT HOW YOU MIGHT KILL YOURSELF?: NO
5. HAVE YOU STARTED TO WORK OUT OR WORKED OUT THE DETAILS OF HOW TO KILL YOURSELF? DO YOU INTEND TO CARRY OUT THIS PLAN?: NO
5. HAVE YOU STARTED TO WORK OUT OR WORKED OUT THE DETAILS OF HOW TO KILL YOURSELF? DO YOU INTEND TO CARRY OUT THIS PLAN?: NO

## 2022-01-27 NOTE — TELEPHONE ENCOUNTER
FUTURE VISIT INFORMATION      FUTURE VISIT INFORMATION:    Date: 2/23/2022    Time: 1030am    Location: [facility]  REFERRAL INFORMATION:    Referring provider:  Colleen BACH CNP     Referring providers clinic:  [facility]     Reason for visit/diagnosis  Cervical Radiculopathy     RECORDS REQUESTED FROM:       Clinic name Comments Records Status Imaging Status   Internal HELADIO Valencia-10/26/2021    Dr. Dowling-10/13/2020 Epic N/A

## 2022-02-04 NOTE — PROGRESS NOTES
[facility]  Diabetes Clinic  Janessa Ordoñez PA-C  2022       Chief Complaint:   Diabetes Type 1 with hypoglycemia unawareness    History of Present Illness:   Arielle Montenegro is a 37 year old female  with a history of type 1 diabetes and a heart murmur who presents for a follow-up on her diabetes.   She was diagnosed DM1 at age 6-7, when she became sick while living in [place]. She was diagnosed with diabetes and started on insulin since then.   We have seen her on a very limited basis in recent pregnancy in  which was delivered via  7/3/2020.  She has worked with myself, Shalini Rider and Ros Mina.        Her type 1 diabetes is complicated by profound impairment of hypoglycemia awareness.  She is CGMS dependent.     She has a history of post partum depression and was suffering with this when I last met with her in early .      Interim:  Recently establishing care with PCP and for depression.      Today:  Arielle tells me that is not doing well.  She is so tired.  She missed a recent appointment as her kids school was closed.  Was at bank and passed out and after an hour ambulance called and blood sugar was 20.  She could not believe they left her there for over an hour.  She has been having trouble refilling her Ariane sensors and so she does not have data at this time.  She also has very few test strips.  She does not have Ariane reader as she uses her phone.  Her appetite has been down.  Sometimes she forgets to eat or is busy.  Life seems unorganized with kids schedules changing.  She is down. She knows she has post partum depression,  She does not want to go out or do anything, she anyone.  Hard to care for her kids though motivated to do so. She is having neck and back pain that feels like a tightness and a heaviness. She is seeing PCP and a therapist.  She will return with PCP to se labs but wonders about her thyroid today.  She does not want Dexcom, wanst to  continue to use Ariane but would love alarms with it    Kids now 9, 7 and 19 months old.  One son with ADHD and obese with prediabetes, not type 1.  Other might have autism.        Blood Glucose Monitoring:  Not available today.      DM Meds:  Using Lantus 6 units daily most days  Novolog 2 - 8 units tid most days. She believes most days using about 15 units.      Diabetes monitoring and complications:  CAD: No  Last eye exam results: 6 mo ago, no retinopathy  Microalbuminuria: 94.51 in   Neuropathy: No  HTN: No  On Statin: No  On Aspirin: Yes  Depression: Yes    Review of Systems:   Pertinent items are noted in HPI.  All other systems are negative.    Active Medications:      albuterol (PROAIR HFA/PROVENTIL HFA/VENTOLIN HFA) 108 (90 Base) MCG/ACT inhaler, Inhale 2 puffs into the lungs every 6 hours, Disp: 1 Inhaler, Rfl: 3     aspirin (ASA) 81 MG EC tablet, Take 1 tablet (81 mg) by mouth daily, Disp: 100 tablet, Rfl: 3     BASAGLAR 100 UNIT/ML injection, Inject 60 Units Subcutaneous daily, Disp: 15 mL, Rfl: 0     fluticasone-salmeterol (ADVAIR) 100-50 MCG/DOSE inhaler, Inhale 1 puff into the lungs every 12 hours, Disp: 1 Inhaler, Rfl: 3     insulin aspart (NOVOLOG FLEXPEN) 100 UNIT/ML pen, Inject 5 Units Subcutaneous, Disp: , Rfl:      insulin aspart (NOVOLOG FLEXPEN) 100 UNIT/ML pen, Take 2 units per carb with each meal plus corrections 1:50 > 150. Max is 25 units daily., Disp: 3 mL, Rfl: 0     insulin glargine (LANTUS SOLOSTAR) 100 UNIT/ML pen, Inject 20 Units Subcutaneous, Disp: , Rfl:      Prenatal Vit-Fe Fumarate-FA (PRENATAL MULTIVITAMIN W/IRON) 27-0.8 MG tablet, Take 1 tablet by mouth daily, Disp: 90 tablet, Rfl: 1      Allergies:   Patient has no known allergies.      Past Medical History:  Blindness of right eye  Type 1 diabetes  Hypoglycemia unawareness  Vitamin D deficiency  Anxiety     Past Surgical History:   section - , 2015  Enucleation right -     Family History:   Diabetes -  "paternal side of family       Social History:     Kids now 9, 7 and 19 months old.  One son with ADHD and obese with prediabetes, not type 1.  Other might have autism.       Physical Exam:   There were no vitals taken for this visit.     Wt Readings from Last 10 Encounters:   10/07/21 73.5 kg (162 lb)   08/12/21 73.5 kg (162 lb)   07/05/20 84.5 kg (186 lb 3.2 oz)   06/22/20 83.1 kg (183 lb 3.2 oz)   06/11/20 80.5 kg (177 lb 6.4 oz)   06/04/20 77.7 kg (171 lb 4.8 oz)   05/21/20 75.8 kg (167 lb)   05/06/20 74.5 kg (164 lb 3.2 oz)   03/12/20 70.2 kg (154 lb 12.8 oz)   03/09/20 71 kg (156 lb 8 oz)      Video VISIT    Arielle is alerted and oriented.  Skin good color.    Mood is \"good,\" affect is congruent.  Thoughtful form and content are fluid and coherent.  Voice is clear.  No cough or dyspnea appreciated.        Data:  Lab Results   Component Value Date     (L) 08/12/2021    POTASSIUM 3.8 08/12/2021    CHLORIDE 100 08/12/2021    CO2 25 08/12/2021    ANIONGAP 7 08/12/2021     (H) 08/12/2021    BUN 12 08/12/2021    CR 1.13 (H) 08/12/2021    ALEXANDRO 8.5 08/12/2021     Lab Results   Component Value Date    GFRESTIMATED 63 08/12/2021    GFRESTIMATED 82 07/05/2020    GFRESTIMATED 85 07/04/2020    GFRESTBLACK >90 07/05/2020    GFRESTBLACK >90 07/04/2020    GFRESTBLACK 87 07/03/2020      Lab Results   Component Value Date    MICROL 172 09/30/2014    UMALCR 94.51 (H) 09/30/2014        Lab Results   Component Value Date    A1C 5.8 (H) 07/04/2020    A1C 6.4 (H) 06/11/2020    A1C 7.5 (H) 01/28/2020    A1C 7.8 (H) 01/02/2020    A1C 12.6 (H) 09/20/2019     Lab Results   Component Value Date    CPEPT <0.1 (L) 09/30/2014       Lab Results   Component Value Date    CHOL 181 09/20/2019    CHOL 147.6 03/11/2019    TRIG 45 09/20/2019    TRIG 115.8 03/11/2019    HDL 71 09/20/2019    HDL 52.4 03/11/2019     (H) 09/20/2019    LDL 72 03/11/2019    NHDL 110 09/20/2019    NHDL 129 06/14/2018     TSH   Date Value Ref Range Status "   05/30/2019 0.877 0.358 - 3.740 UIU/mL Final       Assessment and Plan:  Type 1 diabetes mellitus with hypoglycemia and without coma (H)    Arielle is a single mother of 3 who has type 1 diabetes, is suffering with depression  and recent loss of conscientiousness due to hypoglycemia after struggling to get and use her Ariane sensors.       - Wants to stay with same pharmacy.  Updated rxs sent including for new glucometer and strips.   - Will update to Ariane 2 with alarms and meet with educator. She agrees needs ongoing support to manage her disease.    - Urged to schedule with PCP in person and come in for her labs.    - Encouraged in her work to establish care for her ongoing depression.          Follow-up: With CDE next available. Self  1-3 months.      46 minutes spent on the date of the encounter doing chart review, history and exam, documentation, education and counseling, as well as communication and coordination of care, and further activities as noted above.  This time excludes time spent reviewing CGM.      It is my privilege to be involved in the care of the above patient.     Janessa Ordoñez PA-C, MPAS  [facility]  Diabetes, Endocrinology, and Metabolism  [phone] Appointments/Nurse  [phone] Fax  [phone] pager  [phone] nurse line

## 2022-02-08 ENCOUNTER — VIRTUAL VISIT (OUTPATIENT)
Dept: ENDOCRINOLOGY | Facility: CLINIC | Age: 37
End: 2022-02-08
Payer: COMMERCIAL

## 2022-02-08 DIAGNOSIS — E10.65 TYPE 1 DIABETES MELLITUS WITH HYPERGLYCEMIA (H): ICD-10-CM

## 2022-02-08 DIAGNOSIS — E10.649 TYPE 1 DIABETES MELLITUS WITH HYPOGLYCEMIA AND WITHOUT COMA (H): ICD-10-CM

## 2022-02-08 DIAGNOSIS — M54.12 CERVICAL RADICULOPATHY: ICD-10-CM

## 2022-02-08 DIAGNOSIS — E10.40 TYPE 1 DIABETES MELLITUS WITH DIABETIC NEUROPATHY (H): ICD-10-CM

## 2022-02-08 DIAGNOSIS — F32.5 DEPRESSION, MAJOR, IN REMISSION (H): ICD-10-CM

## 2022-02-08 PROCEDURE — 99215 OFFICE O/P EST HI 40 MIN: CPT | Mod: 95 | Performed by: PHYSICIAN ASSISTANT

## 2022-02-08 RX ORDER — GABAPENTIN 100 MG/1
100 CAPSULE ORAL 2 TIMES DAILY PRN
Qty: 30 CAPSULE | Refills: 0 | Status: SHIPPED | OUTPATIENT
Start: 2022-02-08 | End: 2023-06-27

## 2022-02-08 RX ORDER — INSULIN GLARGINE 100 [IU]/ML
10 INJECTION, SOLUTION SUBCUTANEOUS AT BEDTIME
Qty: 15 ML | Refills: 2 | Status: SHIPPED | OUTPATIENT
Start: 2022-02-08 | End: 2022-02-09

## 2022-02-08 RX ORDER — INSULIN ASPART 100 [IU]/ML
INJECTION, SOLUTION INTRAVENOUS; SUBCUTANEOUS
Qty: 15 ML | Refills: 1 | Status: SHIPPED | OUTPATIENT
Start: 2022-02-08 | End: 2022-08-10

## 2022-02-08 NOTE — LETTER
2022       RE: Arielle Montenegro  [address]  [place] [address]     Dear Colleague,    Thank you for referring your patient, Arielle Montenegro, to the [facility] ENDOCR[facility] [place] [facility]. Please see a copy of my visit note below.        [facility]  Diabetes Clinic  Janessa Ordoñez PA-C  2022       Chief Complaint:   Diabetes Type 1 with hypoglycemia unawareness    History of Present Illness:   Arielle Montenegro is a 37 year old female  with a history of type 1 diabetes and a heart murmur who presents for a follow-up on her diabetes.   She was diagnosed DM1 at age 6-7, when she became sick while living in [place]. She was diagnosed with diabetes and started on insulin since then.   We have seen her on a very limited basis in recent pregnancy in  which was delivered via  7/3/2020.  She has worked with myself, Shalini Rider and Ros Mina.        Her type 1 diabetes is complicated by profound impairment of hypoglycemia awareness.  She is CGMS dependent.     She has a history of post partum depression and was suffering with this when I last met with her in early .      Interim:  Recently establishing care with PCP and for depression.      Today:  Arielle tells me that is not doing well.  She is so tired.  She missed a recent appointment as her kids school was closed.  Was at bank and passed out and after an hour ambulance called and blood sugar was 20.  She could not believe they left her there for over an hour.  She has been having trouble refilling her Ariane sensors and so she does not have data at this time.  She also has very few test strips.  She does not have Ariane reader as she uses her phone.  Her appetite has been down.  Sometimes she forgets to eat or is busy.  Life seems unorganized with kids schedules changing.  She is down. She knows she has post partum depression,   She does not want to go out or do anything, she anyone.  Hard to care for her kids though motivated to do so. She is having neck and back pain that feels like a tightness and a heaviness. She is seeing PCP and a therapist.  She will return with PCP to se labs but wonders about her thyroid today.  She does not want Dexcom, wanst to continue to use Ariane but would love alarms with it    Kids now 9, 7 and 19 months old.  One son with ADHD and obese with prediabetes, not type 1.  Other might have autism.        Blood Glucose Monitoring:  Not available today.      DM Meds:  Using Lantus 6 units daily most days  Novolog 2 - 8 units tid most days. She believes most days using about 15 units.      Diabetes monitoring and complications:  CAD: No  Last eye exam results: 6 mo ago, no retinopathy  Microalbuminuria: 94.51 in 2014  Neuropathy: No  HTN: No  On Statin: No  On Aspirin: Yes  Depression: Yes    Review of Systems:   Pertinent items are noted in HPI.  All other systems are negative.    Active Medications:      albuterol (PROAIR HFA/PROVENTIL HFA/VENTOLIN HFA) 108 (90 Base) MCG/ACT inhaler, Inhale 2 puffs into the lungs every 6 hours, Disp: 1 Inhaler, Rfl: 3     aspirin (ASA) 81 MG EC tablet, Take 1 tablet (81 mg) by mouth daily, Disp: 100 tablet, Rfl: 3     BASAGLAR 100 UNIT/ML injection, Inject 60 Units Subcutaneous daily, Disp: 15 mL, Rfl: 0     fluticasone-salmeterol (ADVAIR) 100-50 MCG/DOSE inhaler, Inhale 1 puff into the lungs every 12 hours, Disp: 1 Inhaler, Rfl: 3     insulin aspart (NOVOLOG FLEXPEN) 100 UNIT/ML pen, Inject 5 Units Subcutaneous, Disp: , Rfl:      insulin aspart (NOVOLOG FLEXPEN) 100 UNIT/ML pen, Take 2 units per carb with each meal plus corrections 1:50 > 150. Max is 25 units daily., Disp: 3 mL, Rfl: 0     insulin glargine (LANTUS SOLOSTAR) 100 UNIT/ML pen, Inject 20 Units Subcutaneous, Disp: , Rfl:      Prenatal Vit-Fe Fumarate-FA (PRENATAL MULTIVITAMIN W/IRON) 27-0.8 MG tablet, Take 1 tablet by  "mouth daily, Disp: 90 tablet, Rfl: 1      Allergies:   Patient has no known allergies.      Past Medical History:  Blindness of right eye  Type 1 diabetes  Hypoglycemia unawareness  Vitamin D deficiency  Anxiety     Past Surgical History:   section - , 2015  Enucleation right - 2015    Family History:   Diabetes - paternal side of family       Social History:     Kids now 9, 7 and 19 months old.  One son with ADHD and obese with prediabetes, not type 1.  Other might have autism.       Physical Exam:   There were no vitals taken for this visit.     Wt Readings from Last 10 Encounters:   10/07/21 73.5 kg (162 lb)   21 73.5 kg (162 lb)   20 84.5 kg (186 lb 3.2 oz)   20 83.1 kg (183 lb 3.2 oz)   20 80.5 kg (177 lb 6.4 oz)   20 77.7 kg (171 lb 4.8 oz)   20 75.8 kg (167 lb)   20 74.5 kg (164 lb 3.2 oz)   20 70.2 kg (154 lb 12.8 oz)   20 71 kg (156 lb 8 oz)      Video VISIT    Arielle is alerted and oriented.  Skin good color.    Mood is \"good,\" affect is congruent.  Thoughtful form and content are fluid and coherent.  Voice is clear.  No cough or dyspnea appreciated.        Data:  Lab Results   Component Value Date     (L) 2021    POTASSIUM 3.8 2021    CHLORIDE 100 2021    CO2 25 2021    ANIONGAP 7 2021     (H) 2021    BUN 12 2021    CR 1.13 (H) 2021    ALEXANDRO 8.5 2021     Lab Results   Component Value Date    GFRESTIMATED 63 2021    GFRESTIMATED 82 2020    GFRESTIMATED 85 2020    GFRESTBLACK >90 2020    GFRESTBLACK >90 2020    GFRESTBLACK 87 2020      Lab Results   Component Value Date    MICROL 172 2014    UMALCR 94.51 (H) 2014        Lab Results   Component Value Date    A1C 5.8 (H) 2020    A1C 6.4 (H) 2020    A1C 7.5 (H) 2020    A1C 7.8 (H) 2020    A1C 12.6 (H) 2019     Lab Results   Component Value Date    CPEPT " <0.1 (L) 09/30/2014       Lab Results   Component Value Date    CHOL 181 09/20/2019    CHOL 147.6 03/11/2019    TRIG 45 09/20/2019    TRIG 115.8 03/11/2019    HDL 71 09/20/2019    HDL 52.4 03/11/2019     (H) 09/20/2019    LDL 72 03/11/2019    NHDL 110 09/20/2019    NHDL 129 06/14/2018     TSH   Date Value Ref Range Status   05/30/2019 0.877 0.358 - 3.740 UIU/mL Final       Assessment and Plan:  Type 1 diabetes mellitus with hypoglycemia and without coma (H)    Arielle is a single mother of 3 who has type 1 diabetes, is suffering with depression  and recent loss of conscientiousness due to hypoglycemia after struggling to get and use her Ariane sensors.       - Wants to stay with same pharmacy.  Updated rxs sent including for new glucometer and strips.   - Will update to Ariane 2 with alarms and meet with educator. She agrees needs ongoing support to manage her disease.    - Urged to schedule with PCP in person and come in for her labs.    - Encouraged in her work to establish care for her ongoing depression.          Follow-up: With CDE next available. Self  1-3 months.      46 minutes spent on the date of the encounter doing chart review, history and exam, documentation, education and counseling, as well as communication and coordination of care, and further activities as noted above.  This time excludes time spent reviewing CGM.      It is my privilege to be involved in the care of the above patient.     Janessa Ordoñez PA-C, MPAS  [facility]  Diabetes, Endocrinology, and Metabolism  [phone] Appointments/Nurse  [phone] Fax  [phone] pager  [phone] nurse line     Arielle is a 37 year old who is being evaluated via a billable video visit.      How would you like to obtain your AVS? MyChart  If the video visit is dropped, the invitation should be resent by: Text to cell phone: [phone]  Will anyone else be joining your video visit? No

## 2022-02-08 NOTE — PROGRESS NOTES
Arielle is a 37 year old who is being evaluated via a billable video visit.      How would you like to obtain your AVS? MyChart  If the video visit is dropped, the invitation should be resent by: Text to cell phone: [phone]  Will anyone else be joining your video visit? No

## 2022-02-08 NOTE — PATIENT INSTRUCTIONS
Dear Arielle,  It is good to talk again.  Please see Ros to start using Ariane 2 with alarms.   I will ask her about getting medical alert bracelet.  Please send Ariane report before our next visit.  Please see primary care provider about you low energy and weight.  It is also good to see nutrition about weight and to assure you are doing all you can to give right mount of insulin for your meals.    We can talk more about that more as well.  It is my privilege to be involved in the care of the above patient.     Janessa Ordoñez PA-C, MPAS  [facility]  Diabetes, Endocrinology, and Metabolism  [phone] Appointments/Nurse  [phone] Fax  [phone] pager  [phone] nurse line

## 2022-02-09 RX ORDER — INSULIN GLARGINE 100 [IU]/ML
6 INJECTION, SOLUTION SUBCUTANEOUS AT BEDTIME
Qty: 15 ML | Refills: 2 | Status: SHIPPED | OUTPATIENT
Start: 2022-02-09 | End: 2022-10-14

## 2022-02-23 ENCOUNTER — PRE VISIT (OUTPATIENT)
Dept: NEUROLOGY | Facility: CLINIC | Age: 37
End: 2022-02-23

## 2022-04-08 ENCOUNTER — VIRTUAL VISIT (OUTPATIENT)
Dept: FAMILY MEDICINE | Facility: CLINIC | Age: 37
End: 2022-04-08
Payer: COMMERCIAL

## 2022-04-08 DIAGNOSIS — G47.00 PERSISTENT INSOMNIA: ICD-10-CM

## 2022-04-08 DIAGNOSIS — Z13.220 SCREENING FOR HYPERLIPIDEMIA: ICD-10-CM

## 2022-04-08 DIAGNOSIS — E10.40 TYPE 1 DIABETES MELLITUS WITH DIABETIC NEUROPATHY (H): Primary | ICD-10-CM

## 2022-04-08 DIAGNOSIS — R53.83 OTHER FATIGUE: ICD-10-CM

## 2022-04-08 PROCEDURE — 99213 OFFICE O/P EST LOW 20 MIN: CPT | Mod: 95 | Performed by: FAMILY MEDICINE

## 2022-04-08 RX ORDER — TRAZODONE HYDROCHLORIDE 50 MG/1
50 TABLET, FILM COATED ORAL AT BEDTIME
Qty: 30 TABLET | Refills: 1 | Status: SHIPPED | OUTPATIENT
Start: 2022-04-08 | End: 2023-06-27

## 2022-04-08 NOTE — PROGRESS NOTES
Arielle is a 37 year old who is being evaluated via a billable telephone visit.      What phone number would you like to be contacted at? [phone]  How would you like to obtain your AVS? MyChart    Assessment & Plan     Screening for hyperlipidemia    - Lipid panel reflex to direct LDL Fasting; Future    Type 1 diabetes mellitus with diabetic neuropathy (H)    Patient is not been checking her blood sugars regularly she is states that it is too hard to work with her new glucose monitoring system she is requesting a latoya to work with checking her sugars again.  She has access to her insulin.  - Continuous Blood Gluc Sensor (FREESTYLE LATOYA 2 SENSOR) MISC; 1 applicator daily    Persistent insomnia  Difficulty falling asleep she states it takes her 2 to 3 hours to try to fall asleep and then she is tired for the rest of the day because she is having naps in the daytime.  She has had a problem before of insomnia we will try trazodone side effects of medication discussed.  - traZODone (DESYREL) 50 MG tablet; Take 1 tablet (50 mg) by mouth At Bedtime    Post partum depression    Patient says she was diagnosed with postpartum depression recently had her fluoxetine refilled denies being suicidal denies any crying spells.    Other fatigue    Patient's been fatigued she does not know if this is due to her sleep issues or and other issues she would like some lab tests done she was seen by endocrinology but she could not make the appointment for the lab visit because of her child's illness.  - Hepatitis C Screen Reflex to HCV RNA Quant and Genotype; Future  - CBC with platelets; Future  - Comprehensive metabolic panel (BMP + Alb, Alk Phos, ALT, AST, Total. Bili, TP); Future  - TSH with free T4 reflex; Future    0956}     BMI:       No follow-ups on file.    Pop Jang MD  [facility]    Josr Guzmán is a 37 year old who presents for the following health issues follow-up depression diabetes  questions regarding sleep and fatigue      HPI   37-year-old female who is single parent and has history of type 1 diabetes and depression.  States she is very tired says her appetite is normal she has been checking her sugars however she feels that she is not got enough energy sleep is also a problem.  She says she has had problems in the past with not being able to sleep.  She denies any chest pain shortness of breath fever chills or headache.  She did see endocrinology virtually and they suggested that she have several tests but she missed the lab appointment.        Review of Systems   Constitutional, HEENT, cardiovascular, pulmonary, gi and gu systems are negative, except as otherwise noted.      Objective           Vitals:  No vitals were obtained today due to virtual visit.    Physical Exam   healthy, alert and no distress  PSYCH: Alert and oriented times 3; coherent speech, normal   rate and volume, able to articulate logical thoughts, able   to abstract reason, no tangential thoughts, no hallucinations   or delusions  Her affect is normal  RESP: No cough, no audible wheezing, able to talk in full sentences  Remainder of exam unable to be completed due to telephone visits          Phone call duration: 20  minutes

## 2022-04-20 ENCOUNTER — TELEPHONE (OUTPATIENT)
Dept: ENDOCRINOLOGY | Facility: CLINIC | Age: 37
End: 2022-04-20
Payer: COMMERCIAL

## 2022-04-20 DIAGNOSIS — E10.40 TYPE 1 DIABETES MELLITUS WITH DIABETIC NEUROPATHY (H): ICD-10-CM

## 2022-04-20 DIAGNOSIS — E10.649 TYPE 1 DIABETES MELLITUS WITH HYPOGLYCEMIA AND WITHOUT COMA (H): ICD-10-CM

## 2022-04-20 DIAGNOSIS — E10.65 TYPE 1 DIABETES MELLITUS WITH HYPERGLYCEMIA (H): ICD-10-CM

## 2022-04-20 RX ORDER — LANCETS
EACH MISCELLANEOUS
Qty: 360 EACH | Refills: 3 | Status: SHIPPED | OUTPATIENT
Start: 2022-04-20 | End: 2022-06-03

## 2022-04-20 NOTE — TELEPHONE ENCOUNTER
----- Message from Traci Rice sent at 4/20/2022  4:36 PM CDT -----  Regarding: Sensor Issues  Hi!     While I spoke with Arielle about scheduling she mentioned that she cannot use her sensor properly and therefore hasn't checked her levels since her last visit in February. I believe it is the Ariane she is struggling to use and she hoped to get her old prescription back. Can you assist her?     Thank you!    Traci

## 2022-04-20 NOTE — TELEPHONE ENCOUNTER
New meter/ strips/lancets sent to pharmacy testing 4 times daily with whatever is covered . Freestyle latoya is not working for her Sunshine Jaime RN on 4/20/2022 at 5:00 PM

## 2022-04-27 DIAGNOSIS — E10.649 TYPE 1 DIABETES MELLITUS WITH HYPOGLYCEMIA AND WITHOUT COMA (H): ICD-10-CM

## 2022-04-29 RX ORDER — FLASH GLUCOSE SCANNING READER
1 EACH MISCELLANEOUS CONTINUOUS
Qty: 1 EACH | Refills: 0 | Status: SHIPPED | OUTPATIENT
Start: 2022-04-29 | End: 2022-10-18

## 2022-04-29 RX ORDER — FLASH GLUCOSE SENSOR
KIT MISCELLANEOUS
Qty: 2 EACH | Refills: 4 | OUTPATIENT
Start: 2022-04-29

## 2022-05-17 ENCOUNTER — VIRTUAL VISIT (OUTPATIENT)
Dept: FAMILY MEDICINE | Facility: CLINIC | Age: 37
End: 2022-05-17
Payer: COMMERCIAL

## 2022-05-17 DIAGNOSIS — H92.02 LEFT EAR PAIN: Primary | ICD-10-CM

## 2022-05-17 DIAGNOSIS — H93.8X2 EAR SWELLING, LEFT: ICD-10-CM

## 2022-05-17 DIAGNOSIS — M62.81 GENERALIZED MUSCLE WEAKNESS: ICD-10-CM

## 2022-05-17 PROCEDURE — 99214 OFFICE O/P EST MOD 30 MIN: CPT | Mod: 95 | Performed by: INTERNAL MEDICINE

## 2022-05-17 NOTE — PROGRESS NOTES
"Arielle is a 37 year old who is being evaluated via a billable video visit.      How would you like to obtain your AVS? MyChart  If the video visit is dropped, the invitation should be resent by: Text to cell phone: [phone]  Will anyone else be joining your video visit? No      Video Start Time: 5:08 PM    Assessment & Plan   Problem List Items Addressed This Visit    None     Visit Diagnoses     Left ear pain    -  Primary    Relevant Medications    amoxicillin-clavulanate (AUGMENTIN) 875-125 MG tablet    Ear swelling, left        Relevant Medications    amoxicillin-clavulanate (AUGMENTIN) 875-125 MG tablet    Generalized muscle weakness             Secondary skin infection due to piercing left earlobe swelling and some pain with some soft tissue swelling in the preauricular area.  Advised patient this is a skin infection from piercing, giving her that she is diabetic is high risk and will need to start immediately on antibiotics, if further pain swelling redness of the area in the preauricular area/Left face, left neck area; she then needs to go to the ER for IV antibiotics.  Patient declined to go to the ER today ; although I did recommend today.  Started Augmentin twice a day for 10 days, patient to update us on her clinical status in the next 24 to 48 hours, again advised if any worsening symptoms go immediately to the ER.  Patient stated if she is not better she will go on Thursday to the ER.  Patient is concerned she reports she has lost her right eye due to an infection in the past.         BMI:   Estimated body mass index is 29.63 kg/m  as calculated from the following:    Height as of 10/7/21: 1.575 m (5' 2\").    Weight as of 10/7/21: 73.5 kg (162 lb).       Work on weight loss  Regular exercise  See Patient Instructions    No follow-ups on file.    Estefanía Rick MD  [facility]    Subjective   Arielle is a 37 year old who presents for the following health issues   HPI     Ear tierney " hurting much, because, last week had ear piercing    Pain is hurting for last 2 to 3 days    Feels week,     Hurt on side of face and left side of her head    Has infection in her left eye, was 2007        Review of Systems   Constitutional, HEENT, cardiovascular, pulmonary, gi and gu systems are negative, except as otherwise noted.      Objective           Vitals:  No vitals were obtained today due to virtual visit.    Physical Exam   GENERAL: Healthy, alert and no distress  EYES: Eyes grossly normal to inspection.  No discharge or erythema, or obvious scleral/conjunctival abnormalities.  Left ear: Minimal swelling of the left ear tragus, I could not appreciate any redness, there is a piercing in the middle/inside of left ear, and multiple piercings on outer part  of the earlobe I could not appreciate any facial swelling on the left side in the preauricular area or facial redness.  RESP: No audible wheeze, cough, or visible cyanosis.  No visible retractions or increased work of breathing.    SKIN: Visible skin clear. No significant rash, abnormal pigmentation or lesions.  NEURO: Cranial nerves grossly intact.  Mentation and speech appropriate for age.  PSYCH: Mentation appears normal, affect normal/bright, judgement and insight intact, normal speech and appearance well-groomed.    Lab on 10/02/2021   Component Date Value Ref Range Status     SARS CoV2 PCR 10/02/2021 Positive (A) Negative Final    POSITIVE: SARS-CoV-2 (COVID-19) RNA detected, presumed positive.               Video-Visit Details    Type of service:  Video Visit    Video End Time:5:15 PM    Originating Location (pt. Location): Home    Distant Location (provider location):  [facility]     Platform used for Video Visit: Glenys

## 2022-05-27 ENCOUNTER — VIRTUAL VISIT (OUTPATIENT)
Dept: FAMILY MEDICINE | Facility: CLINIC | Age: 37
End: 2022-05-27
Payer: COMMERCIAL

## 2022-05-27 DIAGNOSIS — L20.81 ATOPIC NEURODERMATITIS: ICD-10-CM

## 2022-05-27 DIAGNOSIS — R21 RASH AND NONSPECIFIC SKIN ERUPTION: Primary | ICD-10-CM

## 2022-05-27 PROCEDURE — 99213 OFFICE O/P EST LOW 20 MIN: CPT | Mod: 95 | Performed by: INTERNAL MEDICINE

## 2022-05-27 RX ORDER — MOMETASONE FUROATE 1 MG/G
CREAM TOPICAL DAILY
Qty: 45 G | Refills: 0 | Status: SHIPPED | OUTPATIENT
Start: 2022-05-27 | End: 2022-07-12 | Stop reason: ALTCHOICE

## 2022-06-03 ENCOUNTER — VIRTUAL VISIT (OUTPATIENT)
Dept: ENDOCRINOLOGY | Facility: CLINIC | Age: 37
End: 2022-06-03
Payer: COMMERCIAL

## 2022-06-03 ENCOUNTER — VIRTUAL VISIT (OUTPATIENT)
Dept: FAMILY MEDICINE | Facility: CLINIC | Age: 37
End: 2022-06-03
Payer: COMMERCIAL

## 2022-06-03 VITALS — WEIGHT: 170 LBS | HEIGHT: 62 IN | BODY MASS INDEX: 31.28 KG/M2

## 2022-06-03 DIAGNOSIS — E66.09 CLASS 1 OBESITY DUE TO EXCESS CALORIES WITHOUT SERIOUS COMORBIDITY WITH BODY MASS INDEX (BMI) OF 31.0 TO 31.9 IN ADULT: Primary | ICD-10-CM

## 2022-06-03 DIAGNOSIS — E66.9 OBESITY: ICD-10-CM

## 2022-06-03 DIAGNOSIS — E10.40 TYPE 1 DIABETES MELLITUS WITH DIABETIC NEUROPATHY (H): ICD-10-CM

## 2022-06-03 DIAGNOSIS — E10.40 TYPE 1 DIABETES MELLITUS WITH DIABETIC NEUROPATHY (H): Primary | ICD-10-CM

## 2022-06-03 DIAGNOSIS — E66.811 CLASS 1 OBESITY DUE TO EXCESS CALORIES WITHOUT SERIOUS COMORBIDITY WITH BODY MASS INDEX (BMI) OF 31.0 TO 31.9 IN ADULT: Primary | ICD-10-CM

## 2022-06-03 DIAGNOSIS — Z71.3 NUTRITIONAL COUNSELING: Primary | ICD-10-CM

## 2022-06-03 PROCEDURE — 97802 MEDICAL NUTRITION INDIV IN: CPT | Mod: 95 | Performed by: DIETITIAN, REGISTERED

## 2022-06-03 PROCEDURE — 99213 OFFICE O/P EST LOW 20 MIN: CPT | Mod: 95 | Performed by: PHYSICIAN ASSISTANT

## 2022-06-03 PROCEDURE — 99215 OFFICE O/P EST HI 40 MIN: CPT | Mod: 95 | Performed by: INTERNAL MEDICINE

## 2022-06-03 RX ORDER — FLASH GLUCOSE SENSOR
KIT MISCELLANEOUS
Qty: 2 EACH | Refills: 11 | Status: SHIPPED | OUTPATIENT
Start: 2022-06-03 | End: 2022-10-18 | Stop reason: ALTCHOICE

## 2022-06-03 RX ORDER — LIRAGLUTIDE 6 MG/ML
INJECTION SUBCUTANEOUS
Qty: 6 ML | Refills: 3 | Status: SHIPPED | OUTPATIENT
Start: 2022-06-03 | End: 2023-01-22

## 2022-06-03 ASSESSMENT — PATIENT HEALTH QUESTIONNAIRE - PHQ9
10. IF YOU CHECKED OFF ANY PROBLEMS, HOW DIFFICULT HAVE THESE PROBLEMS MADE IT FOR YOU TO DO YOUR WORK, TAKE CARE OF THINGS AT HOME, OR GET ALONG WITH OTHER PEOPLE: NOT DIFFICULT AT ALL
SUM OF ALL RESPONSES TO PHQ QUESTIONS 1-9: 0
SUM OF ALL RESPONSES TO PHQ QUESTIONS 1-9: 0

## 2022-06-03 ASSESSMENT — PAIN SCALES - GENERAL: PAINLEVEL: NO PAIN (0)

## 2022-06-03 NOTE — LETTER
Date:June 6, 2022      Patient was self referred, no letter generated. Do not send.        [facility] Health Information

## 2022-06-03 NOTE — PROGRESS NOTES
"Arielle Montenegro is a 37 year old female who is being evaluated via a billable telephone visit.     The patient has been notified of following:     \"This telephone visit will be conducted via a call between you and your physician/provider. We have found that certain health care needs can be provided without the need for a physical exam.  This service lets us provide the care you need with a short phone conversation.  If a prescription is necessary we can send it directly to your pharmacy.  If lab work is needed we can place an order for that and you can then stop by our lab to have the test done at a later time.    Telephone visits are billed at different rates depending on your insurance coverage. During this emergency period, for some insurers they may be billed the same as an in-person visit.  Please reach out to your insurance provider with any questions.    If during the course of the call the physician/provider feels a telephone visit is not appropriate, you will not be charged for this service.\"    Patient has given verbal consent for Telephone visit?  Yes    How would you like to obtain your AVS? Stalinhart    Phone call duration: 21 minutes    During this virtual visit the patient is located in [place], patient verifies this as the location during the entirety of this visit.       New Weight Management Nutrition Consultation    Arielle Montenegro is a 37 year old female presents today for new weight management nutrition consultation.  Patient referred by Dr. Wasserman on Ivy 3, 2022.    Patient with Co-morbidities of obesity including:  Type II DM no  Renal Failure no  Sleep apnea no  Hypertension no   Dyslipidemia no  Joint pain yes  Back pain no  GERD no     PMH significant for type 1 DM.    Anthropometrics:  Estimated body mass index is 29.63 kg/m  as calculated from the following:    Height as of 10/7/21: 1.575 m (5' 2\").    Weight as of 10/7/21: 73.5 kg (162 lb).       Medications for Weight Loss:  None " currently. May start Niranjan with MD.    NUTRITION HISTORY  Food allergies: None  Food intolerances: None.  Does not eat pork  Previous methods of diet modification for weight loss: prior to last pregnancy, was able to lose from 150 lbs - >120 lbs, ate smaller portion, ate more veggies, smoothies and exercised more. Notes that she has to be in the right mindset to work on weight loss, and feels very motivated right now.   She has met with a dietitian in the past for mgmt of her diabetes.     Currently, she only eats once daily. Doesn't like to eat at home, prefers take-out. Enjoys - gyros. Often asks for just meat and veggies when dining out. States she does not feel well when eating high-carb foods (rice, pasta, bread). Enjoys a lot of fruit and veggies. Does not drink juice. Will make smoothies occasionally.     Nutrition Prescription  Recommended energy/nutrient modification.    Nutrition Diagnosis  Obesity r/t long history of positive energy balance aeb BMI >30.    Nutrition Intervention  Materials/education provided on hypocaloric diet for weight loss. Discussed Volumetric eating to help satiety level on fewer calories; portion control and healthy food choices (Plate Method and Volumetrics handouts).  Discussed importance of meeting protein needs, how to meet protein needs and sources of lean protein.   Provided education on carb counting, including sources of carbohydrate, appropriate portion sizes and limits for a lower carb diet. Discussed use of apps that can help her count carbs and calories.  Provided pt with handouts below.     Patient demonstrates understanding.    Expected Engagement: good  Follow-Up Plans: food journal assess, carb counting     Nutrition Goals  1) Consider keeping a food journal - MyFitnessPal, LoseIt apps  2) Make sure your eating enough protein daily. Goal at least 60 gm per day.  3) Keep meals to 45 gm carbs, and snacks to 15 gm carbs.     Protein Sources    [email or web address]     Carbohydrate Counting  [email or web address]     Guide To Carbohydrate Counting  [email or web address]    Diabetes Plate Method:  https://www.diabetesfoodhub.org/articles/what-is-the-diabetes-plate-method.html    Diabetes Recipe Resources:  https://www.diabetesfoodhub.org/   https://www.cdc.gov/diabetes/library/spotlights/hack-your-snack.html   https://www.eatright.org/food/nutrition/dietary-guidelines-and-myplate/how-to-add-whole-grains-to-your-diet   https://[employer]/recipes   https://www.diabetes.org/healthy-living/recipes-nutrition   https://[employer].Aireum/diabetic-recipe/turkey-veggie-snacks     Follow-Up:  PRN    Time spent with patient: 21 minutes.  Ana Luisa Kwan, ABIGAIL, LD

## 2022-06-03 NOTE — LETTER
"6/3/2022       RE: Arielle Montenegro  [address]  [place] [address]     Dear Colleague,    Thank you for referring your patient, Arielle Montenegro, to the [facility]NAGEMENT [facility]INIC [facility]. Please see a copy of my visit note below.    Arielle is a 37 year old who is being evaluated via a billable video visit.      How would you like to obtain your AVS? MyChart  If the video visit is dropped, the invitation should be resent by: [phone]  Will anyone else be joining your video visit? No     During this virtual visit the patient is located in [place], patient verifies this as the location during the entirety of this visit.   Video-Visit Details  Pt asked to convert to phone appt; she was not able to connect to the video room  Call duration, Doximity, 25 min          New Medical Weight Management Consult    PATIENT:  Arielle Montenegro  MRN:         [id number]  :         1985  ALEX:         6/3/2022      I had the pleasure of seeing your patient, Arielle Montenegro. Full intake/assessment was done to determine barriers to weight loss success and develop a treatment plan. Arielle Montenegro is a 37 year old female interested in treatment of medical problems associated with excess weight. She has a height of 5' 2\", a weight of 170 lbs 0 oz, and the calculated Body mass index is 31.09 kg/m .    ASSESSMENT/PLAN:  Arielle is a patient with young adult onset obesity without significant element of familial/genetic influence and without current health consequences. Arielle Montenegro uses food as mood management.    Her problem is complicated by mental health/psychopharmacological barriers      PLAN:    Decrease portion sizes  Dietician visit of education  Volumetrics eating plan    Mental health/Medication barriers   Ancillary testing:  N/A.  Food Plan:  Volumetrics and High protein/low carbohydrates.   Activity Plan:   Not addressed " "today  Supplementary:  N/A.   Medication: The patient will begin medication in pursuit of improved medical status as influenced by body weight. She will start GLP1 agonist, adding to her basal/bolus ins, if covered, and start low dose phentermine with monitoring of rP/BP if the GLP1 therapy is not covered.  There is a mutual understanding of the goals and risks of this therapy. The patient is in agreement. She is educated on dosage regimen and possible side effects.    Additionally--  --plan A Victoza daily if covered; plan B is 1/2 adipex tablet AM  --dietitian visit today for goal setting for food/activity for wt loss (lower carb, can use carb counting if pt is amenable to that; she notes she has carb counted, and has worked with I:C ratios prior)  --RTC Lauren Bloch in 4-6 wks; with me again in 2-3 mo (if start phentermine P/BP check at her most convenient clinic in about a wk--reaching out to Minal to connect with her on this in a wk if phentermine is started)    She has the following co-morbidities:       6/3/2022   I have the following health issues associated with obesity: Asthma   I have the following symptoms associated with obesity: Knee Pain, Back Pain, Fatigue       Patient Goals 6/3/2022   I am interested in having a healthier weight to diminish current health problems: Yes   I am interested in having a healthier weight in order to prevent future health problems: Yes   I am interested in having a healthier weight in order to have a future surgery: No       Referring Provider 6/3/2022   Please name the provider who referred you to Medical Weight Management.  If you do not know, please answer: \"I Don't Know\". I don't know       Weight History 6/3/2022   How concerned are you about your weight? Very Concerned   Would you describe your weight gain as gradual? Yes   I became overweight: In College   The following factors have contributed to my weight gain:  Change in Schedule, After Quitting Smoking, Eating " Wrong Types of Food, Lack of Exercise   I have tried the following methods to lose weight: Watching Portions or Calories   My highest weight since age 19 was: 170   The most weight I have ever lost was: (lbs) 20   I have the following family history of obesity/being overweight:  I am the only one in my immediate family who is overweight   Has anyone in your family had weight loss surgery? No   How has your weight changed over the last year?  Gained       Diet Recall Review with Patient 6/3/2022   Do you typically eat breakfast? No   Do you typically eat lunch? No   Do you typically eat supper? Yes   If you do eat supper, what types of food do you typically eat? Meat, fish, spicy food, vegetables   Do you typically eat snacks? No   If you do snack, what types of food do you typically eat? Eating chips with kids   Do you like vegetables?  Yes   Do you drink water? Yes   How many glasses of juice do you drink in a typical day? 0   How many of glasses of milk do you drink in a typical day? 0   How many 8oz glasses of sugar containing drinks such as Santos-Aid/sweet tea do you drink in a day? 0   How many cans/bottles of sugar pop/soda/tea/sports drinks do you drink in a day? 1   How many cans/bottles of diet pop/soda/tea or sports drink do you drink in a day? 0   How often do you have a drink of alcohol? Never       Eating Habits 6/3/2022   Generally, my meals include foods like these: bread, pasta, rice, potatoes, corn, crackers, sweet dessert, pop, or juice. A Few Times a Week   Generally, my meals include foods like these: fried meats, brats, burgers, french fries, pizza, cheese, chips, or ice cream. Everyday   Eat fast food (like McDonalds, [employer], Taco Bell). Everyday   Eat at a buffet or sit-down restaurant. A Few Times a Week   Eat most of my meals in front of the TV or computer. Everyday   Often skip meals, eat at random times, have no regular eating times. Everyday   Rarely sit down for a meal but snack or  graze throughout.  Never   Eat extra snacks between meals. Never   Eat most of my food at the end of the day. Everyday   Eat in the middle of the night or wake up at night to eat. Never   Eat extra snacks to prevent or correct low blood sugar. A Few Times a Week   Eat to prevent acid reflux or stomach pain. Once a Week   Worry about not having enough food to eat. Never   Have you been to the food shelf at least a few times this year? No   I eat when I am depressed. Everyday   I eat when I am stressed. Never   I eat when I am bored. Never   I eat when I am anxious. Everyday   I eat when I am happy or as a reward. Everyday   I feel hungry all the time even if I just have eaten. Never   Feeling full is important to me. Everyday   I finish all the food on my plate even if I am already full. Everyday   I can't resist eating delicious food or walk past the good food/smell. Everyday   I eat/snack without noticing that I am eating. A Few Times a Week   I eat when I am preparing the meal. Never   I eat more than usual when I see others eating. Once a Week   I have trouble not eating sweets, ice cream, cookies, or chips if they are around the house. Almost Everyday   I think about food all day. Never   What foods, if any, do you crave? None   Please list any other foods you crave? Fried things, meat, chicken, spicy food       Amount of Food 6/3/2022   I make myself vomit what I have eaten or use laxatives to get rid of food. Never   I eat a large amount of food, like a loaf of bread, a box of cookies, a pint/quart of ice cream, all at once. Everyday   I eat a large amount of food even when I am not hungry. Never   I eat rapidly. Never   I eat alone because I feel embarrassed and do not want others to see how much I have eaten. Never   I eat until I am uncomfortably full. Everyday   I feel bad, disgusted, or guilty after I overeat. Almost Everyday   I make myself vomit what I have eaten or use laxatives to get rid of food.  Never       Activity/Exercise History 6/3/2022   How much of a typical 12 hour day do you spend sitting? Most of the Day   How much of a typical 12 hour day do you spend lying down? Half the Day   How much of a typical day do you spend walking/standing? Half the Day   How many hours (not including work) do you spend on the TV/Video Games/Computer/Tablet/Phone? 6 Hours or More   How many times a week are you active for the purpose of exercise? Never   What keeps you from being more active? Too tired   How many total minutes do you spend doing some activity for the purpose of exercising when you exercise? None       PAST MEDICAL HISTORY:  Past Medical History:   Diagnosis Date     Blindness of right eye 2007     Diabetes mellitus type 1 (H)     Diagnosed as a child       Work/Social History Reviewed With Patient 6/3/2022   My employment status is: Full-Time   My job is: Care taker   How much of your job is spent on the computer or phone? Less Than 50%   How many hours do you spend commuting to work daily?  0   What is your marital status? Single   If in a relationship, is your significant other overweight? N/A   Do you have children? Yes   If you have children, are they overweight? Yes   Who do you live with?  3 children   Are they supportive of your health goals? Yes   Who does the food shopping?  Herself       Mental Health History Reviewed With Patient 6/3/2022   Have you ever been physically or sexually abused? No   If yes, do you feel that the abuse is affecting your weight? N/A   If yes, would you like to talk to a counselor about the abuse? N/A   How often in the past 2 weeks have you felt little interest or pleasure in doing things? Not at all   Over the past 2 weeks how often have you felt down, depressed, or hopeless? Not at all       Sleep History Reviewed With Patient 6/3/2022   How many hours do you sleep at night? 6   Do you think that you snore loudly or has anybody ever heard you snore loudly (louder  than talking or so loud it can be heard behind a shut door)? No   Has anyone seen or heard you stop breathing during your sleep? No   Do you often feel tired, fatigued, or sleepy during the day? Yes   Do you have a TV/Computer in your bedroom? No       MEDICATIONS:   Current Outpatient Medications   Medication Sig Dispense Refill     Continuous Blood Gluc  (FREESTYLE NILDA 14 DAY READER) TOMASA 1 Device continuous 1 each 0     Continuous Blood Gluc Sensor (FREESTYLE NILDA 2 SENSOR) MISC 1 applicator daily 1 each 4     insulin glargine (LANTUS SOLOSTAR) 100 UNIT/ML pen Inject 6 Units Subcutaneous At Bedtime (Patient taking differently: Inject 10 Units Subcutaneous At Bedtime) 15 mL 2     insulin pen needle (31G X 5 MM) 31G X 5 MM miscellaneous Use 3 pen needles daily or as directed. 270 each 3     mometasone (ELOCON) 0.1 % external cream Apply topically daily 45 g 0     NOVOLOG FLEXPEN 100 UNIT/ML soln Inject 2-8 units with meals TID  and at bedtime.approx 15 units daily, MDD 20 units 15 mL 1     albuterol (PROAIR HFA/PROVENTIL HFA/VENTOLIN HFA) 108 (90 Base) MCG/ACT inhaler Inhale 2 puffs into the lungs every 6 hours as needed for shortness of breath / dyspnea (Patient not taking: No sig reported) 18 g 0     albuterol (PROAIR HFA/PROVENTIL HFA/VENTOLIN HFA) 108 (90 Base) MCG/ACT inhaler Inhale 1-2 puffs into the lungs every 4 hours as needed for shortness of breath / dyspnea or wheezing (Patient not taking: No sig reported) 18 g 1     amoxicillin-clavulanate (AUGMENTIN) 875-125 MG tablet Take 1 tablet by mouth 2 times daily (Patient not taking: No sig reported) 20 tablet 0     blood glucose (NO BRAND SPECIFIED) test strip Test 4 times ann (Patient not taking: No sig reported) 360 strip 3     blood glucose (NO BRAND SPECIFIED) test strip Use to test blood sugar 4 times daily or as directed. (Patient not taking: No sig reported) 100 strip 6     blood glucose calibration (NO BRAND SPECIFIED) solution To  accompany: Blood Glucose Monitor Brands: per insurance. (Patient not taking: No sig reported) 1 Bottle 3     blood glucose monitoring (NO BRAND SPECIFIED) meter device kit Use to test blood sugar 4 times daily .whatever is covered (Patient not taking: No sig reported) 1 kit 0     blood glucose monitoring (NO BRAND SPECIFIED) test strip Use to test blood sugars 4 times daily or as directed (Patient not taking: No sig reported) 100 strip 2     Continuous Blood Gluc Sensor (FREESTYLE NILDA 2 SENSOR) MISC 1 Units every 14 days (Patient not taking: No sig reported) 7 each 3     FLUoxetine (PROZAC) 20 MG capsule Take 1 capsule (20 mg) by mouth daily (Patient not taking: No sig reported) 90 capsule 1     fluticasone-salmeterol (ADVAIR-HFA) 115-21 MCG/ACT inhaler Inhale 2 puffs into the lungs 2 times daily (Patient not taking: No sig reported) 8 g 0     gabapentin (NEURONTIN) 100 MG capsule Take 1 capsule (100 mg) by mouth 2 times daily as needed for neuropathic pain (Patient not taking: No sig reported) 30 capsule 0     naproxen (NAPROSYN) 500 MG tablet Take 1 tablet (500 mg) by mouth 2 times daily as needed for moderate pain (Patient not taking: No sig reported) 30 tablet 0     Prenatal Vit-Fe Fumarate-FA (PRENATAL MULTIVITAMIN W/IRON) 27-0.8 MG tablet Take 1 tablet by mouth daily (Patient not taking: No sig reported) 90 tablet 1     thin (NO BRAND SPECIFIED) lancets Use to test blood sugar 4 times daily or as directed. (Patient not taking: No sig reported) 360 each 3     traZODone (DESYREL) 50 MG tablet Take 1 tablet (50 mg) by mouth At Bedtime (Patient not taking: No sig reported) 30 tablet 1     --pt's type 1 diabetes is managed by general endocrine here--pt that she should continue with basal bolus insulin. Discussed with her that we can use a wt loss approach with focus on lowering starches/sugars and she can lower her short acting mealtime insulin based on carb content. If any issues with overnight/fasting lows she  can lower her Lantus some. She notes that she does carb count. She has a visit with nutrition following this visit also for further nutritional counseling.    --she needs to get new sensor for CGM/getting that through her other providers      ALLERGIES:   No Known Allergies        TIME: >40 min spent on evaluation, management, counseling, education, & motivational interviewing (video) combined with previsit prep and post visit follow up care/charting same day     Sincerely,    Maria Dolores Wasserman MD          Again, thank you for allowing me to participate in the care of your patient.      Sincerely,    Maria Dolores Wasserman MD

## 2022-06-03 NOTE — LETTER
"6/3/2022       RE: Arielle Montenegro  [address]  [place] [address]     Dear Colleague,    Thank you for referring your patient, Arielle Montenegro, to the [facility] WEIGHT MANAGEMENT CLINIC [place] [facility]. Please see a copy of my visit note below.    Arielle Montenegro is a 37 year old female who is being evaluated via a billable telephone visit.     The patient has been notified of following:     \"This telephone visit will be conducted via a call between you and your physician/provider. We have found that certain health care needs can be provided without the need for a physical exam.  This service lets us provide the care you need with a short phone conversation.  If a prescription is necessary we can send it directly to your pharmacy.  If lab work is needed we can place an order for that and you can then stop by our lab to have the test done at a later time.    Telephone visits are billed at different rates depending on your insurance coverage. During this emergency period, for some insurers they may be billed the same as an in-person visit.  Please reach out to your insurance provider with any questions.    If during the course of the call the physician/provider feels a telephone visit is not appropriate, you will not be charged for this service.\"    Patient has given verbal consent for Telephone visit?  Yes    How would you like to obtain your AVS? Brittani    Phone call duration: 21 minutes    During this virtual visit the patient is located in [place], patient verifies this as the location during the entirety of this visit.       New Weight Management Nutrition Consultation    Arielle Montenegro is a 37 year old female presents today for new weight management nutrition consultation.  Patient referred by Dr. Wasserman on Ivy 3, 2022.    Patient with Co-morbidities of obesity including:  Type II DM no  Renal Failure no  Sleep apnea " "no  Hypertension no   Dyslipidemia no  Joint pain yes  Back pain no  GERD no     PMH significant for type 1 DM.    Anthropometrics:  Estimated body mass index is 29.63 kg/m  as calculated from the following:    Height as of 10/7/21: 1.575 m (5' 2\").    Weight as of 10/7/21: 73.5 kg (162 lb).       Medications for Weight Loss:  None currently. May start Victoza with MD.    NUTRITION HISTORY  Food allergies: None  Food intolerances: None.  Does not eat pork  Previous methods of diet modification for weight loss: prior to last pregnancy, was able to lose from 150 lbs - >120 lbs, ate smaller portion, ate more veggies, smoothies and exercised more. Notes that she has to be in the right mindset to work on weight loss, and feels very motivated right now.   She has met with a dietitian in the past for mgmt of her diabetes.     Currently, she only eats once daily. Doesn't like to eat at home, prefers take-out. Enjoys - gyros. Often asks for just meat and veggies when dining out. States she does not feel well when eating high-carb foods (rice, pasta, bread). Enjoys a lot of fruit and veggies. Does not drink juice. Will make smoothies occasionally.     Nutrition Prescription  Recommended energy/nutrient modification.    Nutrition Diagnosis  Obesity r/t long history of positive energy balance aeb BMI >30.    Nutrition Intervention  Materials/education provided on hypocaloric diet for weight loss. Discussed Volumetric eating to help satiety level on fewer calories; portion control and healthy food choices (Plate Method and Volumetrics handouts).  Discussed importance of meeting protein needs, how to meet protein needs and sources of lean protein.   Provided education on carb counting, including sources of carbohydrate, appropriate portion sizes and limits for a lower carb diet. Discussed use of apps that can help her count carbs and calories.  Provided pt with handouts below.     Patient demonstrates understanding.    Expected " Engagement: good  Follow-Up Plans: food journal assess, carb counting     Nutrition Goals  1) Consider keeping a food journal - MyFitnessPal, LoseIt apps  2) Make sure your eating enough protein daily. Goal at least 60 gm per day.  3) Keep meals to 45 gm carbs, and snacks to 15 gm carbs.     Protein Sources   [email or web address]     Carbohydrate Counting  [email or web address]     Guide To Carbohydrate Counting  [email or web address]    Diabetes Plate Method:  https://www.diabetesfoodhub.org/articles/what-is-the-diabetes-plate-method.html    Diabetes Recipe Resources:  https://www.diabetesfoodhub.org/   https://www.cdc.gov/diabetes/library/spotlights/hack-your-snack.html   https://www.eatright.org/food/nutrition/dietary-guidelines-and-myplate/how-to-add-whole-grains-to-your-diet   https://Perfect Earth[employer]recipes   https://www.diabetes.org/healthy-living/recipes-nutrition   https://Perfect Earth.com/diabetic-recipe/turkey-veggie-snacks     Follow-Up:  PRN    Time spent with patient: 21 minutes.  Ana Luisa Kwan RD, LD

## 2022-06-03 NOTE — PROGRESS NOTES
"Arielle is a 37 year old who is being evaluated via a billable telephone visit.      What phone number would you like to be contacted at? [phone]  How would you like to obtain your AVS? StalinNovinger    Assessment & Plan   Problem List Items Addressed This Visit        Endocrine    Diabetes mellitus type 1 (H) - Primary    Relevant Medications    Continuous Blood Gluc Sensor (FREESTYLE ARIANE 14 DAY SENSOR) [facility] [place] [facility]         Patient uses Ariane 14 day sensor -   The incorrect sensor has been ordered repeatedly - she scheduled today's visit to get the correct supplies.   Rx sent.  Incorrect diabetes supplies were removed from her med list.              BMI:   Estimated body mass index is 31.09 kg/m  as calculated from the following:    Height as of an earlier encounter on 6/3/22: 1.575 m (5' 2\").    Weight as of an earlier encounter on 6/3/22: 77.1 kg (170 lb).   Weight management plan: not discussed        No follow-ups on file.    Alivia Cummings PA-C  [facility]    Josr Guzmán is a 37 year old who presents for the following health issues     History of Present Illness       Diabetes:   She presents for follow up of diabetes.  She is checking home blood glucose a few times a month. She checks blood glucose before meals.  Blood glucose is never over 200 and never under 70. When her blood glucose is low, the patient is asymptomatic for confusion, blurred vision, lethargy and reports not feeling dizzy, shaky, or weak.  She has no concerns regarding her diabetes at this time.  She is not experiencing numbness or burning in feet, excessive thirst, blurry vision, weight changes or redness, sores or blisters on feet. The patient has had a diabetic eye exam in the last 12 months.         She eats 2-3 servings of fruits and vegetables daily.She consumes 1 sweetened beverage(s) daily.She exercises with enough effort to increase her heart rate 9 or less minutes per day.  She exercises with " enough effort to increase her heart rate 3 or less days per week.   She is taking medications regularly.    Today's PHQ-9         PHQ-9 Total Score: 0    PHQ-9 Q9 Thoughts of better off dead/self-harm past 2 weeks :   Not at all    How difficult have these problems made it for you to do your work, take care of things at home, or get along with other people: Not difficult at all     Patient wanted to note that she has not been checking her diabetic numbers due to not having the strips.           Review of Systems         Objective             Physical Exam   healthy, alert and no distress  PSYCH: Alert and oriented times 3; coherent speech, normal   rate and volume, able to articulate logical thoughts, able   to abstract reason, no tangential thoughts, no hallucinations   or delusions  Her affect is normal  RESP: No cough, no audible wheezing, able to talk in full sentences  Remainder of exam unable to be completed due to telephone visits                Phone call duration: 4 minutes

## 2022-06-03 NOTE — PATIENT INSTRUCTIONS
Thank you for allowing us the privilege of caring for you. We hope we provided you with the excellent service you deserve.    Please let us know if there is anything else we can do for you so that we can be sure you are completely satisfied with your care experience.    Your visit was with Dr. Wasserman today.    Instructions per today's visit:  --plan A Victoza daily if covered; plan B is 1/2 adipex (phentermine) tablet AM  --Please continue your basal/bolus insulin and follow up with general endocrine on this  --dietitian visit today for goal setting for food/activity for wt loss (lower carb, you can use carb counting approach if you are comfortable with that to limit your carbs at meals and match your short acting insulin dose to the carbs in the meal with an insulin: carb ratio that works for you)  --returns with Lauren Bloch (medication management pharmacsist) in 4-6 wks; with Dr. Wasserman again in 2-3 mo (if start phentermine P/BP check at your most convenient clinic in about a wk)    Please call our contact center at [phone] to schedule your next appointments.    Meal Replacement Products:    Here is the link to our new e-store where you can purchase our meal replacement products    [employer] E-EnChroma/store    The one week starter kit is a great way to sample a variety of products and see what works for you.    If you want more information about the product go to: [employer]fund    Free Shipping for orders over $75    Benefits of meal replacements products:    Portion and calorie control  Improved nutrition  Structured eating  Simplified food choices  Avoid contact with trigger foods    Interested in working with a health ?  Health coaches work with you to improve your overall health and wellbeing.  They look at the whole person, and may involve discussion of different areas of life, including, but not limited to the four pillars of health (sleep, exercise,  nutrition, and stress management). Discuss with your care team if you would like to start working a health .    Health Coaching-3 Pack: Schedule by calling [phone]    $99 for three health coaching visits    Visits may be done in person or via phone    Coaching is a partnership between the  and the client; Coaches do not prescribe or diagnose    Coaching helps inspire the client to reach his/her personal goals    Bluetooth Scale:    We hope to provide you with high quality telephone and virtual healthcare visits while social distancing for COVID-19 is necessary, as well as in the future when virtual visits may be more convenient for you.    Our technology team made it possible for Bluetooth scales to send weight measurements to our electronic medical record. This allows weights from you weighing at home to securely flow into the medical record, which will improve telephone and virtual visits.  Additionally, studies have shown that adults actually lose more weight when their weights are automatically sent to someone else, and also that this process is not stressful for those adults.    Below is a link for purchasing the scale, with a discount code for our patients. You may call your insurance company to see if they will reimburse you for the cost of the scale, as a piece of durable medical equipment. The scales only go up to a weight of 400 pounds. This is an issue and we are working with the developer on increasing this. We found no scales that go over 400lb that have blue-tooth for connecting to mobintent.    Scale to purchase: the eyetok  Body  Scale: https://www.[employer]/us/en/body/shop?gclid=[email or web address]&gclsrc=aw.ds    Discount Code: We have a discount code for our patients to bring the cost down to $50, the code is:  withmed     Steps to link the scale to mobintent via an Android Phone (you can always disconnect at any point in the future):  1. The  order must be placed first before the patient can access Track My Health within [facility].  2. Download Google Fit nidia from the Google Play Store  a. Log in or register using your Google account  3. Download the MyChart nidia from Google Play Store  a. Select add organization  b. Search for [place] and select it  c. Log into [facility]  d. Select Track My Health  e. Select the green connect my account button  f. When prompted log into your Google account  g. Select okay to confirm the account  4. Download the [facility] Health Mate nidia from Google Play Store  a. [place] for Reactivity  b. Go to profile  c. Tap google fit under the Apps section  d. Select the option to activate Google Fit integration  e. Select the same Google account  f. Select okay to confirm the account  g.  Steps to link the scale to Boost My Ads via an iPhone (you can always disconnect at any point in the future):  **Note [employer] is not available for download on an iPad**  1. The order must be placed first before the patient can access Track My Health within [facility].  2. Locate the Health nidia on your iPhone.  a. Set up your Apple Health account as prompted  b. The Sources page will show Apps that communicate with your Health nidia. Once all steps are completed, you should see Health Mate and MyChart listed under the Apps section and your iPhone under the devices section.  i. Select Health Mate  1. Under 'ALLOW  HEALTH MATE  TO WRITE DATA ensure the toggle is on for Weight.  2. This will allow the scale to add your weight to the Apple Health  ii. Select MyChart  1. Under 'ALLOW  MYCHART  TO READ DATA ensure the toggle is on for Weight.  2. This allows [facility] to grab the weight from [employer] so your provider can see your weights.  3. Download the MyChart nidia from the Nidia Store  a. Select add organization                                                  b. Search for [place] and select it  c. Log into [facility]  d. Select Track My Health  e. Select the  green connect my account button  f. Follow prompts to link your device to [employer].  4. Download the Withings health mate nidia in the Nidia Store  a. [place] for Eventful  b. Go to profile  c. Redux under the Apps section  d. If prompted to allow access with the Health Nidia, toggle weight on for read and write access.      For any questions/concerns contact Minal Hua LPN at [phone]    To schedule appointments with our team, please call [phone]    Please call during clinic hours Monday through Friday 8:00a - 4:00p if you have questions or you can contact us via [employer] at anytime.      Lab results will be communicated through My Chart or letter (if My Chart not used). Please call the clinic if you have not received communication after 1 week or if you have any questions.    Clinic Fax: [phone]    Thank you,  Medical Weight Management Team    GLP-1 Medications    We are considering starting a GLP-1 (Glucagon-like Peptide-1) medication. Examples of this medication include Byetta, Bydureon, or Victoza, etc. The medication chosen will depend on insurance coverage, and can be changed to the medication that is covered under your plan. These medications work the same, in that they can help you lose weight. One of the ways it works is by slowing down the rate that food leaves your stomach. You feel zuniga and will eat less.  It also helps regulate hormones that can help improve your blood sugars when people have type 2 diabetes but in your case with type 1 we are using it to help with appetite control and cravings as you work on weight loss.        Types of GLP-1 medications include:    Victoza: Starting dose is 0.6 mg for 2 weeks, then 1.2 mg; top dose is 1.8 mg thereafter (if prescribed by doctor). This medication is given daily. Pen needles need to be ordered also.--this is the one we are using and will initially go up to 1.2 mg daily and see how you do  Ozempic: Starting dose is 0.25 mg once weekly  for 4 weeks, and then increase to 0.5 mg weekly. If after 4 weeks of being on 0.5 mg weekly dosing and still wanting additional weight loss and/or blood sugar benefits, check with your doctor to see if they want to increase the dose to 1 mg weekly. Pen needles come in the Ozempic pen box.  Trulicity: Starting dose is 0.75 mg once weekly.  If after 1-3 months of being on 0.75 mg weekly and still wanting additional weight loss and/or blood sugar benefits, check with your doctor to see if they want to increase the dose to 1.5 mg weekly.  Bydureon: Starting dose is 2 mg and is given weekly. The patient should pick one day a week and give the medication on that day. Pen needles need to be ordered also.  Byetta: Starting dose is 5 mcg twice daily. This is given for the first month. Check with the doctor after a month to see if they want the dose increased to 10 mcg BID. Pen needles need to be ordered also.     Side effects of GLP- Medications include: The most common side effects are all GI related and consist of: nausea, constipation, diarrhea, burping, or gassiness. Patients are advised to eat slowly and less, and nausea typically passes if people can stick it out.     The risk of pancreatitis (inflammation of the pancreas) has been associated with this type of medication, but is very rare.  If you have had pancreatitis in the past, this medication may not be for you. Please let us know about any past history of pancreas problems.      Symptoms of pancreatitis include: Pain in your upper stomach area which  may travel to your back and be worse after eating. Your stomach area may be tender to the touch.  You may have vomiting or nausea and/or have a fever. If you should develop any of these symptoms, stop the medication and contact your primary care doctor. They will do a blood test to check for pancreatitis.         There is a small chance you may have some low blood sugar after taking the medication.   The signs of low  blood sugar are:  Weakness  Shaky   Hungry  Sweating  Confusion      See below for ways to treat low blood sugar without adding in lots of extra calories.      Treating Low Blood Sugar    If you have symptoms of low blood sugar (sweating, shaking, dizzy, confused) eat 15 grams of carbs and wait 15 minutes:    Glucose Tabs are best for sugars under 70 -  Dex4 or BD Glucose tablets are good, you will need to take 3-4 of these to equal 15 grams.     One small box of raisins  4 oz fruit juice box or   cup fruit juice  1 small apple  1 small banana    cup canned fruit in water    English muffin or a slice of bread with jelly   1 low fat frozen waffle with sugar-free syrup    cup cottage cheese with   cup frozen or fresh blueberries  1 cup skim or low-fat milk    cup whole grain cereal  4-6 crackers such as Triscuits      This medication is usually covered by insurance for patients with a diagnosis of Type 2 Diabetes. Depending on insurance coverage, the medication may be changed to a different formulary, but they all work in the same way. Sometimes a prior authorization is required, which may take up to 1-2 weeks for an insurance company to make a decision if they will cover the medication. Please be patient, you will be notified either way.     For any questions or concerns please send a [employer] message to our team or call our weight management call center at [phone] during regular business hours. For questions during evenings or weekends your messages will be addressed during the next business day.  For emergencies please call 911 or seek immediate medical care.     (Do not stop taking it if you don't think it's working. For some people it works without them knowing it.)     In order to get refills of this or any medication we prescribe you must be seen in the medical weight mgmt clinic every 2-4 months. Please have your pharmacy fax a refill request to [phone].      MEDICATION--second option we  discussed    We are also considering starting Phentermine. Take in the morning.  Call the nurse if you have any questions or concerns. (Do not stop taking it if you don't think it's working. For some people it works without them knowing it.)    Phentermine is being prescribed because you identified hunger as one of the main causes for your extra weight.      Our patients on Phentermine find that they:    >feel less hunger    >find it easier to push the plate away   >have an easier time eating less    For some of our patients, these feelings are very real and immediate. For other patients, the feelings are less obvious. They don't feel much of a change but find they've lost weight. Like all weight loss medications, Phentermine  works best when you help it work. This means:  1. Having less tempting high calorie (fattening) food around the house or office. (For people with strong cravings this is very important.)   2. Staying away from situations or people that may trigger your cravings .   3. Eating out only one time or less each week.  4. Eating your meals at a table with the TV or computer off.    Side-effects. Phentermine is generally well tolerated. The main side-effects we see are feelings of racing pulse or rapid heart beat. Some people can get an elevated blood pressure. Because of this we may have you come back within a week or so of starting the medication for a blood pressure check.         In order to get refills of this or any medication we prescribe you must be seen in the medical weight mgmt clinic every 2-3 months. Please have your pharmacy fax a refill request to [phone].

## 2022-06-03 NOTE — PATIENT INSTRUCTIONS
Syed Guzmán,    Follow-up with RD in 1 month.    Thank you,    Ana Luisa Kwan, RD, LD  If you would like to schedule or reschedule an appointment with the RD, please call [phone]    Nutrition Goals  1) Consider keeping a food journal - MyFitnessPal, LoseIt apps  2) Make sure your eating enough protein daily. Goal at least 60 gm per day.  3) Keep meals to 45 gm carbs, and snacks to 15 gm carbs.     Protein Sources   [email or web address]     Carbohydrate Counting  [email or web address]     Guide To Carbohydrate Counting  http://[email or web address]    Diabetes Plate Method:  https://www.diabetesfoodhappyviewb.org/articles/what-is-the-diabetes-plate-method.html    Diabetes Recipe Resources:  https://www.diabetesfoodhub.org/   https://www.cdc.gov/diabetes/library/spotlights/hack-your-snack.html   https://www.eatright.org/food/nutrition/dietary-guidelines-and-myplate/how-to-add-whole-grains-to-your-diet   https://[employer].[place]/recipes   https://www.diabetes.org/healthy-living/recipes-nutrition   https://Matchmaker Videos.[place]/diabetic-recipe/turkey-veggie-snacks     Interested in working with a health ?  Health coaches work with you to improve your overall health and wellbeing.  They look at the whole person, and may involve discussion of different areas of life, including, but not limited to the four pillars of health (sleep, exercise, nutrition, and stress management). Discuss with your care team if you would like to start working a health .    Health Coaching-3 Pack:    $99 for three health coaching visits    Visits may be done in person or via phone    Coaching is a partnership between the  and the client; Coaches do not prescribe or diagnose    Coaching helps inspire the client to reach his/her personal goals      COMPREHENSIVE WEIGHT MANAGEMENT PROGRAM  VIRTUAL SUPPORT GROUPS    For Support Group Information:      We offer support groups for patients who are working on weight loss  "and considering, preparing for or have had weight loss surgery.   There is no cost for this opportunity.  You are invited to attend the?Virtual Support Groups?provided by any of the following locations:    [facility] via Microsoft Teams with Kierra Luna RN  2.   [place] via Bee There with Alfredo Aguero, PhD, LP  3.   [place] via [place] with Neelam Gilman RN  4.   [facility] via AwesomeTouch Teams with Neelam Silverio [facility]    The following Support Group information can also be found on our website:  [email or web address]://[email or web address]/[email or web address]/weight-loss-surgery-support-groups      [facility] Weight Loss Surgery Support Group    [facility] Weight Loss Surgery Support Group  The support group is a patient-lead forum that meets monthly to share experiences, encouragement and education. It is open to those who have had weight loss surgery, are scheduled for surgery, and those who are considering surgery.   WHEN: This group meets on the 3rd Wednesday of each month from 5:00PM - 6:00PM virtually using Microsoft Teams.   FACILITATOR: Led by Kierra Luna RD, LD, RN, the program's Clinical Coordinator.   TO REGISTER: Please contact the clinic via Sitestar or call the nurse line directly at [phone] to inform our staff that you would like an invite sent to you and to let us know the email you would like the invite sent to. Prior to the meeting, a link with directions on how to join the meeting will be sent to you.    2022 Meetings  January 19: \"Let's Talk\" a time for the group to share.  February 16: \"Let's Talk\" a time for the group to share.  March 16: Guest Speakers: Psychologists, Oly Butts, PhD,LP and Amie Mckeon, PsyD,LP  April 20: Guest Speaker: Health , Emily Lindsay, [facility],CHES, CPT  May 18: Guest Speaker: Dietitian, Poncho Carballo, ABIGAIL, LP  Ivy 15: \"Let's Talk\" a time for the group to share.  July 20: \"Let's Talk\" a time for the group to share.  August 17: " "TBA  September 21: TBA  October 19: Guest Speaker: Dr Scar Esparza MD Pulmonologist and Sleep Medicine Physician, \"Getting a Good Night's Sleep\".  November 16: TBA  December 21: TBA    [facility] Support Groups    Connections: Bariatric Care Support Group?  This is open to all [facility] (and those external to this program) pre- and post- operative bariatric surgery patients as well as their support system.   WHEN: This group meets the 2nd Tuesday of each month from 6:30 PM - 8:00 PM virtually using Microsoft Teams.   FACILITATOR: Led by Alfredo Aguero, Ph.D who is a Licensed Psychologist with the [facility] Comprehensive Weight Management Program.   TO REGISTER: Please send an email to Alfredo Aguero, Ph.D., LP at?[email or web address]?if you would like an invitation to the group and to learn about using Microsoft Teams.    2022 Meetings  January 11: Gregoria Islas, PharmD, Pharmacy Resident at [facility], \"Medications and Bariatric Surgery\".  February 8: Open Forum  March 8  April 12  May 10  Ivy 14    Connections: Post-Operative Bariatric Surgery Support Group  This is a support group for [facility] bariatric patients (and those external to [facility]) who have had bariatric surgery and are at least 3 months post-surgery.  WHEN: This support group meets the 4th Wednesday of the month from 11:00 AM - 12:00 PM virtually using Microsoft Teams.   FACILITATOR: Led by Certified Bariatric Nurse, Neelam Gilman RN.   TO REGISTER: Please send an email to Neelam at [email or web address] if you would like an invitation to the group and to learn about using Microsoft Teams.    2022 Meetings  January 26  February 23  March 23  April 27  May 25  Ivy 22    [facility] Healthy Lifestyle Virtual Support Group    Healthy Lifestyle Virtual Support Group?  This is 60 minutes of small group guided discussion, " support and resources. All are welcome who want a healthy lifestyle.  WHEN: This group meets monthly on a Friday from 12:30 PM - 1:30 PM virtually using Microsoft Teams.   FACILITATOR: Led by National Board Certified Health and , Neelam Silverio Counts include 234 beds at the [facility].   TO REGISTER: Please send an email to Neelam at?[email or web address] to receive monthly invites to the group or if you have any questions about having a health .  Prior to the meeting, a link with directions on how to join the meeting will be sent to you.    2022 Meetings  MAY 20: OPEN FORUM  JUNE: 24:  Setting Limits and BoundariesNeelam  July 29: OPEN FORUM  August 26: Special Guest Registered Dietician Sofie Copeland  Sept 30: OPEN FORUM  Oct 28, Kylah Newberry National Board Certified Health   Nov 18: Navigating How to Eat Around the Holidays with ABIGAIL Hatch  Dec 16: Changing your relationship with movement with  Emily National Board Certified Health

## 2022-06-03 NOTE — NURSING NOTE
"(   Chief Complaint   Patient presents with     New Patient     New MWM    )    ( Weight: 77.1 kg (170 lb) )  ( Height: 157.5 cm (5' 2\") )  ( BMI (Calculated): 31.09 )  (   )  (   )  (   )  (   )  (   )  (   )    (   )  (   )  (   )  (   )  (   )  (   )  (   )    (   Patient Active Problem List   Diagnosis     Blindness of right eye     Diabetes mellitus type 1 (H)     Hypoglycemia unawareness in type 1 diabetes mellitus (H)     Health Care Home     Vitamin D deficiency     External hemorrhoids     Encounter for long-term (current) use of insulin (H)     Medical non-compliance     Heart murmur     DJD (degenerative joint disease), cervical     Fibrocystic breast changes of both breasts     Diabetes mellitus with ketoacidosis (H)     Endophthalmitis     Uveitis     Generalized anxiety disorder     History of pre-eclampsia     High-risk pregnancy     Episodic mood disorder (H)     Cannabis abuse     Cough     Depression, major, in remission (H)     Previous  delivery, antepartum condition or complication      delivery delivered     Postpartum depression    )  (   Current Outpatient Medications   Medication Sig Dispense Refill     Continuous Blood Gluc  (FREESTYLE NILDA 14 DAY READER) TOMASA 1 Device continuous 1 each 0     Continuous Blood Gluc Sensor (FREESTYLE NILDA 2 SENSOR) MISC 1 applicator daily 1 each 4     insulin glargine (LANTUS SOLOSTAR) 100 UNIT/ML pen Inject 6 Units Subcutaneous At Bedtime (Patient taking differently: Inject 10 Units Subcutaneous At Bedtime) 15 mL 2     insulin pen needle (31G X 5 MM) 31G X 5 MM miscellaneous Use 3 pen needles daily or as directed. 270 each 3     mometasone (ELOCON) 0.1 % external cream Apply topically daily 45 g 0     NOVOLOG FLEXPEN 100 UNIT/ML soln Inject 2-8 units with meals TID  and at bedtime.approx 15 units daily, MDD 20 units 15 mL 1     albuterol (PROAIR HFA/PROVENTIL HFA/VENTOLIN HFA) 108 (90 Base) MCG/ACT inhaler Inhale 2 puffs into the " lungs every 6 hours as needed for shortness of breath / dyspnea (Patient not taking: No sig reported) 18 g 0     albuterol (PROAIR HFA/PROVENTIL HFA/VENTOLIN HFA) 108 (90 Base) MCG/ACT inhaler Inhale 1-2 puffs into the lungs every 4 hours as needed for shortness of breath / dyspnea or wheezing (Patient not taking: No sig reported) 18 g 1     amoxicillin-clavulanate (AUGMENTIN) 875-125 MG tablet Take 1 tablet by mouth 2 times daily (Patient not taking: No sig reported) 20 tablet 0     blood glucose (NO BRAND SPECIFIED) test strip Test 4 times ann (Patient not taking: No sig reported) 360 strip 3     blood glucose (NO BRAND SPECIFIED) test strip Use to test blood sugar 4 times daily or as directed. (Patient not taking: No sig reported) 100 strip 6     blood glucose calibration (NO BRAND SPECIFIED) solution To accompany: Blood Glucose Monitor Brands: per insurance. (Patient not taking: No sig reported) 1 Bottle 3     blood glucose monitoring (NO BRAND SPECIFIED) meter device kit Use to test blood sugar 4 times daily .whatever is covered (Patient not taking: No sig reported) 1 kit 0     blood glucose monitoring (NO BRAND SPECIFIED) test strip Use to test blood sugars 4 times daily or as directed (Patient not taking: No sig reported) 100 strip 2     Continuous Blood Gluc Sensor (FREESTYLE NILDA 2 SENSOR) MISC 1 Units every 14 days (Patient not taking: No sig reported) 7 each 3     FLUoxetine (PROZAC) 20 MG capsule Take 1 capsule (20 mg) by mouth daily (Patient not taking: No sig reported) 90 capsule 1     fluticasone-salmeterol (ADVAIR-HFA) 115-21 MCG/ACT inhaler Inhale 2 puffs into the lungs 2 times daily (Patient not taking: No sig reported) 8 g 0     gabapentin (NEURONTIN) 100 MG capsule Take 1 capsule (100 mg) by mouth 2 times daily as needed for neuropathic pain (Patient not taking: No sig reported) 30 capsule 0     naproxen (NAPROSYN) 500 MG tablet Take 1 tablet (500 mg) by mouth 2 times daily as needed for  moderate pain (Patient not taking: No sig reported) 30 tablet 0     Prenatal Vit-Fe Fumarate-FA (PRENATAL MULTIVITAMIN W/IRON) 27-0.8 MG tablet Take 1 tablet by mouth daily (Patient not taking: No sig reported) 90 tablet 1     thin (NO BRAND SPECIFIED) lancets Use to test blood sugar 4 times daily or as directed. (Patient not taking: No sig reported) 360 each 3     traZODone (DESYREL) 50 MG tablet Take 1 tablet (50 mg) by mouth At Bedtime (Patient not taking: No sig reported) 30 tablet 1    )  ( Diabetes Eval:    )    ( Pain Eval:  No Pain (0) )    ( Wound Eval:       )    (   History   Smoking Status     Former Smoker     Packs/day: 0.00     Types: Cigarettes   Smokeless Tobacco     Never Used     Comment: smokes 2 cigarettes/day-none for several months    )    ( Signed By:  Traci Alberts, EMT; Ivy 3, 2022; 9:52 AM )

## 2022-06-03 NOTE — PROGRESS NOTES
"Arielle is a 37 year old who is being evaluated via a billable video visit.      How would you like to obtain your AVS? MyChart  If the video visit is dropped, the invitation should be resent by: [phone]  Will anyone else be joining your video visit? No     During this virtual visit the patient is located in [place], patient verifies this as the location during the entirety of this visit.   Video-Visit Details  Pt asked to convert to phone appt; she was not able to connect to the video room  Call duration, Doximity, 25 min          New Medical Weight Management Consult    PATIENT:  Arielle Montenegro  MRN:         [id number]  :         1985  ALEX:         6/3/2022      I had the pleasure of seeing your patient, Arielle Montenegro. Full intake/assessment was done to determine barriers to weight loss success and develop a treatment plan. Arielle Montenegro is a 37 year old female interested in treatment of medical problems associated with excess weight. She has a height of 5' 2\", a weight of 170 lbs 0 oz, and the calculated Body mass index is 31.09 kg/m .    ASSESSMENT/PLAN:  Arielle is a patient with young adult onset obesity without significant element of familial/genetic influence and without current health consequences. Arielle Montenegro uses food as mood management.    Her problem is complicated by mental health/psychopharmacological barriers      PLAN:    Decrease portion sizes  Dietician visit of education  Volumetrics eating plan    Mental health/Medication barriers   Ancillary testing:  N/A.  Food Plan:  Volumetrics and High protein/low carbohydrates.   Activity Plan:   Not addressed today  Supplementary:  N/A.   Medication: The patient will begin medication in pursuit of improved medical status as influenced by body weight. She will start GLP1 agonist, adding to her basal/bolus ins, if covered, and start low dose phentermine with monitoring of rP/BP if the GLP1 therapy is not covered.  There is a mutual " "understanding of the goals and risks of this therapy. The patient is in agreement. She is educated on dosage regimen and possible side effects.    Additionally--  --plan A Victoza daily if covered; plan B is 1/2 adipex tablet AM  --dietitian visit today for goal setting for food/activity for wt loss (lower carb, can use carb counting if pt is amenable to that; she notes she has carb counted, and has worked with I:C ratios prior)  --RTC Lauren Bloch in 4-6 wks; with me again in 2-3 mo (if start phentermine P/BP check at her most convenient clinic in about a wk--reaching out to [place] to connect with her on this in a wk if phentermine is started)    She has the following co-morbidities:       6/3/2022   I have the following health issues associated with obesity: Asthma   I have the following symptoms associated with obesity: Knee Pain, Back Pain, Fatigue       Patient Goals 6/3/2022   I am interested in having a healthier weight to diminish current health problems: Yes   I am interested in having a healthier weight in order to prevent future health problems: Yes   I am interested in having a healthier weight in order to have a future surgery: No       Referring Provider 6/3/2022   Please name the provider who referred you to Medical Weight Management.  If you do not know, please answer: \"I Don't Know\". I don't know       Weight History 6/3/2022   How concerned are you about your weight? Very Concerned   Would you describe your weight gain as gradual? Yes   I became overweight: In College   The following factors have contributed to my weight gain:  Change in Schedule, After Quitting Smoking, Eating Wrong Types of Food, Lack of Exercise   I have tried the following methods to lose weight: Watching Portions or Calories   My highest weight since age 19 was: 170   The most weight I have ever lost was: (lbs) 20   I have the following family history of obesity/being overweight:  I am the only one in my immediate family who is " overweight   Has anyone in your family had weight loss surgery? No   How has your weight changed over the last year?  Gained       Diet Recall Review with Patient 6/3/2022   Do you typically eat breakfast? No   Do you typically eat lunch? No   Do you typically eat supper? Yes   If you do eat supper, what types of food do you typically eat? Meat, fish, spicy food, vegetables   Do you typically eat snacks? No   If you do snack, what types of food do you typically eat? Eating chips with kids   Do you like vegetables?  Yes   Do you drink water? Yes   How many glasses of juice do you drink in a typical day? 0   How many of glasses of milk do you drink in a typical day? 0   How many 8oz glasses of sugar containing drinks such as Santos-Aid/sweet tea do you drink in a day? 0   How many cans/bottles of sugar pop/soda/tea/sports drinks do you drink in a day? 1   How many cans/bottles of diet pop/soda/tea or sports drink do you drink in a day? 0   How often do you have a drink of alcohol? Never       Eating Habits 6/3/2022   Generally, my meals include foods like these: bread, pasta, rice, potatoes, corn, crackers, sweet dessert, pop, or juice. A Few Times a Week   Generally, my meals include foods like these: fried meats, brats, burgers, french fries, pizza, cheese, chips, or ice cream. Everyday   Eat fast food (like McDonalds, [employer], Taco Bell). Everyday   Eat at a buffet or sit-down restaurant. A Few Times a Week   Eat most of my meals in front of the TV or computer. Everyday   Often skip meals, eat at random times, have no regular eating times. Everyday   Rarely sit down for a meal but snack or graze throughout.  Never   Eat extra snacks between meals. Never   Eat most of my food at the end of the day. Everyday   Eat in the middle of the night or wake up at night to eat. Never   Eat extra snacks to prevent or correct low blood sugar. A Few Times a Week   Eat to prevent acid reflux or stomach pain. Once a Week   Worry  about not having enough food to eat. Never   Have you been to the food shelf at least a few times this year? No   I eat when I am depressed. Everyday   I eat when I am stressed. Never   I eat when I am bored. Never   I eat when I am anxious. Everyday   I eat when I am happy or as a reward. Everyday   I feel hungry all the time even if I just have eaten. Never   Feeling full is important to me. Everyday   I finish all the food on my plate even if I am already full. Everyday   I can't resist eating delicious food or walk past the good food/smell. Everyday   I eat/snack without noticing that I am eating. A Few Times a Week   I eat when I am preparing the meal. Never   I eat more than usual when I see others eating. Once a Week   I have trouble not eating sweets, ice cream, cookies, or chips if they are around the house. Almost Everyday   I think about food all day. Never   What foods, if any, do you crave? None   Please list any other foods you crave? Fried things, meat, chicken, spicy food       Amount of Food 6/3/2022   I make myself vomit what I have eaten or use laxatives to get rid of food. Never   I eat a large amount of food, like a loaf of bread, a box of cookies, a pint/quart of ice cream, all at once. Everyday   I eat a large amount of food even when I am not hungry. Never   I eat rapidly. Never   I eat alone because I feel embarrassed and do not want others to see how much I have eaten. Never   I eat until I am uncomfortably full. Everyday   I feel bad, disgusted, or guilty after I overeat. Almost Everyday   I make myself vomit what I have eaten or use laxatives to get rid of food. Never       Activity/Exercise History 6/3/2022   How much of a typical 12 hour day do you spend sitting? Most of the Day   How much of a typical 12 hour day do you spend lying down? Half the Day   How much of a typical day do you spend walking/standing? Half the Day   How many hours (not including work) do you spend on the  TV/Video Games/Computer/Tablet/Phone? 6 Hours or More   How many times a week are you active for the purpose of exercise? Never   What keeps you from being more active? Too tired   How many total minutes do you spend doing some activity for the purpose of exercising when you exercise? None       PAST MEDICAL HISTORY:  Past Medical History:   Diagnosis Date     Blindness of right eye 2007     Diabetes mellitus type 1 (H)     Diagnosed as a child       Work/Social History Reviewed With Patient 6/3/2022   My employment status is: Full-Time   My job is: Care taker   How much of your job is spent on the computer or phone? Less Than 50%   How many hours do you spend commuting to work daily?  0   What is your marital status? Single   If in a relationship, is your significant other overweight? N/A   Do you have children? Yes   If you have children, are they overweight? Yes   Who do you live with?  3 children   Are they supportive of your health goals? Yes   Who does the food shopping?  Herself       Mental Health History Reviewed With Patient 6/3/2022   Have you ever been physically or sexually abused? No   If yes, do you feel that the abuse is affecting your weight? N/A   If yes, would you like to talk to a counselor about the abuse? N/A   How often in the past 2 weeks have you felt little interest or pleasure in doing things? Not at all   Over the past 2 weeks how often have you felt down, depressed, or hopeless? Not at all       Sleep History Reviewed With Patient 6/3/2022   How many hours do you sleep at night? 6   Do you think that you snore loudly or has anybody ever heard you snore loudly (louder than talking or so loud it can be heard behind a shut door)? No   Has anyone seen or heard you stop breathing during your sleep? No   Do you often feel tired, fatigued, or sleepy during the day? Yes   Do you have a TV/Computer in your bedroom? No       MEDICATIONS:   Current Outpatient Medications   Medication Sig Dispense  Refill     Continuous Blood Gluc  (FREESTYLE NILDA 14 DAY READER) TOMASA 1 Device continuous 1 each 0     Continuous Blood Gluc Sensor (FREESTYLE NILDA 2 SENSOR) MISC 1 applicator daily 1 each 4     insulin glargine (LANTUS SOLOSTAR) 100 UNIT/ML pen Inject 6 Units Subcutaneous At Bedtime (Patient taking differently: Inject 10 Units Subcutaneous At Bedtime) 15 mL 2     insulin pen needle (31G X 5 MM) 31G X 5 MM miscellaneous Use 3 pen needles daily or as directed. 270 each 3     mometasone (ELOCON) 0.1 % external cream Apply topically daily 45 g 0     NOVOLOG FLEXPEN 100 UNIT/ML soln Inject 2-8 units with meals TID  and at bedtime.approx 15 units daily, MDD 20 units 15 mL 1     albuterol (PROAIR HFA/PROVENTIL HFA/VENTOLIN HFA) 108 (90 Base) MCG/ACT inhaler Inhale 2 puffs into the lungs every 6 hours as needed for shortness of breath / dyspnea (Patient not taking: No sig reported) 18 g 0     albuterol (PROAIR HFA/PROVENTIL HFA/VENTOLIN HFA) 108 (90 Base) MCG/ACT inhaler Inhale 1-2 puffs into the lungs every 4 hours as needed for shortness of breath / dyspnea or wheezing (Patient not taking: No sig reported) 18 g 1     amoxicillin-clavulanate (AUGMENTIN) 875-125 MG tablet Take 1 tablet by mouth 2 times daily (Patient not taking: No sig reported) 20 tablet 0     blood glucose (NO BRAND SPECIFIED) test strip Test 4 times ann (Patient not taking: No sig reported) 360 strip 3     blood glucose (NO BRAND SPECIFIED) test strip Use to test blood sugar 4 times daily or as directed. (Patient not taking: No sig reported) 100 strip 6     blood glucose calibration (NO BRAND SPECIFIED) solution To accompany: Blood Glucose Monitor Brands: per insurance. (Patient not taking: No sig reported) 1 Bottle 3     blood glucose monitoring (NO BRAND SPECIFIED) meter device kit Use to test blood sugar 4 times daily .whatever is covered (Patient not taking: No sig reported) 1 kit 0     blood glucose monitoring (NO BRAND SPECIFIED) test  strip Use to test blood sugars 4 times daily or as directed (Patient not taking: No sig reported) 100 strip 2     Continuous Blood Gluc Sensor (FREESTYLE NILDA 2 SENSOR) MISC 1 Units every 14 days (Patient not taking: No sig reported) 7 each 3     FLUoxetine (PROZAC) 20 MG capsule Take 1 capsule (20 mg) by mouth daily (Patient not taking: No sig reported) 90 capsule 1     fluticasone-salmeterol (ADVAIR-HFA) 115-21 MCG/ACT inhaler Inhale 2 puffs into the lungs 2 times daily (Patient not taking: No sig reported) 8 g 0     gabapentin (NEURONTIN) 100 MG capsule Take 1 capsule (100 mg) by mouth 2 times daily as needed for neuropathic pain (Patient not taking: No sig reported) 30 capsule 0     naproxen (NAPROSYN) 500 MG tablet Take 1 tablet (500 mg) by mouth 2 times daily as needed for moderate pain (Patient not taking: No sig reported) 30 tablet 0     Prenatal Vit-Fe Fumarate-FA (PRENATAL MULTIVITAMIN W/IRON) 27-0.8 MG tablet Take 1 tablet by mouth daily (Patient not taking: No sig reported) 90 tablet 1     thin (NO BRAND SPECIFIED) lancets Use to test blood sugar 4 times daily or as directed. (Patient not taking: No sig reported) 360 each 3     traZODone (DESYREL) 50 MG tablet Take 1 tablet (50 mg) by mouth At Bedtime (Patient not taking: No sig reported) 30 tablet 1     --pt's type 1 diabetes is managed by general endocrine here--pt that she should continue with basal bolus insulin. Discussed with her that we can use a wt loss approach with focus on lowering starches/sugars and she can lower her short acting mealtime insulin based on carb content. If any issues with overnight/fasting lows she can lower her Lantus some. She notes that she does carb count. She has a visit with nutrition following this visit also for further nutritional counseling.    --she needs to get new sensor for CGM/getting that through her other providers      ALLERGIES:   No Known Allergies        TIME: >40 min spent on evaluation, management,  counseling, education, & motivational interviewing (video) combined with previsit prep and post visit follow up care/charting same day     Sincerely,    Maria Dolores Wasserman MD

## 2022-06-06 ENCOUNTER — TELEPHONE (OUTPATIENT)
Dept: ENDOCRINOLOGY | Facility: CLINIC | Age: 37
End: 2022-06-06

## 2022-06-06 ENCOUNTER — VIRTUAL VISIT (OUTPATIENT)
Dept: ENDOCRINOLOGY | Facility: CLINIC | Age: 37
End: 2022-06-06
Payer: COMMERCIAL

## 2022-06-06 DIAGNOSIS — E10.649 TYPE 1 DIABETES MELLITUS WITH HYPOGLYCEMIA AND WITHOUT COMA (H): Primary | ICD-10-CM

## 2022-06-06 DIAGNOSIS — L87.1 REACTIVE PERFORATING COLLAGENOSIS: ICD-10-CM

## 2022-06-06 PROCEDURE — 99215 OFFICE O/P EST HI 40 MIN: CPT | Mod: 95 | Performed by: PHYSICIAN ASSISTANT

## 2022-06-06 RX ORDER — INSULIN PEN,REUSABLE,BT LISPRO
1 INSULIN PEN (EA) SUBCUTANEOUS
Qty: 2 EACH | Refills: 1 | Status: SHIPPED | OUTPATIENT
Start: 2022-06-06 | End: 2022-10-14

## 2022-06-06 RX ORDER — GLUCAGON 3 MG/1
1 POWDER NASAL
Qty: 2 EACH | Refills: 3 | Status: SHIPPED | OUTPATIENT
Start: 2022-06-06 | End: 2023-11-14

## 2022-06-06 RX ORDER — TRETINOIN 0.5 MG/G
CREAM TOPICAL AT BEDTIME
Qty: 45 G | Refills: 1 | Status: SHIPPED | OUTPATIENT
Start: 2022-06-06 | End: 2023-04-25

## 2022-06-06 RX ORDER — PROCHLORPERAZINE 25 MG/1
SUPPOSITORY RECTAL
Qty: 3 EACH | Refills: 11 | Status: SHIPPED | OUTPATIENT
Start: 2022-06-06 | End: 2022-10-18

## 2022-06-06 RX ORDER — PROCHLORPERAZINE 25 MG/1
SUPPOSITORY RECTAL
Qty: 1 EACH | Refills: 3 | Status: SHIPPED | OUTPATIENT
Start: 2022-06-06 | End: 2022-10-14

## 2022-06-06 NOTE — PROGRESS NOTES
"Outcome for 05/26/22 12:07 PM :Sent patient [employer] message asking them to upload their BG data     Start time: 1440  End time: 1515      HPI:   Arielle is a 38 yo woman here for follow up of type 1 diabetes with a history of severe hypoglycemia and hypoglycemia unawareness.  She usually sees Janessa Ordoñez in our clinic.  Today is the first time I am meeting her.  Arielle reports that she is feeling very nauseated, having heartburn and acid reflux today.  She is feeling very tired.  She is laying down during our visit.  She said she thought her glucose was low.  She checked and it said \"lo.\"  She did not eat today because she was nauseated.  Usually, she eats throughout the day. She has no juice, but is eating grapes.  She has a  helping with her kids and she was getting her treatment for her low sugar.  She said she does not have glucagon in her home.  She asked me to call her back after she treated her lows and I did.  She sounded much better.  She reports that she met Dr. Wasserman in weight management and she just started taking victoza a few days ago to help with weight loss.  She was not instructed on adjusting her insulin for this, and she reports she did not know that she would need to lower her insulin doses.  She has been without testing supplies/latoya for a couple of weeks, but was feeling \"high\" so she started taking a higher dose of lantus than in the past.  Her current treatment regimen is as follows:     Lantus- 20 units daily (previous dosing in chart was 6 units)  Novolog- could not explain dosing.  Says she does not understand carb counting, but \"thinks\" she takes 2 units per carb.  About 2-8 units at a time. She expresses that she would prefer to have set doses to take when she eats.    Victoza- 0.6 mg daily- started 4 days ago.     She reports that she was diagnosed with type 1 dm around age 8 when she became sick while living in [place]. She has been on insulin since diagnosis.     She wears a " "latoya sensor, but has not had it on.  She has a friend with type 2 that wears a sensor that beeps on her phone when she is low.  Arielle would really like one of these.  She prefers a sensor with an gael, as her \"kids mess with the machine.\"  She has lost her  before.      Arielle reports that she eats out a lot.  She likes Chinese, all sorts of foods from different places around the world.  Spicy meat, chicken, seafood.  Vegetables and fruits, etc.  She does not like to eat rice, bread, pasta. She did not grow up on this food.    She really wants to lose weight.  She hopes to follow Dr. Wasserman's advice and eat more nutritious foods.  She hopes the Victoza helps her lose weight.     Typical day:   Not hungry for breakfast  Yesterday, bought veggies/fruit.    She wants to do a smoothie in the morning.        She is busy with her kids/family.  Taking a vacation next week to [place].     Blood Glucose Monitoring:  Not available today.      Diabetes monitoring and complications:  CAD: No  Last eye exam results: 6 mo ago, no retinopathy  Microalbuminuria: 172 in   Neuropathy: No  HTN: No  On Statin: No  Depression: Yes      Past Medical History:  Blindness of right eye  Type 1 diabetes  Hypoglycemia unawareness  Vitamin D deficiency  Anxiety     Past Surgical History:   section - , 2015  Enucleation right - 2015    Family History:   Diabetes - paternal side of family       Social History:     Kids now 9, 7 and 19 months old.  One son with ADHD and obese with prediabetes, not type 1.  Other might have autism.      Diabetes Control:   Lab Results   Component Value Date    A1C 5.8 2020    A1C 6.4 2020    A1C 7.5 2020    A1C 7.8 2020    A1C 12.6 2019       Past Medical History:   Diagnosis Date     Blindness of right eye      Diabetes mellitus type 1 (H)     Diagnosed as a child       Past Surgical History:   Procedure Laterality Date      SECTION  13     "  SECTION N/A 2015    Procedure:  SECTION;  Surgeon: John Hudson MD;  Location: [facility]      SECTION N/A 7/3/2020    Procedure:  SECTION;  Surgeon: Aparna Atkins MD;  Location: [facility]     ENUCLEATION Right 3/20/2015    Procedure: ENUCLEATION;  Surgeon: Edilson Islas MD;  Location:  [facility]       Family History   Problem Relation Age of Onset     Family History Negative Mother      Family History Negative Father      Diabetes Other         father's side       Social History     Socioeconomic History     Marital status: Single     Number of children: 1   Occupational History     Employer: UNEMPLOYED   Tobacco Use     Smoking status: Former Smoker     Packs/day: 0.00     Types: Cigarettes     Smokeless tobacco: Never Used     Tobacco comment: smokes 2 cigarettes/day-none for several months   Substance and Sexual Activity     Alcohol use: No     Alcohol/week: 0.0 standard drinks     Drug use: No     Sexual activity: Yes     Partners: Male     Birth control/protection: None   Social History Narrative    ** Merged History Encounter **         Caffeine intake/servings daily - 0    Calcium intake/servings daily - 3    Exercise 7 times weekly - describe walking    Sunscreen used - No    Seatbelts used - Yes    Guns stored in the home - No    Self Breast Exam - Yes    Pap test up to date -  No    Eye exam up to date -  Yes    Dental exam up to date -  Yes    DEXA scan up to date -  Not Applicable    Flex Sig/Colonoscopy up to date -  Not Applicable    Mammography up to date -  Not Applicable    Immunizations reviewed and up to date - No    Abuse: Current or Past (Physical, Sexual or Emotional) - No    Do you feel safe in your environment - Yes    Do you cope well with stress - Yes    Do you suffer from insomnia - No    Last updated by: KARL PELAEZ  2012                       Current Outpatient Medications   Medication     albuterol (PROAIR HFA/PROVENTIL  HFA/VENTOLIN HFA) 108 (90 Base) MCG/ACT inhaler     albuterol (PROAIR HFA/PROVENTIL HFA/VENTOLIN HFA) 108 (90 Base) MCG/ACT inhaler     amoxicillin-clavulanate (AUGMENTIN) 875-125 MG tablet     Continuous Blood Gluc  (FREESTYLE NILDA 14 DAY READER) TOMASA     Continuous Blood Gluc Sensor (FREESTYLE NILDA 14 DAY SENSOR) MISC     FLUoxetine (PROZAC) 20 MG capsule     fluticasone-salmeterol (ADVAIR-HFA) 115-21 MCG/ACT inhaler     gabapentin (NEURONTIN) 100 MG capsule     insulin glargine (LANTUS SOLOSTAR) 100 UNIT/ML pen     insulin pen needle (31G X 5 MM) 31G X 5 MM miscellaneous     liraglutide (VICTOZA) 18 MG/3ML solution     mometasone (ELOCON) 0.1 % external cream     naproxen (NAPROSYN) 500 MG tablet     NOVOLOG FLEXPEN 100 UNIT/ML soln     Prenatal Vit-Fe Fumarate-FA (PRENATAL MULTIVITAMIN W/IRON) 27-0.8 MG tablet     traZODone (DESYREL) 50 MG tablet     No current facility-administered medications for this visit.        No Known Allergies    Physical Exam  There were no vitals taken for this visit.  GENERAL: healthy, alert and no distress  RESP: no audible wheeze, cough, or visible cyanosis.  No visible retractions or increased work of breathing.  Able to speak fully in complete sentences.  PSYCH: mentation appears normal, affect normal/bright, judgement and insight intact, normal speech and appearance well-groomed  RESULTS  Lab Results   Component Value Date    A1C 5.8 (H) 07/04/2020    A1C 6.4 (H) 06/11/2020    A1C 7.5 (H) 01/28/2020    A1C 7.8 (H) 01/02/2020    A1C 12.6 (H) 09/20/2019       TSH   Date Value Ref Range Status   05/30/2019 0.877 0.358 - 3.740 UIU/mL Final   05/11/2017 1.19 0.40 - 4.00 mU/L Final   09/30/2014 1.90 0.40 - 4.00 mU/L Final     Comment:     Effective 7/30/2014, the reference range for this assay has changed to reflect   new instrumentation/methodology.         ALT   Date Value Ref Range Status   08/12/2021 12 0 - 50 U/L Final   07/05/2020 24 0 - 50 U/L Final   07/04/2020 27 0  - 50 U/L Final   ]    Recent Labs   Lab Test 09/20/19  1425 03/11/19  0000   CHOL 181 147.6   HDL 71 52.4   * 72   TRIG 45 115.8       Lab Results   Component Value Date     08/12/2021     09/20/2019      Lab Results   Component Value Date    POTASSIUM 3.8 08/12/2021    POTASSIUM 4.0 01/24/2020     Lab Results   Component Value Date    CHLORIDE 100 08/12/2021    CHLORIDE 95 09/20/2019     Lab Results   Component Value Date    ALEXANDRO 8.5 08/12/2021    ALEXANDRO 8.5 09/20/2019     Lab Results   Component Value Date    CO2 25 08/12/2021    CO2 22 09/20/2019     Lab Results   Component Value Date    BUN 12 08/12/2021    BUN 13 09/20/2019     Lab Results   Component Value Date    CR 1.13 08/12/2021    CR 0.91 07/05/2020       GFR Estimate   Date Value Ref Range Status   08/12/2021 63 >60 mL/min/1.73m2 Final     Comment:     As of July 11, 2021, eGFR is calculated by the CKD-EPI creatinine equation, without race adjustment. eGFR can be influenced by muscle mass, exercise, and diet. The reported eGFR is an estimation only and is only applicable if the renal function is stable.   07/05/2020 82 >60 mL/min/[1.73_m2] Final     Comment:     Non  GFR Calc  Starting 12/18/2018, serum creatinine based estimated GFR (eGFR) will be   calculated using the Chronic Kidney Disease Epidemiology Collaboration   (CKD-EPI) equation.     07/04/2020 85 >60 mL/min/[1.73_m2] Final     Comment:     Non  GFR Calc  Starting 12/18/2018, serum creatinine based estimated GFR (eGFR) will be   calculated using the Chronic Kidney Disease Epidemiology Collaboration   (CKD-EPI) equation.     07/03/2020 75 >60 mL/min/[1.73_m2] Final     Comment:     Non  GFR Calc  Starting 12/18/2018, serum creatinine based estimated GFR (eGFR) will be   calculated using the Chronic Kidney Disease Epidemiology Collaboration   (CKD-EPI) equation.       GFR Estimate If Black   Date Value Ref Range Status   07/05/2020  >90 >60 mL/min/[1.73_m2] Final     Comment:      GFR Calc  Starting 12/18/2018, serum creatinine based estimated GFR (eGFR) will be   calculated using the Chronic Kidney Disease Epidemiology Collaboration   (CKD-EPI) equation.     07/04/2020 >90 >60 mL/min/[1.73_m2] Final     Comment:      GFR Calc  Starting 12/18/2018, serum creatinine based estimated GFR (eGFR) will be   calculated using the Chronic Kidney Disease Epidemiology Collaboration   (CKD-EPI) equation.     07/03/2020 87 >60 mL/min/[1.73_m2] Final     Comment:      GFR Calc  Starting 12/18/2018, serum creatinine based estimated GFR (eGFR) will be   calculated using the Chronic Kidney Disease Epidemiology Collaboration   (CKD-EPI) equation.         Lab Results   Component Value Date    MICROL 172 09/30/2014     No results found for: MICROALBUMIN  Lab Results   Component Value Date    CPEPT <0.1 (L) 09/30/2014       No results found for: B12]    Most recent eye exam date: : Not Found     Assessment/Plan:     1.  Type 1 diabetes-  Unclear on her level of control, but she certainly seems to have a high degree of variability per her report and significant hypoglycemia.  Discussed the IN-pen bluetooth insulin pen, which could be helpful for dosing and gaining a better understanding of her patterns/response to therapy.  She was very much interested.  I have prescribed this and she will let me know if she receives this.  I will then order the insulin cartridges to go with it.  May be helpful to consider set dosing for meals, rather than carb counting, as patient seems a bit overwhelmed by this concept.  Can explore further in DM education.We discussed the mechanism of action of victoza, and how it will reduce her appetite and reduce her insulin requirements.  She had a very low blood sugar today while on the video, and I explained that this is likely from a combination of increasing her lantus significantly, adding  victoza, and not eating today.  I did remind her that if her lantus is the right dose, she should not drop low (or climb) when she is missing meals.  We discussed the advantages of the dexcom g6 sensor, rather than the latoya and she would like me to order this, which I did.  She would like the gael on her phone and share with our clinic I placed a referral for dm education.    Asked her to reach out to me if she continues having low blood sugars.  Will plan to see her back in person in 2 weeks for further adjustments.  Instructions given to patient:     Nice meeting youArielle!  Here is what we discussed today...    Please reduce your lantus to 15 units daily.  If you are still going low when you are not eating, then please reduce this to 10 units daily.     Continue Victoza 0.6 mg daily.  Remember, this will reduce your appetite, so please cut your portions down.      I have prescribed the In-pen bluetooth insulin pen for dosing calculations.  Once you receive this, we can set it up for you in diabetes education. Here is the website which explains the device and gael... [email or web address].com.     Please follow up with me on June 20th at 3pm.      See Janessa Ordoñez in 6 weeks.     2.  Risk factors-     Retinopathy:  No known history.  She does not appear to have a recent eye exam.    Nephropathy:  BP historically well controlled. No microalbuminuria.  Creatinine stable. Overdue for labs.  Asked her to schedule.   Neuropathy: No.    Feet: OK, no ulcers.   Lipids:  LDL at target.   Thyroid screening: will check TSH.   Celiac screening: Does not appear to have been screened.  Will check antibodies.   Contraception: did not address today.     3.  F/U in 2 weeks in person with me, in 6 weeks with Janessa Ordoñez, sooner with concerns/hypoglycemia.     49 minutes spent on the date of the encounter doing chart review, review of test results, patient visit and documentation, counseling/coordination of care, and discussion of  follow up plan for worsening hyper and hypoglycemia.  The patient understood and is satisfied with today's visit.     Kym Jang PA-C, MPAS   [facility]  Department of Medicine  Division of Endocrinology and Diabetes

## 2022-06-06 NOTE — TELEPHONE ENCOUNTER
"Prior Authorization Retail Medication Request    Medication/Dose: Victoza  ICD code (if different than what is on RX):    Class 1 obesity due to excess calories without serious comorbidity with body mass index (BMI) of 31.0 to 31.9 in adult [E66.09, Z68.31]  - Primary       Type 1 diabetes mellitus with diabetic neuropathy (H) [E10.40]           Previously Tried and Failed:  History of diet and exercise   Rationale:  I had the pleasure of seeing your patient, Arielle Montenegro. Full intake/assessment was done to determine barriers to weight loss success and develop a treatment plan. Arielle Montenegro is a 37 year old female interested in treatment of medical problems associated with excess weight. She has a height of 5' 2\", a weight of 170 lbs 0 oz, and the calculated Body mass index is 31.09 kg/m . The patient will begin medication in pursuit of improved medical status as influenced by body weight. She will start GLP1 agonist, adding to her basal/bolus ins, if covered, and start low dose phentermine with monitoring of rP/BP if the GLP1 therapy is not covered.Type 1 diabetes is managed by general endocrine here--pt that she should continue with basal bolus insulin. Discussed with her that we can use a wt loss approach with focus on lowering starches/sugars and she can lower her short acting mealtime insulin based on carb content. If any issues with overnight/fasting lows she can lower her Lantus some. She notes that she does carb count. She has a visit with nutrition following this visit also for further nutritional counseling.    Insurance Name:    Insurance ID:        Pharmacy Information (if different than what is on RX)  Name:  [facility] [id number] [place] [facility] - [place], [place] - [address]  Phone:  [phone]  "

## 2022-06-06 NOTE — PROGRESS NOTES
Arielle Montenegro  is being evaluated via a billable video visit.      How would you like to obtain your AVS? [employer]  For the video visit, send the invitation by: Text to cell phone: [phone]  Will anyone else be joining your video visit? No

## 2022-06-06 NOTE — NURSING NOTE
Patient states they are in [place] for today's virtual visit.     Patient is having a lot of nausea and tired with her new prescription Victoza     Wasn't taking blood sugars because she needed a new Ariane.     Denise Mejia, Virtual Visit Joey

## 2022-06-06 NOTE — PATIENT INSTRUCTIONS
Nice meeting youArielle!  Here is what we discussed today...    Please reduce your lantus to 15 units daily.  If you are still going low when you are not eating, then please reduce this to 10 units daily.     Continue Victoza 0.6 mg daily.  Remember, this will reduce your appetite, so please cut your portions down.      I have prescribed the In-pen bluetooth insulin pen for dosing calculations.  Once you receive this, we can set it up for you in diabetes education. Here is the website which explains the device and gael... www.[employer].com.     Please follow up with me on June 20th at 3pm.      See Janessa Ordoñez in 6 weeks.      See diabetes education to start the sensor in the next couple of weeks.     Please bring your sensor and In-pen with you to your upcoming appointment.     Please let me know if you are having low blood sugars less than 70 or over 250 mg/dL.      If you have concerns, please send me a [employer] message M-Th, call the clinic at [phone], or call [phone] after hours/weekends and ask to speak with the endocrinologist on call.      Take care,   Kym

## 2022-06-06 NOTE — LETTER
"6/6/2022       RE: Arielle Montenegro  [address]  [place] [address]     Dear Colleague,    Thank you for referring your patient, Arielle Montenegro, to the [facility] [place] [facility]. Please see a copy of my visit note below.    Outcome for 05/26/22 12:07 PM :Sent patient Sunpreme message asking them to upload their BG data     Start time: 1440  End time: 1515      HPI:   Arielle is a 38 yo woman here for follow up of type 1 diabetes with a history of severe hypoglycemia and hypoglycemia unawareness.  She usually sees Janessa Ordoñez in our clinic.  Today is the first time I am meeting her.  Arielle reports that she is feeling very nauseated, having heartburn and acid reflux today.  She is feeling very tired.  She is laying down during our visit.  She said she thought her glucose was low.  She checked and it said \"lo.\"  She did not eat today because she was nauseated.  Usually, she eats throughout the day. She has no juice, but is eating grapes.  She has a  helping with her kids and she was getting her treatment for her low sugar.  She said she does not have glucagon in her home.  She asked me to call her back after she treated her lows and I did.  She sounded much better.  She reports that she met Dr. Wasserman in weight management and she just started taking victoza a few days ago to help with weight loss.  She was not instructed on adjusting her insulin for this, and she reports she did not know that she would need to lower her insulin doses.  She has been without testing supplies/latoya for a couple of weeks, but was feeling \"high\" so she started taking a higher dose of lantus than in the past.  Her current treatment regimen is as follows:     Lantus- 20 units daily (previous dosing in chart was 6 units)  Novolog- could not explain dosing.  Says she does not understand carb counting, but \"thinks\" she takes 2 units per " "carb.  About 2-8 units at a time. She expresses that she would prefer to have set doses to take when she eats.    Victoza- 0.6 mg daily- started 4 days ago.     She reports that she was diagnosed with type 1 dm around age 8 when she became sick while living in [place]. She has been on insulin since diagnosis.     She wears a latoya sensor, but has not had it on.  She has a friend with type 2 that wears a sensor that beeps on her phone when she is low.  Arielle would really like one of these.  She prefers a sensor with an gael, as her \"kids mess with the machine.\"  She has lost her  before.      Arielle reports that she eats out a lot.  She likes Chinese, all sorts of foods from different places around the world.  Spicy meat, chicken, seafood.  Vegetables and fruits, etc.  She does not like to eat rice, bread, pasta. She did not grow up on this food.    She really wants to lose weight.  She hopes to follow Dr. Wasserman's advice and eat more nutritious foods.  She hopes the Victoza helps her lose weight.     Typical day:   Not hungry for breakfast  Yesterday, bought veggies/fruit.    She wants to do a smoothie in the morning.        She is busy with her kids/family.  Taking a vacation next week to [place].     Blood Glucose Monitoring:  Not available today.      Diabetes monitoring and complications:  CAD: No  Last eye exam results: 6 mo ago, no retinopathy  Microalbuminuria: 172 in   Neuropathy: No  HTN: No  On Statin: No  Depression: Yes      Past Medical History:  Blindness of right eye  Type 1 diabetes  Hypoglycemia unawareness  Vitamin D deficiency  Anxiety     Past Surgical History:   section - , 2015  Enucleation right - 2015    Family History:   Diabetes - paternal side of family       Social History:     Kids now 9, 7 and 19 months old.  One son with ADHD and obese with prediabetes, not type 1.  Other might have autism.      Diabetes Control:   Lab Results   Component Value Date    A1C 5.8 " 2020    A1C 6.4 2020    A1C 7.5 2020    A1C 7.8 2020    A1C 12.6 2019       Past Medical History:   Diagnosis Date     Blindness of right eye      Diabetes mellitus type 1 (H)     Diagnosed as a child       Past Surgical History:   Procedure Laterality Date      SECTION  13      SECTION N/A 2015    Procedure:  SECTION;  Surgeon: John Hudson MD;  Location: [facility] L[facility]      SECTION N/A 7/3/2020    Procedure:  SECTION;  Surgeon: Aparna Atkins MD;  Location: [facility]     ENUCLEATION Right 3/20/2015    Procedure: ENUCLEATION;  Surgeon: Edilson Islas MD;  Location: [facility]       Family History   Problem Relation Age of Onset     Family History Negative Mother      Family History Negative Father      Diabetes Other         father's side       Social History     Socioeconomic History     Marital status: Single     Number of children: 1   Occupational History     Employer: UNEMPLOYED   Tobacco Use     Smoking status: Former Smoker     Packs/day: 0.00     Types: Cigarettes     Smokeless tobacco: Never Used     Tobacco comment: smokes 2 cigarettes/day-none for several months   Substance and Sexual Activity     Alcohol use: No     Alcohol/week: 0.0 standard drinks     Drug use: No     Sexual activity: Yes     Partners: Male     Birth control/protection: None   Social History Narrative    ** Merged History Encounter **         Caffeine intake/servings daily - 0    Calcium intake/servings daily - 3    Exercise 7 times weekly - describe walking    Sunscreen used - No    Seatbelts used - Yes    Guns stored in the home - No    Self Breast Exam - Yes    Pap test up to date -  No    Eye exam up to date -  Yes    Dental exam up to date -  Yes    DEXA scan up to date -  Not Applicable    Flex Sig/Colonoscopy up to date -  Not Applicable    Mammography up to date -  Not Applicable    Immunizations reviewed and up to date - No    Abuse: Current  or Past (Physical, Sexual or Emotional) - No    Do you feel safe in your environment - Yes    Do you cope well with stress - Yes    Do you suffer from insomnia - No    Last updated by: KARL PELAEZ  7/18/2012                       Current Outpatient Medications   Medication     albuterol (PROAIR HFA/PROVENTIL HFA/VENTOLIN HFA) 108 (90 Base) MCG/ACT inhaler     albuterol (PROAIR HFA/PROVENTIL HFA/VENTOLIN HFA) 108 (90 Base) MCG/ACT inhaler     amoxicillin-clavulanate (AUGMENTIN) 875-125 MG tablet     Continuous Blood Gluc  (FREESTYLE NILDA 14 DAY READER) TOMASA     Continuous Blood Gluc Sensor (FREESTYLE NILDA 14 DAY SENSOR) MISC     FLUoxetine (PROZAC) 20 MG capsule     fluticasone-salmeterol (ADVAIR-HFA) 115-21 MCG/ACT inhaler     gabapentin (NEURONTIN) 100 MG capsule     insulin glargine (LANTUS SOLOSTAR) 100 UNIT/ML pen     insulin pen needle (31G X 5 MM) 31G X 5 MM miscellaneous     liraglutide (VICTOZA) 18 MG/3ML solution     mometasone (ELOCON) 0.1 % external cream     naproxen (NAPROSYN) 500 MG tablet     NOVOLOG FLEXPEN 100 UNIT/ML soln     Prenatal Vit-Fe Fumarate-FA (PRENATAL MULTIVITAMIN W/IRON) 27-0.8 MG tablet     traZODone (DESYREL) 50 MG tablet     No current facility-administered medications for this visit.        No Known Allergies    Physical Exam  There were no vitals taken for this visit.  GENERAL: healthy, alert and no distress  RESP: no audible wheeze, cough, or visible cyanosis.  No visible retractions or increased work of breathing.  Able to speak fully in complete sentences.  PSYCH: mentation appears normal, affect normal/bright, judgement and insight intact, normal speech and appearance well-groomed  RESULTS  Lab Results   Component Value Date    A1C 5.8 (H) 07/04/2020    A1C 6.4 (H) 06/11/2020    A1C 7.5 (H) 01/28/2020    A1C 7.8 (H) 01/02/2020    A1C 12.6 (H) 09/20/2019       TSH   Date Value Ref Range Status   05/30/2019 0.877 0.358 - 3.740 UIU/mL Final   05/11/2017 1.19 0.40  - 4.00 mU/L Final   09/30/2014 1.90 0.40 - 4.00 mU/L Final     Comment:     Effective 7/30/2014, the reference range for this assay has changed to reflect   new instrumentation/methodology.         ALT   Date Value Ref Range Status   08/12/2021 12 0 - 50 U/L Final   07/05/2020 24 0 - 50 U/L Final   07/04/2020 27 0 - 50 U/L Final   ]    Recent Labs   Lab Test 09/20/19  1425 03/11/19  0000   CHOL 181 147.6   HDL 71 52.4   * 72   TRIG 45 115.8       Lab Results   Component Value Date     08/12/2021     09/20/2019      Lab Results   Component Value Date    POTASSIUM 3.8 08/12/2021    POTASSIUM 4.0 01/24/2020     Lab Results   Component Value Date    CHLORIDE 100 08/12/2021    CHLORIDE 95 09/20/2019     Lab Results   Component Value Date    ALEXANDRO 8.5 08/12/2021    ALEXANDRO 8.5 09/20/2019     Lab Results   Component Value Date    CO2 25 08/12/2021    CO2 22 09/20/2019     Lab Results   Component Value Date    BUN 12 08/12/2021    BUN 13 09/20/2019     Lab Results   Component Value Date    CR 1.13 08/12/2021    CR 0.91 07/05/2020       GFR Estimate   Date Value Ref Range Status   08/12/2021 63 >60 mL/min/1.73m2 Final     Comment:     As of July 11, 2021, eGFR is calculated by the CKD-EPI creatinine equation, without race adjustment. eGFR can be influenced by muscle mass, exercise, and diet. The reported eGFR is an estimation only and is only applicable if the renal function is stable.   07/05/2020 82 >60 mL/min/[1.73_m2] Final     Comment:     Non  GFR Calc  Starting 12/18/2018, serum creatinine based estimated GFR (eGFR) will be   calculated using the Chronic Kidney Disease Epidemiology Collaboration   (CKD-EPI) equation.     07/04/2020 85 >60 mL/min/[1.73_m2] Final     Comment:     Non  GFR Calc  Starting 12/18/2018, serum creatinine based estimated GFR (eGFR) will be   calculated using the Chronic Kidney Disease Epidemiology Collaboration   (CKD-EPI) equation.     07/03/2020  75 >60 mL/min/[1.73_m2] Final     Comment:     Non  GFR Calc  Starting 12/18/2018, serum creatinine based estimated GFR (eGFR) will be   calculated using the Chronic Kidney Disease Epidemiology Collaboration   (CKD-EPI) equation.       GFR Estimate If Black   Date Value Ref Range Status   07/05/2020 >90 >60 mL/min/[1.73_m2] Final     Comment:      GFR Calc  Starting 12/18/2018, serum creatinine based estimated GFR (eGFR) will be   calculated using the Chronic Kidney Disease Epidemiology Collaboration   (CKD-EPI) equation.     07/04/2020 >90 >60 mL/min/[1.73_m2] Final     Comment:      GFR Calc  Starting 12/18/2018, serum creatinine based estimated GFR (eGFR) will be   calculated using the Chronic Kidney Disease Epidemiology Collaboration   (CKD-EPI) equation.     07/03/2020 87 >60 mL/min/[1.73_m2] Final     Comment:      GFR Calc  Starting 12/18/2018, serum creatinine based estimated GFR (eGFR) will be   calculated using the Chronic Kidney Disease Epidemiology Collaboration   (CKD-EPI) equation.         Lab Results   Component Value Date    MICROL 172 09/30/2014     No results found for: MICROALBUMIN  Lab Results   Component Value Date    CPEPT <0.1 (L) 09/30/2014       No results found for: B12]    Most recent eye exam date: : Not Found     Assessment/Plan:     1.  Type 1 diabetes-  Unclear on her level of control, but she certainly seems to have a high degree of variability per her report and significant hypoglycemia.  Discussed the IN-pen bluetooth insulin pen, which could be helpful for dosing and gaining a better understanding of her patterns/response to therapy.  She was very much interested.  I have prescribed this and she will let me know if she receives this.  I will then order the insulin cartridges to go with it.  May be helpful to consider set dosing for meals, rather than carb counting, as patient seems a bit overwhelmed by this concept.   Can explore further in DM education.We discussed the mechanism of action of victoza, and how it will reduce her appetite and reduce her insulin requirements.  She had a very low blood sugar today while on the video, and I explained that this is likely from a combination of increasing her lantus significantly, adding victoza, and not eating today.  I did remind her that if her lantus is the right dose, she should not drop low (or climb) when she is missing meals.  We discussed the advantages of the dexcom g6 sensor, rather than the latoya and she would like me to order this, which I did.  She would like the gael on her phone and share with our clinic I placed a referral for dm education.    Asked her to reach out to me if she continues having low blood sugars.  Will plan to see her back in person in 2 weeks for further adjustments.  Instructions given to patient:     Nice meeting youArielle!  Here is what we discussed today...    Please reduce your lantus to 15 units daily.  If you are still going low when you are not eating, then please reduce this to 10 units daily.     Continue Victoza 0.6 mg daily.  Remember, this will reduce your appetite, so please cut your portions down.      I have prescribed the In-pen bluetooth insulin pen for dosing calculations.  Once you receive this, we can set it up for you in diabetes education. Here is the website which explains the device and gael... [email or web address].     Please follow up with me on June 20th at 3pm.      See Janessa Ordoñez in 6 weeks.     2.  Risk factors-     Retinopathy:  No known history.  She does not appear to have a recent eye exam.    Nephropathy:  BP historically well controlled. No microalbuminuria.  Creatinine stable. Overdue for labs.  Asked her to schedule.   Neuropathy: No.    Feet: OK, no ulcers.   Lipids:  LDL at target.   Thyroid screening: will check TSH.   Celiac screening: Does not appear to have been screened.  Will check antibodies.    Contraception: did not address today.     3.  F/U in 2 weeks in person with me, in 6 weeks with Janessa Ordoñez, sooner with concerns/hypoglycemia.     49 minutes spent on the date of the encounter doing chart review, review of test results, patient visit and documentation, counseling/coordination of care, and discussion of follow up plan for worsening hyper and hypoglycemia.  The patient understood and is satisfied with today's visit.     Kym Jang PA-C, MPAS   [facility]  Department of Medicine  Division of Endocrinology and Diabetes        Arielle Montenegro  is being evaluated via a billable video visit.      How would you like to obtain your AVS? MyChart  For the video visit, send the invitation by: Text to cell phone: [phone]  Will anyone else be joining your video visit? No

## 2022-06-07 NOTE — TELEPHONE ENCOUNTER
Prior Authorization Not Needed per Insurance    Medication: Victoza-PA NOT NEEDED   Insurance Company: [employer] - Phone [phone] Fax [phone]  Expected CoPay: NO CO-PAY    Pharmacy Filling the Rx: [id number] - [place], [place] - [address]  Pharmacy Notified: Yes  Patient Notified: No    Called pharmacy and pharmacy stated that PA is Not Needed and medication is covered. **Instructed pharmacy to notify patient when script is ready to /ship.** Pharmacy stated that they have a paid claim on medication on 6/5/2022 quantity 6 ml for 30 day supply and patient has picked up medication.

## 2022-06-22 ENCOUNTER — APPOINTMENT (OUTPATIENT)
Dept: CT IMAGING | Facility: CLINIC | Age: 37
End: 2022-06-22
Attending: EMERGENCY MEDICINE
Payer: COMMERCIAL

## 2022-06-22 ENCOUNTER — HOSPITAL ENCOUNTER (INPATIENT)
Facility: CLINIC | Age: 37
LOS: 1 days | Discharge: HOME | End: 2022-06-23
Attending: EMERGENCY MEDICINE | Admitting: STUDENT IN AN ORGANIZED HEALTH CARE EDUCATION/TRAINING PROGRAM
Payer: COMMERCIAL

## 2022-06-22 DIAGNOSIS — S09.90XA CLOSED HEAD INJURY, INITIAL ENCOUNTER: ICD-10-CM

## 2022-06-22 DIAGNOSIS — E16.2 HYPOGLYCEMIA: ICD-10-CM

## 2022-06-22 DIAGNOSIS — R41.82 ALTERED MENTAL STATUS, UNSPECIFIED ALTERED MENTAL STATUS TYPE: ICD-10-CM

## 2022-06-22 DIAGNOSIS — E10.649 TYPE 1 DIABETES MELLITUS WITH HYPOGLYCEMIA AND WITHOUT COMA (H): ICD-10-CM

## 2022-06-22 LAB
ALBUMIN SERPL BCG-MCNC: 3.6 G/DL (ref 3.5–5.2)
ALP SERPL-CCNC: 82 U/L (ref 35–104)
ALT SERPL W P-5'-P-CCNC: 10 U/L (ref 10–35)
ANION GAP SERPL CALCULATED.3IONS-SCNC: 11 MMOL/L (ref 7–15)
APAP SERPL-MCNC: <5 UG/ML (ref 10–30)
AST SERPL W P-5'-P-CCNC: ABNORMAL U/L
BASOPHILS # BLD AUTO: 0.1 10E3/UL (ref 0–0.2)
BASOPHILS NFR BLD AUTO: 2 %
BILIRUB SERPL-MCNC: 0.2 MG/DL
BUN SERPL-MCNC: 13.8 MG/DL (ref 6–20)
CALCIUM SERPL-MCNC: 9.4 MG/DL (ref 8.6–10)
CHLORIDE SERPL-SCNC: 102 MMOL/L (ref 98–107)
CREAT SERPL-MCNC: 0.73 MG/DL (ref 0.51–0.95)
DEPRECATED HCO3 PLAS-SCNC: 22 MMOL/L (ref 22–29)
EOSINOPHIL # BLD AUTO: 0.1 10E3/UL (ref 0–0.7)
EOSINOPHIL NFR BLD AUTO: 2 %
ERYTHROCYTE [DISTWIDTH] IN BLOOD BY AUTOMATED COUNT: 13.1 % (ref 10–15)
GFR SERPL CREATININE-BSD FRML MDRD: >90 ML/MIN/1.73M2
GLUCOSE BLDC GLUCOMTR-MCNC: 128 MG/DL (ref 70–99)
GLUCOSE SERPL-MCNC: 122 MG/DL (ref 70–99)
HCO3 BLDV-SCNC: 27 MMOL/L (ref 21–28)
HCT VFR BLD AUTO: 38.5 % (ref 35–47)
HGB BLD-MCNC: 12.7 G/DL (ref 11.7–15.7)
HOLD SPECIMEN: NORMAL
IMM GRANULOCYTES # BLD: 0 10E3/UL
IMM GRANULOCYTES NFR BLD: 0 %
INR PPP: 0.97 (ref 0.85–1.15)
LACTATE BLD-SCNC: 1.8 MMOL/L
LYMPHOCYTES # BLD AUTO: 1.3 10E3/UL (ref 0.8–5.3)
LYMPHOCYTES NFR BLD AUTO: 29 %
MCH RBC QN AUTO: 28.8 PG (ref 26.5–33)
MCHC RBC AUTO-ENTMCNC: 33 G/DL (ref 31.5–36.5)
MCV RBC AUTO: 87 FL (ref 78–100)
MONOCYTES # BLD AUTO: 0.4 10E3/UL (ref 0–1.3)
MONOCYTES NFR BLD AUTO: 8 %
NEUTROPHILS # BLD AUTO: 2.7 10E3/UL (ref 1.6–8.3)
NEUTROPHILS NFR BLD AUTO: 59 %
NRBC # BLD AUTO: 0 10E3/UL
NRBC BLD AUTO-RTO: 0 /100
PCO2 BLDV: 51 MM HG (ref 40–50)
PH BLDV: 7.32 [PH] (ref 7.32–7.43)
PLATELET # BLD AUTO: 270 10E3/UL (ref 150–450)
PO2 BLDV: 24 MM HG (ref 25–47)
POTASSIUM SERPL-SCNC: 3.9 MMOL/L (ref 3.4–4.5)
PROT SERPL-MCNC: 6.7 G/DL (ref 6.4–8.3)
RBC # BLD AUTO: 4.41 10E6/UL (ref 3.8–5.2)
SALICYLATES SERPL-MCNC: <0.5 MG/DL
SAO2 % BLDV: 38 % (ref 94–100)
SODIUM SERPL-SCNC: 135 MMOL/L (ref 136–145)
WBC # BLD AUTO: 4.6 10E3/UL (ref 4–11)

## 2022-06-22 PROCEDURE — 80143 DRUG ASSAY ACETAMINOPHEN: CPT | Performed by: EMERGENCY MEDICINE

## 2022-06-22 PROCEDURE — 99285 EMERGENCY DEPT VISIT HI MDM: CPT | Performed by: EMERGENCY MEDICINE

## 2022-06-22 PROCEDURE — 96375 TX/PRO/DX INJ NEW DRUG ADDON: CPT | Performed by: EMERGENCY MEDICINE

## 2022-06-22 PROCEDURE — 85610 PROTHROMBIN TIME: CPT | Performed by: EMERGENCY MEDICINE

## 2022-06-22 PROCEDURE — 80179 DRUG ASSAY SALICYLATE: CPT | Performed by: EMERGENCY MEDICINE

## 2022-06-22 PROCEDURE — 99285 EMERGENCY DEPT VISIT HI MDM: CPT | Mod: 25 | Performed by: EMERGENCY MEDICINE

## 2022-06-22 PROCEDURE — 83036 HEMOGLOBIN GLYCOSYLATED A1C: CPT | Performed by: HOSPITALIST

## 2022-06-22 PROCEDURE — 84155 ASSAY OF PROTEIN SERUM: CPT | Performed by: EMERGENCY MEDICINE

## 2022-06-22 PROCEDURE — 82803 BLOOD GASES ANY COMBINATION: CPT

## 2022-06-22 PROCEDURE — 250N000011 HC RX IP 250 OP 636

## 2022-06-22 PROCEDURE — 70450 CT HEAD/BRAIN W/O DYE: CPT

## 2022-06-22 PROCEDURE — 70450 CT HEAD/BRAIN W/O DYE: CPT | Mod: 26 | Performed by: RADIOLOGY

## 2022-06-22 PROCEDURE — 96374 THER/PROPH/DIAG INJ IV PUSH: CPT | Performed by: EMERGENCY MEDICINE

## 2022-06-22 PROCEDURE — 36415 COLL VENOUS BLD VENIPUNCTURE: CPT | Performed by: EMERGENCY MEDICINE

## 2022-06-22 PROCEDURE — 85025 COMPLETE CBC W/AUTO DIFF WBC: CPT | Performed by: EMERGENCY MEDICINE

## 2022-06-22 PROCEDURE — 258N000003 HC RX IP 258 OP 636: Performed by: EMERGENCY MEDICINE

## 2022-06-22 PROCEDURE — 83605 ASSAY OF LACTIC ACID: CPT

## 2022-06-22 PROCEDURE — 96361 HYDRATE IV INFUSION ADD-ON: CPT | Performed by: EMERGENCY MEDICINE

## 2022-06-22 PROCEDURE — 120N000002 HC R&B MED SURG/OB UMMC

## 2022-06-22 RX ORDER — LORAZEPAM 2 MG/ML
INJECTION INTRAMUSCULAR
Status: COMPLETED
Start: 2022-06-22 | End: 2022-06-22

## 2022-06-22 RX ORDER — SODIUM CHLORIDE 9 MG/ML
INJECTION, SOLUTION INTRAVENOUS CONTINUOUS
Status: DISCONTINUED | OUTPATIENT
Start: 2022-06-22 | End: 2022-06-23

## 2022-06-22 RX ORDER — ONDANSETRON 2 MG/ML
INJECTION INTRAMUSCULAR; INTRAVENOUS
Status: COMPLETED
Start: 2022-06-22 | End: 2022-06-22

## 2022-06-22 RX ORDER — ONDANSETRON 2 MG/ML
4 INJECTION INTRAMUSCULAR; INTRAVENOUS EVERY 30 MIN PRN
Status: DISCONTINUED | OUTPATIENT
Start: 2022-06-22 | End: 2022-06-23

## 2022-06-22 RX ORDER — LORAZEPAM 2 MG/ML
0.5 INJECTION INTRAMUSCULAR ONCE
Status: COMPLETED | OUTPATIENT
Start: 2022-06-22 | End: 2022-06-22

## 2022-06-22 RX ADMIN — SODIUM CHLORIDE 1000 ML: 9 INJECTION, SOLUTION INTRAVENOUS at 21:51

## 2022-06-22 RX ADMIN — ONDANSETRON 4 MG: 2 INJECTION INTRAMUSCULAR; INTRAVENOUS at 21:51

## 2022-06-22 RX ADMIN — LORAZEPAM 0.5 MG: 2 INJECTION INTRAMUSCULAR; INTRAVENOUS at 21:26

## 2022-06-22 RX ADMIN — LORAZEPAM 0.5 MG: 2 INJECTION INTRAMUSCULAR at 21:26

## 2022-06-22 ASSESSMENT — ENCOUNTER SYMPTOMS
CONFUSION: 1
NERVOUS/ANXIOUS: 1

## 2022-06-23 VITALS
TEMPERATURE: 98 F | OXYGEN SATURATION: 95 % | RESPIRATION RATE: 16 BRPM | SYSTOLIC BLOOD PRESSURE: 103 MMHG | HEART RATE: 56 BPM | DIASTOLIC BLOOD PRESSURE: 67 MMHG

## 2022-06-23 LAB
ANION GAP SERPL CALCULATED.3IONS-SCNC: 9 MMOL/L (ref 7–15)
BUN SERPL-MCNC: 10 MG/DL (ref 6–20)
CALCIUM SERPL-MCNC: 9 MG/DL (ref 8.6–10)
CHLORIDE SERPL-SCNC: 104 MMOL/L (ref 98–107)
CREAT SERPL-MCNC: 0.68 MG/DL (ref 0.51–0.95)
DEPRECATED HCO3 PLAS-SCNC: 24 MMOL/L (ref 22–29)
GFR SERPL CREATININE-BSD FRML MDRD: >90 ML/MIN/1.73M2
GLUCOSE BLDC GLUCOMTR-MCNC: 156 MG/DL (ref 70–99)
GLUCOSE BLDC GLUCOMTR-MCNC: 66 MG/DL (ref 70–99)
GLUCOSE BLDC GLUCOMTR-MCNC: 94 MG/DL (ref 70–99)
GLUCOSE SERPL-MCNC: 96 MG/DL (ref 70–99)
HBA1C MFR BLD: 10.1 %
POTASSIUM SERPL-SCNC: 3.9 MMOL/L (ref 3.4–4.5)
SODIUM SERPL-SCNC: 137 MMOL/L (ref 136–145)

## 2022-06-23 PROCEDURE — 99236 HOSP IP/OBS SAME DATE HI 85: CPT | Mod: GC | Performed by: STUDENT IN AN ORGANIZED HEALTH CARE EDUCATION/TRAINING PROGRAM

## 2022-06-23 PROCEDURE — 36415 COLL VENOUS BLD VENIPUNCTURE: CPT | Performed by: HOSPITALIST

## 2022-06-23 PROCEDURE — 250N000013 HC RX MED GY IP 250 OP 250 PS 637: Performed by: HOSPITALIST

## 2022-06-23 PROCEDURE — 80048 BASIC METABOLIC PNL TOTAL CA: CPT | Performed by: HOSPITALIST

## 2022-06-23 RX ORDER — NICOTINE POLACRILEX 4 MG
15-30 LOZENGE BUCCAL
Status: DISCONTINUED | OUTPATIENT
Start: 2022-06-23 | End: 2022-06-23 | Stop reason: HOSPADM

## 2022-06-23 RX ORDER — ACETAMINOPHEN 325 MG/1
650 TABLET ORAL EVERY 6 HOURS PRN
Status: DISCONTINUED | OUTPATIENT
Start: 2022-06-23 | End: 2022-06-23 | Stop reason: HOSPADM

## 2022-06-23 RX ORDER — LIDOCAINE 40 MG/G
CREAM TOPICAL
Status: DISCONTINUED | OUTPATIENT
Start: 2022-06-23 | End: 2022-06-23 | Stop reason: HOSPADM

## 2022-06-23 RX ORDER — POLYETHYLENE GLYCOL 3350 17 G/17G
17 POWDER, FOR SOLUTION ORAL DAILY
Status: DISCONTINUED | OUTPATIENT
Start: 2022-06-23 | End: 2022-06-23 | Stop reason: HOSPADM

## 2022-06-23 RX ORDER — AMOXICILLIN 250 MG
2 CAPSULE ORAL 2 TIMES DAILY PRN
Status: DISCONTINUED | OUTPATIENT
Start: 2022-06-23 | End: 2022-06-23 | Stop reason: HOSPADM

## 2022-06-23 RX ORDER — ONDANSETRON 4 MG/1
4 TABLET, ORALLY DISINTEGRATING ORAL EVERY 6 HOURS PRN
Status: DISCONTINUED | OUTPATIENT
Start: 2022-06-23 | End: 2022-06-23 | Stop reason: HOSPADM

## 2022-06-23 RX ORDER — LORAZEPAM 2 MG/ML
1 INJECTION INTRAMUSCULAR ONCE
Status: DISCONTINUED | OUTPATIENT
Start: 2022-06-23 | End: 2022-06-23 | Stop reason: HOSPADM

## 2022-06-23 RX ORDER — PROCHLORPERAZINE 25 MG
25 SUPPOSITORY, RECTAL RECTAL EVERY 12 HOURS PRN
Status: DISCONTINUED | OUTPATIENT
Start: 2022-06-23 | End: 2022-06-23 | Stop reason: HOSPADM

## 2022-06-23 RX ORDER — ONDANSETRON 2 MG/ML
4 INJECTION INTRAMUSCULAR; INTRAVENOUS EVERY 6 HOURS PRN
Status: DISCONTINUED | OUTPATIENT
Start: 2022-06-23 | End: 2022-06-23 | Stop reason: HOSPADM

## 2022-06-23 RX ORDER — IBUPROFEN 600 MG/1
600 TABLET, FILM COATED ORAL EVERY 6 HOURS PRN
Status: DISCONTINUED | OUTPATIENT
Start: 2022-06-23 | End: 2022-06-23 | Stop reason: HOSPADM

## 2022-06-23 RX ORDER — AMOXICILLIN 250 MG
1 CAPSULE ORAL 2 TIMES DAILY PRN
Status: DISCONTINUED | OUTPATIENT
Start: 2022-06-23 | End: 2022-06-23 | Stop reason: HOSPADM

## 2022-06-23 RX ORDER — MAGNESIUM HYDROXIDE/ALUMINUM HYDROXICE/SIMETHICONE 120; 1200; 1200 MG/30ML; MG/30ML; MG/30ML
30 SUSPENSION ORAL EVERY 4 HOURS PRN
Status: DISCONTINUED | OUTPATIENT
Start: 2022-06-23 | End: 2022-06-23 | Stop reason: HOSPADM

## 2022-06-23 RX ORDER — DEXTROSE MONOHYDRATE 25 G/50ML
25-50 INJECTION, SOLUTION INTRAVENOUS
Status: DISCONTINUED | OUTPATIENT
Start: 2022-06-23 | End: 2022-06-23 | Stop reason: HOSPADM

## 2022-06-23 RX ORDER — PROCHLORPERAZINE MALEATE 10 MG
10 TABLET ORAL EVERY 6 HOURS PRN
Status: DISCONTINUED | OUTPATIENT
Start: 2022-06-23 | End: 2022-06-23 | Stop reason: HOSPADM

## 2022-06-23 RX ADMIN — FLUOXETINE 20 MG: 20 CAPSULE ORAL at 08:30

## 2022-06-23 ASSESSMENT — ACTIVITIES OF DAILY LIVING (ADL)
ADLS_ACUITY_SCORE: 35

## 2022-06-23 NOTE — PROVIDER NOTIFICATION
Paged the team as pt was  Uncooperative and harsh, refused to do Covid swab test, tried twice. Team called back to keep and consider as Covid Precaution till swab was done.
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
## 2022-06-23 NOTE — PROGRESS NOTES
[facility]  Transfer Triage Note    Date of call: 06/22/22  Time of call: 11:56 PM    Current Patient Location: [facility]  Current Level of Care: ED    Vitals: Temp: 97.9  F (36.6  C) Temp src: Oral BP: (!) 161/111 Pulse: 120   Resp: 16 SpO2: 100 %      O2 Device: None (Room air) at    Diagnosis: confused and altered  Is COVID-19 a concern? No   Reason for requested transfer: Patient has established care here   Isolation Needs: None    Outside Records: Available  Additional records may be faxed to [phone].    Transfer accepted: Yes  Stability of Patient: Patient is vitally stable, with no critical labs, and will likely remain stable throughout the transfer process  Level of Care Needed: Med Surg  Telemetry Needed:  None  Expected Time of Arrival for Transfer: 0-8 hours  Arrival Location:  [facility]    Recommendations for Management and Stabilization: Given    Additional Comments:     Confused   Altered  Prior to arrival was hypoglycemic, BG 28, now 100s    Medical confusion and possible underlying Psych confusion    Not improving mentation in the ED, so Obs not suggested per ED    Med surg, no tele      Yasmany Monique MD
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
## 2022-06-24 ENCOUNTER — PATIENT OUTREACH (OUTPATIENT)
Dept: CARE COORDINATION | Facility: CLINIC | Age: 37
End: 2022-06-24

## 2022-06-24 DIAGNOSIS — Z71.89 OTHER SPECIFIED COUNSELING: ICD-10-CM
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
## 2022-07-05 ENCOUNTER — PATIENT OUTREACH (OUTPATIENT)
Dept: CARE COORDINATION | Facility: CLINIC | Age: 37
End: 2022-07-05
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
## 2022-07-22 ENCOUNTER — TELEPHONE (OUTPATIENT)
Dept: ENDOCRINOLOGY | Facility: CLINIC | Age: 37
End: 2022-07-22

## 2022-07-22 NOTE — TELEPHONE ENCOUNTER
Letter sent directly via email from [facility].   Belkys Carey RN on 7/22/2022 at 1:10 PM      Health Call Center    Phone Message    May a detailed message be left on voicemail: yes     Reason for Call: Other: Per patient Kym Jang sent her letter to [facility] on 7/12/2022 she can not find it and wants to know if it can be resent by e-mail to   [email or web address]    Action Taken: Other: ENDO    Travel Screening: Not Applicable

## 2022-08-10 DIAGNOSIS — E10.649 TYPE 1 DIABETES MELLITUS WITH HYPOGLYCEMIA AND WITHOUT COMA (H): ICD-10-CM

## 2022-08-10 RX ORDER — INSULIN ASPART 100 [IU]/ML
INJECTION, SOLUTION INTRAVENOUS; SUBCUTANEOUS
Qty: 15 ML | Refills: 11 | Status: SHIPPED | OUTPATIENT
Start: 2022-08-10 | End: 2022-12-19

## 2022-08-10 NOTE — TELEPHONE ENCOUNTER
NOVOLOG FLEXPEN 100 UNIT/ML soln  Last Written Prescription Date:  2/8/2022  Last Fill Quantity: 15,   # refills: 1  Last Office Visit :  6/6/2022  Future Office visit:  None    Routing refill request to provider for review/approval because:  Drug not on the [facility], [facility] or [facility] refill protocol or controlled substance      Lanie Gamez RN  Central Triage Red Flags/Med Refills

## 2022-08-19 ENCOUNTER — VIRTUAL VISIT (OUTPATIENT)
Dept: ENDOCRINOLOGY | Facility: CLINIC | Age: 37
End: 2022-08-19
Payer: COMMERCIAL

## 2022-08-19 VITALS — BODY MASS INDEX: 31.09 KG/M2 | HEIGHT: 62 IN

## 2022-08-19 DIAGNOSIS — Z86.39 HISTORY OF TYPE 1 DIABETES MELLITUS: ICD-10-CM

## 2022-08-19 DIAGNOSIS — E66.09 CLASS 1 OBESITY DUE TO EXCESS CALORIES WITHOUT SERIOUS COMORBIDITY WITH BODY MASS INDEX (BMI) OF 31.0 TO 31.9 IN ADULT: Primary | ICD-10-CM

## 2022-08-19 DIAGNOSIS — E66.811 CLASS 1 OBESITY DUE TO EXCESS CALORIES WITHOUT SERIOUS COMORBIDITY WITH BODY MASS INDEX (BMI) OF 31.0 TO 31.9 IN ADULT: Primary | ICD-10-CM

## 2022-08-19 PROCEDURE — 99215 OFFICE O/P EST HI 40 MIN: CPT | Mod: 95 | Performed by: INTERNAL MEDICINE

## 2022-08-19 ASSESSMENT — PAIN SCALES - GENERAL: PAINLEVEL: NO PAIN (0)

## 2022-08-19 NOTE — PATIENT INSTRUCTIONS
Thank you for allowing us the privilege of caring for you. We hope we provided you with the excellent service you deserve.    Please let us know if there is anything else we can do for you so that we can be sure you are completely satisfied with your care experience.    Your visit was with Dr. Wasserman today.    Instructions per today's visit:  --please follow up with Kym Jang in gen [facility] for sugars review/insulin adjustments prior to a retrial of Victoza, long slow up tirtration as tolerated  can follow--please let us know when you've met with Kym and what adjustments were made so that we can get the other medication restarted  --please return with Dr. Wasserman again in 6-8 wks  --please let us know if you have any questions of concerns between visits    Please call our contact center at [phone] to schedule your next appointments.    Meal Replacement Products:    Here is the link to our new e-store where you can purchase our meal replacement products    [email or web address]    The one week starter kit is a great way to sample a variety of products and see what works for you.    If you want more information about the product go to: HelloFax    Free Shipping for orders over $75    Benefits of meal replacements products:    Portion and calorie control  Improved nutrition  Structured eating  Simplified food choices  Avoid contact with trigger foods    Interested in working with a health ?  Health coaches work with you to improve your overall health and wellbeing.  They look at the whole person, and may involve discussion of different areas of life, including, but not limited to the four pillars of health (sleep, exercise, nutrition, and stress management). Discuss with your care team if you would like to start working a health .    Health Coaching-3 Pack: Schedule by calling [phone]    $99 for three health coaching visits    Visits may be  done in person or via phone    Coaching is a partnership between the  and the client; Coaches do not prescribe or diagnose    Coaching helps inspire the client to reach his/her personal goals    Bluetooth Scale:    We hope to provide you with high quality telephone and virtual healthcare visits while social distancing for COVID-19 is necessary, as well as in the future when virtual visits may be more convenient for you.    Our technology team made it possible for Bluetooth scales to send weight measurements to our electronic medical record. This allows weights from you weighing at home to securely flow into the medical record, which will improve telephone and virtual visits.  Additionally, studies have shown that adults actually lose more weight when their weights are automatically sent to someone else, and also that this process is not stressful for those adults.    Below is a link for purchasing the scale, with a discount code for our patients. You may call your insurance company to see if they will reimburse you for the cost of the scale, as a piece of durable medical equipment. The scales only go up to a weight of 400 pounds. This is an issue and we are working with the developer on increasing this. We found no scales that go over 400lb that have blue-tooth for connecting to [employer].    Scale to purchase: the DebtMarket  Body  Scale: [email or web address]=aw.ds    Discount Code: We have a discount code for our patients to bring the cost down to $50, the code is:  withmed     Steps to link the scale to [employer] via an Android Phone (you can always disconnect at any point in the future):  1. The order must be placed first before the patient can access Track My Health within [employer].  2. Download Google Fit gael from the Google Play Store  a. Log in or register using your Google account  3. Download the Nugg Solutions gael from Nut[employer]pace  Play Store  a. Select add organization  b. Search for [employer] and select it  c. Log into StepsAway  d. Select Track My Health  e. Select the green connect my account button  f. When prompted log into your Google account  g. Select okay to confirm the account  4. Download the [employer]ings Health Mate nidia from Google Play Store  a. [place] for [employer]ings  b. Go to profile  c. Tap google fit under the Apps section  d. Select the option to activate Google Fit integration  e. Select the same Google account  f. Select okay to confirm the account  g.  Steps to link the scale to StepsAway via an iPhone (you can always disconnect at any point in the future):  **Note [email or web address].com is not available for download on an iPad**  1. The order must be placed first before the patient can access [employer] Health within StepsAway.  2. Locate the Health nidia on your iPhone.  a. Set up your Apple Health account as prompted  b. The Sources page will show Apps that communicate with your Health nidia. Once all steps are completed, you should see Champion Windows and Flowdockhart listed under the Apps section and your iPhone under the devices section.  i. Select Health Mate  1. Under 'ALLOW  HEALTH MATE  TO WRITE DATA ensure the toggle is on for Weight.  2. This will allow the scale to add your weight to the Apple Health  ii. Select MyChart  1. Under 'ALLOW  HotelbarHART  TO READ DATA ensure the toggle is on for Weight.  2. This allows StepsAway to grab the weight from Fiverr.com so your provider can see your weights.  3. Download the Flowdockhart nidia from the Nidia Store  a. Select add organization                                                  b. Search for [place] and select it  c. Log into [email or web address]  d. Select Track My Health  e. Select the green connect my account button  f. Follow prompts to link your device to StepsAway.  4. Download the Agent Panda health mate nidia in the Nidia Store  a. [place] for [employer]ings  b. Go to profile  c. Tap Health under the Apps section  d. If  prompted to allow access with the Health Nidia, toggle weight on for read and write access.      For any questions/concerns contact Minal Hua LPN at [phone]    To schedule appointments with our team, please call [phone]    Please call during clinic hours Monday through Friday 8:00a - 4:00p if you have questions or you can contact us via [employer] at anytime.      Lab results will be communicated through My Chart or letter (if My Chart not used). Please call the clinic if you have not received communication after 1 week or if you have any questions.    Clinic Fax: [phone]    Thank you,  Medical Weight Management Team

## 2022-08-19 NOTE — NURSING NOTE
"Chief Complaint   Patient presents with     ANA Guzmán, is participating in a video visit today for a follow up, stop taking medication, as reported by patient.       Vitals:    08/19/22 1000   Height: 1.575 m (5' 2\")       Body mass index is 31.09 kg/m .      Laura Sheth LPN    "

## 2022-08-19 NOTE — PROGRESS NOTES
"Arielle is a 37 year old who is being evaluated via a billable video visit.      How would you like to obtain your AVS? MyChart  If the video visit is dropped, the invitation should be resent by: Text to cell phone: [phone]  Will anyone else be joining your video visit? No       Video-Visit Details  --video visit planned and I joined video room but pt was unable to connect to video today and requested switch to phone visit so visit completed on phone Dogeo platform.  --phone call duration: 14 min    Return Medical Weight Management Note     Arielle Montenegro  MRN:  [id number]  :  1985  ALEX:  2022    Dear [facility],    I had the pleasure of seeing your patient Arielle Montenegro. She is a 37 year old female who I am continuing to see for treatment of obesity related to:       6/3/2022   I have the following health issues associated with obesity: Asthma   I have the following symptoms associated with obesity: Knee Pain, Back Pain, Fatigue       INTERVAL HISTORY:  Return visit with me, last visit about 2 mo ago    -------------------------------  \"--Leeanne Montenegro is a 37 year old female interested in treatment of medical problems associated with excess weight. She has a height of 5' 2\", a weight of 170 lbs 0 oz, and the calculated Body mass index is 31.09 kg/m .    --pt's type 1 diabetes is managed by general endocrine here--pt that she should continue with basal bolus insulin. Discussed with her that we can use a wt loss approach with focus on lowering starches/sugars and she can lower her short acting mealtime insulin based on carb content. If any issues with overnight/fasting lows she can lower her Lantus some. She notes that she does carb count. She has a visit with nutrition following this visit also for further nutritional counseling.\"  -------------------------------    --since last seen--notes she tried Victoza but had low blood sugar on this (combined with her " "insulin regimen)  --noted that she had more anxiety with this med trial; we discussed that we were considering phentermine as a second line possibility but that would be more likely to result in anxiety issues  --she has an interest in Ozempic; we discussed that from my look at her meds this one dose not look to be covered currently by her insurance but the Victoza is. We discussed that these meds are very similar being in the same class with the big difference being daily Victoza dosing vs. Weekly Ozempic dosing. We discussed that these meds can be expensive if insurance does not cover. We discussed that in and of itself in a pt with type 1 DM neither Victoza nor Ozempic would affect blood sugars alone, and that what would be important to regulate blood sugars would be getting insulin dosing properly addressed. Then we could step in again with a cautious slow up-titration of the GLP1 agonist (appears this should be Victoza rather than Ozempic for affordability).  --She was advised to connect back with gen trisha specifically for review of CGM sugars/insulin adjustments; then we can go to the GLP1 addition plan as discussed above. I am also connecting with Kym Jang about this and will work together with her on this pt's mgmt  --pt agreeable to this plan          Wt Readings from Last 3 Encounters:   06/03/22 77.1 kg (170 lb)   10/07/21 73.5 kg (162 lb)   08/12/21 73.5 kg (162 lb)             Changes and Difficulties 8/19/2022   I have made the following changes to my diet since my last visit: no change   With regards to my diet, I am still struggling with: weight   I have made the following changes to my activity/exercise since my last visit: none   With regards to my activity/exercise, I am still struggling with: exercising       VITALS:  Ht 1.575 m (5' 2\")   BMI 31.09 kg/m      MEDICATIONS:   Current Outpatient Medications   Medication Sig Dispense Refill     Continuous Blood Gluc Sensor (FREESTYLE NILDA 14 DAY " SENSOR) MISC Change every 14 days. 2 each 11     Continuous Blood Gluc Transmit (DEXCOM G6 TRANSMITTER) MISC Change every 3 months. 1 each 3     Injection Device for insulin (INPEN 100-GREY-NOVOLOG-FIASP) TOMASA 1 each 5 times daily 2 each 1     insulin pen needle (31G X 5 MM) 31G X 5 MM miscellaneous Use 3 pen needles daily or as directed. 270 each 3     NOVOLOG FLEXPEN 100 UNIT/ML soln Inject 2-8 units with meals TID  and at bedtime.approx 15 units daily, MDD 20 units 15 mL 11     tretinoin (RETIN-A) 0.05 % external cream Apply topically At Bedtime To hands and feet nightly 45 g 1     albuterol (PROAIR HFA/PROVENTIL HFA/VENTOLIN HFA) 108 (90 Base) MCG/ACT inhaler Inhale 2 puffs into the lungs every 6 hours as needed for shortness of breath / dyspnea (Patient not taking: No sig reported) 18 g 0     albuterol (PROAIR HFA/PROVENTIL HFA/VENTOLIN HFA) 108 (90 Base) MCG/ACT inhaler Inhale 1-2 puffs into the lungs every 4 hours as needed for shortness of breath / dyspnea or wheezing (Patient not taking: No sig reported) 18 g 1     Continuous Blood Gluc  (FREESTYLE NILDA 14 DAY READER) TOMASA 1 Device continuous (Patient not taking: No sig reported) 1 each 0     Continuous Blood Gluc Sensor (DEXCOM G6 SENSOR) MISC Change every 10 days. (Patient not taking: Reported on 8/19/2022) 3 each 11     FLUoxetine (PROZAC) 20 MG capsule Take 1 capsule (20 mg) by mouth daily (Patient not taking: No sig reported) 90 capsule 1     fluticasone-salmeterol (ADVAIR-HFA) 115-21 MCG/ACT inhaler Inhale 2 puffs into the lungs 2 times daily (Patient not taking: No sig reported) 8 g 0     gabapentin (NEURONTIN) 100 MG capsule Take 1 capsule (100 mg) by mouth 2 times daily as needed for neuropathic pain (Patient not taking: No sig reported) 30 capsule 0     Glucagon (BAQSIMI TWO PACK) 3 MG/DOSE POWD Spray 1 each in nostril once as needed (severe hypoglycemia) (Patient not taking: Reported on 8/19/2022) 2 each 3     insulin glargine (LANTUS  "SOLOSTAR) 100 UNIT/ML pen Inject 6 Units Subcutaneous At Bedtime (Patient not taking: No sig reported) 15 mL 2     liraglutide (VICTOZA) 18 MG/3ML solution Week 1-- 0.6 mg daily; increase on week 3--1.2 mg daily, as tolerated (Patient not taking: Reported on 8/19/2022) 6 mL 3     naproxen (NAPROSYN) 500 MG tablet Take 1 tablet (500 mg) by mouth 2 times daily as needed for moderate pain (Patient not taking: No sig reported) 30 tablet 0     Prenatal Vit-Fe Fumarate-FA (PRENATAL MULTIVITAMIN W/IRON) 27-0.8 MG tablet Take 1 tablet by mouth daily (Patient not taking: No sig reported) 90 tablet 1     traZODone (DESYREL) 50 MG tablet Take 1 tablet (50 mg) by mouth At Bedtime (Patient not taking: No sig reported) 30 tablet 1       Weight Loss Medication History Reviewed With Patient 8/19/2022   Which weight loss medications are you currently taking on a regular basis?  None   If you are not taking a weight loss medication that was prescribed to you, please indicate why: I did not like the side effects   Are you having any side effects from the weight loss medication that we have prescribed you? Yes   If you are having side effects please describe: BP low, nervousness, changed personality       ASSESSMENT:     Arielle Montenegro is a 37 year old female interested in treatment of medical problems associated with excess weight. She has a height of 5' 2\", a weight of 170 lbs 0 oz, and the calculated Body mass index is 31.09 kg/m .     ASSESSMENT/PLAN:  Arielle is a patient with young adult onset obesity without significant element of familial/genetic influence and without current health consequences. Arielle Montenegro uses food as mood management.     Her problem is complicated by mental health/psychopharmacological barriers        PLAN:    Decrease portion sizes  Dietician visit of education  Volumetrics eating plan     Mental health/Medication barriers   Ancillary testing:  N/A.  Food Plan:  Volumetrics and High protein/low " carbohydrates.   Activity Plan:   Not addressed today  Supplementary:  N/A.   Medication: The patient will re-begin medication in pursuit of improved medical status as influenced by body weight. She will start GLP1 agonist, adding to her basal/bolus ins. She is educated on dosage regimen and possible side effects.     Additionally--  --pt advised to follow up with Kym ESCOTO in gen endo for sugars review/insulin adjustments prior to a retrial of Victoza, long slow up tirtration as tolerated (pt's insurance does not look like it covers Ozempic, which she was interested in, and she does not want to trial adipex d/t history of anxiety/concerns for worsening  --I also reached out to Kym Jang today to ask her to follow up with pt/discuss mgmt plans and coordination of care, I am asking Kym here to let me know when pt has connected/made insulin adjustments so that we can re-trial the Victoza more effectively/safely; pt has prescription already and will be using CGM  --RTC with me again in 6-8 wks        Time: 40 min spent on evaluation, management, counseling, education, & motivational interviewing (phone visit) combined with previsit prep and post visit follow up care/charting same day.    Sincerely,    Maria Dolores Wasserman MD

## 2022-08-19 NOTE — LETTER
"2022       RE: Arielle Montenegro  [address]  [place] [address]     Dear Colleague,    Thank you for referring your patient, Arielle Montenegro, to the [facility] [place] [facility]. Please see a copy of my visit note below.    Arielle is a 37 year old who is being evaluated via a billable video visit.      How would you like to obtain your AVS? MyChart  If the video visit is dropped, the invitation should be resent by: Text to cell phone: [phone]  Will anyone else be joining your video visit? No       Video-Visit Details  --video visit planned and I joined video room but pt was unable to connect to video today and requested switch to phone visit so visit completed on phone [employer] platform.  --phone call duration: 14 min    Return Medical Weight Management Note     Arielle Montenegro  MRN:  [id number]  :  1985  ALEX:  2022    Dear [facility],    I had the pleasure of seeing your patient Arielle Montenegro. She is a 37 year old female who I am continuing to see for treatment of obesity related to:       6/3/2022   I have the following health issues associated with obesity: Asthma   I have the following symptoms associated with obesity: Knee Pain, Back Pain, Fatigue       INTERVAL HISTORY:  Return visit with me, last visit about 2 mo ago    -------------------------------  \"--Leeanne Montenegro is a 37 year old female interested in treatment of medical problems associated with excess weight. She has a height of 5' 2\", a weight of 170 lbs 0 oz, and the calculated Body mass index is 31.09 kg/m .    --pt's type 1 diabetes is managed by general endocrine here--pt that she should continue with basal bolus insulin. Discussed with her that we can use a wt loss approach with focus on lowering starches/sugars and she can lower her short acting mealtime insulin based on carb content. If " "any issues with overnight/fasting lows she can lower her Lantus some. She notes that she does carb count. She has a visit with nutrition following this visit also for further nutritional counseling.\"  -------------------------------    --since last seen--notes she tried Victoza but had low blood sugar on this (combined with her insulin regimen)  --noted that she had more anxiety with this med trial; we discussed that we were considering phentermine as a second line possibility but that would be more likely to result in anxiety issues  --she has an interest in Ozempic; we discussed that from my look at her meds this one dose not look to be covered currently by her insurance but the Victoza is. We discussed that these meds are very similar being in the same class with the big difference being daily Victoza dosing vs. Weekly Ozempic dosing. We discussed that these meds can be expensive if insurance does not cover. We discussed that in and of itself in a pt with type 1 DM neither Victoza nor Ozempic would affect blood sugars alone, and that what would be important to regulate blood sugars would be getting insulin dosing properly addressed. Then we could step in again with a cautious slow up-titration of the GLP1 agonist (appears this should be Victoza rather than Ozempic for affordability).  --She was advised to connect back with gen trisha specifically for review of CGM sugars/insulin adjustments; then we can go to the GLP1 addition plan as discussed above. I am also connecting with Kym Jang about this and will work together with her on this pt's mgmt  --pt agreeable to this plan          Wt Readings from Last 3 Encounters:   06/03/22 77.1 kg (170 lb)   10/07/21 73.5 kg (162 lb)   08/12/21 73.5 kg (162 lb)             Changes and Difficulties 8/19/2022   I have made the following changes to my diet since my last visit: no change   With regards to my diet, I am still struggling with: weight   I have made the following " "changes to my activity/exercise since my last visit: none   With regards to my activity/exercise, I am still struggling with: exercising       VITALS:  Ht 1.575 m (5' 2\")   BMI 31.09 kg/m      MEDICATIONS:   Current Outpatient Medications   Medication Sig Dispense Refill     Continuous Blood Gluc Sensor (FREESTYLE NILDA 14 DAY SENSOR) MISC Change every 14 days. 2 each 11     Continuous Blood Gluc Transmit (DEXCOM G6 TRANSMITTER) MISC Change every 3 months. 1 each 3     Injection Device for insulin (INPEN 100-GREY-NOVOLOG-FIASP) TOMASA 1 each 5 times daily 2 each 1     insulin pen needle (31G X 5 MM) 31G X 5 MM miscellaneous Use 3 pen needles daily or as directed. 270 each 3     NOVOLOG FLEXPEN 100 UNIT/ML soln Inject 2-8 units with meals TID  and at bedtime.approx 15 units daily, MDD 20 units 15 mL 11     tretinoin (RETIN-A) 0.05 % external cream Apply topically At Bedtime To hands and feet nightly 45 g 1     albuterol (PROAIR HFA/PROVENTIL HFA/VENTOLIN HFA) 108 (90 Base) MCG/ACT inhaler Inhale 2 puffs into the lungs every 6 hours as needed for shortness of breath / dyspnea (Patient not taking: No sig reported) 18 g 0     albuterol (PROAIR HFA/PROVENTIL HFA/VENTOLIN HFA) 108 (90 Base) MCG/ACT inhaler Inhale 1-2 puffs into the lungs every 4 hours as needed for shortness of breath / dyspnea or wheezing (Patient not taking: No sig reported) 18 g 1     Continuous Blood Gluc  (FREESTYLE NILDA 14 DAY READER) TOMASA 1 Device continuous (Patient not taking: No sig reported) 1 each 0     Continuous Blood Gluc Sensor (DEXCOM G6 SENSOR) MISC Change every 10 days. (Patient not taking: Reported on 8/19/2022) 3 each 11     FLUoxetine (PROZAC) 20 MG capsule Take 1 capsule (20 mg) by mouth daily (Patient not taking: No sig reported) 90 capsule 1     fluticasone-salmeterol (ADVAIR-HFA) 115-21 MCG/ACT inhaler Inhale 2 puffs into the lungs 2 times daily (Patient not taking: No sig reported) 8 g 0     gabapentin (NEURONTIN) 100 MG " "capsule Take 1 capsule (100 mg) by mouth 2 times daily as needed for neuropathic pain (Patient not taking: No sig reported) 30 capsule 0     Glucagon (BAQSIMI TWO PACK) 3 MG/DOSE POWD Spray 1 each in nostril once as needed (severe hypoglycemia) (Patient not taking: Reported on 8/19/2022) 2 each 3     insulin glargine (LANTUS SOLOSTAR) 100 UNIT/ML pen Inject 6 Units Subcutaneous At Bedtime (Patient not taking: No sig reported) 15 mL 2     liraglutide (VICTOZA) 18 MG/3ML solution Week 1-- 0.6 mg daily; increase on week 3--1.2 mg daily, as tolerated (Patient not taking: Reported on 8/19/2022) 6 mL 3     naproxen (NAPROSYN) 500 MG tablet Take 1 tablet (500 mg) by mouth 2 times daily as needed for moderate pain (Patient not taking: No sig reported) 30 tablet 0     Prenatal Vit-Fe Fumarate-FA (PRENATAL MULTIVITAMIN W/IRON) 27-0.8 MG tablet Take 1 tablet by mouth daily (Patient not taking: No sig reported) 90 tablet 1     traZODone (DESYREL) 50 MG tablet Take 1 tablet (50 mg) by mouth At Bedtime (Patient not taking: No sig reported) 30 tablet 1       Weight Loss Medication History Reviewed With Patient 8/19/2022   Which weight loss medications are you currently taking on a regular basis?  None   If you are not taking a weight loss medication that was prescribed to you, please indicate why: I did not like the side effects   Are you having any side effects from the weight loss medication that we have prescribed you? Yes   If you are having side effects please describe: BP low, nervousness, changed personality       ASSESSMENT:     Arielle Montenegro is a 37 year old female interested in treatment of medical problems associated with excess weight. She has a height of 5' 2\", a weight of 170 lbs 0 oz, and the calculated Body mass index is 31.09 kg/m .     ASSESSMENT/PLAN:  Arielle is a patient with young adult onset obesity without significant element of familial/genetic influence and without current health consequences. Arielle BARNES" Lan uses food as mood management.     Her problem is complicated by mental health/psychopharmacological barriers        PLAN:    Decrease portion sizes  Dietician visit of education  Volumetrics eating plan     Mental health/Medication barriers   Ancillary testing:  N/A.  Food Plan:  Volumetrics and High protein/low carbohydrates.   Activity Plan:   Not addressed today  Supplementary:  N/A.   Medication: The patient will re-begin medication in pursuit of improved medical status as influenced by body weight. She will start GLP1 agonist, adding to her basal/bolus ins. She is educated on dosage regimen and possible side effects.     Additionally--  --pt advised to follow up with Kym ESCOTO in [facility] for sugars review/insulin adjustments prior to a retrial of Victoza, long slow up tirtration as tolerated (pt's insurance does not look like it covers Ozempic, which she was interested in, and she does not want to trial adipex d/t history of anxiety/concerns for worsening  --I also reached out to Kym Jang today to ask her to follow up with pt/discuss mgmt plans and coordination of care, I am asking Kym here to let me know when pt has connected/made insulin adjustments so that we can re-trial the Victoza more effectively/safely; pt has prescription already and will be using CGM  --RTC with me again in 6-8 wks        Time: 40 min spent on evaluation, management, counseling, education, & motivational interviewing (phone visit) combined with previsit prep and post visit follow up care/charting same day.    Sincerely,    Maria Dolores Wasserman MD      Again, thank you for allowing me to participate in the care of your patient.      Sincerely,    Maria Dolores Wasserman MD

## 2022-08-19 NOTE — LETTER
Date:August 22, 2022      Patient was self referred, no letter generated. Do not send.        [facility] Health Information

## 2022-08-25 ENCOUNTER — TELEPHONE (OUTPATIENT)
Dept: ENDOCRINOLOGY | Facility: CLINIC | Age: 37
End: 2022-08-25

## 2022-08-25 NOTE — TELEPHONE ENCOUNTER
Call patient to schedule follow up in the Weight Management Clinic with Dr Wasserman per provider last visit on 08/19/22 checkout comment dispositions. No answered. Left message with clinic number to call back to schedule. Send mychart.     Schedule follow up 2 months.

## 2022-10-07 NOTE — PROGRESS NOTES
Arielle Montenegro  is being evaluated via a billable video visit.      How would you like to obtain your AVS? [employer]  For the video visit, send the invitation by: Text to cell phone: [phone]  Will anyone else be joining your video visit? No      Alexandra Majano CMA    Outcome for 10/07/22 3:47 PM: Data uploaded on [facility] [employer]     Start time: 1404  End time: 1439      HPI:   Arielle is a 38 yo woman here for follow up of type 1 diabetes with a history of severe hypoglycemia and hypoglycemia unawareness.  She reports that she was diagnosed with type 1 dm around age 8 when she became sick while living in [place]. She has been on insulin since diagnosis.   She also sees Janessa Ordoñez in our clinic and Dr. Wasserman in weight management. Arielle reports that she is feeling great today.  She was able to move out of her old apartment and into a new one.  Unfortunately, she did lose her dexcom sensor and In-pen in the move and was unable to start these.  She would like me to reorder these.      Her current treatment regimen is as follows:     Lantus- 15 units daily (previous dosing in chart was 6 units)  Novolog-   Tries to count carbs- seems like it works.  She knows how to do this, but finds it difficult. She usually takes no more than 4 units at a time.   She expresses that she would prefer to have set doses to take when she eats.     She joined a facebook diabetes group and she has been reading about how a lot of people with diabetes have depression.   She feels like she may be dealing with this.  After she had her last baby, she was given an antidepressant, but she never took it.  She wonders if she may need something now.   She feels like she gets mad easily.  She used to think it was just her personality, but she wonders if some of her mood swings are related to her diabetes. She is frustrated with her weight and would like to lose weight.       She often does not take insulin before eating, but tries  "to fix it when it goes too high.  She admits she takes too much and often does not know how much she has recently taken.  At her last visit, I prescribed the In-pen, but she was having a lot of stress and missed her appointment in diabetes education to set it up.  She is ready to do so now.     Her latoya sensor fell out the other day.  She has to get some more.     She wears a latoya sensor, but has not had it on.  She has a friend with type 2 that wears a sensor that beeps on her phone when she is low.  Arielle would really like one of these.  She prefers a sensor with an gael, as her \"kids mess with the machine.\"  She has lost her  before.      Recent glucose:             Arielle reports that she eats out a lot.  She likes Chinese, all sorts of foods from different places around the world.  Spicy meat, chicken, seafood.  Vegetables and fruits, etc.  She does not like to eat rice, bread, pasta. She did not grow up on this food.  She really wants to lose weight.  She was started on Victoza earlier this year by Dr. Wasserman and experienced severe hypoglycemia requiring paramedics and ER visit.  She took 4 doses and then we stopped it.  She wonders if we could consider this again in the future.       Diabetes monitoring and complications:  CAD: No  Last eye exam results: in the past year, no retinopathy  Microalbuminuria: 172 in   Neuropathy: No  HTN: No  On Statin: No  Depression: Yes      Past Medical History:  Blindness of right eye  Type 1 diabetes  Hypoglycemia unawareness  Vitamin D deficiency  Anxiety     Past Surgical History:   section - , 2015  Enucleation right - 2015    Family History:   Diabetes - paternal side of family       Social History:     Kids now 9, 7 and 19 months old.  One son with ADHD and obese with prediabetes, not type 1.  Other might have autism.      Diabetes Control:   Lab Results   Component Value Date    A1C 5.8 2020    A1C 6.4 2020    A1C 7.5 2020    " A1C 7.8 2020    A1C 12.6 2019       Past Medical History:   Diagnosis Date     Blindness of right eye      Diabetes mellitus type 1 (H)     Diagnosed as a child       Past Surgical History:   Procedure Laterality Date      SECTION  13      SECTION N/A 2015    Procedure:  SECTION;  Surgeon: John Hudson MD;  Location: [facility]      SECTION N/A 7/3/2020    Procedure:  SECTION;  Surgeon: Aparna Atkins MD;  Location: [facility]     ENUCLEATION Right 3/20/2015    Procedure: ENUCLEATION;  Surgeon: Edilson Islas MD;  Location: [facility]       Family History   Problem Relation Age of Onset     Family History Negative Mother      Family History Negative Father      Diabetes Other         father's side       Social History     Socioeconomic History     Marital status: Single     Number of children: 1   Occupational History     Employer: UNEMPLOYED   Tobacco Use     Smoking status: Former Smoker     Packs/day: 0.00     Types: Cigarettes     Smokeless tobacco: Never Used     Tobacco comment: smokes 2 cigarettes/day-none for several months   Substance and Sexual Activity     Alcohol use: No     Alcohol/week: 0.0 standard drinks     Drug use: No     Sexual activity: Yes     Partners: Male     Birth control/protection: None   Social History Narrative    ** Merged History Encounter **         Caffeine intake/servings daily - 0    Calcium intake/servings daily - 3    Exercise 7 times weekly - describe walking    Sunscreen used - No    Seatbelts used - Yes    Guns stored in the home - No    Self Breast Exam - Yes    Pap test up to date -  No    Eye exam up to date -  Yes    Dental exam up to date -  Yes    DEXA scan up to date -  Not Applicable    Flex Sig/Colonoscopy up to date -  Not Applicable    Mammography up to date -  Not Applicable    Immunizations reviewed and up to date - No    Abuse: Current or Past (Physical, Sexual or Emotional) - No    Do you  feel safe in your environment - Yes    Do you cope well with stress - Yes    Do you suffer from insomnia - No    Last updated by: KARL PELAEZ  7/18/2012                       Current Outpatient Medications   Medication     albuterol (PROAIR HFA/PROVENTIL HFA/VENTOLIN HFA) 108 (90 Base) MCG/ACT inhaler     albuterol (PROAIR HFA/PROVENTIL HFA/VENTOLIN HFA) 108 (90 Base) MCG/ACT inhaler     Continuous Blood Gluc  (FREESTYLE NILDA 14 DAY READER) TOMASA     Continuous Blood Gluc Sensor (DEXCOM G6 SENSOR) MISC     Continuous Blood Gluc Sensor (FREESTYLE NILDA 14 DAY SENSOR) MISC     Continuous Blood Gluc Transmit (DEXCOM G6 TRANSMITTER) MISC     FLUoxetine (PROZAC) 20 MG capsule     fluticasone-salmeterol (ADVAIR-HFA) 115-21 MCG/ACT inhaler     gabapentin (NEURONTIN) 100 MG capsule     Glucagon (BAQSIMI TWO PACK) 3 MG/DOSE POWD     Injection Device for insulin (INPEN 100-GREY-NOVOLOG-FIASP) TOMASA     insulin glargine (LANTUS SOLOSTAR) 100 UNIT/ML pen     insulin pen needle (31G X 5 MM) 31G X 5 MM miscellaneous     liraglutide (VICTOZA) 18 MG/3ML solution     naproxen (NAPROSYN) 500 MG tablet     NOVOLOG FLEXPEN 100 UNIT/ML soln     Prenatal Vit-Fe Fumarate-FA (PRENATAL MULTIVITAMIN W/IRON) 27-0.8 MG tablet     traZODone (DESYREL) 50 MG tablet     tretinoin (RETIN-A) 0.05 % external cream     No current facility-administered medications for this visit.        No Known Allergies    Physical Exam  There were no vitals taken for this visit.  GENERAL: healthy, alert and no distress  RESP: no audible wheeze, cough, or visible cyanosis.  No visible retractions or increased work of breathing.  Able to speak fully in complete sentences.  PSYCH: mentation appears normal, affect normal/bright, judgement and insight intact, normal speech and appearance well-groomed    RESULTS  Lab Results   Component Value Date    A1C 10.1 (H) 06/22/2022    A1C 5.8 (H) 07/04/2020    A1C 6.4 (H) 06/11/2020    A1C 7.5 (H) 01/28/2020    A1C  7.8 (H) 01/02/2020    A1C 12.6 (H) 09/20/2019       TSH   Date Value Ref Range Status   05/30/2019 0.877 0.358 - 3.740 UIU/mL Final   05/11/2017 1.19 0.40 - 4.00 mU/L Final   09/30/2014 1.90 0.40 - 4.00 mU/L Final     Comment:     Effective 7/30/2014, the reference range for this assay has changed to reflect   new instrumentation/methodology.         ALT   Date Value Ref Range Status   06/22/2022 10 10 - 35 U/L Final   08/12/2021 12 0 - 50 U/L Final   07/05/2020 24 0 - 50 U/L Final   07/04/2020 27 0 - 50 U/L Final   ]    Recent Labs   Lab Test 09/20/19  1425 03/11/19  0000   CHOL 181 147.6   HDL 71 52.4   * 72   TRIG 45 115.8       Lab Results   Component Value Date     06/23/2022     09/20/2019      Lab Results   Component Value Date    POTASSIUM 3.9 06/23/2022    POTASSIUM 3.8 08/12/2021    POTASSIUM 4.0 01/24/2020     Lab Results   Component Value Date    CHLORIDE 104 06/23/2022    CHLORIDE 100 08/12/2021    CHLORIDE 95 09/20/2019     Lab Results   Component Value Date    ALEXANDRO 9.0 06/23/2022    ALEXANDRO 8.5 09/20/2019     Lab Results   Component Value Date    CO2 24 06/23/2022    CO2 25 08/12/2021    CO2 22 09/20/2019     Lab Results   Component Value Date    BUN 10.0 06/23/2022    BUN 12 08/12/2021    BUN 13 09/20/2019     Lab Results   Component Value Date    CR 0.68 06/23/2022    CR 0.91 07/05/2020       GFR Estimate   Date Value Ref Range Status   06/23/2022 >90 >60 mL/min/1.73m2 Final     Comment:     Effective December 21, 2021 eGFRcr in adults is calculated using the 2021 CKD-EPI creatinine equation which includes age and gender (Keerthi rojas al., NEJM, DOI: 10.1056/IITMui1585129)   06/22/2022 >90 >60 mL/min/1.73m2 Final     Comment:     Effective December 21, 2021 eGFRcr in adults is calculated using the 2021 CKD-EPI creatinine equation which includes age and gender (Keerthi et al., NEJM, DOI: 10.1056/FMFOqu1882927)   08/12/2021 63 >60 mL/min/1.73m2 Final     Comment:     As of July 11, 2021, eGFR  is calculated by the CKD-EPI creatinine equation, without race adjustment. eGFR can be influenced by muscle mass, exercise, and diet. The reported eGFR is an estimation only and is only applicable if the renal function is stable.   07/05/2020 82 >60 mL/min/[1.73_m2] Final     Comment:     Non  GFR Calc  Starting 12/18/2018, serum creatinine based estimated GFR (eGFR) will be   calculated using the Chronic Kidney Disease Epidemiology Collaboration   (CKD-EPI) equation.     07/04/2020 85 >60 mL/min/[1.73_m2] Final     Comment:     Non  GFR Calc  Starting 12/18/2018, serum creatinine based estimated GFR (eGFR) will be   calculated using the Chronic Kidney Disease Epidemiology Collaboration   (CKD-EPI) equation.     07/03/2020 75 >60 mL/min/[1.73_m2] Final     Comment:     Non  GFR Calc  Starting 12/18/2018, serum creatinine based estimated GFR (eGFR) will be   calculated using the Chronic Kidney Disease Epidemiology Collaboration   (CKD-EPI) equation.       GFR Estimate If Black   Date Value Ref Range Status   07/05/2020 >90 >60 mL/min/[1.73_m2] Final     Comment:      GFR Calc  Starting 12/18/2018, serum creatinine based estimated GFR (eGFR) will be   calculated using the Chronic Kidney Disease Epidemiology Collaboration   (CKD-EPI) equation.     07/04/2020 >90 >60 mL/min/[1.73_m2] Final     Comment:      GFR Calc  Starting 12/18/2018, serum creatinine based estimated GFR (eGFR) will be   calculated using the Chronic Kidney Disease Epidemiology Collaboration   (CKD-EPI) equation.     07/03/2020 87 >60 mL/min/[1.73_m2] Final     Comment:      GFR Calc  Starting 12/18/2018, serum creatinine based estimated GFR (eGFR) will be   calculated using the Chronic Kidney Disease Epidemiology Collaboration   (CKD-EPI) equation.         Lab Results   Component Value Date    MICROL 172 09/30/2014     No results found for:  MICROALBUMIN  Lab Results   Component Value Date    CPEPT <0.1 (L) 09/30/2014       No results found for: B12]    Most recent eye exam date: : Not Found     Assessment/Plan:     1.  Type 1 diabetes-  Arielle's glucose is highly variable and she has a history of severe hypoglycemia and hypoglycemia unawareness. Her insulin dosing and decision making is unclear.  Discussed the IN-pen bluetooth insulin pen, which could be helpful for dosing and gaining a better understanding of her patterns/response to therapy.  She was very much interested.  I have prescribed this and she will schedule to set this up in diabetes education.  We also discussed switching to fiasp, as she often struggles to take her insulin 15 minutes before eating.  I did warn her that this insulin works very quickly and should be taken at the start of the meal.  May be helpful to consider set dosing for meals, rather than carb counting, as patient seems a bit overwhelmed by this concept.  Can explore further in DM education.  We discussed the advantages of the dexcom g6 sensor, rather than the latoya and she would like me to order this, which I did.  She would like the gael on her phone and share with our clinic I placed a referral for dm education.    Asked her to reach out to me if she continues having low blood sugars.  Addressed her interest in weight loss and restarting Victoza.  I explained that, until we have a better grasp on her glucose control and patterns, it would be unsafe to resume this.  Will certainly consider once glucose is more stable and she is avoiding hypoglycemia.  Will plan to see her back in 6 weeks for further adjustments.  Instructions given to patient:     Switch from Novolog to Fiasp. Please take this right before you start to eat. This insulin works very quickly (within 5 minutes)    Check your sugar before you eat.  Take 3 units before each meal.     Schedule with diabetes education to start the dexcom sensor, in-pen and  discuss insulin pumps.     Schedule in health psychology with Sarah Lozano.      Schedule labs.  Lab [phone]    Please let me know if you are having low blood sugars less than 70 or over 250 mg/dL.      If you have concerns, please send me a [employer] message M-, call the clinic at [phone], or call [phone] after hours/weekends and ask to speak with the endocrinologist on call.        2.  Risk factors-     Retinopathy:  No known history.  She does not appear to have a recent eye exam.    Nephropathy:  BP historically well controlled. No microalbuminuria.  Creatinine stable. Overdue for labs.  Asked her to schedule.   Neuropathy: No.    Feet: OK, no ulcers.   Lipids:  LDL at target.   Thyroid screening: will check TSH.   Celiac screening: Does not appear to have been screened.  Will check antibodies.   Contraception: did not address today.   Depression: She seems to be struggling recently and is open to the idea of scheduling in health psychology with Sarah Lozano.  I did give her contact information.     3.  F/U in 6 weeks with me, sooner with concerns/hypoglycemia.     49 minutes spent on the date of the encounter doing chart review, review of test results, patient visit and documentation, counseling/coordination of care, and discussion of follow up plan for worsening hyper and hypoglycemia.  The patient understood and is satisfied with today's visit.     Kym Jang PA-C, MPAS   [facility]  Department of Medicine  Division of Endocrinology and Diabetes

## 2022-10-14 ENCOUNTER — VIRTUAL VISIT (OUTPATIENT)
Dept: ENDOCRINOLOGY | Facility: CLINIC | Age: 37
End: 2022-10-14
Payer: COMMERCIAL

## 2022-10-14 ENCOUNTER — TELEPHONE (OUTPATIENT)
Dept: ENDOCRINOLOGY | Facility: CLINIC | Age: 37
End: 2022-10-14

## 2022-10-14 DIAGNOSIS — E10.649 TYPE 1 DIABETES MELLITUS WITH HYPOGLYCEMIA AND WITHOUT COMA (H): ICD-10-CM

## 2022-10-14 PROCEDURE — 99215 OFFICE O/P EST HI 40 MIN: CPT | Mod: 95 | Performed by: PHYSICIAN ASSISTANT

## 2022-10-14 RX ORDER — INSULIN GLARGINE 100 [IU]/ML
INJECTION, SOLUTION SUBCUTANEOUS
Qty: 15 ML | Refills: 2 | Status: SHIPPED | OUTPATIENT
Start: 2022-10-14 | End: 2023-01-23

## 2022-10-14 RX ORDER — INSULIN PEN,REUSABLE,BT LISPRO
1 INSULIN PEN (EA) SUBCUTANEOUS
Qty: 2 EACH | Refills: 1 | Status: SHIPPED | OUTPATIENT
Start: 2022-10-14 | End: 2023-05-17

## 2022-10-14 RX ORDER — PROCHLORPERAZINE 25 MG/1
SUPPOSITORY RECTAL
Qty: 1 EACH | Refills: 3 | Status: SHIPPED | OUTPATIENT
Start: 2022-10-14 | End: 2023-09-22

## 2022-10-14 RX ORDER — PROCHLORPERAZINE 25 MG/1
SUPPOSITORY RECTAL
Qty: 9 EACH | Refills: 11 | Status: SHIPPED | OUTPATIENT
Start: 2022-10-14 | End: 2023-01-12

## 2022-10-14 NOTE — LETTER
10/14/2022       RE: Arielle Montenegro  [address] [address]     Dear Colleague,    Thank you for referring your patient, Arielle Montenegro, to the [facility] ENDOCRINOLOGY CLINIC [place] at [facility]. Please see a copy of my visit note below.    Arielle Montenegro  is being evaluated via a billable video visit.      How would you like to obtain your AVS? [employer]  For the video visit, send the invitation by: Text to cell phone: [phone]  Will anyone else be joining your video visit? No      Alexandra Majano CMA    Outcome for 10/07/22 3:47 PM: Data uploaded on MENG [employer]     Start time: 1404  End time: 1439      HPI:   Arielle is a 36 yo woman here for follow up of type 1 diabetes with a history of severe hypoglycemia and hypoglycemia unawareness.  She reports that she was diagnosed with type 1 dm around age 8 when she became sick while living in [place]. She has been on insulin since diagnosis.   She also sees Janessa Ordoñez in our clinic and Dr. Wasserman in weight management. Arielle reports that she is feeling great today.  She was able to move out of her old apartment and into a new one.  Unfortunately, she did lose her dexcom sensor and In-pen in the move and was unable to start these.  She would like me to reorder these.      Her current treatment regimen is as follows:     Lantus- 15 units daily (previous dosing in chart was 6 units)  Novolog-   Tries to count carbs- seems like it works.  She knows how to do this, but finds it difficult. She usually takes no more than 4 units at a time.   She expresses that she would prefer to have set doses to take when she eats.     She joined a facebook diabetes group and she has been reading about how a lot of people with diabetes have depression.   She feels like she may be dealing with this.  After she had her last baby, she was given an antidepressant, but she never took it.  She  "wonders if she may need something now.   She feels like she gets mad easily.  She used to think it was just her personality, but she wonders if some of her mood swings are related to her diabetes. She is frustrated with her weight and would like to lose weight.       She often does not take insulin before eating, but tries to fix it when it goes too high.  She admits she takes too much and often does not know how much she has recently taken.  At her last visit, I prescribed the In-pen, but she was having a lot of stress and missed her appointment in diabetes education to set it up.  She is ready to do so now.     Her latoya sensor fell out the other day.  She has to get some more.     She wears a latoya sensor, but has not had it on.  She has a friend with type 2 that wears a sensor that beeps on her phone when she is low.  Arielle would really like one of these.  She prefers a sensor with an gael, as her \"kids mess with the machine.\"  She has lost her  before.      Recent glucose:             Arielle reports that she eats out a lot.  She likes Chinese, all sorts of foods from different places around the world.  Spicy meat, chicken, seafood.  Vegetables and fruits, etc.  She does not like to eat rice, bread, pasta. She did not grow up on this food.  She really wants to lose weight.  She was started on Victoza earlier this year by Dr. Wasserman and experienced severe hypoglycemia requiring paramedics and ER visit.  She took 4 doses and then we stopped it.  She wonders if we could consider this again in the future.       Diabetes monitoring and complications:  CAD: No  Last eye exam results: in the past year, no retinopathy  Microalbuminuria: 172 in   Neuropathy: No  HTN: No  On Statin: No  Depression: Yes      Past Medical History:  Blindness of right eye  Type 1 diabetes  Hypoglycemia unawareness  Vitamin D deficiency  Anxiety     Past Surgical History:   section - ,   Enucleation right - "     Family History:   Diabetes - paternal side of family       Social History:     Kids now 9, 7 and 19 months old.  One son with ADHD and obese with prediabetes, not type 1.  Other might have autism.      Diabetes Control:   Lab Results   Component Value Date    A1C 5.8 2020    A1C 6.4 2020    A1C 7.5 2020    A1C 7.8 2020    A1C 12.6 2019       Past Medical History:   Diagnosis Date     Blindness of right eye      Diabetes mellitus type 1 (H)     Diagnosed as a child       Past Surgical History:   Procedure Laterality Date      SECTION  13      SECTION N/A 2015    Procedure:  SECTION;  Surgeon: John Hudson MD;  Location: [facility] L+[facility]      SECTION N/A 7/3/2020    Procedure:  SECTION;  Surgeon: Aparna Atkins MD;  Location: [facility] L+[facility]     ENUCLEATION Right 3/20/2015    Procedure: ENUCLEATION;  Surgeon: Edilson Islas MD;  Location: [facility]       Family History   Problem Relation Age of Onset     Family History Negative Mother      Family History Negative Father      Diabetes Other         father's side       Social History     Socioeconomic History     Marital status: Single     Number of children: 1   Occupational History     Employer: UNEMPLOYED   Tobacco Use     Smoking status: Former Smoker     Packs/day: 0.00     Types: Cigarettes     Smokeless tobacco: Never Used     Tobacco comment: smokes 2 cigarettes/day-none for several months   Substance and Sexual Activity     Alcohol use: No     Alcohol/week: 0.0 standard drinks     Drug use: No     Sexual activity: Yes     Partners: Male     Birth control/protection: None   Social History Narrative    ** Merged History Encounter **         Caffeine intake/servings daily - 0    Calcium intake/servings daily - 3    Exercise 7 times weekly - describe walking    Sunscreen used - No    Seatbelts used - Yes    Guns stored in the home - No    Self Breast Exam - Yes    Pap test up to  date -  No    Eye exam up to date -  Yes    Dental exam up to date -  Yes    DEXA scan up to date -  Not Applicable    Flex Sig/Colonoscopy up to date -  Not Applicable    Mammography up to date -  Not Applicable    Immunizations reviewed and up to date - No    Abuse: Current or Past (Physical, Sexual or Emotional) - No    Do you feel safe in your environment - Yes    Do you cope well with stress - Yes    Do you suffer from insomnia - No    Last updated by: KARL PELAEZ  7/18/2012                       Current Outpatient Medications   Medication     albuterol (PROAIR HFA/PROVENTIL HFA/VENTOLIN HFA) 108 (90 Base) MCG/ACT inhaler     albuterol (PROAIR HFA/PROVENTIL HFA/VENTOLIN HFA) 108 (90 Base) MCG/ACT inhaler     Continuous Blood Gluc  (FREESTYLE NILDA 14 DAY READER) TOMASA     Continuous Blood Gluc Sensor (DEXCOM G6 SENSOR) MISC     Continuous Blood Gluc Sensor (FREESTYLE NILDA 14 DAY SENSOR) MISC     Continuous Blood Gluc Transmit (DEXCOM G6 TRANSMITTER) MISC     FLUoxetine (PROZAC) 20 MG capsule     fluticasone-salmeterol (ADVAIR-HFA) 115-21 MCG/ACT inhaler     gabapentin (NEURONTIN) 100 MG capsule     Glucagon (BAQSIMI TWO PACK) 3 MG/DOSE POWD     Injection Device for insulin (INPEN 100-GREY-NOVOLOG-FIASP) TOMASA     insulin glargine (LANTUS SOLOSTAR) 100 UNIT/ML pen     insulin pen needle (31G X 5 MM) 31G X 5 MM miscellaneous     liraglutide (VICTOZA) 18 MG/3ML solution     naproxen (NAPROSYN) 500 MG tablet     NOVOLOG FLEXPEN 100 UNIT/ML soln     Prenatal Vit-Fe Fumarate-FA (PRENATAL MULTIVITAMIN W/IRON) 27-0.8 MG tablet     traZODone (DESYREL) 50 MG tablet     tretinoin (RETIN-A) 0.05 % external cream     No current facility-administered medications for this visit.        No Known Allergies    Physical Exam  There were no vitals taken for this visit.  GENERAL: healthy, alert and no distress  RESP: no audible wheeze, cough, or visible cyanosis.  No visible retractions or increased work of breathing.   Able to speak fully in complete sentences.  PSYCH: mentation appears normal, affect normal/bright, judgement and insight intact, normal speech and appearance well-groomed    RESULTS  Lab Results   Component Value Date    A1C 10.1 (H) 06/22/2022    A1C 5.8 (H) 07/04/2020    A1C 6.4 (H) 06/11/2020    A1C 7.5 (H) 01/28/2020    A1C 7.8 (H) 01/02/2020    A1C 12.6 (H) 09/20/2019       TSH   Date Value Ref Range Status   05/30/2019 0.877 0.358 - 3.740 UIU/mL Final   05/11/2017 1.19 0.40 - 4.00 mU/L Final   09/30/2014 1.90 0.40 - 4.00 mU/L Final     Comment:     Effective 7/30/2014, the reference range for this assay has changed to reflect   new instrumentation/methodology.         ALT   Date Value Ref Range Status   06/22/2022 10 10 - 35 U/L Final   08/12/2021 12 0 - 50 U/L Final   07/05/2020 24 0 - 50 U/L Final   07/04/2020 27 0 - 50 U/L Final   ]    Recent Labs   Lab Test 09/20/19  1425 03/11/19  0000   CHOL 181 147.6   HDL 71 52.4   * 72   TRIG 45 115.8       Lab Results   Component Value Date     06/23/2022     09/20/2019      Lab Results   Component Value Date    POTASSIUM 3.9 06/23/2022    POTASSIUM 3.8 08/12/2021    POTASSIUM 4.0 01/24/2020     Lab Results   Component Value Date    CHLORIDE 104 06/23/2022    CHLORIDE 100 08/12/2021    CHLORIDE 95 09/20/2019     Lab Results   Component Value Date    ALEXANDRO 9.0 06/23/2022    ALEXANDRO 8.5 09/20/2019     Lab Results   Component Value Date    CO2 24 06/23/2022    CO2 25 08/12/2021    CO2 22 09/20/2019     Lab Results   Component Value Date    BUN 10.0 06/23/2022    BUN 12 08/12/2021    BUN 13 09/20/2019     Lab Results   Component Value Date    CR 0.68 06/23/2022    CR 0.91 07/05/2020       GFR Estimate   Date Value Ref Range Status   06/23/2022 >90 >60 mL/min/1.73m2 Final     Comment:     Effective December 21, 2021 eGFRcr in adults is calculated using the 2021 CKD-EPI creatinine equation which includes age and gender (Keerthi et al., NEJM, DOI:  10.1056/HGWKht1724042)   06/22/2022 >90 >60 mL/min/1.73m2 Final     Comment:     Effective December 21, 2021 eGFRcr in adults is calculated using the 2021 CKD-EPI creatinine equation which includes age and gender (Keerthi rojas al., NE, DOI: 10.1056/IMLYvj5215143)   08/12/2021 63 >60 mL/min/1.73m2 Final     Comment:     As of July 11, 2021, eGFR is calculated by the CKD-EPI creatinine equation, without race adjustment. eGFR can be influenced by muscle mass, exercise, and diet. The reported eGFR is an estimation only and is only applicable if the renal function is stable.   07/05/2020 82 >60 mL/min/[1.73_m2] Final     Comment:     Non  GFR Calc  Starting 12/18/2018, serum creatinine based estimated GFR (eGFR) will be   calculated using the Chronic Kidney Disease Epidemiology Collaboration   (CKD-EPI) equation.     07/04/2020 85 >60 mL/min/[1.73_m2] Final     Comment:     Non  GFR Calc  Starting 12/18/2018, serum creatinine based estimated GFR (eGFR) will be   calculated using the Chronic Kidney Disease Epidemiology Collaboration   (CKD-EPI) equation.     07/03/2020 75 >60 mL/min/[1.73_m2] Final     Comment:     Non  GFR Calc  Starting 12/18/2018, serum creatinine based estimated GFR (eGFR) will be   calculated using the Chronic Kidney Disease Epidemiology Collaboration   (CKD-EPI) equation.       GFR Estimate If Black   Date Value Ref Range Status   07/05/2020 >90 >60 mL/min/[1.73_m2] Final     Comment:      GFR Calc  Starting 12/18/2018, serum creatinine based estimated GFR (eGFR) will be   calculated using the Chronic Kidney Disease Epidemiology Collaboration   (CKD-EPI) equation.     07/04/2020 >90 >60 mL/min/[1.73_m2] Final     Comment:      GFR Calc  Starting 12/18/2018, serum creatinine based estimated GFR (eGFR) will be   calculated using the Chronic Kidney Disease Epidemiology Collaboration   (CKD-EPI) equation.     07/03/2020 87 >60  mL/min/[1.73_m2] Final     Comment:      GFR Calc  Starting 12/18/2018, serum creatinine based estimated GFR (eGFR) will be   calculated using the Chronic Kidney Disease Epidemiology Collaboration   (CKD-EPI) equation.         Lab Results   Component Value Date    MICROL 172 09/30/2014     No results found for: MICROALBUMIN  Lab Results   Component Value Date    CPEPT <0.1 (L) 09/30/2014       No results found for: B12]    Most recent eye exam date: : Not Found     Assessment/Plan:     1.  Type 1 diabetes-  Arielle's glucose is highly variable and she has a history of severe hypoglycemia and hypoglycemia unawareness. Her insulin dosing and decision making is unclear.  Discussed the IN-pen bluetooth insulin pen, which could be helpful for dosing and gaining a better understanding of her patterns/response to therapy.  She was very much interested.  I have prescribed this and she will schedule to set this up in diabetes education.  We also discussed switching to fiasp, as she often struggles to take her insulin 15 minutes before eating.  I did warn her that this insulin works very quickly and should be taken at the start of the meal.  May be helpful to consider set dosing for meals, rather than carb counting, as patient seems a bit overwhelmed by this concept.  Can explore further in DM education.  We discussed the advantages of the dexcom g6 sensor, rather than the latoya and she would like me to order this, which I did.  She would like the gael on her phone and share with our clinic I placed a referral for dm education.    Asked her to reach out to me if she continues having low blood sugars.  Addressed her interest in weight loss and restarting Victoza.  I explained that, until we have a better grasp on her glucose control and patterns, it would be unsafe to resume this.  Will certainly consider once glucose is more stable and she is avoiding hypoglycemia.  Will plan to see her back in 6 weeks for  further adjustments.  Instructions given to patient:     Switch from Novolog to Fiasp. Please take this right before you start to eat. This insulin works very quickly (within 5 minutes)    Check your sugar before you eat.  Take 3 units before each meal.     Schedule with diabetes education to start the dexcom sensor, in-pen and discuss insulin pumps.     Schedule in health psychology with Sarah Lozano.      Schedule labs.  Lab [phone]    Please let me know if you are having low blood sugars less than 70 or over 250 mg/dL.      If you have concerns, please send me a [employer] message M-, call the clinic at [phone], or call [phone] after hours/weekends and ask to speak with the endocrinologist on call.        2.  Risk factors-     Retinopathy:  No known history.  She does not appear to have a recent eye exam.    Nephropathy:  BP historically well controlled. No microalbuminuria.  Creatinine stable. Overdue for labs.  Asked her to schedule.   Neuropathy: No.    Feet: OK, no ulcers.   Lipids:  LDL at target.   Thyroid screening: will check TSH.   Celiac screening: Does not appear to have been screened.  Will check antibodies.   Contraception: did not address today.   Depression: She seems to be struggling recently and is open to the idea of scheduling in health psychology with Sarah Lozano.  I did give her contact information.     3.  F/U in 6 weeks with me, sooner with concerns/hypoglycemia.     49 minutes spent on the date of the encounter doing chart review, review of test results, patient visit and documentation, counseling/coordination of care, and discussion of follow up plan for worsening hyper and hypoglycemia.  The patient understood and is satisfied with today's visit.     Kym Jang PA-C, MPAS   [facility]  Department of Medicine  Division of Endocrinology and Diabetes

## 2022-10-14 NOTE — PATIENT INSTRUCTIONS
Switch from Novolog to Fiasp. Please take this right before you start to eat. This insulin works very quickly (within 5 minutes)    Check your sugar before you eat.  Take 3 units before each meal.     Schedule with diabetes education to start the dexcom sensor, in-pen and discuss insulin pumps.     Schedule in health psychology with Sarah Lozano.      Schedule labs.  Lab [phone]    Please schedule eye exam.     Please let me know if you are having low blood sugars less than 70 or over 250 mg/dL.      If you have concerns, please send me a [employer] message , call the clinic at [phone], or call [phone] after hours/weekends and ask to speak with the endocrinologist on call.            HEALTH PSYCHOLOGY    What is Health Psychology?  Health Psychology is a specialty that helps people cope with the stress and anxiety that often occurs with illness, emotional and psychological issues, and preparation for medical treatment including surgical procedures.    Telehealth services are now being provided to patients.    Location[facility]                       [address] [place] [id number]                     Please arrive 15 minutes early to check in for in-person appointments.       We can help patients affected by  Adjustment problems  Anxiety  Cancer  Cardiovascular disorders  Chronic digestive disorders  Depression  Diabetes  Disability  Health-related behavior change  Insomnia  Other medical and nervous system conditions  People experiencing or wishing to prevent health problems  Pulmonary/lung conditions  Smoking cessation  Transplants    Treatments we offer include  Psychological assessment  Psychotherapy/counseling  Cognitive behavioral therapy  Relaxation training  Behavior therapy  Motivational interviewing  Stress management    How We Help  Coping: We help people with the emotional issues related to their illness.  Challenges: Difficult challenges seem to  increase during illness. We teach steps and strategies for managing stressful situations.  Feelings: We help people deal with anger, anxiety, confusion, depression, fear, frustration, grief, loss of control, sadness, uncertainty, and motivational issues.  Behaviors: When people are ill, it can be harder to take care of themselves. We help people manage weight, follow health regimens, relax, and quit smoking.  Counseling: We offer several types of counseling and can create a plan that meets needs of a patient. Our practice works with individuals, couples, and families.    Our Care Team  Working with primary and specialty physicians at [facility], we see patients in the hospital and clinic, allowing us to coordinate our services with the rest of people's medical needs.       To Schedule an Appointment  New patient appointments are generally scheduled through the Central Scheduling number: [phone].      Patients may contact our psychologists directly with questions or to make an appointment.    Summer Lozano, Ph.D.,                    [phone] website  Sophie Camejo, Ph.D.,                    [phone] website  Ronna Pham, Ph.D., A.B.P.P.,       [phone] website         Santiago Miller, Ph.D., A.B.P.P.,  [phone] website   Becky Michael, Ph.D.,                     [phone] website  Sarah Lozano, Ph.D., A.B.P.P.,         [phone] website    NOTE: Services are confidential. Insurance coverage varies. Mental health benefits of health plans may have different levels of coverage than medical benefits. Please confirm the coverage details with the insurance plan or our business office.

## 2022-10-17 NOTE — TELEPHONE ENCOUNTER
PRIOR AUTHORIZATION DENIED    Medication: Fiasp Penfill 100Unit/ML SOCT    Denial Date: 10/15/2022    Denial Rational:     Appeal Information:

## 2022-10-17 NOTE — TELEPHONE ENCOUNTER
RE    Message  Received: Today  Kym Jang PA-C Miller, Deanne M RN  Caller: Unspecified (3 days ago,  3:11 PM)  Ok to substitute novolog penfills. Thanks,   Kym

## 2022-10-17 NOTE — TELEPHONE ENCOUNTER
I don't think it's necessary for a new prescription for Novolog Flexpen. I saw a prescription for Fiasp and visit dated 10/14/2022 the provider wrote:      Unfortunately, Fiasp isn't covered by the patient's insurance so I initiated a prior authorization which was denied. If the provider plans to keep the patient on Novolog then I don't think there's a need for a new prescription since the Novolog Flexpen from 08/10/2022 has 11 refills(12 in total, good for a year).    If there's any other questions please contact me.    Thank You!    Tee Steven [facility] Pharmacy Liaison  [facility]  [email or web address]  Phone: [phone]  Fax: [phone]

## 2022-10-18 ENCOUNTER — VIRTUAL VISIT (OUTPATIENT)
Dept: PHARMACY | Facility: CLINIC | Age: 37
End: 2022-10-18
Payer: COMMERCIAL

## 2022-10-18 ENCOUNTER — DOCUMENTATION ONLY (OUTPATIENT)
Dept: PHARMACY | Facility: CLINIC | Age: 37
End: 2022-10-18

## 2022-10-18 DIAGNOSIS — E10.649 TYPE 1 DIABETES MELLITUS WITH HYPOGLYCEMIA AND WITHOUT COMA (H): Primary | ICD-10-CM

## 2022-10-18 PROCEDURE — 99605 MTMS BY PHARM NP 15 MIN: CPT

## 2022-10-18 PROCEDURE — 99607 MTMS BY PHARM ADDL 15 MIN: CPT

## 2022-10-18 NOTE — PATIENT INSTRUCTIONS
Recommendations from today's MTM visit:                                                    MTM (medication therapy management) is a service provided by a clinical pharmacist designed to help you get the most of out of your medicines. Today we reviewed what your medicines are for, how to know if they are working, that your medicines are safe and how to make your medicine regimen as easy as possible.      Great to meet with you today Arielle! Please call me at [phone] when you hear back from [facility] so we can get you set up with the InPen. Below is some in-depth information about the Dexcom. Please reach out if anything comes up before our call next week!      Parts of your DexCom G6 System      Top: insertion device Bottom: sensor as it appears on top of skin.  The gray piece is the transmitter.    The sensor comes preloaded in the insertion device.    2. The sensor is the part that sits under your skin.  You will need three sensors a month.  3. The transmitter snaps into the sensor after it is inserted. You will need a new transmitter every 3 months.        There are two options to get your DexCom data.  An gael on a compatible mobile phone or a .    Tips for using your Dexcom sensor    There is a two hour warm-up period each time you insert a new Dexcom sensor.  During that time the part of the sensor that sits under your skin is getting wet with your interstitial fluid.  The sensor cannot provide glucose readings until that happens.    2.  Each time you insert a new sensor, you need to input another 4 digit code.  The code is on the paper backing you remove from the adhesive before inserting.    3.  After two hours the glucose will stream to the  or an gael on your cell phone or a  every 5 minutes.  This will provide 288 glucose readings for every days you wear the sensor.    4.  You will get a trend arrow in addition to glucose result every 5 minutes.       WHAT THE TREND ARROWS MEAN:    "  The trend arrows are helpful in predicting where your sensor glucose will be in the next 30 minutes or so.  The system doesn't have the ability to predict further ahead than about 30 minutes.       Straight across arrow:  Means your sensor glucose is not changing much (less than 1 mg/dL/mn)     Angled arrows up or down:  Means that if nothing else changes,  your sensor glucose may rise or fall about 50 mg/dL over the next 30 minutes.     One arrow straight UP or DOWN:  Means that if nothing else changes, your sensor glucose may rise or fall about 75 mg/dL over the next 30 minutes.    Two arrows straight UP or DOWN:  Means that if nothing else changes, your sensor glucose may rise or fall about 100 mg/dL over the next 30 minutes      5. If your glucose goes above or below the thresholds you set you will get an alarm.  Acknowledge the alarm on your device to stop it.    6. Try to keep your device within 20 feet of you.  If you are using the gael on your cell phone, try to keep the gael open in the background of your phone.  If you close it out it will lose connection with the phone.  If this happens turn your phone off and then turn on to restart it.    7. The Dexcom sensor is reading the glucose in the interstitial fluid.  Your blood glucose meter is reading glucose in your blood stream.  Most of the time these two values are very close and that is why you can dose your insulin off your Dexcom value.  However, after eating, glucose gets to your blood stream first and then it takes another 20-30 minutes for it to get out to the sensor.  During this time your blood glucose may be significantly higher than your sensor reading.  The difference is called \"lag time\".    7. Treat low blood sugar as recommended by your healthcare team (15 grams for blood sugar 55-69 mg/dl, 30 grams for blood sugar <55 mg/dl) and do a test with a blood sugar meter to check your recovery.  Due to \"lag time\" it will take the sensor an extra " 20-30 to recognize that you have recovered from a low.  If you eat until the sensor value comes back up you will rebound quite high.    8. You can calibrate a Dexcom sensor if you feel the accuracy is off.  Go to the calibrate screen and input a blood glucose value.  Be sure that you have not eaten in the last two hours before calibrating.  Do not calibrate multiple times a day this can throw the accuracy of your sensor off.  Be sure that you wash your hands prior to any blood glucose readings taken for calibration as skipping handwashing is a common reason blood glucose readings are not accurate.    9. Sensors are sensitive to movement and excessive movement can cause sensor errors.  Pay attention to your sites if you are having a lot of sensor errors.  Some sites on your body get more movement than others.    10. If you have a sensor that does not last 10 days go to [email or web address].[email or web address] and report the sensor error.  The company will replace the sensor free of charge.  They will ask you to provide the circumstances (when the sensor was inserted and when it failed) and the lot number off the box of sensors.  The tech support number for Dexcom is [phone] or you can go this route [employer]-->support-->contact us-->request sensor or live chat.       11. There are many tips and products to improve adhesion of your DexCom sensor.  You can find the entire list at [email or web address].com.  Type adhesion into the search bar.    12. Keep yourself hydrated.  The sensor is reading the glucose in the interstitial fluid.  You need to have adequate fluid to read.    13.  Your transmitter will need to be replaced every three months.  Contact your supplier to get a new on when your device gives you the message that your battery is low.  You have about 10 days from the first message to get a new one before the battery stops working.    14. There are multiple strategies to combat skin reactions to Dexcom tape.  Go to [email or web address].com and type skin  "irritation into the search.    15. You will need two apps on your phone to stream data to our clinic portal.  The Dexcom G 6 gael and Dexcom Clarity.  You can generate a sharing code to give to your healthcare team or our preferred way, to accept our invitation to share data.  To add other follower such as family members they would get the Dexcom Follow gael.    16. Most of the time Dexcom is covered through your pharmacy benefit and you can get it at the same pharmacy you use for your other prescriptions. Medicare covers it as durable medical equipment and orders for it need to be sent to a company that can bill it that way.  This may mean that you need to use a mail-order company such as [employer] [facility] or [employer].     17. Compression lows: The sensor reads the glucose in the interstitial fluid.  If you are laying on the sensor you can prevent the interstitial fluid from getting to the sensor.  This may cause your sensor to read it as low blood sugar when you are not actually low.  We call this a \"compression low\" and often if you change positions the sensor will adjust and start reading accurately again.  If there is any question about your reading do a fingerstick.    18.  The system will alert you when your sensor session is ending.  There will be a countdown of hours until it gets to change sensor now.  If you are ending your session early go into the main menu and and scroll to the bottom of the menu and hit stop sensor.  Peel the sensor off like a Band-Aid.  Baby oil or soap may help loosen the tape.    It was great to speak with you today!  I value your experience and would be very thankful for your time with providing feedback on our clinic survey. You may receive a survey via email or text message in the next few days.     To schedule another MTM appointment, please call the clinic directly or you may call the MTM scheduling line at [phone] or toll-free at [phone].     My " Clinical Pharmacist's contact information:                                                      Please feel free to contact me with any questions or concerns you have!     Lauro Barrios, PharmD  Endocrinology & Diabetes [facility] Pharmacist  [address] [facility] [address]  Direct Voicemail: [phone]

## 2022-10-18 NOTE — PROGRESS NOTES
Medication Therapy Management (MTM) Encounter    ASSESSMENT:                            Medication Adherence/Access: See below for considerations    Type 1 Diabetes: Last A1C above goal of < 7%. However, experiences frequent episodes of hypoglycemia and hypoglycemia unawareness. Would benefit from Dexcom education today, including linking patient with endocrinology clinic so data can be viewed remotely. Additionally, would benefit from InPen education when she gets it from [employer]. Will reach out to the patient in 1 week for education. Patient interested in Omnipod G5--may be good candidate once stable on MDI and we have accurate picture of trends. Will message Kym to get her thoughts.     Due to time constraints, I was only able to assess the above with the patient today. Will follow-up on other disease states in future encounters    PLAN:                            -Dexcom Education:  1. Instructed the patient on proper sensor placement (at least two inches from belly button) and cleaning with an alcohol wipe before application.   2. Guided patient on the proper steps to apply the sensor and transmitter.  3. Instructed the patient on how to sync a new sensor to the Dexcom G6 Nidia and set the  BG range of . **Note: Patient preferred to set low alarm to 80 and high alarm to 190. Encouraged her that this could be changed in the future if the alarms become annoying.  4. Instructed the patient on how to check blood sugar with Dexcom, including how to interpret trend arrows.   5. Discussed with patient the difference between fingerstick glucose readings and sensor glucose readings (sensor glucose readings are 10 minutes behind fingerstick glucose readings). Instructed patient to check fingerstick glucose when directed by the Dexcom G6 or if signs/symptoms of hypoglycemia occur.  6. Instructed patient to change sensor every 10 days. Provided patient with phone number to call if a sensor were to fall off or  malfunction. Emphasized to NOT throw away transmitter piece which lasts 90 days.  7. Instructed patient to download Dexcom Clarity and Dexcom via smartphone and linked to our clinic's portal. Use the same login information for each gael.     By the end of this education session, patient was able to verbalize understanding of this information and was able to appropriately self-apply the sensor under my supervision.    -Call or Balt Lauro when you [facility]    Follow-up: 10/25 with Lauro via phone for Dexcom check-in    Medication issues to be addressed at a future visit:      Patient interested in Omnipod G5. Will staff message Kym to get her thoughts.    Assess asthma (no recent fill hx for maintenance inhalers)    Assess FERMIN/MDD and make sure patient called the resource Kym provided in last visit    SUBJECTIVE/OBJECTIVE:                          Arielle Montenegro is a 37 year old female contacted via secure video for an initial visit. She was referred to me from Kym Jang PA-C.      Reason for visit: Medication Therapy Management (MTM).    Allergies/ADRs: Reviewed in chart  Past Medical History: Reviewed in chart  Social History     Tobacco Use     Smoking status: Former     Packs/day: 0.00     Types: Cigarettes     Smokeless tobacco: Never     Tobacco comments:     smokes 2 cigarettes/day-none for several months   Substance Use Topics     Alcohol use: No     Alcohol/week: 0.0 standard drinks     Drug use: No    ^Reviewed today    Medication Adherence/Access: Fill history appropriate for diabetes medications/supplies, but other medications have variable fill history. Only had time to assess diabetes today.    Type 1 Diabetes:   Diabetes Medication(s)     Diabetic Other       Glucagon (BAQSIMI TWO PACK) 3 MG/DOSE POWD    Spray 1 each in nostril once as needed (severe hypoglycemia)     Patient not taking: No sig reported    Insulin       Insulin Aspart, w/Niacinamide, 100  "UNIT/ML SOCT    Inject 30 Units Subcutaneous daily Use as directed up to 30 units daily.     insulin glargine (LANTUS SOLOSTAR) 100 UNIT/ML pen    Take 15 units daily.     NOVOLOG FLEXPEN 100 UNIT/ML soln    Inject 2-8 units with meals TID  and at bedtime.approx 15 units daily, MDD 20 units.   Counts carbs and adjusts short acting insulin accordingly throughout the day. 3 units at each meal was recommended by Kym last visit.   Prescribed Inpen, hasn't come yet. I provided her with [facility] phone # to place her order.  Blood sugar monitoring: Continuous Glucose Monitor. Historically on Ariane, but is switching over to Dexcom today.   Symptoms of low blood sugar? Frequent lows and hypoglycemia unawareness. She knows how to treat lows (15-15 rule)  Diet (reviewed with Kym 10/14): Arielle reports that she eats out a lot.  She likes Chinese, all sorts of foods from different places around the world.  Spicy meat, chicken, seafood.  Vegetables and fruits, etc.  She does not like to eat rice, bread, pasta. She did not grow up on this food.  She really wants to lose weight.  She was started on Victoza earlier this year by Dr. Wasserman and experienced severe hypoglycemia requiring paramedics and ER visit.  She took 4 doses and then we stopped it.  She wonders if we could consider this again in the future.   Statin: No   ACEi/ARB: No  Urine Albumin:   Lab Results   Component Value Date    UMALCR 94.51 (H) 09/30/2014      Lab Results   Component Value Date    A1C 10.1 06/22/2022    A1C 5.8 07/04/2020    A1C 6.4 06/11/2020    A1C 7.5 01/28/2020    A1C 7.8 01/02/2020    A1C 12.6 09/20/2019     Most Recent Immunizations   Administered Date(s) Administered     TDAP Vaccine (Boostrix) 12/08/2014     Estimated body mass index is 31.09 kg/m  as calculated from the following:    Height as of 8/19/22: 5' 2\" (1.575 m).    Weight as of 6/3/22: 170 lb (77.1 kg).      BP Readings from Last 1 Encounters:   06/23/22 103/67     Pulse " "Readings from Last 1 Encounters:   06/23/22 56     Wt Readings from Last 1 Encounters:   06/03/22 170 lb (77.1 kg)     Ht Readings from Last 1 Encounters:   08/19/22 5' 2\" (1.575 m)     Estimated body mass index is 31.09 kg/m  as calculated from the following:    Height as of 8/19/22: 5' 2\" (1.575 m).    Weight as of 6/3/22: 170 lb (77.1 kg).    Temp Readings from Last 1 Encounters:   06/23/22 98  F (36.7  C) (Oral)       ----------------  I spent 60 minutes with this patient today. Kym Jang PA-C was provided the recommendations above via routed note and is the authorizing prescriber for this visit through the pharmacist collaborative practice agreement. A copy of the visit note was provided to the patient's provider(s).    The patient was given a summary of these recommendations.     Lauro Barrios, PharmD, [facility]  Endocrinology & Diabetes [facility] Pharmacist  [address] [place] [address]  Direct Voicemail: [phone]  Clinic Hours: Monday-Friday 7:00 AM - 3:30 PM    Telemedicine Visit Details  Type of service:  Telephone visit  Start Time: 10:10AM  End Time: 11:10AM  Originating Location (patient location): [place]  Distant Location (provider location):  [facility] DIABETES [facility]     Medication Therapy Recommendations  Diabetes mellitus type 1 (H)    Current Medication: Continuous Blood Gluc Sensor (DEXCOM G6 SENSOR) MISC   Rationale: Does not understand instructions - Adherence - Adherence   Recommendation: Provide Education   Status: Patient Agreed - Adherence/Education                         "

## 2022-10-18 NOTE — Clinical Note
[facility]!  Was able to get her set up with Dexcom today and linked her to the endo's Clarity! She has not received the InPen yet so I provided her with [facility] phone number to set up delivery. I will f/up in 1 week to get her set up with that as well.  I saw in your note that you requested diabetes education to discuss pumps with her. I did discuss Omnipod with her and she sounded very interested. I agree that she should get stable on her current regimen/InPen first. If you want me to get the ball rolling with the PA I would be happy to get that started when you think she is ready.  Thanks! Lauro

## 2022-10-18 NOTE — PROGRESS NOTES
Arielle called in today asking about the insulin pen fills for the InPen. She has ordered the InPen from [employer], but is wondering where the insulin is.    She also inquired if this would be the new insulin fiasp that Kym wrote for her. I informed her of the PA denial and that the Novolog will be the substitute per note from Kym.    Called [facility] to confirm that this was switched over appropriately and communicated to patient.    Lauro Barrios, PharmD  Endocrinology & Diabetes [facility] Pharmacist  [address] [facility] [address]  Direct Voicemail: [phone]

## 2022-10-20 DIAGNOSIS — E10.40 TYPE 1 DIABETES MELLITUS WITH DIABETIC NEUROPATHY (H): Primary | ICD-10-CM

## 2022-10-20 DIAGNOSIS — E10.649 HYPOGLYCEMIA UNAWARENESS IN TYPE 1 DIABETES MELLITUS (H): ICD-10-CM

## 2022-10-20 NOTE — TELEPHONE ENCOUNTER
Novolog cartridges    Re    Prior Auth - Medication (Fiasp Penfill 100Unit/ML SOCT)  (Newest Message First)  Kym Jang PA-C  You 15 hours ago (5:42 PM)     AK  Thanks. Once patient receives the In-pen, she will need the novolog cartridges, however.  So we should be sure she has these in order to start the IN-pen. Thanks,   Tee Doll 2 days ago     [facility]  Was requested to release Fiasp prescription by Lauro Barrios via Teams message.

## 2022-10-25 ENCOUNTER — DOCUMENTATION ONLY (OUTPATIENT)
Dept: PHARMACY | Facility: CLINIC | Age: 37
End: 2022-10-25

## 2022-10-25 NOTE — PROGRESS NOTES
Attempted to reach out to patient to check to see if she has picked up the insulin cartridge and InPen and to set up to a time for virtual training. I did confirm with pharmacy yesterday that they are both ready. Left detailed voice message to call back.    I did look into her Clarity profile to check where her sugars have been. Routing to [facility] as NATASHA.      Lauro Barrios, PharmD  Endocrine & Diabetes [facility] Pharmacist  [address] [place] [id number]  Direct Voicemail: [phone]

## 2022-10-31 ENCOUNTER — DOCUMENTATION ONLY (OUTPATIENT)
Dept: PHARMACY | Facility: CLINIC | Age: 37
End: 2022-10-31

## 2022-10-31 NOTE — PROGRESS NOTES
LM to follow-up with patient to see if she was able to get the InPen and insulin cartridge. I did verify with pharmacy last week that these were ready. Gave my direct line to call back.     Of note, it looks like last data uploaded was Friday 10/28, which is when her 1st sensor was due to be changed. Will follow-up with patient Thursday if I haven't heard anything back by then.          Lauro Barrios, PharmD  Endocrine & Diabetes [facility] Pharmacist  [address] [place] [id number]  Direct Voicemail: [phone]

## 2022-11-03 ENCOUNTER — VIRTUAL VISIT (OUTPATIENT)
Dept: FAMILY MEDICINE | Facility: CLINIC | Age: 37
End: 2022-11-03
Payer: COMMERCIAL

## 2022-11-03 DIAGNOSIS — B34.9 VIRAL ILLNESS: Primary | ICD-10-CM

## 2022-11-03 DIAGNOSIS — J98.01 COLD INDUCED BRONCHOSPASM: ICD-10-CM

## 2022-11-03 PROCEDURE — 99214 OFFICE O/P EST MOD 30 MIN: CPT | Mod: 95 | Performed by: FAMILY MEDICINE

## 2022-11-03 RX ORDER — ALBUTEROL SULFATE 90 UG/1
2 AEROSOL, METERED RESPIRATORY (INHALATION) EVERY 6 HOURS PRN
Qty: 18 G | Refills: 0 | Status: SHIPPED | OUTPATIENT
Start: 2022-11-03 | End: 2023-06-06

## 2022-11-03 NOTE — PROGRESS NOTES
Arielle is a 37 year old who is being evaluated via a billable video visit.      How would you like to obtain your AVS? MyChart  If the video visit is dropped, the invitation should be resent by: Text to cell phone: [phone]  Will anyone else be joining your video visit? No    ==============================================      Assessment & Plan     Viral illness  - Suspect a viral URI.  Will test for flu, strep, and COVID to rule them out  - Influenza A & B Antigen; Future  - Symptomatic; Yes; 10/31/2022 COVID-19 Virus (Coronavirus) by PCR; Future  - Streptococcus A Rapid Screen w/Reflex to PCR; Future    Cold induced bronchospasm  - Patient is not having any significant SOB, but lost her inhaler and requests a refill for a new one just in case  - albuterol (PROAIR HFA/PROVENTIL HFA/VENTOLIN HFA) 108 (90 Base) MCG/ACT inhaler; Inhale 2 puffs into the lungs every 6 hours as needed for shortness of breath / dyspnea    Postpartum depression  - Previously prescribed by her PCP, but she never started it.  Would like to start now.  Depression symptoms are back.  Denies SI  - FLUoxetine (PROZAC) 20 MG capsule; Take 1 capsule (20 mg) by mouth daily      Return in about 4 weeks (around 12/1/2022) for Depression/Anxiety.    Ronna Oakley DO  [facility]    =======================================================    Subjective   Arielle is a 37 year old, presenting for the following health issues:  URI (With sore throat )      HPI     Acute Illness  Acute illness concerns: sore throat/ uri   Onset/Duration: 3 days  Symptoms:  Fever: No  Chills/Sweats: No  Headache (location?): YES  Sinus Pressure: No  Conjunctivitis:  No  Ear Pain: no  Rhinorrhea: YES  Congestion: YES  Sore Throat: YES  Cough: YES-productive of clear sputum  Wheeze: No  Decreased Appetite: YES  Nausea: No  Vomiting: No  Diarrhea: No  Dysuria/Freq.: No  Dysuria or Hematuria: No  Fatigue/Achiness: YES  Sick/Strep Exposure: YES-  Kids have been sick  Therapies tried and outcome: none      Patient has not noticed any significant SOB or wheezing, but does have a history of requiring albuterol during URIs.  She misplaced her inhaler and is requesting a new one.    She was previously prescribed fluoxetine for postpartum depression.  Never started it due to stress from housing insecurity.  Her housing situation is now stable.  She is feeling depressed again and would like to try the fluoxetine.  Denies SI.      Review of Systems   Constitutional, HEENT, cardiovascular, pulmonary, gi and gu systems are negative, except as otherwise noted.      Objective           Vitals:  No vitals were obtained today due to virtual visit.    Physical Exam   GENERAL: Healthy, alert and no distress  EYES: Eyes grossly normal to inspection.  No discharge or erythema, or obvious scleral/conjunctival abnormalities.  RESP: No audible wheeze, cough, or visible cyanosis.  No visible retractions or increased work of breathing.    SKIN: Visible skin clear. No significant rash, abnormal pigmentation or lesions.  NEURO: Cranial nerves grossly intact.  Mentation and speech appropriate for age.  PSYCH: Mentation appears normal, affect normal/bright, judgement and insight intact, normal speech and appearance well-groomed.            Video-Visit Details    Video Start Time: 12:52    Type of service:  Video Visit    Video End Time: 1:01 PM    Originating Location (pt. Location): Home    Distant Location (provider location):  Off-site    Platform used for Video Visit: Dawn

## 2022-11-03 NOTE — PATIENT INSTRUCTIONS
Instructions for Patients      What are the symptoms of COVID-19?  Symptoms can include fever, cough, shortness of breath, chills, headache, muscle pain sore throat, fatigue, runny or stuffy nose, and loss of taste and smell. Other less common symptoms include nausea, vomiting, or diarrhea (watery stools).    Know when to call 911. Emergency warning signs include:    Trouble breathing or shortness of breath    Pain or pressure in the chest that doesn't go away    Feeling confused like you haven't felt before, or not being able to wake up    Bluish-colored lips or face    How can I take care of myself?  1. Get lots of rest. Drink extra fluids (unless a doctor has told you not to).  2. Take Tylenol (acetaminophen) for fever or pain. If you have liver or kidney problems, ask your family doctor if it's okay to take Tylenol   Adults:   650 mg (two 325 mg pills or tablets) every 4 to 6 hours, or...   1,000 mg (two 500 mg pills or tablets) every 8 hours as needed.  Note: Don't take more than 3,000 mg in one day. Acetaminophen is found in many medicines (both prescribed and over the counter). Read all labels to be sure you don't take too much.  For children, check the Tylenol bottle for the right dose. The dose is based on the child's age or weight.  3. Take over the counter medicines for your symptoms as needed. Talk to your pharmacist.  4. If you have other health problems (like cancer, heart failure, an organ transplant, or severe kidney disease): Call your specialty clinic if you don't feel better in the next 2 days.    Where can I get more information?     [facility] [facility] COVID-19 Resource Hub: www.[email or web address]/covid19/     CDC Quarantine & Isolation: https://www.cdc.gov/coronavirus/2019-ncov/your-health/quarantine-isolation.html     CDC - What to Do If You're Sick: https://www.cdc.gov/coronavirus/2019-ncov/if-you-are-sick/index.html    [facility] clinical trials (COVID-19 research studies):  clinicalaffairs.[facility]/n-clinical-trials    [place] Department of Health COVID-19 Public Hotline: 1-248.726.7867

## 2022-11-14 ENCOUNTER — NURSE TRIAGE (OUTPATIENT)
Dept: NURSING | Facility: CLINIC | Age: 37
End: 2022-11-14

## 2022-11-14 NOTE — TELEPHONE ENCOUNTER
Called patient, she reports going to [place] and sees any doctor there. Gave her some home care advice and to go to UC if continues to be painful.    She reports may go to UC later today.      Bernarda Bullock RN

## 2022-11-14 NOTE — TELEPHONE ENCOUNTER
Nurse Triage SBAR    Is this a 2nd Level Triage?  Yes    Situation:   Left leg muscle pain    BackgroundAssessment:     Pt reporting, over the weekend.    Her left leg hurts and feels really sore.  It is more the inside of the muscle that feels achy and sore.     No injuries, swelling, lumps, bumps, redness, drainage or blood thinner noted.  No bruising.     Just feels really sore.      Pt would like a call back from the Primary Care clinic for further assistance.  Pt wondering if she needs to be seen for the symptoms she is having.     Please call the Pt back @ [phone] for further assistance.    Lanie Gamez RN  Central Triage Red Flags/Med Refills        Protocol Recommended Disposition:   See in Office Today or Tomorrow      Reason for Disposition    Patient wants to be seen    Additional Information    Negative: Looks like a broken bone or dislocated joint (e.g., crooked or deformed)    Negative: Sounds like a life-threatening emergency to the triager    Negative: Followed a hip injury    Negative: Followed a knee injury    Negative: Followed an ankle or foot injury    Negative: Back pain radiating (shooting) into leg(s)    Negative: Foot pain is main symptom    Negative: Ankle pain is main symptom    Negative: Knee pain is main symptom    Negative: Leg swelling is main symptom    Negative: Chest pain    Negative: Difficulty breathing    Negative: Entire foot is cool or blue in comparison to other side    Negative: Unable to walk    Negative: Fever and red area (or area very tender to touch)    Negative: Swollen joint and fever    Negative: Thigh or calf pain in only one leg and present > 1 hour    Negative: Thigh, calf, or ankle swelling in only one leg    Negative: Thigh, calf, or ankle swelling in both legs, but one side is definitely more swollen    Negative: History of prior 'blood clot' in leg or lungs (i.e., deep vein thrombosis, pulmonary embolism)    Negative: History of inherited increased risk  of blood clots (e.g., factor 5 Leiden, antithrombin 3, protein C or protein S deficiency, prothrombin mutation)    Negative: Major surgery in past month    Negative: Hip or leg fracture (broken bone) in past month (or had cast on leg or ankle in past month)    Negative: Illness requiring prolonged bedrest in past month (e.g., immobilization, long hospital stay)    Negative: Long-distance travel in past month (e.g., car, bus, train, plane; with trip lasting 6 or more hours)    Negative: Cancer treatment in past six months (or has cancer now)    Negative: Patient sounds very sick or weak to the triager    Negative: SEVERE pain (e.g., excruciating, unable to do any normal activities)    Negative: Cast on leg or ankle and now has increasing pain    Negative: Red area or streak > 2 inches (or 5 cm)    Negative: Painful rash with multiple small blisters grouped together (i.e., dermatomal distribution or 'band' or 'stripe')    Negative: Looks like a boil, infected sore, deep ulcer, or other infected rash (spreading redness, pus)    Negative: Localized rash is very painful (no fever)    Negative: Numbness in a leg or foot (i.e., loss of sensation)    Negative: Localized pain, redness or hard lump along vein    Protocols used: LEG PAIN-A-OH

## 2022-11-21 ENCOUNTER — HEALTH MAINTENANCE LETTER (OUTPATIENT)
Age: 37
End: 2022-11-21

## 2022-12-15 ENCOUNTER — VIRTUAL VISIT (OUTPATIENT)
Dept: PHARMACY | Facility: CLINIC | Age: 37
End: 2022-12-15
Payer: COMMERCIAL

## 2022-12-15 DIAGNOSIS — E10.649 TYPE 1 DIABETES MELLITUS WITH HYPOGLYCEMIA AND WITHOUT COMA (H): ICD-10-CM

## 2022-12-15 PROCEDURE — 99207 PR NO CHARGE LOS: CPT | Mod: 93

## 2022-12-16 NOTE — PROGRESS NOTES
Clinical Pharmacy Consult:                                                    Arielle Montenegro is a 37 year old female called for a clinical pharmacist consult.  She was referred to me from Kym Jang PA-C.     Reason for Consult: Inpen start/Dexcom restart    Discussion: Patient called my direct line wishing to set up a time for InPen training. She was previously lost to follow-up.     Additionally, patient was previously in Dexcom Clarity and is no longer sharing data. Patient says that she forgot how to replace the sensor/transmitter and is looking for guide ance.    Plan:  1. Patient will come into clinic next Monday for InPen start and to discuss medication options for weight loss.   2. Guided patient over the phone how to insert sensor/transmitter again. Still no data in Clarity as of 12/23--may be using reader.    Lauro Barrios, PharmD  Endocrine & Diabetes [facility] Pharmacist  [address] [place] [id number]  Direct Voicemail: [phone]

## 2022-12-19 RX ORDER — INSULIN ASPART 100 [IU]/ML
INJECTION, SOLUTION INTRAVENOUS; SUBCUTANEOUS
Qty: 30 ML | Refills: 11 | Status: SHIPPED | OUTPATIENT
Start: 2022-12-19 | End: 2023-03-28

## 2022-12-21 ENCOUNTER — OFFICE VISIT (OUTPATIENT)
Dept: URGENT CARE | Facility: URGENT CARE | Age: 37
End: 2022-12-21
Payer: COMMERCIAL

## 2022-12-21 VITALS
DIASTOLIC BLOOD PRESSURE: 84 MMHG | SYSTOLIC BLOOD PRESSURE: 122 MMHG | TEMPERATURE: 97.3 F | RESPIRATION RATE: 16 BRPM | OXYGEN SATURATION: 98 % | HEART RATE: 119 BPM

## 2022-12-21 DIAGNOSIS — R09.81 CONGESTION OF PARANASAL SINUS: Primary | ICD-10-CM

## 2022-12-21 DIAGNOSIS — J01.40 ACUTE PANSINUSITIS, RECURRENCE NOT SPECIFIED: ICD-10-CM

## 2022-12-21 PROCEDURE — 99213 OFFICE O/P EST LOW 20 MIN: CPT | Performed by: FAMILY MEDICINE

## 2022-12-21 RX ORDER — CETIRIZINE HYDROCHLORIDE 10 MG/1
10 TABLET ORAL DAILY
Qty: 30 TABLET | Refills: 0 | Status: SHIPPED | OUTPATIENT
Start: 2022-12-21 | End: 2023-06-09

## 2022-12-21 RX ORDER — FLUTICASONE PROPIONATE 50 MCG
1 SPRAY, SUSPENSION (ML) NASAL DAILY
Qty: 9.9 ML | Refills: 0 | Status: SHIPPED | OUTPATIENT
Start: 2022-12-21 | End: 2023-06-09

## 2022-12-21 NOTE — PROGRESS NOTES
Chief Complaint   Patient presents with     Urgent Care     URI     Cold symptoms x 2 weeks. Settled in head.      Arielle was seen today for urgent care and uri.    Diagnoses and all orders for this visit:    Congestion of paranasal sinus  -     cetirizine (ZYRTEC) 10 MG tablet; Take 1 tablet (10 mg) by mouth daily  -     fluticasone (FLONASE) 50 MCG/ACT nasal spray; Spray 1 spray into both nostrils daily    Acute pansinusitis, recurrence not specified      Differential diagnosis viral URI, postnasal drip, allergic rhinitis, sinusitis,          SUBJECTIVE:  Arielle Montenegro is a 37 year old female who presents to the clinic today with a chief complaint of congestion with clear drainage with postnasal drip but is mostly clear in nature.  Also had some body aches on and off symptoms are there for 2 weeks denies any cough chest pain chest heaviness or chest tightness symptoms.The patient's symptoms are moderate and worsening.  Associated symptoms include none. The patient's symptoms are exacerbated by no particular triggers  Patient has been using Tylenol  to improve symptoms.    Past Medical History:   Diagnosis Date     Blindness of right eye 2007     Diabetes mellitus type 1 (H)     Diagnosed as a child       Current Outpatient Medications   Medication Sig Dispense Refill     cetirizine (ZYRTEC) 10 MG tablet Take 1 tablet (10 mg) by mouth daily 30 tablet 0     FLUoxetine (PROZAC) 20 MG capsule Take 1 capsule (20 mg) by mouth daily 90 capsule 1     fluticasone (FLONASE) 50 MCG/ACT nasal spray Spray 1 spray into both nostrils daily 9.9 mL 0     insulin aspart (NOVOPEN ECHO) 100 UNIT/ML cartridge Inject subcu via InPen  2-8 units 3x daily with meals and at bedtime.  Max Daily Dose 20 units 15 mL 3     insulin glargine (LANTUS SOLOSTAR) 100 UNIT/ML pen Take 15 units daily. 15 mL 2     albuterol (PROAIR HFA/PROVENTIL HFA/VENTOLIN HFA) 108 (90 Base) MCG/ACT inhaler Inhale 2 puffs into the lungs every 6 hours as needed for  shortness of breath / dyspnea 18 g 0     Continuous Blood Gluc Sensor (DEXCOM G6 SENSOR) MISC Change every 10 days. 9 each 11     Continuous Blood Gluc Transmit (DEXCOM G6 TRANSMITTER) MISC Change every 3 months. 1 each 3     fluticasone-salmeterol (ADVAIR-HFA) 115-21 MCG/ACT inhaler Inhale 2 puffs into the lungs 2 times daily (Patient not taking: Reported on 5/27/2022) 8 g 0     gabapentin (NEURONTIN) 100 MG capsule Take 1 capsule (100 mg) by mouth 2 times daily as needed for neuropathic pain (Patient not taking: Reported on 11/3/2022) 30 capsule 0     Glucagon (BAQSIMI TWO PACK) 3 MG/DOSE POWD Spray 1 each in nostril once as needed (severe hypoglycemia) 2 each 3     Injection Device for insulin (INPEN 100-GREY-NOVOLOG-FIASP) TOMASA 1 each 5 times daily 2 each 1     insulin pen needle (31G X 5 MM) 31G X 5 MM miscellaneous Use 4 pen needles daily or as directed. 300 each 3     liraglutide (VICTOZA) 18 MG/3ML solution Week 1-- 0.6 mg daily; increase on week 3--1.2 mg daily, as tolerated (Patient not taking: Reported on 10/14/2022) 6 mL 3     naproxen (NAPROSYN) 500 MG tablet Take 1 tablet (500 mg) by mouth 2 times daily as needed for moderate pain (Patient not taking: No sig reported) 30 tablet 0     NOVOLOG FLEXPEN 100 UNIT/ML soln Inject 2-10 units with meals TID  and at bedtime. MDD 30 units 30 mL 11     Prenatal Vit-Fe Fumarate-FA (PRENATAL MULTIVITAMIN W/IRON) 27-0.8 MG tablet Take 1 tablet by mouth daily (Patient not taking: Reported on 1/4/2022) 90 tablet 1     traZODone (DESYREL) 50 MG tablet Take 1 tablet (50 mg) by mouth At Bedtime (Patient not taking: Reported on 5/17/2022) 30 tablet 1     tretinoin (RETIN-A) 0.05 % external cream Apply topically At Bedtime To hands and feet nightly (Patient not taking: Reported on 10/14/2022) 45 g 1       Social History     Tobacco Use     Smoking status: Former     Packs/day: 0.00     Types: Cigarettes     Smokeless tobacco: Never     Tobacco comments:     smokes 2  cigarettes/day-none for several months   Substance Use Topics     Alcohol use: No     Alcohol/week: 0.0 standard drinks       ROS  REVIEW OF SYSTEMS:   Constitutional: No fevers, no chills, no sweats  Cardiac: No history of chest pain, No palpitations  Respiratory: No shortness of breath, no wheeze, no cough  Gastro Intestinal: No history of abdominal pain, no vomiting, no diarrhea  Neurological: positive for  headaches, no new or changing weakness, no new or changing numbness  Musculoskeletal: No joint pain or swelling HEENT: nasal  Congestion, No stridor  Psychiatric: No reported hallucinations, No obvious delusions  Allergic: No Hives + Rash  Hematologic: No Easy Bruising, No Nosebleeds,   Genitourinary: No Dysuria, No Frequency  Endocrine: No Polyuria, No Polydipsia  Skin/Integumentary: No Rash, No Ulcer  Opthalmologic: No Diplopia, No Flashes         OBJECTIVE:  /84   Pulse 119   Temp 97.3  F (36.3  C) (Temporal)   Resp 16   SpO2 98%   GENERAL APPEARANCE: healthy, alert and no distress  EYES: EOMI,  PERRL, conjunctiva clear  HENT: ear canals and TM's normal.  Nose congested with clear discharge and mouth without ulcers, erythema or lesions  NECK: supple, nontender, no lymphadenopathy  RESP: lungs clear to auscultation - no rales, rhonchi or wheezes  CV: regular rates and rhythm, normal S1 S2, no murmur noted  SKIN: no suspicious lesions or rashes  PSYCH: mentation appears normal

## 2022-12-25 ENCOUNTER — HEALTH MAINTENANCE LETTER (OUTPATIENT)
Age: 37
End: 2022-12-25

## 2023-01-11 ENCOUNTER — TELEPHONE (OUTPATIENT)
Dept: PSYCHOLOGY | Facility: CLINIC | Age: 38
End: 2023-01-11

## 2023-01-12 ENCOUNTER — OFFICE VISIT (OUTPATIENT)
Dept: PHARMACY | Facility: CLINIC | Age: 38
End: 2023-01-12
Payer: COMMERCIAL

## 2023-01-12 DIAGNOSIS — E10.649 TYPE 1 DIABETES MELLITUS WITH HYPOGLYCEMIA AND WITHOUT COMA (H): Primary | ICD-10-CM

## 2023-01-12 PROCEDURE — 99607 MTMS BY PHARM ADDL 15 MIN: CPT

## 2023-01-12 PROCEDURE — 99605 MTMS BY PHARM NP 15 MIN: CPT

## 2023-01-12 RX ORDER — PROCHLORPERAZINE 25 MG/1
SUPPOSITORY RECTAL
Qty: 9 EACH | Refills: 11 | Status: SHIPPED | OUTPATIENT
Start: 2023-01-12 | End: 2023-01-12

## 2023-01-12 RX ORDER — PROCHLORPERAZINE 25 MG/1
SUPPOSITORY RECTAL
Qty: 1 EACH | Refills: 3 | Status: SHIPPED | OUTPATIENT
Start: 2023-01-12 | End: 2023-10-09

## 2023-01-12 RX ORDER — PROCHLORPERAZINE 25 MG/1
SUPPOSITORY RECTAL
Qty: 9 EACH | Refills: 11 | Status: SHIPPED | OUTPATIENT
Start: 2023-01-12 | End: 2023-09-22

## 2023-01-12 RX ORDER — PROCHLORPERAZINE 25 MG/1
SUPPOSITORY RECTAL
Qty: 1 EACH | Refills: 3 | Status: SHIPPED | OUTPATIENT
Start: 2023-01-12 | End: 2023-01-12

## 2023-01-12 RX ORDER — PROCHLORPERAZINE 25 MG/1
SUPPOSITORY RECTAL
Qty: 1 EACH | Refills: 0 | Status: SHIPPED | OUTPATIENT
Start: 2023-01-12 | End: 2023-10-09

## 2023-01-12 NOTE — PROGRESS NOTES
Medication Therapy Management (MTM) Encounter    ASSESSMENT:                            Medication Adherence/Access: See below for considerations    Type 1 Diabetes: Last A1C above goal of < 7%.  Unknown current control given patient has not monitoring.  History of frequent episodes of hypoglycemia and hypoglycemia unawareness.  History of Dexcom use, but major health literacy barriers.  Would benefit from Dexcom re-education today, including linking patient with endocrinology clinic so data can be viewed remotely.     The intent was to complete InPen training today.  However, given unclear picture of current glycemic control and issues with phone, the priority is to get Dexcom up and running today.  Patient should come into clinic Monday where her children are in school to minimize actions.    Would benefit from clear conservative insulin regimen given patient's variable insulin administration without monitoring.     Due to time constraints, I was only able to assess the above with the patient today. Will follow-up on other disease states in future encounters    PLAN:                            -Dexcom Education:  1. Instructed the patient on proper sensor placement (at least two inches from belly button) and cleaning with an alcohol wipe before application.   2. Guided patient on the proper steps to apply the sensor and transmitter.  3. Instructed the patient on how to sync a new sensor to the Dexcom G6 Nidia and set the  BG range of .  Turned all alarms off.  4. Instructed the patient on how to check blood sugar with Dexcom, including how to interpret trend arrows.   5. Discussed with patient the difference between fingerstick glucose readings and sensor glucose readings (sensor glucose readings are 10 minutes behind fingerstick glucose readings). Instructed patient to check fingerstick glucose when directed by the Dexcom G6 or if signs/symptoms of hypoglycemia occur.  6. Instructed patient to change sensor  every 10 days. Provided patient with phone number to call if a sensor were to fall off or malfunction. Emphasized to NOT throw away transmitter piece which lasts 90 days.  7. Instructed patient to download Dexcom Clarity and Dexcom via smartphone and linked to our clinic's portal. Use the same login information for each gael.     By the end of this education session, patient was able to verbalize understanding of this information and was able to appropriately self-apply the sensor under my supervision.    Lantus 14 units          Novolog 3 units with meals and 2 units with snacks      Follow-up: 1/26 with Lauro for InPen setup    Medication issues to be addressed at a future visit:      Assess asthma (no recent fill hx for maintenance inhalers)    Assess FERMIN/MDD and make sure patient called the resource Kym delcid last fall    SUBJECTIVE/OBJECTIVE:                          Arielle Montenegro is a 37 year old female seen for an initial visit. She was referred to me from Kym Jang PA-C.      Reason for visit: Medication Therapy Management (MTM).    Allergies/ADRs: Reviewed in chart  Past Medical History: Reviewed in chart  Social History     Tobacco Use     Smoking status: Former     Packs/day: 0.00     Types: Cigarettes     Smokeless tobacco: Never     Tobacco comments:     smokes 2 cigarettes/day-none for several months   Substance Use Topics     Alcohol use: No     Alcohol/week: 0.0 standard drinks     Drug use: No    ^Reviewed today    Medication Adherence/Access: Fill history appropriate for diabetes medications/supplies, but other medications have variable fill history. Only had time to assess diabetes today. Difficult home life--three children, one has ADHD and diabetes. All three children present in visit today and distracting to mother.     Type 1 Diabetes:   Diabetes Medication(s)     Diabetic Other       Glucagon (BAQSIMI TWO PACK) 3 MG/DOSE POWD    Spray 1 each in nostril once as needed (severe  hypoglycemia)     Patient not taking: No sig reported    Insulin       Insulin Aspart, w/Niacinamide, 100 UNIT/ML SOCT    Inject 30 Units Subcutaneous daily Use as directed up to 30 units daily.     insulin glargine (LANTUS SOLOSTAR) 100 UNIT/ML pen    Take 15 units daily.     NOVOLOG FLEXPEN 100 UNIT/ML soln    Inject 2-8 units with meals TID  and at bedtime.approx 15 units daily, MDD 20 units.   Unclear how patient is administering her insulin.  Unable to specify frequency or dose.  Symptoms of low blood sugar? Frequent lows and hypoglycemia unawareness. She knows how to treat lows (15-15 rule)  Was trained on Dexcom at the end of last year.  Did not understand that she had to keep the transmitter for 90 days despite teach back methods employed during training. Additionally, patient needs a new phone--it shuts off every 3 minutes or so, making it difficult to set up Dexcom/InPen apps.   Diet (reviewed with Kym 10/14): Arielle reports that she eats out a lot.  She likes Chinese, all sorts of foods from different places around the world.  Spicy meat, chicken, seafood.  Vegetables and fruits, etc.  She does not like to eat rice, bread, pasta. She did not grow up on this food.  She really wants to lose weight.  She was started on Victoza earlier this year by Dr. Wasserman and experienced severe hypoglycemia requiring paramedics and ER visit.  She took 4 doses and then we stopped it.  She wonders if we could consider this again in the future.   Brought in InPen today. InPen settings per Kym:    Statin: No   ACEi/ARB: No  Urine Albumin:   Lab Results   Component Value Date    UMALCR 94.51 (H) 09/30/2014      Lab Results   Component Value Date    A1C 10.1 06/22/2022    A1C 5.8 07/04/2020    A1C 6.4 06/11/2020    A1C 7.5 01/28/2020    A1C 7.8 01/02/2020    A1C 12.6 09/20/2019     Most Recent Immunizations   Administered Date(s) Administered     TDAP Vaccine (Boostrix) 12/08/2014     Estimated body mass index is 31.09  "kg/m  as calculated from the following:    Height as of 8/19/22: 5' 2\" (1.575 m).    Weight as of 6/3/22: 170 lb (77.1 kg).      BP Readings from Last 1 Encounters:   12/21/22 122/84     Pulse Readings from Last 1 Encounters:   12/21/22 119     Wt Readings from Last 1 Encounters:   06/03/22 170 lb (77.1 kg)     Ht Readings from Last 1 Encounters:   08/19/22 5' 2\" (1.575 m)     Estimated body mass index is 31.09 kg/m  as calculated from the following:    Height as of 8/19/22: 5' 2\" (1.575 m).    Weight as of 6/3/22: 170 lb (77.1 kg).    Temp Readings from Last 1 Encounters:   12/21/22 97.3  F (36.3  C) (Temporal)       ----------------  I spent 60 minutes with this patient today. Kym Jang PA-C was provided the recommendations above via routed note and is the authorizing prescriber for this visit through the pharmacist collaborative practice agreement. A copy of the visit note was provided to the patient's provider(s).    The patient was given a summary of these recommendations.     Lauro Barrios, PharmD, [facility]  Endocrinology & Diabetes [facility] Pharmacist  [address] [place] [address]  Direct Voicemail: [phone]  Clinic Hours: Monday-Friday 7:00 AM - 3:30 PM            "

## 2023-01-13 NOTE — TELEPHONE ENCOUNTER
Patient left a voicemail on 1/11/23 regarding scheduling an intake appointment per the recommendation of her endocrinology provider, Kym Jang PA-C. Returned patient's call and spoke with her. She elected to schedule an initial appointment with me on 1/26/23 at 10:00am. Will request appointment from scheduling staff.     Sarah Lozano, Ph.D., , [facility]  Clinical Health Psychologist  Phone: [phone]   Pager: [phone]  1/13/2023 4:49 PM

## 2023-01-16 NOTE — PROGRESS NOTES
Outcome for 01/16/23 8:28 AM: Data uploaded on Dexcom- patient to set up inpen this week  M[facility] [place]     Arielle Montenegro  is being evaluated via a billable video visit.      How would you like to obtain your AVS? Brittani  For the video visit, send the invitation by: Text to cell phone: [phone]  Will anyone else be joining your video visit? No    Start time: 0734  End time: 0809  Provider location: off site- home  Patient location: off site- home.    HPI:   Arielle is a 39 yo woman here for follow up of type 1 diabetes with a history of severe hypoglycemia and hypoglycemia unawareness.  She reports that she was diagnosed with type 1 dm around age 8 when she became sick while living in [place]. She has been on insulin since diagnosis.   She has also sees Dr. Wasserman in weight management. Arielle reports that she is frustrated with her weight gain and wants to do whatever she can to lose weight.  She recently starting talking with our pharmacist, Lauro Barrios, who helped Arielle start up on her dexcom sensor.  At that visit, she had been taking Lantus 20 units daily (previously had been taking 15), and she was having a lot of hypoglycemia.  He advised she lower it to 14 units and add in 3 units Novolog with each meal.  She feels this is not enough.  She has noticed that her glucose is quite up and down.  She finds she goes low often, then goes quite high after being low.      She thinks her trouble is that she does not eat much during the day- busy with her kids and just not hungry- then she eats quite a lot at night, when the kids go to bed and she does not have to worry about them.  She has been  gaining a lot of weight-part of it is not taking care of her diabetes.  She feels like walking is harder for her- her legs hurt- because she is so overweight.   189#.    She orders a lot of takeout.  Craving for outside food.  Spicy foods, but she does not eat bread, rice, potatoes. She does not eat what her kids eat.  She  "tries to eat a lot of veggies and fish/shrimp.  She feels like she would be healthier if she ate more at home.  She has been successful at weight loss when she has done this in the past.  Arielle just bought a lot of fruits and vegetables and plans to start making smoothies every day in the morning. Strawberries, apple, esequiel, pineapple, grapes, bananas. She plans to put spinach and kale in her smoothies.  She thinks that spreading out her eating, and not eating as much at night will help her lose weight. She just started a gym near her house- having a lot of lows. She has only gone twice, but would like to go more often.  She has been bringing her smoothie to drink when she goes low.  When she goes low, she will drink juice.  She drinks \"a lot\" of juice- usually 2 glasses. She likes juice, but mostly drinks it when her blood sugar is low.     She knows she should take her insulin before she eats, but never does.  She is usually so hungry that she just wants to eat.  She then takes her Novolog and brings her glucose down.  She is due to start the IN-pen next week.      Her current treatment regimen is as follows:   Lantus- 14 units daily.   Novolog- 3 units with each meal- actually taking 5 units most of the time. Last night, blood sugar was 200- took 3 units when she went to sleep.   Woke up this morning and glucose was 300 mg/dL. She does not feel this is enough.    Tries to count carbs- seems like it works.  She knows how to do this, but finds it difficult. She usually takes no more than 5 units at a time.   She expresses that she would prefer to have set doses to take when she eats.     She joined a facebook diabetes group and she has been reading about how a lot of people with diabetes have depression.   She feels like she may be dealing with this.    She feels like she gets mad easily.  She used to think it was just her personality, but she wonders if some of her mood swings are related to her diabetes. She was " prescribed fluoxetine 20 mg daily.  She is scheduled to meet with Sarah Lozano, health psychologist, next week.     She recently started wearing the dexcom G6 sensor.  Her overall average glucose is 216 mg/dL over the past two weeks (CV 50%, TIR 32%, low 10%, very low 5%). Recent glucose:                 Diabetes monitoring and complications:  CAD: No  Last eye exam results: in the past year, no retinopathy  Microalbuminuria: 172 in 2014  Neuropathy: No  HTN: No  On Statin: No  Depression: Yes      Past Medical History:  Blindness of right eye  Type 1 diabetes  Hypoglycemia unawareness  Vitamin D deficiency  Anxiety  Depression  Overweight     Past Surgical History:   section - , 2015  Enucleation right -     Family History:   Diabetes - paternal side of family       Social History:     Kids now 10, 8 and 2 years old.  One son (8 year old) with ADHD and obese with prediabetes, not type 1.  Other might have autism.      Diabetes Control:   Lab Results   Component Value Date    A1C 5.8 2020    A1C 6.4 2020    A1C 7.5 2020    A1C 7.8 2020    A1C 12.6 2019       Past Medical History:   Diagnosis Date     Blindness of right eye      Diabetes mellitus type 1 (H)     Diagnosed as a child       Past Surgical History:   Procedure Laterality Date      SECTION  13      SECTION N/A 2015    Procedure:  SECTION;  Surgeon: John Hudson MD;  Location: [facility]      SECTION N/A 7/3/2020    Procedure:  SECTION;  Surgeon: Aparna Atkins MD;  Location: [facility]     ENUCLEATION Right 3/20/2015    Procedure: ENUCLEATION;  Surgeon: Edilson Islas MD;  Location: [facility]       Family History   Problem Relation Age of Onset     Family History Negative Mother      Family History Negative Father      Diabetes Other         father's side       Social History     Socioeconomic History     Marital status: Single     Number of children: 1    Occupational History     Employer: UNEMPLOYED   Tobacco Use     Smoking status: Former Smoker     Packs/day: 0.00     Types: Cigarettes     Smokeless tobacco: Never Used     Tobacco comment: smokes 2 cigarettes/day-none for several months   Substance and Sexual Activity     Alcohol use: No     Alcohol/week: 0.0 standard drinks     Drug use: No     Sexual activity: Yes     Partners: Male     Birth control/protection: None   Social History Narrative    ** Merged History Encounter **         Caffeine intake/servings daily - 0    Calcium intake/servings daily - 3    Exercise 7 times weekly - describe walking    Sunscreen used - No    Seatbelts used - Yes    Guns stored in the home - No    Self Breast Exam - Yes    Pap test up to date -  No    Eye exam up to date -  Yes    Dental exam up to date -  Yes    DEXA scan up to date -  Not Applicable    Flex Sig/Colonoscopy up to date -  Not Applicable    Mammography up to date -  Not Applicable    Immunizations reviewed and up to date - No    Abuse: Current or Past (Physical, Sexual or Emotional) - No    Do you feel safe in your environment - Yes    Do you cope well with stress - Yes    Do you suffer from insomnia - No    Last updated by: KARL PELAEZ  7/18/2012                       Current Outpatient Medications   Medication     albuterol (PROAIR HFA/PROVENTIL HFA/VENTOLIN HFA) 108 (90 Base) MCG/ACT inhaler     cetirizine (ZYRTEC) 10 MG tablet     Continuous Blood Gluc  (DEXCOM G6 ) TOMASA     Continuous Blood Gluc Sensor (DEXCOM G6 SENSOR) MISC     Continuous Blood Gluc Transmit (DEXCOM G6 TRANSMITTER) MISC     Continuous Blood Gluc Transmit (DEXCOM G6 TRANSMITTER) MISC     FLUoxetine (PROZAC) 20 MG capsule     fluticasone (FLONASE) 50 MCG/ACT nasal spray     fluticasone-salmeterol (ADVAIR-HFA) 115-21 MCG/ACT inhaler     gabapentin (NEURONTIN) 100 MG capsule     Glucagon (BAQSIMI TWO PACK) 3 MG/DOSE POWD     Injection Device for insulin (INPEN  100-GREY-NOVOLOG-FIASP) TOMASA     insulin aspart (NOVOPEN ECHO) 100 UNIT/ML cartridge     insulin glargine (LANTUS SOLOSTAR) 100 UNIT/ML pen     insulin pen needle (31G X 5 MM) 31G X 5 MM miscellaneous     naproxen (NAPROSYN) 500 MG tablet     NOVOLOG FLEXPEN 100 UNIT/ML soln     traZODone (DESYREL) 50 MG tablet     tretinoin (RETIN-A) 0.05 % external cream     No current facility-administered medications for this visit.        No Known Allergies    Physical Exam  There were no vitals taken for this visit.  GENERAL: healthy, alert and no distress  RESP: no audible wheeze, cough, or visible cyanosis.  No visible retractions or increased work of breathing.  Able to speak fully in complete sentences.  PSYCH: mentation appears normal, affect normal/bright, judgement and insight intact, normal speech and appearance well-groomed    RESULTS  Lab Results   Component Value Date    A1C 10.1 (H) 06/22/2022    A1C 5.8 (H) 07/04/2020    A1C 6.4 (H) 06/11/2020    A1C 7.5 (H) 01/28/2020    A1C 7.8 (H) 01/02/2020    A1C 12.6 (H) 09/20/2019       TSH   Date Value Ref Range Status   05/30/2019 0.877 0.358 - 3.740 UIU/mL Final   05/11/2017 1.19 0.40 - 4.00 mU/L Final   09/30/2014 1.90 0.40 - 4.00 mU/L Final     Comment:     Effective 7/30/2014, the reference range for this assay has changed to reflect   new instrumentation/methodology.         ALT   Date Value Ref Range Status   06/22/2022 10 10 - 35 U/L Final   08/12/2021 12 0 - 50 U/L Final   07/05/2020 24 0 - 50 U/L Final   07/04/2020 27 0 - 50 U/L Final   ]    Recent Labs   Lab Test 09/20/19  1425 03/11/19  0000   CHOL 181 147.6   HDL 71 52.4   * 72   TRIG 45 115.8       Lab Results   Component Value Date     06/23/2022     09/20/2019      Lab Results   Component Value Date    POTASSIUM 3.9 06/23/2022    POTASSIUM 3.8 08/12/2021    POTASSIUM 4.0 01/24/2020     Lab Results   Component Value Date    CHLORIDE 104 06/23/2022    CHLORIDE 100 08/12/2021    CHLORIDE 95  09/20/2019     Lab Results   Component Value Date    ALEXANDRO 9.0 06/23/2022    ALEXANDRO 8.5 09/20/2019     Lab Results   Component Value Date    CO2 24 06/23/2022    CO2 25 08/12/2021    CO2 22 09/20/2019     Lab Results   Component Value Date    BUN 10.0 06/23/2022    BUN 12 08/12/2021    BUN 13 09/20/2019     Lab Results   Component Value Date    CR 0.68 06/23/2022    CR 0.91 07/05/2020       GFR Estimate   Date Value Ref Range Status   06/23/2022 >90 >60 mL/min/1.73m2 Final     Comment:     Effective December 21, 2021 eGFRcr in adults is calculated using the 2021 CKD-EPI creatinine equation which includes age and gender ( et al., [facility], DOI: 10.1056/KQKDno1944326)   06/22/2022 >90 >60 mL/min/1.73m2 Final     Comment:     Effective December 21, 2021 eGFRcr in adults is calculated using the 2021 CKD-EPI creatinine equation which includes age and gender (Keerthi et al., [facility], DOI: 10.1056/IWAWab8668911)   08/12/2021 63 >60 mL/min/1.73m2 Final     Comment:     As of July 11, 2021, eGFR is calculated by the CKD-EPI creatinine equation, without race adjustment. eGFR can be influenced by muscle mass, exercise, and diet. The reported eGFR is an estimation only and is only applicable if the renal function is stable.   07/05/2020 82 >60 mL/min/[1.73_m2] Final     Comment:     Non  GFR Calc  Starting 12/18/2018, serum creatinine based estimated GFR (eGFR) will be   calculated using the Chronic Kidney Disease Epidemiology Collaboration   (CKD-EPI) equation.     07/04/2020 85 >60 mL/min/[1.73_m2] Final     Comment:     Non  GFR Calc  Starting 12/18/2018, serum creatinine based estimated GFR (eGFR) will be   calculated using the Chronic Kidney Disease Epidemiology Collaboration   (CKD-EPI) equation.     07/03/2020 75 >60 mL/min/[1.73_m2] Final     Comment:     Non  GFR Calc  Starting 12/18/2018, serum creatinine based estimated GFR (eGFR) will be   calculated using the Chronic Kidney  Disease Epidemiology Collaboration   (CKD-EPI) equation.       GFR Estimate If Black   Date Value Ref Range Status   07/05/2020 >90 >60 mL/min/[1.73_m2] Final     Comment:      GFR Calc  Starting 12/18/2018, serum creatinine based estimated GFR (eGFR) will be   calculated using the Chronic Kidney Disease Epidemiology Collaboration   (CKD-EPI) equation.     07/04/2020 >90 >60 mL/min/[1.73_m2] Final     Comment:      GFR Calc  Starting 12/18/2018, serum creatinine based estimated GFR (eGFR) will be   calculated using the Chronic Kidney Disease Epidemiology Collaboration   (CKD-EPI) equation.     07/03/2020 87 >60 mL/min/[1.73_m2] Final     Comment:      GFR Calc  Starting 12/18/2018, serum creatinine based estimated GFR (eGFR) will be   calculated using the Chronic Kidney Disease Epidemiology Collaboration   (CKD-EPI) equation.         Lab Results   Component Value Date    MICROL 172 09/30/2014     No results found for: MICROALBUMIN  Lab Results   Component Value Date    CPEPT <0.1 (L) 09/30/2014       No results found for: B12]    Most recent eye exam date: : Not Found       Assessment/Plan:     1.  Type 1 diabetes-  Arielle's glucose is highly variable and she has a history of severe hypoglycemia and hypoglycemia unawareness. Her insulin dosing and decision making is unclear, but we did establish that she is routinely taking her Novolog AFTER eating, sometimes by over 1 hour, rather than before.  I explained how this inevitably will lead to profound hyperglycemia, followed by corrections which often drive her low.  I also emphasized how repeated hypoglycemia can certainly be contributing to weight gain.  She understands and will make an effort to take her insulin before she eats going forward.  She has follow up later this week with Lauro Barrios, and they can again review this at the appointment. She is also scheduled in person to set up the IN-pen bluetooth insulin pen,  which will be helpful for dosing and gaining a better understanding of her patterns/response to therapy.  Addressed her interest in weight loss and restarting Victoza/switching to Ozempic.  I explained that we will work on taking her insulin before eating this month and then we will review again next month. I am very concerned about her risk of severe hypoglycemia.     Instructions given to patient:   Keep glucose tablets in your pocket all the time.     This month, please focus on taking Novolog 5 units before each meal.  Remember, if you do not take your insulin before you eat, your glucose will go up into the 300's.      Low blood sugars are likely causing weight gain.  We can lower your risk of having lows by taking Novolog BEFORE you eat.     Once you are regularly taking your Novolog before you are eating, then we can consider starting Ozempic to help with weight loss.  We can review things at your next follow up visit with me  at 7:30am on video.     Schedule labs.  Lab [phone]    Emergency issues: Please contact the clinic as soon as you recognize a problem.  Here are some concerns you should contact us about.  -Vomiting: more than twice.  Please check ketones.  If positive, go to ER. Monitor glucose hourly.   -High glucose (over 300 mg/dL twice in a row): Please check ketones.  If ketones are negative, take an insulin correction and recheck glucose in 1 hour.  If glucose is not coming down, please call the clinic. If ketones are moderate or large, drink lots of water, take an insulin correction, and recheck ketones in 1 hour.  If ketones are still high (or you are vomiting), go to the ER.  -Hypoglycemia (low glucose):   If glucose is less than 70 mg/dL, treat with 15g carb (4 glucose tablets), recheck glucose in 10 minutes.  If low again, repeat.   If glucose is less than 54 mg/dL, treat with 30g carb, recheck glucose in 10 minutes.  If low again, repeat.  Keep glucagon in your  home in case of severe hypoglycemia and train someone how to use this.    Emergency kit (please ensure you always have these with you):   Glucose tablets  Glucagon  Insulin  Syringes (if on a pump)  Extra infusion set (if on a pump)  Ketone strips    Contact information:   If you have concerns, please send me a [employer] message or call the clinic at [phone].  For more urgent concerns, please call [phone] after hours/weekends and ask to speak with the endocrinologist on call.      Please let me know if you are having low blood sugars less than 70 or over 350 mg/dL.  Do not wait until your next appointment if this is happening.      2.  Risk factors-     Retinopathy:  No known history.  She does not appear to have a recent eye exam.    Nephropathy:  BP historically well controlled. No microalbuminuria.  Creatinine stable. Overdue for labs.  Asked her to schedule same day as her in person visit with Lauro Barrios (2/6).   Neuropathy: No.    Feet: OK, no ulcers.   Lipids:  LDL at target.   Thyroid screening: will check TSH.   Celiac screening: Does not appear to have been screened.  Will check antibodies.   Contraception: did not address today.   Depression: She seems to be struggling recently and is open to the idea of scheduling in health psychology with Sarah Lozano.  She is scheduled next week.     3.  F/U in 6 weeks with me, sooner with concerns/hypoglycemia.     49 minutes spent on the date of the encounter doing chart review, review of test results, patient visit and documentation, counseling/coordination of care, and discussion of follow up plan for worsening hyper and hypoglycemia.  The patient understood and is satisfied with today's visit.     Kym Jang PA-C, MPAS   [facility]  Department of Medicine  Division of Endocrinology and Diabetes

## 2023-01-20 ENCOUNTER — VIRTUAL VISIT (OUTPATIENT)
Dept: PHARMACY | Facility: CLINIC | Age: 38
End: 2023-01-20
Payer: COMMERCIAL

## 2023-01-20 DIAGNOSIS — E10.649 TYPE 1 DIABETES MELLITUS WITH HYPOGLYCEMIA AND WITHOUT COMA (H): Primary | ICD-10-CM

## 2023-01-20 PROCEDURE — 99606 MTMS BY PHARM EST 15 MIN: CPT

## 2023-01-20 NOTE — PROGRESS NOTES
Medication Therapy Management (MTM) Encounter    ASSESSMENT:                            Medication Adherence/Access: See below for considerations    Type 1 Diabetes: Last A1C above goal of < 7%. Now stable on Dexcom. High glycemic variability secondary to patient overcorrecting with Novolog. Would benefit from re-education today on appropriate doses provided last visit.     Patient has missed multiple InPen appointments--would benefit from setting up a time with a reminder call in a few weeks for patient to come in for training.         PLAN:                              Lantus 14 units          Novolog 3 units with meals and 2 units with snacks      Follow-up: 1/23 with Kym; 2/6 for InPen training w/ reminder call    Medication issues to be addressed at a future visit:      Assess asthma (no recent fill hx for maintenance inhalers)    Assess FERMIN/MDD and make sure patient called the resource Kym provided last fall    SUBJECTIVE/OBJECTIVE:                          Arielle Montenegro is a 37 year old female seen for a follow-up visit. She was referred to me from Kym Jang PA-C.      Reason for visit: Medication Therapy Management (MTM).    Allergies/ADRs: Reviewed in chart  Past Medical History: Reviewed in chart  Social History     Tobacco Use     Smoking status: Former     Packs/day: 0.00     Types: Cigarettes     Smokeless tobacco: Never     Tobacco comments:     smokes 2 cigarettes/day-none for several months   Substance Use Topics     Alcohol use: No     Alcohol/week: 0.0 standard drinks     Drug use: No    ^Reviewed today    Medication Adherence/Access: Fill history appropriate for diabetes medications/supplies, but other medications have variable fill history. Only had time to assess diabetes today. Difficult home life--three children, one has ADHD and diabetes.     Type 1 Diabetes:   Diabetes Medication(s)     Diabetic Other       Glucagon (BAQSIMI TWO PACK) 3 MG/DOSE POWD    Spray 1 each in nostril once  "as needed (severe hypoglycemia)     Patient not taking: No sig reported    Insulin       Insulin Aspart, w/Niacinamide, 100 UNIT/ML SOCT    Inject 30 Units Subcutaneous daily Use as directed up to 30 units daily.     insulin glargine (LANTUS SOLOSTAR) 100 UNIT/ML pen    Take 15 units daily     NOVOLOG FLEXPEN 100 UNIT/ML soln    Inject 2-8 units with meals TID  and at bedtime.approx 15 units daily, MDD 20 units.   Novolog: giving \"a lot\" -- 5 or 6 units   Long acting -- 20 units   Symptoms of low blood sugar? Frequent lows and hypoglycemia unawareness. She knows how to treat lows (15-15 rule)    Was trained on Dexcom at the end of last year.  Did not understand that she had to keep the transmitter for 90 days despite teach back methods employed during training. Additionally, patient needs a new phone--it shuts off every 3 minutes or so, making it difficult to set up Dexcom/InPen apps.   Diet (reviewed with Kym 10/14): Arielle reports that she eats out a lot.  She likes Chinese, all sorts of foods from different places around the world.  Spicy meat, chicken, seafood.  Vegetables and fruits, etc.  She does not like to eat rice, bread, pasta. She did not grow up on this food.  She really wants to lose weight.  She was started on Victoza earlier this year by Dr. Wasserman and experienced severe hypoglycemia requiring paramedics and ER visit.  She took 4 doses and then we stopped it.  She wonders if we could consider this again in the future.   Brought in InPen today. InPen settings per Kym:    Statin: No   ACEi/ARB: No  Urine Albumin:   Lab Results   Component Value Date    UMALCR 94.51 (H) 09/30/2014      Lab Results   Component Value Date    A1C 10.1 06/22/2022    A1C 5.8 07/04/2020    A1C 6.4 06/11/2020    A1C 7.5 01/28/2020    A1C 7.8 01/02/2020    A1C 12.6 09/20/2019     Most Recent Immunizations   Administered Date(s) Administered     TDAP Vaccine (Boostrix) 12/08/2014     Estimated body mass index is 31.09 kg/m  " "as calculated from the following:    Height as of 8/19/22: 5' 2\" (1.575 m).    Weight as of 6/3/22: 170 lb (77.1 kg).      BP Readings from Last 1 Encounters:   12/21/22 122/84     Pulse Readings from Last 1 Encounters:   12/21/22 119     Wt Readings from Last 1 Encounters:   06/03/22 170 lb (77.1 kg)     Ht Readings from Last 1 Encounters:   08/19/22 5' 2\" (1.575 m)     Estimated body mass index is 31.09 kg/m  as calculated from the following:    Height as of 8/19/22: 5' 2\" (1.575 m).    Weight as of 6/3/22: 170 lb (77.1 kg).    Temp Readings from Last 1 Encounters:   12/21/22 97.3  F (36.3  C) (Temporal)       ----------------  I spent 10 minutes with this patient today. Kym Jang PA-C was provided the recommendations above via routed note and is the authorizing prescriber for this visit through the pharmacist collaborative practice agreement. A copy of the visit note was provided to the patient's provider(s).    Telemedicine Visit Details  Type of service:  Telephone visit  Start Time: 12:00PM  End Time: 12:10PM  Originating Location (pt. Location): Home  Distant Location (provider location):  Off-site  Provider has received verbal consent for a visit from the patient? Yes    The patient was given a summary of these recommendations.     Lauro Barrios, PharmD, [facility]  Endocrinology & Diabetes [facility] Pharmacist  [facility] [address] [place] [address]  Direct Voicemail: [phone]  Clinic Hours: Monday-Friday 7:00 AM - 3:30 PM     Medication Therapy Recommendations Needing Review  No medication therapy recommendations to display          "

## 2023-01-23 ENCOUNTER — VIRTUAL VISIT (OUTPATIENT)
Dept: ENDOCRINOLOGY | Facility: CLINIC | Age: 38
End: 2023-01-23
Payer: COMMERCIAL

## 2023-01-23 DIAGNOSIS — R53.83 OTHER FATIGUE: ICD-10-CM

## 2023-01-23 DIAGNOSIS — E10.649 TYPE 1 DIABETES MELLITUS WITH HYPOGLYCEMIA AND WITHOUT COMA (H): Primary | ICD-10-CM

## 2023-01-23 PROCEDURE — 99215 OFFICE O/P EST HI 40 MIN: CPT | Mod: 95 | Performed by: PHYSICIAN ASSISTANT

## 2023-01-23 RX ORDER — INSULIN GLARGINE 100 [IU]/ML
INJECTION, SOLUTION SUBCUTANEOUS
Qty: 15 ML | Refills: 2 | COMMUNITY
Start: 2023-01-23 | End: 2023-01-26

## 2023-01-23 RX ORDER — URINE ACETONE TEST STRIPS
STRIP MISCELLANEOUS
Qty: 50 STRIP | Refills: 3 | Status: SHIPPED | OUTPATIENT
Start: 2023-01-23 | End: 2023-11-14

## 2023-01-23 NOTE — PROGRESS NOTES
Arielle Montenegro  is being evaluated via a billable video visit.      How would you like to obtain your AVS? [employer]  For the video visit, send the invitation by: Text to cell phone: [phone]  Will anyone else be joining your video visit? No

## 2023-01-23 NOTE — Clinical Note
Syed Restrepo.  FYI- She is engaged.  Bumped her Novolog to 5 units and really emphasized taking it before eating.  Linking weight gain with lows may add a bit of motivation to take insulin before eating. Let's see.  I hope she gets the inpen set up with you! Thanks,  Kym

## 2023-01-23 NOTE — LETTER
1/23/2023       RE: Arielle Montenegro  [address] [place] [address]     Dear Colleague,    Thank you for referring your patient, Arielle Montenegro, to the [facility]. Please see a copy of my visit note below.    Outcome for 01/16/23 8:28 AM: Data uploaded on Dexcom- patient to set up inpen this week  MENG De [place]ente     Arielle Montenegro  is being evaluated via a billable video visit.      How would you like to obtain your AVS? MyChart  For the video visit, send the invitation by: Text to cell phone: [phone]  Will anyone else be joining your video visit? No    Start time: 0734  End time: 0809  Provider location: off site- home  Patient location: off site- home.    HPI:   Arielle is a 39 yo woman here for follow up of type 1 diabetes with a history of severe hypoglycemia and hypoglycemia unawareness.  She reports that she was diagnosed with type 1 dm around age 8 when she became sick while living in [place]. She has been on insulin since diagnosis.   She has also sees Dr. Wasserman in weight management. Arielle reports that she is frustrated with her weight gain and wants to do whatever she can to lose weight.  She recently starting talking with our pharmacist, Lauro Barrios, who helped Arielle start up on her dexcom sensor.  At that visit, she had been taking Lantus 20 units daily (previously had been taking 15), and she was having a lot of hypoglycemia.  He advised she lower it to 14 units and add in 3 units Novolog with each meal.  She feels this is not enough.  She has noticed that her glucose is quite up and down.  She finds she goes low often, then goes quite high after being low.      She thinks her trouble is that she does not eat much during the day- busy with her kids and just not hungry- then she eats quite a lot at night, when the kids go to bed and she does not have to worry about them.  She has been   "gaining a lot of weight-part of it is not taking care of her diabetes.  She feels like walking is harder for her- her legs hurt- because she is so overweight.   189#.    She orders a lot of takeout.  Craving for outside food.  Spicy foods, but she does not eat bread, rice, potatoes. She does not eat what her kids eat.  She tries to eat a lot of veggies and fish/shrimp.  She feels like she would be healthier if she ate more at home.  She has been successful at weight loss when she has done this in the past.  Arielle just bought a lot of fruits and vegetables and plans to start making smoothies every day in the morning. Strawberries, apple, esequiel, pineapple, grapes, bananas. She plans to put spinach and kale in her smoothies.  She thinks that spreading out her eating, and not eating as much at night will help her lose weight. She just started a gym near her house- having a lot of lows. She has only gone twice, but would like to go more often.  She has been bringing her smoothie to drink when she goes low.  When she goes low, she will drink juice.  She drinks \"a lot\" of juice- usually 2 glasses. She likes juice, but mostly drinks it when her blood sugar is low.     She knows she should take her insulin before she eats, but never does.  She is usually so hungry that she just wants to eat.  She then takes her Novolog and brings her glucose down.  She is due to start the IN-pen next week.      Her current treatment regimen is as follows:   Lantus- 14 units daily.   Novolog- 3 units with each meal- actually taking 5 units most of the time. Last night, blood sugar was 200- took 3 units when she went to sleep.   Woke up this morning and glucose was 300 mg/dL. She does not feel this is enough.    Tries to count carbs- seems like it works.  She knows how to do this, but finds it difficult. She usually takes no more than 5 units at a time.   She expresses that she would prefer to have set doses to take when she eats.     She " joined a facebook diabetes group and she has been reading about how a lot of people with diabetes have depression.   She feels like she may be dealing with this.    She feels like she gets mad easily.  She used to think it was just her personality, but she wonders if some of her mood swings are related to her diabetes. She was prescribed fluoxetine 20 mg daily.  She is scheduled to meet with Sarah Lozano, health psychologist, next week.     She recently started wearing the dexcom G6 sensor.  Her overall average glucose is 216 mg/dL over the past two weeks (CV 50%, TIR 32%, low 10%, very low 5%). Recent glucose:                 Diabetes monitoring and complications:  CAD: No  Last eye exam results: in the past year, no retinopathy  Microalbuminuria: 172 in   Neuropathy: No  HTN: No  On Statin: No  Depression: Yes      Past Medical History:  Blindness of right eye  Type 1 diabetes  Hypoglycemia unawareness  Vitamin D deficiency  Anxiety  Depression  Overweight     Past Surgical History:   section - , 2015  Enucleation right -     Family History:   Diabetes - paternal side of family       Social History:     Kids now 10, 8 and 2 years old.  One son (8 year old) with ADHD and obese with prediabetes, not type 1.  Other might have autism.      Diabetes Control:   Lab Results   Component Value Date    A1C 5.8 2020    A1C 6.4 2020    A1C 7.5 2020    A1C 7.8 2020    A1C 12.6 2019       Past Medical History:   Diagnosis Date     Blindness of right eye      Diabetes mellitus type 1 (H)     Diagnosed as a child       Past Surgical History:   Procedure Laterality Date      SECTION  13      SECTION N/A 2015    Procedure:  SECTION;  Surgeon: John Hudson MD;  Location: [facility] L+[facility]      SECTION N/A 7/3/2020    Procedure:  SECTION;  Surgeon: Aparna Atkins MD;  Location: [facility] L[facility]     ENUCLEATION Right 3/20/2015    Procedure:  ENUCLEATION;  Surgeon: Edilson Islas MD;  Location: [facility]       Family History   Problem Relation Age of Onset     Family History Negative Mother      Family History Negative Father      Diabetes Other         father's side       Social History     Socioeconomic History     Marital status: Single     Number of children: 1   Occupational History     Employer: UNEMPLOYED   Tobacco Use     Smoking status: Former Smoker     Packs/day: 0.00     Types: Cigarettes     Smokeless tobacco: Never Used     Tobacco comment: smokes 2 cigarettes/day-none for several months   Substance and Sexual Activity     Alcohol use: No     Alcohol/week: 0.0 standard drinks     Drug use: No     Sexual activity: Yes     Partners: Male     Birth control/protection: None   Social History Narrative    ** Merged History Encounter **         Caffeine intake/servings daily - 0    Calcium intake/servings daily - 3    Exercise 7 times weekly - describe walking    Sunscreen used - No    Seatbelts used - Yes    Guns stored in the home - No    Self Breast Exam - Yes    Pap test up to date -  No    Eye exam up to date -  Yes    Dental exam up to date -  Yes    DEXA scan up to date -  Not Applicable    Flex Sig/Colonoscopy up to date -  Not Applicable    Mammography up to date -  Not Applicable    Immunizations reviewed and up to date - No    Abuse: Current or Past (Physical, Sexual or Emotional) - No    Do you feel safe in your environment - Yes    Do you cope well with stress - Yes    Do you suffer from insomnia - No    Last updated by: KARL PELAEZ  7/18/2012                       Current Outpatient Medications   Medication     albuterol (PROAIR HFA/PROVENTIL HFA/VENTOLIN HFA) 108 (90 Base) MCG/ACT inhaler     cetirizine (ZYRTEC) 10 MG tablet     Continuous Blood Gluc  (DEXCOM G6 ) TOMASA     Continuous Blood Gluc Sensor (DEXCOM G6 SENSOR) MISC     Continuous Blood Gluc Transmit (DEXCOM G6 TRANSMITTER) MISC     Continuous Blood  Gluc Transmit (DEXCOM G6 TRANSMITTER) MISC     FLUoxetine (PROZAC) 20 MG capsule     fluticasone (FLONASE) 50 MCG/ACT nasal spray     fluticasone-salmeterol (ADVAIR-HFA) 115-21 MCG/ACT inhaler     gabapentin (NEURONTIN) 100 MG capsule     Glucagon (BAQSIMI TWO PACK) 3 MG/DOSE POWD     Injection Device for insulin (INPEN 100-GREY-NOVOLOG-FIASP) TOMASA     insulin aspart (NOVOPEN ECHO) 100 UNIT/ML cartridge     insulin glargine (LANTUS SOLOSTAR) 100 UNIT/ML pen     insulin pen needle (31G X 5 MM) 31G X 5 MM miscellaneous     naproxen (NAPROSYN) 500 MG tablet     NOVOLOG FLEXPEN 100 UNIT/ML soln     traZODone (DESYREL) 50 MG tablet     tretinoin (RETIN-A) 0.05 % external cream     No current facility-administered medications for this visit.        No Known Allergies    Physical Exam  There were no vitals taken for this visit.  GENERAL: healthy, alert and no distress  RESP: no audible wheeze, cough, or visible cyanosis.  No visible retractions or increased work of breathing.  Able to speak fully in complete sentences.  PSYCH: mentation appears normal, affect normal/bright, judgement and insight intact, normal speech and appearance well-groomed    RESULTS  Lab Results   Component Value Date    A1C 10.1 (H) 06/22/2022    A1C 5.8 (H) 07/04/2020    A1C 6.4 (H) 06/11/2020    A1C 7.5 (H) 01/28/2020    A1C 7.8 (H) 01/02/2020    A1C 12.6 (H) 09/20/2019       TSH   Date Value Ref Range Status   05/30/2019 0.877 0.358 - 3.740 UIU/mL Final   05/11/2017 1.19 0.40 - 4.00 mU/L Final   09/30/2014 1.90 0.40 - 4.00 mU/L Final     Comment:     Effective 7/30/2014, the reference range for this assay has changed to reflect   new instrumentation/methodology.         ALT   Date Value Ref Range Status   06/22/2022 10 10 - 35 U/L Final   08/12/2021 12 0 - 50 U/L Final   07/05/2020 24 0 - 50 U/L Final   07/04/2020 27 0 - 50 U/L Final   ]    Recent Labs   Lab Test 09/20/19  1425 03/11/19  0000   CHOL 181 147.6   HDL 71 52.4   * 72   TRIG 45  115.8       Lab Results   Component Value Date     06/23/2022     09/20/2019      Lab Results   Component Value Date    POTASSIUM 3.9 06/23/2022    POTASSIUM 3.8 08/12/2021    POTASSIUM 4.0 01/24/2020     Lab Results   Component Value Date    CHLORIDE 104 06/23/2022    CHLORIDE 100 08/12/2021    CHLORIDE 95 09/20/2019     Lab Results   Component Value Date    ALEXANDRO 9.0 06/23/2022    ALEXANDRO 8.5 09/20/2019     Lab Results   Component Value Date    CO2 24 06/23/2022    CO2 25 08/12/2021    CO2 22 09/20/2019     Lab Results   Component Value Date    BUN 10.0 06/23/2022    BUN 12 08/12/2021    BUN 13 09/20/2019     Lab Results   Component Value Date    CR 0.68 06/23/2022    CR 0.91 07/05/2020       GFR Estimate   Date Value Ref Range Status   06/23/2022 >90 >60 mL/min/1.73m2 Final     Comment:     Effective December 21, 2021 eGFRcr in adults is calculated using the 2021 CKD-EPI creatinine equation which includes age and gender ( et al., NEJ, DOI: 10.1056/KAMMwb2723163)   06/22/2022 >90 >60 mL/min/1.73m2 Final     Comment:     Effective December 21, 2021 eGFRcr in adults is calculated using the 2021 CKD-EPI creatinine equation which includes age and gender ( et al., NEJM, DOI: 10.1056/WJOTif4801284)   08/12/2021 63 >60 mL/min/1.73m2 Final     Comment:     As of July 11, 2021, eGFR is calculated by the CKD-EPI creatinine equation, without race adjustment. eGFR can be influenced by muscle mass, exercise, and diet. The reported eGFR is an estimation only and is only applicable if the renal function is stable.   07/05/2020 82 >60 mL/min/[1.73_m2] Final     Comment:     Non  GFR Calc  Starting 12/18/2018, serum creatinine based estimated GFR (eGFR) will be   calculated using the Chronic Kidney Disease Epidemiology Collaboration   (CKD-EPI) equation.     07/04/2020 85 >60 mL/min/[1.73_m2] Final     Comment:     Non  GFR Calc  Starting 12/18/2018, serum creatinine based estimated  GFR (eGFR) will be   calculated using the Chronic Kidney Disease Epidemiology Collaboration   (CKD-EPI) equation.     07/03/2020 75 >60 mL/min/[1.73_m2] Final     Comment:     Non  GFR Calc  Starting 12/18/2018, serum creatinine based estimated GFR (eGFR) will be   calculated using the Chronic Kidney Disease Epidemiology Collaboration   (CKD-EPI) equation.       GFR Estimate If Black   Date Value Ref Range Status   07/05/2020 >90 >60 mL/min/[1.73_m2] Final     Comment:      GFR Calc  Starting 12/18/2018, serum creatinine based estimated GFR (eGFR) will be   calculated using the Chronic Kidney Disease Epidemiology Collaboration   (CKD-EPI) equation.     07/04/2020 >90 >60 mL/min/[1.73_m2] Final     Comment:      GFR Calc  Starting 12/18/2018, serum creatinine based estimated GFR (eGFR) will be   calculated using the Chronic Kidney Disease Epidemiology Collaboration   (CKD-EPI) equation.     07/03/2020 87 >60 mL/min/[1.73_m2] Final     Comment:      GFR Calc  Starting 12/18/2018, serum creatinine based estimated GFR (eGFR) will be   calculated using the Chronic Kidney Disease Epidemiology Collaboration   (CKD-EPI) equation.         Lab Results   Component Value Date    MICROL 172 09/30/2014     No results found for: MICROALBUMIN  Lab Results   Component Value Date    CPEPT <0.1 (L) 09/30/2014       No results found for: B12]    Most recent eye exam date: : Not Found       Assessment/Plan:     1.  Type 1 diabetes-  Arielle's glucose is highly variable and she has a history of severe hypoglycemia and hypoglycemia unawareness. Her insulin dosing and decision making is unclear, but we did establish that she is routinely taking her Novolog AFTER eating, sometimes by over 1 hour, rather than before.  I explained how this inevitably will lead to profound hyperglycemia, followed by corrections which often drive her low.  I also emphasized how repeated hypoglycemia  can certainly be contributing to weight gain.  She understands and will make an effort to take her insulin before she eats going forward.  She has follow up later this week with Lauro Barrios, and they can again review this at the appointment. She is also scheduled in person to set up the IN-pen bluetooth insulin pen, which will be helpful for dosing and gaining a better understanding of her patterns/response to therapy.  Addressed her interest in weight loss and restarting Victoza/switching to Ozempic.  I explained that we will work on taking her insulin before eating this month and then we will review again next month. I am very concerned about her risk of severe hypoglycemia.     Instructions given to patient:   Keep glucose tablets in your pocket all the time.     This month, please focus on taking Novolog 5 units before each meal.  Remember, if you do not take your insulin before you eat, your glucose will go up into the 300's.      Low blood sugars are likely causing weight gain.  We can lower your risk of having lows by taking Novolog BEFORE you eat.     Once you are regularly taking your Novolog before you are eating, then we can consider starting Ozempic to help with weight loss.  We can review things at your next follow up visit with me  at 7:30am on video.     Schedule labs.  Lab [phone]    Emergency issues: Please contact the clinic as soon as you recognize a problem.  Here are some concerns you should contact us about.  -Vomiting: more than twice.  Please check ketones.  If positive, go to ER. Monitor glucose hourly.   -High glucose (over 300 mg/dL twice in a row): Please check ketones.  If ketones are negative, take an insulin correction and recheck glucose in 1 hour.  If glucose is not coming down, please call the clinic. If ketones are moderate or large, drink lots of water, take an insulin correction, and recheck ketones in 1 hour.  If ketones are still high (or you are  vomiting), go to the ER.  -Hypoglycemia (low glucose):   If glucose is less than 70 mg/dL, treat with 15g carb (4 glucose tablets), recheck glucose in 10 minutes.  If low again, repeat.   If glucose is less than 54 mg/dL, treat with 30g carb, recheck glucose in 10 minutes.  If low again, repeat.  Keep glucagon in your home in case of severe hypoglycemia and train someone how to use this.    Emergency kit (please ensure you always have these with you):   Glucose tablets  Glucagon  Insulin  Syringes (if on a pump)  Extra infusion set (if on a pump)  Ketone strips    Contact information:   If you have concerns, please send me a SCYFIX message or call the clinic at [phone].  For more urgent concerns, please call [phone] after hours/weekends and ask to speak with the endocrinologist on call.      Please let me know if you are having low blood sugars less than 70 or over 350 mg/dL.  Do not wait until your next appointment if this is happening.      2.  Risk factors-     Retinopathy:  No known history.  She does not appear to have a recent eye exam.    Nephropathy:  BP historically well controlled. No microalbuminuria.  Creatinine stable. Overdue for labs.  Asked her to schedule same day as her in person visit with Lauro Barrios (2/6).   Neuropathy: No.    Feet: OK, no ulcers.   Lipids:  LDL at target.   Thyroid screening: will check TSH.   Celiac screening: Does not appear to have been screened.  Will check antibodies.   Contraception: did not address today.   Depression: She seems to be struggling recently and is open to the idea of scheduling in health psychology with Sarah Lozano.  She is scheduled next week.     3.  F/U in 6 weeks with me, sooner with concerns/hypoglycemia.     49 minutes spent on the date of the encounter doing chart review, review of test results, patient visit and documentation, counseling/coordination of care, and discussion of follow up plan for worsening hyper and hypoglycemia.  The  patient understood and is satisfied with today's visit.     Kym Jang PA-C, MPAS   [facility]  Department of Medicine  Division of Endocrinology and Diabetes                                  Arielle Montenegro  is being evaluated via a billable video visit.      How would you like to obtain your AVS? Insight Plushart  For the video visit, send the invitation by: Text to cell phone: [phone]  Will anyone else be joining your video visit? No

## 2023-01-23 NOTE — PATIENT INSTRUCTIONS
Keep glucose tablets in your pocket all the time.     This month, please focus on taking Novolog 5 units before each meal.  Remember, if you do not take your insulin before you eat, your glucose will go up into the 300's.      Low blood sugars are likely causing weight gain.  We can lower your risk of having lows by taking Novolog BEFORE you eat.     Once you are regularly taking your Novolog before you are eating, then we can consider starting Ozempic to help with weight loss.  We can review things at your next follow up visit with me  at 7:30am on video.     Schedule labs.  Lab [phone]    Please schedule an eye exam.  I have placed a referral.     Emergency issues: Please contact the clinic as soon as you recognize a problem.  Here are some concerns you should contact us about.  -Vomiting: more than twice.  Please check ketones.  If positive, go to ER. Monitor glucose hourly.   -High glucose (over 300 mg/dL twice in a row): Please check ketones.  If ketones are negative, take an insulin correction and recheck glucose in 1 hour.  If glucose is not coming down, please call the clinic. If ketones are moderate or large, drink lots of water, take an insulin correction, and recheck ketones in 1 hour.  If ketones are still high (or you are vomiting), go to the ER.  -Hypoglycemia (low glucose):   If glucose is less than 70 mg/dL, treat with 15g carb (4 glucose tablets), recheck glucose in 10 minutes.  If low again, repeat.   If glucose is less than 54 mg/dL, treat with 30g carb, recheck glucose in 10 minutes.  If low again, repeat.  Keep glucagon in your home in case of severe hypoglycemia and train someone how to use this.    Emergency kit (please ensure you always have these with you):   Glucose tablets  Glucagon  Insulin  Syringes (if on a pump)  Extra infusion set (if on a pump)  Ketone strips    Contact information:   If you have concerns, please send me a [email or web address] message or call the clinic  at [phone].  For more urgent concerns, please call [phone] after hours/weekends and ask to speak with the endocrinologist on call.      Please let me know if you are having low blood sugars less than 70 or over 350 mg/dL.  Do not wait until your next appointment if this is happening.

## 2023-01-26 ENCOUNTER — TELEPHONE (OUTPATIENT)
Dept: ENDOCRINOLOGY | Facility: CLINIC | Age: 38
End: 2023-01-26
Payer: COMMERCIAL

## 2023-01-26 ENCOUNTER — VIRTUAL VISIT (OUTPATIENT)
Dept: PHARMACY | Facility: CLINIC | Age: 38
End: 2023-01-26
Payer: COMMERCIAL

## 2023-01-26 ENCOUNTER — VIRTUAL VISIT (OUTPATIENT)
Dept: PSYCHOLOGY | Facility: CLINIC | Age: 38
End: 2023-01-26
Payer: COMMERCIAL

## 2023-01-26 DIAGNOSIS — F39 UNSPECIFIED MOOD (AFFECTIVE) DISORDER (H): Primary | ICD-10-CM

## 2023-01-26 DIAGNOSIS — E10.649 TYPE 1 DIABETES MELLITUS WITH HYPOGLYCEMIA AND WITHOUT COMA (H): ICD-10-CM

## 2023-01-26 DIAGNOSIS — E10.40 TYPE 1 DIABETES MELLITUS WITH DIABETIC NEUROPATHY (H): ICD-10-CM

## 2023-01-26 DIAGNOSIS — E10.649 TYPE 1 DIABETES MELLITUS WITH HYPOGLYCEMIA AND WITHOUT COMA (H): Primary | ICD-10-CM

## 2023-01-26 PROCEDURE — 90832 PSYTX W PT 30 MINUTES: CPT | Mod: 95 | Performed by: PSYCHOLOGIST

## 2023-01-26 PROCEDURE — 99606 MTMS BY PHARM EST 15 MIN: CPT

## 2023-01-26 RX ORDER — INSULIN GLARGINE 100 [IU]/ML
INJECTION, SOLUTION SUBCUTANEOUS
Refills: 2 | OUTPATIENT
Start: 2023-01-26

## 2023-01-26 RX ORDER — INSULIN GLARGINE 100 [IU]/ML
INJECTION, SOLUTION SUBCUTANEOUS
Qty: 15 ML | Refills: 2 | Status: SHIPPED | OUTPATIENT
Start: 2023-01-26 | End: 2023-03-21

## 2023-01-26 NOTE — PROGRESS NOTES
Health Psychology          Summer Lozano, Ph.D.,  [phone]  Angie Byrne, Ph.D.,  [phone]  Sophie Camejo, Ph.D.,  [phone]  Ronna Pham, Ph.D., , [facility] [phone]  Santiago Miller, Ph.D., , [facility] [phone]  Becky Michael, Ph.D.,  [phone]  Sarah Lozano, Ph.D., , [facility] [phone]    [facility], 3rd Floor  [address] [place] [address]       Health Psychology Progress Note    Patient Name: Arielle Montenegro    Service Type: Individual  Length of Visit: 24 minutes  Start time: 10:06 AM  Stop time: 10:30 AM   Attendees: Patient attended alone  Session #: 1    Telemedicine Information:     Telemedicine Visit: The patient's condition can be safely assessed and treated via synchronous audio telemedicine encounter.    Reason for Telemedicine Visit: Patient has requested telehealth visit and Patient convenience (e.g. access to timely appointments / distance to available provider)  Originating Site (Patient Location): Patient's home  Distant Location (provider location):  On-site:   Consent:  The patient/guardian has verbally consented to: the potential risks and benefits of telemedicine (video visit) versus in person care; bill my insurance or make self-payment for services provided; and responsibility for payment of non-covered services.   Mode of Communication:  Audio Conference via myDocket - patient did not consent to video visit and requested audio only visit today  As the provider I attest to compliance with applicable laws and regulations related to telemedicine.     Identifying Information and Presenting Problem:  The patient is a 38 year old adult who scheduled an appointment with me at the recommendation of her endocrinology provider, Kym Jang PA-C.    Topics Discussed/Interventions Provided:    Orientation to Service:    Is patient the family member of an employee who works at this clinic or connected to  a patient health psychology provider is already treating? No    Discussed dual role conflicts? Yes     Discussed privacy and confidentiality, including limits? Yes     Is patient already established with a mental health provider? No      Health Psychology Service Introduction: Discussed the health psychology service. Provided education about role as health psychology provider and model of care. Patient was given the opportunity to ask questions and state her goals/expectations for initiating services with the health psychology provider. Patient is not interested in establishing services with the health psychology provider.    Session Content:       Call connection: Patient not yet connected to the video visit as of 10:05am. Called patient to check-in and confirm that she was still planning on having the visit today. She answered and stated that she would have the visit but that she wanted to complete the visit by phone and did not consent to a video call today. Proceeded with audio only visit.       Background/Context: Patient with a PMH significant for type 1 diabetes, depression, and difficulty with anger. She reports that she was asking questions about depression during a recent diabetes follow-up visit with Kym Jang PA-C and it was recommended that she schedule an appointment with a health psychologist to discuss her questions and concerns further. Patient states that she has received feedback from many people that she is bipolar and has difficulty with anger. She expresses that she believes her mood is tied to her glycemic control and that she may be depressed. Patent endorses worsening glycemic control, has gained weight, and is experiencing difficulty with low mood. She notes that she is also struggling with significant psychosocial stress including parenting demands and limited social support.       Education and Treatment Planning: Attempted to gather information about patient's goals for treatment and  "establishing care with a health psychologist. In response, she stated \"I'm not looking for nothing, I called because my doctor told me to.\" Provided psychoeducation about different types of therapy and treatments I can offer as a health psychologist. Inquired about whether treatment options are compatible with patient's goals. Patient expressed frustration and stated that she believed I did not understand why she called. She reiterated that she called because her provider advised her to. Attempted to validate patient's frustration and expressed that in addition to following providers' recommendations, I want to support her autonomy, agency, preferences, and goals for treatment. Patient stated, \"I've been through a lot. When I've met with other psychologists, they ask me about my life, but you are not asking me any of those questions. I'm sorry I called.\" Patient then disconnected from the call. Attempted to call patient back, but she did not answer. Left message apologizing for the miscommunication and invited her to return my call or schedule another appointment if she is still interested in getting mental health support. I also stated that I am happy to help her get connected with a different provider given that today's visit did not meet her expectations and that a different provider might be a better fit for her needs.     Treatment Objective(s) Addressed in This Session:  Assessment, triage, consultation, treatment planning    Progress on / Status of Treatment Objective(s) / Homework:  Satisfactory progress     Medication Adherence: Patient did not report medication changes. Current medication list is below:     Current Outpatient Medications   Medication     albuterol (PROAIR HFA/PROVENTIL HFA/VENTOLIN HFA) 108 (90 Base) MCG/ACT inhaler     cetirizine (ZYRTEC) 10 MG tablet     Continuous Blood Gluc  (DEXCOM G6 ) TOMASA     Continuous Blood Gluc Sensor (DEXCOM G6 SENSOR) MISC     Continuous Blood " Gluc Transmit (DEXCOM G6 TRANSMITTER) MISC     Continuous Blood Gluc Transmit (DEXCOM G6 TRANSMITTER) MISC     FLUoxetine (PROZAC) 20 MG capsule     fluticasone (FLONASE) 50 MCG/ACT nasal spray     fluticasone-salmeterol (ADVAIR-HFA) 115-21 MCG/ACT inhaler     gabapentin (NEURONTIN) 100 MG capsule     Glucagon (BAQSIMI TWO PACK) 3 MG/DOSE POWD     Injection Device for insulin (INPEN 100-GREY-NOVOLOG-FIASP) TOMASA     insulin aspart (NOVOPEN ECHO) 100 UNIT/ML cartridge     insulin glargine (LANTUS SOLOSTAR) 100 UNIT/ML pen     insulin pen needle (31G X 5 MM) 31G X 5 MM miscellaneous     KETOSTIX test strip     naproxen (NAPROSYN) 500 MG tablet     NOVOLOG FLEXPEN 100 UNIT/ML soln     traZODone (DESYREL) 50 MG tablet     tretinoin (RETIN-A) 0.05 % external cream     No current facility-administered medications for this visit.     Assessment: The patient appeared to be active and somewhat disengaged in today's session. She appeared somewhat reluctant to feedback and expressed frustration about attempts to gather information about her goals for treatment and how I could meet her needs.     Mental Status Exam:   Appearance: unable to assess   Eye Contact: unable to assess   Orientation: Did not assess visually, sounded alert and oriented x 4 by phone  Behavior/relationship to provider/demeanor: Irritable, Interruptive  Motor Activity: unable to assess   Mood (subjective report): Depressed, Irritable  Affect (objective appearance): not assessed visually, sounded appropriate/mood congruent by phone  Speech Rate: Normal  Speech Volume: Normal  Speech Articulation: Normal  Speech Coherence: Normal  Speech Spontaneity: Normal  Thought Content: endorsed anxious and depressive cognitions, unable to assess for suicidal/homicidal ideation, as patient disconnected the call before this could be assessed and did not respond with attempted outreach  Thought Process (associations): Logical, Linear and Goal directed  Thought Process  (rate): Normal  Abnormal Perception: None  Attention/Concentration: Normal  Memory: Appears grossly intact  Fund of Knowledge: Appears within normal limits  Abstraction:  Normal  Insight:  Fair  Judgment:  Fair    Does the patient appear to be at imminent risk of harm to self/others at this time? No. However, I was unable to assess this directly, as patient disconnected from the call before assessment occurred. Will send patient crisis resources via DJZ.     Diagnoses:    1. Unspecified mood (affective) disorder (H)    2. Type 1 diabetes mellitus with diabetic neuropathy (H)      Plan:    1. No additional follow-up with patient is planned at this time. Sent DJZ message with crisis resources and an invitation to follow-up if she is interested in the future.   2. Patient to use [employer] or other crisis resources in the event of a psychiatric emergency  3. Primary care needs and medications are managed by [facility]. Referring provider is Kym Jang PA-C.     NOTE: Treatment plan due in future visit    Sarah Lozano, Ph.D., , [facility]  Clinical Health Psychologist  Phone: [phone]   Pager: [phone]  1/26/2023 10:07 AM

## 2023-01-26 NOTE — PROGRESS NOTES
Medication Therapy Management (MTM) Encounter    ASSESSMENT:                            Medication Adherence/Access: See below for considerations    Type 1 Diabetes: Last A1C above goal of < 7%.  Unknown current control given patient has not monitoring.  History of frequent episodes of hypoglycemia and hypoglycemia unawareness.  Extremely sensitive to insulin -- high glycemic variability. Uncertain of the importance of Lantus--she feels this is what is causing her to go low. Would benefit from education today.     Would benefit from reinforcing importance of insulin regimen recommended by Kym and to NOT overcorrect.  Will set up InPen training with reminder call today.    PLAN:                            Lantus 14 units          Novolog 5 units with meals and 2 units with snacks      Follow-up: 2/6 with Lauro for InPen setup    Medication issues to be addressed at a future visit:      Assess asthma (no recent fill hx for maintenance inhalers)    Assess FERMIN/MDD and make sure patient called the resource Kym provided last fall    SUBJECTIVE/OBJECTIVE:                          Arielle Montenegro is a 37 year old female seen for a follow-up visit. She was referred to me from Kym Jang PA-C.      Reason for visit: Medication Therapy Management (MTM).    Allergies/ADRs: Reviewed in chart  Past Medical History: Reviewed in chart  Social History     Tobacco Use     Smoking status: Former     Packs/day: 0.00     Types: Cigarettes     Smokeless tobacco: Never     Tobacco comments:     smokes 2 cigarettes/day-none for several months   Substance Use Topics     Alcohol use: No     Alcohol/week: 0.0 standard drinks     Drug use: No    ^Reviewed today    Medication Adherence/Access: Fill history appropriate for diabetes medications/supplies, but other medications have variable fill history. Only had time to assess diabetes today. Difficult home life--three children, one has ADHD and diabetes. All three children present in  "visit today and distracting to mother.     Type 1 Diabetes:   Diabetes Medication(s)     Diabetic Other       Glucagon (BAQSIMI TWO PACK) 3 MG/DOSE POWD    Spray 1 each in nostril once as needed (severe hypoglycemia)     Patient not taking: No sig reported    Insulin       Insulin Aspart, w/Niacinamide, 100 UNIT/ML SOCT    Inject 30 Units Subcutaneous daily Use as directed up to 30 units daily.     insulin glargine (LANTUS SOLOSTAR) 100 UNIT/ML pen    Take 15 units daily.     NOVOLOG FLEXPEN 100 UNIT/ML soln    Inject 2-8 units with meals TID  and at bedtime.approx 15 units daily, MDD 20 units.       Unclear how patient is administering her insulin.  Unable to specify frequency or dose.  Symptoms of low blood sugar? Frequent lows and hypoglycemia unawareness. She knows how to treat lows (15-15 rule)  \"lower\" -- doesn't take (100) -- might take 3 units  Was trained on Dexcom at the end of last year.  Did not understand that she had to keep the transmitter for 90 days despite teach back methods employed during training. Patient has downloaded the InPen gael and is ready for training.   Diet (reviewed with Kym 10/14): Arielle reports that she eats out a lot.  She likes Chinese, all sorts of foods from different places around the world.  Spicy meat, chicken, seafood.  Vegetables and fruits, etc.  She does not like to eat rice, bread, pasta. She did not grow up on this food.  She really wants to lose weight.  She was started on Victoza earlier this year by Dr. Wasserman and experienced severe hypoglycemia requiring paramedics and ER visit.  She took 4 doses and then we stopped it.  She wonders if we could consider this again in the future.   Future InPen settings per Kym:    Statin: No   ACEi/ARB: No  Urine Albumin:   Lab Results   Component Value Date    UMALCR 94.51 (H) 09/30/2014      Lab Results   Component Value Date    A1C 10.1 06/22/2022    A1C 5.8 07/04/2020    A1C 6.4 06/11/2020    A1C 7.5 01/28/2020    A1C 7.8 " "01/02/2020    A1C 12.6 09/20/2019     Most Recent Immunizations   Administered Date(s) Administered     TDAP Vaccine (Boostrix) 12/08/2014     Estimated body mass index is 31.09 kg/m  as calculated from the following:    Height as of 8/19/22: 5' 2\" (1.575 m).    Weight as of 6/3/22: 170 lb (77.1 kg).      BP Readings from Last 1 Encounters:   12/21/22 122/84     Pulse Readings from Last 1 Encounters:   12/21/22 119     Wt Readings from Last 1 Encounters:   06/03/22 170 lb (77.1 kg)     Ht Readings from Last 1 Encounters:   08/19/22 5' 2\" (1.575 m)     Estimated body mass index is 31.09 kg/m  as calculated from the following:    Height as of 8/19/22: 5' 2\" (1.575 m).    Weight as of 6/3/22: 170 lb (77.1 kg).    Temp Readings from Last 1 Encounters:   12/21/22 97.3  F (36.3  C) (Temporal)       ----------------  I spent 10 minutes with this patient today. Kym Jang PA-C was provided the recommendations above via routed note and is the authorizing prescriber for this visit through the pharmacist collaborative practice agreement. A copy of the visit note was provided to the patient's provider(s).    The patient was given a summary of these recommendations.     Telemedicine Visit Details  Type of service:  Telephone visit  Start Time: 9:30AM  End Time: 9:40AM  Originating Location (pt. Location): Home  Distant Location (provider location):  On-site  Provider has received verbal consent for a visit from the patient? Yes    Lauro Barrios, PharmD, [facility]  Endocrinology & Diabetes [facility] Pharmacist  [address] [place], [place] [address]b[place] [address]  Direct Voicemail: [phone]  Clinic Hours: Monday-Friday 7:00 AM - 3:30 PM      "

## 2023-01-26 NOTE — TELEPHONE ENCOUNTER
Order sent per patient request.    Lauro Barrios, PharmD  Endocrine & Diabetes [facility] Pharmacist  [address] [place] [id number]  Direct Voicemail: [phone]

## 2023-01-26 NOTE — TELEPHONE ENCOUNTER
[facility] Call Center    Phone Message    May a detailed message be left on voicemail: yes     Reason for Call: Medication Refill Request    Has the patient contacted the pharmacy for the refill? Yes   Name of medication being requested: *insulin glargine (LANTUS SOLOSTAR) 100 UNIT/ML pen  Provider who prescribed the medication: Kym Jang PA-C  Pharmacy: [id number] - [place], [place] - [address]  Date medication is needed: Pharmacy is saying they didn't get this.  Patient is out of medication. Please refill as soon as possible Thank you.         Action Taken: Other: Endo    Travel Screening: Not Applicable

## 2023-02-08 ENCOUNTER — DOCUMENTATION ONLY (OUTPATIENT)
Dept: PHARMACY | Facility: CLINIC | Age: 38
End: 2023-02-08
Payer: COMMERCIAL

## 2023-02-08 DIAGNOSIS — E10.649 TYPE 1 DIABETES MELLITUS WITH HYPOGLYCEMIA AND WITHOUT COMA (H): Primary | ICD-10-CM

## 2023-02-20 NOTE — PROGRESS NOTES
Outcome for 02/20/23 8:30 AM: Data uploaded on Dexcom  Elida Issa LPN   Outcome for 02/23/23 8:37 AM: OpenCounter message sent; dexcom not UTD  Elida Issa LPN   Outcome for 02/24/23 11:11 AM: Left Voicemail   Elida Issa LPN   Outcome for 02/27/23 7:17 AM: Dexcom emailed to provider; last data from 2/9/2023. Patient has not been checking blood sugars since.   Elida Issa LPN     Arielle Montenegro  is being evaluated via a billable video visit.      How would you like to obtain your AVS? [employer]  For the video visit, send the invitation by: Text to cell phone: [phone]  Will anyone else be joining your video visit? No    Start time: 0734  End time: 0754  Provider location: off site- home  Patient location: off site- home.    HPI:   Arielle is a 37 yo woman here for follow up of type 1 diabetes with a history of severe hypoglycemia and hypoglycemia unawareness.  She reports that she was diagnosed with type 1 dm around age 8 when she became sick while living in [place]. She has been on insulin since diagnosis.   She has also sees Dr. Wasserman in weight management. Arielle reports that she is frustrated with her weight gain and wants to do whatever she can to lose weight.  She recently starting talking with our pharmacist, Lauro Barrios, who helped Arielle start up on her dexcom sensor, however she has missed her recent appointments.  She found it helpful to meet with him, but she had a lot of stressors which made it difficult to make the appointments.  At our last appointment, we spent much time discussing the importance of taking her Novolog before she eats.  She has not been able to do this as she forgets. She does recognize that this results in a lot of highs and lows.  She also accidentally threw away her dexcom transmitter, and is without a sensor currently.  She does not have a meter/test strips and really dislikes checking her glucose.  Sometimes her sensor says she is low, but she is not really low.  She is  "interested in upgrading to the dexcom g7.     She thinks her trouble is that she does not eat much during the day- busy with her kids and just not hungry- then she eats quite a lot at night, when the kids go to bed and she does not have to worry about them.  She has been  gaining a lot of weight-part of it is not taking care of her diabetes.  She feels like walking is harder for her- her legs hurt- because she is so overweight.   189#.    She orders a lot of takeout.  Craving for outside food.  Spicy foods, but she does not eat bread, rice, potatoes. She does not eat what her kids eat.  She tries to eat a lot of veggies and fish/shrimp.  She feels like she would be healthier if she ate more at home.  She has been successful at weight loss when she has done this in the past.  Arielle just bought a lot of fruits and vegetables and plans to start making smoothies every day in the morning. Strawberries, apple, esequiel, pineapple, grapes, bananas. She plans to put spinach and kale in her smoothies.  She thinks that spreading out her eating, and not eating as much at night will help her lose weight. She just started a gym near her house- having a lot of lows. She has only gone twice, but would like to go more often.  She has been bringing her smoothie to drink when she goes low.  When she goes low, she will drink juice.  She drinks \"a lot\" of juice- usually 2 glasses. She likes juice, but mostly drinks it when her blood sugar is low.       Her current treatment regimen is as follows:   Lantus- 14 units daily.   Novolog- does not take it before she eats- 3-5 units, mostly 5.  She takes it after eating.        Tries to count carbs- seems like it works.  She knows how to do this, but finds it difficult. She usually takes no more than 5 units at a time.   She expresses that she would prefer to have set doses to take when she eats.     She joined a facebook diabetes group and she has been reading about how a lot of people with " diabetes have depression.   She tried to meet with health psychologist last month but it was not a good fit.  She does not want to see another provider at this time.     She recently started wearing the dexcom G6 sensor.  Her overall average glucose is 178 mg/dL over the past two weeks (CV 47%, TIR 42%, low 4%, very low 7%). Recent glucose:     \                  Diabetes monitoring and complications:  CAD: No  Last eye exam results: in the past year, no retinopathy  Microalbuminuria: 172 in   Neuropathy: No  HTN: No  On Statin: No  Depression: Yes      Past Medical History:  Blindness of right eye  Type 1 diabetes  Hypoglycemia unawareness  Vitamin D deficiency  Anxiety  Depression  Overweight     Past Surgical History:   section - , 2015  Enucleation right -     Family History:   Diabetes - paternal side of family       Social History:     Kids now 10, 8 and 2 years old.  One son (8 year old) with ADHD and obese with prediabetes, not type 1.  Other might have autism.      Diabetes Control:   Lab Results   Component Value Date    A1C 5.8 2020    A1C 6.4 2020    A1C 7.5 2020    A1C 7.8 2020    A1C 12.6 2019       Past Medical History:   Diagnosis Date     Blindness of right eye      Diabetes mellitus type 1 (H)     Diagnosed as a child       Past Surgical History:   Procedure Laterality Date      SECTION  13      SECTION N/A 2015    Procedure:  SECTION;  Surgeon: John Hudson MD;  Location: [facility]      SECTION N/A 7/3/2020    Procedure:  SECTION;  Surgeon: Aparna Atkins MD;  Location: [facility]     ENUCLEATION Right 3/20/2015    Procedure: ENUCLEATION;  Surgeon: Edilson Islas MD;  [facility]       Family History   Problem Relation Age of Onset     Family History Negative Mother      Family History Negative Father      Diabetes Other         father's side       Social History     Socioeconomic  History     Marital status: Single     Number of children: 1   Occupational History     Employer: UNEMPLOYED   Tobacco Use     Smoking status: Former Smoker     Packs/day: 0.00     Types: Cigarettes     Smokeless tobacco: Never Used     Tobacco comment: smokes 2 cigarettes/day-none for several months   Substance and Sexual Activity     Alcohol use: No     Alcohol/week: 0.0 standard drinks     Drug use: No     Sexual activity: Yes     Partners: Male     Birth control/protection: None   Social History Narrative    ** Merged History Encounter **         Caffeine intake/servings daily - 0    Calcium intake/servings daily - 3    Exercise 7 times weekly - describe walking    Sunscreen used - No    Seatbelts used - Yes    Guns stored in the home - No    Self Breast Exam - Yes    Pap test up to date -  No    Eye exam up to date -  Yes    Dental exam up to date -  Yes    DEXA scan up to date -  Not Applicable    Flex Sig/Colonoscopy up to date -  Not Applicable    Mammography up to date -  Not Applicable    Immunizations reviewed and up to date - No    Abuse: Current or Past (Physical, Sexual or Emotional) - No    Do you feel safe in your environment - Yes    Do you cope well with stress - Yes    Do you suffer from insomnia - No    Last updated by: KARL PELAEZ  7/18/2012                       Current Outpatient Medications   Medication     albuterol (PROAIR HFA/PROVENTIL HFA/VENTOLIN HFA) 108 (90 Base) MCG/ACT inhaler     cetirizine (ZYRTEC) 10 MG tablet     Continuous Blood Gluc  (DEXCOM G6 ) TOMASA     Continuous Blood Gluc Sensor (DEXCOM G6 SENSOR) MISC     Continuous Blood Gluc Transmit (DEXCOM G6 TRANSMITTER) MISC     Continuous Blood Gluc Transmit (DEXCOM G6 TRANSMITTER) MISC     FLUoxetine (PROZAC) 20 MG capsule     fluticasone (FLONASE) 50 MCG/ACT nasal spray     fluticasone-salmeterol (ADVAIR-HFA) 115-21 MCG/ACT inhaler     gabapentin (NEURONTIN) 100 MG capsule     Glucagon (BAQSIMI TWO PACK) 3  MG/DOSE POWD     Injection Device for insulin (INPEN 100-GREY-NOVOLOG-FIASP) TOMASA     insulin aspart (NOVOPEN ECHO) 100 UNIT/ML cartridge     insulin detemir (LEVEMIR PEN) 100 UNIT/ML pen     insulin glargine (LANTUS SOLOSTAR) 100 UNIT/ML pen     insulin pen needle (31G X 5 MM) 31G X 5 MM miscellaneous     KETOSTIX test strip     naproxen (NAPROSYN) 500 MG tablet     NOVOLOG FLEXPEN 100 UNIT/ML soln     traZODone (DESYREL) 50 MG tablet     tretinoin (RETIN-A) 0.05 % external cream     No current facility-administered medications for this visit.        No Known Allergies    Physical Exam  There were no vitals taken for this visit.  GENERAL: healthy, alert and no distress  RESP: no audible wheeze, cough, or visible cyanosis.  No visible retractions or increased work of breathing.  Able to speak fully in complete sentences.  PSYCH: mentation appears normal, affect normal/bright, judgement and insight intact, normal speech and appearance well-groomed    RESULTS  Lab Results   Component Value Date    A1C 10.1 (H) 06/22/2022    A1C 5.8 (H) 07/04/2020    A1C 6.4 (H) 06/11/2020    A1C 7.5 (H) 01/28/2020    A1C 7.8 (H) 01/02/2020    A1C 12.6 (H) 09/20/2019       TSH   Date Value Ref Range Status   05/30/2019 0.877 0.358 - 3.740 UIU/mL Final   05/11/2017 1.19 0.40 - 4.00 mU/L Final   09/30/2014 1.90 0.40 - 4.00 mU/L Final     Comment:     Effective 7/30/2014, the reference range for this assay has changed to reflect   new instrumentation/methodology.         ALT   Date Value Ref Range Status   06/22/2022 10 10 - 35 U/L Final   08/12/2021 12 0 - 50 U/L Final   07/05/2020 24 0 - 50 U/L Final   07/04/2020 27 0 - 50 U/L Final   ]    Recent Labs   Lab Test 09/20/19  1425 03/11/19  0000   CHOL 181 147.6   HDL 71 52.4   * 72   TRIG 45 115.8       Lab Results   Component Value Date     06/23/2022     09/20/2019      Lab Results   Component Value Date    POTASSIUM 3.9 06/23/2022    POTASSIUM 3.8 08/12/2021     POTASSIUM 4.0 01/24/2020     Lab Results   Component Value Date    CHLORIDE 104 06/23/2022    CHLORIDE 100 08/12/2021    CHLORIDE 95 09/20/2019     Lab Results   Component Value Date    ALEXANDRO 9.0 06/23/2022    ALEXANDRO 8.5 09/20/2019     Lab Results   Component Value Date    CO2 24 06/23/2022    CO2 25 08/12/2021    CO2 22 09/20/2019     Lab Results   Component Value Date    BUN 10.0 06/23/2022    BUN 12 08/12/2021    BUN 13 09/20/2019     Lab Results   Component Value Date    CR 0.68 06/23/2022    CR 0.91 07/05/2020       GFR Estimate   Date Value Ref Range Status   06/23/2022 >90 >60 mL/min/1.73m2 Final     Comment:     Effective December 21, 2021 eGFRcr in adults is calculated using the 2021 CKD-EPI creatinine equation which includes age and gender (Keerthi et al., [facility], DOI: 10.1056/NQFIls0077342)   06/22/2022 >90 >60 mL/min/1.73m2 Final     Comment:     Effective December 21, 2021 eGFRcr in adults is calculated using the 2021 CKD-EPI creatinine equation which includes age and gender ( et al., [facility], DOI: 10.1056/GYJJsg0399085)   08/12/2021 63 >60 mL/min/1.73m2 Final     Comment:     As of July 11, 2021, eGFR is calculated by the CKD-EPI creatinine equation, without race adjustment. eGFR can be influenced by muscle mass, exercise, and diet. The reported eGFR is an estimation only and is only applicable if the renal function is stable.   07/05/2020 82 >60 mL/min/[1.73_m2] Final     Comment:     Non  GFR Calc  Starting 12/18/2018, serum creatinine based estimated GFR (eGFR) will be   calculated using the Chronic Kidney Disease Epidemiology Collaboration   (CKD-EPI) equation.     07/04/2020 85 >60 mL/min/[1.73_m2] Final     Comment:     Non  GFR Calc  Starting 12/18/2018, serum creatinine based estimated GFR (eGFR) will be   calculated using the Chronic Kidney Disease Epidemiology Collaboration   (CKD-EPI) equation.     07/03/2020 75 >60 mL/min/[1.73_m2] Final     Comment:     Non   American GFR Calc  Starting 12/18/2018, serum creatinine based estimated GFR (eGFR) will be   calculated using the Chronic Kidney Disease Epidemiology Collaboration   (CKD-EPI) equation.       GFR Estimate If Black   Date Value Ref Range Status   07/05/2020 >90 >60 mL/min/[1.73_m2] Final     Comment:      GFR Calc  Starting 12/18/2018, serum creatinine based estimated GFR (eGFR) will be   calculated using the Chronic Kidney Disease Epidemiology Collaboration   (CKD-EPI) equation.     07/04/2020 >90 >60 mL/min/[1.73_m2] Final     Comment:      GFR Calc  Starting 12/18/2018, serum creatinine based estimated GFR (eGFR) will be   calculated using the Chronic Kidney Disease Epidemiology Collaboration   (CKD-EPI) equation.     07/03/2020 87 >60 mL/min/[1.73_m2] Final     Comment:      GFR Calc  Starting 12/18/2018, serum creatinine based estimated GFR (eGFR) will be   calculated using the Chronic Kidney Disease Epidemiology Collaboration   (CKD-EPI) equation.         Lab Results   Component Value Date    MICROL 172 09/30/2014     No results found for: MICROALBUMIN  Lab Results   Component Value Date    CPEPT <0.1 (L) 09/30/2014       No results found for: B12]    Most recent eye exam date: : Not Found       Assessment/Plan:     1.  Type 1 diabetes-  Arielle's glucose is highly variable and she has a history of severe hypoglycemia and hypoglycemia unawareness. Her insulin dosing and decision making is unclear, but we did establish that she is routinely taking her Novolog AFTER eating, sometimes by over 1 hour, rather than before.  I explained how this inevitably will lead to profound hyperglycemia, followed by corrections which often drive her low.  I also emphasized how repeated hypoglycemia can certainly be contributing to weight gain.  She understands and will make an effort to take her insulin before she eats going forward.  She missed follow up with Lauro Barrios a couple  of times, so she has not yet set up the IN-pen.  Will reschedule. Addressed her interest in weight loss and restarting Victoza/switching to Ozempic.  I explained that we will work on taking her insulin before eating this month and then we will review again next month. I am very concerned about her risk of severe hypoglycemia.     Instructions given to patient:   Keep glucose tablets in your pocket all the time.     This month, please focus on taking Novolog 5 units before each meal.  Remember, if you do not take your insulin before you eat, your glucose will go up into the 300's.      Low blood sugars are likely causing weight gain.  We can lower your risk of having lows by taking Novolog BEFORE you eat.     Once you are regularly taking your Novolog before you are eating, then we can consider starting Ozempic to help with weight loss.  We can review things at your next follow up visit with me  at 11am on video.     Schedule labs.  Lab [phone]    Emergency issues: Please contact the clinic as soon as you recognize a problem.  Here are some concerns you should contact us about.  -Vomiting: more than twice.  Please check ketones.  If positive, go to ER. Monitor glucose hourly.   -High glucose (over 300 mg/dL twice in a row): Please check ketones.  If ketones are negative, take an insulin correction and recheck glucose in 1 hour.  If glucose is not coming down, please call the clinic. If ketones are moderate or large, drink lots of water, take an insulin correction, and recheck ketones in 1 hour.  If ketones are still high (or you are vomiting), go to the ER.  -Hypoglycemia (low glucose):   If glucose is less than 70 mg/dL, treat with 15g carb (4 glucose tablets), recheck glucose in 10 minutes.  If low again, repeat.   If glucose is less than 54 mg/dL, treat with 30g carb, recheck glucose in 10 minutes.  If low again, repeat.  Keep glucagon in your home in case of severe hypoglycemia and  "train someone how to use this.    Emergency kit (please ensure you always have these with you):   Glucose tablets  Glucagon  Insulin  Syringes (if on a pump)  Extra infusion set (if on a pump)  Ketone strips    Contact information:   If you have concerns, please send me a [employer]ong message or call the clinic at [phone].  For more urgent concerns, please call [phone] after hours/weekends and ask to speak with the endocrinologist on call.      Please let me know if you are having low blood sugars less than 70 or over 350 mg/dL.  Do not wait until your next appointment if this is happening.      2.  Risk factors-     Retinopathy:  No known history.  Scheduled for eye exam 3/2.  Has not had exam in years.    Nephropathy:  BP historically well controlled. No microalbuminuria.  Creatinine stable. Overdue for labs.  Asked her to schedule.  Neuropathy: No.    Feet: OK, no ulcers.   Lipids:  LDL at target.   Thyroid screening: will check TSH. Feeling very tired.   Celiac screening: Does not appear to have been screened.  Will check antibodies.   Contraception: did not address today.   Depression: Met with health psychology and it was not a good fit.  Not interested in rescheduling.  \"I'm a good mom and take care of my kids.\"    She is overdue for labs and would like to reschedule.      3.  F/U in 2 weeks with me, sooner with concerns/hypoglycemia.     32 minutes spent on the date of the encounter doing chart review, review of test results, patient visit and documentation, counseling/coordination of care, and discussion of follow up plan for worsening hyper and hypoglycemia.  The patient understood and is satisfied with today's visit.     Kym Jang PA-C, MPAS   [facility]  Department of Medicine  Division of Endocrinology and Diabetes                                            "

## 2023-02-24 ENCOUNTER — TELEPHONE (OUTPATIENT)
Dept: ENDOCRINOLOGY | Facility: CLINIC | Age: 38
End: 2023-02-24
Payer: COMMERCIAL

## 2023-02-24 NOTE — TELEPHONE ENCOUNTER
Called patient and left voicemail. Patient has an appointment on 2/27/2023. Need patient to upload their Dexcom device to site for provider to review prior to their appointment. Patient's last data on dexcom is from 2/9/2023.    Elida Issa LPN 02/24/23 11:10 AM

## 2023-02-27 ENCOUNTER — TELEPHONE (OUTPATIENT)
Dept: ENDOCRINOLOGY | Facility: CLINIC | Age: 38
End: 2023-02-27

## 2023-02-27 ENCOUNTER — TELEPHONE (OUTPATIENT)
Dept: EDUCATION SERVICES | Facility: CLINIC | Age: 38
End: 2023-02-27

## 2023-02-27 ENCOUNTER — VIRTUAL VISIT (OUTPATIENT)
Dept: ENDOCRINOLOGY | Facility: CLINIC | Age: 38
End: 2023-02-27
Payer: COMMERCIAL

## 2023-02-27 DIAGNOSIS — E10.649 TYPE 1 DIABETES MELLITUS WITH HYPOGLYCEMIA AND WITHOUT COMA (H): Primary | ICD-10-CM

## 2023-02-27 PROCEDURE — 99214 OFFICE O/P EST MOD 30 MIN: CPT | Mod: VID | Performed by: PHYSICIAN ASSISTANT

## 2023-02-27 RX ORDER — ACYCLOVIR 400 MG/1
1 TABLET ORAL
Qty: 3 EACH | Refills: 11 | Status: SHIPPED | OUTPATIENT
Start: 2023-02-27 | End: 2023-11-14

## 2023-02-27 NOTE — TELEPHONE ENCOUNTER
LMTCB we do have an extra transmitter for pt.    Leatha JACKSONN, RN, [facility]  Certified Diabetes Care and   [facility] Endocrinology and Diabetes  [facility]  [address] [place] [address]  Phone [phone]

## 2023-02-27 NOTE — TELEPHONE ENCOUNTER
PA Initiation    Medication: Dexcom G7 Sensor  Insurance Company: [employer] - Phone [phone] Fax [phone]  Pharmacy Filling the Rx:    Filling Pharmacy Phone:    Filling Pharmacy Fax:    Start Date: 2/27/2023    [id number]

## 2023-02-27 NOTE — PATIENT INSTRUCTIONS
Keep glucose tablets in your pocket all the time.     This month, please focus on taking Novolog 5 units before each meal.  Remember, if you do not take your insulin before you eat, your glucose will go up into the 300's.      Low blood sugars are likely causing weight gain.  We can lower your risk of having lows by taking Novolog BEFORE you eat.     Once you are regularly taking your Novolog before you are eating, then we can consider starting Ozempic to help with weight loss.  We can review things at your next follow up visit with me  at 11am on video.    Schedule labs.  Lab [phone]    Please schedule an eye exam.  I have placed a referral.     Emergency issues: Please contact the clinic as soon as you recognize a problem.  Here are some concerns you should contact us about.  -Vomiting: more than twice.  Please check ketones.  If positive, go to ER. Monitor glucose hourly.   -High glucose (over 300 mg/dL twice in a row): Please check ketones.  If ketones are negative, take an insulin correction and recheck glucose in 1 hour.  If glucose is not coming down, please call the clinic. If ketones are moderate or large, drink lots of water, take an insulin correction, and recheck ketones in 1 hour.  If ketones are still high (or you are vomiting), go to the ER.  -Hypoglycemia (low glucose):   If glucose is less than 70 mg/dL, treat with 15g carb (4 glucose tablets), recheck glucose in 10 minutes.  If low again, repeat.   If glucose is less than 54 mg/dL, treat with 30g carb, recheck glucose in 10 minutes.  If low again, repeat.  Keep glucagon in your home in case of severe hypoglycemia and train someone how to use this.    Emergency kit (please ensure you always have these with you):   Glucose tablets  Glucagon  Insulin  Syringes (if on a pump)  Extra infusion set (if on a pump)  Ketone strips    Contact information:   If you have concerns, please send me a [employer] message or call the clinic at  [phone].  For more urgent concerns, please call [phone] after hours/weekends and ask to speak with the endocrinologist on call.      Please let me know if you are having low blood sugars less than 70 or over 350 mg/dL.  Do not wait until your next appointment if this is happening.

## 2023-02-27 NOTE — Clinical Note
Extra g6 transmitter?  Arielle has severe unawareness and accidentally threw away her transmitter.  Thanks! Kym

## 2023-02-27 NOTE — LETTER
2/27/2023       RE: Arielle Montenegro  [address]  [place] [address]     Dear Colleague,    Thank you for referring your patient, Arielle Montenegro, to the [facility] [place] [facility]. Please see a copy of my visit note below.    Outcome for 02/20/23 8:30 AM: Data uploaded on Dexcom  Elida Issa LPN   Outcome for 02/23/23 8:37 AM: [employer] message sent; dexcom not UTD  Elida Issa LPN   Outcome for 02/24/23 11:11 AM: Left Voicemail   Elida Issa LPN   Outcome for 02/27/23 7:17 AM: Dexcom emailed to provider; last data from 2/9/2023. Patient has not been checking blood sugars since.   Elida Issa LPN     Arielle Montenegro  is being evaluated via a billable video visit.      How would you like to obtain your AVS? [employer]  For the video visit, send the invitation by: Text to cell phone: [phone]  Will anyone else be joining your video visit? No    Start time: 0734  End time: 0754  Provider location: off site- home  Patient location: off site- home.    HPI:   Arielle is a 37 yo woman here for follow up of type 1 diabetes with a history of severe hypoglycemia and hypoglycemia unawareness.  She reports that she was diagnosed with type 1 dm around age 8 when she became sick while living in [place]. She has been on insulin since diagnosis.   She has also sees Dr. Wasserman in weight management. Arielle reports that she is frustrated with her weight gain and wants to do whatever she can to lose weight.  She recently starting talking with our pharmacist, Lauro Barrios, who helped Arielle start up on her dexcom sensor, however she has missed her recent appointments.  She found it helpful to meet with him, but she had a lot of stressors which made it difficult to make the appointments.  At our last appointment, we spent much time discussing the importance of taking her Novolog before she eats.  She has not been able to do this as  "she forgets. She does recognize that this results in a lot of highs and lows.  She also accidentally threw away her dexcom transmitter, and is without a sensor currently.  She does not have a meter/test strips and really dislikes checking her glucose.  Sometimes her sensor says she is low, but she is not really low.  She is interested in upgrading to the dexcom g7.     She thinks her trouble is that she does not eat much during the day- busy with her kids and just not hungry- then she eats quite a lot at night, when the kids go to bed and she does not have to worry about them.  She has been  gaining a lot of weight-part of it is not taking care of her diabetes.  She feels like walking is harder for her- her legs hurt- because she is so overweight.   189#.    She orders a lot of takeout.  Craving for outside food.  Spicy foods, but she does not eat bread, rice, potatoes. She does not eat what her kids eat.  She tries to eat a lot of veggies and fish/shrimp.  She feels like she would be healthier if she ate more at home.  She has been successful at weight loss when she has done this in the past.  Arielle just bought a lot of fruits and vegetables and plans to start making smoothies every day in the morning. Strawberries, apple, esequiel, pineapple, grapes, bananas. She plans to put spinach and kale in her smoothies.  She thinks that spreading out her eating, and not eating as much at night will help her lose weight. She just started a gym near her house- having a lot of lows. She has only gone twice, but would like to go more often.  She has been bringing her smoothie to drink when she goes low.  When she goes low, she will drink juice.  She drinks \"a lot\" of juice- usually 2 glasses. She likes juice, but mostly drinks it when her blood sugar is low.       Her current treatment regimen is as follows:   Lantus- 14 units daily.   Novolog- does not take it before she eats- 3-5 units, mostly 5.  She takes it after eating.  "       Tries to count carbs- seems like it works.  She knows how to do this, but finds it difficult. She usually takes no more than 5 units at a time.   She expresses that she would prefer to have set doses to take when she eats.     She joined a facebook diabetes group and she has been reading about how a lot of people with diabetes have depression.   She tried to meet with health psychologist last month but it was not a good fit.  She does not want to see another provider at this time.     She recently started wearing the dexcom G6 sensor.  Her overall average glucose is 178 mg/dL over the past two weeks (CV 47%, TIR 42%, low 4%, very low 7%). Recent glucose:     \                  Diabetes monitoring and complications:  CAD: No  Last eye exam results: in the past year, no retinopathy  Microalbuminuria: 172 in   Neuropathy: No  HTN: No  On Statin: No  Depression: Yes      Past Medical History:  Blindness of right eye  Type 1 diabetes  Hypoglycemia unawareness  Vitamin D deficiency  Anxiety  Depression  Overweight     Past Surgical History:   section - ,   Enucleation right -     Family History:   Diabetes - paternal side of family       Social History:     Kids now 10, 8 and 2 years old.  One son (8 year old) with ADHD and obese with prediabetes, not type 1.  Other might have autism.      Diabetes Control:   Lab Results   Component Value Date    A1C 5.8 2020    A1C 6.4 2020    A1C 7.5 2020    A1C 7.8 2020    A1C 12.6 2019       Past Medical History:   Diagnosis Date     Blindness of right eye      Diabetes mellitus type 1 (H)     Diagnosed as a child       Past Surgical History:   Procedure Laterality Date      SECTION  13      SECTION N/A 2015    Procedure:  SECTION;  Surgeon: John Hudson MD;  Location: [facility] L+D      SECTION N/A 7/3/2020    Procedure:  SECTION;  Surgeon: Aparna Atkins MD;   Location: [facility] L+D     ENUCLEATION Right 3/20/2015    Procedure: ENUCLEATION;  Surgeon: Edilson Islas MD;  Location:  [facility]       Family History   Problem Relation Age of Onset     Family History Negative Mother      Family History Negative Father      Diabetes Other         father's side       Social History     Socioeconomic History     Marital status: Single     Number of children: 1   Occupational History     Employer: UNEMPLOYED   Tobacco Use     Smoking status: Former Smoker     Packs/day: 0.00     Types: Cigarettes     Smokeless tobacco: Never Used     Tobacco comment: smokes 2 cigarettes/day-none for several months   Substance and Sexual Activity     Alcohol use: No     Alcohol/week: 0.0 standard drinks     Drug use: No     Sexual activity: Yes     Partners: Male     Birth control/protection: None   Social History Narrative    ** Merged History Encounter **         Caffeine intake/servings daily - 0    Calcium intake/servings daily - 3    Exercise 7 times weekly - describe walking    Sunscreen used - No    Seatbelts used - Yes    Guns stored in the home - No    Self Breast Exam - Yes    Pap test up to date -  No    Eye exam up to date -  Yes    Dental exam up to date -  Yes    DEXA scan up to date -  Not Applicable    Flex Sig/Colonoscopy up to date -  Not Applicable    Mammography up to date -  Not Applicable    Immunizations reviewed and up to date - No    Abuse: Current or Past (Physical, Sexual or Emotional) - No    Do you feel safe in your environment - Yes    Do you cope well with stress - Yes    Do you suffer from insomnia - No    Last updated by: KARL PELAEZ  7/18/2012                       Current Outpatient Medications   Medication     albuterol (PROAIR HFA/PROVENTIL HFA/VENTOLIN HFA) 108 (90 Base) MCG/ACT inhaler     cetirizine (ZYRTEC) 10 MG tablet     Continuous Blood Gluc  (DEXCOM G6 ) TOMASA     Continuous Blood Gluc Sensor (DEXCOM G6 SENSOR) MISC     Continuous Blood  Gluc Transmit (DEXCOM G6 TRANSMITTER) MISC     Continuous Blood Gluc Transmit (DEXCOM G6 TRANSMITTER) MISC     FLUoxetine (PROZAC) 20 MG capsule     fluticasone (FLONASE) 50 MCG/ACT nasal spray     fluticasone-salmeterol (ADVAIR-HFA) 115-21 MCG/ACT inhaler     gabapentin (NEURONTIN) 100 MG capsule     Glucagon (BAQSIMI TWO PACK) 3 MG/DOSE POWD     Injection Device for insulin (INPEN 100-GREY-NOVOLOG-FIASP) TOMASA     insulin aspart (NOVOPEN ECHO) 100 UNIT/ML cartridge     insulin detemir (LEVEMIR PEN) 100 UNIT/ML pen     insulin glargine (LANTUS SOLOSTAR) 100 UNIT/ML pen     insulin pen needle (31G X 5 MM) 31G X 5 MM miscellaneous     KETOSTIX test strip     naproxen (NAPROSYN) 500 MG tablet     NOVOLOG FLEXPEN 100 UNIT/ML soln     traZODone (DESYREL) 50 MG tablet     tretinoin (RETIN-A) 0.05 % external cream     No current facility-administered medications for this visit.        No Known Allergies    Physical Exam  There were no vitals taken for this visit.  GENERAL: healthy, alert and no distress  RESP: no audible wheeze, cough, or visible cyanosis.  No visible retractions or increased work of breathing.  Able to speak fully in complete sentences.  PSYCH: mentation appears normal, affect normal/bright, judgement and insight intact, normal speech and appearance well-groomed    RESULTS  Lab Results   Component Value Date    A1C 10.1 (H) 06/22/2022    A1C 5.8 (H) 07/04/2020    A1C 6.4 (H) 06/11/2020    A1C 7.5 (H) 01/28/2020    A1C 7.8 (H) 01/02/2020    A1C 12.6 (H) 09/20/2019       TSH   Date Value Ref Range Status   05/30/2019 0.877 0.358 - 3.740 UIU/mL Final   05/11/2017 1.19 0.40 - 4.00 mU/L Final   09/30/2014 1.90 0.40 - 4.00 mU/L Final     Comment:     Effective 7/30/2014, the reference range for this assay has changed to reflect   new instrumentation/methodology.         ALT   Date Value Ref Range Status   06/22/2022 10 10 - 35 U/L Final   08/12/2021 12 0 - 50 U/L Final   07/05/2020 24 0 - 50 U/L Final    07/04/2020 27 0 - 50 U/L Final   ]    Recent Labs   Lab Test 09/20/19  1425 03/11/19  0000   CHOL 181 147.6   HDL 71 52.4   * 72   TRIG 45 115.8       Lab Results   Component Value Date     06/23/2022     09/20/2019      Lab Results   Component Value Date    POTASSIUM 3.9 06/23/2022    POTASSIUM 3.8 08/12/2021    POTASSIUM 4.0 01/24/2020     Lab Results   Component Value Date    CHLORIDE 104 06/23/2022    CHLORIDE 100 08/12/2021    CHLORIDE 95 09/20/2019     Lab Results   Component Value Date    ALEXANDRO 9.0 06/23/2022    ALEXANDRO 8.5 09/20/2019     Lab Results   Component Value Date    CO2 24 06/23/2022    CO2 25 08/12/2021    CO2 22 09/20/2019     Lab Results   Component Value Date    BUN 10.0 06/23/2022    BUN 12 08/12/2021    BUN 13 09/20/2019     Lab Results   Component Value Date    CR 0.68 06/23/2022    CR 0.91 07/05/2020       GFR Estimate   Date Value Ref Range Status   06/23/2022 >90 >60 mL/min/1.73m2 Final     Comment:     Effective December 21, 2021 eGFRcr in adults is calculated using the 2021 CKD-EPI creatinine equation which includes age and gender ( et al., NEJM, DOI: 10.1056/WBCZdf3461467)   06/22/2022 >90 >60 mL/min/1.73m2 Final     Comment:     Effective December 21, 2021 eGFRcr in adults is calculated using the 2021 CKD-EPI creatinine equation which includes age and gender ( et al., NEJ, DOI: 10.1056/ZFLKul6863187)   08/12/2021 63 >60 mL/min/1.73m2 Final     Comment:     As of July 11, 2021, eGFR is calculated by the CKD-EPI creatinine equation, without race adjustment. eGFR can be influenced by muscle mass, exercise, and diet. The reported eGFR is an estimation only and is only applicable if the renal function is stable.   07/05/2020 82 >60 mL/min/[1.73_m2] Final     Comment:     Non  GFR Calc  Starting 12/18/2018, serum creatinine based estimated GFR (eGFR) will be   calculated using the Chronic Kidney Disease Epidemiology Collaboration   (CKD-EPI)  equation.     07/04/2020 85 >60 mL/min/[1.73_m2] Final     Comment:     Non  GFR Calc  Starting 12/18/2018, serum creatinine based estimated GFR (eGFR) will be   calculated using the Chronic Kidney Disease Epidemiology Collaboration   (CKD-EPI) equation.     07/03/2020 75 >60 mL/min/[1.73_m2] Final     Comment:     Non  GFR Calc  Starting 12/18/2018, serum creatinine based estimated GFR (eGFR) will be   calculated using the Chronic Kidney Disease Epidemiology Collaboration   (CKD-EPI) equation.       GFR Estimate If Black   Date Value Ref Range Status   07/05/2020 >90 >60 mL/min/[1.73_m2] Final     Comment:      GFR Calc  Starting 12/18/2018, serum creatinine based estimated GFR (eGFR) will be   calculated using the Chronic Kidney Disease Epidemiology Collaboration   (CKD-EPI) equation.     07/04/2020 >90 >60 mL/min/[1.73_m2] Final     Comment:      GFR Calc  Starting 12/18/2018, serum creatinine based estimated GFR (eGFR) will be   calculated using the Chronic Kidney Disease Epidemiology Collaboration   (CKD-EPI) equation.     07/03/2020 87 >60 mL/min/[1.73_m2] Final     Comment:      GFR Calc  Starting 12/18/2018, serum creatinine based estimated GFR (eGFR) will be   calculated using the Chronic Kidney Disease Epidemiology Collaboration   (CKD-EPI) equation.         Lab Results   Component Value Date    MICROL 172 09/30/2014     No results found for: MICROALBUMIN  Lab Results   Component Value Date    CPEPT <0.1 (L) 09/30/2014       No results found for: B12]    Most recent eye exam date: : Not Found       Assessment/Plan:     1.  Type 1 diabetes-  Arielle's glucose is highly variable and she has a history of severe hypoglycemia and hypoglycemia unawareness. Her insulin dosing and decision making is unclear, but we did establish that she is routinely taking her Novolog AFTER eating, sometimes by over 1 hour, rather than before.  I  explained how this inevitably will lead to profound hyperglycemia, followed by corrections which often drive her low.  I also emphasized how repeated hypoglycemia can certainly be contributing to weight gain.  She understands and will make an effort to take her insulin before she eats going forward.  She missed follow up with Lauro Barrios a couple of times, so she has not yet set up the IN-pen.  Will reschedule. Addressed her interest in weight loss and restarting Victoza/switching to Ozempic.  I explained that we will work on taking her insulin before eating this month and then we will review again next month. I am very concerned about her risk of severe hypoglycemia.     Instructions given to patient:   Keep glucose tablets in your pocket all the time.     This month, please focus on taking Novolog 5 units before each meal.  Remember, if you do not take your insulin before you eat, your glucose will go up into the 300's.      Low blood sugars are likely causing weight gain.  We can lower your risk of having lows by taking Novolog BEFORE you eat.     Once you are regularly taking your Novolog before you are eating, then we can consider starting Ozempic to help with weight loss.  We can review things at your next follow up visit with me  at 11am on video.     Schedule labs.  Lab [phone]    Emergency issues: Please contact the clinic as soon as you recognize a problem.  Here are some concerns you should contact us about.  -Vomiting: more than twice.  Please check ketones.  If positive, go to ER. Monitor glucose hourly.   -High glucose (over 300 mg/dL twice in a row): Please check ketones.  If ketones are negative, take an insulin correction and recheck glucose in 1 hour.  If glucose is not coming down, please call the clinic. If ketones are moderate or large, drink lots of water, take an insulin correction, and recheck ketones in 1 hour.  If ketones are still high (or you are vomiting), go  "to the ER.  -Hypoglycemia (low glucose):   If glucose is less than 70 mg/dL, treat with 15g carb (4 glucose tablets), recheck glucose in 10 minutes.  If low again, repeat.   If glucose is less than 54 mg/dL, treat with 30g carb, recheck glucose in 10 minutes.  If low again, repeat.  Keep glucagon in your home in case of severe hypoglycemia and train someone how to use this.    Emergency kit (please ensure you always have these with you):   Glucose tablets  Glucagon  Insulin  Syringes (if on a pump)  Extra infusion set (if on a pump)  Ketone strips    Contact information:   If you have concerns, please send me a [employer] message or call the clinic at [phone].  For more urgent concerns, please call [phone] after hours/weekends and ask to speak with the endocrinologist on call.      Please let me know if you are having low blood sugars less than 70 or over 350 mg/dL.  Do not wait until your next appointment if this is happening.      2.  Risk factors-     Retinopathy:  No known history.  Scheduled for eye exam 3/2.  Has not had exam in years.    Nephropathy:  BP historically well controlled. No microalbuminuria.  Creatinine stable. Overdue for labs.  Asked her to schedule.  Neuropathy: No.    Feet: OK, no ulcers.   Lipids:  LDL at target.   Thyroid screening: will check TSH. Feeling very tired.   Celiac screening: Does not appear to have been screened.  Will check antibodies.   Contraception: did not address today.   Depression: Met with health psychology and it was not a good fit.  Not interested in rescheduling.  \"I'm a good mom and take care of my kids.\"    She is overdue for labs and would like to reschedule.      3.  F/U in 2 weeks with me, sooner with concerns/hypoglycemia.     32 minutes spent on the date of the encounter doing chart review, review of test results, patient visit and documentation, counseling/coordination of care, and discussion of follow up plan for worsening hyper and hypoglycemia.  " The patient understood and is satisfied with today's visit.     Kym Jang PA-C, MPAS   [facility]  Department of Medicine  Division of Endocrinology and Diabetes

## 2023-02-27 NOTE — TELEPHONE ENCOUNTER
Scheduled patient 3/14/23 at 11am per cuco sánchez. Patient also needs labs scheduled. Called pt and lvm advising of follow up scheduled. Advised pt to call office back to schedule labs. Karla Corral CMA on 2/27/2023 at 11:22 AM

## 2023-02-27 NOTE — Clinical Note
Patient would like to get on dexcom G7 and also wants to reschedule with you in person.  G6 would probably be best to integrate with dexcom.  I told her once she is back on dexcom, we can carefully start her on ozempic.   Thanks,  Kym

## 2023-02-27 NOTE — TELEPHONE ENCOUNTER
Kym Jang, NEIL  P [facility] Certified Diabetes Educators Adult [facility]  Extra g6 transmitter?  Arielle has severe unawareness and accidentally threw away her transmitter.  Thanks!   Kym

## 2023-02-27 NOTE — NURSING NOTE
Is the patient currently in the state of [place]? YES    Visit mode:VIDEO    If the visit is dropped, the patient can be reconnected by: VIDEO VISIT: Text to cell phone: [phone]    Will anyone else be joining the visit? NO      How would you like to obtain your AVS? MyChart    Are changes needed to the allergy or medication list? NO    Reason for visit: Follow up

## 2023-02-27 NOTE — TELEPHONE ENCOUNTER
Prior Authorization Approval    Authorization Effective Date: 1/28/2023  Authorization Expiration Date: 2/27/2024  Medication: Dexcom G7 Sensor  Approved Dose/Quantity: 3 per 30 days  Reference #: [id number]   Insurance Company: [employer] - Phone [phone] Fax [phone]  Expected CoPay:       CoPay Card Available:      Foundation Assistance Needed:    Which Pharmacy is filling the prescription (Not needed for infusion/clinic administered):    Pharmacy Notified:    Patient Notified:

## 2023-03-07 ENCOUNTER — TELEPHONE (OUTPATIENT)
Dept: PHARMACY | Facility: CLINIC | Age: 38
End: 2023-03-07
Payer: COMMERCIAL

## 2023-03-07 DIAGNOSIS — E10.649 TYPE 1 DIABETES MELLITUS WITH HYPOGLYCEMIA AND WITHOUT COMA (H): Primary | ICD-10-CM

## 2023-03-07 NOTE — TELEPHONE ENCOUNTER
LVM to let patient know G7 was approved and to schedule f/up with me    Lauro Barrios, PharmD  Endocrine & Diabetes [facility] Pharmacist  [address] [place] [address]  Direct Voicemail: [phone]

## 2023-03-07 NOTE — PROGRESS NOTES
Outcome for 03/07/23 4:26 PM: [employer] message sent  Karla Corral MA  Outcome for 03/10/23 12:47 PM: Patient ran out of dexcom and has not been testing with a meter for a few weeks now. Patient is also not using inpen yet. Patient states she will pick dexcom up from pharmacy today.  Elida Issa LPN   Outcome for 03/13/23 9:07 AM: Patient is not checking blood sugars; latest data is from 2/9 on dexcom.   Elida Issa LPN   Outcome for 03/13/23 9:07 AM: Data obtained via Dexcom website; data from 1/27-2/9  Elida Issa LPN               Outcome for 02/20/23 8:30 AM: Data uploaded on Dexcom  Elida Issa LPN   Outcome for 02/23/23 8:37 AM: [employer] message sent; dexcom not UTD  Elida Issa LPN   Outcome for 02/24/23 11:11 AM: Left Voicemail   Elida Issa LPN   Outcome for 02/27/23 7:17 AM: Dexcom emailed to provider; last data from 2/9/2023. Patient has not been checking blood sugars since.   Elida Issa LPN     Arielle Montenegro  is being evaluated via a billable video visit.      How would you like to obtain your AVS? [address]  For the video visit, send the invitation by: Text to cell phone: [phone]  Will anyone else be joining your video visit? No    Start time: 0734  End time: 0754  Provider location: off site- home  Patient location: off site- home.    HPI:   Arielle is a 39 yo woman here for follow up of type 1 diabetes with a history of severe hypoglycemia and hypoglycemia unawareness.  She reports that she was diagnosed with type 1 dm around age 8 when she became sick while living in [place]. She has been on insulin since diagnosis.   She has also sees Dr. Wasserman in weight management. Arielle reports that she is frustrated with her weight gain and wants to do whatever she can to lose weight.  She recently starting talking with our pharmacist, Lauro Barrios, who helped Arielle start up on her dexcom sensor, however she has missed her recent appointments.  She found it helpful to meet with him, but she had a lot  "of stressors which made it difficult to make the appointments.  At our last appointment, we spent much time discussing the importance of taking her Novolog before she eats.  She has not been able to do this as she forgets. She does recognize that this results in a lot of highs and lows. She has been off of her sensor for the past month or so.  Has not been checking glucose.  Just received dexcom g7, which should be easier for her.   She just received the Dexcom G7 and she would like to start using.      She thinks her trouble is that she does not eat much during the day- busy with her kids and just not hungry- then she eats quite a lot at night, when the kids go to bed and she does not have to worry about them.  Still having an eating problem.  She likes eating when her kids are asleep.  Likes to relax when she is eating. She has been  gaining a lot of weight-part of it is not taking care of her diabetes.  She feels like walking is harder for her- her legs hurt- because she is so overweight.   189#.    She orders a lot of takeout.  Craving for outside food.  Spicy foods, but she does not eat bread, rice, potatoes. She does not eat what her kids eat.  She tries to eat a lot of veggies and fish/shrimp.  She feels like she would be healthier if she ate more at home.  She has been successful at weight loss when she has done this in the past.  Arielle just bought a lot of fruits and vegetables and plans to start making smoothies every day in the morning. Strawberries, apple, esequiel, pineapple, grapes, bananas. She plans to put spinach and kale in her smoothies.  She thinks that spreading out her eating, and not eating as much at night will help her lose weight. She just started a gym near her house- having a lot of lows. She has only gone twice, but would like to go more often.  She has been bringing her smoothie to drink when she goes low.  When she goes low, she will drink juice.  She drinks \"a lot\" of juice- usually 2 " glasses. She likes juice, but mostly drinks it when her blood sugar is low.       Her current treatment regimen is as follows:   Lantus- 14 units daily.   Novolog- does not take it before she eats- 3-5 units, mostly 5.  She takes it after eating.  If she remembers, she takes it before she eats.      Diabetes monitoring and complications:  CAD: No  Last eye exam results: in the past year, no retinopathy  Microalbuminuria: 172 in 2014  Neuropathy: No  HTN: No  On Statin: No  Depression: Yes      Past Medical History:  Blindness of right eye  Type 1 diabetes  Hypoglycemia unawareness  Vitamin D deficiency  Anxiety  Depression  Overweight     Past Surgical History:   section - , 2015  Enucleation right -     Family History:   Diabetes - paternal side of family       Social History:     Kids now 10, 8 and 2 years old.  One son (8 year old) with ADHD and obese with prediabetes, not type 1.  Other might have autism.      Diabetes Control:   Lab Results   Component Value Date    A1C 5.8 2020    A1C 6.4 2020    A1C 7.5 2020    A1C 7.8 2020    A1C 12.6 2019       Past Medical History:   Diagnosis Date     Blindness of right eye      Diabetes mellitus type 1 (H)     Diagnosed as a child       Past Surgical History:   Procedure Laterality Date      SECTION  13      SECTION N/A 2015    Procedure:  SECTION;  Surgeon: John Hudson MD;  Location: [facility]      SECTION N/A 7/3/2020    Procedure:  SECTION;  Surgeon: Aparna Atkins MD;  Location: [facility]     ENUCLEATION Right 3/20/2015    Procedure: ENUCLEATION;  Surgeon: Edilson Islas MD;  Location: [facility]       Family History   Problem Relation Age of Onset     Family History Negative Mother      Family History Negative Father      Diabetes Other         father's side       Social History     Socioeconomic History     Marital status: Single     Number of children: 1    Occupational History     Employer: UNEMPLOYED   Tobacco Use     Smoking status: Former Smoker     Packs/day: 0.00     Types: Cigarettes     Smokeless tobacco: Never Used     Tobacco comment: smokes 2 cigarettes/day-none for several months   Substance and Sexual Activity     Alcohol use: No     Alcohol/week: 0.0 standard drinks     Drug use: No     Sexual activity: Yes     Partners: Male     Birth control/protection: None   Social History Narrative    ** Merged History Encounter **         Caffeine intake/servings daily - 0    Calcium intake/servings daily - 3    Exercise 7 times weekly - describe walking    Sunscreen used - No    Seatbelts used - Yes    Guns stored in the home - No    Self Breast Exam - Yes    Pap test up to date -  No    Eye exam up to date -  Yes    Dental exam up to date -  Yes    DEXA scan up to date -  Not Applicable    Flex Sig/Colonoscopy up to date -  Not Applicable    Mammography up to date -  Not Applicable    Immunizations reviewed and up to date - No    Abuse: Current or Past (Physical, Sexual or Emotional) - No    Do you feel safe in your environment - Yes    Do you cope well with stress - Yes    Do you suffer from insomnia - No    Last updated by: KARL PELAEZ  7/18/2012                       Current Outpatient Medications   Medication     albuterol (PROAIR HFA/PROVENTIL HFA/VENTOLIN HFA) 108 (90 Base) MCG/ACT inhaler     blood glucose (NO BRAND SPECIFIED) test strip     blood glucose monitoring (NO BRAND SPECIFIED) meter device kit     cetirizine (ZYRTEC) 10 MG tablet     Continuous Blood Gluc  (DEXCOM G6 ) TOMASA     Continuous Blood Gluc Sensor (DEXCOM G6 SENSOR) MISC     Continuous Blood Gluc Sensor (DEXCOM G7 SENSOR) MISC     Continuous Blood Gluc Transmit (DEXCOM G6 TRANSMITTER) MISC     Continuous Blood Gluc Transmit (DEXCOM G6 TRANSMITTER) MISC     FLUoxetine (PROZAC) 20 MG capsule     fluticasone (FLONASE) 50 MCG/ACT nasal spray     Glucagon (BAQSIMI TWO  PACK) 3 MG/DOSE POWD     Glucagon 3 MG/DOSE POWD     Injection Device for insulin (INPEN 100-GREY-NOVOLOG-FIASP) TOMASA     insulin aspart (NOVOPEN ECHO) 100 UNIT/ML cartridge     insulin detemir (LEVEMIR PEN) 100 UNIT/ML pen     insulin glargine (LANTUS SOLOSTAR) 100 UNIT/ML pen     insulin pen needle (31G X 5 MM) 31G X 5 MM miscellaneous     KETOSTIX test strip     NOVOLOG FLEXPEN 100 UNIT/ML soln     traZODone (DESYREL) 50 MG tablet     fluticasone-salmeterol (ADVAIR-HFA) 115-21 MCG/ACT inhaler     gabapentin (NEURONTIN) 100 MG capsule     naproxen (NAPROSYN) 500 MG tablet     tretinoin (RETIN-A) 0.05 % external cream     No current facility-administered medications for this visit.        No Known Allergies    Physical Exam  There were no vitals taken for this visit.  GENERAL: healthy, alert and no distress  RESP: no audible wheeze, cough, or visible cyanosis.  No visible retractions or increased work of breathing.  Able to speak fully in complete sentences.  PSYCH: mentation appears normal, affect normal/bright, judgement and insight intact, normal speech and appearance well-groomed    RESULTS  Lab Results   Component Value Date    A1C 10.1 (H) 06/22/2022    A1C 5.8 (H) 07/04/2020    A1C 6.4 (H) 06/11/2020    A1C 7.5 (H) 01/28/2020    A1C 7.8 (H) 01/02/2020    A1C 12.6 (H) 09/20/2019       TSH   Date Value Ref Range Status   05/30/2019 0.877 0.358 - 3.740 UIU/mL Final   05/11/2017 1.19 0.40 - 4.00 mU/L Final   09/30/2014 1.90 0.40 - 4.00 mU/L Final     Comment:     Effective 7/30/2014, the reference range for this assay has changed to reflect   new instrumentation/methodology.         ALT   Date Value Ref Range Status   06/22/2022 10 10 - 35 U/L Final   08/12/2021 12 0 - 50 U/L Final   07/05/2020 24 0 - 50 U/L Final   07/04/2020 27 0 - 50 U/L Final   ]    Recent Labs   Lab Test 09/20/19  1425 03/11/19  0000   CHOL 181 147.6   HDL 71 52.4   * 72   TRIG 45 115.8       Lab Results   Component Value Date    NA  137 06/23/2022     09/20/2019      Lab Results   Component Value Date    POTASSIUM 3.9 06/23/2022    POTASSIUM 3.8 08/12/2021    POTASSIUM 4.0 01/24/2020     Lab Results   Component Value Date    CHLORIDE 104 06/23/2022    CHLORIDE 100 08/12/2021    CHLORIDE 95 09/20/2019     Lab Results   Component Value Date    ALEXANDRO 9.0 06/23/2022    ALEXANDRO 8.5 09/20/2019     Lab Results   Component Value Date    CO2 24 06/23/2022    CO2 25 08/12/2021    CO2 22 09/20/2019     Lab Results   Component Value Date    BUN 10.0 06/23/2022    BUN 12 08/12/2021    BUN 13 09/20/2019     Lab Results   Component Value Date    CR 0.68 06/23/2022    CR 0.91 07/05/2020       GFR Estimate   Date Value Ref Range Status   06/23/2022 >90 >60 mL/min/1.73m2 Final     Comment:     Effective December 21, 2021 eGFRcr in adults is calculated using the 2021 CKD-EPI creatinine equation which includes age and gender ( et al., NEJ, DOI: 10.1056/FNFBky0695757)   06/22/2022 >90 >60 mL/min/1.73m2 Final     Comment:     Effective December 21, 2021 eGFRcr in adults is calculated using the 2021 CKD-EPI creatinine equation which includes age and gender ( et al., NEJ, DOI: 10.1056/PGMAoi0436107)   08/12/2021 63 >60 mL/min/1.73m2 Final     Comment:     As of July 11, 2021, eGFR is calculated by the CKD-EPI creatinine equation, without race adjustment. eGFR can be influenced by muscle mass, exercise, and diet. The reported eGFR is an estimation only and is only applicable if the renal function is stable.   07/05/2020 82 >60 mL/min/[1.73_m2] Final     Comment:     Non  GFR Calc  Starting 12/18/2018, serum creatinine based estimated GFR (eGFR) will be   calculated using the Chronic Kidney Disease Epidemiology Collaboration   (CKD-EPI) equation.     07/04/2020 85 >60 mL/min/[1.73_m2] Final     Comment:     Non  GFR Calc  Starting 12/18/2018, serum creatinine based estimated GFR (eGFR) will be   calculated using the Chronic  Kidney Disease Epidemiology Collaboration   (CKD-EPI) equation.     07/03/2020 75 >60 mL/min/[1.73_m2] Final     Comment:     Non  GFR Calc  Starting 12/18/2018, serum creatinine based estimated GFR (eGFR) will be   calculated using the Chronic Kidney Disease Epidemiology Collaboration   (CKD-EPI) equation.       GFR Estimate If Black   Date Value Ref Range Status   07/05/2020 >90 >60 mL/min/[1.73_m2] Final     Comment:      GFR Calc  Starting 12/18/2018, serum creatinine based estimated GFR (eGFR) will be   calculated using the Chronic Kidney Disease Epidemiology Collaboration   (CKD-EPI) equation.     07/04/2020 >90 >60 mL/min/[1.73_m2] Final     Comment:      GFR Calc  Starting 12/18/2018, serum creatinine based estimated GFR (eGFR) will be   calculated using the Chronic Kidney Disease Epidemiology Collaboration   (CKD-EPI) equation.     07/03/2020 87 >60 mL/min/[1.73_m2] Final     Comment:      GFR Calc  Starting 12/18/2018, serum creatinine based estimated GFR (eGFR) will be   calculated using the Chronic Kidney Disease Epidemiology Collaboration   (CKD-EPI) equation.         Lab Results   Component Value Date    MICROL 172 09/30/2014     No results found for: MICROALBUMIN  Lab Results   Component Value Date    CPEPT <0.1 (L) 09/30/2014       No results found for: B12]    Most recent eye exam date: : Not Found     Assessment/Plan:     1.  Type 1 diabetes-  I have no glucose data to review today.  Historically, Arielle's glucose is highly variable and she has a history of severe hypoglycemia and hypoglycemia unawareness. Her insulin dosing and decision making is unclear, but we did establish that she is routinely taking her Novolog AFTER eating, sometimes by over 1 hour, rather than before.  I explained how this inevitably will lead to profound hyperglycemia, followed by corrections which often drive her low.  I also emphasized how repeated  hypoglycemia can certainly be contributing to weight gain.  She understands and will make an effort to take her insulin before she eats going forward.  She now has the dexcom g7 and will set it up with Lauro gonzalez.  Will plan to start low dose ozempic once I can see her glucose data.  Will likely need to lower her Novolog doses and really focus on pre-meal dosing.  Will schedule in DM education to review Omnipod 5.  Would take a lot of education, and if I were to do this, it would be with additional basal insulin on board.  She is interested in this option.       Instructions given to patient:   Keep glucose tablets in your pocket all the time.     This month, please focus on taking Novolog 5 units before each meal.  Remember, if you do not take your insulin before you eat, your glucose will go up into the 300's.      Low blood sugars are likely causing weight gain.  We can lower your risk of having lows by taking Novolog BEFORE you eat.     Once you start the Dexcom G7 sensor, then we can start Ozempic 0.25 mg once weekly.      Please schedule with Madina Soto in diabetes education to discuss Omnipod 5 pump.      Emergency issues: Please contact the clinic as soon as you recognize a problem.  Here are some concerns you should contact us about.  -Vomiting: more than twice.  Please check ketones.  If positive, go to ER. Monitor glucose hourly.   -High glucose (over 300 mg/dL twice in a row): Please check ketones.  If ketones are negative, take an insulin correction and recheck glucose in 1 hour.  If glucose is not coming down, please call the clinic. If ketones are moderate or large, drink lots of water, take an insulin correction, and recheck ketones in 1 hour.  If ketones are still high (or you are vomiting), go to the ER.  -Hypoglycemia (low glucose):   If glucose is less than 70 mg/dL, treat with 15g carb (4 glucose tablets), recheck glucose in 10 minutes.  If low again, repeat.   If glucose is less than 54  "mg/dL, treat with 30g carb, recheck glucose in 10 minutes.  If low again, repeat.  Keep glucagon in your home in case of severe hypoglycemia and train someone how to use this.    Emergency kit (please ensure you always have these with you):   Glucose tablets  Glucagon  Insulin  Syringes (if on a pump)  Extra infusion set (if on a pump)  Ketone strips    Contact information:   If you have concerns, please send me a [employer] message or call the clinic at [phone].  For more urgent concerns, please call [phone] after hours/weekends and ask to speak with the endocrinologist on call.      Please let me know if you are having low blood sugars less than 70 or over 350 mg/dL.  Do not wait until your next appointment if this is happening.     2.  Risk factors-     Retinopathy:  No known history.  Scheduled for eye exam 3/2.  Has not had exam in years.    Nephropathy:  BP historically well controlled. No microalbuminuria.  Creatinine stable. Overdue for labs.  Asked her to schedule.  Neuropathy: No.    Feet: OK, no ulcers.   Lipids:  LDL at target.   Thyroid screening: will check TSH. Feeling very tired.   Celiac screening: Does not appear to have been screened.  Will check antibodies.   Contraception: did not address today.   Depression: Met with health psychology and it was not a good fit.  Not interested in rescheduling.  \"I'm a good mom and take care of my kids.\"    She is overdue for labs and would like to reschedule.      3.  F/U in 2 weeks with me, sooner with concerns/hypoglycemia.     32 minutes spent on the date of the encounter doing chart review, review of test results, patient visit and documentation, counseling/coordination of care, and discussion of follow up plan for worsening hyper and hypoglycemia.  The patient understood and is satisfied with today's visit.     Kym Jang PA-C, MPAS   [facility]  Department of Medicine  Division of Endocrinology and Diabetes    "

## 2023-03-09 NOTE — PROGRESS NOTES
Sent prescription for Levemir for patient -- child broke Lantus pen and insurance will not allow early fill override.     Lauro Barrios, PharmD  Endocrine & Diabetes [facility] Pharmacist  [address] [place] [id number]  Direct Voicemail: [phone]

## 2023-03-10 ENCOUNTER — TELEPHONE (OUTPATIENT)
Dept: ENDOCRINOLOGY | Facility: CLINIC | Age: 38
End: 2023-03-10
Payer: COMMERCIAL

## 2023-03-10 NOTE — TELEPHONE ENCOUNTER
Spoke with patient in regards to obtaining dexcom and inpen data for appointment on 3/14/2023. Patient ran out of dexcom and has not used it since 2/9/2023. Patient is picking them up from pharmacy and started again today with dexcom. Patient has not been using inpen.     Elida Issa LPN 03/10/23 12:47 PM

## 2023-03-14 ENCOUNTER — TELEPHONE (OUTPATIENT)
Dept: ENDOCRINOLOGY | Facility: CLINIC | Age: 38
End: 2023-03-14

## 2023-03-14 ENCOUNTER — VIRTUAL VISIT (OUTPATIENT)
Dept: PHARMACY | Facility: CLINIC | Age: 38
End: 2023-03-14
Payer: COMMERCIAL

## 2023-03-14 ENCOUNTER — VIRTUAL VISIT (OUTPATIENT)
Dept: ENDOCRINOLOGY | Facility: CLINIC | Age: 38
End: 2023-03-14
Payer: COMMERCIAL

## 2023-03-14 DIAGNOSIS — E66.811 CLASS 1 OBESITY DUE TO EXCESS CALORIES WITHOUT SERIOUS COMORBIDITY WITH BODY MASS INDEX (BMI) OF 31.0 TO 31.9 IN ADULT: Primary | ICD-10-CM

## 2023-03-14 DIAGNOSIS — E10.649 TYPE 1 DIABETES MELLITUS WITH HYPOGLYCEMIA AND WITHOUT COMA (H): Primary | ICD-10-CM

## 2023-03-14 DIAGNOSIS — E66.09 CLASS 1 OBESITY DUE TO EXCESS CALORIES WITHOUT SERIOUS COMORBIDITY WITH BODY MASS INDEX (BMI) OF 31.0 TO 31.9 IN ADULT: Primary | ICD-10-CM

## 2023-03-14 PROCEDURE — 99214 OFFICE O/P EST MOD 30 MIN: CPT | Mod: VID | Performed by: PHYSICIAN ASSISTANT

## 2023-03-14 PROCEDURE — 99207 PR NO CHARGE LOS: CPT

## 2023-03-14 NOTE — PATIENT INSTRUCTIONS
Next appointment:  at 10:30am.     Please schedule labs! Lab [phone]    Keep glucose tablets in your pocket all the time.     This month, please focus on taking Novolog 5 units before each meal.  Remember, if you do not take your insulin before you eat, your glucose will go up into the 300's.      Low blood sugars are likely causing weight gain.  We can lower your risk of having lows by taking Novolog BEFORE you eat.     Once you start the Dexcom G7 sensor, then we can start Ozempic 0.25 mg once weekly.      Please schedule with Madina Soto in diabetes education to discuss Omnipod 5 pump.      Emergency issues: Please contact the clinic as soon as you recognize a problem.  Here are some concerns you should contact us about.  -Vomiting: more than twice.  Please check ketones.  If positive, go to ER. Monitor glucose hourly.   -High glucose (over 300 mg/dL twice in a row): Please check ketones.  If ketones are negative, take an insulin correction and recheck glucose in 1 hour.  If glucose is not coming down, please call the clinic. If ketones are moderate or large, drink lots of water, take an insulin correction, and recheck ketones in 1 hour.  If ketones are still high (or you are vomiting), go to the ER.  -Hypoglycemia (low glucose):   If glucose is less than 70 mg/dL, treat with 15g carb (4 glucose tablets), recheck glucose in 10 minutes.  If low again, repeat.   If glucose is less than 54 mg/dL, treat with 30g carb, recheck glucose in 10 minutes.  If low again, repeat.  Keep glucagon in your home in case of severe hypoglycemia and train someone how to use this.    Emergency kit (please ensure you always have these with you):   Glucose tablets  Glucagon  Insulin  Syringes (if on a pump)  Extra infusion set (if on a pump)  Ketone strips    Contact information:   If you have concerns, please send me a [employer] message or call the clinic at [phone].  For more urgent concerns, please  call [phone] after hours/weekends and ask to speak with the endocrinologist on call.      Please let me know if you are having low blood sugars less than 70 or over 350 mg/dL.  Do not wait until your next appointment if this is happening.

## 2023-03-14 NOTE — NURSING NOTE
Is the patient currently in the state of [place]? YES    Visit mode:TELEPHONE    If the visit is dropped, the patient can be reconnected by: TELEPHONE VISIT: Phone number: [phone]    Will anyone else be joining the visit? NO      How would you like to obtain your AVS? MyChart    Are changes needed to the allergy or medication list? YES: See med list    Reason for visit: Follow up    Alexandra Majano CMA

## 2023-03-14 NOTE — LETTER
3/14/2023       RE: Arielle Montenegro  [address]  [place] [address]     Dear Colleague,    Thank you for referring your patient, Arielle Montenegro, to the [facility] ENDOCRINOLOG[facility] [place] at [facility]. Please see a copy of my visit note below.    Outcome for 03/07/23 4:26 PM: [employer] message sent  Karla Corral MA  Outcome for 03/10/23 12:47 PM: Patient ran out of dexcom and has not been testing with a meter for a few weeks now. Patient is also not using inpen yet. Patient states she will pick dexcom up from pharmacy today.  Elida Issa LPN   Outcome for 03/13/23 9:07 AM: Patient is not checking blood sugars; latest data is from 2/9 on dexcom.   Elida Issa LPN   Outcome for 03/13/23 9:07 AM: Data obtained via Dexcom website; data from 1/27-2/9  Elida Issa LPN               Outcome for 02/20/23 8:30 AM: Data uploaded on Dexcom  Elida Issa LPN   Outcome for 02/23/23 8:37 AM: [employer] message sent; dexcom not UTD  Elida Issa LPN   Outcome for 02/24/23 11:11 AM: Left Voicemail   Elida Issa LPN   Outcome for 02/27/23 7:17 AM: Dexcom emailed to provider; last data from 2/9/2023. Patient has not been checking blood sugars since.   Elida Issa LPN     Arielle Montenegro  is being evaluated via a billable video visit.      How would you like to obtain your AVS? [employer]  For the video visit, send the invitation by: Text to cell phone: [phone]  Will anyone else be joining your video visit? No    Start time: 0734  End time: 0754  Provider location: off site- home  Patient location: off site- home.    HPI:   Arielle is a 39 yo woman here for follow up of type 1 diabetes with a history of severe hypoglycemia and hypoglycemia unawareness.  She reports that she was diagnosed with type 1 dm around age 8 when she became sick while living in [place]. She has been on insulin since diagnosis.   She has also sees Dr. Wasserman in weight  management. Arielle reports that she is frustrated with her weight gain and wants to do whatever she can to lose weight.  She recently starting talking with our pharmacist, Lauro Barrios, who helped Arielle start up on her dexcom sensor, however she has missed her recent appointments.  She found it helpful to meet with him, but she had a lot of stressors which made it difficult to make the appointments.  At our last appointment, we spent much time discussing the importance of taking her Novolog before she eats.  She has not been able to do this as she forgets. She does recognize that this results in a lot of highs and lows. She has been off of her sensor for the past month or so.  Has not been checking glucose.  Just received dexcom g7, which should be easier for her.   She just received the Dexcom G7 and she would like to start using.      She thinks her trouble is that she does not eat much during the day- busy with her kids and just not hungry- then she eats quite a lot at night, when the kids go to bed and she does not have to worry about them.  Still having an eating problem.  She likes eating when her kids are asleep.  Likes to relax when she is eating. She has been  gaining a lot of weight-part of it is not taking care of her diabetes.  She feels like walking is harder for her- her legs hurt- because she is so overweight.   189#.    She orders a lot of takeout.  Craving for outside food.  Spicy foods, but she does not eat bread, rice, potatoes. She does not eat what her kids eat.  She tries to eat a lot of veggies and fish/shrimp.  She feels like she would be healthier if she ate more at home.  She has been successful at weight loss when she has done this in the past.  Arielle just bought a lot of fruits and vegetables and plans to start making smoothies every day in the morning. Strawberries, apple, esequiel, pineapple, grapes, bananas. She plans to put spinach and kale in her smoothies.  She thinks that spreading out  "her eating, and not eating as much at night will help her lose weight. She just started a gym near her house- having a lot of lows. She has only gone twice, but would like to go more often.  She has been bringing her smoothie to drink when she goes low.  When she goes low, she will drink juice.  She drinks \"a lot\" of juice- usually 2 glasses. She likes juice, but mostly drinks it when her blood sugar is low.       Her current treatment regimen is as follows:   Lantus- 14 units daily.   Novolog- does not take it before she eats- 3-5 units, mostly 5.  She takes it after eating.  If she remembers, she takes it before she eats.      Diabetes monitoring and complications:  CAD: No  Last eye exam results: in the past year, no retinopathy  Microalbuminuria: 172 in   Neuropathy: No  HTN: No  On Statin: No  Depression: Yes      Past Medical History:  Blindness of right eye  Type 1 diabetes  Hypoglycemia unawareness  Vitamin D deficiency  Anxiety  Depression  Overweight     Past Surgical History:   section - ,   Enucleation right -     Family History:   Diabetes - paternal side of family       Social History:     Kids now 10, 8 and 2 years old.  One son (8 year old) with ADHD and obese with prediabetes, not type 1.  Other might have autism.      Diabetes Control:   Lab Results   Component Value Date    A1C 5.8 2020    A1C 6.4 2020    A1C 7.5 2020    A1C 7.8 2020    A1C 12.6 2019       Past Medical History:   Diagnosis Date     Blindness of right eye      Diabetes mellitus type 1 (H)     Diagnosed as a child       Past Surgical History:   Procedure Laterality Date      SECTION  13      SECTION N/A 2015    Procedure:  SECTION;  Surgeon: John Hudson MD;  Location: [facility] L+D      SECTION N/A 7/3/2020    Procedure:  SECTION;  Surgeon: Aparna Atkins MD;  Location: [facility] L+[facility]     ENUCLEATION Right 3/20/2015    Procedure: " ENUCLEATION;  Surgeon: Edilson Islas MD;  Location: [facility]       Family History   Problem Relation Age of Onset     Family History Negative Mother      Family History Negative Father      Diabetes Other         father's side       Social History     Socioeconomic History     Marital status: Single     Number of children: 1   Occupational History     Employer: UNEMPLOYED   Tobacco Use     Smoking status: Former Smoker     Packs/day: 0.00     Types: Cigarettes     Smokeless tobacco: Never Used     Tobacco comment: smokes 2 cigarettes/day-none for several months   Substance and Sexual Activity     Alcohol use: No     Alcohol/week: 0.0 standard drinks     Drug use: No     Sexual activity: Yes     Partners: Male     Birth control/protection: None   Social History Narrative    ** Merged History Encounter **         Caffeine intake/servings daily - 0    Calcium intake/servings daily - 3    Exercise 7 times weekly - describe walking    Sunscreen used - No    Seatbelts used - Yes    Guns stored in the home - No    Self Breast Exam - Yes    Pap test up to date -  No    Eye exam up to date -  Yes    Dental exam up to date -  Yes    DEXA scan up to date -  Not Applicable    Flex Sig/Colonoscopy up to date -  Not Applicable    Mammography up to date -  Not Applicable    Immunizations reviewed and up to date - No    Abuse: Current or Past (Physical, Sexual or Emotional) - No    Do you feel safe in your environment - Yes    Do you cope well with stress - Yes    Do you suffer from insomnia - No    Last updated by: KALR PELAEZ  7/18/2012                       Current Outpatient Medications   Medication     albuterol (PROAIR HFA/PROVENTIL HFA/VENTOLIN HFA) 108 (90 Base) MCG/ACT inhaler     blood glucose (NO BRAND SPECIFIED) test strip     blood glucose monitoring (NO BRAND SPECIFIED) meter device kit     cetirizine (ZYRTEC) 10 MG tablet     Continuous Blood Gluc  (DEXCOM G6 ) TOMASA     Continuous Blood  Gluc Sensor (DEXCOM G6 SENSOR) MISC     Continuous Blood Gluc Sensor (DEXCOM G7 SENSOR) MISC     Continuous Blood Gluc Transmit (DEXCOM G6 TRANSMITTER) MISC     Continuous Blood Gluc Transmit (DEXCOM G6 TRANSMITTER) MISC     FLUoxetine (PROZAC) 20 MG capsule     fluticasone (FLONASE) 50 MCG/ACT nasal spray     Glucagon (BAQSIMI TWO PACK) 3 MG/DOSE POWD     Glucagon 3 MG/DOSE POWD     Injection Device for insulin (INPEN 100-GREY-NOVOLOG-FIASP) TOMASA     insulin aspart (NOVOPEN ECHO) 100 UNIT/ML cartridge     insulin detemir (LEVEMIR PEN) 100 UNIT/ML pen     insulin glargine (LANTUS SOLOSTAR) 100 UNIT/ML pen     insulin pen needle (31G X 5 MM) 31G X 5 MM miscellaneous     KETOSTIX test strip     NOVOLOG FLEXPEN 100 UNIT/ML soln     traZODone (DESYREL) 50 MG tablet     fluticasone-salmeterol (ADVAIR-HFA) 115-21 MCG/ACT inhaler     gabapentin (NEURONTIN) 100 MG capsule     naproxen (NAPROSYN) 500 MG tablet     tretinoin (RETIN-A) 0.05 % external cream     No current facility-administered medications for this visit.        No Known Allergies    Physical Exam  There were no vitals taken for this visit.  GENERAL: healthy, alert and no distress  RESP: no audible wheeze, cough, or visible cyanosis.  No visible retractions or increased work of breathing.  Able to speak fully in complete sentences.  PSYCH: mentation appears normal, affect normal/bright, judgement and insight intact, normal speech and appearance well-groomed    RESULTS  Lab Results   Component Value Date    A1C 10.1 (H) 06/22/2022    A1C 5.8 (H) 07/04/2020    A1C 6.4 (H) 06/11/2020    A1C 7.5 (H) 01/28/2020    A1C 7.8 (H) 01/02/2020    A1C 12.6 (H) 09/20/2019       TSH   Date Value Ref Range Status   05/30/2019 0.877 0.358 - 3.740 UIU/mL Final   05/11/2017 1.19 0.40 - 4.00 mU/L Final   09/30/2014 1.90 0.40 - 4.00 mU/L Final     Comment:     Effective 7/30/2014, the reference range for this assay has changed to reflect   new instrumentation/methodology.          ALT   Date Value Ref Range Status   06/22/2022 10 10 - 35 U/L Final   08/12/2021 12 0 - 50 U/L Final   07/05/2020 24 0 - 50 U/L Final   07/04/2020 27 0 - 50 U/L Final   ]    Recent Labs   Lab Test 09/20/19  1425 03/11/19  0000   CHOL 181 147.6   HDL 71 52.4   * 72   TRIG 45 115.8       Lab Results   Component Value Date     06/23/2022     09/20/2019      Lab Results   Component Value Date    POTASSIUM 3.9 06/23/2022    POTASSIUM 3.8 08/12/2021    POTASSIUM 4.0 01/24/2020     Lab Results   Component Value Date    CHLORIDE 104 06/23/2022    CHLORIDE 100 08/12/2021    CHLORIDE 95 09/20/2019     Lab Results   Component Value Date    ALEXANDRO 9.0 06/23/2022    ALEXANDRO 8.5 09/20/2019     Lab Results   Component Value Date    CO2 24 06/23/2022    CO2 25 08/12/2021    CO2 22 09/20/2019     Lab Results   Component Value Date    BUN 10.0 06/23/2022    BUN 12 08/12/2021    BUN 13 09/20/2019     Lab Results   Component Value Date    CR 0.68 06/23/2022    CR 0.91 07/05/2020       GFR Estimate   Date Value Ref Range Status   06/23/2022 >90 >60 mL/min/1.73m2 Final     Comment:     Effective December 21, 2021 eGFRcr in adults is calculated using the 2021 CKD-EPI creatinine equation which includes age and gender (Keerthi et al., NEJ, DOI: 10.1056/AXNQah8016950)   06/22/2022 >90 >60 mL/min/1.73m2 Final     Comment:     Effective December 21, 2021 eGFRcr in adults is calculated using the 2021 CKD-EPI creatinine equation which includes age and gender (Keerthi et al., NEJ, DOI: 10.1056/LGBYuf5439205)   08/12/2021 63 >60 mL/min/1.73m2 Final     Comment:     As of July 11, 2021, eGFR is calculated by the CKD-EPI creatinine equation, without race adjustment. eGFR can be influenced by muscle mass, exercise, and diet. The reported eGFR is an estimation only and is only applicable if the renal function is stable.   07/05/2020 82 >60 mL/min/[1.73_m2] Final     Comment:     Non  GFR Calc  Starting 12/18/2018, serum  creatinine based estimated GFR (eGFR) will be   calculated using the Chronic Kidney Disease Epidemiology Collaboration   (CKD-EPI) equation.     07/04/2020 85 >60 mL/min/[1.73_m2] Final     Comment:     Non  GFR Calc  Starting 12/18/2018, serum creatinine based estimated GFR (eGFR) will be   calculated using the Chronic Kidney Disease Epidemiology Collaboration   (CKD-EPI) equation.     07/03/2020 75 >60 mL/min/[1.73_m2] Final     Comment:     Non  GFR Calc  Starting 12/18/2018, serum creatinine based estimated GFR (eGFR) will be   calculated using the Chronic Kidney Disease Epidemiology Collaboration   (CKD-EPI) equation.       GFR Estimate If Black   Date Value Ref Range Status   07/05/2020 >90 >60 mL/min/[1.73_m2] Final     Comment:      GFR Calc  Starting 12/18/2018, serum creatinine based estimated GFR (eGFR) will be   calculated using the Chronic Kidney Disease Epidemiology Collaboration   (CKD-EPI) equation.     07/04/2020 >90 >60 mL/min/[1.73_m2] Final     Comment:      GFR Calc  Starting 12/18/2018, serum creatinine based estimated GFR (eGFR) will be   calculated using the Chronic Kidney Disease Epidemiology Collaboration   (CKD-EPI) equation.     07/03/2020 87 >60 mL/min/[1.73_m2] Final     Comment:      GFR Calc  Starting 12/18/2018, serum creatinine based estimated GFR (eGFR) will be   calculated using the Chronic Kidney Disease Epidemiology Collaboration   (CKD-EPI) equation.         Lab Results   Component Value Date    MICROL 172 09/30/2014     No results found for: MICROALBUMIN  Lab Results   Component Value Date    CPEPT <0.1 (L) 09/30/2014       No results found for: B12]    Most recent eye exam date: : Not Found     Assessment/Plan:     1.  Type 1 diabetes-  I have no glucose data to review today.  Historically, Arielle's glucose is highly variable and she has a history of severe hypoglycemia and hypoglycemia  unawareness. Her insulin dosing and decision making is unclear, but we did establish that she is routinely taking her Novolog AFTER eating, sometimes by over 1 hour, rather than before.  I explained how this inevitably will lead to profound hyperglycemia, followed by corrections which often drive her low.  I also emphasized how repeated hypoglycemia can certainly be contributing to weight gain.  She understands and will make an effort to take her insulin before she eats going forward.  She now has the dexcom g7 and will set it up with Lauro gonzalez.  Will plan to start low dose ozempic once I can see her glucose data.  Will likely need to lower her Novolog doses and really focus on pre-meal dosing.  Will schedule in DM education to review Omnipod 5.  Would take a lot of education, and if I were to do this, it would be with additional basal insulin on board.  She is interested in this option.       Instructions given to patient:   Keep glucose tablets in your pocket all the time.     This month, please focus on taking Novolog 5 units before each meal.  Remember, if you do not take your insulin before you eat, your glucose will go up into the 300's.      Low blood sugars are likely causing weight gain.  We can lower your risk of having lows by taking Novolog BEFORE you eat.     Once you start the Dexcom G7 sensor, then we can start Ozempic 0.25 mg once weekly.      Please schedule with Madina Soto in diabetes education to discuss Omnipod 5 pump.      Emergency issues: Please contact the clinic as soon as you recognize a problem.  Here are some concerns you should contact us about.  -Vomiting: more than twice.  Please check ketones.  If positive, go to ER. Monitor glucose hourly.   -High glucose (over 300 mg/dL twice in a row): Please check ketones.  If ketones are negative, take an insulin correction and recheck glucose in 1 hour.  If glucose is not coming down, please call the clinic. If ketones are moderate or  "large, drink lots of water, take an insulin correction, and recheck ketones in 1 hour.  If ketones are still high (or you are vomiting), go to the ER.  -Hypoglycemia (low glucose):   If glucose is less than 70 mg/dL, treat with 15g carb (4 glucose tablets), recheck glucose in 10 minutes.  If low again, repeat.   If glucose is less than 54 mg/dL, treat with 30g carb, recheck glucose in 10 minutes.  If low again, repeat.  Keep glucagon in your home in case of severe hypoglycemia and train someone how to use this.    Emergency kit (please ensure you always have these with you):   Glucose tablets  Glucagon  Insulin  Syringes (if on a pump)  Extra infusion set (if on a pump)  Ketone strips    Contact information:   If you have concerns, please send me a [employer] message or call the clinic at [phone].  For more urgent concerns, please call [phone] after hours/weekends and ask to speak with the endocrinologist on call.      Please let me know if you are having low blood sugars less than 70 or over 350 mg/dL.  Do not wait until your next appointment if this is happening.     2.  Risk factors-     Retinopathy:  No known history.  Scheduled for eye exam 3/2.  Has not had exam in years.    Nephropathy:  BP historically well controlled. No microalbuminuria.  Creatinine stable. Overdue for labs.  Asked her to schedule.  Neuropathy: No.    Feet: OK, no ulcers.   Lipids:  LDL at target.   Thyroid screening: will check TSH. Feeling very tired.   Celiac screening: Does not appear to have been screened.  Will check antibodies.   Contraception: did not address today.   Depression: Met with health psychology and it was not a good fit.  Not interested in rescheduling.  \"I'm a good mom and take care of my kids.\"    She is overdue for labs and would like to reschedule.      3.  F/U in 2 weeks with me, sooner with concerns/hypoglycemia.     32 minutes spent on the date of the encounter doing chart review, review of test results, " patient visit and documentation, counseling/coordination of care, and discussion of follow up plan for worsening hyper and hypoglycemia.  The patient understood and is satisfied with today's visit.     Kym Jang PA-C, MPAS   [facility]  Department of Medicine  Division of Endocrinology and Diabetes

## 2023-03-21 ENCOUNTER — APPOINTMENT (OUTPATIENT)
Dept: GENERAL RADIOLOGY | Facility: CLINIC | Age: 38
End: 2023-03-21
Attending: EMERGENCY MEDICINE
Payer: COMMERCIAL

## 2023-03-21 ENCOUNTER — HOSPITAL ENCOUNTER (EMERGENCY)
Facility: CLINIC | Age: 38
Discharge: HOME | End: 2023-03-21
Attending: EMERGENCY MEDICINE | Admitting: EMERGENCY MEDICINE
Payer: COMMERCIAL

## 2023-03-21 ENCOUNTER — TELEPHONE (OUTPATIENT)
Dept: PHARMACY | Facility: CLINIC | Age: 38
End: 2023-03-21

## 2023-03-21 VITALS
OXYGEN SATURATION: 95 % | HEART RATE: 93 BPM | WEIGHT: 183 LBS | RESPIRATION RATE: 16 BRPM | SYSTOLIC BLOOD PRESSURE: 99 MMHG | BODY MASS INDEX: 32.43 KG/M2 | DIASTOLIC BLOOD PRESSURE: 76 MMHG | HEIGHT: 63 IN | TEMPERATURE: 97.6 F

## 2023-03-21 DIAGNOSIS — E10.10 DIABETIC KETOACIDOSIS WITHOUT COMA ASSOCIATED WITH TYPE 1 DIABETES MELLITUS (H): ICD-10-CM

## 2023-03-21 DIAGNOSIS — E10.649 TYPE 1 DIABETES MELLITUS WITH HYPOGLYCEMIA AND WITHOUT COMA (H): ICD-10-CM

## 2023-03-21 LAB
ALBUMIN SERPL BCG-MCNC: 3.8 G/DL (ref 3.5–5.2)
ALBUMIN UR-MCNC: 50 MG/DL
ALP SERPL-CCNC: 154 U/L (ref 35–104)
ALT SERPL W P-5'-P-CCNC: 19 U/L (ref 10–35)
ANION GAP SERPL CALCULATED.3IONS-SCNC: 22 MMOL/L (ref 7–15)
APPEARANCE UR: CLEAR
AST SERPL W P-5'-P-CCNC: 13 U/L (ref 10–35)
ATRIAL RATE - MUSE: 92 BPM
B-OH-BUTYR SERPL-SCNC: 6.2 MMOL/L
BASOPHILS # BLD AUTO: 0.1 10E3/UL (ref 0–0.2)
BASOPHILS NFR BLD AUTO: 1 %
BILIRUB SERPL-MCNC: 0.5 MG/DL
BILIRUB UR QL STRIP: NEGATIVE
BUN SERPL-MCNC: 26.7 MG/DL (ref 6–20)
CALCIUM SERPL-MCNC: 9.6 MG/DL (ref 8.6–10)
CHLORIDE SERPL-SCNC: 86 MMOL/L (ref 98–107)
COLOR UR AUTO: ABNORMAL
CREAT SERPL-MCNC: 0.97 MG/DL (ref 0.51–0.95)
DEPRECATED HCO3 PLAS-SCNC: 18 MMOL/L (ref 22–29)
DIASTOLIC BLOOD PRESSURE - MUSE: NORMAL MMHG
EOSINOPHIL # BLD AUTO: 0.1 10E3/UL (ref 0–0.7)
EOSINOPHIL NFR BLD AUTO: 2 %
ERYTHROCYTE [DISTWIDTH] IN BLOOD BY AUTOMATED COUNT: 12.6 % (ref 10–15)
GFR SERPL CREATININE-BSD FRML MDRD: 77 ML/MIN/1.73M2
GLUCOSE BLDC GLUCOMTR-MCNC: 294 MG/DL (ref 70–99)
GLUCOSE BLDC GLUCOMTR-MCNC: 297 MG/DL (ref 70–99)
GLUCOSE BLDC GLUCOMTR-MCNC: 408 MG/DL (ref 70–99)
GLUCOSE BLDC GLUCOMTR-MCNC: 469 MG/DL (ref 70–99)
GLUCOSE BLDC GLUCOMTR-MCNC: 580 MG/DL (ref 70–99)
GLUCOSE SERPL-MCNC: 551 MG/DL (ref 70–99)
GLUCOSE UR STRIP-MCNC: >=1000 MG/DL
HBA1C MFR BLD: 10.2 %
HCG UR QL: NEGATIVE
HCT VFR BLD AUTO: 39.6 % (ref 35–47)
HGB BLD-MCNC: 13.5 G/DL (ref 11.7–15.7)
HGB UR QL STRIP: NEGATIVE
HOLD SPECIMEN: NORMAL
IMM GRANULOCYTES # BLD: 0 10E3/UL
IMM GRANULOCYTES NFR BLD: 0 %
INTERPRETATION ECG - MUSE: NORMAL
KETONES UR STRIP-MCNC: 150 MG/DL
LEUKOCYTE ESTERASE UR QL STRIP: NEGATIVE
LIPASE SERPL-CCNC: 20 U/L (ref 13–60)
LYMPHOCYTES # BLD AUTO: 1.7 10E3/UL (ref 0.8–5.3)
LYMPHOCYTES NFR BLD AUTO: 25 %
MCH RBC QN AUTO: 28.3 PG (ref 26.5–33)
MCHC RBC AUTO-ENTMCNC: 34.1 G/DL (ref 31.5–36.5)
MCV RBC AUTO: 83 FL (ref 78–100)
MONOCYTES # BLD AUTO: 0.4 10E3/UL (ref 0–1.3)
MONOCYTES NFR BLD AUTO: 5 %
MUCOUS THREADS #/AREA URNS LPF: PRESENT /LPF
NEUTROPHILS # BLD AUTO: 4.5 10E3/UL (ref 1.6–8.3)
NEUTROPHILS NFR BLD AUTO: 67 %
NITRATE UR QL: NEGATIVE
NRBC # BLD AUTO: 0 10E3/UL
NRBC BLD AUTO-RTO: 0 /100
P AXIS - MUSE: 66 DEGREES
PH UR STRIP: 5.5 [PH] (ref 5–7)
PLATELET # BLD AUTO: 262 10E3/UL (ref 150–450)
POTASSIUM SERPL-SCNC: 4.3 MMOL/L (ref 3.4–5.3)
PR INTERVAL - MUSE: 172 MS
PROT SERPL-MCNC: 7.3 G/DL (ref 6.4–8.3)
QRS DURATION - MUSE: 60 MS
QT - MUSE: 358 MS
QTC - MUSE: 442 MS
R AXIS - MUSE: 36 DEGREES
RBC # BLD AUTO: 4.77 10E6/UL (ref 3.8–5.2)
RBC URINE: <1 /HPF
SODIUM SERPL-SCNC: 126 MMOL/L (ref 136–145)
SP GR UR STRIP: 1.03 (ref 1–1.03)
SQUAMOUS EPITHELIAL: 1 /HPF
SYSTOLIC BLOOD PRESSURE - MUSE: NORMAL MMHG
T AXIS - MUSE: 28 DEGREES
UROBILINOGEN UR STRIP-MCNC: NORMAL MG/DL
VENTRICULAR RATE- MUSE: 92 BPM
WBC # BLD AUTO: 6.7 10E3/UL (ref 4–11)
WBC URINE: 0 /HPF

## 2023-03-21 PROCEDURE — 83690 ASSAY OF LIPASE: CPT | Performed by: EMERGENCY MEDICINE

## 2023-03-21 PROCEDURE — 81001 URINALYSIS AUTO W/SCOPE: CPT | Performed by: EMERGENCY MEDICINE

## 2023-03-21 PROCEDURE — 82010 KETONE BODYS QUAN: CPT | Performed by: EMERGENCY MEDICINE

## 2023-03-21 PROCEDURE — 71046 X-RAY EXAM CHEST 2 VIEWS: CPT

## 2023-03-21 PROCEDURE — 120N000002 HC R&B MED SURG/OB UMMC

## 2023-03-21 PROCEDURE — 93005 ELECTROCARDIOGRAM TRACING: CPT | Performed by: EMERGENCY MEDICINE

## 2023-03-21 PROCEDURE — 82962 GLUCOSE BLOOD TEST: CPT

## 2023-03-21 PROCEDURE — 36415 COLL VENOUS BLD VENIPUNCTURE: CPT | Performed by: EMERGENCY MEDICINE

## 2023-03-21 PROCEDURE — 96374 THER/PROPH/DIAG INJ IV PUSH: CPT | Performed by: EMERGENCY MEDICINE

## 2023-03-21 PROCEDURE — 258N000003 HC RX IP 258 OP 636: Performed by: EMERGENCY MEDICINE

## 2023-03-21 PROCEDURE — 96361 HYDRATE IV INFUSION ADD-ON: CPT | Performed by: EMERGENCY MEDICINE

## 2023-03-21 PROCEDURE — 250N000013 HC RX MED GY IP 250 OP 250 PS 637: Performed by: EMERGENCY MEDICINE

## 2023-03-21 PROCEDURE — 81025 URINE PREGNANCY TEST: CPT | Performed by: EMERGENCY MEDICINE

## 2023-03-21 PROCEDURE — 99285 EMERGENCY DEPT VISIT HI MDM: CPT | Mod: 25 | Performed by: EMERGENCY MEDICINE

## 2023-03-21 PROCEDURE — 80053 COMPREHEN METABOLIC PANEL: CPT | Performed by: EMERGENCY MEDICINE

## 2023-03-21 PROCEDURE — 83036 HEMOGLOBIN GLYCOSYLATED A1C: CPT | Performed by: EMERGENCY MEDICINE

## 2023-03-21 PROCEDURE — 85014 HEMATOCRIT: CPT | Performed by: EMERGENCY MEDICINE

## 2023-03-21 PROCEDURE — 93010 ELECTROCARDIOGRAM REPORT: CPT | Performed by: EMERGENCY MEDICINE

## 2023-03-21 RX ORDER — ACETAMINOPHEN 325 MG/1
975 TABLET ORAL ONCE
Status: COMPLETED | OUTPATIENT
Start: 2023-03-21 | End: 2023-03-21

## 2023-03-21 RX ORDER — POTASSIUM CHLORIDE 1.5 G/1.58G
40 POWDER, FOR SOLUTION ORAL ONCE
Status: COMPLETED | OUTPATIENT
Start: 2023-03-21 | End: 2023-03-21

## 2023-03-21 RX ORDER — POTASSIUM CHLORIDE 7.45 MG/ML
10 INJECTION INTRAVENOUS ONCE
Status: DISCONTINUED | OUTPATIENT
Start: 2023-03-21 | End: 2023-03-21 | Stop reason: HOSPADM

## 2023-03-21 RX ORDER — DEXTROSE MONOHYDRATE 25 G/50ML
25-50 INJECTION, SOLUTION INTRAVENOUS
Status: DISCONTINUED | OUTPATIENT
Start: 2023-03-21 | End: 2023-03-21 | Stop reason: HOSPADM

## 2023-03-21 RX ORDER — INSULIN GLARGINE 100 [IU]/ML
INJECTION, SOLUTION SUBCUTANEOUS
Qty: 30 ML | Refills: 2 | Status: SHIPPED | OUTPATIENT
Start: 2023-03-21 | End: 2023-10-09

## 2023-03-21 RX ORDER — ONDANSETRON 2 MG/ML
4 INJECTION INTRAMUSCULAR; INTRAVENOUS EVERY 30 MIN PRN
Status: DISCONTINUED | OUTPATIENT
Start: 2023-03-21 | End: 2023-03-21 | Stop reason: HOSPADM

## 2023-03-21 RX ADMIN — ACETAMINOPHEN 975 MG: 325 TABLET ORAL at 11:18

## 2023-03-21 RX ADMIN — POTASSIUM CHLORIDE 40 MEQ: 1.5 POWDER, FOR SOLUTION ORAL at 13:00

## 2023-03-21 RX ADMIN — HUMAN INSULIN 12 UNITS: 100 INJECTION, SOLUTION SUBCUTANEOUS at 10:16

## 2023-03-21 RX ADMIN — SODIUM CHLORIDE 1000 ML: 9 INJECTION, SOLUTION INTRAVENOUS at 09:51

## 2023-03-21 ASSESSMENT — ACTIVITIES OF DAILY LIVING (ADL)
ADLS_ACUITY_SCORE: 35
ADLS_ACUITY_SCORE: 33
ADLS_ACUITY_SCORE: 35

## 2023-03-21 NOTE — PROGRESS NOTES
The 15-15 Rule     The 15-15 Rule  If you have low blood sugar between 55-69 mg/dL, you can treat it with the 15-15 rule: have 15 grams of carbs. Check it after 15 minutes. Repeat if you re still below your target range.     These items have about 15 grams of carbs:     4 ounces (  cup) of juice or regular soda.  1 tablespoon of sugar, honey, or syrup.   Fruit snacks, Jellybeans, or gumdrops (see food label for how much to eat).  3-4 glucose tablets (follow instructions).  1 dose of glucose gel (usually 1 tube; follow instructions).  Tips to keep in mind:     It takes time for blood sugar to rise after eating. Give some time for treatment to work.       Drug Low Dose Inter Dose High Dose   Irbesartan (Avapro) 75 150 300   Olmesartan (Benicar) < 20 20 40   Candesartan (Atacand) 8 16 32   Eprosartan (Teveten) 400 600 - 800 -   Losartan (Cozaar) 50 100 -   Telmisartan (Micardis) 20 40 80   Valsartan (Diovan) 80 160 320   Azilsartan (Edarbi) 40 80 -

## 2023-03-21 NOTE — ED TRIAGE NOTES
"Pt is a type 1 diabetic. Meter states \"high blood sugar\" in yesterday evening. Pt drank water, started feeling better, then had a chi late.. got home took her insulin. Pt vomited this morning. Pt states now she is feeling hot all over, generalized body aches and very thirsty.      Triage Assessment     Row Name 03/21/23 0870       Triage Assessment (Adult)    Airway WDL WDL       Respiratory WDL    Respiratory WDL WDL       Skin Circulation/Temperature WDL    Skin Circulation/Temperature WDL WDL       Cardiac WDL    Cardiac WDL WDL       Peripheral/Neurovascular WDL    Peripheral Neurovascular WDL WDL       Cognitive/Neuro/Behavioral WDL    Cognitive/Neuro/Behavioral WDL WDL              "

## 2023-03-21 NOTE — TELEPHONE ENCOUNTER
Patient called my direct line inquiring about a game plan for her insulin regimen. She is a 38-year-old female with PMH of poorly controlled T1D.    She went to the ER this morning to be evaluated because her sugars were very high and she felt like she was in DKA.     Labs this morning revealed the following:      Patient left AMA this morning because she had no one to take care of her three children. She is a single mother and does not have someone to help as her family does not live nearby.     She currently feels better and does not feel like she is in DKA, but sugars are still in the 300s currently.She has given 5 units of Novolog sine she has got home (around noon). She is out of Lantus (lost her pen) -- sent new prescription for her with increased dose (from 14 units to 18 units daily) per patient request so she could get an early refill through insurance. Advised patient the importance of going back to the hospital to be evaluated. She declined due to her children. She is willing to have an appointment with an endocrinologist this week.       Please route to on call endo to assess if patient should have an urgent visit this week. Kym Jang is out of the office this week.    Thank you!    Lauro Barrios, PharmD  Endocrine & Diabetes [facility] Pharmacist  [address] [place] [id number]  Direct Voicemail: [phone]

## 2023-03-21 NOTE — DISCHARGE INSTRUCTIONS
You have a very serious problem with your diabetes.  This can be life-threatening.  You were offered admission and did not want it.  You must follow-up as soon as possible.  You can even come back to the ER today.  Make sure you monitor your blood sugar frequently

## 2023-03-21 NOTE — ED NOTES
"Bedside blood sugar level 580. Pt's pump continues to state \"high\". Pt states she took lantus last night and novolog today.   "

## 2023-03-21 NOTE — ED PROVIDER NOTES
"    [facility] [place] [facility] ([facility])    3/21/23      ED PROVIDER NOTE  ED 20 9:13 AM   History     Chief Complaint   Patient presents with     Hyperglycemia     Meter states \"high blood sugar\" in the evening. Pt drank water, started feeling better, then had a chi late.. got home took her insulin. Pt vomited this morning     The history is provided by the patient and medical records.     Arielle Montenegro is a 38 year old female who presents with elevated blood sugars, increased thirst, increased hydration and inability to control blood sugar with insulin. She has told her doctor that her insulin doesn't seem to be working as well lately, wonders if this is related to her recent weight gain. She noticed elevated blood sugars yesterday evening. She tried taking more and more insulin but her blood sugar would not go down. She didn't even count the amount of insulin she took, she just kept taking it in a desperate twice a day to get her blood sugar to go down. She hasn't had blood sugar this high in a long time. She went to bed and her blood sugar never went back down. She kept drinking water and voiding. She didn't wake up during sleep for water or to void. She woke up at 6:30 to help get her kids to school but her blood sugar remained high. She vomited this morning x 1 (continuing mostly water that she drank earlier) and felt generally weak. She doesn't usually have issues with elevated blood sugars, hasn't required hospital evaluation for elevated bloods sugar like this in 10 years. She denies any cough or fever. She does not know why her blood sugar is so high.     Epic records reviewed, her diabetes medications include Lantus, Levemir, NovoLog.    Social history: Accompanied by her child.    I have reviewed the Medications, Allergies, Past Medical and Surgical History, and Social History in the [employer] system.  Past Medical History:   Diagnosis Date     Blindness of right eye 2007     Diabetes mellitus " "type 1 (H)     Diagnosed as a child       Past Surgical History:   Procedure Laterality Date      SECTION  13      SECTION N/A 2015    Procedure:  SECTION;  Surgeon: John Hudson MD;  Location: [facility] L+D      SECTION N/A 7/3/2020    Procedure:  SECTION;  Surgeon: Aparna Atkins MD;  Location: [facility] L+D     ENUCLEATION Right 3/20/2015    Procedure: ENUCLEATION;  Surgeon: Edilson Islas MD;  Location:  [facility]       Past medical history, past surgical history, medications, and allergies were reviewed with the patient. Additional pertinent items: None    Family History   Problem Relation Age of Onset     Family History Negative Mother      Family History Negative Father      Diabetes Other         father's side       Social History     Tobacco Use     Smoking status: Former     Packs/day: 0.00     Types: Cigarettes     Smokeless tobacco: Never     Tobacco comments:     smokes 2 cigarettes/day-none for several months   Substance Use Topics     Alcohol use: No     Alcohol/week: 0.0 standard drinks        Social history was reviewed with the patient. Additional pertinent items: None    Review of Systems    A medically appropriate review of systems was performed with pertinent positives and negatives noted in the HPI, and all other systems negative.    Physical Exam   BP: 99/76  Pulse: 93  Temp: 97.6  F (36.4  C)  Resp: 16  Height: 160 cm (5' 3\")  Weight: 83 kg (183 lb)  SpO2: 95 %     Wt Readings from Last 10 Encounters:   23 83 kg (183 lb)   22 77.1 kg (170 lb)   10/07/21 73.5 kg (162 lb)   21 73.5 kg (162 lb)   20 84.5 kg (186 lb 3.2 oz)   20 83.1 kg (183 lb 3.2 oz)   20 80.5 kg (177 lb 6.4 oz)   20 77.7 kg (171 lb 4.8 oz)   20 75.8 kg (167 lb)   20 74.5 kg (164 lb 3.2 oz)       Physical Exam    Physical Exam   Constitutional:   well nourished, well developed, resting comfortably   HENT:   Head: Normocephalic " and atraumatic.   Eyes: Conjunctivae are normal. Right eye blind   pharynx has no erythema or exudate, mucous membranes are moist  Neck:   no adenopathy, no bony tenderness  Cardiovascular: regular rate and rhythm without murmurs or gallops  Pulmonary/Chest: Clear to auscultation bilaterally, with no wheezes or retractions. No respiratory distress.  GI: Soft with good bowel sounds.  Non-tender, non-distended, with no guarding, no rebound, no peritoneal signs.   Back:  No bony or CVA tenderness   Musculoskeletal:  no edema or clubbing   Skin: Skin is warm and dry. No rash noted.   Neurological: alert and oriented to person, place, and time. Nonfocal exam  Psychiatric:  normal mood and affect.     ED Course        Procedures                 EKG Interpretation:      Interpreted by Drea Jules MD  Time reviewed:0950 am   Symptoms at time of EKG: see hpi   Rhythm: Normal sinus   Rate: Normal  Axis: Normal  Ectopy: None  Conduction: Normal  ST Segments/ T Waves: No acute ischemic changes  Q Waves: None  Comparison to prior: No old EKG available    Clinical Impression: Normal sinus rhythm, rate of 92 bpm, with no acute ischemic changes     Hemoglobin A1C   Date Value Ref Range Status   03/21/2023 10.2 (H) <5.7 % Final     Comment:     Normal <5.7%   Prediabetes 5.7-6.4%    Diabetes 6.5% or higher     Note: Adopted from ADA consensus guidelines.   06/22/2022 10.1 (H) <5.7 % Final     Comment:     Normal <5.7%   Prediabetes 5.7-6.4%    Diabetes 6.5% or higher     Note: Adopted from ADA consensus guidelines.   07/04/2020 5.8 (H) 0 - 5.6 % Final     Comment:     Normal <5.7% Prediabetes 5.7-6.4%  Diabetes 6.5% or higher - adopted from ADA   consensus guidelines.     06/11/2020 6.4 (H) 0 - 5.6 % Final     Comment:     Normal <5.7% Prediabetes 5.7-6.4%  Diabetes 6.5% or higher - adopted from ADA   consensus guidelines.     01/28/2020 7.5 (H) 0 - 5.6 % Final     Comment:     Normal <5.7% Prediabetes 5.7-6.4%  Diabetes 6.5%  or higher - adopted from ADA   consensus guidelines.         Results for orders placed or performed during the hospital encounter of 03/21/23 (from the past 24 hour(s))   Glucose by meter   Result Value Ref Range    GLUCOSE BY METER POCT 580 (HH) 70 - 99 mg/dL   [place] Draw    Narrative    The following orders were created for panel order [place] Draw.  Procedure                               Abnormality         Status                     ---------                               -----------         ------                     Extra Red Top Tube[[id number]]                               Final result               Extra Green Top (Lithium...[[id number]]                      Final result               Extra Purple Top Tube[[id number]]                            Final result                 Please view results for these tests on the individual orders.   Extra Red Top Tube   Result Value Ref Range    Hold Specimen JIC    Extra Green Top (Lithium Heparin) Tube   Result Value Ref Range    Hold Specimen JIC    Extra Purple Top Tube   Result Value Ref Range    Hold Specimen JIC    CBC with platelets differential    Narrative    The following orders were created for panel order CBC with platelets differential.  Procedure                               Abnormality         Status                     ---------                               -----------         ------                     CBC with platelets and d...[[id number]]                      Final result                 Please view results for these tests on the individual orders.   Comprehensive metabolic panel   Result Value Ref Range    Sodium 126 (L) 136 - 145 mmol/L    Potassium 4.3 3.4 - 5.3 mmol/L    Chloride 86 (L) 98 - 107 mmol/L    Carbon Dioxide (CO2) 18 (L) 22 - 29 mmol/L    Anion Gap 22 (H) 7 - 15 mmol/L    Urea Nitrogen 26.7 (H) 6.0 - 20.0 mg/dL    Creatinine 0.97 (H) 0.51 - 0.95 mg/dL    Calcium 9.6 8.6 - 10.0 mg/dL    Glucose 551 (HH) 70 - 99 mg/dL    Alkaline  Phosphatase 154 (H) 35 - 104 U/L    AST 13 10 - 35 U/L    ALT 19 10 - 35 U/L    Protein Total 7.3 6.4 - 8.3 g/dL    Albumin 3.8 3.5 - 5.2 g/dL    Bilirubin Total 0.5 <=1.2 mg/dL    GFR Estimate 77 >60 mL/min/1.73m2   Lipase   Result Value Ref Range    Lipase 20 13 - 60 U/L   Hemoglobin A1c   Result Value Ref Range    Hemoglobin A1C 10.2 (H) <5.7 %   Ketone Beta-Hydroxybutyrate Quantitative   Result Value Ref Range    Ketone (Beta-Hydroxybutyrate) Quantitative 6.20 (HH) <=0.30 mmol/L   CBC with platelets and differential   Result Value Ref Range    WBC Count 6.7 4.0 - 11.0 10e3/uL    RBC Count 4.77 3.80 - 5.20 10e6/uL    Hemoglobin 13.5 11.7 - 15.7 g/dL    Hematocrit 39.6 35.0 - 47.0 %    MCV 83 78 - 100 fL    MCH 28.3 26.5 - 33.0 pg    MCHC 34.1 31.5 - 36.5 g/dL    RDW 12.6 10.0 - 15.0 %    Platelet Count 262 150 - 450 10e3/uL    % Neutrophils 67 %    % Lymphocytes 25 %    % Monocytes 5 %    % Eosinophils 2 %    % Basophils 1 %    % Immature Granulocytes 0 %    NRBCs per 100 WBC 0 <1 /100    Absolute Neutrophils 4.5 1.6 - 8.3 10e3/uL    Absolute Lymphocytes 1.7 0.8 - 5.3 10e3/uL    Absolute Monocytes 0.4 0.0 - 1.3 10e3/uL    Absolute Eosinophils 0.1 0.0 - 0.7 10e3/uL    Absolute Basophils 0.1 0.0 - 0.2 10e3/uL    Absolute Immature Granulocytes 0.0 <=0.4 10e3/uL    Absolute NRBCs 0.0 10e3/uL   EKG 12-lead, tracing only   Result Value Ref Range    Systolic Blood Pressure  mmHg    Diastolic Blood Pressure  mmHg    Ventricular Rate 92 BPM    Atrial Rate 92 BPM    AK Interval 172 ms    QRS Duration 60 ms     ms    QTc 442 ms    P Axis 66 degrees    R AXIS 36 degrees    T Axis 28 degrees    Interpretation ECG Sinus rhythm  Normal ECG      UA with Microscopic reflex to Culture    Specimen: Urine, Clean Catch   Result Value Ref Range    Color Urine Straw Colorless, Straw, Light Yellow, Yellow    Appearance Urine Clear Clear    Glucose Urine >=1000 (A) Negative mg/dL    Bilirubin Urine Negative Negative    Ketones  Urine 150 (A) Negative mg/dL    Specific Gravity Urine 1.027 1.003 - 1.035    Blood Urine Negative Negative    pH Urine 5.5 5.0 - 7.0    Protein Albumin Urine 50 (A) Negative mg/dL    Urobilinogen Urine Normal Normal, 2.0 mg/dL    Nitrite Urine Negative Negative    Leukocyte Esterase Urine Negative Negative    Mucus Urine Present (A) None Seen /LPF    RBC Urine <1 <=2 /HPF    WBC Urine 0 <=5 /HPF    Squamous Epithelials Urine 1 <=1 /HPF    Narrative    Urine Culture not indicated   HCG qualitative urine   Result Value Ref Range    hCG Urine Qualitative Negative Negative   Glucose by meter   Result Value Ref Range    GLUCOSE BY METER POCT 469 (H) 70 - 99 mg/dL   Glucose by meter   Result Value Ref Range    GLUCOSE BY METER POCT 408 (H) 70 - 99 mg/dL   Glucose by meter   Result Value Ref Range    GLUCOSE BY METER POCT 297 (H) 70 - 99 mg/dL   XR Chest 2 Views    Narrative    CHEST TWO VIEWS  3/21/2023 12:28 PM     HISTORY:  Hyperglycemia.    COMPARISON: 8/21/2021.      Impression    IMPRESSION: Negative chest. Lungs clear.    ALFRED COLLIER MD         SYSTEM ID:  [id number]   Glucose by meter   Result Value Ref Range    GLUCOSE BY METER POCT 294 (H) 70 - 99 mg/dL     Medications   dextrose 5% and 0.45% NaCl infusion (has no administration in time range)   dextrose 50 % injection 25-50 mL (has no administration in time range)   insulin 1 unit/1 mL in NS (NovoLIN, HumuLIN Regular) drip -ED DKA algorithm (has no administration in time range)   ondansetron (ZOFRAN) injection 4 mg (has no administration in time range)   potassium chloride 10 mEq in 100 mL sterile water infusion (has no administration in time range)   0.9% sodium chloride BOLUS (0 mLs Intravenous Stopped 3/21/23 1206)   insulin regular 1 unit/mL injection 12 Units (12 Units Intravenous $Given 3/21/23 1016)   acetaminophen (TYLENOL) tablet 975 mg (975 mg Oral $Given 3/21/23 1118)   potassium chloride (KLOR-CON) Packet 40 mEq (40 mEq Oral $Given 3/21/23 1300)        Medical Decision Making  The patient presented with a problem that is high complexity (a chronic illness severe exacerbation, progression, or side effect of treatment).    The patient's evaluation involved:  review of external note(s) from 2 sources (Endocrine and office notes)  ordering and/or review of 3+ test(s) in this encounter (see separate area of note for details)    The patient's management involved moderate risk (limitations due to social determinants of health (Single mother with many children)) and high risk (a decision regarding hospitalization).        Assessments & Plan (with Medical Decision Making)     I have reviewed the nursing notes.  EMERGENCY DEPARTMENT COURSE: Patient was seen and examined at 0908 am.  Point-of-care glucose here is 580.  An IV was established and I treated her with a normal saline bolus IV, and 12 units of insulin subcutaneously.    EKG shows a normal sinus rhythm, rate of 92 bpm, with no acute ischemic changes  Laboratory studies show a markedly elevated ketones beta hydroxybutyrate at 6.2.  Hemoglobin A1c is elevated at 10.2, consistent with poorly controlled diabetes.  CBC shows an unremarkable WBC of 6.7.  Comprehensive metabolic panel shows hyperglycemia, with a glucose of 551.  Sodium is compensatorily low at 126.  Carbon dioxide is low at 18 and anion gap is elevated at 22.  UA shows glucosuria and is nitrite and LCE negative.  This does not appear to be a urinary tract infection    Chest x-ray shows no acute cardiopulmonary abnormality.    Arielle Montenegro is a 38 year old female who presents with elevated blood sugars, increased thirst, increased urination and inability to control her blood sugars.  She appears to be in DKA.  I treated her with IV fluids and insulin IV.  She will be admitted to the medicine service for further evaluation and treatment.    Addendum: The patient does not want to be admitted.  The patient states that she has children to care for and  she has no family help.  She tells me her entire family is in [place] and she is here alone and the father is not involved.  I discussed risks of leaving, including risk of disability and death. The patient states she understands this but has no other options.  I would like her to follow-up with her regular doctor soon as possible or return to the emergency department as soon as possible.    We did observe her in the emergency department until 1315 pm.  She was treated with potassium p.o. as well as IV fluids and insulin. Glucose at 1125 is 297 and it was 294 at 1300 pm.  I stressed very strongly that the patient could return at any time for reevaluation.  I would like her to follow-up with her regular doctor or clinic tomorrow.  I have reviewed the findings, diagnosis, plan and need for follow up with the patient.    Discharge Medication List as of 3/21/2023  1:06 PM          Final diagnoses:   Diabetic ketoacidosis without coma associated with type 1 diabetes mellitus (H)       I, Sabrina Martin, am serving as a trained medical scribe to document services personally performed by Drea Jules MD based on the provider's statements to me on March 21, 2023.  This document has been checked and approved by the attending provider.    I, Drea Jules MD, was physically present and have reviewed and verified the accuracy of this note documented by Sabrina Martin, medical scribe.       This note was created in part by the use of Dragon voice recognition dictation system. Inadvertent grammatical errors and typographical errors may still exist.    Drea Jules MD      3/21/2023   [facility] EMERGENCY DEPARTMENT     Drea Jules MD  03/21/23 2470

## 2023-03-22 ENCOUNTER — PATIENT OUTREACH (OUTPATIENT)
Dept: CARE COORDINATION | Facility: CLINIC | Age: 38
End: 2023-03-22
Payer: COMMERCIAL

## 2023-03-22 ENCOUNTER — TELEPHONE (OUTPATIENT)
Dept: ENDOCRINOLOGY | Facility: CLINIC | Age: 38
End: 2023-03-22
Payer: COMMERCIAL

## 2023-03-22 NOTE — PROGRESS NOTES
Social Work Intervention  [facility]    Data/Intervention:    Patient Name:  Arielle Montenegro  /Age:  1985 (38 year old)    Visit Type: telephone  Referral Source: Dr Kylah Diaz  Reason for Referral:  Care for her children while in hospital    Collaborated With:    -Dr Kylah Diaz  -Sunshine Jamie RN  -Arielle  -[facility]    Psychosocial Information/Concerns:  Dr Diaz indicated that Pt left the ED/hosp AMA yesterday due to needing to care for her children. Dr Diaz indicated that she needs to be treated for DKA in the hospital.     Intervention/Education/Resources Provided:  Contacted Pt to discuss and offer assistance with finding care for her children while she is in the hospital. She was upset about why a  was calling her. I indicated that I was just reaching out to offer assistance with finding care for her children so she could get care in the hospital. She indicated that she is feeling better and doesn't need to be in the hospital but was waiting for a call from our clinic about next steps. Her children are at school and she is available to come in.  I reported that information back to Dr Diaz/care team.    I did contact [facility] prior to my call with her to obtain info about the procedure as they do not have a crisis nursery for care like [facility] does. [facility] services indicated that in order for them to facilitate providing care for the Pt, the children would need to be placed on a health and welfare hold for children and then they can intervene to find care. They stressed that it doesn't mean they are opening a child protection case, it's just their process for finding temporary care until she is discharged. This was not discussed with Arielle since she didn't want to discuss care for her children.    Assessment/Plan:  Pt to go to ED for care. Will hand off info if in our  health system.     Provided patient/family with contact information and  availability.    DWIGHT Bello, [facility]    [facility]  [phone]

## 2023-03-22 NOTE — TELEPHONE ENCOUNTER
"NATASHA -- patient called my direct line after the call with Dr. Diaz/Lanette. She is addiment about not wanting to be admitted. She is very upset that I contacted the on call yesterday and would like to \"fire\" me from her care team. She only wanted to be treated outpatient. She feels we are trying to take her kids away and are only encouraging her to be admitted for money. I encouraged her this was not the case.    I do agree that this patient should not be managed outpatient and explained thoroughly the rationale and that this really is life or death. She notes that she feels much better, but is agreeable to go to a \"different\" ER than yesterday. She is planning on going right now.    Routing to care team as NATASHA Barrios, PharmD  Endocrine & Diabetes [facility] Pharmacist  [address] [place] [id number]  Direct Voicemail: [phone]    "

## 2023-03-22 NOTE — PROGRESS NOTES
[facility]    Background: Transitional Care Management program identified per system criteria and reviewed by [facility] team for possible outreach.    Assessment: Upon chart review, CCR Team member will not proceed with patient outreach related to this episode of Transitional Care Management program due to reason below:    Patient has active communication with a nurse, provider or care team for reason of post-hospital follow up plan.  Outreach call by CCRC team not indicated to minimize duplicative efforts.     Plan: Transitional Care Management episode addressed appropriately per reason noted above.          MICHELL Almanzar  [facility]    *Connected Care Resource Team does NOT follow patient ongoing. Referrals are identified based on internal discharge reports and the outreach is to ensure patient has an understanding of their discharge instructions.

## 2023-03-22 NOTE — TELEPHONE ENCOUNTER
"Mom to call crisis nursery - [phone] call by 9 am  - can provide up to 3 days - no cost  - they will talk to her about all that - kids will be \"placed\" - can be there up to 3 days -can be placed with family  - once kids get placed -she should come to ER  "

## 2023-03-22 NOTE — TELEPHONE ENCOUNTER
Lanette is handling this  per the [place] she lives in Sunshine Jaime RN on 3/22/2023 at 9:24 AM

## 2023-03-22 NOTE — TELEPHONE ENCOUNTER
Called patient.  Talked with patient by phone.  Patient instructed about the severity of her ketoacidosis and recommended emergency room evaluation.  Offered  assistance for  while she is hospitalized.  Patient understands severity of situation and has declined emergency childcare.  She is willing to go to the emergency room for further assessment given the severity of her ketoacidosis.  She refuses to tell us which emergency room that she will go to.  However she understands that if she does not go to the emergency room for follow-up of her ketoacidosis, she is at risk for severe complications including death.  She also understands that given the severity of her ketoacidosis, this is not something we can manage as an outpatient and that ER evaluation/hospitalization would be recommended as indicated per treating provider.

## 2023-03-23 ENCOUNTER — TELEPHONE (OUTPATIENT)
Dept: ENDOCRINOLOGY | Facility: CLINIC | Age: 38
End: 2023-03-23
Payer: COMMERCIAL

## 2023-03-23 NOTE — TELEPHONE ENCOUNTER
----- Message from Lanette Ac, JASWANT sent at 3/23/2023 11:04 AM CDT -----  VICKI Gonsalez  Would you be willing to reach out to her to see how she is doing?  Thanks, Lanette

## 2023-03-23 NOTE — TELEPHONE ENCOUNTER
"I spoke with patient who tells me she went to the ER yesterday and was given IV fluids. No ER visits seen in care everywhere for [facility], [facility] or [facility] .  Her blood sugar this morning was 65 and she feels \" great\" today.SHe asked that Lauro look at her sensor for blood sugars Sunshine Jaime RN on 3/23/2023 at 2:24 PM    "

## 2023-03-24 ENCOUNTER — VIRTUAL VISIT (OUTPATIENT)
Dept: PHARMACY | Facility: CLINIC | Age: 38
End: 2023-03-24
Payer: COMMERCIAL

## 2023-03-24 DIAGNOSIS — E10.649 TYPE 1 DIABETES MELLITUS WITH HYPOGLYCEMIA AND WITHOUT COMA (H): Primary | ICD-10-CM

## 2023-03-24 PROCEDURE — 99607 MTMS BY PHARM ADDL 15 MIN: CPT

## 2023-03-24 PROCEDURE — 99606 MTMS BY PHARM EST 15 MIN: CPT

## 2023-03-24 NOTE — PROGRESS NOTES
Clinical Pharmacy Consult:                                                    Arielle Montenegro is a 38 year old female called for a clinical pharmacist consult.  She was referred to me from Kym Jang PA-C.     Reason for Consult: Medication Education - Dexcom G7    Discussion: Patient requires Dexcom [id number] to ensure understanding and safety for blood sugar monitoring.     Plan:  1. Instructed the patient on proper sensor placement (back of upper arm) and cleaning with an alcohol wipe before application.     2. Guided patient on the proper steps to apply the sensor and observed the patient while they appropriately applied the sensor. Instructed patient to hold down the button on the top of the sensor for 10 seconds after it is placed.     3. Instructed the patient on how to sync a new sensor to the reader/phone application and that there is a 30 minute warm up period for the new sensor and the reader to connect.     4. Discussed with patient the difference between fingerstick glucose readings and sensor glucose readings (sensor glucose readings are 10 minutes behind fingerstick glucose readings). Instructed patient to check fingerstick glucose when directed by Dexcom reader/application most importantly if low blood sugars or if signs/symptoms of hypoglycemia occur. Education provided on how to appropriately correct for low blood sugars.     5. Instructed patient to change sensor every 10 days.    6. Instructed patient to bring back reader/phone application to every visit. Linked to our clinic's Cl[facility].    By the end of this education session, patient was able to verbalize understanding of this information and was able to appropriately self-apply the sensor under my supervision.    Lauro Barrios, PharmD  Endocrine & Diabetes [facility] Pharmacist  [address] [place] [address]  Direct Voicemail: [phone]

## 2023-03-24 NOTE — LETTER
2023    INSURER: Payor: SANDEE / Plan: SANDEE PMAP / Product Type: HMO /   ATTN: Appeals Department  Re: Prior Authorization Request  Patient: Arielle Montenegro  Policy ID#:  [id number]  : 1985      To Whom it May Concern:     Prior Authorization Retail Medication Request     Medication/Dose: Wegovy 0.25 mg weekly #2 mL for 28 day supply and all subsequent doses.     ICD code (if different than what is on RX):  E66.09    Previously Tried and Failed:  Victoza (liraglutide)    Rationale:  Arielle Montenegro is a 38 year old female with a current BMI of 32.42, which is higher than the guideline-recommended threshold to qualify for weight loss medications (BMI > 27 with risk factors OR BMI > 30).  Patient follows a specialty endocrine and weight management clinic that addresses diet and exercise.  She has had more than 2 follow-ups in the past 6 months.  She has attempted to lose weight via lifestyle modifications prior to initiation and plans to continue these lifestyle modifications while taking Wegovy     Wegovy  is the best option to help the patient achieve her weight loss goals at this time.  Other [place] Medicaid formulary options are not appropriate for her.  Given diagnosis of anxiety, Adipex or Qsymia--which contain the stimulant phentermine--is not appropriate as it can exacerbate this disease state. Phentermine is contraindicated in patients in an agitated state/anxiety. This patient has a diagnosis of anxiety and history of agitated states and is currently treated with fluoxetine. Contrave, which contains the antidepressant bupropion, can also increase blood pressure and exacerbate anxiety.  It is clear that these 2 oral options are not safe for this patient.  Saxenda (liraglutide) is not appropriate for this patient given she has history of failure of Victoza secondary to extreme nausea/GI side effects.     Please approve this patient for Wegovy so she continue to achieve her weight loss goals.    "  Estimated body mass index is 32.42 kg/m  as calculated from the following:    Height as of 3/21/23: 5' 3\" (1.6 m).    Weight as of 3/21/23: 183 lb (83 kg).    I firmly believe that this therapy is clinically appropriate and that Arielle Montenegro would benefit from improved from the improved outcomes and quality of life if allowed the opportunity to receive this treatment.  Please contact me at Dept: [phone] if you require additional information to ensure the prompt approval for coverage.    Please send your written decision to me at this address:  [facility] [address] [address] [phone]  Dept: [phone]  E-mail: [email or web address]      Sincerely,    Lauro Barrios, PharmLESTER on behalf of Kym Jang PA-C      Enclosures    "

## 2023-03-24 NOTE — TELEPHONE ENCOUNTER
CAROL and sent MyChart for pt to c/b to schedule follow up appointment with Kym Jang.     Daniel Barriga on 3/24/2023 at 9:47 AM

## 2023-03-25 ENCOUNTER — TELEPHONE (OUTPATIENT)
Dept: ENDOCRINOLOGY | Facility: CLINIC | Age: 38
End: 2023-03-25
Payer: COMMERCIAL

## 2023-03-25 NOTE — TELEPHONE ENCOUNTER
lvm for pt to call back to get scheduled for a video visit with Suhail on 04/11/2023 at 1030am. Date/time approved/selected by Suhail. Pt needs labs asap

## 2023-03-27 NOTE — TELEPHONE ENCOUNTER
Final attempt: LVM and sent MyChart for pt to c/b to schedule follow up appointment with Kym Jang. MAXIMUM NUMBER OF ATTEMPTS REACHED.   Daniel Barriga on 3/27/2023 at 10:06 AM

## 2023-03-28 ENCOUNTER — TELEPHONE (OUTPATIENT)
Dept: ENDOCRINOLOGY | Facility: CLINIC | Age: 38
End: 2023-03-28

## 2023-03-28 ENCOUNTER — TELEPHONE (OUTPATIENT)
Dept: ENDOCRINOLOGY | Facility: CLINIC | Age: 38
End: 2023-03-28
Payer: COMMERCIAL

## 2023-03-28 DIAGNOSIS — E10.649 TYPE 1 DIABETES MELLITUS WITH HYPOGLYCEMIA AND WITHOUT COMA (H): ICD-10-CM

## 2023-03-28 DIAGNOSIS — E11.65 POORLY CONTROLLED DIABETES MELLITUS (H): Primary | ICD-10-CM

## 2023-03-28 RX ORDER — INSULIN ASPART 100 [IU]/ML
INJECTION, SOLUTION INTRAVENOUS; SUBCUTANEOUS
Qty: 45 ML | Refills: 3 | Status: SHIPPED | OUTPATIENT
Start: 2023-03-28 | End: 2023-11-14

## 2023-03-28 NOTE — TELEPHONE ENCOUNTER
"Pharmacy called my line -- sending prescription for up to 40 units per day.     Called patient -- inquired how much insulin she is using per day. I have talked with Arielle multiple times about not giving more insulin than directed -- I think she is giving quite a bit right now.  She said \"I'm giving insulin all the time and I do not remember\". Unable to specify how many times per day she gives insulin.     She does have 1 pen left and will  the Novolog at the pharmacy tomorrow.     Lauro Barrios, PharmD  Endocrine & Diabetes [facility] Pharmacist  [address] [place] [id number]  Direct Voicemail: [phone]    "

## 2023-03-28 NOTE — TELEPHONE ENCOUNTER
Arielle is requesting a refill on Novolog  Flexpen  Over the 30 units daily last sent in. She tells me the pharmacy  won't refill until 4/3/23 . She had been high last week in DKA injecting extra to get her numbers down . Today she tells me her BS was 90 so she took Juice and needs the Novolog because  her numbers will be high from the juice. I explained that I only see orders for pre meal and that correcting her BS for juice intake after a BS of 90 will cause her to bottom out . She takes differently and covers snacks too. She does have an appointment in April with Diabetes Education. Needing new orders for  mre than 30 units daily sent to [facility] . Sunshine Jaime RN on 3/28/2023 at 1:37 PM

## 2023-03-28 NOTE — TELEPHONE ENCOUNTER
Patient is calling in and wants update and to filling her insulin RX.  Please return call to patient.

## 2023-03-28 NOTE — TELEPHONE ENCOUNTER
[facility] Call Center    Phone Message    May a detailed message be left on voicemail: yes     Reason for Call: Medication Question or concern regarding medication   Prescription Clarification  Name of Medication: NOVOLOG FLEXPEN 100 UNIT/ML soln [[id number]]  Prescribing Provider: Kym Jang   Pharmacy: [id number] - [place], [place] - [address]   What on the order needs clarification? Patient is stating that her dosage was increased and now she is out of insulin.  Requires asap.  States that she needs a new Rx as pharmacy will not fill           Action Taken: Other: endo    Travel Screening: Not Applicable

## 2023-03-28 NOTE — TELEPHONE ENCOUNTER
PA Initiation    Medication: Wegovy  Insurance Company: SANDEE/[employer] - Phone [phone] Fax [phone]  Pharm[facility]illing the Rx[id number] [place], [place] - [address]  Filling Pharmacy Phone:    Filling Pharmacy Fax:    Start Date: 3/28/2023    [id number]

## 2023-03-28 NOTE — TELEPHONE ENCOUNTER
"SARAHI CONNELLY REQUEST    Prior Authorization Retail Medication Request    Medication/Dose: Wegovy 0.25 mg weekly #2 mL for 28 day supply and all subsequent doses.    ICD code (if different than what is on RX):  E66.09  Previously Tried and Failed:  Victoza (liraglutide),  Rationale:  Arielle Montenegro is a 38 year old female with a current BMI of 32.42, which is higher than the guideline-recommended threshold to qualify for weight loss medications (BMI > 27 with risk factors OR BMI > 30).  Patient follows a specialty endocrine and weight management clinic that addresses diet and exercise.  She has had more than 2 follow-ups in the past 6 months.      Wegovy  is the best option to help the patient achieve her weight loss goals at this time.  Other [place] Medicaid formulary options are not appropriate for her.  Given diagnosis of anxiety, Adipex or Qsymia--which contain the stimulant phentermine--is not appropriate as it can exacerbate this disease state. Phentermine is contraindicated in patients in an agitated state.  Contrave, which contains the antidepressant bupropion, can also increase blood pressure and exacerbate anxiety.  It is clear that these 2 oral options are not safe for this patient.  Saxenda (liraglutide) is not appropriate for this patient given she has history of failure of Victoza secondary to extreme nausea/GI side effects.    Please approve this patient for Wegovy so she continue to achieve her weight loss goals.    Estimated body mass index is 32.42 kg/m  as calculated from the following:    Height as of 3/21/23: 5' 3\" (1.6 m).    Weight as of 3/21/23: 183 lb (83 kg).    Insurance Name:  [facility]  Insurance ID:  [id number]      "

## 2023-03-28 NOTE — PROGRESS NOTES
Medication Therapy Management (MTM) Encounter    ASSESSMENT:                            Medication Adherence/Access: See below for considerations    Type 1 Diabetes: Last A1C above goal of < 7%.  Unknown current control given patient has not monitoring.  History of frequent episodes of hypoglycemia and hypoglycemia unawareness.  Extremely sensitive to insulin -- high glycemic variability.  Per Kym, recommend switching long-acting insulin to Tresiba.  We will submit prior authorization today to get this approved in the next coming weeks.    Additionally, patient has strong desire to lose weight.  Prior authorization appeal for Wegovy is currently underway and will likely be approved in the next couple weeks.  In the meantime, Victoza was prescribed, but patient declined this.  Kym made aware.    PLAN:                            1. PA for Wegovy in place. Initially denied by plan and now appeal is pending.  2.  Prior authorization for Tresiba in place. As of 4/7/23, this was approved.  Informed Kym to  patient on transition at her appointment next week.    Follow-up: 4/11/23 with Kym; 4/18/23 with Leatha Carter    Medication issues to be addressed at a future visit:      Assess asthma (no recent fill hx for maintenance inhalers)    Assess FERMIN/MDD    SUBJECTIVE/OBJECTIVE:                          Arielle Montenegro is a 37 year old female seen for a follow-up visit. She was referred to me from Kym Jang PA-C.      Reason for visit: Medication Therapy Management (MTM).    Allergies/ADRs: Reviewed in chart  Past Medical History: Reviewed in chart  Social History     Tobacco Use     Smoking status: Former     Packs/day: 0.00     Types: Cigarettes     Smokeless tobacco: Never     Tobacco comments:     smokes 2 cigarettes/day-none for several months   Substance Use Topics     Alcohol use: No     Alcohol/week: 0.0 standard drinks of alcohol     Drug use: No    ^Reviewed today    Medication Adherence/Access:  "Fill history appropriate for diabetes medications/supplies, but other medications have variable fill history. Only had time to assess diabetes today. Difficult home life--three children, one has ADHD and diabetes. All three children present in visit today and distracting to mother.     Type 1 Diabetes:   Diabetes Medication(s)     Diabetic Other       Glucagon (BAQSIMI TWO PACK) 3 MG/DOSE POWD    Spray 1 each in nostril once as needed (severe hypoglycemia)     Patient not taking: No sig reported    Insulin       Insulin Aspart, w/Niacinamide, 100 UNIT/ML SOCT    Inject 30 Units Subcutaneous daily Use as directed up to 30 units daily.     insulin glargine (LANTUS SOLOSTAR) 100 UNIT/ML pen    Take 15 units daily.     NOVOLOG FLEXPEN 100 UNIT/ML soln    Inject 2-8 units with meals TID  and at bedtime.approx 15 units daily, MDD 20 units.       Unclear how patient is administering her insulin.  Unable to specify frequency or dose. If \"lower\" -- doesn't take insulin (100) -- might take 3 units here and there.  Symptoms of low blood sugar? Frequent lows and hypoglycemia unawareness. She knows how to treat lows (15-15 rule)  Due to health literacy issues with the G6, we were able to get the patient approved for Dexcom G7 which she has now stable on.  Unfortunately, this limits our compatibility with InPen so we may hold off on this until patient is stable on insulin regimen and possible GLP-1 agonist  Diet (reviewed with Kym 10/14): Arielle reports that she eats out a lot.  She likes Chinese, all sorts of foods from different places around the world.  Spicy meat, chicken, seafood.  Vegetables and fruits, etc.  She does not like to eat rice, bread, pasta. She did not grow up on this food.      She really wants to lose weight.  She was started on Victoza earlier this year by Dr. Wasserman and experienced severe hypoglycemia requiring paramedics and ER visit.  She took 4 doses and then we stopped it.  Very anxious to lose weight " "before a wedding in June.  A prior authorization for Wegovy was denied despite patient meeting qualifications so appeal is underway.  Patient is willing to pay cash price for this in the meantime.  Advised strongly against this given patient is at high risk to lose her MA if she were to pay bah cruz.  Kym decide to send a prescription for Victoza for patient to try in the meantime.  Per patient she does not want to try this as she believes this caused her to gain weight.    Future InPen settings per Kym:    Statin: No   ACEi/ARB: No  Urine Albumin:   Lab Results   Component Value Date    UMALCR 94.51 (H) 09/30/2014      Lab Results   Component Value Date    A1C 10.1 06/22/2022    A1C 5.8 07/04/2020    A1C 6.4 06/11/2020    A1C 7.5 01/28/2020    A1C 7.8 01/02/2020    A1C 12.6 09/20/2019     Most Recent Immunizations   Administered Date(s) Administered     TDAP Vaccine (Boostrix) 12/08/2014     Estimated body mass index is 32.42 kg/m  as calculated from the following:    Height as of 3/21/23: 5' 3\" (1.6 m).    Weight as of 3/21/23: 183 lb (83 kg).      BP Readings from Last 1 Encounters:   03/21/23 99/76     Pulse Readings from Last 1 Encounters:   03/21/23 93     Wt Readings from Last 1 Encounters:   03/21/23 183 lb (83 kg)     Ht Readings from Last 1 Encounters:   03/21/23 5' 3\" (1.6 m)     Estimated body mass index is 32.42 kg/m  as calculated from the following:    Height as of 3/21/23: 5' 3\" (1.6 m).    Weight as of 3/21/23: 183 lb (83 kg).    Temp Readings from Last 1 Encounters:   03/21/23 97.6  F (36.4  C) (Oral)       ----------------  I spent 10 minutes with this patient today. Kym Jang PA-C was provided the recommendations above via routed note and is the authorizing prescriber for this visit through the pharmacist collaborative practice agreement. A copy of the visit note was provided to the patient's provider(s).    The patient was given a summary of these recommendations.     Telemedicine " Visit Details  Type of service:  Telephone visit  Start Time: 9:30AM  End Time: 9:40AM  Originating Location (pt. Location): Home  Distant Location (provider location):  On-site  Provider has received verbal consent for a visit from the patient? Yes    Lauro Barrios, PharmD, [facility]  Endocrinology & Diabetes [facility] Pharmacist  [address] [place] [address]  Direct Voicemail: [phone]  Clinic Hours: Monday-Friday 7:00 AM - 3:30 PM     Medication Therapy Recommendations  Diabetes mellitus type 1 (H)    Current Medication: Insulin Degludec (TRESIBA) 100 UNIT/ML SOLN   Rationale: Cannot afford medication product - Cost - Adherence   Recommendation: Referral to Service    Status: Resolved Med Access Issue

## 2023-03-29 ENCOUNTER — TELEPHONE (OUTPATIENT)
Dept: ENDOCRINOLOGY | Facility: CLINIC | Age: 38
End: 2023-03-29
Payer: COMMERCIAL

## 2023-03-29 NOTE — TELEPHONE ENCOUNTER
Copied over from other encounter. Plan requested more information. Please fax to [phone] -- CASE ID [id number]:    SARAHI CONNELLY REQUEST     Prior Authorization Retail Medication Request     Medication/Dose: Wegovy 0.25 mg weekly #2 mL for 28 day supply and all subsequent doses.     ICD code (if different than what is on RX):  E66.09  Previously Tried and Failed:  Victoza (liraglutide),  Rationale:  Arielle Montenegro is a 38 year old female with a current BMI of 32.42, which is higher than the guideline-recommended threshold to qualify for weight loss medications (BMI > 27 with risk factors OR BMI > 30).  Patient follows a specialty endocrine and weight management clinic that addresses diet and exercise.  She has had more than 2 follow-ups in the past 6 months.  She has attempted to lose weight via lifestyle modifications prior to initiation and plans to continue these lifestyle modifications while taking Wegovy     Wegovy  is the best option to help the patient achieve her weight loss goals at this time.  Other [place] Medicaid formulary options are not appropriate for her.  Given diagnosis of anxiety, Adipex or Qsymia--which contain the stimulant phentermine--is not appropriate as it can exacerbate this disease state. Phentermine is contraindicated in patients in an agitated state/anxiety. This patient has a diagnosis of anxiety and history of agitated states and is currently treated with fluoxetine. Contrave, which contains the antidepressant bupropion, can also increase blood pressure and exacerbate anxiety.  It is clear that these 2 oral options are not safe for this patient.  Saxenda (liraglutide) is not appropriate for this patient given she has history of failure of Victoza secondary to extreme nausea/GI side effects.     Please approve this patient for Wegovy so she continue to achieve her weight loss goals.     Estimated body mass index is 32.42 kg/m  as calculated from the following:    Height as of 3/21/23: 5'  "3\" (1.6 m).    Weight as of 3/21/23: 183 lb (83 kg).     Insurance Name:  [facility]  Insurance ID:  [id number]  "

## 2023-03-29 NOTE — TELEPHONE ENCOUNTER
I put all of the requested information in the original PA -- it appears they ignored it. Messaging liaison to fax information again.    FYI -- patient called my line -- she is very adamant about being on this ASAP -- She is even willing to pay $1.5k to get it for cash (she got a new job as an ). Advised to wait to hear back from insurance company    Lauro

## 2023-03-31 RX ORDER — LIRAGLUTIDE 6 MG/ML
INJECTION SUBCUTANEOUS
Refills: 2 | OUTPATIENT
Start: 2023-03-31

## 2023-03-31 NOTE — TELEPHONE ENCOUNTER
VICTOZA 2-ASHLEY 18 MG/3 ML PEN  Last Written Prescription Date:  ?  Last Fill Quantity: ?,   # refills: ?  Last Office Visit : 3/14/2023  Future Office visit:   4/11/2023    Routing refill request to provider for review/approval because:  Drug not active on patient's medication list      Lanie Gamez RN  Central Triage Red Flags/Med Refills

## 2023-04-04 RX ORDER — LIRAGLUTIDE 6 MG/ML
0.6 INJECTION SUBCUTANEOUS DAILY
Qty: 9 ML | Refills: 3 | Status: SHIPPED | OUTPATIENT
Start: 2023-04-04 | End: 2023-05-05

## 2023-04-05 ENCOUNTER — TELEPHONE (OUTPATIENT)
Dept: ENDOCRINOLOGY | Facility: CLINIC | Age: 38
End: 2023-04-05

## 2023-04-05 RX ORDER — INSULIN DEGLUDEC 100 U/ML
16 INJECTION, SOLUTION SUBCUTANEOUS DAILY
Qty: 15 ML | Refills: 3 | Status: SHIPPED | OUTPATIENT
Start: 2023-04-05 | End: 2023-10-09

## 2023-04-05 NOTE — TELEPHONE ENCOUNTER
PA Initiation    Medication: Tresiba Flextouch 100Unit/ML  Insurance Company: [employer] - Phone [phone] Fax [phone]  Pharmacy Filling the Rx:    Filling Pharmacy Phone:    Filling Pharmacy Fax:    Start Date: 4/5/2023    [id number]

## 2023-04-07 NOTE — TELEPHONE ENCOUNTER
Medication Appeal Initiation    We have initiated an appeal for the requested medication:  Medication: Wegovy  Appeal Start Date:  4/7/2023  Insurance Company: [employer] - Phone [phone] Fax [phone]  Comments:  Appeal letter faxed to [phone]

## 2023-04-07 NOTE — TELEPHONE ENCOUNTER
Letter created. Literally just copied and pasted PA info because it appears they didn't read the contrindication to adipex... Thanks! Lauro

## 2023-04-07 NOTE — TELEPHONE ENCOUNTER
Prior Authorization Approval    Authorization Effective Date: 3/6/2023  Authorization Expiration Date: 4/6/2024  Medication: Tresiba Flextouch 100Unit/ML  Approved Dose/Quantity: 2ml per 28 days  Reference #: [id number]   Insurance Company: [employer]E/[employer] - Phone [phone] Fax [phone]  Expected CoPay:       CoPay Card Available:      Foundation Assistance Needed:    Which Pharmacy is filling the prescription (Not needed for infusion/clinic administered):    Pharmacy Notified:    Patient Notified:

## 2023-04-14 ENCOUNTER — VIRTUAL VISIT (OUTPATIENT)
Dept: PHARMACY | Facility: CLINIC | Age: 38
End: 2023-04-14
Payer: COMMERCIAL

## 2023-04-14 DIAGNOSIS — E66.01 MORBID OBESITY (H): ICD-10-CM

## 2023-04-14 DIAGNOSIS — L87.1 REACTIVE PERFORATING COLLAGENOSIS: ICD-10-CM

## 2023-04-14 DIAGNOSIS — E10.649 TYPE 1 DIABETES MELLITUS WITH HYPOGLYCEMIA AND WITHOUT COMA (H): Primary | ICD-10-CM

## 2023-04-14 PROCEDURE — 99607 MTMS BY PHARM ADDL 15 MIN: CPT

## 2023-04-14 PROCEDURE — 99606 MTMS BY PHARM EST 15 MIN: CPT

## 2023-04-14 NOTE — TELEPHONE ENCOUNTER
MEDICATION APPEAL APPROVED    Medication: Wegovy  Authorization Effective Date: 3/7/2023  Authorization Expiration Date: 10/7/2023  Approved Dose/Quantity: 2ml per 28 days  Reference #: Key: [id number]   Insurance Company: [employer] - Phone [phone] Fax [phone]  Expected CoPay: $0     CoPay Card Available:      Foundation Assistance Needed:    Which Pharmacy is filling the prescription (Not needed for infusion/clinic administered): [id number] [place], [place] - [address]

## 2023-04-14 NOTE — Clinical Note
NATASHA only --this was a visit from last Friday that I sent a staff message about.  Patient did call my direct line today and says that she has some mild GERD symptoms since starting Wegovy but they are not severe.  Her Dexcom has fallen off again, so we do not have data so I cannot confirm if she is going low.  I sent prescription for BGM supplies today per her request and also recommended to contact the  for replacement.  Since she appears to not be calling the schedulers back, can I schedule her manually with you next time she calls my line?  How soon you like to follow-up?  Lauro

## 2023-04-16 ENCOUNTER — HEALTH MAINTENANCE LETTER (OUTPATIENT)
Age: 38
End: 2023-04-16

## 2023-04-24 ENCOUNTER — TELEPHONE (OUTPATIENT)
Dept: ENDOCRINOLOGY | Facility: CLINIC | Age: 38
End: 2023-04-24
Payer: COMMERCIAL

## 2023-04-25 RX ORDER — BLOOD-GLUCOSE METER
1 EACH MISCELLANEOUS ONCE
Qty: 1 KIT | Refills: 0 | Status: SHIPPED | OUTPATIENT
Start: 2023-04-25 | End: 2023-04-25

## 2023-04-25 RX ORDER — LANCETS
EACH MISCELLANEOUS
Qty: 100 EACH | Refills: 3 | Status: SHIPPED | OUTPATIENT
Start: 2023-04-25 | End: 2023-11-14

## 2023-04-25 RX ORDER — GLUCOSAMINE HCL/CHONDROITIN SU 500-400 MG
CAPSULE ORAL
Qty: 100 EACH | Refills: 11 | Status: SHIPPED | OUTPATIENT
Start: 2023-04-25 | End: 2023-11-14

## 2023-04-25 RX ORDER — TRETINOIN 0.5 MG/G
CREAM TOPICAL AT BEDTIME
Qty: 45 G | Refills: 1 | Status: SHIPPED | OUTPATIENT
Start: 2023-04-25 | End: 2023-06-27

## 2023-04-25 RX ORDER — BLOOD SUGAR DIAGNOSTIC
STRIP MISCELLANEOUS
Qty: 100 STRIP | Refills: 3 | Status: SHIPPED | OUTPATIENT
Start: 2023-04-25 | End: 2023-11-14

## 2023-04-25 NOTE — PROGRESS NOTES
Medication Therapy Management (MTM) Encounter    ASSESSMENT:                            Medication Adherence/Access: See below for considerations    Type 1 Diabetes: Last A1C above goal of < 7%.  Patient's Dexcom sensor has been having issues connecting with clinic.  We do have data from today which shows that patient spends most of her time greater than 180.    Patient has strong desire to lose weight.  An appeal was approved for Wegovy and patient wishes to start as soon as possible.  Would benefit from critical discussion of ensure she does not overcorrect with her short acting insulin and making sure that she administers it appropriately about 15 minutes before her meals and not after as she has been doing.     Would also benefit from counseling on transition to Tresiba today.      PLAN:                            1. Counseled patient on administration, side effect profile, and storage requirements of Wegovy.  Stressed the importance of not over correcting with short acting insulin and to adhere to Kym's recommendation of 3 units with meals.  Advised how important it is that patient inject her short acting insulin before she eats meals to avoid lows.  Patient verbalized understanding of this information    2.  Counseled patient on transition of long-acting insulin to Tresiba.  Patient to take 14 units daily.  Patient verbalized understanding.    Follow-up: In about 2 weeks with Lauro; No f/up scheduled with Kym yet    Medication issues to be addressed at a future visit:      Assess asthma (no recent fill hx for maintenance inhalers)    Assess FERMIN/MDD    SUBJECTIVE/OBJECTIVE:                          Arielle Montenegro is a 37 year old female seen for a follow-up visit. She was referred to me from Kym Jang PA-C.      Reason for visit: Medication Therapy Management (MTM).    Allergies/ADRs: Reviewed in chart  Past Medical History: Reviewed in chart  Social History     Tobacco Use     Smoking status: Former  "    Packs/day: 0.00     Types: Cigarettes     Smokeless tobacco: Never     Tobacco comments:     smokes 2 cigarettes/day-none for several months   Substance Use Topics     Alcohol use: No     Alcohol/week: 0.0 standard drinks of alcohol     Drug use: No    ^Reviewed today    Medication Adherence/Access: Fill history appropriate for diabetes medications/supplies, but other medications have variable fill history. Only had time to assess diabetes today. Difficult home life--three children, one has ADHD and diabetes. All three children present in visit today and distracting to mother.     Type 1 Diabetes:   Diabetes Medication(s)     Diabetic Other       Glucagon (BAQSIMI TWO PACK) 3 MG/DOSE POWD    Spray 1 each in nostril once as needed (severe hypoglycemia)     Patient not taking: No sig reported    Insulin       Insulin Aspart, w/Niacinamide, 100 UNIT/ML SOCT    Inject 30 Units Subcutaneous daily Use as directed up to 30 units daily.     insulin glargine (LANTUS SOLOSTAR) 100 UNIT/ML pen    Take 15 units daily.     NOVOLOG FLEXPEN 100 UNIT/ML soln    Inject 2-8 units with meals TID  and at bedtime.approx 15 units daily, MDD 20 units.       Unclear how patient is administering her insulin.  Unable to specify frequency or dose. If \"lower\" -- doesn't take insulin (100) -- might take 3 units here and there.  Patient notes that sometimes she will just \"dial it up high\" if her sugars are high.  Unable to specify this further.    Symptoms of low blood sugar? Frequent lows and hypoglycemia unawareness. She knows how to treat lows (15-15 rule)  Due to health literacy issues with the G6, we were able to get the patient approved for Dexcom G7 which she has now stable on.  Unfortunately, this limits our compatibility with InPen so we may hold off on this until patient is stable on insulin regimen and possible GLP-1 agonist.     Diet (reviewed with Kym 10/14): Arielle reports that she eats out a lot.  She likes Chinese, all " "sorts of foods from different places around the world.  Spicy meat, chicken, seafood.  Vegetables and fruits, etc.  She does not like to eat rice, bread, pasta. She did not grow up on this food.      She really wants to lose weight.  She was started on Victoza earlier this year by Dr. Wasserman and experienced severe hypoglycemia requiring paramedics and ER visit.  She took 4 doses and then we stopped it.  Very anxious to lose weight before a wedding in June.  A prior authorization for Wegovy was denied despite patient meeting qualifications.  An appeal letter was created by myself last week and subsequently approved.  Patient seeks education today.    Future InPen settings per Kym:    Statin: No   ACEi/ARB: No  Urine Albumin:   Lab Results   Component Value Date    UMALCR 94.51 (H) 09/30/2014      Lab Results   Component Value Date    A1C 10.1 06/22/2022    A1C 5.8 07/04/2020    A1C 6.4 06/11/2020    A1C 7.5 01/28/2020    A1C 7.8 01/02/2020    A1C 12.6 09/20/2019     Most Recent Immunizations   Administered Date(s) Administered     TDAP Vaccine (Boostrix) 12/08/2014     Estimated body mass index is 32.42 kg/m  as calculated from the following:    Height as of 3/21/23: 5' 3\" (1.6 m).    Weight as of 3/21/23: 183 lb (83 kg).      BP Readings from Last 1 Encounters:   03/21/23 99/76     Pulse Readings from Last 1 Encounters:   03/21/23 93     Wt Readings from Last 1 Encounters:   03/21/23 183 lb (83 kg)     Ht Readings from Last 1 Encounters:   03/21/23 5' 3\" (1.6 m)     Estimated body mass index is 32.42 kg/m  as calculated from the following:    Height as of 3/21/23: 5' 3\" (1.6 m).    Weight as of 3/21/23: 183 lb (83 kg).    Temp Readings from Last 1 Encounters:   03/21/23 97.6  F (36.4  C) (Oral)       ----------------  I spent 30 minutes with this patient today. Kym Jang PA-C was provided the recommendations above via routed note and is the authorizing prescriber for this visit through the pharmacist " collaborative practice agreement. A copy of the visit note was provided to the patient's provider(s).    The patient was given a summary of these recommendations.     Telemedicine Visit Details  Type of service:  Telephone visit  Start Time: 10:30AM  End Time: 11:00 AM  Originating Location (pt. Location): Home  Distant Location (provider location):  On-site  Provider has received verbal consent for a visit from the patient? Yes    Lauro Barrios, PharmD, [facility]  Endocrinology & Diabetes [facility] Pharmacist  [address] [place] [id number]  Direct Voicemail: [phone]  Clinic Hours: Monday-Friday 7:00 AM - 3:30 PM     Medication Therapy Recommendations  Diabetes mellitus type 1 (H)    Current Medication: Semaglutide-Weight Management (WEGOVY) 0.25 MG/0.5ML pen   Rationale: Does not understand instructions - Adherence - Adherence   Recommendation: Provide Education   Status: Patient Agreed - Adherence/Education

## 2023-04-29 NOTE — LETTER
May 16, 2023      Arielle Montenegro  [address]  [place] [address]      Dear Arielle,    We recently received a call from your pharmacy requesting a refill of your medication.    A review of your chart indicates that an appointment is required to establish care with a new provider/doctor to continue receiving refills of your medication. Please call the clinic to schedule your appointment.    We have authorized one refill of your medication to allow time for you to schedule.   If you have a history of diabetes or high cholesterol, please come in fasting for the appointment. Fasting entails nothing to eat or drink 8 hours prior to your appointment; with the exception on water. You may take your medication the day of the appointment.    Thank you,      [facility] Team

## 2023-05-02 NOTE — TELEPHONE ENCOUNTER
Called patient and left a voicemail message to call the clinic and schedule an appointment to establish care/ physical/ med check up.    Joyce Goncalves

## 2023-05-02 NOTE — TELEPHONE ENCOUNTER
Rx for 90 days.  PCP is no longer with clinic.  Advise to schedule an appointment with new PCP to establish care and for future refills.

## 2023-05-17 ENCOUNTER — TELEPHONE (OUTPATIENT)
Dept: ENDOCRINOLOGY | Facility: CLINIC | Age: 38
End: 2023-05-17
Payer: COMMERCIAL

## 2023-05-17 ENCOUNTER — VIRTUAL VISIT (OUTPATIENT)
Dept: PHARMACY | Facility: CLINIC | Age: 38
End: 2023-05-17
Payer: COMMERCIAL

## 2023-05-17 DIAGNOSIS — E10.649 TYPE 1 DIABETES MELLITUS WITH HYPOGLYCEMIA AND WITHOUT COMA (H): Primary | ICD-10-CM

## 2023-05-17 DIAGNOSIS — E66.01 MORBID OBESITY (H): ICD-10-CM

## 2023-05-17 PROCEDURE — 99607 MTMS BY PHARM ADDL 15 MIN: CPT

## 2023-05-17 PROCEDURE — 99606 MTMS BY PHARM EST 15 MIN: CPT

## 2023-05-17 NOTE — PROGRESS NOTES
Medication Therapy Management (MTM) Encounter    ASSESSMENT:                            Medication Adherence/Access: See below for considerations    Type 1 Diabetes: Last A1C above goal of < 7%.  Time in raenge has improved since starting Wegovy and patient is tolerating well. May benefit from dose increase today, with extensive counseling on to stick to the lower end of Novolog corrections to mitigate risk of lows. Unclear how patient is administering insulin -- she often overcorrects which is causing her to go low often. Will  today on ensuring she does not reuse pen needles and to try to take insulin 15 minutes before her meal. If she forgets to do this, recommend patient to only give small amount of insulin (e.g. 1-3 units) to correct)    Would also benefit from counseling on transition to Tresiba today.      PLAN:                            1. Do NOT reuse pen needles    2. Sent prescription for Wegovy 0.5 mg to [facility] and provided phone number to patient to set up delivery    3. Counseled on Tresiba switch.     4. When you make the Wegovy switch, give 1-2 units less of Novolog than your usual dose (e.g. 1-3 units with meals instead of 2-4 units)    5. DECREASE Novolog to 1-3 units with meals. Do not give 5+ units. Track the amount of insulin you are giving and when you give it in the notes gael in your phone this week    Follow-up: In about 2 weeks with Lauro; No f/up scheduled with Kym yet    SUBJECTIVE/OBJECTIVE:                          Arielle Montenegro is a 37 year old female seen for a follow-up visit. She was referred to me from Kym Jang PA-C.      Reason for visit: Medication Therapy Management (MTM).    Allergies/ADRs: Reviewed in chart  Past Medical History: Reviewed in chart  Social History     Tobacco Use     Smoking status: Former     Packs/day: 0.00     Types: Cigarettes     Smokeless tobacco: Never     Tobacco comments:     smokes 2 cigarettes/day-none for several months   Substance Use  "Topics     Alcohol use: No     Alcohol/week: 0.0 standard drinks of alcohol     Drug use: No    ^Reviewed today    Medication Adherence/Access: Fill history appropriate for diabetes medications/supplies, but other medications have variable fill history. Only had time to assess diabetes today. Difficult home life--three children, one has ADHD and diabetes.     Type 1 Diabetes:   Diabetes Medication(s)     Diabetic Other       Glucagon (BAQSIMI TWO PACK) 3 MG/DOSE POWD    Spray 1 each in nostril once as needed (severe hypoglycemia)     Patient not taking: No sig reported    Insulin       Insulin Aspart, w/Niacinamide, 100 UNIT/ML SOCT    Inject 30 Units Subcutaneous daily Use as directed up to 30 units daily.     insulin glargine (LANTUS SOLOSTAR) 100 UNIT/ML pen    Take 15 units daily.     NOVOLOG FLEXPEN 100 UNIT/ML soln    Inject 2-8 units with meals TID  and at bedtime.approx 15 units daily, MDD 20 units.               \"Seems like the Wegovy keeps me up\" -- really liking it. Making better choices with diet  Lost 5 lbs  When inquiring about how much short acting she gives per day: \"Probably 40 units\". Unclear how she is giving this -- willing to track this week     Symptoms of low blood sugar? Frequent lows and hypoglycemia unawareness. She knows how to treat lows (15-15 rule)  Due to health literacy issues with the G6, we were able to get the patient approved for Dexcom G7 which she has now stable on.  Unfortunately, this limits our compatibility with InPen so we may hold off on this until patient is stable on insulin regimen and possible GLP-1 agonist.     Statin: No   ACEi/ARB: No  Urine Albumin:   Lab Results   Component Value Date    UMALCR 94.51 (H) 09/30/2014      Lab Results   Component Value Date    A1C 10.1 06/22/2022    A1C 5.8 07/04/2020    A1C 6.4 06/11/2020    A1C 7.5 01/28/2020    A1C 7.8 01/02/2020    A1C 12.6 09/20/2019     Most Recent Immunizations   Administered Date(s) Administered     TDAP " "Vaccine (Boostrix) 12/08/2014     Estimated body mass index is 32.42 kg/m  as calculated from the following:    Height as of 3/21/23: 5' 3\" (1.6 m).    Weight as of 3/21/23: 183 lb (83 kg).      BP Readings from Last 1 Encounters:   03/21/23 99/76     Pulse Readings from Last 1 Encounters:   03/21/23 93     Wt Readings from Last 1 Encounters:   03/21/23 183 lb (83 kg)     Ht Readings from Last 1 Encounters:   03/21/23 5' 3\" (1.6 m)     Estimated body mass index is 32.42 kg/m  as calculated from the following:    Height as of 3/21/23: 5' 3\" (1.6 m).    Weight as of 3/21/23: 183 lb (83 kg).    Temp Readings from Last 1 Encounters:   03/21/23 97.6  F (36.4  C) (Oral)       ----------------  I spent 40 minutes with this patient today. Kym Jang PA-C was provided the recommendations above via routed note and is the authorizing prescriber for this visit through the pharmacist collaborative practice agreement. A copy of the visit note was provided to the patient's provider(s).    The patient was given a summary of these recommendations.     Telemedicine Visit Details  Type of service:  Telephone visit  Start Time: 7:30AM  End Time: 8:10AM  Originating Location (pt. Location): Home  Distant Location (provider location):  On-site  Provider has received verbal consent for a visit from the patient? Yes    Lauro Barrios, PharmD, [facility]  Endocrinology & Diabetes [facility] Pharmacist  [address] [place] [address]  Direct Voicemail: [phone]  Clinic Hours: Monday-Friday 7:00 AM - 3:30 PM     Medication Therapy Recommendations  Diabetes mellitus type 1 (H)    Current Medication: Semaglutide-Weight Management (WEGOVY) 0.25 MG/0.5ML pen (Discontinued)   Rationale: Dose too low - Dosage too low - Effectiveness   Recommendation: Increase Dose   Status: Accepted per CPA           "

## 2023-05-17 NOTE — TELEPHONE ENCOUNTER
Please call to schedule follow-up with Kym for soonest available. Video OK. LMK if going to be more than 4-6 weeks and I will ask Kym if we can use HEATH spot    Thank you!    Lauro Barrios, PharmD  Endocrine & Diabetes [facility] Pharmacist  [address] [place] [address]  Direct Voicemail: [phone]

## 2023-05-20 ENCOUNTER — HOSPITAL ENCOUNTER (EMERGENCY)
Facility: CLINIC | Age: 38
Discharge: HOME | End: 2023-05-20
Attending: EMERGENCY MEDICINE | Admitting: EMERGENCY MEDICINE
Payer: COMMERCIAL

## 2023-05-20 VITALS
WEIGHT: 180 LBS | RESPIRATION RATE: 15 BRPM | TEMPERATURE: 98.4 F | BODY MASS INDEX: 30.73 KG/M2 | SYSTOLIC BLOOD PRESSURE: 136 MMHG | HEIGHT: 64 IN | HEART RATE: 91 BPM | OXYGEN SATURATION: 99 % | DIASTOLIC BLOOD PRESSURE: 94 MMHG

## 2023-05-20 DIAGNOSIS — E16.2 HYPOGLYCEMIA: ICD-10-CM

## 2023-05-20 LAB
ALBUMIN UR-MCNC: 200 MG/DL
AMPHETAMINES UR QL SCN: NORMAL
ANION GAP SERPL CALCULATED.3IONS-SCNC: 12 MMOL/L (ref 7–15)
APPEARANCE UR: CLEAR
BARBITURATES UR QL SCN: NORMAL
BASOPHILS # BLD AUTO: 0 10E3/UL (ref 0–0.2)
BASOPHILS NFR BLD AUTO: 2 %
BENZODIAZ UR QL SCN: NORMAL
BILIRUB UR QL STRIP: NEGATIVE
BUN SERPL-MCNC: 11.8 MG/DL (ref 6–20)
BZE UR QL SCN: NORMAL
CALCIUM SERPL-MCNC: 8.9 MG/DL (ref 8.6–10)
CANNABINOIDS UR QL SCN: NORMAL
CHLORIDE SERPL-SCNC: 107 MMOL/L (ref 98–107)
COLOR UR AUTO: ABNORMAL
CREAT SERPL-MCNC: 0.72 MG/DL (ref 0.51–0.95)
DEPRECATED HCO3 PLAS-SCNC: 25 MMOL/L (ref 22–29)
EOSINOPHIL # BLD AUTO: 0.1 10E3/UL (ref 0–0.7)
EOSINOPHIL NFR BLD AUTO: 2 %
ERYTHROCYTE [DISTWIDTH] IN BLOOD BY AUTOMATED COUNT: 14.1 % (ref 10–15)
GFR SERPL CREATININE-BSD FRML MDRD: >90 ML/MIN/1.73M2
GLUCOSE SERPL-MCNC: 148 MG/DL (ref 70–99)
GLUCOSE UR STRIP-MCNC: NEGATIVE MG/DL
HCT VFR BLD AUTO: 39.5 % (ref 35–47)
HGB BLD-MCNC: 12.7 G/DL (ref 11.7–15.7)
HGB UR QL STRIP: NEGATIVE
HOLD SPECIMEN: NORMAL
HOLD SPECIMEN: NORMAL
IMM GRANULOCYTES # BLD: 0 10E3/UL
IMM GRANULOCYTES NFR BLD: 0 %
KETONES UR STRIP-MCNC: NEGATIVE MG/DL
LEUKOCYTE ESTERASE UR QL STRIP: NEGATIVE
LYMPHOCYTES # BLD AUTO: 1.2 10E3/UL (ref 0.8–5.3)
LYMPHOCYTES NFR BLD AUTO: 45 %
MCH RBC QN AUTO: 28.2 PG (ref 26.5–33)
MCHC RBC AUTO-ENTMCNC: 32.2 G/DL (ref 31.5–36.5)
MCV RBC AUTO: 88 FL (ref 78–100)
MONOCYTES # BLD AUTO: 0.2 10E3/UL (ref 0–1.3)
MONOCYTES NFR BLD AUTO: 9 %
MUCOUS THREADS #/AREA URNS LPF: PRESENT /LPF
NEUTROPHILS # BLD AUTO: 1.1 10E3/UL (ref 1.6–8.3)
NEUTROPHILS NFR BLD AUTO: 42 %
NITRATE UR QL: NEGATIVE
NRBC # BLD AUTO: 0 10E3/UL
NRBC BLD AUTO-RTO: 0 /100
OPIATES UR QL SCN: NORMAL
PH UR STRIP: 6 [PH] (ref 5–7)
PLATELET # BLD AUTO: 252 10E3/UL (ref 150–450)
POTASSIUM SERPL-SCNC: 3.7 MMOL/L (ref 3.4–5.3)
RBC # BLD AUTO: 4.51 10E6/UL (ref 3.8–5.2)
RBC URINE: 1 /HPF
SODIUM SERPL-SCNC: 144 MMOL/L (ref 136–145)
SP GR UR STRIP: 1.02 (ref 1–1.03)
SQUAMOUS EPITHELIAL: <1 /HPF
UROBILINOGEN UR STRIP-MCNC: NORMAL MG/DL
WBC # BLD AUTO: 2.7 10E3/UL (ref 4–11)
WBC URINE: <1 /HPF

## 2023-05-20 PROCEDURE — 80307 DRUG TEST PRSMV CHEM ANLYZR: CPT | Performed by: EMERGENCY MEDICINE

## 2023-05-20 PROCEDURE — 96360 HYDRATION IV INFUSION INIT: CPT | Performed by: EMERGENCY MEDICINE

## 2023-05-20 PROCEDURE — 81001 URINALYSIS AUTO W/SCOPE: CPT | Performed by: EMERGENCY MEDICINE

## 2023-05-20 PROCEDURE — 36415 COLL VENOUS BLD VENIPUNCTURE: CPT | Performed by: EMERGENCY MEDICINE

## 2023-05-20 PROCEDURE — 258N000003 HC RX IP 258 OP 636: Performed by: EMERGENCY MEDICINE

## 2023-05-20 PROCEDURE — 99284 EMERGENCY DEPT VISIT MOD MDM: CPT | Performed by: EMERGENCY MEDICINE

## 2023-05-20 PROCEDURE — 99283 EMERGENCY DEPT VISIT LOW MDM: CPT | Mod: 25 | Performed by: EMERGENCY MEDICINE

## 2023-05-20 PROCEDURE — 85025 COMPLETE CBC W/AUTO DIFF WBC: CPT | Performed by: EMERGENCY MEDICINE

## 2023-05-20 PROCEDURE — 80048 BASIC METABOLIC PNL TOTAL CA: CPT | Performed by: EMERGENCY MEDICINE

## 2023-05-20 PROCEDURE — 82962 GLUCOSE BLOOD TEST: CPT

## 2023-05-20 PROCEDURE — 96361 HYDRATE IV INFUSION ADD-ON: CPT | Performed by: EMERGENCY MEDICINE

## 2023-05-20 RX ORDER — SODIUM CHLORIDE, SODIUM LACTATE, POTASSIUM CHLORIDE, CALCIUM CHLORIDE 600; 310; 30; 20 MG/100ML; MG/100ML; MG/100ML; MG/100ML
125 INJECTION, SOLUTION INTRAVENOUS CONTINUOUS
Status: DISCONTINUED | OUTPATIENT
Start: 2023-05-20 | End: 2023-05-20 | Stop reason: HOSPADM

## 2023-05-20 RX ADMIN — SODIUM CHLORIDE, POTASSIUM CHLORIDE, SODIUM LACTATE AND CALCIUM CHLORIDE 1000 ML: 600; 310; 30; 20 INJECTION, SOLUTION INTRAVENOUS at 13:56

## 2023-05-20 ASSESSMENT — ACTIVITIES OF DAILY LIVING (ADL)
ADLS_ACUITY_SCORE: 35
ADLS_ACUITY_SCORE: 33

## 2023-05-20 NOTE — ED PROVIDER NOTES
ED Provider Note  [facility]      History     Chief Complaint   Patient presents with     Hypoglycemia     The history is provided by the patient, medical records and the EMS personnel.     Arielle Montenegro is a 38 year old female with history of DMI presenting to the ED via EMS due to hyperglycemia. Per EMS report patient's 3 young children became concerned because she was awake, but unresponsive. They went next door and the neighbor called 911. EMS gave instaglucose upon arrival, but BG resulted at 33 so they gave glucagon with improvement to 135. Patient now more alert, oriented, and responsive now. She reports feeling at baseline last night.     Additionally, EMS notes that upon arrival they noted bags of frozen chicken and shrimp and a gallon of milk sitting in the entryway that were thawing after having been left out overnight. Patient claims that she put the children to bed last night and forgot about it. EMS reports that the rest of the apartment was also disheveled with trash, food, diapers, etc. scattered around. They asked the oldest child, who is around 10 years old, if the patient is on any medications and the child showed them a cabinet full of various open medication bottles. The child reported they moved there 1 month ago and no other adult is living there. The patient does reportedly have a cousin who lives on the 2nd floor so the children were taken there. EMS did call PD who arrived before transport due to the condition of the apartment in concern for the health and safety of the children.     Past Medical History  Past Medical History:   Diagnosis Date     Blindness of right eye      Diabetes mellitus type 1 (H)     Diagnosed as a child     Past Surgical History:   Procedure Laterality Date      SECTION  13      SECTION N/A 2015    Procedure:  SECTION;  Surgeon: John Hudson MD;  Location:  L+D      SECTION N/A  7/3/2020    Procedure:  SECTION;  Surgeon: Aparna Atkins MD;  Location: [facility] L+D     ENUCLEATION Right 3/20/2015    Procedure: ENUCLEATION;  Surgeon: Edilson Islas MD;  Location:  EC     albuterol (PROAIR HFA/PROVENTIL HFA/VENTOLIN HFA) 108 (90 Base) MCG/ACT inhaler  alcohol swab prep pads  blood glucose (ACCU-CHEK GUIDE) test strip  blood glucose (NO BRAND SPECIFIED) test strip  blood glucose monitoring (NO BRAND SPECIFIED) meter device kit  blood glucose monitoring (SOFTCLIX) lancets  cetirizine (ZYRTEC) 10 MG tablet  Continuous Blood Gluc  (DEXCOM G6 ) TOMASA  Continuous Blood Gluc Sensor (DEXCOM G6 SENSOR) MISC  Continuous Blood Gluc Sensor (DEXCOM G7 SENSOR) MISC  Continuous Blood Gluc Transmit (DEXCOM G6 TRANSMITTER) MISC  Continuous Blood Gluc Transmit (DEXCOM G6 TRANSMITTER) MISC  FLUoxetine (PROZAC) 20 MG capsule  fluticasone (FLONASE) 50 MCG/ACT nasal spray  fluticasone-salmeterol (ADVAIR-HFA) 115-21 MCG/ACT inhaler  gabapentin (NEURONTIN) 100 MG capsule  Glucagon (BAQSIMI TWO PACK) 3 MG/DOSE POWD  Insulin Degludec (TRESIBA) 100 UNIT/ML SOLN  insulin glargine (LANTUS SOLOSTAR) 100 UNIT/ML pen  insulin pen needle (31G X 5 MM) 31G X 5 MM miscellaneous  KETOSTIX test strip  naproxen (NAPROSYN) 500 MG tablet  NOVOLOG FLEXPEN 100 UNIT/ML soln  Semaglutide-Weight Management (WEGOVY) 0.5 MG/0.5ML pen  traZODone (DESYREL) 50 MG tablet  tretinoin (RETIN-A) 0.05 % external cream  Glucagon 3 MG/DOSE POWD      No Known Allergies  Family History  Family History   Problem Relation Age of Onset     Family History Negative Mother      Family History Negative Father      Diabetes Other         father's side     Social History   Social History     Tobacco Use     Smoking status: Former     Packs/day: 0.00     Types: Cigarettes     Smokeless tobacco: Never     Tobacco comments:     smokes 2 cigarettes/day-none for several months   Substance Use Topics     Alcohol use: No     Alcohol/week:  "0.0 standard drinks of alcohol     Drug use: No         A medically appropriate review of systems was performed with pertinent positives and negatives noted in the HPI, and all other systems negative.    Physical Exam   BP: 135/78  Pulse: 86  Temp: 98.7  F (37.1  C)  Resp: 15  Height: 162.6 cm (5' 4\")  Weight: 81.6 kg (180 lb)  SpO2: 99 %     Physical Exam  Vitals and nursing note reviewed.   Constitutional:       General: She is not in acute distress.     Appearance: She is well-developed.   HENT:      Head: Normocephalic and atraumatic.      Mouth/Throat:      Mouth: Mucous membranes are moist.   Eyes:      General: No scleral icterus.     Conjunctiva/sclera: Conjunctivae normal.      Pupils: Pupils are equal, round, and reactive to light.   Cardiovascular:      Rate and Rhythm: Normal rate and regular rhythm.      Heart sounds: Normal heart sounds.   Pulmonary:      Effort: Pulmonary effort is normal. No respiratory distress.      Breath sounds: Normal breath sounds. No wheezing.   Abdominal:      General: Abdomen is flat.      Palpations: Abdomen is soft.   Musculoskeletal:      Cervical back: Neck supple.   Skin:     General: Skin is warm and dry.   Neurological:      General: No focal deficit present.      Mental Status: She is alert and oriented to person, place, and time.      Cranial Nerves: No cranial nerve deficit.   Psychiatric:         Attention and Perception: Attention normal.         Mood and Affect: Mood is anxious.         Speech: Speech normal.         Behavior: Behavior normal.         Thought Content: Thought content is not delusional.           ED Course, Procedures, & Data      Procedures              Results for orders placed or performed during the hospital encounter of 05/20/23   Basic metabolic panel     Status: Abnormal   Result Value Ref Range    Sodium 144 136 - 145 mmol/L    Potassium 3.7 3.4 - 5.3 mmol/L    Chloride 107 98 - 107 mmol/L    Carbon Dioxide (CO2) 25 22 - 29 mmol/L    " Anion Gap 12 7 - 15 mmol/L    Urea Nitrogen 11.8 6.0 - 20.0 mg/dL    Creatinine 0.72 0.51 - 0.95 mg/dL    Calcium 8.9 8.6 - 10.0 mg/dL    Glucose 148 (H) 70 - 99 mg/dL    GFR Estimate >90 >60 mL/min/1.73m2   UA with Microscopic reflex to Culture     Status: Abnormal    Specimen: Urine, Clean Catch   Result Value Ref Range    Color Urine Light Yellow Colorless, Straw, Light Yellow, Yellow    Appearance Urine Clear Clear    Glucose Urine Negative Negative mg/dL    Bilirubin Urine Negative Negative    Ketones Urine Negative Negative mg/dL    Specific Gravity Urine 1.021 1.003 - 1.035    Blood Urine Negative Negative    pH Urine 6.0 5.0 - 7.0    Protein Albumin Urine 200 (A) Negative mg/dL    Urobilinogen Urine Normal Normal, 2.0 mg/dL    Nitrite Urine Negative Negative    Leukocyte Esterase Urine Negative Negative    Mucus Urine Present (A) None Seen /LPF    RBC Urine 1 <=2 /HPF    WBC Urine <1 <=5 /HPF    Squamous Epithelials Urine <1 <=1 /HPF    Narrative    Urine Culture not indicated   CBC with platelets and differential     Status: Abnormal   Result Value Ref Range    WBC Count 2.7 (L) 4.0 - 11.0 10e3/uL    RBC Count 4.51 3.80 - 5.20 10e6/uL    Hemoglobin 12.7 11.7 - 15.7 g/dL    Hematocrit 39.5 35.0 - 47.0 %    MCV 88 78 - 100 fL    MCH 28.2 26.5 - 33.0 pg    MCHC 32.2 31.5 - 36.5 g/dL    RDW 14.1 10.0 - 15.0 %    Platelet Count 252 150 - 450 10e3/uL    % Neutrophils 42 %    % Lymphocytes 45 %    % Monocytes 9 %    % Eosinophils 2 %    % Basophils 2 %    % Immature Granulocytes 0 %    NRBCs per 100 WBC 0 <1 /100    Absolute Neutrophils 1.1 (L) 1.6 - 8.3 10e3/uL    Absolute Lymphocytes 1.2 0.8 - 5.3 10e3/uL    Absolute Monocytes 0.2 0.0 - 1.3 10e3/uL    Absolute Eosinophils 0.1 0.0 - 0.7 10e3/uL    Absolute Basophils 0.0 0.0 - 0.2 10e3/uL    Absolute Immature Granulocytes 0.0 <=0.4 10e3/uL    Absolute NRBCs 0.0 10e3/uL   Drug abuse screen 1 urine (ED)     Status: Normal   Result Value Ref Range    Amphetamines  Urine Screen Negative Screen Negative    Barbituates Urine Screen Negative Screen Negative    Benzodiazepine Urine Screen Negative Screen Negative    Cannabinoids Urine Screen Negative Screen Negative    Cocaine Urine Screen Negative Screen Negative    Opiates Urine Screen Negative Screen Negative   [place] Draw     Status: None    Narrative    The following orders were created for panel order [place] Draw.  Procedure                               Abnormality         Status                     ---------                               -----------         ------                     Extra Blue Top Tube[[id number]]                              Final result               Extra Red Top Tube[[id number]]                               Final result                 Please view results for these tests on the individual orders.   Extra Blue Top Tube     Status: None   Result Value Ref Range    Hold Specimen JIC    Extra Red Top Tube     Status: None   Result Value Ref Range    Hold Specimen JIC    Glucose by meter     Status: Abnormal   Result Value Ref Range    GLUCOSE BY METER POCT 134 (H) 70 - 99 mg/dL   Glucose by meter     Status: Abnormal   Result Value Ref Range    GLUCOSE BY METER POCT 176 (H) 70 - 99 mg/dL   CBC with platelets differential     Status: Abnormal    Narrative    The following orders were created for panel order CBC with platelets differential.  Procedure                               Abnormality         Status                     ---------                               -----------         ------                     CBC with platelets and d...[[id number]]  Abnormal            Final result                 Please view results for these tests on the individual orders.   Urine Drugs of Abuse Screen     Status: Normal    Narrative    The following orders were created for panel order Urine Drugs of Abuse Screen.  Procedure                               Abnormality         Status                     ---------                                -----------         ------                     Drug abuse screen 1 urin...[[id number]]  Normal              Final result                 Please view results for these tests on the individual orders.     Medications   lactated ringers BOLUS 1,000 mL (0 mLs Intravenous Stopped 5/20/23 1530)     Labs Ordered and Resulted from Time of ED Arrival to Time of ED Departure   BASIC METABOLIC PANEL - Abnormal       Result Value    Sodium 144      Potassium 3.7      Chloride 107      Carbon Dioxide (CO2) 25      Anion Gap 12      Urea Nitrogen 11.8      Creatinine 0.72      Calcium 8.9      Glucose 148 (*)     GFR Estimate >90     ROUTINE UA WITH MICROSCOPIC REFLEX TO CULTURE - Abnormal    Color Urine Light Yellow      Appearance Urine Clear      Glucose Urine Negative      Bilirubin Urine Negative      Ketones Urine Negative      Specific Gravity Urine 1.021      Blood Urine Negative      pH Urine 6.0      Protein Albumin Urine 200 (*)     Urobilinogen Urine Normal      Nitrite Urine Negative      Leukocyte Esterase Urine Negative      Mucus Urine Present (*)     RBC Urine 1      WBC Urine <1      Squamous Epithelials Urine <1     CBC WITH PLATELETS AND DIFFERENTIAL - Abnormal    WBC Count 2.7 (*)     RBC Count 4.51      Hemoglobin 12.7      Hematocrit 39.5      MCV 88      MCH 28.2      MCHC 32.2      RDW 14.1      Platelet Count 252      % Neutrophils 42      % Lymphocytes 45      % Monocytes 9      % Eosinophils 2      % Basophils 2      % Immature Granulocytes 0      NRBCs per 100 WBC 0      Absolute Neutrophils 1.1 (*)     Absolute Lymphocytes 1.2      Absolute Monocytes 0.2      Absolute Eosinophils 0.1      Absolute Basophils 0.0      Absolute Immature Granulocytes 0.0      Absolute NRBCs 0.0     DRUG ABUSE SCREEN 1 URINE (ED) - Normal    Amphetamines Urine Screen Negative      Barbituates Urine Screen Negative      Benzodiazepine Urine Screen Negative      Cannabinoids Urine Screen Negative       Cocaine Urine Screen Negative      Opiates Urine Screen Negative       No orders to display          Critical care was not performed.     Medical Decision Making  The patient's presentation was of moderate complexity (an acute illness with systemic symptoms).    The patient's evaluation involved:  ordering and/or review of 3+ test(s) in this encounter (CBC, metabolic panel, toxicology screen)  discussion of management or test interpretation with another health professional (Social work)    The patient's management necessitated moderate risk (limitations due to social determinants of health (38-year-old female diabetic with hypoglycemic spell and 3 small children with no assistance at home.  Concern for both her health and the wellbeing of the children.  [facility] social work is involved.)).      Assessment & Plan    38-year-old female with insulin-dependent diabetes found to be hypoglycemic.  Paramedics were concerned about the condition of her house it sounds as though it was very disheveled and there were 3 small children there.  It turns out that there is an open CPS case.  The children are in their possession now.  Regarding her hypoglycemia she was given fluids D50 and she was then fed and she was able to maintain her blood sugar.  It was requested that she be held in the ED until someone from the [facility] the [facility] could interview her.  Appreciate assistance of ED social work.  She was interviewed on the phone by them and she requested to be discharged.  She needs to follow-up with a primary care provider that is managing her diabetes.  It is questionable whether she is using her medications appropriately.  She may have used her insulin this morning without eating.  She is safe to discharge.  She does need good outpatient primary care follow-up.    I have reviewed the nursing notes. I have reviewed the findings, diagnosis, plan and need for follow up with the patient.    Discharge Medication List as of 5/20/2023  4:30 PM           Final diagnoses:   Hypoglycemia   I, Esperanza Ahmadi, am serving as a trained medical scribe to document services personally performed by Víctor Moser MD, based on the provider's statements to me.     I, Víctor Moser MD, was physically present and have reviewed and verified the accuracy of this note documented by Esperanza Ahmadi.        [facility] EMERGENCY DEPARTMENT  5/20/2023     Víctor Moser MD  05/31/23 1217

## 2023-05-20 NOTE — ED TRIAGE NOTES
"Patient arrives to ED via BLS coming from home after being found on the floor by her 3 children. Children ran to neighbors and they called 911. Blood sugar 33 upon  arrival and now 135  After glucagon was given by EMS. Per EMS house was completely \"trashed.\" Patient stating she needs a scarf for her hair and wants to leave       "

## 2023-05-20 NOTE — CONSULTS
"Care Management Discharge Note    Discharge Date: 05/20/2023       Discharge Disposition: Home    Discharge Services: Protective Services    Discharge DME:  (Not assessed)    Discharge Transportation:  (Not assessed)    Private pay costs discussed: Not applicable    Does the patient's insurance plan have a 3 day qualifying hospital stay waiver?  No    PAS Confirmation Code:  (N/A)  Patient/family educated on Medicare website which has current facility and service quality ratings: no    Education Provided on the Discharge Plan: No  Persons Notified of Discharge Plans: Pt; MD; RN  Patient/Family in Agreement with the Plan: unable to assess    Handoff Referral Completed: Yes    Additional Information:  Writer received drive-by consult from ED [facility]. Pt was brought in by EMS. Per report from [facility] [employer]/Police Dept, the home is a \"garbage house\" with open pill bottles around the house, diapers on the floor, meat was unrefridgerated and on the floor. Pt has three small children at home and it is believed they are currently with a neighbor.    Writer met with pt. Writer introduced self, discussed role and went over writer's availability. Pt immediately gave writer a skeptical look. Writer asked several questions about why she came in and her ongoing management of her DM. Pt exclaimed \"Why does a  care about this? Why is a  telling me that I shouldn't train my kids to call 911. I'll just stay home and die next time!\" Writer never made any such suggestion that 911 shouldn't have been called or that she had done anything wrong. Writer explained that the EMS crew or police made reports that her house is dirty. Pt reported the house was dirty but that she meant to clean it but she passed out. When asked to report the chain of events that she recalls, she first stated that she received the groceries last night and went to put the kids to bed before she was going to put away the groceries. " "Pt then stated she passed out. When writer clarified that she was passed out for the entire night and spent it on the floor until her children found her, pt changed her story, stating that she only passed out this morning. At this point in writer's conversation pt became significantly more evasive in providing information to writer, or pt would make comments that were acknowledging imaginary accusations that writer never alleged, for example \"Yes, it was my fault.\" \"Yes I should have cleaned sooner.\" \"I should have managed my diabetes better.\"    Pt denies having issues with accessing food. Pt says her kids are with her cousin, Kavya. She has also made contradicting statements that she has zero supports or family in the [place], as they are all back in [place].     Pt asked if writer was filing a report. Writer explained that EMS/police may have filed report, but a report may be filed today. Pt appeared upset and stated that she is ready to discharge.     Per pt's chart, on 3/21/23, pt was to be admitted for Diabetic ketoacidosis and ongoing management of her Hyperglycemia, but pt refused admission and elected to leave AMA because she needed to take care of her kids. On 2/5/2023, pt was found unresponsive and brought to [facility] hospital with a BG of < 20.     Before writer could call in report, RN met with writer and reported that pt did not know where her kids were and pt wanted to know where writer placed her kids. Writer explained that writer had not made a report yet and does not know where pt's children are currently.    Overall, writer has concerns with how pt is able to manage her own health, specifically her diabetes and how this relates to supervision and giving care to her three children. Within the last 3 months, pt has passed out due to hypoglycemia twice, and once needed to be hospitalized due to her hyperglycemia, but chose to leave AMA. Due to how pt has three young children in the home, and she has been " "evasive in giving answers to writer, writer has made the determination to file a CPS report.    Writer made report to CPS Intake -- [place]. [place]ordo reports they already have received a report RE: pt's kids and that they had pt's kids in CPS custody. [place] reports that a verbal report does not need to be made due to there being an already made report, but writer should submit a written report. Writer provided [place]ordo with RN station number and pt's listed phone number.     Writer updated pt on the location of her children and how CPS wanted to meet with her here in the hospital. Pt stood up and began shouting at writer. \"CPS TOOK MY KIDS. WHY DID YOU TAKE MY KIDS? THEY DONT TAKE KIDS AWAY FOR HAVING DIABETES.\" Writer attempted to respond, but pt was too worked up to be reasoned with. Pt continue to talk about how her children have caused her to pass out and this is not her fault. When asked how many times she has passed out, pt stated that she has only passed out once, which was today, however, pt has made contradicting statement about the number of times she has passed out multiple times today.    Pt demanded to know what CPS wanted to talk to her about. Writer explained that writer was uncertain why CPS would meet with her at this time Pt then demanded to know when she could get her kids back, which writer stated that she would need to speak with CPS regarding it. Pt stated she was getting her kids back but she would not meet with CPS while she was here and she wanted to leave.     Writer updated MD on pt's desire to leave.     ________________    DWIGHT Gonzalez, [facility]  ED/Observation   [facility]  Phone: [phone]  Pager: [phone]  Fax: [phone]    On-call pager, [phone], 4:00pm to midnight    "

## 2023-05-20 NOTE — PROGRESS NOTES
"Pt stated that she was called by \"care worker\" and was told she could leave to see her kids. Shanti CORNELL updated and ok'd for pt to discharge. . Pt very pleasant during discharge and called for lyft.   "

## 2023-05-20 NOTE — DISCHARGE INSTRUCTIONS
It is very important to take your medication as prescribed and you must eat after taking your insulin.  Make an appointment to follow-up with your primary care provider

## 2023-05-21 LAB
GLUCOSE BLDC GLUCOMTR-MCNC: 134 MG/DL (ref 70–99)
GLUCOSE BLDC GLUCOMTR-MCNC: 176 MG/DL (ref 70–99)

## 2023-05-23 ENCOUNTER — VIRTUAL VISIT (OUTPATIENT)
Dept: PHARMACY | Facility: CLINIC | Age: 38
End: 2023-05-23
Payer: COMMERCIAL

## 2023-05-23 DIAGNOSIS — E66.01 MORBID OBESITY (H): ICD-10-CM

## 2023-05-23 DIAGNOSIS — E10.649 TYPE 1 DIABETES MELLITUS WITH HYPOGLYCEMIA AND WITHOUT COMA (H): Primary | ICD-10-CM

## 2023-05-23 PROCEDURE — 99606 MTMS BY PHARM EST 15 MIN: CPT

## 2023-05-23 PROCEDURE — 99607 MTMS BY PHARM ADDL 15 MIN: CPT

## 2023-05-23 NOTE — PROGRESS NOTES
Medication Therapy Management (MTM) Encounter    ASSESSMENT:                            Medication Adherence/Access: See below for considerations    Type 1 Diabetes: Last A1C above goal of < 7%.  Time in raenge has improved since starting Wegovy and patient is tolerating well. May benefit from dose increase today, with extensive counseling on to stick to the lower end of Novolog corrections to mitigate risk of lows. Unclear how patient is administering insulin -- she often overcorrects which is causing her to go low often. Will  today on ensuring she does not reuse pen needles and to try to take insulin 15 minutes before her meal. If she forgets to do this, recommend patient to only give small amount of insulin (e.g. 1-3 units) to correct)    PLAN:                            1. Patient will start Wegovy 0.5 mg once she has Dexcom G7 back up and running (about 1-2 days). She will call if any issues.     2. Reiterated importance of giving 1-2 units less of Novolog than your usual dose (e.g. 1-3 units with meals instead of 2-4 units)     3. Track the amount of insulin you are using this week so we can make sure we have the right dose prescribed to you    Follow-up: 5/30/23 with Mike JOE Ok'd for Kym in weekly meeting    Medication issues to be addressed at a future visit:      Assess asthma (no recent fill hx for maintenance inhalers)    Assess FERMIN/MDD    SUBJECTIVE/OBJECTIVE:                          Arielle Montenegro is a 37 year old female seen for a follow-up visit. She was referred to me from Kym Jang PA-C.      Reason for visit: Medication Therapy Management (MTM).    Allergies/ADRs: Reviewed in chart  Past Medical History: Reviewed in chart  Social History     Tobacco Use     Smoking status: Former     Packs/day: 0.00     Types: Cigarettes     Smokeless tobacco: Never     Tobacco comments:     smokes 2 cigarettes/day-none for several months   Substance Use Topics     Alcohol use: No      "Alcohol/week: 0.0 standard drinks of alcohol     Drug use: No    ^Reviewed today    Medication Adherence/Access: Fill history appropriate for diabetes medications/supplies, but other medications have variable fill history. Only had time to assess diabetes today. Difficult home life--three children, one has ADHD and diabetes.     Type 1 Diabetes:   Diabetes Medication(s)     Diabetic Other       Glucagon (BAQSIMI TWO PACK) 3 MG/DOSE POWD    Spray 1 each in nostril once as needed (severe hypoglycemia)     Glucagon 3 MG/DOSE POWD    Spray 1 spray (3 mg) in nostril as needed in the event of unconscious hypoglycemia or hypoglycemic seizure. May repeat dose if no response after 15 minutes.    Insulin       Insulin Degludec (TRESIBA) 100 UNIT/ML SOLN    Inject 16 Units Subcutaneous daily     NOVOLOG FLEXPEN 100 UNIT/ML soln    Use as directed up to 40 units daily           Of note, patient had a level 3 hypoglycemia event last weekend which required ER admission.  Concerning note of home life by social work in ER.  Patient is very worried about losing her children.     Patient has run out of Dexcom G7 and is awaiting a shipment to arrive in a couple days.  She wishes to hold off on increasing Wegovy dose to 0.5 mg until she has the data.  She did keep track of the times that she gives insulin this past week, but she did not keep track of doses.  She is willing to do that this week.    \"Seems like the Wegovy keeps me up\" -- really liking it. Making better choices with diet  Lost 5 lbs  When inquiring about how much short acting she gives per day: \"Probably 40 units\". Unclear how she is giving this -- willing to track this week      Symptoms of low blood sugar? Frequent lows and hypoglycemia unawareness. She knows how to treat lows (15-15 rule)  Due to health literacy issues with the G6, we were able to get the patient approved for Dexcom G7 which she has now stable on.  Unfortunately, this limits our compatibility with " "InPen so we may hold off on this until patient is stable on insulin regimen and possible GLP-1 agonist.   Statin: No   ACEi/ARB: No  Urine Albumin:   Lab Results   Component Value Date    UMALCR 94.51 (H) 09/30/2014      Lab Results   Component Value Date    A1C 10.1 06/22/2022    A1C 5.8 07/04/2020    A1C 6.4 06/11/2020    A1C 7.5 01/28/2020    A1C 7.8 01/02/2020    A1C 12.6 09/20/2019     Most Recent Immunizations   Administered Date(s) Administered     TDAP Vaccine (Boostrix) 12/08/2014     Estimated body mass index is 30.9 kg/m  as calculated from the following:    Height as of 5/20/23: 5' 4\" (1.626 m).    Weight as of 5/20/23: 180 lb (81.6 kg).      BP Readings from Last 1 Encounters:   05/20/23 (!) 136/94     Pulse Readings from Last 1 Encounters:   05/20/23 91     Wt Readings from Last 1 Encounters:   05/20/23 180 lb (81.6 kg)     Ht Readings from Last 1 Encounters:   05/20/23 5' 4\" (1.626 m)     Estimated body mass index is 30.9 kg/m  as calculated from the following:    Height as of 5/20/23: 5' 4\" (1.626 m).    Weight as of 5/20/23: 180 lb (81.6 kg).    Temp Readings from Last 1 Encounters:   05/20/23 98.4  F (36.9  C) (Oral)       ----------------  I spent 15 minutes with this patient today. Kym Jang PA-C was provided the recommendations above via routed note and is the authorizing prescriber for this visit through the pharmacist collaborative practice agreement. A copy of the visit note was provided to the patient's provider(s).    The patient was given a summary of these recommendations.     Telemedicine Visit Details  Type of service:  Telephone visit  Start Time: 11:30AM  End Time: 11:45AM  Originating Location (pt. Location): Home  Distant Location (provider location):  On-site  Provider has received verbal consent for a visit from the patient? Yes    Lauro Barrios, PharmD, [facility]  Endocrinology & Diabetes [facility] Pharmacist  [address] , [place] [address]  Direct Voicemail: " [phone]  Clinic Hours: Monday-Friday 7:00 AM - 3:30 PM     Medication Therapy Recommendations  Diabetes mellitus type 1 (H)    Current Medication: NOVOLOG FLEXPEN 100 UNIT/ML soln   Rationale: Does not understand instructions - Adherence - Adherence   Recommendation: Provide Education   Status: Patient Agreed - Adherence/Education

## 2023-05-24 NOTE — TELEPHONE ENCOUNTER
Sim Restrepo,    At this time, it looks like Kym Jang's first available virtual appointment is 7/31/23 and her first available in person appointment is 9/18/23. HEATH may be necessary. Please advise when able.     Thank you,  Daniel Barriga on 5/24/2023 at 10:30 AM

## 2023-05-30 ENCOUNTER — VIRTUAL VISIT (OUTPATIENT)
Dept: PHARMACY | Facility: CLINIC | Age: 38
End: 2023-05-30
Payer: COMMERCIAL

## 2023-05-30 DIAGNOSIS — E66.01 MORBID OBESITY (H): ICD-10-CM

## 2023-05-30 DIAGNOSIS — E10.649 TYPE 1 DIABETES MELLITUS WITH HYPOGLYCEMIA AND WITHOUT COMA (H): Primary | ICD-10-CM

## 2023-05-30 DIAGNOSIS — J98.01 COLD INDUCED BRONCHOSPASM: ICD-10-CM

## 2023-05-30 PROCEDURE — 99607 MTMS BY PHARM ADDL 15 MIN: CPT | Mod: 93

## 2023-05-30 PROCEDURE — 99606 MTMS BY PHARM EST 15 MIN: CPT | Mod: 93

## 2023-05-30 NOTE — PROGRESS NOTES
Medication Therapy Management (MTM) Encounter    ASSESSMENT:                            Medication Adherence/Access: See below for considerations    Type 1 Diabetes: Last A1C above goal of < 7%.  Unclear current control as patient had a Dexcom fall off early -- she plans on setting this up today. Patient tolerating Wegovy well. May benefit from dose increase today, with extensive counseling on to stick to the lower end of Novolog corrections to mitigate risk of lows. Unclear how patient is administering insulin -- she often overcorrects which is causing her to go low often. Will  today on ensuring she does not reuse pen needles and to try to take insulin 15 minutes before her meal. If she forgets to do this, recommend patient to only give small amount of insulin (e.g. 1-3 units) to correct.    PLAN:                            1. RESTART Dexcom today. Let me know if you have any issues     2. Track the amount of insulin you are using this week in the Spotted gael so we can make sure we have the right dose prescribed to you    Follow-up: 6/14/23 with Mike JOE Ok'd for Kym in weekly meeting    Medication issues to be addressed at a future visit:      Assess asthma (no recent fill hx for maintenance inhalers)    Assess FERMIN/MDD    SUBJECTIVE/OBJECTIVE:                          Arielle Montenegro is a 37 year old female seen for a follow-up visit. She was referred to me from Kym Jang PA-C.      Reason for visit: Medication Therapy Management (MTM).    Allergies/ADRs: Reviewed in chart  Past Medical History: Reviewed in chart  Social History     Tobacco Use     Smoking status: Former     Packs/day: 0.00     Types: Cigarettes     Smokeless tobacco: Never     Tobacco comments:     smokes 2 cigarettes/day-none for several months   Substance Use Topics     Alcohol use: No     Alcohol/week: 0.0 standard drinks of alcohol     Drug use: No    ^Reviewed today    Medication Adherence/Access: Fill history appropriate for  "diabetes medications/supplies, but other medications have variable fill history. Only had time to assess diabetes today. Difficult home life--three children, one has ADHD and diabetes.     Type 1 Diabetes:   Diabetes Medication(s)     Diabetic Other       Glucagon (BAQSIMI TWO PACK) 3 MG/DOSE POWD    Spray 1 each in nostril once as needed (severe hypoglycemia)     Glucagon 3 MG/DOSE POWD    Spray 1 spray (3 mg) in nostril as needed in the event of unconscious hypoglycemia or hypoglycemic seizure. May repeat dose if no response after 15 minutes.    Insulin       Insulin Degludec (TRESIBA) 100 UNIT/ML SOLN    Inject 16 Units Subcutaneous daily     NOVOLOG FLEXPEN 100 UNIT/ML soln    Use as directed up to 40 units daily             \"Sugars dropping fast lately\" -- took only been taking 10 units of Tresiba. Hasn't started Dexcom yet, willing to put on today and track insulin dosing in the gael. Unable to specify specific BGM readings    \"Seems like the Wegovy keeps me up\" -- really liking it. Making better choices with diet  Lost 5 lbs  When inquiring about how much short acting she gives per day: \"Probably 40 units\". Unclear how she is giving this -- willing to track this week      Symptoms of low blood sugar? Frequent lows and hypoglycemia unawareness. She knows how to treat lows (15-15 rule)  Due to health literacy issues with the G6, we were able to get the patient approved for Dexcom G7 which she has now stable on.  Unfortunately, this limits our compatibility with InPen so we may hold off on this until patient is stable on insulin regimen and possible GLP-1 agonist.   Statin: No   ACEi/ARB: No  Urine Albumin:   Lab Results   Component Value Date    UMALCR 94.51 (H) 09/30/2014      Lab Results   Component Value Date    A1C 10.1 06/22/2022    A1C 5.8 07/04/2020    A1C 6.4 06/11/2020    A1C 7.5 01/28/2020    A1C 7.8 01/02/2020    A1C 12.6 09/20/2019     Most Recent Immunizations   Administered Date(s) Administered     " "TDAP Vaccine (Boostrix) 12/08/2014     Estimated body mass index is 30.9 kg/m  as calculated from the following:    Height as of 5/20/23: 5' 4\" (1.626 m).    Weight as of 5/20/23: 180 lb (81.6 kg).      BP Readings from Last 1 Encounters:   05/20/23 (!) 136/94     Pulse Readings from Last 1 Encounters:   05/20/23 91     Wt Readings from Last 1 Encounters:   05/20/23 180 lb (81.6 kg)     Ht Readings from Last 1 Encounters:   05/20/23 5' 4\" (1.626 m)     Estimated body mass index is 30.9 kg/m  as calculated from the following:    Height as of 5/20/23: 5' 4\" (1.626 m).    Weight as of 5/20/23: 180 lb (81.6 kg).    Temp Readings from Last 1 Encounters:   05/20/23 98.4  F (36.9  C) (Oral)       ----------------  I spent 15 minutes with this patient today. Kym Jang PA-C was provided the recommendations above via routed note and is the authorizing prescriber for this visit through the pharmacist collaborative practice agreement. A copy of the visit note was provided to the patient's provider(s).    The patient was given a summary of these recommendations.     Telemedicine Visit Details  Type of service:  Telephone visit  Start Time: 11:30AM  End Time: 11:45AM  Originating Location (pt. Location): Home  Distant Location (provider location):  On-site  Provider has received verbal consent for a visit from the patient? Yes    Lauro Barrios, PharmD, [facility]  Endocrinology & Diabetes [facility] Pharmacist  [address] [place] [id number]  Direct Voicemail: [phone]  Clinic Hours: Monday-Friday 7:00 AM - 3:30 PM     Medication Therapy Recommendations  Diabetes mellitus type 1 (H)    Current Medication: NOVOLOG FLEXPEN 100 UNIT/ML soln   Rationale: Does not understand instructions - Adherence - Adherence   Recommendation: Provide Education   Status: Patient Agreed - Adherence/Education           "

## 2023-06-06 ENCOUNTER — TELEPHONE (OUTPATIENT)
Dept: ENDOCRINOLOGY | Facility: CLINIC | Age: 38
End: 2023-06-06
Payer: COMMERCIAL

## 2023-06-06 RX ORDER — BUDESONIDE AND FORMOTEROL FUMARATE DIHYDRATE 160; 4.5 UG/1; UG/1
2 AEROSOL RESPIRATORY (INHALATION) 2 TIMES DAILY
Qty: 10.2 G | Refills: 3 | Status: SHIPPED | OUTPATIENT
Start: 2023-06-06 | End: 2023-06-09

## 2023-06-06 RX ORDER — ALBUTEROL SULFATE 90 UG/1
2 AEROSOL, METERED RESPIRATORY (INHALATION) EVERY 6 HOURS PRN
Qty: 18 G | Refills: 0 | Status: SHIPPED | OUTPATIENT
Start: 2023-06-06 | End: 2023-06-09

## 2023-06-06 NOTE — TELEPHONE ENCOUNTER
Patient call:     Appointment type: return diabetes   Provider: Suhail  Return date: first available; [facility] phone number: [phone]  Additional appointment(s) needed:   Additional notes: LVM, ruthann Myc   Can schedule pt in HEATH spot, do not overbook, must be scheduled manually as it will not show in solutions   Buck Cordova on 6/6/2023 at 11:23 AM

## 2023-06-09 ENCOUNTER — APPOINTMENT (OUTPATIENT)
Dept: GENERAL RADIOLOGY | Facility: CLINIC | Age: 38
End: 2023-06-09
Attending: EMERGENCY MEDICINE
Payer: COMMERCIAL

## 2023-06-09 ENCOUNTER — HOSPITAL ENCOUNTER (EMERGENCY)
Facility: CLINIC | Age: 38
Discharge: HOME | End: 2023-06-09
Attending: EMERGENCY MEDICINE | Admitting: EMERGENCY MEDICINE
Payer: COMMERCIAL

## 2023-06-09 VITALS
RESPIRATION RATE: 16 BRPM | TEMPERATURE: 98.6 F | OXYGEN SATURATION: 97 % | HEIGHT: 64 IN | SYSTOLIC BLOOD PRESSURE: 118 MMHG | BODY MASS INDEX: 32.66 KG/M2 | DIASTOLIC BLOOD PRESSURE: 80 MMHG | WEIGHT: 191.3 LBS | HEART RATE: 105 BPM

## 2023-06-09 DIAGNOSIS — E16.2 HYPOGLYCEMIA: ICD-10-CM

## 2023-06-09 DIAGNOSIS — J98.01 ACUTE BRONCHOSPASM: ICD-10-CM

## 2023-06-09 DIAGNOSIS — J98.01 COLD INDUCED BRONCHOSPASM: ICD-10-CM

## 2023-06-09 DIAGNOSIS — R09.81 CONGESTION OF PARANASAL SINUS: ICD-10-CM

## 2023-06-09 LAB
ALBUMIN SERPL BCG-MCNC: 3.2 G/DL (ref 3.5–5.2)
ALP SERPL-CCNC: 89 U/L (ref 35–104)
ALT SERPL W P-5'-P-CCNC: 16 U/L (ref 10–35)
ANION GAP SERPL CALCULATED.3IONS-SCNC: 8 MMOL/L (ref 7–15)
AST SERPL W P-5'-P-CCNC: 14 U/L (ref 10–35)
BASOPHILS # BLD AUTO: 0 10E3/UL (ref 0–0.2)
BASOPHILS NFR BLD AUTO: 1 %
BILIRUB SERPL-MCNC: 0.2 MG/DL
BUN SERPL-MCNC: 8.1 MG/DL (ref 6–20)
CALCIUM SERPL-MCNC: 8.7 MG/DL (ref 8.6–10)
CHLORIDE SERPL-SCNC: 106 MMOL/L (ref 98–107)
CREAT SERPL-MCNC: 0.76 MG/DL (ref 0.51–0.95)
D DIMER PPP FEU-MCNC: 0.53 UG/ML FEU (ref 0–0.5)
DEPRECATED HCO3 PLAS-SCNC: 24 MMOL/L (ref 22–29)
EOSINOPHIL # BLD AUTO: 0.2 10E3/UL (ref 0–0.7)
EOSINOPHIL NFR BLD AUTO: 2 %
ERYTHROCYTE [DISTWIDTH] IN BLOOD BY AUTOMATED COUNT: 13.7 % (ref 10–15)
FLUAV RNA SPEC QL NAA+PROBE: NEGATIVE
FLUBV RNA RESP QL NAA+PROBE: NEGATIVE
GFR SERPL CREATININE-BSD FRML MDRD: >90 ML/MIN/1.73M2
GLUCOSE BLDC GLUCOMTR-MCNC: 203 MG/DL (ref 70–99)
GLUCOSE SERPL-MCNC: 47 MG/DL (ref 70–99)
HCT VFR BLD AUTO: 39.1 % (ref 35–47)
HGB BLD-MCNC: 13 G/DL (ref 11.7–15.7)
IMM GRANULOCYTES # BLD: 0 10E3/UL
IMM GRANULOCYTES NFR BLD: 0 %
LYMPHOCYTES # BLD AUTO: 2.1 10E3/UL (ref 0.8–5.3)
LYMPHOCYTES NFR BLD AUTO: 27 %
MCH RBC QN AUTO: 28.9 PG (ref 26.5–33)
MCHC RBC AUTO-ENTMCNC: 33.2 G/DL (ref 31.5–36.5)
MCV RBC AUTO: 87 FL (ref 78–100)
MONOCYTES # BLD AUTO: 0.5 10E3/UL (ref 0–1.3)
MONOCYTES NFR BLD AUTO: 6 %
NEUTROPHILS # BLD AUTO: 5.1 10E3/UL (ref 1.6–8.3)
NEUTROPHILS NFR BLD AUTO: 64 %
NRBC # BLD AUTO: 0 10E3/UL
NRBC BLD AUTO-RTO: 0 /100
NT-PROBNP SERPL-MCNC: <36 PG/ML (ref 0–450)
PLAT MORPH BLD: NORMAL
PLATELET # BLD AUTO: 283 10E3/UL (ref 150–450)
POTASSIUM SERPL-SCNC: 4 MMOL/L (ref 3.4–5.3)
PROT SERPL-MCNC: 6.5 G/DL (ref 6.4–8.3)
RBC # BLD AUTO: 4.5 10E6/UL (ref 3.8–5.2)
RBC MORPH BLD: NORMAL
RSV RNA SPEC NAA+PROBE: NEGATIVE
SARS-COV-2 RNA RESP QL NAA+PROBE: NEGATIVE
SODIUM SERPL-SCNC: 138 MMOL/L (ref 136–145)
TROPONIN T SERPL HS-MCNC: <6 NG/L
WBC # BLD AUTO: 7.9 10E3/UL (ref 4–11)

## 2023-06-09 PROCEDURE — 83880 ASSAY OF NATRIURETIC PEPTIDE: CPT | Performed by: EMERGENCY MEDICINE

## 2023-06-09 PROCEDURE — 250N000013 HC RX MED GY IP 250 OP 250 PS 637: Performed by: EMERGENCY MEDICINE

## 2023-06-09 PROCEDURE — 36415 COLL VENOUS BLD VENIPUNCTURE: CPT | Performed by: EMERGENCY MEDICINE

## 2023-06-09 PROCEDURE — 99285 EMERGENCY DEPT VISIT HI MDM: CPT | Mod: 25 | Performed by: EMERGENCY MEDICINE

## 2023-06-09 PROCEDURE — 99284 EMERGENCY DEPT VISIT MOD MDM: CPT | Mod: 25 | Performed by: EMERGENCY MEDICINE

## 2023-06-09 PROCEDURE — 80053 COMPREHEN METABOLIC PANEL: CPT | Performed by: EMERGENCY MEDICINE

## 2023-06-09 PROCEDURE — 71046 X-RAY EXAM CHEST 2 VIEWS: CPT

## 2023-06-09 PROCEDURE — 250N000012 HC RX MED GY IP 250 OP 636 PS 637: Performed by: EMERGENCY MEDICINE

## 2023-06-09 PROCEDURE — 82962 GLUCOSE BLOOD TEST: CPT

## 2023-06-09 PROCEDURE — 93005 ELECTROCARDIOGRAM TRACING: CPT | Performed by: EMERGENCY MEDICINE

## 2023-06-09 PROCEDURE — 87637 SARSCOV2&INF A&B&RSV AMP PRB: CPT | Performed by: EMERGENCY MEDICINE

## 2023-06-09 PROCEDURE — 94640 AIRWAY INHALATION TREATMENT: CPT | Performed by: EMERGENCY MEDICINE

## 2023-06-09 PROCEDURE — 85379 FIBRIN DEGRADATION QUANT: CPT | Performed by: EMERGENCY MEDICINE

## 2023-06-09 PROCEDURE — 93010 ELECTROCARDIOGRAM REPORT: CPT | Performed by: EMERGENCY MEDICINE

## 2023-06-09 PROCEDURE — 85025 COMPLETE CBC W/AUTO DIFF WBC: CPT | Performed by: EMERGENCY MEDICINE

## 2023-06-09 PROCEDURE — 84484 ASSAY OF TROPONIN QUANT: CPT | Performed by: EMERGENCY MEDICINE

## 2023-06-09 RX ORDER — ALBUTEROL SULFATE 90 UG/1
2 AEROSOL, METERED RESPIRATORY (INHALATION) ONCE
Status: COMPLETED | OUTPATIENT
Start: 2023-06-09 | End: 2023-06-09

## 2023-06-09 RX ORDER — PREDNISONE 20 MG/1
TABLET ORAL
Qty: 10 TABLET | Refills: 0 | Status: SHIPPED | OUTPATIENT
Start: 2023-06-09 | End: 2023-06-10

## 2023-06-09 RX ORDER — FLUTICASONE PROPIONATE 50 MCG
1 SPRAY, SUSPENSION (ML) NASAL DAILY
Qty: 16 G | Refills: 0 | Status: SHIPPED | OUTPATIENT
Start: 2023-06-09 | End: 2023-06-10

## 2023-06-09 RX ORDER — BUDESONIDE AND FORMOTEROL FUMARATE DIHYDRATE 160; 4.5 UG/1; UG/1
2 AEROSOL RESPIRATORY (INHALATION) 2 TIMES DAILY
Qty: 10.2 G | Refills: 0 | Status: SHIPPED | OUTPATIENT
Start: 2023-06-09 | End: 2023-06-10

## 2023-06-09 RX ORDER — CETIRIZINE HYDROCHLORIDE 10 MG/1
10 TABLET ORAL DAILY
Qty: 90 TABLET | Refills: 0 | Status: SHIPPED | OUTPATIENT
Start: 2023-06-09 | End: 2023-06-10

## 2023-06-09 RX ORDER — PREDNISONE 20 MG/1
40 TABLET ORAL ONCE
Status: COMPLETED | OUTPATIENT
Start: 2023-06-09 | End: 2023-06-09

## 2023-06-09 RX ORDER — ALBUTEROL SULFATE 90 UG/1
AEROSOL, METERED RESPIRATORY (INHALATION)
Qty: 18 G | Refills: 0 | Status: SHIPPED | OUTPATIENT
Start: 2023-06-09 | End: 2023-06-10

## 2023-06-09 RX ADMIN — PREDNISONE 40 MG: 20 TABLET ORAL at 18:32

## 2023-06-09 RX ADMIN — ALBUTEROL SULFATE 2 PUFF: 90 AEROSOL, METERED RESPIRATORY (INHALATION) at 15:55

## 2023-06-09 ASSESSMENT — ACTIVITIES OF DAILY LIVING (ADL): ADLS_ACUITY_SCORE: 35

## 2023-06-09 NOTE — ED TRIAGE NOTES
Triage Assessment     Row Name 06/09/23 1458       Triage Assessment (Adult)    Airway WDL WDL       Respiratory WDL    Respiratory WDL WDL       Skin Circulation/Temperature WDL    Skin Circulation/Temperature WDL WDL       Cardiac WDL    Cardiac WDL WDL       Peripheral/Neurovascular WDL    Peripheral Neurovascular WDL WDL       Cognitive/Neuro/Behavioral WDL    Cognitive/Neuro/Behavioral WDL WDL

## 2023-06-09 NOTE — ED PROVIDER NOTES
[facility] EMERGENCY DEPARTMENT ([facility])    23      ED PROVIDER NOTE  ED04      History     Chief Complaint   Patient presents with     Shortness of Breath     Pt is complaining of shortness of breath for a week and a half. She feels something is in her chest; pain is from her neck to chest and she has a headache. For a week and a half, pt has been feeling weak. Pt said she had used her inhaler today.      HPI  Arielle Montenegro is a 38 year old female with a past medical history of type I diabetes and heart murmur who presents to the emergency department complaining of some NEWBY. Patient states that she gets this seasonally with her allergies and is usually relieved with inhalers.  Patient complains however of this dull type chest pain in her anterior chest over her sternum that feels like somebody is standing on her chest as well.  Patient states she has had no ankle edema and that she is diabetic and has a history of hypertension and family history of early heart disease.  Patient denies any smoking history.  Patient states she has never had a stress test done before.    This part of the medical record was transcribed by Ramón Roper, Medical Scribe, from a dictation done by Brooks Guerra MD.       Past Medical History  Past Medical History:   Diagnosis Date     Blindness of right eye      Diabetes mellitus type 1 (H)     Diagnosed as a child     Past Surgical History:   Procedure Laterality Date      SECTION  13      SECTION N/A 2015    Procedure:  SECTION;  Surgeon: John Hudson MD;  Location: [facility] L+D      SECTION N/A 7/3/2020    Procedure:  SECTION;  Surgeon: Aparna Atkins MD;  Location: [facility] L+D     ENUCLEATION Right 3/20/2015    Procedure: ENUCLEATION;  Surgeon: Edilson Islas MD;  Location:  [facility]     albuterol (PROAIR HFA/PROVENTIL HFA/VENTOLIN HFA) 108 (90 Base) MCG/ACT inhaler  alcohol swab prep pads  blood glucose  (ACCU-CHEK GUIDE) test strip  blood glucose (NO BRAND SPECIFIED) test strip  blood glucose monitoring (NO BRAND SPECIFIED) meter device kit  blood glucose monitoring (SOFTCLIX) lancets  budesonide-formoterol (SYMBICORT) 160-4.5 MCG/ACT Inhaler  cetirizine (ZYRTEC) 10 MG tablet  Continuous Blood Gluc  (DEXCOM G6 ) TOMASA  Continuous Blood Gluc Sensor (DEXCOM G6 SENSOR) MISC  Continuous Blood Gluc Sensor (DEXCOM G7 SENSOR) MISC  Continuous Blood Gluc Transmit (DEXCOM G6 TRANSMITTER) MISC  Continuous Blood Gluc Transmit (DEXCOM G6 TRANSMITTER) MISC  FLUoxetine (PROZAC) 20 MG capsule  fluticasone (FLONASE) 50 MCG/ACT nasal spray  fluticasone-salmeterol (ADVAIR-HFA) 115-21 MCG/ACT inhaler  gabapentin (NEURONTIN) 100 MG capsule  Glucagon (BAQSIMI TWO PACK) 3 MG/DOSE POWD  Glucagon 3 MG/DOSE POWD  Insulin Degludec (TRESIBA) 100 UNIT/ML SOLN  insulin glargine (LANTUS SOLOSTAR) 100 UNIT/ML pen  insulin pen needle (31G X 5 MM) 31G X 5 MM miscellaneous  KETOSTIX test strip  naproxen (NAPROSYN) 500 MG tablet  NOVOLOG FLEXPEN 100 UNIT/ML soln  Semaglutide-Weight Management (WEGOVY) 0.5 MG/0.5ML pen  traZODone (DESYREL) 50 MG tablet  tretinoin (RETIN-A) 0.05 % external cream      No Known Allergies     Family History  Family History   Problem Relation Age of Onset     Family History Negative Mother      Family History Negative Father      Diabetes Other         father's side     Social History   Social History     Tobacco Use     Smoking status: Former     Packs/day: 0.00     Types: Cigarettes     Smokeless tobacco: Never     Tobacco comments:     smokes 2 cigarettes/day-none for several months   Substance Use Topics     Alcohol use: No     Alcohol/week: 0.0 standard drinks of alcohol     Drug use: No      Past medical history, past surgical history, medications, allergies, family history, and social history were reviewed with the patient. No additional pertinent items.      A medically appropriate review of systems  "was performed with pertinent positives and negatives noted in the HPI, and all other systems negative.    Physical Exam   BP: 118/80  Pulse: 105  Temp: 98.6  F (37  C)  Resp: 16  Height: 162.6 cm (5' 4\")  Weight: 86.8 kg (191 lb 4.8 oz)  SpO2: 97 %  Physical Exam  Vitals and nursing note reviewed.   Constitutional:       Appearance: She is not ill-appearing or diaphoretic.   HENT:      Head: Atraumatic.   Eyes:      Extraocular Movements: Extraocular movements intact.      Pupils: Pupils are equal, round, and reactive to light.   Cardiovascular:      Rate and Rhythm: Regular rhythm.   Pulmonary:      Comments: A long expiratory phase bilaterally      With a albuterol inhaler the patient's pre and post peak flows were 170 and 220 respectively  Musculoskeletal:      Cervical back: Neck supple.      Right lower leg: No tenderness. No edema.      Left lower leg: No tenderness. No edema.   Neurological:      General: No focal deficit present.      Mental Status: She is alert and oriented to person, place, and time.   Psychiatric:         Mood and Affect: Mood normal.           ED Course, Procedures, & Data      Procedures           Patient is EKG revealed a normal sinus rhythm at a rate of 95 with a LA interval point 162 and a QRS duration of point 072.  The patient had a normal axis with no acute ST or T wave changes syndrome for ischemia.  This is read by me personally.    Results for orders placed or performed during the hospital encounter of 06/09/23   XR Chest 2 Views     Status: None (Preliminary result)    Narrative    EXAM: XR CHEST 2 VIEWS  LOCATION: [facility]  DATE/TIME: 6/9/2023 4:44 PM CDT    INDICATION: Shortness of breath.  COMPARISON: 03/21/2023.      Impression    IMPRESSION: Negative chest.   Comprehensive metabolic panel     Status: Abnormal   Result Value Ref Range    Sodium 138 136 - 145 mmol/L    Potassium 4.0 3.4 - 5.3 mmol/L    Chloride 106 98 - 107 " mmol/L    Carbon Dioxide (CO2) 24 22 - 29 mmol/L    Anion Gap 8 7 - 15 mmol/L    Urea Nitrogen 8.1 6.0 - 20.0 mg/dL    Creatinine 0.76 0.51 - 0.95 mg/dL    Calcium 8.7 8.6 - 10.0 mg/dL    Glucose 47 (LL) 70 - 99 mg/dL    Alkaline Phosphatase 89 35 - 104 U/L    AST 14 10 - 35 U/L    ALT 16 10 - 35 U/L    Protein Total 6.5 6.4 - 8.3 g/dL    Albumin 3.2 (L) 3.5 - 5.2 g/dL    Bilirubin Total 0.2 <=1.2 mg/dL    GFR Estimate >90 >60 mL/min/1.73m2   Troponin T, High Sensitivity     Status: Normal   Result Value Ref Range    Troponin T, High Sensitivity <6 <=14 ng/L   Nt probnp inpatient (BNP)     Status: Normal   Result Value Ref Range    N terminal Pro BNP Inpatient <36 0 - 450 pg/mL   D dimer quantitative     Status: Abnormal   Result Value Ref Range    D-Dimer Quantitative 0.53 (H) 0.00 - 0.50 ug/mL FEU    Narrative    This D-dimer assay is intended for use in conjunction with a clinical pretest probability assessment model to exclude pulmonary embolism (PE) and deep venous thrombosis (DVT) in outpatients suspected of PE or DVT. The cut-off value is 0.50 ug/mL FEU.   Symptomatic Influenza A/B, RSV, & SARS-CoV2 PCR (COVID-19) Nasopharyngeal     Status: Normal    Specimen: Nasopharyngeal; Swab   Result Value Ref Range    Influenza A PCR Negative Negative    Influenza B PCR Negative Negative    RSV PCR Negative Negative    SARS CoV2 PCR Negative Negative    Narrative    Testing was performed using the Xpert Xpress CoV2/Flu/RSV Assay on the Cepheid GeneXpert Instrument. This test should be ordered for the detection of SARS-CoV-2, influenza, and RSV viruses in individuals who meet clinical and/or epidemiological criteria. Test performance is unknown in asymptomatic patients. This test is for in vitro diagnostic use under the FDA EUA for laboratories certified under CLIA to perform high or moderate complexity testing. This test has not been FDA cleared or approved. A negative result does not rule out the presence of PCR  inhibitors in the specimen or target RNA in concentration below the limit of detection for the assay. If only one viral target is positive but coinfection with multiple targets is suspected, the sample should be re-tested with another FDA cleared, approved, or authorized test, if coinfection would change clinical management. This test was validated by the [facility]. These laboratories are certified under the Clinical Laboratory Improvement Amendments of 1988 (CLIA-88) as qualified to perform high complexity laboratory testing.   CBC with platelets and differential     Status: None   Result Value Ref Range    WBC Count 7.9 4.0 - 11.0 10e3/uL    RBC Count 4.50 3.80 - 5.20 10e6/uL    Hemoglobin 13.0 11.7 - 15.7 g/dL    Hematocrit 39.1 35.0 - 47.0 %    MCV 87 78 - 100 fL    MCH 28.9 26.5 - 33.0 pg    MCHC 33.2 31.5 - 36.5 g/dL    RDW 13.7 10.0 - 15.0 %    Platelet Count 283 150 - 450 10e3/uL    % Neutrophils 64 %    % Lymphocytes 27 %    % Monocytes 6 %    % Eosinophils 2 %    % Basophils 1 %    % Immature Granulocytes 0 %    NRBCs per 100 WBC 0 <1 /100    Absolute Neutrophils 5.1 1.6 - 8.3 10e3/uL    Absolute Lymphocytes 2.1 0.8 - 5.3 10e3/uL    Absolute Monocytes 0.5 0.0 - 1.3 10e3/uL    Absolute Eosinophils 0.2 0.0 - 0.7 10e3/uL    Absolute Basophils 0.0 0.0 - 0.2 10e3/uL    Absolute Immature Granulocytes 0.0 <=0.4 10e3/uL    Absolute NRBCs 0.0 10e3/uL   Glucose by meter     Status: Abnormal   Result Value Ref Range    GLUCOSE BY METER POCT 203 (H) 70 - 99 mg/dL   RBC and Platelet Morphology     Status: None   Result Value Ref Range    Platelet Assessment  Automated Count Confirmed. Platelet morphology is normal.     Automated Count Confirmed. Platelet morphology is normal.    RBC Morphology Confirmed RBC Indices    EKG 12-lead, tracing only     Status: None (Preliminary result)   Result Value Ref Range    Systolic Blood Pressure  mmHg    Diastolic Blood Pressure  mmHg    Ventricular Rate 95 BPM     Atrial Rate 95 BPM    LA Interval 162 ms    QRS Duration 72 ms     ms    QTc 414 ms    P Axis 72 degrees    R AXIS 36 degrees    T Axis 27 degrees    Interpretation ECG Sinus rhythm  Normal ECG      CBC with platelets differential     Status: None    Narrative    The following orders were created for panel order CBC with platelets differential.  Procedure                               Abnormality         Status                     ---------                               -----------         ------                     CBC with platelets and d...[[id number]]                      Final result               RBC and Platelet Morphology[[id number]]                      Final result                 Please view results for these tests on the individual orders.     Medications   predniSONE (DELTASONE) tablet 40 mg (has no administration in time range)   albuterol (PROVENTIL HFA/VENTOLIN HFA) inhaler (2 puffs Inhalation $Given 6/9/23 [id number])       Critical care was not performed.     Medical Decision Making  The patient's presentation was of high complexity (a chronic illness severe exacerbation, progression, or side effect of treatment).    The patient's evaluation involved:  ordering and/or review of 3+ test(s) in this encounter (See above)  strong consideration of a test (Pulmonary angiogram thought unnecessary and the D-dimer was thought to be a red herring) that was ultimately deferred    The patient's management necessitated high risk (a decision regarding hospitalization).      Assessment & Plan      I have reviewed the nursing notes.    Patient stated that she improved significantly with the albuterol inhaler and states that these of the same symptoms she had previously when she needed bronchospasm treatment.  At this point the patient be placed on the medications below and will follow-up with a primary care.  Differential diagnosis initially included cardiac etiology versus PE but these were thought unlikely at  this time.    I have reviewed the findings, diagnosis, plan and need for follow up with the patient.       Review of your medicines      UNREVIEWED medicines. Ask your doctor about these medicines      Dose / Directions   * Baqsimi Two Pack 3 MG/DOSE Powd  Used for: Type 1 diabetes mellitus with hypoglycemia and without coma (H)  Generic drug: Glucagon      Dose: 1 each  Spray 1 each in nostril once as needed (severe hypoglycemia)  Quantity: 2 each  Refills: 3     * Glucagon 3 MG/DOSE Powd  Used for: Type 1 diabetes mellitus with hypoglycemia and without coma (H)      Dose: 1 spray  Spray 1 spray (3 mg) in nostril as needed in the event of unconscious hypoglycemia or hypoglycemic seizure. May repeat dose if no response after 15 minutes.  Quantity: 1 each  Refills: 3     FLUoxetine 20 MG capsule  Commonly known as: PROzac  Used for: Postpartum depression      TAKE 1 CAPSULE BY MOUTH EVERY DAY  Quantity: 90 capsule  Refills: 0     fluticasone-salmeterol 115-21 MCG/ACT inhaler  Commonly known as: ADVAIR HFA  Used for: Cough, Cold induced bronchospasm      Dose: 2 puff  Inhale 2 puffs into the lungs 2 times daily  Quantity: 8 g  Refills: 0     gabapentin 100 MG capsule  Commonly known as: NEURONTIN  Used for: Cervical radiculopathy      Dose: 100 mg  Take 1 capsule (100 mg) by mouth 2 times daily as needed for neuropathic pain  Quantity: 30 capsule  Refills: 0     Insulin Degludec 100 UNIT/ML Soln  Commonly known as: Tresiba  Used for: Type 1 diabetes mellitus with hypoglycemia and without coma (H)      Dose: 16 Units  Inject 16 Units Subcutaneous daily  Quantity: 15 mL  Refills: 3     Lantus SoloStar 100 UNIT/ML pen  Used for: Type 1 diabetes mellitus with hypoglycemia and without coma (H)  Generic drug: insulin glargine      Take 18 units daily. Titrate up to 30 units daily as tolerated. Max total daily dose = 30 units.  Quantity: 30 mL  Refills: 2     naproxen 500 MG tablet  Commonly known as: NAPROSYN      Dose: 500  mg  Take 1 tablet (500 mg) by mouth 2 times daily as needed for moderate pain  Quantity: 30 tablet  Refills: 0     NovoLOG FLEXPEN 100 UNIT/ML pen  Used for: Type 1 diabetes mellitus with hypoglycemia and without coma (H)  Generic drug: insulin aspart      Use as directed up to 40 units daily  Quantity: 45 mL  Refills: 3     Semaglutide-Weight Management 0.5 MG/0.5ML pen  Commonly known as: WEGOVY  Used for: Morbid obesity (H)      Dose: 0.5 mg  Inject 0.5 mg Subcutaneous once a week  Quantity: 2 mL  Refills: 0     traZODone 50 MG tablet  Commonly known as: DESYREL  Used for: Persistent insomnia      Dose: 50 mg  Take 1 tablet (50 mg) by mouth At Bedtime  Quantity: 30 tablet  Refills: 1     tretinoin 0.05 % external cream  Commonly known as: RETIN-A  Used for: Reactive perforating collagenosis      Apply topically At Bedtime To hands and feet nightly  Quantity: 45 g  Refills: 1         * This list has 2 medication(s) that are the same as other medications prescribed for you. Read the directions carefully, and ask your doctor or other care provider to review them with you.            START taking      Dose / Directions   predniSONE 20 MG tablet  Commonly known as: DELTASONE      Take two tablets (= 40mg) each day for 5 (five) days  Quantity: 10 tablet  Refills: 0        CONTINUE these medicines which may have CHANGED, or have new prescriptions. If we are uncertain of the size of tablets/capsules you have at home, strength may be listed as something that might have changed.      Dose / Directions   albuterol 108 (90 Base) MCG/ACT inhaler  Commonly known as: PROAIR HFA/PROVENTIL HFA/VENTOLIN HFA  This may have changed:     how much to take    how to take this    when to take this    reasons to take this    additional instructions  Used for: Cold induced bronchospasm      Two puffs four times daily for two weeks; then two puffs twice daily  Quantity: 18 g  Refills: 0        CONTINUE these medicines which have NOT  CHANGED      Dose / Directions   alcohol swab prep pads  Used for: Type 1 diabetes mellitus with hypoglycemia and without coma (H)      Use to swab area of injection/joelle as directed up to 4 times daily  Quantity: 100 each  Refills: 11     blood glucose monitoring lancets  Used for: Type 1 diabetes mellitus with hypoglycemia and without coma (H)      Use to test blood sugar 4 times daily or as directed.  Quantity: 100 each  Refills: 3     blood glucose monitoring meter device kit  Commonly known as: NO BRAND SPECIFIED  Used for: Type 1 diabetes mellitus with hypoglycemia and without coma (H)      Use to test blood sugar 4 times daily or as directed.  Quantity: 1 kit  Refills: 1     * blood glucose test strip  Commonly known as: NO BRAND SPECIFIED  Used for: Type 1 diabetes mellitus with hypoglycemia and without coma (H)      Use to test blood sugar 4 times daily or as directed.  Quantity: 100 strip  Refills: 3     * Accu-Chek Guide test strip  Used for: Type 1 diabetes mellitus with hypoglycemia and without coma (H)  Generic drug: blood glucose      Use to test blood sugar 4 times daily or as directed. For emergencies when Dexcom falls off  Quantity: 100 strip  Refills: 3     budesonide-formoterol 160-4.5 MCG/ACT Inhaler  Commonly known as: SYMBICORT  Used for: Cold induced bronchospasm      Dose: 2 puff  Inhale 2 puffs into the lungs 2 times daily  Quantity: 10.2 g  Refills: 0     cetirizine 10 MG tablet  Commonly known as: zyrTEC  Used for: Congestion of paranasal sinus      Dose: 10 mg  Take 1 tablet (10 mg) by mouth daily for 90 days  Quantity: 90 tablet  Refills: 0     Dexcom G6  Jackie  Used for: Type 1 diabetes mellitus with hypoglycemia and without coma (H)      Use to read blood sugars as per 's instructions.  Quantity: 1 each  Refills: 0     * Dexcom G6 Sensor Misc  Used for: Type 1 diabetes mellitus with hypoglycemia and without coma (H)      Change every 10 days.  Quantity: 9  each  Refills: 11     * Dexcom G7 Sensor Misc  Used for: Type 1 diabetes mellitus with hypoglycemia and without coma (H)      Dose: 1 each  1 each every 10 days  Quantity: 3 each  Refills: 11     * Dexcom G6 Transmitter Misc  Used for: Type 1 diabetes mellitus with hypoglycemia and without coma (H)      Change every 3 months.  Quantity: 1 each  Refills: 3     * Dexcom G6 Transmitter Misc  Used for: Type 1 diabetes mellitus with hypoglycemia and without coma (H)      Change every 3 months.  Quantity: 1 each  Refills: 3     fluticasone 50 MCG/ACT nasal spray  Commonly known as: FLONASE  Used for: Congestion of paranasal sinus      Dose: 1 spray  Spray 1 spray into both nostrils daily for 90 days  Quantity: 16 g  Refills: 0     insulin pen needle 31G X 5 MM miscellaneous  Commonly known as: 31G X 5 MM  Used for: Type 1 diabetes mellitus with hypoglycemia and without coma (H)      Use 4 pen needles daily or as directed.  Quantity: 300 each  Refills: 3     Ketostix test strip  Used for: Type 1 diabetes mellitus with hypoglycemia and without coma (H)  Generic drug: acetone urine      Use as directed in case of high glucose or illness.  Quantity: 50 strip  Refills: 3         * This list has 6 medication(s) that are the same as other medications prescribed for you. Read the directions carefully, and ask your doctor or other care provider to review them with you.               Where to get your medicines      Some of these will need a paper prescription and others can be bought over the counter. Ask your nurse if you have questions.    Bring a paper prescription for each of these medications    albuterol 108 (90 Base) MCG/ACT inhaler    budesonide-formoterol 160-4.5 MCG/ACT Inhaler    cetirizine 10 MG tablet    fluticasone 50 MCG/ACT nasal spray    predniSONE 20 MG tablet           Final diagnoses:   Acute bronchospasm   Hypoglycemia       Home to rest tonight     Fill your prescriptions and take as directed     Please make  an appointment to follow up with our primary care referral or Your Primary Care Provider should you have 1 in the next 1 to 2 weeks for recheck .  The referral service should call you in the next 1 to 2 days to help you set up an appointment with our referral clinic.    Routine discharge instructions were given for this diagnosis      IRamón, am serving as a trained medical scribe to document services personally performed by Brooks Thomas Md MD based on the provider's statements to me on June 9, 2023.  This document has been checked and approved by the attending provider.    IBrooks Md MD, was physically present and have reviewed and verified the accuracy of this note documented by Ramón Roper, medical scribe.      Brooks Thomas Md MD  [facility] EMERGENCY DEPARTMENT  6/9/2023     Brooks Thomas MD  06/09/23 1455

## 2023-06-09 NOTE — DISCHARGE INSTRUCTIONS
Home to rest tonight     Fill your prescriptions and take as directed     Please make an appointment to follow up with our primary care referral or Your Primary Care Provider should you have 1 in the next 1 to 2 weeks for recheck .  The referral service should call you in the next 1 to 2 days to help you set up an appointment with our referral clinic.

## 2023-06-10 LAB
ATRIAL RATE - MUSE: 95 BPM
DIASTOLIC BLOOD PRESSURE - MUSE: NORMAL MMHG
INTERPRETATION ECG - MUSE: NORMAL
P AXIS - MUSE: 72 DEGREES
PR INTERVAL - MUSE: 162 MS
QRS DURATION - MUSE: 72 MS
QT - MUSE: 330 MS
QTC - MUSE: 414 MS
R AXIS - MUSE: 36 DEGREES
SYSTOLIC BLOOD PRESSURE - MUSE: NORMAL MMHG
T AXIS - MUSE: 27 DEGREES
VENTRICULAR RATE- MUSE: 95 BPM

## 2023-06-10 RX ORDER — CETIRIZINE HYDROCHLORIDE 10 MG/1
10 TABLET ORAL DAILY
Qty: 90 TABLET | Refills: 0 | Status: SHIPPED | OUTPATIENT
Start: 2023-06-10

## 2023-06-10 RX ORDER — ALBUTEROL SULFATE 90 UG/1
AEROSOL, METERED RESPIRATORY (INHALATION)
Qty: 18 G | Refills: 0 | Status: SHIPPED | OUTPATIENT
Start: 2023-06-10

## 2023-06-10 RX ORDER — FLUTICASONE PROPIONATE 50 MCG
1 SPRAY, SUSPENSION (ML) NASAL DAILY
Qty: 16 G | Refills: 0 | Status: SHIPPED | OUTPATIENT
Start: 2023-06-10 | End: 2023-06-27

## 2023-06-10 RX ORDER — PREDNISONE 20 MG/1
TABLET ORAL
Qty: 10 TABLET | Refills: 0 | Status: SHIPPED | OUTPATIENT
Start: 2023-06-10 | End: 2023-06-27

## 2023-06-10 RX ORDER — BUDESONIDE AND FORMOTEROL FUMARATE DIHYDRATE 160; 4.5 UG/1; UG/1
2 AEROSOL RESPIRATORY (INHALATION) 2 TIMES DAILY
Qty: 10.2 G | Refills: 0 | Status: SHIPPED | OUTPATIENT
Start: 2023-06-10 | End: 2023-10-18

## 2023-06-14 ENCOUNTER — VIRTUAL VISIT (OUTPATIENT)
Dept: PHARMACY | Facility: CLINIC | Age: 38
End: 2023-06-14
Payer: COMMERCIAL

## 2023-06-14 DIAGNOSIS — E66.01 MORBID OBESITY (H): ICD-10-CM

## 2023-06-14 DIAGNOSIS — E10.649 TYPE 1 DIABETES MELLITUS WITH HYPOGLYCEMIA AND WITHOUT COMA (H): Primary | ICD-10-CM

## 2023-06-14 PROCEDURE — 99606 MTMS BY PHARM EST 15 MIN: CPT | Mod: 93

## 2023-06-14 PROCEDURE — 99607 MTMS BY PHARM ADDL 15 MIN: CPT | Mod: 93

## 2023-06-14 NOTE — PROGRESS NOTES
Medication Therapy Management (MTM) Encounter    ASSESSMENT:                            Medication Adherence/Access: See below for considerations    Type 1 Diabetes: Last A1C above goal of < 7%. Time in range worsened since last encounter secondary to bout of bronchitis over past few weeks. Patient on final day of steroids. In light of current control -- will defer insulin adjustment to Kym next week. Recommending patient to track insulin doses in her gael     Could consider dose increase of Wegovy to 1 mg. Paitent tolerating well, but is discouraged because she has not been losing much weight.     PLAN:                            1. Sent prescription for Wegovy 1 mg -- likely will have to continue 0.5 mg or back down to 0.25 mg due to shortage. Let me know if issues    2. Follow-up with Kym next week to adjust insulin once stable off prednisone    3. Mychart me with any lows < 70    Follow-up: In about 4 weeks with Lauro; 6/20/23 with Kym Jang PA-C    SUBJECTIVE/OBJECTIVE:                          Arielle Montenegro is a 38 year old female seen for a follow-up visit. She was referred to me from Kym Jang PA-C.      Reason for visit: Medication Therapy Management (MTM).    Allergies/ADRs: Reviewed in chart  Past Medical History: Reviewed in chart  Social History     Tobacco Use     Smoking status: Former     Packs/day: 0.00     Types: Cigarettes     Smokeless tobacco: Never     Tobacco comments:     smokes 2 cigarettes/day-none for several months   Substance Use Topics     Alcohol use: No     Alcohol/week: 0.0 standard drinks of alcohol     Drug use: No    ^Reviewed today    Medication Adherence/Access: Fill history appropriate for diabetes medications/supplies, but other medications have variable fill history. Only had time to assess diabetes today. Difficult home life--three children, one has ADHD and diabetes.      Passed citizenship test and has ceremony today    Type 1 Diabetes:   Diabetes  "Medication(s)     Diabetic Other       Glucagon (BAQSIMI TWO PACK) 3 MG/DOSE POWD    Spray 1 each in nostril once as needed (severe hypoglycemia)     Glucagon 3 MG/DOSE POWD    Spray 1 spray (3 mg) in nostril as needed in the event of unconscious hypoglycemia or hypoglycemic seizure. May repeat dose if no response after 15 minutes.    Insulin       Insulin Degludec (TRESIBA) 100 UNIT/ML SOLN    Inject 12 Units Subcutaneous daily     NOVOLOG FLEXPEN 100 UNIT/ML soln    Use as directed up to 40-50 units daily \"Taking a lot of insulin\" -- unable to specify doses.           \"I've beeen sick for 3 weeks\" -- Acute bronchitis. On 5th day of prednisone 40 mg/day. Last encounter, was nearing upper 40% time in range. Now below 20%.    \"I feel better since stopping taking Lantus\" per patient. Successfully transitioned to Tresiba.    \"Seems like the Wegovy keeps me up\" -- really liking it, but frustrated not losing much weight. Making better choices with diet. Lost 5 lbs so far. Discussed at length how the major weight loss effects occur 6 months-1 year after max dose therapy    When inquiring about how much short acting she gives per day: \"Probably 40 units\". Unclear how she is giving this -- has agreed to track this in prior encounters, but per patient life has been busy and she has had issues keeping on top of it.      Symptoms of low blood sugar? Frequent lows and hypoglycemia unawareness. She knows how to treat lows (15-15 rule)  Due to health literacy issues with the G6, we were able to get the patient approved for Dexcom G7 which she has now stable on.  Unfortunately, this limits our compatibility with InPen/Omnipod so we may hold off on this until patient is stable on insulin regimen  Statin: No   ACEi/ARB: No  Urine Albumin:   Lab Results   Component Value Date    UMALCR 94.51 (H) 09/30/2014      Lab Results   Component Value Date    A1C 10.1 06/22/2022    A1C 5.8 07/04/2020    A1C 6.4 06/11/2020    A1C 7.5 01/28/2020 " "   A1C 7.8 01/02/2020    A1C 12.6 09/20/2019     Most Recent Immunizations   Administered Date(s) Administered     TDAP Vaccine (Boostrix) 12/08/2014     Estimated body mass index is 32.84 kg/m  as calculated from the following:    Height as of 6/9/23: 5' 4\" (1.626 m).    Weight as of 6/9/23: 191 lb 4.8 oz (86.8 kg).      BP Readings from Last 1 Encounters:   06/09/23 118/80     Pulse Readings from Last 1 Encounters:   06/09/23 105     Wt Readings from Last 1 Encounters:   06/09/23 191 lb 4.8 oz (86.8 kg)     Ht Readings from Last 1 Encounters:   06/09/23 5' 4\" (1.626 m)     Estimated body mass index is 32.84 kg/m  as calculated from the following:    Height as of 6/9/23: 5' 4\" (1.626 m).    Weight as of 6/9/23: 191 lb 4.8 oz (86.8 kg).    Temp Readings from Last 1 Encounters:   06/09/23 98.6  F (37  C)       ----------------  I spent 15 minutes with this patient today. Kym Jang PA-C was provided the recommendations above via routed note and is the authorizing prescriber for this visit through the pharmacist collaborative practice agreement. A copy of the visit note was provided to the patient's provider(s).    The patient was given a summary of these recommendations.     Telemedicine Visit Details  Type of service:  Telephone visit  Start Time: 11:30AM  End Time: 11:45AM  Originating Location (pt. Location): Home  Distant Location (provider location):  On-site  Provider has received verbal consent for a visit from the patient? Yes    Lauro Barrios, PharmD, [facility]  Endocrinology & Diabetes [facility] Pharmacist  [address] [place] [address] [place] [address]  Direct Voicemail: [phone]  Clinic Hours: Monday-Friday 7:00 AM - 3:30 PM     Medication Therapy Recommendations  Diabetes mellitus type 1 (H)    Current Medication: Semaglutide-Weight Management (WEGOVY) 0.5 MG/0.5ML pen   Rationale: Dose too low - Dosage too low - Effectiveness   Recommendation: Increase Dose   Status: Accepted per CPA             "

## 2023-06-14 NOTE — PROGRESS NOTES
Outcome for 06/14/23 2:54 PM: Data uploaded on Dexcom  Laura Bruce MA  Outcome for 06/19/23 12:22 PM: Data obtained via Dexcom website  Karla Corral MA     Start time: 0734  End time: 0754  Provider location: off site- home  Patient location: off site- home.        HPI:   Arielle is a 39 yo woman here for follow up of type 1 diabetes with a history of severe hypoglycemia and hypoglycemia unawareness.  She reports that she was diagnosed with type 1 dm around age 8 when she became sick while living in [place]. She has been on insulin since diagnosis.   She has also sees Dr. Wasserman in weight management. Arielle reports that she is frustrated with her weight gain and wants to do whatever she can to lose weight.  She recently starting talking with our pharmacist, Lauro Barrios, who helped Arielle start up on her dexcom sensor and also started on ozempic.  She feels like it is not really helping her lose weight yet.  She is on 0.5 mg weekly. She is back on her sensor regularly after having a severe hypoglycemic event last month when she ended up passed out after going to bed while glucose was low.      Arielle has also been dealing with bronchitis and is on prednisone 20 mg.  She was prescribed a 5 day course of 20 mg, but missed a few doses and is taking her last dose today.  She still has a cough, but it is getting better.      She feels like her body does not respond to her insulin and she wonders if she needs a different type.      Her current treatment regimen is as follows:   Tresiba- 14 units daily.   Novolog- does not take it before she eats- 3-5 units, mostly 5.  She takes it after eating.  If she remembers, she takes it before she eats.     In-pen- would like to have extra insulin as a back-up, as she only has one In-pen.     Patient wears a Dexcom sensor with an overall average of 284 mg/dL (CV 38%, TIR 19%, hypo 1%, severe hypo 0%) over the past 2 weeks. Recent glucose is as follows:            Past Medical  History:  Blindness of right eye  Type 1 diabetes  Hypoglycemia unawareness  Vitamin D deficiency  Anxiety  Depression  Overweight     Past Surgical History:   section - , 2015  Enucleation right -     Family History:   Diabetes - paternal side of family       Social History:     Kids now 10, 8 and 2 years old.  One son (8 year old) with ADHD and obese with prediabetes, not type 1.  Other might have autism.      Diabetes Control:   Lab Results   Component Value Date    A1C 5.8 2020    A1C 6.4 2020    A1C 7.5 2020    A1C 7.8 2020    A1C 12.6 2019       Past Medical History:   Diagnosis Date     Blindness of right eye      Diabetes mellitus type 1 (H)     Diagnosed as a child       Past Surgical History:   Procedure Laterality Date      SECTION  13      SECTION N/A 2015    Procedure:  SECTION;  Surgeon: John Hudson MD;  Location: [facility]      SECTION N/A 7/3/2020    Procedure:  SECTION;  Surgeon: Aparna Atkins MD;  Location: [facility]     ENUCLEATION Right 3/20/2015    Procedure: ENUCLEATION;  Surgeon: Edilson Islas MD;  Location: [facility]       Family History   Problem Relation Age of Onset     Family History Negative Mother      Family History Negative Father      Diabetes Other         father's side       Social History     Socioeconomic History     Marital status: Single     Number of children: 1   Occupational History     Employer: UNEMPLOYED   Tobacco Use     Smoking status: Former Smoker     Packs/day: 0.00     Types: Cigarettes     Smokeless tobacco: Never Used     Tobacco comment: smokes 2 cigarettes/day-none for several months   Substance and Sexual Activity     Alcohol use: No     Alcohol/week: 0.0 standard drinks     Drug use: No     Sexual activity: Yes     Partners: Male     Birth control/protection: None   Social History Narrative    ** Merged History Encounter **         Caffeine  intake/servings daily - 0    Calcium intake/servings daily - 3    Exercise 7 times weekly - describe walking    Sunscreen used - No    Seatbelts used - Yes    Guns stored in the home - No    Self Breast Exam - Yes    Pap test up to date -  No    Eye exam up to date -  Yes    Dental exam up to date -  Yes    DEXA scan up to date -  Not Applicable    Flex Sig/Colonoscopy up to date -  Not Applicable    Mammography up to date -  Not Applicable    Immunizations reviewed and up to date - No    Abuse: Current or Past (Physical, Sexual or Emotional) - No    Do you feel safe in your environment - Yes    Do you cope well with stress - Yes    Do you suffer from insomnia - No    Last updated by: KARL PELAEZ  7/18/2012                       Current Outpatient Medications   Medication     albuterol (PROAIR HFA/PROVENTIL HFA/VENTOLIN HFA) 108 (90 Base) MCG/ACT inhaler     alcohol swab prep pads     blood glucose (ACCU-CHEK GUIDE) test strip     blood glucose (NO BRAND SPECIFIED) test strip     blood glucose monitoring (NO BRAND SPECIFIED) meter device kit     blood glucose monitoring (SOFTCLIX) lancets     budesonide-formoterol (SYMBICORT) 160-4.5 MCG/ACT Inhaler     cetirizine (ZYRTEC) 10 MG tablet     Continuous Blood Gluc  (DEXCOM G6 ) TOMASA     Continuous Blood Gluc Sensor (DEXCOM G6 SENSOR) MISC     Continuous Blood Gluc Sensor (DEXCOM G7 SENSOR) MISC     Continuous Blood Gluc Transmit (DEXCOM G6 TRANSMITTER) MISC     Continuous Blood Gluc Transmit (DEXCOM G6 TRANSMITTER) MISC     FLUoxetine (PROZAC) 20 MG capsule     fluticasone (FLONASE) 50 MCG/ACT nasal spray     fluticasone-salmeterol (ADVAIR-HFA) 115-21 MCG/ACT inhaler     gabapentin (NEURONTIN) 100 MG capsule     Glucagon (BAQSIMI TWO PACK) 3 MG/DOSE POWD     Glucagon 3 MG/DOSE POWD     Insulin Degludec (TRESIBA) 100 UNIT/ML SOLN     insulin glargine (LANTUS SOLOSTAR) 100 UNIT/ML pen     insulin pen needle (31G X 5 MM) 31G X 5 MM miscellaneous      KETOSTIX test strip     naproxen (NAPROSYN) 500 MG tablet     NOVOLOG FLEXPEN 100 UNIT/ML soln     predniSONE (DELTASONE) 20 MG tablet     Semaglutide-Weight Management (WEGOVY) 0.5 MG/0.5ML pen     Semaglutide-Weight Management (WEGOVY) 1 MG/0.5ML pen     traZODone (DESYREL) 50 MG tablet     tretinoin (RETIN-A) 0.05 % external cream     No current facility-administered medications for this visit.        No Known Allergies    Physical Exam  LMP  (LMP Unknown)   GENERAL: healthy, alert and no distress  RESP: no audible wheeze, cough, or visible cyanosis.  No visible retractions or increased work of breathing.  Able to speak fully in complete sentences.  PSYCH: mentation appears normal, affect normal/bright, judgement and insight intact, normal speech and appearance well-groomed    RESULTS  Lab Results   Component Value Date    A1C 10.2 (H) 03/21/2023    A1C 10.1 (H) 06/22/2022    A1C 5.8 (H) 07/04/2020    A1C 6.4 (H) 06/11/2020    A1C 7.5 (H) 01/28/2020    A1C 7.8 (H) 01/02/2020    A1C 12.6 (H) 09/20/2019       TSH   Date Value Ref Range Status   05/30/2019 0.877 0.358 - 3.740 UIU/mL Final   05/11/2017 1.19 0.40 - 4.00 mU/L Final   09/30/2014 1.90 0.40 - 4.00 mU/L Final     Comment:     Effective 7/30/2014, the reference range for this assay has changed to reflect   new instrumentation/methodology.         ALT   Date Value Ref Range Status   06/09/2023 16 10 - 35 U/L Final   03/21/2023 19 10 - 35 U/L Final   07/05/2020 24 0 - 50 U/L Final   07/04/2020 27 0 - 50 U/L Final   ]    Recent Labs   Lab Test 09/20/19  1425 03/11/19  0000   CHOL 181 147.6   HDL 71 52.4   * 72   TRIG 45 115.8       Lab Results   Component Value Date     06/09/2023     09/20/2019      Lab Results   Component Value Date    POTASSIUM 4.0 06/09/2023    POTASSIUM 3.8 08/12/2021    POTASSIUM 4.0 01/24/2020     Lab Results   Component Value Date    CHLORIDE 106 06/09/2023    CHLORIDE 100 08/12/2021    CHLORIDE 95 09/20/2019     Lab  Results   Component Value Date    ALEXANDRO 8.7 06/09/2023    ALEXANDRO 8.5 09/20/2019     Lab Results   Component Value Date    CO2 24 06/09/2023    CO2 25 08/12/2021    CO2 22 09/20/2019     Lab Results   Component Value Date    BUN 8.1 06/09/2023    BUN 12 08/12/2021    BUN 13 09/20/2019     Lab Results   Component Value Date    CR 0.76 06/09/2023    CR 0.91 07/05/2020       GFR Estimate   Date Value Ref Range Status   06/09/2023 >90 >60 mL/min/1.73m2 Final     Comment:     eGFR calculated using 2021 CKD-EPI equation.   05/20/2023 >90 >60 mL/min/1.73m2 Final     Comment:     eGFR calculated using 2021 CKD-EPI equation.   03/21/2023 77 >60 mL/min/1.73m2 Final     Comment:     eGFR calculated using 2021 CKD-EPI equation.   07/05/2020 82 >60 mL/min/[1.73_m2] Final     Comment:     Non  GFR Calc  Starting 12/18/2018, serum creatinine based estimated GFR (eGFR) will be   calculated using the Chronic Kidney Disease Epidemiology Collaboration   (CKD-EPI) equation.     07/04/2020 85 >60 mL/min/[1.73_m2] Final     Comment:     Non  GFR Calc  Starting 12/18/2018, serum creatinine based estimated GFR (eGFR) will be   calculated using the Chronic Kidney Disease Epidemiology Collaboration   (CKD-EPI) equation.     07/03/2020 75 >60 mL/min/[1.73_m2] Final     Comment:     Non  GFR Calc  Starting 12/18/2018, serum creatinine based estimated GFR (eGFR) will be   calculated using the Chronic Kidney Disease Epidemiology Collaboration   (CKD-EPI) equation.       GFR Estimate If Black   Date Value Ref Range Status   07/05/2020 >90 >60 mL/min/[1.73_m2] Final     Comment:      GFR Calc  Starting 12/18/2018, serum creatinine based estimated GFR (eGFR) will be   calculated using the Chronic Kidney Disease Epidemiology Collaboration   (CKD-EPI) equation.     07/04/2020 >90 >60 mL/min/[1.73_m2] Final     Comment:      GFR Calc  Starting 12/18/2018, serum creatinine based  estimated GFR (eGFR) will be   calculated using the Chronic Kidney Disease Epidemiology Collaboration   (CKD-EPI) equation.     07/03/2020 87 >60 mL/min/[1.73_m2] Final     Comment:      GFR Calc  Starting 12/18/2018, serum creatinine based estimated GFR (eGFR) will be   calculated using the Chronic Kidney Disease Epidemiology Collaboration   (CKD-EPI) equation.         Lab Results   Component Value Date    MICROL 172 09/30/2014     No results found for: MICROALBUMIN  Lab Results   Component Value Date    CPEPT <0.1 (L) 09/30/2014       No results found for: B12]    Most recent eye exam date: : Not Found       Assessment/Plan:     1.  Type 1 diabetes-  Glucose is running high as she is currently on prednisone.  Doing better on Tresiba and not missing doses.  We spent most of our time discussing the importance of taking Novolog before her meals, rather than correcting after.  I reminded her never to take more than 5 units.  Will set up the In-pen with Lauro Barrios.  This will be very helpful.  We made the following plan today (instructions given to patient):    This month, please focus on taking Novolog 5 units before each meal.  Remember, if you do not take your insulin before you eat, your glucose will go up into the 300's.      Low blood sugars are likely causing weight gain.  We can lower your risk of having lows by taking Novolog BEFORE you eat.     Increase Ozempic to 1 mg weekly.     Schedule appointment with primary care to discuss your cough.      Emergency issues: Please contact the clinic as soon as you recognize a problem.  Here are some concerns you should contact us about.  -Vomiting: more than twice.  Please check ketones.  If positive, go to ER. Monitor glucose hourly.   -High glucose (over 300 mg/dL twice in a row): Please check ketones.  If ketones are negative, take an insulin correction and recheck glucose in 1 hour.  If glucose is not coming down, please call the clinic. If  "ketones are moderate or large, drink lots of water, take an insulin correction, and recheck ketones in 1 hour.  If ketones are still high (or you are vomiting), go to the ER.  -Hypoglycemia (low glucose):   If glucose is less than 70 mg/dL, treat with 15g carb (4 glucose tablets), recheck glucose in 10 minutes.  If low again, repeat.   If glucose is less than 54 mg/dL, treat with 30g carb, recheck glucose in 10 minutes.  If low again, repeat.  Keep glucagon in your home in case of severe hypoglycemia and train someone how to use this.    Emergency kit (please ensure you always have these with you):   Glucose tablets  Glucagon  Insulin  Syringes (if on a pump)  Extra infusion set (if on a pump)  Ketone strips    Contact information:   If you have concerns, please send me a [employer] message or call the clinic at [phone].  For more urgent concerns, please call [phone] after hours/weekends and ask to speak with the endocrinologist on call.      Please let me know if you are having low blood sugars less than 70 or over 350 mg/dL.  Do not wait until your next appointment if this is happening.         2.  Risk factors-     Retinopathy:  No known history.  Scheduled for eye exam 3/2, but missed appt.  Encouraged her to reschedule.  Has not had exam in years.    Nephropathy:  BP historically well controlled. No microalbuminuria. Needs to recheck.  Creatinine stable. Overdue for labs.  Asked her to schedule.  Neuropathy: No.    Feet: OK, no ulcers.   Lipids:  LDL at target.   Thyroid screening: will check TSH. Feeling very tired.   Celiac screening: Does not appear to have been screened.  Will check antibodies.   Contraception: did not address today.   Depression: Met with health psychology and it was not a good fit.  Not interested in rescheduling.  \"I'm a good mom and take care of my kids.\" Needs to schedule annual physical with PCP.    She is overdue for labs and would like to reschedule.      3.  F/U in 2 mos " with me, sooner with concerns/hypoglycemia.     32 minutes spent on the date of the encounter doing chart review, review of test results, patient visit and documentation, counseling/coordination of care, and discussion of follow up plan for worsening hyper and hypoglycemia.  The patient understood and is satisfied with today's visit.     Kym Jang PA-C, MPAS   [facility]  Department of Medicine  Division of Endocrinology and Diabetes

## 2023-06-19 DIAGNOSIS — E66.01 MORBID OBESITY (H): ICD-10-CM

## 2023-06-20 ENCOUNTER — VIRTUAL VISIT (OUTPATIENT)
Dept: ENDOCRINOLOGY | Facility: CLINIC | Age: 38
End: 2023-06-20
Payer: COMMERCIAL

## 2023-06-20 DIAGNOSIS — E10.649 HYPOGLYCEMIA UNAWARENESS IN TYPE 1 DIABETES MELLITUS (H): Primary | ICD-10-CM

## 2023-06-20 DIAGNOSIS — E66.01 MORBID OBESITY (H): ICD-10-CM

## 2023-06-20 DIAGNOSIS — E10.65 POORLY CONTROLLED TYPE 1 DIABETES MELLITUS (H): ICD-10-CM

## 2023-06-20 PROCEDURE — 99443 PR PHYSICIAN TELEPHONE EVALUATION 21-30 MIN: CPT | Mod: 95 | Performed by: PHYSICIAN ASSISTANT

## 2023-06-20 RX ORDER — SEMAGLUTIDE 1 MG/.5ML
INJECTION, SOLUTION SUBCUTANEOUS
Qty: 2 ML | Refills: 11 | Status: SHIPPED | OUTPATIENT
Start: 2023-06-20 | End: 2023-06-27

## 2023-06-20 RX ORDER — INSULIN ASPART 100 [IU]/ML
INJECTION, SOLUTION INTRAVENOUS; SUBCUTANEOUS
Qty: 30 ML | Refills: 3 | Status: SHIPPED | OUTPATIENT
Start: 2023-06-20 | End: 2023-11-14

## 2023-06-20 NOTE — TELEPHONE ENCOUNTER
Pharmacy comment: Product Backordered/Unavailable:WEGOVY TITRATION DOSES ARE ON BACKORDER, PLEASE SEND ALTERNATIVE    CLINIC APPOINTMENT TODAY 6-

## 2023-06-20 NOTE — NURSING NOTE
Is the patient currently in the state of [place]? YES    Visit mode:TELEPHONE    If the visit is dropped, the patient can be reconnected by: TELEPHONE VISIT: Phone number: [phone]    Will anyone else be joining the visit? NO      How would you like to obtain your AVS? MyChart    Are changes needed to the allergy or medication list? NO    Reason for visit: RECHECK

## 2023-06-20 NOTE — PATIENT INSTRUCTIONS
This month, please focus on taking Novolog 5 units before each meal.  Remember, if you do not take your insulin before you eat, your glucose will go up into the 300's.      Low blood sugars are likely causing weight gain.  We can lower your risk of having lows by taking Novolog BEFORE you eat.     Increase Ozempic to 1 mg weekly.     Schedule appointment with primary care to discuss your cough.      Emergency issues: Please contact the clinic as soon as you recognize a problem.  Here are some concerns you should contact us about.  -Vomiting: more than twice.  Please check ketones.  If positive, go to ER. Monitor glucose hourly.   -High glucose (over 300 mg/dL twice in a row): Please check ketones.  If ketones are negative, take an insulin correction and recheck glucose in 1 hour.  If glucose is not coming down, please call the clinic. If ketones are moderate or large, drink lots of water, take an insulin correction, and recheck ketones in 1 hour.  If ketones are still high (or you are vomiting), go to the ER.  -Hypoglycemia (low glucose):   If glucose is less than 70 mg/dL, treat with 15g carb (4 glucose tablets), recheck glucose in 10 minutes.  If low again, repeat.   If glucose is less than 54 mg/dL, treat with 30g carb, recheck glucose in 10 minutes.  If low again, repeat.  Keep glucagon in your home in case of severe hypoglycemia and train someone how to use this.    Emergency kit (please ensure you always have these with you):   Glucose tablets  Glucagon  Insulin  Syringes (if on a pump)  Extra infusion set (if on a pump)  Ketone strips    Contact information:   If you have concerns, please send me a [employer] message or call the clinic at [phone].  For more urgent concerns, please call [phone] after hours/weekends and ask to speak with the endocrinologist on call.      Please let me know if you are having low blood sugars less than 70 or over 350 mg/dL.  Do not wait until your next appointment if this  is happening.

## 2023-06-21 NOTE — TELEPHONE ENCOUNTER
Pharmacy comment: Product Backordered/Unavailable:BACK ORDERED UNTIL AT LEAST SEPTEMBER.  ONLY 1.7 MG AND 2.4 MG AVAILABLE.

## 2023-06-23 RX ORDER — PHENTERMINE HYDROCHLORIDE 37.5 MG/1
TABLET ORAL
Refills: 0 | OUTPATIENT
Start: 2023-06-23

## 2023-06-27 ENCOUNTER — VIRTUAL VISIT (OUTPATIENT)
Dept: FAMILY MEDICINE | Facility: CLINIC | Age: 38
End: 2023-06-27
Payer: COMMERCIAL

## 2023-06-27 DIAGNOSIS — J20.9 ACUTE BRONCHITIS WITH SYMPTOMS > 10 DAYS: Primary | ICD-10-CM

## 2023-06-27 DIAGNOSIS — E66.01 MORBID OBESITY (H): ICD-10-CM

## 2023-06-27 PROCEDURE — 99213 OFFICE O/P EST LOW 20 MIN: CPT | Mod: VID | Performed by: FAMILY MEDICINE

## 2023-06-27 RX ORDER — BENZONATATE 200 MG/1
200 CAPSULE ORAL 3 TIMES DAILY PRN
Qty: 30 CAPSULE | Refills: 0 | Status: SHIPPED | OUTPATIENT
Start: 2023-06-27 | End: 2023-11-14

## 2023-06-27 RX ORDER — SEMAGLUTIDE 1 MG/.5ML
1 INJECTION, SOLUTION SUBCUTANEOUS WEEKLY
Qty: 2 ML | Refills: 11 | Status: SHIPPED | OUTPATIENT
Start: 2023-06-27 | End: 2023-08-16

## 2023-06-27 RX ORDER — AZITHROMYCIN 250 MG/1
TABLET, FILM COATED ORAL
Qty: 6 TABLET | Refills: 0 | Status: SHIPPED | OUTPATIENT
Start: 2023-06-27 | End: 2023-07-02

## 2023-06-27 ASSESSMENT — ENCOUNTER SYMPTOMS: COUGH: 1

## 2023-06-27 NOTE — TELEPHONE ENCOUNTER
Patient filled Wegovy at another pharmacy -- this is resolved    Lauro Barrios, PharmD  Endocrine & Diabetes [facility] Pharmacist  [address] [place] [id number]  Direct Voicemail: [phone]

## 2023-06-27 NOTE — PROGRESS NOTES
"Arielle is a 38 year old who is being evaluated via a billable video visit.      How would you like to obtain your AVS? MyChart  If the video visit is dropped, the invitation should be resent by: Text to cell phone: [phone]  Will anyone else be joining your video visit? No    Assessment & Plan   Problem List Items Addressed This Visit    None  Visit Diagnoses     Acute bronchitis with symptoms > 10 days    -  Primary: This patient was seen via video.  She has been struggling with a persistent cough for the past 4-5 weeks.  I reviewed the emergency department visit note on 6/9.  At that time she was prescribed prednisone and was advised to use over-the-counter symptomatic measures.  She continues to have fits of coughing.  She wonders if there is something to \"clear her lungs.\"  Based on duration of symptoms, I think it is reasonable to think of this is a bacterial infection.  No drug allergies.  Azithromycin for \"walking pneumonia.\"  Prescribed.  Tessalon Perles for symptoms.  If not improving she will need a face-to-face visit for lung exam and chest x-ray.    Relevant Medications    benzonatate (TESSALON) 200 MG capsule    azithromycin (ZITHROMAX) 250 MG tablet        Lyle Payne MD  [facility]    Subjective   Arielle is a 38 year old, presenting for the following health issues:  Cough        6/27/2023     1:10 PM   Additional Questions   Roomed by sac   Accompanied by self     Cough:   - follow-up.  She was seen in the ED on 6/9.  \"I was told that I have bronchitis.\"     - she was given prednisone and allergy medication which helped.   - using inhaler which she says has helped a little.  \"I have been sick.\"   - no fever.  No chills.     - Fits of coughing.  \"Like an asthma attack.\" \"like my throat itches.\"  At night it gets worse.    - she has not heard wheezing.     Cough       Review of Systems   Respiratory: Positive for cough.           Objective           Vitals:  No vitals " were obtained today due to virtual visit.    Physical Exam  Nursing note reviewed.   Constitutional:       General: She is not in acute distress.     Appearance: Normal appearance. She is not ill-appearing.   HENT:      Head: Normocephalic and atraumatic.   Eyes:      Extraocular Movements: Extraocular movements intact.      Conjunctiva/sclera: Conjunctivae normal.   Pulmonary:      Effort: Pulmonary effort is normal.   Neurological:      Mental Status: She is alert and oriented to person, place, and time.   Psychiatric:         Attention and Perception: Attention normal.         Mood and Affect: Mood normal.         Speech: Speech normal.         Thought Content: Thought content normal.             Video-Visit Details    Type of service:  Video Visit   Video Start Time: 1:18 PM  Video End Time:1:33 PM    Originating Location (pt. Location): Home  Distant Location (provider location):  On-site  Platform used for Video Visit: Dawn

## 2023-07-05 ENCOUNTER — TELEPHONE (OUTPATIENT)
Dept: ENDOCRINOLOGY | Facility: CLINIC | Age: 38
End: 2023-07-05
Payer: COMMERCIAL

## 2023-07-05 NOTE — TELEPHONE ENCOUNTER
Patient call:     Appointment type: follow up  Provider: Suhail  Return date: 8/20/2023  Speciality phone number: [phone]  Additional appointment(s) needed: No  Additional notes:  No MyC JESSM  Timo Cheung on 7/5/2023 at 11:05 AM

## 2023-07-12 ENCOUNTER — TELEPHONE (OUTPATIENT)
Dept: PHARMACY | Facility: CLINIC | Age: 38
End: 2023-07-12
Payer: COMMERCIAL

## 2023-07-12 DIAGNOSIS — R11.2 NAUSEA AND VOMITING, UNSPECIFIED VOMITING TYPE: ICD-10-CM

## 2023-07-12 DIAGNOSIS — E66.01 MORBID OBESITY (H): Primary | ICD-10-CM

## 2023-07-12 NOTE — TELEPHONE ENCOUNTER
Patient called in noting that she felt sick on the 1 mg dose of Wegovy (had been out for 2 weeks which I was not aware of). Nausea has since resolved, but she just wanted to let me know. Encouraged patient that she had this side effect because she had gone > 1 week without the medication, which typically we would retitrate at that point. She is okay with continuing for now given side effects have resolved.    Lauro Barrios, PharmD  Endocrine & Diabetes [facility] Pharmacist  [address] [place] [address]  Direct Voicemail: [phone]

## 2023-08-16 RX ORDER — ONDANSETRON 4 MG/1
4 TABLET, ORALLY DISINTEGRATING ORAL EVERY 8 HOURS PRN
Qty: 12 TABLET | Refills: 3 | Status: SHIPPED | OUTPATIENT
Start: 2023-08-16 | End: 2023-09-08

## 2023-08-18 ENCOUNTER — HOSPITAL ENCOUNTER (EMERGENCY)
Facility: CLINIC | Age: 38
Discharge: HOME | End: 2023-08-18
Attending: EMERGENCY MEDICINE | Admitting: EMERGENCY MEDICINE
Payer: COMMERCIAL

## 2023-08-18 VITALS
SYSTOLIC BLOOD PRESSURE: 136 MMHG | BODY MASS INDEX: 32.05 KG/M2 | TEMPERATURE: 98.1 F | RESPIRATION RATE: 16 BRPM | DIASTOLIC BLOOD PRESSURE: 102 MMHG | OXYGEN SATURATION: 99 % | HEART RATE: 94 BPM | WEIGHT: 186.7 LBS

## 2023-08-18 DIAGNOSIS — R11.2 NAUSEA AND VOMITING, UNSPECIFIED VOMITING TYPE: ICD-10-CM

## 2023-08-18 LAB
ALBUMIN SERPL BCG-MCNC: 2.8 G/DL (ref 3.5–5.2)
ALP SERPL-CCNC: 77 U/L (ref 35–104)
ALT SERPL W P-5'-P-CCNC: 12 U/L (ref 0–50)
ANION GAP SERPL CALCULATED.3IONS-SCNC: 9 MMOL/L (ref 7–15)
AST SERPL W P-5'-P-CCNC: 25 U/L (ref 0–45)
BASOPHILS # BLD AUTO: 0 10E3/UL (ref 0–0.2)
BASOPHILS NFR BLD AUTO: 1 %
BILIRUB SERPL-MCNC: <0.2 MG/DL
BUN SERPL-MCNC: 15.4 MG/DL (ref 6–20)
CALCIUM SERPL-MCNC: 8.7 MG/DL (ref 8.6–10)
CHLORIDE SERPL-SCNC: 106 MMOL/L (ref 98–107)
CREAT SERPL-MCNC: 1.12 MG/DL (ref 0.51–0.95)
DEPRECATED HCO3 PLAS-SCNC: 24 MMOL/L (ref 22–29)
EOSINOPHIL # BLD AUTO: 0.1 10E3/UL (ref 0–0.7)
EOSINOPHIL NFR BLD AUTO: 1 %
ERYTHROCYTE [DISTWIDTH] IN BLOOD BY AUTOMATED COUNT: 13.5 % (ref 10–15)
GFR SERPL CREATININE-BSD FRML MDRD: 65 ML/MIN/1.73M2
GLUCOSE BLDC GLUCOMTR-MCNC: 77 MG/DL (ref 70–99)
GLUCOSE SERPL-MCNC: 46 MG/DL (ref 70–99)
HCT VFR BLD AUTO: 36.9 % (ref 35–47)
HGB BLD-MCNC: 12.4 G/DL (ref 11.7–15.7)
IMM GRANULOCYTES # BLD: 0 10E3/UL
IMM GRANULOCYTES NFR BLD: 0 %
LIPASE SERPL-CCNC: 27 U/L (ref 13–60)
LYMPHOCYTES # BLD AUTO: 1.9 10E3/UL (ref 0.8–5.3)
LYMPHOCYTES NFR BLD AUTO: 32 %
MAGNESIUM SERPL-MCNC: 1.9 MG/DL (ref 1.7–2.3)
MCH RBC QN AUTO: 29.5 PG (ref 26.5–33)
MCHC RBC AUTO-ENTMCNC: 33.6 G/DL (ref 31.5–36.5)
MCV RBC AUTO: 88 FL (ref 78–100)
MONOCYTES # BLD AUTO: 0.7 10E3/UL (ref 0–1.3)
MONOCYTES NFR BLD AUTO: 11 %
NEUTROPHILS # BLD AUTO: 3.2 10E3/UL (ref 1.6–8.3)
NEUTROPHILS NFR BLD AUTO: 55 %
NRBC # BLD AUTO: 0 10E3/UL
NRBC BLD AUTO-RTO: 0 /100
PLATELET # BLD AUTO: 191 10E3/UL (ref 150–450)
POTASSIUM SERPL-SCNC: 4.6 MMOL/L (ref 3.4–5.3)
PROT SERPL-MCNC: 6.1 G/DL (ref 6.4–8.3)
RBC # BLD AUTO: 4.21 10E6/UL (ref 3.8–5.2)
SODIUM SERPL-SCNC: 139 MMOL/L (ref 136–145)
WBC # BLD AUTO: 5.9 10E3/UL (ref 4–11)

## 2023-08-18 PROCEDURE — 36415 COLL VENOUS BLD VENIPUNCTURE: CPT | Performed by: EMERGENCY MEDICINE

## 2023-08-18 PROCEDURE — 82962 GLUCOSE BLOOD TEST: CPT

## 2023-08-18 PROCEDURE — 83690 ASSAY OF LIPASE: CPT | Performed by: EMERGENCY MEDICINE

## 2023-08-18 PROCEDURE — 258N000003 HC RX IP 258 OP 636: Performed by: EMERGENCY MEDICINE

## 2023-08-18 PROCEDURE — 80053 COMPREHEN METABOLIC PANEL: CPT | Performed by: EMERGENCY MEDICINE

## 2023-08-18 PROCEDURE — 93005 ELECTROCARDIOGRAM TRACING: CPT | Mod: RTG

## 2023-08-18 PROCEDURE — 99283 EMERGENCY DEPT VISIT LOW MDM: CPT | Mod: 25 | Performed by: EMERGENCY MEDICINE

## 2023-08-18 PROCEDURE — 83735 ASSAY OF MAGNESIUM: CPT | Performed by: EMERGENCY MEDICINE

## 2023-08-18 PROCEDURE — 96360 HYDRATION IV INFUSION INIT: CPT | Performed by: EMERGENCY MEDICINE

## 2023-08-18 PROCEDURE — 99283 EMERGENCY DEPT VISIT LOW MDM: CPT | Performed by: EMERGENCY MEDICINE

## 2023-08-18 PROCEDURE — 96361 HYDRATE IV INFUSION ADD-ON: CPT | Performed by: EMERGENCY MEDICINE

## 2023-08-18 PROCEDURE — 85025 COMPLETE CBC W/AUTO DIFF WBC: CPT | Performed by: EMERGENCY MEDICINE

## 2023-08-18 RX ADMIN — SODIUM CHLORIDE 1000 ML: 9 INJECTION, SOLUTION INTRAVENOUS at 19:41

## 2023-08-18 RX ADMIN — SODIUM CHLORIDE 1000 ML: 9 INJECTION, SOLUTION INTRAVENOUS at 21:12

## 2023-08-18 ASSESSMENT — ACTIVITIES OF DAILY LIVING (ADL)
ADLS_ACUITY_SCORE: 35

## 2023-08-18 NOTE — ED PROVIDER NOTES
ED Provider Note  [facility]      History     Chief Complaint   Patient presents with    Vomiting     Pt reports she was on weight loss medication, stopped it two weeks ago and started taking it again is when she started throwing up non stop. Feeling weak.      HPI  Arielle Montenegro is a 38 year old female PMH of DM1, hyperglycemia, episodic mood disorder, depression who presents to the ER for evaluation of vomiting.    Patient states she has been taking Wegovy, started 3 months ago.  She missed 2 weeks of her doses because she was on vacation.  She states when she took it this Tuesday she started vomiting, leaving her feeling dehydrated and weak.  She states she could not hold anything down.  Her stomach was empty, was throwing up bile.  No reported vomiting today, but vomited all day yesterday.  She states she would sleep, wake up, and throw up in a cycle, has no count on how many episodes of emesis she had yesterday.  Patient was given Zofran by her doctor, she took it more than once yesterday has not taken it today.    Past Medical History  Past Medical History:   Diagnosis Date    Blindness of right eye     Diabetes mellitus type 1 (H)     Diagnosed as a child     Past Surgical History:   Procedure Laterality Date     SECTION  13     SECTION N/A 2015    Procedure:  SECTION;  Surgeon: John Hudson MD;  Location: [facility] L+D     SECTION N/A 7/3/2020    Procedure:  SECTION;  Surgeon: Aparna Atkins MD;  Location: [facility] L+[facility]    ENUCLEATION Right 3/20/2015    Procedure: ENUCLEATION;  Surgeon: Edilson Islas MD;  Location: [facility]     albuterol (PROAIR HFA/PROVENTIL HFA/VENTOLIN HFA) 108 (90 Base) MCG/ACT inhaler  alcohol swab prep pads  benzonatate (TESSALON) 200 MG capsule  blood glucose (ACCU-CHEK GUIDE) test strip  blood glucose (NO BRAND SPECIFIED) test strip  blood glucose monitoring (NO BRAND SPECIFIED) meter device  kit  blood glucose monitoring (SOFTCLIX) lancets  budesonide-formoterol (SYMBICORT) 160-4.5 MCG/ACT Inhaler  cetirizine (ZYRTEC) 10 MG tablet  Continuous Blood Gluc  (DEXCOM G6 ) TOMASA  Continuous Blood Gluc Sensor (DEXCOM G6 SENSOR) MISC  Continuous Blood Gluc Sensor (DEXCOM G7 SENSOR) MISC  Continuous Blood Gluc Transmit (DEXCOM G6 TRANSMITTER) MISC  Continuous Blood Gluc Transmit (DEXCOM G6 TRANSMITTER) MISC  FLUoxetine (PROZAC) 20 MG capsule  fluticasone-salmeterol (ADVAIR-HFA) 115-21 MCG/ACT inhaler  Glucagon (BAQSIMI TWO PACK) 3 MG/DOSE POWD  Glucagon 3 MG/DOSE POWD  Insulin Degludec (TRESIBA) 100 UNIT/ML SOLN  insulin glargine (LANTUS SOLOSTAR) 100 UNIT/ML pen  insulin pen needle (31G X 5 MM) 31G X 5 MM miscellaneous  KETOSTIX test strip  naproxen (NAPROSYN) 500 MG tablet  NOVOLOG FLEXPEN 100 UNIT/ML soln  NOVOLOG PENFILL 100 UNIT/ML soln  ondansetron (ZOFRAN ODT) 4 MG ODT tab  Semaglutide-Weight Management (WEGOVY) 1 MG/0.5ML pen      No Known Allergies  Family History  Family History   Problem Relation Age of Onset    Family History Negative Mother     Family History Negative Father     Diabetes Other         father's side     Social History   Social History     Tobacco Use    Smoking status: Former     Packs/day: 0.00     Types: Cigarettes    Smokeless tobacco: Never    Tobacco comments:     smokes 2 cigarettes/day-none for several months   Vaping Use    Vaping Use: Never used   Substance Use Topics    Alcohol use: No     Alcohol/week: 0.0 standard drinks of alcohol    Drug use: No         A medically appropriate review of systems was performed with pertinent positives and negatives noted in the HPI, and all other systems negative.    Physical Exam   BP: 97/73  Pulse: 94  Temp: 98.7  F (37.1  C)  Resp: 16  Weight: 84.7 kg (186 lb 11.2 oz)  SpO2: 97 %  Physical Exam  Vitals and nursing note reviewed.   Constitutional:       General: She is not in acute distress.     Appearance: Normal  appearance. She is well-developed.   HENT:      Head: Normocephalic and atraumatic.   Eyes:      General: No scleral icterus.     Conjunctiva/sclera: Conjunctivae normal.   Cardiovascular:      Rate and Rhythm: Normal rate.   Pulmonary:      Effort: Pulmonary effort is normal. No respiratory distress.   Abdominal:      General: Abdomen is flat.   Musculoskeletal:      Cervical back: Normal range of motion and neck supple.   Skin:     General: Skin is warm and dry.      Findings: No rash.   Neurological:      Mental Status: She is alert and oriented to person, place, and time.             ED Course, Procedures, & Data      Procedures                    No results found for any visits on 08/18/23.  Medications - No data to display  Labs Ordered and Resulted from Time of ED Arrival to Time of ED Departure - No data to display  No orders to display          Critical care was not performed.     Medical Decision Making  The patient's presentation was of moderate complexity (an acute illness with systemic symptoms).    The patient's evaluation involved:  ordering and/or review of 3+ test(s) in this encounter (see separate area of note for details)    The patient's management necessitated only low risk treatment.    Assessment & Plan      38 year old female PMH of DM1, hyperglycemia, episodic mood disorder, depression who presents to the ER for evaluation of vomiting. VSS and afebrile, labs sent and remarkable for hypoglycemia with serum glucose at 46, mild Cr elevation to 1.12, but otherwise normal CBC, electrolytes, lipase. She was given 2 L NSS IVF bolus and upon repeat assessment her symptoms had completely resolved and she was tolerating po without difficulty.     I have reviewed the nursing notes. I have reviewed the findings, diagnosis, plan and need for follow up with the patient.    New Prescriptions    No medications on file       Final diagnoses:   Nausea and vomiting, unspecified vomiting type     I, Leatha GARBER   Jayshree, am serving as a trained medical scribe to document services personally performed by Katheryn Joshi MD, based on the provider's statements to me.     I, Katheryn Joshi MD, was physically present and have reviewed and verified the accuracy of this note documented by Leatha Martell.    CHEN JOSHI MD  [facility] EMERGENCY DEPARTMENT  8/18/2023     Katheryn Joshi MD  08/20/23 1913

## 2023-08-19 LAB
ATRIAL RATE - MUSE: 101 BPM
DIASTOLIC BLOOD PRESSURE - MUSE: NORMAL MMHG
INTERPRETATION ECG - MUSE: NORMAL
P AXIS - MUSE: 52 DEGREES
PR INTERVAL - MUSE: 146 MS
QRS DURATION - MUSE: 66 MS
QT - MUSE: 326 MS
QTC - MUSE: 422 MS
R AXIS - MUSE: 27 DEGREES
SYSTOLIC BLOOD PRESSURE - MUSE: NORMAL MMHG
T AXIS - MUSE: 19 DEGREES
VENTRICULAR RATE- MUSE: 101 BPM

## 2023-08-19 NOTE — ED NOTES
Katey CHING to attempt PIV access as writer unsuccessful to get PIV in two attempts. Pt understanding of this.

## 2023-08-19 NOTE — ED NOTES
Pt discharged home with AVS. Instructed to follow up with Family MD. Pt IV removed by writer. Pt left ambulatory with good gait, called an Uber to take her home.

## 2023-08-19 NOTE — ED NOTES
Pt on Wegovy, but had missed two weeks worth. Started taking again and feeling nauseated and dizzy. Pt also Type I Diabetic. Alert and oriented x4, independent and ambulatory at baseline.

## 2023-08-19 NOTE — ED NOTES
Pt given anastasiia crackers and juice by Katey CHING. Writer went in pt room to check on pt and pt sitting up in bed, talking on Facetime to kids while eating. IV bolus running

## 2023-08-19 NOTE — ED NOTES
Pt showed writer on her phone after eating that her CBGM is now up 76 mg/dL as per her worn sensor. 200 ml left in NS bolus. Denies wanting any more snacks. Chatting, bright affect. Skin colour pink warm and dry and even resps.

## 2023-09-08 RX ORDER — ONDANSETRON 4 MG/1
4 TABLET, ORALLY DISINTEGRATING ORAL EVERY 8 HOURS PRN
Qty: 12 TABLET | Refills: 3 | Status: SHIPPED | OUTPATIENT
Start: 2023-09-08 | End: 2023-10-09

## 2023-09-22 ENCOUNTER — TELEPHONE (OUTPATIENT)
Dept: PHARMACY | Facility: CLINIC | Age: 38
End: 2023-09-22
Payer: COMMERCIAL

## 2023-09-22 DIAGNOSIS — E10.649 TYPE 1 DIABETES MELLITUS WITH HYPOGLYCEMIA AND WITHOUT COMA (H): ICD-10-CM

## 2023-09-22 RX ORDER — PROCHLORPERAZINE 25 MG/1
SUPPOSITORY RECTAL
Qty: 9 EACH | Refills: 11 | Status: SHIPPED | OUTPATIENT
Start: 2023-09-22 | End: 2023-11-14

## 2023-09-22 RX ORDER — PROCHLORPERAZINE 25 MG/1
SUPPOSITORY RECTAL
Qty: 1 EACH | Refills: 3 | Status: SHIPPED | OUTPATIENT
Start: 2023-09-22 | End: 2023-11-14

## 2023-09-22 NOTE — TELEPHONE ENCOUNTER
Patient called requesting prescription for Omnipod. Will message Kym for thoughts.    Of note, patient stopped Wegovy due to concerns for nausea as she was late for her dose. She will continue to hold off for now. Did lose some weight (180 lb).    Lauro Barrios, PharmD  Endocrine & Diabetes [facility] Pharmacist  [address] [place] [id number]  Direct Voicemail: [phone]

## 2023-10-02 NOTE — TELEPHONE ENCOUNTER
PA Initiation    Medication:  Wegovy  Insurance Company: [employer] - Phone [phone] Fax [phone]  Pharmacy Filling the Rx: [id number] - [place], [place] - [address]  Filling Pharmacy Phone:    Filling Pharmacy Fax:    Start Date: 10/2/2023    [id number]

## 2023-10-03 NOTE — TELEPHONE ENCOUNTER
Prior Authorization Approval    Medication:  Wegovy  Authorization Effective Date: 9/2/2023  Authorization Expiration Date: 10/1/2024  Approved Dose/Quantity: 2ml per 28 days  Reference #: [id number]   Insurance Company: [employer] - Phone [phone] Fax [phone]  Expected CoPay: $    CoPay Card Available:      Financial Assistance Needed:   Which Pharmacy is filling the prescription: [id number] - [place], [place] - [address] [place] [address]  Pharmacy Notified: No  Patient Notified: No

## 2023-10-09 ENCOUNTER — VIRTUAL VISIT (OUTPATIENT)
Dept: PHARMACY | Facility: CLINIC | Age: 38
End: 2023-10-09
Payer: COMMERCIAL

## 2023-10-09 DIAGNOSIS — E10.649 TYPE 1 DIABETES MELLITUS WITH HYPOGLYCEMIA AND WITHOUT COMA (H): ICD-10-CM

## 2023-10-09 DIAGNOSIS — E10.65 TYPE 1 DIABETES MELLITUS WITH HYPERGLYCEMIA (H): Primary | ICD-10-CM

## 2023-10-09 PROCEDURE — 99606 MTMS BY PHARM EST 15 MIN: CPT

## 2023-10-09 PROCEDURE — 99607 MTMS BY PHARM ADDL 15 MIN: CPT

## 2023-10-09 RX ORDER — INSULIN DEGLUDEC 100 U/ML
14-18 INJECTION, SOLUTION SUBCUTANEOUS DAILY
Qty: 15 ML | Refills: 11 | Status: SHIPPED | OUTPATIENT
Start: 2023-10-09 | End: 2023-11-14

## 2023-10-09 RX ORDER — INSULIN DEGLUDEC 100 U/ML
14-18 INJECTION, SOLUTION SUBCUTANEOUS DAILY
Qty: 15 ML | Refills: 3 | OUTPATIENT
Start: 2023-10-09 | End: 2023-10-09

## 2023-10-09 NOTE — Clinical Note
"JEAN CLAUDEI only -- you meet with patient 10/12.  I am hoping she will be on G6 by that time and clarity will be working again.  If not, I scheduled an in person with Yarelis shortly after to troubleshoot (and do pre pump assessment). Of note, she has this state worker \"on her case\" and really wants her on a pump -- she will share more about this I'm sure. Lauro"
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
## 2023-10-18 ENCOUNTER — TELEPHONE (OUTPATIENT)
Dept: PHARMACY | Facility: CLINIC | Age: 38
End: 2023-10-18
Payer: COMMERCIAL

## 2023-10-18 DIAGNOSIS — J98.01 COLD INDUCED BRONCHOSPASM: ICD-10-CM

## 2023-10-18 RX ORDER — BUDESONIDE AND FORMOTEROL FUMARATE DIHYDRATE 160; 4.5 UG/1; UG/1
2 AEROSOL RESPIRATORY (INHALATION) 2 TIMES DAILY
Qty: 10.2 G | Refills: 4 | Status: SHIPPED | OUTPATIENT
Start: 2023-10-18 | End: 2023-11-14

## 2023-10-18 NOTE — TELEPHONE ENCOUNTER
Call patient to remind her of appointment with diabetes education tomorrow to look at insulin pumps.  She needs to take her son into the clinic at that time.  Rescheduled to early November.    Lauro Barrios, PharmD, [facility]  Endocrine & Diabetes [facility] Pharmacist  [address] [place] [phone]

## 2023-11-05 ENCOUNTER — HOSPITAL ENCOUNTER (EMERGENCY)
Facility: CLINIC | Age: 38
Discharge: HOME | End: 2023-11-05
Attending: FAMILY MEDICINE | Admitting: FAMILY MEDICINE
Payer: COMMERCIAL

## 2023-11-05 VITALS
DIASTOLIC BLOOD PRESSURE: 79 MMHG | SYSTOLIC BLOOD PRESSURE: 115 MMHG | HEIGHT: 64 IN | BODY MASS INDEX: 32.05 KG/M2 | OXYGEN SATURATION: 100 % | HEART RATE: 84 BPM | RESPIRATION RATE: 16 BRPM

## 2023-11-05 DIAGNOSIS — T38.3X5A HYPOGLYCEMIA DUE TO INSULIN: ICD-10-CM

## 2023-11-05 DIAGNOSIS — E16.0 HYPOGLYCEMIA DUE TO INSULIN: ICD-10-CM

## 2023-11-05 LAB
ALBUMIN SERPL BCG-MCNC: 3.2 G/DL (ref 3.5–5.2)
ALP SERPL-CCNC: 110 U/L (ref 35–104)
ALT SERPL W P-5'-P-CCNC: 11 U/L (ref 0–50)
ANION GAP SERPL CALCULATED.3IONS-SCNC: 13 MMOL/L (ref 7–15)
AST SERPL W P-5'-P-CCNC: 32 U/L (ref 0–45)
BASOPHILS # BLD AUTO: 0.1 10E3/UL (ref 0–0.2)
BASOPHILS NFR BLD AUTO: 1 %
BILIRUB SERPL-MCNC: 0.2 MG/DL
BUN SERPL-MCNC: 12.5 MG/DL (ref 6–20)
CALCIUM SERPL-MCNC: 9.1 MG/DL (ref 8.6–10)
CHLORIDE SERPL-SCNC: 110 MMOL/L (ref 98–107)
CLUE CELLS: ABNORMAL
CREAT SERPL-MCNC: 0.73 MG/DL (ref 0.51–0.95)
DEPRECATED HCO3 PLAS-SCNC: 22 MMOL/L (ref 22–29)
EGFRCR SERPLBLD CKD-EPI 2021: >90 ML/MIN/1.73M2
EOSINOPHIL # BLD AUTO: 0.1 10E3/UL (ref 0–0.7)
EOSINOPHIL NFR BLD AUTO: 1 %
ERYTHROCYTE [DISTWIDTH] IN BLOOD BY AUTOMATED COUNT: 13.8 % (ref 10–15)
GLUCOSE BLDC GLUCOMTR-MCNC: 124 MG/DL (ref 70–99)
GLUCOSE BLDC GLUCOMTR-MCNC: 355 MG/DL (ref 70–99)
GLUCOSE SERPL-MCNC: 187 MG/DL (ref 70–99)
HBA1C MFR BLD: 9.6 %
HCT VFR BLD AUTO: 43.6 % (ref 35–47)
HGB BLD-MCNC: 14.1 G/DL (ref 11.7–15.7)
IMM GRANULOCYTES # BLD: 0.1 10E3/UL
IMM GRANULOCYTES NFR BLD: 1 %
LYMPHOCYTES # BLD AUTO: 1.4 10E3/UL (ref 0.8–5.3)
LYMPHOCYTES NFR BLD AUTO: 14 %
MCH RBC QN AUTO: 28.7 PG (ref 26.5–33)
MCHC RBC AUTO-ENTMCNC: 32.3 G/DL (ref 31.5–36.5)
MCV RBC AUTO: 89 FL (ref 78–100)
MONOCYTES # BLD AUTO: 0.4 10E3/UL (ref 0–1.3)
MONOCYTES NFR BLD AUTO: 5 %
NEUTROPHILS # BLD AUTO: 7.7 10E3/UL (ref 1.6–8.3)
NEUTROPHILS NFR BLD AUTO: 78 %
NRBC # BLD AUTO: 0 10E3/UL
NRBC BLD AUTO-RTO: 0 /100
PLATELET # BLD AUTO: 316 10E3/UL (ref 150–450)
POTASSIUM SERPL-SCNC: 4.5 MMOL/L (ref 3.4–5.3)
PROT SERPL-MCNC: 7 G/DL (ref 6.4–8.3)
RBC # BLD AUTO: 4.92 10E6/UL (ref 3.8–5.2)
SODIUM SERPL-SCNC: 145 MMOL/L (ref 135–145)
TRICHOMONAS, WET PREP: ABNORMAL
WBC # BLD AUTO: 9.6 10E3/UL (ref 4–11)
WBC'S/HIGH POWER FIELD, WET PREP: ABNORMAL
YEAST, WET PREP: ABNORMAL

## 2023-11-05 PROCEDURE — 36415 COLL VENOUS BLD VENIPUNCTURE: CPT | Performed by: FAMILY MEDICINE

## 2023-11-05 PROCEDURE — 80053 COMPREHEN METABOLIC PANEL: CPT | Performed by: FAMILY MEDICINE

## 2023-11-05 PROCEDURE — 85014 HEMATOCRIT: CPT | Performed by: FAMILY MEDICINE

## 2023-11-05 PROCEDURE — 99285 EMERGENCY DEPT VISIT HI MDM: CPT | Mod: 25 | Performed by: FAMILY MEDICINE

## 2023-11-05 PROCEDURE — 93010 ELECTROCARDIOGRAM REPORT: CPT | Performed by: FAMILY MEDICINE

## 2023-11-05 PROCEDURE — 87210 SMEAR WET MOUNT SALINE/INK: CPT | Performed by: FAMILY MEDICINE

## 2023-11-05 PROCEDURE — 83036 HEMOGLOBIN GLYCOSYLATED A1C: CPT | Performed by: FAMILY MEDICINE

## 2023-11-05 PROCEDURE — 82962 GLUCOSE BLOOD TEST: CPT

## 2023-11-05 PROCEDURE — 99284 EMERGENCY DEPT VISIT MOD MDM: CPT | Performed by: FAMILY MEDICINE

## 2023-11-05 PROCEDURE — 93005 ELECTROCARDIOGRAM TRACING: CPT | Performed by: FAMILY MEDICINE

## 2023-11-05 RX ORDER — CARBOXYMETHYLCELLULOSE SODIUM 5 MG/ML
2 SOLUTION/ DROPS OPHTHALMIC
Status: DISCONTINUED | OUTPATIENT
Start: 2023-11-05 | End: 2023-11-05 | Stop reason: HOSPADM

## 2023-11-05 ASSESSMENT — ACTIVITIES OF DAILY LIVING (ADL)
ADLS_ACUITY_SCORE: 35
ADLS_ACUITY_SCORE: 35

## 2023-11-05 NOTE — DISCHARGE INSTRUCTIONS
Home.  Monitor your blood sugars at home and make sure eating well.  See MD tomorrow to follow up with check on wet prep test done today and further testing.  Return if any concerns.
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
## 2023-11-05 NOTE — ED PROVIDER NOTES
ED Provider Note  [facility]      History     Chief Complaint   Patient presents with    Hypoglycemia     The history is provided by the patient, the EMS personnel and medical records.     Arielle Montenegro is a 38 year old female with history of DMI presenting to the ED via EMS from home after her son found her unconscious in the setting of hypoglycemia. BG 22 on EMS arrival so D50 given and BG up to 37. Patient ate 15 g food and BG up to 77. Patient alert and oriented on arrival. She reports that her Dexcom was off this morning, but she felt that her BG was high so administered around 7 units of Novolog. She also takes long-acting insulin which she takes 12 units of and last took midday yesterday. She does not currently have a pump, but is supposed to be getting one. She notes that she has an appt scheduled tomorrow. She has otherwise been feeling well and denies nausea, vomiting, diarrhea, chest pain, shortness of breath.     Past Medical History  Past Medical History:   Diagnosis Date    Blindness of right eye     Diabetes mellitus type 1 (H)     Diagnosed as a child     Past Surgical History:   Procedure Laterality Date     SECTION  13     SECTION N/A 2015    Procedure:  SECTION;  Surgeon: John Hudson MD;  Location: [facility] L+D     SECTION N/A 7/3/2020    Procedure:  SECTION;  Surgeon: Aparna Atkins MD;  Location: [facility] L+D    ENUCLEATION Right 3/20/2015    Procedure: ENUCLEATION;  Surgeon: Edilson Islas MD;  Location: [facility]     albuterol (PROAIR HFA/PROVENTIL HFA/VENTOLIN HFA) 108 (90 Base) MCG/ACT inhaler  alcohol swab prep pads  benzonatate (TESSALON) 200 MG capsule  blood glucose (ACCU-CHEK GUIDE) test strip  blood glucose (NO BRAND SPECIFIED) test strip  blood glucose monitoring (NO BRAND SPECIFIED) meter device kit  blood glucose monitoring (SOFTCLIX) lancets  budesonide-formoterol (SYMBICORT) 160-4.5 MCG/ACT  "Inhaler  cetirizine (ZYRTEC) 10 MG tablet  Continuous Blood Gluc Sensor (DEXCOM G6 SENSOR) MISC  Continuous Blood Gluc Sensor (DEXCOM G7 SENSOR) MISC  Continuous Blood Gluc Transmit (DEXCOM G6 TRANSMITTER) MISC  FLUoxetine (PROZAC) 20 MG capsule  Glucagon (BAQSIMI TWO PACK) 3 MG/DOSE POWD  Glucagon 3 MG/DOSE POWD  insulin degludec (TRESIBA FLEXTOUCH) 100 UNIT/ML pen  insulin pen needle (31G X 5 MM) 31G X 5 MM miscellaneous  KETOSTIX test strip  naproxen (NAPROSYN) 500 MG tablet  NOVOLOG FLEXPEN 100 UNIT/ML soln  NOVOLOG PENFILL 100 UNIT/ML soln      No Known Allergies  Family History  Family History   Problem Relation Age of Onset    Family History Negative Mother     Family History Negative Father     Diabetes Other         father's side     Social History   Social History     Tobacco Use    Smoking status: Former     Packs/day: 0     Types: Cigarettes    Smokeless tobacco: Never    Tobacco comments:     smokes 2 cigarettes/day-none for several months   Vaping Use    Vaping Use: Never used   Substance Use Topics    Alcohol use: No     Alcohol/week: 0.0 standard drinks of alcohol    Drug use: No         A medically appropriate review of systems was performed with pertinent positives and negatives noted in the HPI, and all other systems negative.    Physical Exam   BP: 115/79  Pulse: 84  Resp: 16  Height: 162.6 cm (5' 4\")  SpO2: 100 %  Physical Exam  Vitals reviewed.   Constitutional:       General: She is not in acute distress.     Appearance: Normal appearance. She is well-developed. She is not toxic-appearing.      Comments: Alert and orient x3   HENT:      Head: Normocephalic and atraumatic.      Nose: Nose normal.      Mouth/Throat:      Mouth: Mucous membranes are moist.      Pharynx: Oropharynx is clear.   Eyes:      General: No scleral icterus.     Conjunctiva/sclera: Conjunctivae normal.      Comments: Drainage from right eye normal as patient has dry eyes requesting artificial tears legally blind in the " right eye   Cardiovascular:      Rate and Rhythm: Normal rate and regular rhythm.   Pulmonary:      Effort: Pulmonary effort is normal. No respiratory distress.      Breath sounds: No stridor.   Abdominal:      General: Abdomen is flat. There is no distension.      Tenderness: There is no abdominal tenderness. There is no guarding.   Musculoskeletal:         General: No swelling or tenderness.      Cervical back: Normal range of motion and neck supple. No rigidity or tenderness.   Skin:     General: Skin is warm and dry.      Capillary Refill: Capillary refill takes less than 2 seconds.      Coloration: Skin is not jaundiced.      Findings: No rash.   Neurological:      General: No focal deficit present.      Mental Status: She is alert and oriented to person, place, and time. Mental status is at baseline.   Psychiatric:      Comments: Appropriate here in the ER           ED Course, Procedures, & Data      Records reviewed in epic.  Patient been seen in the ER August 18 for nausea vomiting patient seen for hypoglycemia in September 9 of this year also in the ER.  Records reviewed etc.    In the ER medication reviewed also with patient.    Patient is IV established labs drawn and reviewed.  Patient able to eat here in the ER 1 to go home so she could blood sugar noted.  Labs reviewed.    Patient's glucose was done and checked.  Glucose 187 sodium 145 potassium 4.5.  Bicarb 22 gap is 13 creatinine noted the 0.73.  A1c is 9.6 white count 9.6 hemoglobin 14.1.    Patient was able to eat comfortable going home will monitor blood sugars etc. manage them at home.  Patient here in the ER also talked about having some concerns of vaginal infection did a wet prep via a self testing here in the ER we did send it off findings reveal that of no yeast or clue cells there is some white cells noted.  Trichomonas was negative.    We had ordered urine also for GC chlamydia but patient could not go and wanted to leave will follow-up  with her primary physician tomorrow and will continue to talk about having these evaluated and tested if ongoing symptoms.  This point patient is mentally appropriate otherwise shows no signs of any recurrent hypoglycemia and should be discharged home continue monitoring her blood sugars eating etc. following up with MD tomorrow.  Return if any concerns.  EKG also done which was noted just does show some nonspecific changes.  No chest pain or shortness of breath.      Procedures            EKG Interpretation:      Interpreted by Christopher Garber MD  Time reviewed: 1230  Symptoms at time of EKG: low blood sugar   Rhythm: normal sinus   Rate: normal  Axis: normal  Ectopy: none  Conduction: normal  ST Segments/ T Waves: Nonspecifici ST-T wave changes  Q Waves: none  Comparison to prior: No old EKG available    Clinical Impression: normal EKG nonspecific st changes                 Results for orders placed or performed during the hospital encounter of 11/05/23   Comprehensive metabolic panel     Status: Abnormal   Result Value Ref Range    Sodium 145 135 - 145 mmol/L    Potassium 4.5 3.4 - 5.3 mmol/L    Carbon Dioxide (CO2) 22 22 - 29 mmol/L    Anion Gap 13 7 - 15 mmol/L    Urea Nitrogen 12.5 6.0 - 20.0 mg/dL    Creatinine 0.73 0.51 - 0.95 mg/dL    GFR Estimate >90 >60 mL/min/1.73m2    Calcium 9.1 8.6 - 10.0 mg/dL    Chloride 110 (H) 98 - 107 mmol/L    Glucose 187 (H) 70 - 99 mg/dL    Alkaline Phosphatase 110 (H) 35 - 104 U/L    AST 32 0 - 45 U/L    ALT 11 0 - 50 U/L    Protein Total 7.0 6.4 - 8.3 g/dL    Albumin 3.2 (L) 3.5 - 5.2 g/dL    Bilirubin Total 0.2 <=1.2 mg/dL   Hemoglobin A1c     Status: Abnormal   Result Value Ref Range    Hemoglobin A1C 9.6 (H) <5.7 %   Glucose by meter     Status: Abnormal   Result Value Ref Range    GLUCOSE BY METER POCT 124 (H) 70 - 99 mg/dL   CBC with platelets and differential     Status: None   Result Value Ref Range    WBC Count 9.6 4.0 - 11.0 10e3/uL    RBC Count 4.92 3.80 - 5.20  10e6/uL    Hemoglobin 14.1 11.7 - 15.7 g/dL    Hematocrit 43.6 35.0 - 47.0 %    MCV 89 78 - 100 fL    MCH 28.7 26.5 - 33.0 pg    MCHC 32.3 31.5 - 36.5 g/dL    RDW 13.8 10.0 - 15.0 %    Platelet Count 316 150 - 450 10e3/uL    % Neutrophils 78 %    % Lymphocytes 14 %    % Monocytes 5 %    % Eosinophils 1 %    % Basophils 1 %    % Immature Granulocytes 1 %    NRBCs per 100 WBC 0 <1 /100    Absolute Neutrophils 7.7 1.6 - 8.3 10e3/uL    Absolute Lymphocytes 1.4 0.8 - 5.3 10e3/uL    Absolute Monocytes 0.4 0.0 - 1.3 10e3/uL    Absolute Eosinophils 0.1 0.0 - 0.7 10e3/uL    Absolute Basophils 0.1 0.0 - 0.2 10e3/uL    Absolute Immature Granulocytes 0.1 <=0.4 10e3/uL    Absolute NRBCs 0.0 10e3/uL   Glucose by meter     Status: Abnormal   Result Value Ref Range    GLUCOSE BY METER POCT 355 (H) 70 - 99 mg/dL   EKG 12 lead     Status: None (Preliminary result)   Result Value Ref Range    Systolic Blood Pressure  mmHg    Diastolic Blood Pressure  mmHg    Ventricular Rate 81 BPM    Atrial Rate 81 BPM    MD Interval 162 ms    QRS Duration 72 ms     ms    QTc 462 ms    P Axis 57 degrees    R AXIS 26 degrees    T Axis 27 degrees    Interpretation ECG       Sinus rhythm  Nonspecific ST and T wave abnormality  Prolonged QT  Abnormal ECG     Wet prep     Status: Abnormal    Specimen: Vagina; Swab   Result Value Ref Range    Trichomonas Absent Absent    Yeast Absent Absent    Clue Cells Absent Absent    WBCs/high power field 3+ (A) None   CBC with platelets differential     Status: None    Narrative    The following orders were created for panel order CBC with platelets differential.  Procedure                               Abnormality         Status                     ---------                               -----------         ------                     CBC with platelets and d...[[id number]]                      Final result                 Please view results for these tests on the individual orders.     Medications - No data to  display  Labs Ordered and Resulted from Time of ED Arrival to Time of ED Departure   COMPREHENSIVE METABOLIC PANEL - Abnormal       Result Value    Sodium 145      Potassium 4.5      Carbon Dioxide (CO2) 22      Anion Gap 13      Urea Nitrogen 12.5      Creatinine 0.73      GFR Estimate >90      Calcium 9.1      Chloride 110 (*)     Glucose 187 (*)     Alkaline Phosphatase 110 (*)     AST 32      ALT 11      Protein Total 7.0      Albumin 3.2 (*)     Bilirubin Total 0.2     HEMOGLOBIN A1C - Abnormal    Hemoglobin A1C 9.6 (*)    GLUCOSE BY METER - Abnormal    GLUCOSE BY METER POCT 124 (*)    GLUCOSE BY METER - Abnormal    GLUCOSE BY METER POCT 355 (*)    CBC WITH PLATELETS AND DIFFERENTIAL    WBC Count 9.6      RBC Count 4.92      Hemoglobin 14.1      Hematocrit 43.6      MCV 89      MCH 28.7      MCHC 32.3      RDW 13.8      Platelet Count 316      % Neutrophils 78      % Lymphocytes 14      % Monocytes 5      % Eosinophils 1      % Basophils 1      % Immature Granulocytes 1      NRBCs per 100 WBC 0      Absolute Neutrophils 7.7      Absolute Lymphocytes 1.4      Absolute Monocytes 0.4      Absolute Eosinophils 0.1      Absolute Basophils 0.1      Absolute Immature Granulocytes 0.1      Absolute NRBCs 0.0       No orders to display          Critical care was not performed.     Medical Decision Making  The patient's presentation was of moderate complexity (an acute illness with systemic symptoms).    The patient's evaluation involved:  review of external note(s) from 3+ sources (see separate area of note for details)  review of 3+ test result(s) ordered prior to this encounter (see separate area of note for details)  ordering and/or review of 3+ test(s) in this encounter (see separate area of note for details)    The patient's management necessitated high risk (a decision regarding hospitalization).    Assessment & Plan   38-year-old insulin-dependent diabetic who took her insulin today without checking her blood  sugar as she did not have her blood sugar reading but felt her blood sugar was high patient noted to be unresponsive blood sugar 22.  Patient though able to believe eat something brought to the ER for evaluation blood sugars have now more normal range appropriate neuro nonfocal EKG without hyperacute changes creatinine normal otherwise.  Patient improved here in the ER able to eat blood sugars maintaining getting history seems appropriate this point also did wet prep for some vaginal irritation but no trichomonas clue cells or yeast seen will follow-up with MD tomorrow for ongoing evaluation monitoring blood sugars at home patient feels comfort this plan does have family members at home also.       I have reviewed the nursing notes. I have reviewed the findings, diagnosis, plan and need for follow up with the patient.    Discharge Medication List as of 11/5/2023  3:19 PM          Final diagnoses:   Hypoglycemia due to insulin   I, Esperanza Ahmadi, am serving as a trained medical scribe to document services personally performed by Christopher Garber MD, based on the provider's statements to me.     I, Christopher Garber MD, was physically present and have reviewed and verified the accuracy of this note documented by Esperanza Ahmadi.      Christopher Garber MD  [facility] EMERGENCY DEPARTMENT  11/5/2023    This note was created at least in part by the use of dragon voice dictation system. Inadvertent typographical errors may still exist.  Christopher Garber MD.  Patient evaluated in the emergency department during the COVID-19 pandemic period. Careful attention to patients safety was addressed throughout the evaluation. Evaluation and treatment management was initiated with disposition made efficiently and appropriate as possible to minimize any risk of potential exposure to patient during this evaluation.       Christopher Garber MD  11/05/23 1015

## 2023-11-06 ENCOUNTER — ALLIED HEALTH/NURSE VISIT (OUTPATIENT)
Dept: EDUCATION SERVICES | Facility: CLINIC | Age: 38
End: 2023-11-06
Payer: COMMERCIAL

## 2023-11-06 DIAGNOSIS — E10.40 TYPE 1 DIABETES MELLITUS WITH DIABETIC NEUROPATHY (H): ICD-10-CM

## 2023-11-06 DIAGNOSIS — E10.649 TYPE 1 DIABETES MELLITUS WITH HYPOGLYCEMIA AND WITHOUT COMA (H): Primary | ICD-10-CM

## 2023-11-06 LAB
ATRIAL RATE - MUSE: 81 BPM
DIASTOLIC BLOOD PRESSURE - MUSE: NORMAL MMHG
INTERPRETATION ECG - MUSE: NORMAL
P AXIS - MUSE: 57 DEGREES
PR INTERVAL - MUSE: 162 MS
QRS DURATION - MUSE: 72 MS
QT - MUSE: 398 MS
QTC - MUSE: 462 MS
R AXIS - MUSE: 26 DEGREES
SYSTOLIC BLOOD PRESSURE - MUSE: NORMAL MMHG
T AXIS - MUSE: 27 DEGREES
VENTRICULAR RATE- MUSE: 81 BPM

## 2023-11-06 PROCEDURE — G0108 DIAB MANAGE TRN  PER INDIV: HCPCS | Performed by: DIETITIAN, REGISTERED

## 2023-11-06 NOTE — PROGRESS NOTES
Diabetes Self-Management Education & Support:  Pre Pump Education    SUBJECTIVE:  Arielle Montenegro presents today for education related to planning for an insulin pump related to Type 1 Diabetes    Patient is being treated with:  diet, exercise, and insulin. Also follows with weight management clinic  She is accompanied by self and son    Year of diagnosis: young, around age 6-7  Referring provider:  Kym Jang PA-C  Living Situation: lives with children  Employment: , dispatching    CURRENT INSULIN REGIMEN:  Long Acting Insulin:  12 units Tresiba  Rapid Acting Insulin: 5 units Novolog with each meal  Total Daily Dose:  ~ 27 units daily    MONITORING:    Patient glucose self monitoring as follows: continuously using a continuous glucose monitor (CGM)  BG meter: unknown  BG results: 216 upon Dexcom G7 warmup     RAPID-ACTING INSULIN CALCULATIONS:  Does patient currently count carbs? no, bases insulin dosing on experience  Is instruction indicated? NO  How is insulin determined for correction: guesstimation    PROBLEM SOLVING:    Frequency and treatment of hypoglycemia:    Hospitalizations for hyper or hypoglycemia: Yes (Please explain): yes, ED visits on 11/5, 9/9, 8/18 for hypoglycemia  What is considered a high blood sugar? over 200    How is high BG treated: takes insulin, guesstimates dose, not using a correction scale  Does Patient test for ketones? No  At what level of blood glucose are ketones tested?   If ketones are present, what action is taken?     Physical Activity:    Stressed out about housing  What accomodations are made for exercising:      Healthy eating?  Doesn't eat bread, rice, spaghetti  Meat and vegetables   Lots of grapes    Diabetes knowledge and skills assessment:   Patient is knowledgeable in diabetes management concepts related to: Healthy Eating, Being Active, and Taking Medication    Patient needs further education on the following diabetes management concepts: Monitoring,  Problem Solving, Reducing Risks, and Insulin Pump Concepts Balancing glucose and insulin  Carbohydrate counting  Calculating boluses  Problem solving with insulin pump therapy (BG monitoring; hypoglycemia signs/symptoms, treatment (glucagon) and prevention; hyperglycemia signs/symptoms, treatment and prevention; ketones, DKA signs/symptoms and prevention)  Hands on practice with basic pump button use    Based on learning assessment above, most appropriate setting for further diabetes education would be: Individual setting.    Assessment:  We also spent considerable time during this visit trying to trouble issues with Dexcom apps. Her Dexcom G7 gael opens and closes itself immediately. We were unable to download Dexcom G6 gael as [employer] ID kept requiring Arielle to enter payment information even though the gael is free. Arielle will need to call [employer] tech support to troubleshoot. She was without a sensor and was seen at the ED yesterday for hypoglycemia. Discussed always carrying a quick-acting source of carbohydrate with her and the 15-15 Rule. She applied a sample Dexcom G7 sensor in clinic and was given a Dexcom G7 . Low alert set to 80 mg/dL, High alert set to 200 mg/dL. We changed alert volume to highest volume.    Arielle is interested in getting an insulin pump as she would like to cut down on the amount of ED visits due to hypoglycemia. We discussed need to learn carb counting and how to problem solve using a pump. She is currently not using a correction scale and is guess-timating meal time doses and correction when she is high. She is possibly giving herself too much insulin to treat highs. We will discuss further.    EDUCATION PROVIDED TODAY  Explained the way an insulin pump works, in terms that described the function of a basal rate and a bolus calculator.    Demonstrated the operation of the main pumps on the market right now: Omnipod 5, Tandem t:slim X2 with Control-IQ, Beta Credit Sesames iLet, and Medtronic  MiniMed 780G.    Explained the unique features of each pump.      Explained that having a pump does not take the place of checking his blood glucoses, counting carbs, or remembering to push the button to deliver the bolus.   Described how the insulin delivers insulin through a cannula inserted under the skin and attached to the pump itself, or in the case of Omnipod, controlled wirelessly via blue tooth pairing with the Personal Diabetes Manager (PDM) / Controller.     Reviewed some of the increased risks associated with using a pump, i.e. DKA, especially if not checking BG at least 3-4 times daily or using a continuous glucose monitor (CGM).    Explained in general terms the costs associated with using an insulin pump, including the disposables and insulin.  Explained the process for obtaining a pump:  Approval by diabetes management team, application to the pump company, approval by insurance company, and necessary pre-training and post-pump follow-up.    Explained the need to follow up on a regular basis with diabetes care team in order to continue to have insulin pumping supplies approved by insurance.     PLAN:  Will review carb counting at next Diabetes Education visit  Will review problem solving, treatment of highs when on a pump, checking ketones, DKA signs/symptoms and prevention  Try to take Novolog 10-15 minutes before you start eating  Continue pre-pump education    See Patient Instructions, AVS printed and provided to patient today.    Follow-up:   Carb Counting and more pre-pump education appointment made for 11/29.    Time Spent: 60 minutes  Encounter Type: Individual     Any diabetes medication dose changes were made via the CDE Protocol Collaborative Practice Agreement with referring provider.  A copy of this encounter was provided to patient's referring provider.

## 2023-11-06 NOTE — Clinical Note
Syed Mejía,  Saw Arielle today for a pre-pump assessment. I don't think she's ready for a pump yet, but she seems willing and motivated to learn carb counting, problem-solving, etc. She's most interested in the Omnipod 5 and doesn't want anything with tubing.  I saw she missed her appointment with you on 10/12 and it hasn't been rescheduled. Do you want to add her to your schedule? She was at the ED yesterday for hypoglycemia because she was without her Dexcom and gave herself too much Novolog.  Thanks, Yarelis

## 2023-11-06 NOTE — PATIENT INSTRUCTIONS
Syed Guzmán,    It was nice meeting with you today. We will need to get the Dexcom G6 and Dexcom G7 apps working on your iPhone as part of the pre-pump process.    Try to keep a food log and count the amount of carbohydrates in it.  Take your Novolog 10-15 minutes before eating  Treat lows with 15-15 Rule:    The 15-15 Rule:  For low blood sugars between 55 - 69 mg/dL, raise it by following the 15-15 rule: have 15 grams of carbs and check your blood sugar after 15 minutes. If it s still below your target range, have another serving. Repeat these steps until it s in your target range. Once it s in range, eat a nutritious meal or snack to ensure it doesn t get too low again.    These items have about 15 grams of carbs:    4 ounces (  cup) of juice or regular soda.  1 tablespoon of sugar, honey, or syrup.  Hard candies, jellybeans, or gumdrops (see food label for how much to eat).  3-4 glucose tablets (follow instructions).  1 dose of glucose gel (usually 1 tube; follow instructions).    Tips to keep in mind:  It takes time for blood sugar to rise after eating. Give some time for treatment to work. Following the 15-15 rule helps.  You should avoid eating a carb with lots of fiber, such as beans or lentils, or a carb that also has fat, such as chocolate. Fiber and fat slow down how fast you absorb sugar.  Check your blood sugar often when lows are more likely, such as when the weather is hot or when you travel.      Tips for managing High and Low Glucose:      General Rules:      Emergency issues: Please contact the clinic as soon as you recognize a problem.  Here are some concerns you should contact us about.  -Vomiting: more than twice. Please check ketones.  If positive, go to ER. Monitor glucose hourly.   -High glucose (over 300 mg/dL twice in a row): Please check ketones.  If ketones are negative, take an insulin correction and recheck glucose in 1 hour.  If glucose is not coming down, please call the clinic. If  ketones are moderate or large, drink lots of water, take an insulin correction, and recheck ketones in 1 hour.  If ketones are still high (or you are vomiting), go to the ER.     -Hypoglycemia (low glucose):   If glucose is less than 70 mg/dL, treat with 15g carb (4 glucose tablets), recheck glucose in 10 minutes.  If low again, repeat.     If glucose is less than 54 mg/dL, treat with 30g carb, recheck glucose in 10 minutes.  If low again, repeat.  Keep glucagon in your home in case of severe hypoglycemia and train someone how to use this.     Emergency kit (please ensure you always have these with you):   Glucose tablets  Glucagon  Insulin  Syringes (if on a pump)  Extra infusion set (if on a pump)  Ketone strips     Contact information:   If you have concerns, please send a [employer] message or call the clinic at [phone].    For more urgent concerns, please call [phone] after hours/weekends and ask to speak with the Endocrinologist on call.       Please let us know if you are having low blood sugars less than 70 or over 350 mg/dL.  Do not wait until your next appointment if this is happening.      Follow-up:  With Yarelis on 11/29 at 10:00 am

## 2023-11-14 DIAGNOSIS — L87.1 REACTIVE PERFORATING COLLAGENOSIS: ICD-10-CM

## 2023-11-14 DIAGNOSIS — J98.01 COLD INDUCED BRONCHOSPASM: ICD-10-CM

## 2023-11-14 DIAGNOSIS — E10.65 TYPE 1 DIABETES MELLITUS WITH HYPERGLYCEMIA (H): ICD-10-CM

## 2023-11-14 DIAGNOSIS — E10.649 TYPE 1 DIABETES MELLITUS WITH HYPOGLYCEMIA AND WITHOUT COMA (H): ICD-10-CM

## 2023-11-14 RX ORDER — INSULIN ASPART 100 [IU]/ML
INJECTION, SOLUTION INTRAVENOUS; SUBCUTANEOUS
Qty: 45 ML | Refills: 3 | Status: SHIPPED | OUTPATIENT
Start: 2023-11-14 | End: 2023-12-26

## 2023-11-14 RX ORDER — PROCHLORPERAZINE 25 MG/1
SUPPOSITORY RECTAL
Qty: 9 EACH | Refills: 11 | Status: SHIPPED | OUTPATIENT
Start: 2023-11-14 | End: 2024-02-20

## 2023-11-14 RX ORDER — GLUCOSAMINE HCL/CHONDROITIN SU 500-400 MG
CAPSULE ORAL
Qty: 100 EACH | Refills: 11 | Status: SHIPPED | OUTPATIENT
Start: 2023-11-14

## 2023-11-14 RX ORDER — URINE ACETONE TEST STRIPS
STRIP MISCELLANEOUS
Qty: 50 STRIP | Refills: 3 | Status: SHIPPED | OUTPATIENT
Start: 2023-11-14

## 2023-11-14 RX ORDER — LANCETS
EACH MISCELLANEOUS
Qty: 100 EACH | Refills: 3 | Status: SHIPPED | OUTPATIENT
Start: 2023-11-14

## 2023-11-14 RX ORDER — INSULIN DEGLUDEC 100 U/ML
14-18 INJECTION, SOLUTION SUBCUTANEOUS DAILY
Qty: 15 ML | Refills: 11 | Status: SHIPPED | OUTPATIENT
Start: 2023-11-14 | End: 2024-05-17

## 2023-11-14 RX ORDER — BUDESONIDE AND FORMOTEROL FUMARATE DIHYDRATE 160; 4.5 UG/1; UG/1
2 AEROSOL RESPIRATORY (INHALATION) 2 TIMES DAILY
Qty: 10.2 G | Refills: 4 | Status: SHIPPED | OUTPATIENT
Start: 2023-11-14 | End: 2023-12-26

## 2023-11-14 RX ORDER — TRETINOIN 0.5 MG/G
CREAM TOPICAL AT BEDTIME
Qty: 45 G | Refills: 1 | Status: SHIPPED | OUTPATIENT
Start: 2023-11-14

## 2023-11-14 RX ORDER — PROCHLORPERAZINE 25 MG/1
SUPPOSITORY RECTAL
Qty: 1 EACH | Refills: 3 | Status: SHIPPED | OUTPATIENT
Start: 2023-11-14 | End: 2024-02-20

## 2023-11-14 RX ORDER — BLOOD SUGAR DIAGNOSTIC
STRIP MISCELLANEOUS
Qty: 100 STRIP | Refills: 3 | Status: SHIPPED | OUTPATIENT
Start: 2023-11-14

## 2023-11-24 ENCOUNTER — HOSPITAL ENCOUNTER (EMERGENCY)
Facility: CLINIC | Age: 38
Discharge: HOME | End: 2023-11-24
Attending: EMERGENCY MEDICINE | Admitting: EMERGENCY MEDICINE
Payer: COMMERCIAL

## 2023-11-24 VITALS
TEMPERATURE: 98.5 F | SYSTOLIC BLOOD PRESSURE: 113 MMHG | HEART RATE: 90 BPM | RESPIRATION RATE: 16 BRPM | DIASTOLIC BLOOD PRESSURE: 80 MMHG | OXYGEN SATURATION: 98 %

## 2023-11-24 DIAGNOSIS — R10.2 PELVIC PAIN: ICD-10-CM

## 2023-11-24 DIAGNOSIS — N73.0 PID (ACUTE PELVIC INFLAMMATORY DISEASE): ICD-10-CM

## 2023-11-24 DIAGNOSIS — R10.2 VAGINAL PAIN: ICD-10-CM

## 2023-11-24 DIAGNOSIS — R05.1 ACUTE COUGH: ICD-10-CM

## 2023-11-24 LAB
ALBUMIN UR-MCNC: 300 MG/DL
APPEARANCE UR: CLEAR
BILIRUB UR QL STRIP: NEGATIVE
CLUE CELLS: ABNORMAL
COLOR UR AUTO: YELLOW
GLUCOSE UR STRIP-MCNC: NEGATIVE MG/DL
HCG UR QL: NEGATIVE
HGB UR QL STRIP: NEGATIVE
KETONES UR STRIP-MCNC: NEGATIVE MG/DL
LEUKOCYTE ESTERASE UR QL STRIP: NEGATIVE
MUCOUS THREADS #/AREA URNS LPF: PRESENT /LPF
NITRATE UR QL: NEGATIVE
PH UR STRIP: 7 [PH] (ref 5–7)
RBC URINE: 4 /HPF
SP GR UR STRIP: 1.03 (ref 1–1.03)
SQUAMOUS EPITHELIAL: 5 /HPF
TRICHOMONAS, WET PREP: ABNORMAL
UROBILINOGEN UR STRIP-MCNC: NORMAL MG/DL
WBC URINE: <1 /HPF
WBC'S/HIGH POWER FIELD, WET PREP: ABNORMAL
YEAST, WET PREP: ABNORMAL

## 2023-11-24 PROCEDURE — 81025 URINE PREGNANCY TEST: CPT | Performed by: EMERGENCY MEDICINE

## 2023-11-24 PROCEDURE — 99284 EMERGENCY DEPT VISIT MOD MDM: CPT | Performed by: EMERGENCY MEDICINE

## 2023-11-24 PROCEDURE — 81001 URINALYSIS AUTO W/SCOPE: CPT | Performed by: EMERGENCY MEDICINE

## 2023-11-24 PROCEDURE — 87491 CHLMYD TRACH DNA AMP PROBE: CPT | Performed by: EMERGENCY MEDICINE

## 2023-11-24 PROCEDURE — 250N000013 HC RX MED GY IP 250 OP 250 PS 637: Performed by: EMERGENCY MEDICINE

## 2023-11-24 PROCEDURE — 96372 THER/PROPH/DIAG INJ SC/IM: CPT | Performed by: EMERGENCY MEDICINE

## 2023-11-24 PROCEDURE — 87210 SMEAR WET MOUNT SALINE/INK: CPT | Performed by: EMERGENCY MEDICINE

## 2023-11-24 PROCEDURE — 87591 N.GONORRHOEAE DNA AMP PROB: CPT | Performed by: EMERGENCY MEDICINE

## 2023-11-24 PROCEDURE — 250N000011 HC RX IP 250 OP 636: Mod: JZ | Performed by: EMERGENCY MEDICINE

## 2023-11-24 PROCEDURE — 250N000009 HC RX 250: Performed by: EMERGENCY MEDICINE

## 2023-11-24 RX ORDER — DOXYCYCLINE 100 MG/1
100 CAPSULE ORAL 2 TIMES DAILY
Qty: 28 CAPSULE | Refills: 0 | Status: SHIPPED | OUTPATIENT
Start: 2023-11-24 | End: 2023-12-08

## 2023-11-24 RX ORDER — CEFTRIAXONE SODIUM 1 G
500 VIAL (EA) INJECTION ONCE
Status: DISCONTINUED | OUTPATIENT
Start: 2023-11-24 | End: 2023-11-24

## 2023-11-24 RX ORDER — METRONIDAZOLE 500 MG/1
500 TABLET ORAL ONCE
Status: COMPLETED | OUTPATIENT
Start: 2023-11-24 | End: 2023-11-24

## 2023-11-24 RX ORDER — METRONIDAZOLE 500 MG/1
500 TABLET ORAL 2 TIMES DAILY
Qty: 28 TABLET | Refills: 0 | Status: SHIPPED | OUTPATIENT
Start: 2023-11-24 | End: 2023-12-08

## 2023-11-24 RX ORDER — DOXYCYCLINE 100 MG/1
100 CAPSULE ORAL ONCE
Status: COMPLETED | OUTPATIENT
Start: 2023-11-24 | End: 2023-11-24

## 2023-11-24 RX ADMIN — LIDOCAINE HYDROCHLORIDE 500 MG: 10 INJECTION, SOLUTION EPIDURAL; INFILTRATION; INTRACAUDAL; PERINEURAL at 20:09

## 2023-11-24 RX ADMIN — METRONIDAZOLE 500 MG: 500 TABLET ORAL at 20:09

## 2023-11-24 RX ADMIN — DOXYCYCLINE HYCLATE 100 MG: 100 CAPSULE ORAL at 20:09

## 2023-11-24 ASSESSMENT — ACTIVITIES OF DAILY LIVING (ADL): ADLS_ACUITY_SCORE: 33

## 2023-11-25 LAB
C TRACH DNA SPEC QL PROBE+SIG AMP: NEGATIVE
N GONORRHOEA DNA SPEC QL NAA+PROBE: NEGATIVE

## 2023-11-25 NOTE — ED TRIAGE NOTES
Patient was in the ER 2 weeks ago for a low blood sugar. Patient was told to do a vaginal swab (d/t concerns with pain) and after patient swabbed herself, she saw blood, and thought she was getting her period. However her period never started, and the pain is still there.   Patient also has a cough and would like it checked out-been sick for a couple days.

## 2023-11-25 NOTE — ED PROVIDER NOTES
[facility] EMERGENCY DEPARTMENT ([facility])    23            History     Chief Complaint   Patient presents with    Cough     Cough for a few days-wants to be checked out while she is here as well.     Vaginal Pain     Patient was in the ER 2 weeks ago for a low blood sugar. Patient was told to do a vaginal swab (d/t concerns with pain) and after patient swabbed herself, she saw blood, and thought she was getting her period. However her period never started, and the pain is still there.      HPI  Arielle Montenegro is a 38 year old female who with PMH of DM1, hyperglycemia, episodic mood disorder and depression presents to the ED with cough and vaginal pain.  Patient reports that she has been having vaginal pain that has been external and internal for the past few weeks.  She was evaluated a couple weeks ago in this emergency room but and had similar symptoms at that time, self swabbed and did not hear the results, did not pursue empiric treatment for STIs.  She denies vaginal discharge or bleeding, not on her period now, denies dysuria or urinary frequency. She has no abdominal pain, nausea, vomiting, back or flank pain. No fevers or chills.  She has had a dry cough for few days with no mucus production, no shortness of breath and no chest pain.  She has no headache or neck stiffness, has not been around anyone sick, no rhinorrhea.     Past Medical History  Past Medical History:   Diagnosis Date    Blindness of right eye     Diabetes mellitus type 1 (H)     Diagnosed as a child     Past Surgical History:   Procedure Laterality Date     SECTION  13     SECTION N/A 2015    Procedure:  SECTION;  Surgeon: John Hudson MD;  Location: [facility] L+[facility]     SECTION N/A 7/3/2020    Procedure:  SECTION;  Surgeon: Aparna Atkins MD;  Location: [facility] L+D    ENUCLEATION Right 3/20/2015    Procedure: ENUCLEATION;  Surgeon: Edilson Islas MD;  Location: [facility]      acetone urine (KETOSTIX) test strip  albuterol (PROAIR HFA/PROVENTIL HFA/VENTOLIN HFA) 108 (90 Base) MCG/ACT inhaler  budesonide-formoterol (SYMBICORT) 160-4.5 MCG/ACT Inhaler  cetirizine (ZYRTEC) 10 MG tablet  doxycycline hyclate (VIBRAMYCIN) 100 MG capsule  FLUoxetine (PROZAC) 20 MG capsule  insulin aspart (NOVOLOG FLEXPEN) 100 UNIT/ML pen  insulin degludec (TRESIBA FLEXTOUCH) 100 UNIT/ML pen  metroNIDAZOLE (FLAGYL) 500 MG tablet  tretinoin (RETIN-A) 0.05 % external cream  alcohol swab prep pads  blood glucose (ACCU-CHEK GUIDE) test strip  blood glucose monitoring (SOFTCLIX) lancets  Continuous Blood Gluc Sensor (DEXCOM G6 SENSOR) MISC  Continuous Blood Gluc Transmit (DEXCOM G6 TRANSMITTER) MISC  Glucagon (BAQSIMI) 3 MG/DOSE nasal powder  insulin pen needle (31G X 5 MM) 31G X 5 MM miscellaneous      No Known Allergies  Family History  Family History   Problem Relation Age of Onset    Family History Negative Mother     Family History Negative Father     Diabetes Other         father's side     Social History   Social History     Tobacco Use    Smoking status: Former     Packs/day: 0     Types: Cigarettes    Smokeless tobacco: Never    Tobacco comments:     smokes 2 cigarettes/day-none for several months   Vaping Use    Vaping Use: Never used   Substance Use Topics    Alcohol use: No     Alcohol/week: 0.0 standard drinks of alcohol    Drug use: No      Past medical history, past surgical history, medications, allergies, family history, and social history were reviewed with the patient. No additional pertinent items.      A medically appropriate review of systems was performed with pertinent positives and negatives noted in the HPI, and all other systems negative.    Physical Exam   BP: 113/80  Pulse: 90  Temp: 98.5  F (36.9  C)  Resp: 16  SpO2: 98 %  Physical Exam  Vitals and nursing note reviewed.   Constitutional:       Appearance: Normal appearance.   HENT:      Head: Normocephalic and atraumatic.       Mouth/Throat:      Mouth: Mucous membranes are moist.      Pharynx: Oropharynx is clear.   Eyes:      Comments: Prosthetic right eye; left eye pupil 3mm with normal constriction to light, full EOM of left eye    Cardiovascular:      Rate and Rhythm: Normal rate and regular rhythm.   Pulmonary:      Effort: Pulmonary effort is normal. No respiratory distress.      Breath sounds: Normal breath sounds. No stridor. No wheezing, rhonchi or rales.   Abdominal:      Palpations: Abdomen is soft.      Tenderness: There is no abdominal tenderness.   Genitourinary:     Comments: Patient refused  Musculoskeletal:      Cervical back: Normal range of motion and neck supple.   Skin:     General: Skin is warm and dry.   Neurological:      General: No focal deficit present.      Mental Status: She is alert and oriented to person, place, and time.   Psychiatric:         Mood and Affect: Mood normal.         Behavior: Behavior normal.           ED Course, Procedures, & Data      Procedures                     No results found for any visits on 11/24/23.  Medications   doxycycline hyclate (VIBRAMYCIN) capsule 100 mg (has no administration in time range)   metroNIDAZOLE (FLAGYL) tablet 500 mg (has no administration in time range)   cefTRIAXone (ROCEPHIN) 500 mg in lidocaine injection (has no administration in time range)     Labs Ordered and Resulted from Time of ED Arrival to Time of ED Departure - No data to display  No orders to display          Critical care was not performed.     Medical Decision Making  The patient's presentation was of moderate complexity (an undiagnosed new problem with uncertain diagnosis).    The patient's evaluation involved:  review of external note(s) from 1 sources (see separate area of note for details)  review of 3+ test result(s) ordered prior to this encounter (see separate area of note for details)  ordering and/or review of 3+ test(s) in this encounter (see separate area of note for details)    The  patient's management necessitated moderate risk (prescription drug management including medications given in the ED).    Assessment & Plan    Arielle Montenegro is a 38 year old female who with PMH of DM1, hyperglycemia, episodic mood disorder and depression presents to the ED with cough and vaginal pain.  Patient is nontoxic in the department, his vital signs within normal limits.  She refused pelvic examination even with female chaperone or by female provider, and stated preference to self swab instead.  We discussed that she is at risk of missing pathology with the absence of inspection, speculum exam, and endocervical swabs obtained through speculum exam, patient understands these risks and still would like to proceed with self swab.  We did discuss empiric treatment for STIs at this time and patient agreed to that as she has some high risk behaviors, and in light of ongoing symptoms for 2 weeks with an adequate exam and persistent pelvic pain, will opt to treat with antibiotics for the duration consistent with pelvic inflammatory disease.  Outpatient follow-up and return precautions discussed.  No focal right or left lower quadrant tenderness that would be suggestive of tubo-ovarian abscess and no systemic signs of illness such as fever. We did however discuss close outpatient follow up and strict return precautions as well as importance of adhering to medication regimen. Ceftriaxone given and doxycycline + metronidazole prescribed per guideline recommendations for PID. Patient provided self swabs and urinalysis; she did not want to wait for results. Regarding her cough, it is non-productive, and her lung sounds are clear on auscultation. She does not want to wait for chest x-ray at this time. Will plan for outpatient follow up and return precautions.     I have reviewed the nursing notes. I have reviewed the findings, diagnosis, plan and need for follow up with the patient.    New Prescriptions    DOXYCYCLINE  HYCLATE (VIBRAMYCIN) 100 MG CAPSULE    Take 1 capsule (100 mg) by mouth 2 times daily for 14 days    METRONIDAZOLE (FLAGYL) 500 MG TABLET    Take 1 tablet (500 mg) by mouth 2 times daily for 14 days       Final diagnoses:   Pelvic pain   Vaginal pain   PID (acute pelvic inflammatory disease)   Acute cough       Santiago Gibbs MD  [facility] EMERGENCY DEPARTMENT  11/24/2023     Santiago Gibbs MD  11/24/23 1950

## 2023-11-26 ENCOUNTER — NURSE TRIAGE (OUTPATIENT)
Dept: NURSING | Facility: CLINIC | Age: 38
End: 2023-11-26
Payer: COMMERCIAL

## 2023-11-26 NOTE — TELEPHONE ENCOUNTER
In ER on Friday night. Had test done. Told they'd call.  I reviewed test results which were normal. She has no other questions.  Kiana Morales, RN  [facility]    Reason for Disposition   [1] Follow-up call to recent contact AND [2] information only call, no triage required    Additional Information   Negative: [1] Caller is not with the adult (patient) AND [2] reporting urgent symptoms   Negative: Lab result questions   Negative: Medication questions   Negative: Caller can't be reached by phone   Negative: Caller has already spoken to PCP or another triager   Negative: RN needs further essential information from caller in order to complete triage   Negative: Requesting regular office appointment   Negative: [1] Caller requesting NON-URGENT health information AND [2] PCP's office is the best resource   Negative: [1] Caller is not with the adult (patient) AND [2] probable NON-URGENT symptoms   Negative: General information question, no triage required and triager able to answer question   Negative: Question about upcoming scheduled test, no triage required and triager able to answer question   Negative: Health Information question, no triage required and triager able to answer question    Protocols used: Information Only Call - No Triage-A-

## 2023-11-29 ENCOUNTER — TELEPHONE (OUTPATIENT)
Dept: PHARMACY | Facility: CLINIC | Age: 38
End: 2023-11-29

## 2023-11-29 NOTE — TELEPHONE ENCOUNTER
LVM for patient to remind her of 8AM appointment tomorrow with Yarelis.    Lauro Barrios, PharmD, [facility]  Endocrine & Diabetes MTM Pharmacist  [address] [place] [id number]

## 2023-12-23 DIAGNOSIS — J98.01 COLD INDUCED BRONCHOSPASM: ICD-10-CM

## 2023-12-23 DIAGNOSIS — E10.649 TYPE 1 DIABETES MELLITUS WITH HYPOGLYCEMIA AND WITHOUT COMA (H): ICD-10-CM

## 2023-12-24 NOTE — TELEPHONE ENCOUNTER
insulin aspart (NOVOLOG FLEXPEN) 100 UNIT/ML pen   45 mL 3 11/14/2023 6/20/2023  [facility] [place]      Kym Jang PA-C  Endocrinology, Diabetes, and Metabolism     Routed because:   endo triage to fill. Requested instructions different than med list

## 2023-12-26 RX ORDER — INSULIN ASPART 100 [IU]/ML
INJECTION, SOLUTION INTRAVENOUS; SUBCUTANEOUS
Qty: 30 ML | Refills: 1 | Status: SHIPPED | OUTPATIENT
Start: 2023-12-26 | End: 2024-01-09

## 2023-12-26 RX ORDER — BUDESONIDE AND FORMOTEROL FUMARATE DIHYDRATE 160; 4.5 UG/1; UG/1
2 AEROSOL RESPIRATORY (INHALATION) 2 TIMES DAILY
Qty: 10.2 G | Refills: 3 | Status: SHIPPED | OUTPATIENT
Start: 2023-12-26

## 2024-01-09 DIAGNOSIS — E10.649 TYPE 1 DIABETES MELLITUS WITH HYPOGLYCEMIA AND WITHOUT COMA (H): ICD-10-CM

## 2024-01-09 RX ORDER — INSULIN ASPART 100 [IU]/ML
INJECTION, SOLUTION INTRAVENOUS; SUBCUTANEOUS
Qty: 30 ML | Refills: 1 | Status: SHIPPED | OUTPATIENT
Start: 2024-01-09 | End: 2024-09-23

## 2024-01-10 ENCOUNTER — APPOINTMENT (OUTPATIENT)
Dept: GENERAL RADIOLOGY | Facility: CLINIC | Age: 39
End: 2024-01-10
Attending: EMERGENCY MEDICINE
Payer: COMMERCIAL

## 2024-01-10 ENCOUNTER — HOSPITAL ENCOUNTER (EMERGENCY)
Facility: CLINIC | Age: 39
Discharge: HOME | End: 2024-01-10
Attending: EMERGENCY MEDICINE | Admitting: EMERGENCY MEDICINE
Payer: COMMERCIAL

## 2024-01-10 VITALS
SYSTOLIC BLOOD PRESSURE: 110 MMHG | TEMPERATURE: 98.5 F | RESPIRATION RATE: 16 BRPM | OXYGEN SATURATION: 100 % | HEART RATE: 96 BPM | DIASTOLIC BLOOD PRESSURE: 76 MMHG

## 2024-01-10 DIAGNOSIS — M79.605 PAIN OF LEFT LOWER EXTREMITY: Primary | ICD-10-CM

## 2024-01-10 LAB
HCG UR QL: NEGATIVE
INTERNAL QC OK POCT: NORMAL
POCT KIT EXPIRATION DATE: 0
POCT KIT LOT NUMBER: 0

## 2024-01-10 PROCEDURE — 73560 X-RAY EXAM OF KNEE 1 OR 2: CPT | Mod: LT

## 2024-01-10 PROCEDURE — 73590 X-RAY EXAM OF LOWER LEG: CPT | Mod: LT

## 2024-01-10 PROCEDURE — 99283 EMERGENCY DEPT VISIT LOW MDM: CPT | Performed by: EMERGENCY MEDICINE

## 2024-01-10 PROCEDURE — 99284 EMERGENCY DEPT VISIT MOD MDM: CPT | Performed by: EMERGENCY MEDICINE

## 2024-01-10 PROCEDURE — 81025 URINE PREGNANCY TEST: CPT | Performed by: EMERGENCY MEDICINE

## 2024-01-10 PROCEDURE — 73552 X-RAY EXAM OF FEMUR 2/>: CPT | Mod: LT

## 2024-01-10 RX ORDER — IBUPROFEN 600 MG/1
600 TABLET, FILM COATED ORAL EVERY 6 HOURS PRN
Qty: 20 TABLET | Refills: 0 | Status: SHIPPED | OUTPATIENT
Start: 2024-01-10

## 2024-01-10 ASSESSMENT — ACTIVITIES OF DAILY LIVING (ADL): ADLS_ACUITY_SCORE: 33

## 2024-01-10 NOTE — ED PROVIDER NOTES
"ED Provider Note  [facility]      History     Chief Complaint   Patient presents with    Leg Pain     Onset 5 days ago \"I fell and hurt my leg,\" yesterday increasing pain in left leg, no relief with tylenol.     The history is provided by the patient and medical records.     Arielle Montenegro is a 39 year old female with a history of DM1, hyperglycemia, blindness of right eye, episodic mood disorder, and depression presents to the ED for fall and leg pain.    Patient states that on Friday she was attempting to step over one of her children's toys in the living room, and as she was getting over she slipped on her leg.  She states that her leg \"went back far \".  She then had some left knee and leg pain. No head injury with this. Pain was mild initially and was ambulatory. Pain has gradually worsened since then but she notes walking a lot. She now states the pain is in her left ankle, foot, and knee.  Pain is worse with walking.  She took tylenol which helped a little.    Past Medical History  Past Medical History:   Diagnosis Date    Blindness of right eye     Diabetes mellitus type 1 (H)     Diagnosed as a child     Past Surgical History:   Procedure Laterality Date     SECTION  13     SECTION N/A 2015    Procedure:  SECTION;  Surgeon: John Hudson MD;  Location: [facility] L+[facility]     SECTION N/A 7/3/2020    Procedure:  SECTION;  Surgeon: Aparna Atkins MD;  Location: [facility] L+[facility]    ENUCLEATION Right 3/20/2015    Procedure: ENUCLEATION;  Surgeon: Edilson Islas MD;  Location: [facility]     ibuprofen (ADVIL/MOTRIN) 600 MG tablet  acetone urine (KETOSTIX) test strip  albuterol (PROAIR HFA/PROVENTIL HFA/VENTOLIN HFA) 108 (90 Base) MCG/ACT inhaler  alcohol swab prep pads  blood glucose (ACCU-CHEK GUIDE) test strip  blood glucose monitoring (SOFTCLIX) lancets  budesonide-formoterol (SYMBICORT) 160-4.5 MCG/ACT Inhaler  cetirizine (ZYRTEC) 10 MG " tablet  Continuous Blood Gluc Sensor (DEXCOM G6 SENSOR) MISC  Continuous Blood Gluc Transmit (DEXCOM G6 TRANSMITTER) MISC  FLUoxetine (PROZAC) 20 MG capsule  Glucagon (BAQSIMI) 3 MG/DOSE nasal powder  insulin degludec (TRESIBA FLEXTOUCH) 100 UNIT/ML pen  insulin pen needle (31G X 5 MM) 31G X 5 MM miscellaneous  NOVOLOG FLEXPEN 100 UNIT/ML soln  tretinoin (RETIN-A) 0.05 % external cream      No Known Allergies  Family History  Family History   Problem Relation Age of Onset    Family History Negative Mother     Family History Negative Father     Diabetes Other         father's side     Social History   Social History     Tobacco Use    Smoking status: Former     Packs/day: 0     Types: Cigarettes    Smokeless tobacco: Never    Tobacco comments:     smokes 2 cigarettes/day-none for several months   Vaping Use    Vaping Use: Never used   Substance Use Topics    Alcohol use: No     Alcohol/week: 0.0 standard drinks of alcohol    Drug use: No         A medically appropriate review of systems was performed with pertinent positives and negatives noted in the HPI, and all other systems negative.    Physical Exam   BP: 110/76  Pulse: 96  Temp: 98.5  F (36.9  C)  Resp: 16  SpO2: 100 %  Physical Exam  Constitutional:       General: She is not in acute distress.     Appearance: Normal appearance. She is not ill-appearing or toxic-appearing.   HENT:      Head: Normocephalic and atraumatic.      Mouth/Throat:      Mouth: Mucous membranes are moist.   Eyes:      Extraocular Movements: Extraocular movements intact.      Pupils: Pupils are equal, round, and reactive to light.   Cardiovascular:      Rate and Rhythm: Normal rate and regular rhythm.   Pulmonary:      Effort: Pulmonary effort is normal. No respiratory distress.   Musculoskeletal:         General: Normal range of motion.      Cervical back: Normal range of motion.      Comments: 2+ DP pulse  Some mild tenderness, non focal, along the medial aspect of the left knee. No  patellar tenderness  No other tenderness in the LLE  No joint laxity with valgus or varus testing or anterior/posterior drawer testing  Full ROM in all joints  Sensation intact throughout   Skin:     General: Skin is warm and dry.   Neurological:      Mental Status: She is alert.           ED Course, Procedures, & Data      Procedures                      Results for orders placed or performed during the hospital encounter of 01/10/24   XR Femur Left 2 Views     Status: None    Narrative    XR FEMUR LEFT 2 VIEWS 1/10/2024 11:41 AM     HISTORY: Trauma, pain    COMPARISON: None.         Impression    IMPRESSION: The left hip and femur are negative for fracture.    AMINA PIMENTEL MD         SYSTEM ID:  [id number]   XR Tibia and Fibula Left 2 Views     Status: None    Narrative    XR TIBIA AND FIBULA LEFT 2 VIEWS 1/10/2024 11:41 AM     HISTORY: Trauma, pain    COMPARISON: None.         Impression    IMPRESSION: The left tibia and fibula are negative for fracture. No  good AP projection is provided.    AMINA PIMENTEL MD         SYSTEM ID:  [id number]   XR Knee Left 1/2 Views     Status: None    Narrative    EXAM: KNEE LEFT ONE TO TWO VIEWS  DATE/TIME: 1/10/2024 11:46 AM     INDICATION: Left knee pain after trauma.  COMPARISON: 4/12/2016.      Impression    IMPRESSION: Normal joint spacing and alignment.  No fracture or joint  effusion.       MARGY JAIME MD         SYSTEM ID:  [id number]   hCG qual urine POCT     Status: None   Result Value Ref Range    HCG Qual Urine Negative Negative    Internal QC Check POCT Valid Valid    POCT Kit Lot Number 0     POCT Kit Expiration Date 0      Medications - No data to display  Labs Ordered and Resulted from Time of ED Arrival to Time of ED Departure - No data to display  XR Knee Left 1/2 Views   Final Result   IMPRESSION: Normal joint spacing and alignment.  No fracture or joint   effusion.          MARGY JAIME MD            SYSTEM ID:  [id number]      XR Femur Left 2 Views    Final Result   IMPRESSION: The left hip and femur are negative for fracture.      AMINA PIMENTEL MD            SYSTEM ID:  [id number]      XR Tibia and Fibula Left 2 Views   Final Result   IMPRESSION: The left tibia and fibula are negative for fracture. No   good AP projection is provided.      AMINA PIMENTEL MD            SYSTEM ID:  [id number]             Critical care was not performed.     Medical Decision Making  The patient's presentation was of low complexity (an acute and uncomplicated illness or injury).    The patient's evaluation involved:  ordering and/or review of 3+ test(s) in this encounter (xrays)    The patient's management necessitated moderate risk (prescription drug management including medications given in the ED).    Assessment & Plan    39yoF here with left leg pain after a mechanical fall. She was neurovascularly intact. Some mild tenderness to the medial aspect of the knee without point tenderness noted on exam. Xrays were done which were negative for acute fracture. No joint laxity on exam and she was ambulatory. Symptoms most likely representative of muscle or tendinous strain or contusion. I recommended she follow up with her PCP. A prescription for ibuprofen was provided. Return precautions discussed, all questions answered.    I have reviewed the nursing notes. I have reviewed the findings, diagnosis, plan and need for follow up with the patient.    Discharge Medication List as of 1/10/2024 12:25 PM        START taking these medications    Details   ibuprofen (ADVIL/MOTRIN) 600 MG tablet Take 1 tablet (600 mg) by mouth every 6 hours as needed for mild pain, Disp-20 tablet, R-0, E-Prescribe             Final diagnoses:   Pain of left lower extremity   ISergio, am serving as a trained medical scribe to document services personally performed by Nathanael Pack MD, based to the provider's statements to me.     I, Nathanael Pack MD, was physically present and have reviewed  and verified the accuracy of this note documented by Sergio Schroeder.       Nathanael Pack MD  [facility] EMERGENCY DEPARTMENT  1/10/2024     Nathanael Pack MD  01/10/24 0062

## 2024-02-13 NOTE — TELEPHONE ENCOUNTER
Hello,    The PA for Dexcom G7 Sensor will be expiring on 02/27/2024. I attempted to initiate a PA Renewal however two things came up: 1. Pt doesn't have active insurance. 2. There's no active prescription for Dexcom G7 Sensor.    Thank You!    Tee Steven [facility] Pharmacy Liaison  [facility] Satinder [email or web address]  Phone: [phone]  Fax: [phone]

## 2024-02-14 NOTE — TELEPHONE ENCOUNTER
Hello,    I thought the G6 was all that I saw as well. I don't know if there's a PA in place for them. I assume that the G7 may allow the G6 to be filled as well. The issue as of now is that it doesn't seem that the patient has current active insurance.    Thank You!    Tee Steven [facility] Pharmacy Liaison  [facility]  [email or web address]  Phone: [phone]  Fax: [phone]

## 2024-02-16 ENCOUNTER — TELEPHONE (OUTPATIENT)
Dept: ENDOCRINOLOGY | Facility: CLINIC | Age: 39
End: 2024-02-16

## 2024-02-16 NOTE — TELEPHONE ENCOUNTER
If patient is seen, make sure she brings in updated current active pharmacy insurance. I still cannot find it. Please send new rx for Dexcom as well so I can pursue PA if needed.    Thank You!    Tee Steven [facility] Pharmacy Liaison  [email or web address]  Phone: [phone]  Fax: [phone]

## 2024-02-16 NOTE — TELEPHONE ENCOUNTER
Hello,    I'm not entirely sure because when I did the PA on 2/27/23 it was for the Dexcom G7 when there was an active prescription:      But the active prescription from 11/14/23 is the G6:      I guess it's up to the provider.    Thank You!    Tee Steven [facility] Pharmacy Liaison  [facility] Satinder [email or web address]  Phone: [phone]  Fax: [phone]

## 2024-02-16 NOTE — PROGRESS NOTES
"Outcome for 02/16/24 3:32 PM: Glucose readings are under \"labs\" from patients recent hospitalization.   Karla Corral MA  Outcome for 02/16/24 3:33 PM: Left Voicemail   Karla Corral MA  Outcome for 02/19/24 11:24 AM: Left Voicemail   Karla Corral MA  Outcome for 02/19/24 2:23 PM: Left Voicemail   Karla Corral MA  Outcome for 02/20/24 10:15 AM:  Pt states she has not been checking BG the last two weeks. Pt states she is unable to upload any data.   Krala Corral MA    Start time: 1035  End time: 1100  Provider location: off site- home  Patient location: off site- home.    HPI:   Arielle is a 40 yo woman here for follow up of type 1 diabetes with a history of severe hypoglycemia and hypoglycemia unawareness.  Unfortunately, she had another severe hypoglycemic event where her daughter could not wake her and she called 911.  Patient reported that she had been taking benadryl for allergies and also had taken insulin and got busy and forgot to eat.  She was able to drink some juice before paramedics arrived, but she was very confused.  This was a very frustrating experience for her because she was made to feel like she did this on purpose and that she does not care about her kids.  Child protection was involved, which was very scary for Arielle.  She is terrified of losing her kids and feels like she would rather run high sugars than risk going low again.  She wants to train her daughter how to use glucagon.  She is now having her cousin come and help her out more.      Since the hospitalization, she has been taking much less insulin.  She is strictly avoiding lows.  She is worried because she can not feel her lows.  She had a dexcom sensor, but ran out and has not been able to fill this.  She would like to restart this. She is also really interested in starting an Omnipod pump, but was told by her diabetes educator that she could not do this unless she is carb counting.  She has not looked into other " pumps, such as the beta bionics.  She is no longer on the GLP-1 agonist.      Skin is really bothering her.  Treatment given by dermatology several years ago is not working well.  Wants me to prescribe more cream.  Reviewed note from derm from 2019.     Diabetes history: She reports that she was diagnosed with type 1 dm around age 8 when she became sick while living in [place]. She has been on insulin since diagnosis.         Her current treatment regimen is as follows:   Tresiba- 6 units daily (previously on 14)   Novolog- does not take it before she eats- 3-5 units, mostly 3.  She takes it after eating.  If she remembers, she takes it before she eats. She knows she needs to work on this.     I have no glucose data to review today. She reports not having any known low blood sugars or loss of consciousness since the ER visit.     Past Medical History:  Blindness of right eye  Type 1 diabetes  Hypoglycemia unawareness  Vitamin D deficiency  Anxiety  Depression  Overweight     Past Surgical History:   section - ,   Enucleation right -     Family History:   Diabetes - paternal side of family       Social History:     Single mom.  Kids now 12, 10 and 3 years old.  One son with ADHD and obese with prediabetes, not type 1.  Other might have autism.  Works for a [employer] overnight from home (remote work).  Tries to sleep during the day. She does not sleep much.     Diabetes Control:   Lab Results   Component Value Date    A1C 5.8 2020    A1C 6.4 2020    A1C 7.5 2020    A1C 7.8 2020    A1C 12.6 2019       Past Medical History:   Diagnosis Date    Blindness of right eye     Diabetes mellitus type 1 (H)     Diagnosed as a child       Past Surgical History:   Procedure Laterality Date     SECTION  13     SECTION N/A 2015    Procedure:  SECTION;  Surgeon: John Hudson MD;  Location: [facility]     SECTION N/A 7/3/2020     Procedure:  SECTION;  Surgeon: Aparna Atkins MD;  Location: [facility]    ENUCLEATION Right 3/20/2015    Procedure: ENUCLEATION;  Surgeon: Edilson Islas MD;  Location: [facility]       Family History   Problem Relation Age of Onset    Family History Negative Mother     Family History Negative Father     Diabetes Other         father's side       Social History     Socioeconomic History    Marital status: Single    Number of children: 1   Occupational History     Employer: UNEMPLOYED   Tobacco Use    Smoking status: Former Smoker     Packs/day: 0.00     Types: Cigarettes    Smokeless tobacco: Never Used    Tobacco comment: smokes 2 cigarettes/day-none for several months   Substance and Sexual Activity    Alcohol use: No     Alcohol/week: 0.0 standard drinks    Drug use: No    Sexual activity: Yes     Partners: Male     Birth control/protection: None   Social History Narrative    ** Merged History Encounter **         Caffeine intake/servings daily - 0    Calcium intake/servings daily - 3    Exercise 7 times weekly - describe walking    Sunscreen used - No    Seatbelts used - Yes    Guns stored in the home - No    Self Breast Exam - Yes    Pap test up to date -  No    Eye exam up to date -  Yes    Dental exam up to date -  Yes    DEXA scan up to date -  Not Applicable    Flex Sig/Colonoscopy up to date -  Not Applicable    Mammography up to date -  Not Applicable    Immunizations reviewed and up to date - No    Abuse: Current or Past (Physical, Sexual or Emotional) - No    Do you feel safe in your environment - Yes    Do you cope well with stress - Yes    Do you suffer from insomnia - No    Last updated by: KARL PELAEZ  2012                       Current Outpatient Medications   Medication    acetone urine (KETOSTIX) test strip    albuterol (PROAIR HFA/PROVENTIL HFA/VENTOLIN HFA) 108 (90 Base) MCG/ACT inhaler    alcohol swab prep pads    blood glucose (ACCU-CHEK GUIDE) test strip    blood  glucose monitoring (SOFTCLIX) lancets    budesonide-formoterol (SYMBICORT) 160-4.5 MCG/ACT Inhaler    cetirizine (ZYRTEC) 10 MG tablet    Continuous Blood Gluc Sensor (DEXCOM G6 SENSOR) MISC    Continuous Blood Gluc Transmit (DEXCOM G6 TRANSMITTER) MISC    FLUoxetine (PROZAC) 20 MG capsule    Glucagon (BAQSIMI) 3 MG/DOSE nasal powder    ibuprofen (ADVIL/MOTRIN) 600 MG tablet    insulin degludec (TRESIBA FLEXTOUCH) 100 UNIT/ML pen    insulin pen needle (31G X 5 MM) 31G X 5 MM miscellaneous    NOVOLOG FLEXPEN 100 UNIT/ML soln    tretinoin (RETIN-A) 0.05 % external cream     No current facility-administered medications for this visit.        No Known Allergies    Physical Exam  There were no vitals taken for this visit.  GENERAL: healthy, alert and no distress  RESP: no audible wheeze, cough, or visible cyanosis.  No visible retractions or increased work of breathing.  Able to speak fully in complete sentences.  PSYCH: mentation appears normal, affect normal/bright, judgement and insight intact, normal speech and appearance well-groomed    RESULTS  Lab Results   Component Value Date    A1C 9.6 (H) 11/05/2023    A1C 10.2 (H) 03/21/2023    A1C 10.1 (H) 06/22/2022    A1C 5.8 (H) 07/04/2020    A1C 6.4 (H) 06/11/2020    A1C 7.5 (H) 01/28/2020    A1C 7.8 (H) 01/02/2020    A1C 12.6 (H) 09/20/2019       TSH   Date Value Ref Range Status   05/30/2019 0.877 0.358 - 3.740 UIU/mL Final   05/11/2017 1.19 0.40 - 4.00 mU/L Final   09/30/2014 1.90 0.40 - 4.00 mU/L Final     Comment:     Effective 7/30/2014, the reference range for this assay has changed to reflect   new instrumentation/methodology.         ALT   Date Value Ref Range Status   11/05/2023 11 0 - 50 U/L Final     Comment:     Reference intervals for this test were updated on 6/12/2023 to more accurately reflect our healthy population. There may be differences in the flagging of prior results with similar values performed with this method. Interpretation of those prior  results can be made in the context of the updated reference intervals.     08/18/2023 12 0 - 50 U/L Final     Comment:     Reference intervals for this test were updated on 6/12/2023 to more accurately reflect our healthy population. There may be differences in the flagging of prior results with similar values performed with this method. Interpretation of those prior results can be made in the context of the updated reference intervals.     07/05/2020 24 0 - 50 U/L Final   07/04/2020 27 0 - 50 U/L Final   ]    Recent Labs   Lab Test 09/20/19  1425 03/11/19  0000   CHOL 181 147.6   HDL 71 52.4   * 72   TRIG 45 115.8       Lab Results   Component Value Date     11/05/2023     09/20/2019      Lab Results   Component Value Date    POTASSIUM 4.5 11/05/2023    POTASSIUM 3.8 08/12/2021    POTASSIUM 4.0 01/24/2020     Lab Results   Component Value Date    CHLORIDE 110 11/05/2023    CHLORIDE 100 08/12/2021    CHLORIDE 95 09/20/2019     Lab Results   Component Value Date    ALEXANDRO 9.1 11/05/2023    ALEXANDRO 8.5 09/20/2019     Lab Results   Component Value Date    CO2 22 11/05/2023    CO2 25 08/12/2021    CO2 22 09/20/2019     Lab Results   Component Value Date    BUN 12.5 11/05/2023    BUN 12 08/12/2021    BUN 13 09/20/2019     Lab Results   Component Value Date    CR 0.73 11/05/2023    CR 0.91 07/05/2020       GFR Estimate   Date Value Ref Range Status   11/05/2023 >90 >60 mL/min/1.73m2 Final   08/18/2023 65 >60 mL/min/1.73m2 Final   06/09/2023 >90 >60 mL/min/1.73m2 Final     Comment:     eGFR calculated using 2021 CKD-EPI equation.   07/05/2020 82 >60 mL/min/[1.73_m2] Final     Comment:     Non  GFR Calc  Starting 12/18/2018, serum creatinine based estimated GFR (eGFR) will be   calculated using the Chronic Kidney Disease Epidemiology Collaboration   (CKD-EPI) equation.     07/04/2020 85 >60 mL/min/[1.73_m2] Final     Comment:     Non  GFR Calc  Starting 12/18/2018, serum  "creatinine based estimated GFR (eGFR) will be   calculated using the Chronic Kidney Disease Epidemiology Collaboration   (CKD-EPI) equation.     07/03/2020 75 >60 mL/min/[1.73_m2] Final     Comment:     Non  GFR Calc  Starting 12/18/2018, serum creatinine based estimated GFR (eGFR) will be   calculated using the Chronic Kidney Disease Epidemiology Collaboration   (CKD-EPI) equation.       GFR Estimate If Black   Date Value Ref Range Status   07/05/2020 >90 >60 mL/min/[1.73_m2] Final     Comment:      GFR Calc  Starting 12/18/2018, serum creatinine based estimated GFR (eGFR) will be   calculated using the Chronic Kidney Disease Epidemiology Collaboration   (CKD-EPI) equation.     07/04/2020 >90 >60 mL/min/[1.73_m2] Final     Comment:      GFR Calc  Starting 12/18/2018, serum creatinine based estimated GFR (eGFR) will be   calculated using the Chronic Kidney Disease Epidemiology Collaboration   (CKD-EPI) equation.     07/03/2020 87 >60 mL/min/[1.73_m2] Final     Comment:      GFR Calc  Starting 12/18/2018, serum creatinine based estimated GFR (eGFR) will be   calculated using the Chronic Kidney Disease Epidemiology Collaboration   (CKD-EPI) equation.         Lab Results   Component Value Date    MICROL 172 09/30/2014     No results found for: \"MICROALBUMIN\"  Lab Results   Component Value Date    CPEPT <0.1 (L) 09/30/2014       No results found for: \"B12\"]    Most recent eye exam date: : Not Found     Assessment/Plan:     1.  Type 1 diabetes-  I am unable to assess her level of control.  Currently, major issue is hypoglycemia unawareness and severe hypoglycemia with recent ambulance call.  Discussed absolute necessity to have the Dexcom sensor on at all times.  She has no backup meter, so I sent that with testing supplies.  Although she is not an ideal candidate for the Omnipod pump (does not currently carb count), I feel like she could do much better with " an automated system, provided she take the time to go to all the pre-pump education visits.  I will reach out to DM education- could also consider the Beta bionics for simplicity, but I am concerned about potential hypoglycemia if she is not regularly announcing insulin before eating.  Would suggest staying on a low dose of basal insulin once she starts the pump to prevent DKA in case of pump failure. She is highly motivated to have more stable glucose control, particularly as she is concerned about possibly losing her children.  We made the following plan today (instructions given to patient):        Emergency issues: Please contact the clinic as soon as you recognize a problem.  Here are some concerns you should contact us about.  -Vomiting: more than twice.  Please check ketones.  If positive, go to ER. Monitor glucose hourly.   -High glucose (over 300 mg/dL twice in a row): Please check ketones.  If ketones are negative, take an insulin correction and recheck glucose in 1 hour.  If glucose is not coming down, please call the clinic. If ketones are moderate or large, drink lots of water, take an insulin correction, and recheck ketones in 1 hour.  If ketones are still high (or you are vomiting), go to the ER.  -Hypoglycemia (low glucose):   If glucose is less than 70 mg/dL, treat with 15g carb (4 glucose tablets), recheck glucose in 10 minutes.  If low again, repeat.   If glucose is less than 54 mg/dL, treat with 30g carb, recheck glucose in 10 minutes.  If low again, repeat.  Keep glucagon in your home in case of severe hypoglycemia and train someone how to use this.    Emergency kit (please ensure you always have these with you):   Glucose tablets  Glucagon  Insulin  Syringes (if on a pump)  Extra infusion set (if on a pump)  Ketone strips    Contact information:   If you have concerns, please send me a [employer] message or call the clinic at [phone].  For more urgent concerns, please call [phone] after  hours/weekends and ask to speak with the endocrinologist on call.      Please let me know if you are having low blood sugars less than 70 or over 350 mg/dL.  Do not wait until your next appointment if this is happening.    Emergency issues: Please contact the clinic as soon as you recognize a problem.  Here are some concerns you should contact us about.  -Vomiting: more than twice.  Please check ketones.  If positive, go to ER. Monitor glucose hourly.   -High glucose (over 300 mg/dL twice in a row): Please check ketones.  If ketones are negative, take an insulin correction and recheck glucose in 1 hour.  If glucose is not coming down, please call the clinic. If ketones are moderate or large, drink lots of water, take an insulin correction, and recheck ketones in 1 hour.  If ketones are still high (or you are vomiting), go to the ER.  -Hypoglycemia (low glucose):   If glucose is less than 70 mg/dL, treat with 15g carb (4 glucose tablets), recheck glucose in 10 minutes.  If low again, repeat.   If glucose is less than 54 mg/dL, treat with 30g carb, recheck glucose in 10 minutes.  If low again, repeat.  Keep glucagon in your home in case of severe hypoglycemia and train someone how to use this.    Emergency kit (please ensure you always have these with you):   Glucose tablets  Glucagon  Insulin  Syringes (if on a pump)  Extra infusion set (if on a pump)  Ketone strips    Contact information:   If you have concerns, please send me a [employer] message or call the clinic at [phone].  For more urgent concerns, please call [phone] after hours/weekends and ask to speak with the endocrinologist on call.      Please let me know if you are having low blood sugars less than 70 or over 350 mg/dL.  Do not wait until your next appointment if this is happening.         2.  Risk factors-     Retinopathy:  No known history.  Scheduled for eye exam, but missed appt.  Encouraged her to reschedule.  Has not had exam in years.   "  Nephropathy:  BP historically well controlled. No microalbuminuria. Needs to recheck.  Creatinine stable. Overdue for labs.  Asked her to schedule.  Neuropathy: No.    Feet: OK, no ulcers.   Lipids:  LDL at target.   Thyroid screening: will check TSH. Feeling very tired.   Celiac screening: Does not appear to have been screened.  Will check antibodies.   Contraception: did not address today.   Depression: Met with health psychology and it was not a good fit.  Not interested in rescheduling.  \"I'm a good mom and take care of my kids.\" Needs to schedule annual physical with PCP.    She is overdue for labs and would like to reschedule.      3. 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 Asked her to schedule with dermatology for further evaluation.  Placed referral.     4.  F/U in 1 mos with me, sooner with concerns/hypoglycemia. Will meet with DM education in the interim.      42 minutes spent on the date of the encounter doing chart review, review of test results, patient visit and documentation, counseling/coordination of care, and discussion of follow up plan for worsening hyper and hypoglycemia.  The patient understood and is satisfied with today's visit.     Kym Jang PA-C, MPAS   [facility]  Department of Medicine  Division of Endocrinology and Diabetes                                                    "

## 2024-02-16 NOTE — TELEPHONE ENCOUNTER
Called patient and left voicemail. Patient has appointment on  2/20/24 . Need to collect 14 days of blood sugar readings if patient is using meter, or if patient is using CGM, need to get patients device uploaded to retrieve report for provider to review.  Karla Corral MA

## 2024-02-20 ENCOUNTER — VIRTUAL VISIT (OUTPATIENT)
Dept: ENDOCRINOLOGY | Facility: CLINIC | Age: 39
End: 2024-02-20
Payer: COMMERCIAL

## 2024-02-20 DIAGNOSIS — E10.649 TYPE 1 DIABETES MELLITUS WITH HYPOGLYCEMIA AND WITHOUT COMA (H): ICD-10-CM

## 2024-02-20 DIAGNOSIS — L87.1 REACTIVE PERFORATING COLLAGENOSIS: Primary | ICD-10-CM

## 2024-02-20 PROCEDURE — 99215 OFFICE O/P EST HI 40 MIN: CPT | Mod: 95 | Performed by: PHYSICIAN ASSISTANT

## 2024-02-20 PROCEDURE — G2211 COMPLEX E/M VISIT ADD ON: HCPCS | Mod: 95 | Performed by: PHYSICIAN ASSISTANT

## 2024-02-20 RX ORDER — PROCHLORPERAZINE 25 MG/1
SUPPOSITORY RECTAL
Qty: 1 EACH | Refills: 3 | Status: SHIPPED | OUTPATIENT
Start: 2024-02-20 | End: 2024-09-23

## 2024-02-20 RX ORDER — PROCHLORPERAZINE 25 MG/1
SUPPOSITORY RECTAL
Qty: 9 EACH | Refills: 3 | Status: SHIPPED | OUTPATIENT
Start: 2024-02-20 | End: 2024-09-23

## 2024-02-20 NOTE — PATIENT INSTRUCTIONS
Restart the Dexcom.  You must ALWAYS wear this, as it can save your life.     No changes in your insulin dosing today.  Please take 3 units right BEFORE each meal.  Taking it after can make you VERY low.     Schedule labs.  No need for fasting.  Lab [phone]    Schedule to meet with Diabetes education about the Omnipod pump or Beta bionics.     [email or web address]  [email or web address]  [email or web address]  [email or web address]      Emergency issues: Please contact the clinic as soon as you recognize a problem.  Here are some concerns you should contact us about.  -Vomiting: more than twice.  Please check ketones.  If positive, go to ER. Monitor glucose hourly.   -High glucose (over 300 mg/dL twice in a row): Please check ketones.  If ketones are negative, take an insulin correction and recheck glucose in 1 hour.  If glucose is not coming down, please call the clinic. If ketones are moderate or large, drink lots of water, take an insulin correction, and recheck ketones in 1 hour.  If ketones are still high (or you are vomiting), go to the ER.  -Hypoglycemia (low glucose):   If glucose is less than 70 mg/dL, treat with 15g carb (4 glucose tablets), recheck glucose in 10 minutes.  If low again, repeat.   If glucose is less than 54 mg/dL, treat with 30g carb, recheck glucose in 10 minutes.  If low again, repeat.  Keep glucagon in your home in case of severe hypoglycemia and train someone how to use this.    Emergency kit (please ensure you always have these with you):   Glucose tablets  Glucagon  Insulin  Syringes (if on a pump)  Extra infusion set (if on a pump)  Ketone strips    Contact information:   If you have concerns, please send me a [employer] message or call the clinic at [phone].  For more urgent concerns, please call [phone] after hours/weekends and ask to speak with the endocrinologist on call.      Please let me know if you  are having low blood sugars less than 70 or over 350 mg/dL.  Do not wait until your next appointment if this is happening.

## 2024-02-20 NOTE — Clinical Note
Syed carcamo.  Arielle is a mess.  Needs to get on dexcom ASAP.  Looks like she may an insurance issue.  Lauro- can you please help her out on this?  I just sent a new prescription. Would she do better with mail order pharmacy?   Yarelis- I think it is worth a try with a pump.  I discussed both omnipod and beta bionics.  She could just take set doses with each meal (or tell her to put in 45g at each meal and make the carb ratio 1/15, for example).  I think she would be much safer with this compared to what she is doing now.  We can talk about her in the meeting tomorrow.   She told her daughter NOT to call 911 in the future, just wants her to give glucagon.  Terrified child protection will take her kids.  Such a tough situation.    Let me know if you have any thoughts... Kym

## 2024-02-20 NOTE — LETTER
"2/20/2024       RE: Arielle Montenegro  [address]  [place] [address]     Dear Colleague,    Thank you for referring your patient, Arielle Montenegro, to the [facility] ENDOCRINOLOGY CLINIC [place] [facility]. Please see a copy of my visit note below.    Outcome for 02/16/24 3:32 PM: Glucose readings are under \"labs\" from patients recent hospitalization.   Karla Corral MA  Outcome for 02/16/24 3:33 PM: Left Voicemail   Karla Corral MA  [facility]/19/24 11:24 AM: [facility]   Karla Corral MA  Outcome for 02/19/24 2:23 PM: Left [facility]   Karla Corral MA  Outcome for 02/20/24 10:15 AM:  Pt states she has not been checking BG the last two weeks. Pt states she is unable to upload any data.   Karla Corral MA    Start time: 1035  End time: 1100  Provider location: off site- home  Patient location: off site- home.    HPI:   Arielle is a 40 yo woman here for follow up of type 1 diabetes with a history of severe hypoglycemia and hypoglycemia unawareness.  Unfortunately, she had another severe hypoglycemic event where her daughter could not wake her and she called 911.  Patient reported that she had been taking benadryl for allergies and also had taken insulin and got busy and forgot to eat.  She was able to drink some juice before paramedics arrived, but she was very confused.  This was a very frustrating experience for her because she was made to feel like she did this on purpose and that she does not care about her kids.  Child protection was involved, which was very scary for Arielle.  She is terrified of losing her kids and feels like she would rather run high sugars than risk going low again.  She wants to train her daughter how to use glucagon.  She is now having her cousin come and help her out more.      Since the hospitalization, she has been taking much less insulin.  She is strictly avoiding lows.  She is worried " because she can not feel her lows.  She had a dexcom sensor, but ran out and has not been able to fill this.  She would like to restart this. She is also really interested in starting an Omnipod pump, but was told by her diabetes educator that she could not do this unless she is carb counting.  She has not looked into other pumps, such as the beta bionics.  She is no longer on the GLP-1 agonist.      Skin is really bothering her.  Treatment given by dermatology several years ago is not working well.  Wants me to prescribe more cream.  Reviewed note from derm from 2019.     Diabetes history: She reports that she was diagnosed with type 1 dm around age 8 when she became sick while living in [place]. She has been on insulin since diagnosis.         Her current treatment regimen is as follows:   Tresiba- 6 units daily (previously on 14)   Novolog- does not take it before she eats- 3-5 units, mostly 3.  She takes it after eating.  If she remembers, she takes it before she eats. She knows she needs to work on this.     I have no glucose data to review today. She reports not having any known low blood sugars or loss of consciousness since the ER visit.     Past Medical History:  Blindness of right eye  Type 1 diabetes  Hypoglycemia unawareness  Vitamin D deficiency  Anxiety  Depression  Overweight     Past Surgical History:   section - , 2015  Enucleation right - 2015    Family History:   Diabetes - paternal side of family       Social History:     Single mom.  Kids now 12, 10 and 3 years old.  One son with ADHD and obese with prediabetes, not type 1.  Other might have autism.  Works for a [employer] company overnight from home (remote work).  Tries to sleep during the day. She does not sleep much.     Diabetes Control:   Lab Results   Component Value Date    A1C 5.8 2020    A1C 6.4 2020    A1C 7.5 2020    A1C 7.8 2020    A1C 12.6 2019       Past Medical History:   Diagnosis Date     Blindness of right eye     Diabetes mellitus type 1 (H)     Diagnosed as a child       Past Surgical History:   Procedure Laterality Date     SECTION  13     SECTION N/A 2015    Procedure:  SECTION;  Surgeon: John Hudson MD;  Location: [facility] L+[facility]     SECTION N/A 7/3/2020    Procedure:  SECTION;  Surgeon: Aparna Atkins MD;  Location: [facility] L+[facility]    ENUCLEATION Right 3/20/2015    Procedure: ENUCLEATION;  Surgeon: Edilson Islas MD;  [facility]       Family History   Problem Relation Age of Onset    Family History Negative Mother     Family History Negative Father     Diabetes Other         father's side       Social History     Socioeconomic History    Marital status: Single    Number of children: 1   Occupational History     Employer: UNEMPLOYED   Tobacco Use    Smoking status: Former Smoker     Packs/day: 0.00     Types: Cigarettes    Smokeless tobacco: Never Used    Tobacco comment: smokes 2 cigarettes/day-none for several months   Substance and Sexual Activity    Alcohol use: No     Alcohol/week: 0.0 standard drinks    Drug use: No    Sexual activity: Yes     Partners: Male     Birth control/protection: None   Social History Narrative    ** Merged History Encounter **         Caffeine intake/servings daily - 0    Calcium intake/servings daily - 3    Exercise 7 times weekly - describe walking    Sunscreen used - No    Seatbelts used - Yes    Guns stored in the home - No    Self Breast Exam - Yes    Pap test up to date -  No    Eye exam up to date -  Yes    Dental exam up to date -  Yes    DEXA scan up to date -  Not Applicable    Flex Sig/Colonoscopy up to date -  Not Applicable    Mammography up to date -  Not Applicable    Immunizations reviewed and up to date - No    Abuse: Current or Past (Physical, Sexual or Emotional) - No    Do you feel safe in your environment - Yes    Do you cope well with stress - Yes    Do you suffer from insomnia - No     Last updated by: KARL PELAEZ  7/18/2012                       Current Outpatient Medications   Medication    acetone urine (KETOSTIX) test strip    albuterol (PROAIR HFA/PROVENTIL HFA/VENTOLIN HFA) 108 (90 Base) MCG/ACT inhaler    alcohol swab prep pads    blood glucose (ACCU-CHEK GUIDE) test strip    blood glucose monitoring (SOFTCLIX) lancets    budesonide-formoterol (SYMBICORT) 160-4.5 MCG/ACT Inhaler    cetirizine (ZYRTEC) 10 MG tablet    Continuous Blood Gluc Sensor (DEXCOM G6 SENSOR) MISC    Continuous Blood Gluc Transmit (DEXCOM G6 TRANSMITTER) MISC    FLUoxetine (PROZAC) 20 MG capsule    Glucagon (BAQSIMI) 3 MG/DOSE nasal powder    ibuprofen (ADVIL/MOTRIN) 600 MG tablet    insulin degludec (TRESIBA FLEXTOUCH) 100 UNIT/ML pen    insulin pen needle (31G X 5 MM) 31G X 5 MM miscellaneous    NOVOLOG FLEXPEN 100 UNIT/ML soln    tretinoin (RETIN-A) 0.05 % external cream     No current facility-administered medications for this visit.        No Known Allergies    Physical Exam  There were no vitals taken for this visit.  GENERAL: healthy, alert and no distress  RESP: no audible wheeze, cough, or visible cyanosis.  No visible retractions or increased work of breathing.  Able to speak fully in complete sentences.  PSYCH: mentation appears normal, affect normal/bright, judgement and insight intact, normal speech and appearance well-groomed    RESULTS  Lab Results   Component Value Date    A1C 9.6 (H) 11/05/2023    A1C 10.2 (H) 03/21/2023    A1C 10.1 (H) 06/22/2022    A1C 5.8 (H) 07/04/2020    A1C 6.4 (H) 06/11/2020    A1C 7.5 (H) 01/28/2020    A1C 7.8 (H) 01/02/2020    A1C 12.6 (H) 09/20/2019       TSH   Date Value Ref Range Status   05/30/2019 0.877 0.358 - 3.740 UIU/mL Final   05/11/2017 1.19 0.40 - 4.00 mU/L Final   09/30/2014 1.90 0.40 - 4.00 mU/L Final     Comment:     Effective 7/30/2014, the reference range for this assay has changed to reflect   new instrumentation/methodology.         ALT   Date Value  Ref Range Status   11/05/2023 11 0 - 50 U/L Final     Comment:     Reference intervals for this test were updated on 6/12/2023 to more accurately reflect our healthy population. There may be differences in the flagging of prior results with similar values performed with this method. Interpretation of those prior results can be made in the context of the updated reference intervals.     08/18/2023 12 0 - 50 U/L Final     Comment:     Reference intervals for this test were updated on 6/12/2023 to more accurately reflect our healthy population. There may be differences in the flagging of prior results with similar values performed with this method. Interpretation of those prior results can be made in the context of the updated reference intervals.     07/05/2020 24 0 - 50 U/L Final   07/04/2020 27 0 - 50 U/L Final   ]    Recent Labs   Lab Test 09/20/19  1425 03/11/19  0000   CHOL 181 147.6   HDL 71 52.4   * 72   TRIG 45 115.8       Lab Results   Component Value Date     11/05/2023     09/20/2019      Lab Results   Component Value Date    POTASSIUM 4.5 11/05/2023    POTASSIUM 3.8 08/12/2021    POTASSIUM 4.0 01/24/2020     Lab Results   Component Value Date    CHLORIDE 110 11/05/2023    CHLORIDE 100 08/12/2021    CHLORIDE 95 09/20/2019     Lab Results   Component Value Date    ALEXANDRO 9.1 11/05/2023    ALEXANDRO 8.5 09/20/2019     Lab Results   Component Value Date    CO2 22 11/05/2023    CO2 25 08/12/2021    CO2 22 09/20/2019     Lab Results   Component Value Date    BUN 12.5 11/05/2023    BUN 12 08/12/2021    BUN 13 09/20/2019     Lab Results   Component Value Date    CR 0.73 11/05/2023    CR 0.91 07/05/2020       GFR Estimate   Date Value Ref Range Status   11/05/2023 >90 >60 mL/min/1.73m2 Final   08/18/2023 65 >60 mL/min/1.73m2 Final   06/09/2023 >90 >60 mL/min/1.73m2 Final     Comment:     eGFR calculated using 2021 CKD-EPI equation.   07/05/2020 82 >60 mL/min/[1.73_m2] Final     Comment:     Non   "American GFR Calc  Starting 12/18/2018, serum creatinine based estimated GFR (eGFR) will be   calculated using the Chronic Kidney Disease Epidemiology Collaboration   (CKD-EPI) equation.     07/04/2020 85 >60 mL/min/[1.73_m2] Final     Comment:     Non  GFR Calc  Starting 12/18/2018, serum creatinine based estimated GFR (eGFR) will be   calculated using the Chronic Kidney Disease Epidemiology Collaboration   (CKD-EPI) equation.     07/03/2020 75 >60 mL/min/[1.73_m2] Final     Comment:     Non  GFR Calc  Starting 12/18/2018, serum creatinine based estimated GFR (eGFR) will be   calculated using the Chronic Kidney Disease Epidemiology Collaboration   (CKD-EPI) equation.       GFR Estimate If Black   Date Value Ref Range Status   07/05/2020 >90 >60 mL/min/[1.73_m2] Final     Comment:      GFR Calc  Starting 12/18/2018, serum creatinine based estimated GFR (eGFR) will be   calculated using the Chronic Kidney Disease Epidemiology Collaboration   (CKD-EPI) equation.     07/04/2020 >90 >60 mL/min/[1.73_m2] Final     Comment:      GFR Calc  Starting 12/18/2018, serum creatinine based estimated GFR (eGFR) will be   calculated using the Chronic Kidney Disease Epidemiology Collaboration   (CKD-EPI) equation.     07/03/2020 87 >60 mL/min/[1.73_m2] Final     Comment:      GFR Calc  Starting 12/18/2018, serum creatinine based estimated GFR (eGFR) will be   calculated using the Chronic Kidney Disease Epidemiology Collaboration   (CKD-EPI) equation.         Lab Results   Component Value Date    MICROL 172 09/30/2014     No results found for: \"MICROALBUMIN\"  Lab Results   Component Value Date    CPEPT <0.1 (L) 09/30/2014       No results found for: \"B12\"]    Most recent eye exam date: : Not Found     Assessment/Plan:     1.  Type 1 diabetes-  I am unable to assess her level of control.  Currently, major issue is hypoglycemia unawareness and severe " hypoglycemia with recent ambulance call.  Discussed absolute necessity to have the Dexcom sensor on at all times.  She has no backup meter, so I sent that with testing supplies.  Although she is not an ideal candidate for the Omnipod pump (does not currently carb count), I feel like she could do much better with an automated system, provided she take the time to go to all the pre-pump education visits.  I will reach out to DM education- could also consider the Beta bionics for simplicity, but I am concerned about potential hypoglycemia if she is not regularly announcing insulin before eating.  Would suggest staying on a low dose of basal insulin once she starts the pump to prevent DKA in case of pump failure. She is highly motivated to have more stable glucose control, particularly as she is concerned about possibly losing her children.  We made the following plan today (instructions given to patient):        Emergency issues: Please contact the clinic as soon as you recognize a problem.  Here are some concerns you should contact us about.  -Vomiting: more than twice.  Please check ketones.  If positive, go to ER. Monitor glucose hourly.   -High glucose (over 300 mg/dL twice in a row): Please check ketones.  If ketones are negative, take an insulin correction and recheck glucose in 1 hour.  If glucose is not coming down, please call the clinic. If ketones are moderate or large, drink lots of water, take an insulin correction, and recheck ketones in 1 hour.  If ketones are still high (or you are vomiting), go to the ER.  -Hypoglycemia (low glucose):   If glucose is less than 70 mg/dL, treat with 15g carb (4 glucose tablets), recheck glucose in 10 minutes.  If low again, repeat.   If glucose is less than 54 mg/dL, treat with 30g carb, recheck glucose in 10 minutes.  If low again, repeat.  Keep glucagon in your home in case of severe hypoglycemia and train someone how to use this.    Emergency kit (please ensure you  always have these with you):   Glucose tablets  Glucagon  Insulin  Syringes (if on a pump)  Extra infusion set (if on a pump)  Ketone strips    Contact information:   If you have concerns, please send me a Eyelation message or call the clinic at [phone].  For more urgent concerns, please call [phone] after hours/weekends and ask to speak with the endocrinologist on call.      Please let me know if you are having low blood sugars less than 70 or over 350 mg/dL.  Do not wait until your next appointment if this is happening.    Emergency issues: Please contact the clinic as soon as you recognize a problem.  Here are some concerns you should contact us about.  -Vomiting: more than twice.  Please check ketones.  If positive, go to ER. Monitor glucose hourly.   -High glucose (over 300 mg/dL twice in a row): Please check ketones.  If ketones are negative, take an insulin correction and recheck glucose in 1 hour.  If glucose is not coming down, please call the clinic. If ketones are moderate or large, drink lots of water, take an insulin correction, and recheck ketones in 1 hour.  If ketones are still high (or you are vomiting), go to the ER.  -Hypoglycemia (low glucose):   If glucose is less than 70 mg/dL, treat with 15g carb (4 glucose tablets), recheck glucose in 10 minutes.  If low again, repeat.   If glucose is less than 54 mg/dL, treat with 30g carb, recheck glucose in 10 minutes.  If low again, repeat.  Keep glucagon in your home in case of severe hypoglycemia and train someone how to use this.    Emergency kit (please ensure you always have these with you):   Glucose tablets  Glucagon  Insulin  Syringes (if on a pump)  Extra infusion set (if on a pump)  Ketone strips    Contact information:   If you have concerns, please send me a Eyelation message or call the clinic at [phone].  For more urgent concerns, please call [phone] after hours/weekends and ask to speak with the endocrinologist on call.   "    Please let me know if you are having low blood sugars less than 70 or over 350 mg/dL.  Do not wait until your next appointment if this is happening.         2.  Risk factors-     Retinopathy:  No known history.  Scheduled for eye exam, but missed appt.  Encouraged her to reschedule.  Has not had exam in years.    Nephropathy:  BP historically well controlled. No microalbuminuria. Needs to recheck.  Creatinine stable. Overdue for labs.  Asked her to schedule.  Neuropathy: No.    Feet: OK, no ulcers.   Lipids:  LDL at target.   Thyroid screening: will check TSH. Feeling very tired.   Celiac screening: Does not appear to have been screened.  Will check antibodies.   Contraception: did not address today.   Depression: Met with health psychology and it was not a good fit.  Not interested in rescheduling.  \"I'm a good mom and take care of my kids.\" Needs to schedule annual physical with PCP.    She is overdue for labs and would like to reschedule.      3. 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 Asked her to schedule with dermatology for further evaluation.  Placed referral.     4.  F/U in 1 mos with me, sooner with concerns/hypoglycemia. Will meet with DM education in the interim.      42 minutes spent on the date of the encounter doing chart review, review of test results, patient visit and documentation, counseling/coordination of care, and discussion of follow up plan for worsening hyper and hypoglycemia.  The patient understood and is satisfied with today's visit.     Kym Jang PA-C, MPAS   [facility]  Department of Medicine  Division of Endocrinology and Diabetes          "

## 2024-02-20 NOTE — NURSING NOTE
Is the patient currently in the state of [place]? YES    Visit mode:VIDEO    If the visit is dropped, the patient can be reconnected by: VIDEO VISIT: Text to cell phone:   Telephone Information:   Mobile [phone]       Will anyone else be joining the visit? NO  (If patient encounters technical issues they should call [phone])    How would you like to obtain your AVS? MyChart    Are changes needed to the allergy or medication list? Pt stated no changes to allergies and Pt stated no med changes    Reason for visit: ANA Corral CMA VVF

## 2024-02-21 ENCOUNTER — TELEPHONE (OUTPATIENT)
Dept: DERMATOLOGY | Facility: CLINIC | Age: 39
End: 2024-02-21

## 2024-02-21 NOTE — TELEPHONE ENCOUNTER
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      Referral Order in Epic  Records in Epic  No outside Records    Dx not in scheduling protocols - please review and call Pt to schedule    Thank you        Action Taken: Message routed to:  [facility] ([facility] DERM    Travel Screening: Not Applicable

## 2024-02-22 NOTE — TELEPHONE ENCOUNTER
Writer contacted the patient to schedule an appointment. The patient stated that they do no have active health insurance at this time. The patient stated that they would call back to schedule once they have active insurance.

## 2024-02-23 ENCOUNTER — PATIENT OUTREACH (OUTPATIENT)
Dept: CARE COORDINATION | Facility: CLINIC | Age: 39
End: 2024-02-23

## 2024-02-23 DIAGNOSIS — E10.40 TYPE 1 DIABETES MELLITUS WITH DIABETIC NEUROPATHY (H): Primary | ICD-10-CM

## 2024-02-23 NOTE — PROGRESS NOTES
Social Work Intervention   Health Clinics    Data/Intervention:    Patient Name:  Arielle Montenegro  /Age:  1985 (39 year old)    Visit Type: telephone  Referral Source: Kym Jang PA-C  Reason for Referral:  needs insurance and any services to support her and children at home    Collaborated With:    -Arielle Jang PA-C    Psychosocial Information/Concerns:  Pt with difficult to manage diabetes with psychosocial components. Arielle tried to re apply for insurance thru [facility] and because she is now working 2 jobs and making more income, she doesn't qualify for any state health insurance programs and was directed to insurance thru the marketplace. She indicates that she now has an appt next week with an  to help her choose a health plan. Discussed that she should consider plans that have Kym, [facility] in network and also a lower deductible.  Arielle has a house and a mortgage now and states she needs a good income to meet her expenses and that she doesn't mind working. Her kids are all in school in the mornings when she sleeps as she works overnights. She indicated she is going to see if she can change and work days instead. She makes more money at night so that's why she is doing that.   Discussed the [facility] income guidelines so she knows how much she can make and be on a [place] health [place] program in the future. I think the out of pocket expenses with a regular health plan will be difficult for her with her other expenses. Her children are able to be on [place] health care programs.   Her cousin lives with her now and helps and she also has another person who comes in to help with the children.      Intervention/Education/Resources Provided:  Acknowledged that she may not have always had a good connection with social workers and indicated that my goals are to support her and her family in being together. There have been some fears that SWs are trying to take away her children and I  indicated that's not my role/goal.  She denied being concerned about that and when I inquired about different areas where there may be some help for her, she denied any needs. She was willing to consider applying for the [employer] program for $80 monthly in food at [facility] sites or [employer]. I emailed the link for her to complete the enrollment.   Discussed that with a regular health plan, she may be able to use co pay coupons for some things and that I would ask Lauro Barrios MTM pharm to assist her with that after she has new new plan in place. She indicated that she has the meds she needs now and is looking forward to trying a pump.    Assessment/Plan:  Will hope for some incremental progress with trust, understanding her fears are understandable. Email sent with link to the food program. Told her that meeting with a  was the best thing to do as is her plan and that she can contact me anytime with questions or needs.     Provided patient/family with contact information and availability.    DWIGHT Bello, [facility]    [facility] [address], [place] [address]    [phone]

## 2024-02-26 ENCOUNTER — TELEPHONE (OUTPATIENT)
Dept: ENDOCRINOLOGY | Facility: CLINIC | Age: 39
End: 2024-02-26

## 2024-02-26 NOTE — TELEPHONE ENCOUNTER
Spoke with patient- they declined scheduling follow-up visit at this time due to insurance reasons. Patient said they will call back to schedule once that is figured out.    Appointment type: Return Diabetes  Provider: Kym Jang  Return date: around 3/19/24 for 4 week follow-up   Specialty phone number: [phone]  Additional appointment(s) needed: labs  Additonal Notes: HEATH brito per provider

## 2024-03-07 ENCOUNTER — TELEPHONE (OUTPATIENT)
Dept: FAMILY MEDICINE | Facility: CLINIC | Age: 39
End: 2024-03-07

## 2024-03-07 NOTE — LETTER
Arielle Montenegro     [address] [place] [address]       3/7/2024    Dear Arielle,     In reviewing your records, we have determined a gap in your preventive services. Based on your age and health history, we recommend the follow service.     General Physical  Diabetic check    If you have had the service elsewhere, please contact us so we can update our records. Please let us know if you have transferred your care to another clinic.    Please call [phone] to schedule this appointment.    We believe that a strong preventive care program, including regular physicals and follow-up care is an important part of a healthy lifestyle and we are committed to helping you maintain your health.    Thank you for choosing us as your health care provider.    Sincerely,     [facility]

## 2024-03-07 NOTE — TELEPHONE ENCOUNTER
Patient Quality Outreach    Patient is due for the following:   Diabetes -  A1C, Eye Exam, Microalbumin, and Foot Exam  Physical Preventive Adult Physical    Next Steps:   Schedule a Adult Preventative    Type of outreach:    Sent letter.      Questions for provider review:    None           Goldy Arteaga MA

## 2024-03-19 ENCOUNTER — PATIENT OUTREACH (OUTPATIENT)
Dept: CARE COORDINATION | Facility: CLINIC | Age: 39
End: 2024-03-19

## 2024-03-19 NOTE — PROGRESS NOTES
Social Work Follow-Up  [facility]    Data/Intervention:  Patient Name:  Arielle Montenegro  /Age:  1985 (39 year old)    Reason for Follow-Up:  Pt called to check in    Collaborated With:    -Arielle    Intervention/Education/Resources Provided:  Arielle called to check in with me and let me know that she reduced her hours at work and soon should be eligible for mnsure with a [place] health care program again. She meets with someone tomorrow to work on it. We previously addressed the income level to be able to get on a [place] health care program and she was just over that amount which caused her to need to sign up for a regular health insurance that would have significantly more costs.     She also indicated that CPS is involved after a low blood sugar and police/paramedics were called to her home. She said that they will agree to close her case if she has close contact with her family to help her OR is working with a hospital/clinic . She is always worried that they will take her children so  he is anxious to get her case closed. Her niece and niece's Mother visit her frequently to help out.    I indicated I didn't know what CPS would want us to do together but that I could call to see what their expectations would be. She didn't think that was necessary because she now has her family closely helping.     She reports she has all the supplies and medications that she needs for her diabetes but is excited about the idea of a pump in the future. She also has the glucagon that she believes her family could administer in an emergency.    Assessment/Plan:  Supported Arielle in her goals and indicated that if she needs more help at home CPS would be able to help her get that. She denies any needs and expresses being a very good parent.   She will contact us when she has insurance in place or if she needs any medications or supplies.    Previously provided patient/family with writer's contact information and  availability.       DWIGHT Bello, [facility]    [facility] [address], [address]  [place] [address]    [phone]

## 2024-03-25 ENCOUNTER — TELEPHONE (OUTPATIENT)
Dept: ENDOCRINOLOGY | Facility: CLINIC | Age: 39
End: 2024-03-25

## 2024-03-25 NOTE — TELEPHONE ENCOUNTER
Date: 3/25    Phone Number: [phone]    Comments: spoke to pt.  Pt is aware that her insurance is inactive.  Will need to call next week if we dont hear anything

## 2024-04-01 NOTE — TELEPHONE ENCOUNTER
Date: 4/1     Phone Number: [phone]     Comments: spoke to pt.  Pt is aware that her insurance is inactive.  Will need to call in 1 month if we dont hear anything

## 2024-04-05 ENCOUNTER — HOSPITAL ENCOUNTER (EMERGENCY)
Facility: CLINIC | Age: 39
Discharge: HOME | End: 2024-04-05
Admitting: PHYSICIAN ASSISTANT
Payer: COMMERCIAL

## 2024-04-05 ENCOUNTER — APPOINTMENT (OUTPATIENT)
Dept: GENERAL RADIOLOGY | Facility: CLINIC | Age: 39
End: 2024-04-05
Attending: PHYSICIAN ASSISTANT

## 2024-04-05 VITALS
SYSTOLIC BLOOD PRESSURE: 121 MMHG | RESPIRATION RATE: 16 BRPM | TEMPERATURE: 98.8 F | HEART RATE: 95 BPM | DIASTOLIC BLOOD PRESSURE: 83 MMHG | OXYGEN SATURATION: 100 %

## 2024-04-05 DIAGNOSIS — J40 BRONCHITIS: ICD-10-CM

## 2024-04-05 DIAGNOSIS — J02.9 SORE THROAT: ICD-10-CM

## 2024-04-05 LAB
ATRIAL RATE - MUSE: 91 BPM
DIASTOLIC BLOOD PRESSURE - MUSE: NORMAL MMHG
GROUP A STREP BY PCR: NOT DETECTED
INTERPRETATION ECG - MUSE: NORMAL
P AXIS - MUSE: 58 DEGREES
PR INTERVAL - MUSE: 158 MS
QRS DURATION - MUSE: 68 MS
QT - MUSE: 360 MS
QTC - MUSE: 442 MS
R AXIS - MUSE: 22 DEGREES
SYSTOLIC BLOOD PRESSURE - MUSE: NORMAL MMHG
T AXIS - MUSE: 12 DEGREES
VENTRICULAR RATE- MUSE: 91 BPM

## 2024-04-05 PROCEDURE — 93005 ELECTROCARDIOGRAM TRACING: CPT | Performed by: PHYSICIAN ASSISTANT

## 2024-04-05 PROCEDURE — 99284 EMERGENCY DEPT VISIT MOD MDM: CPT | Mod: 25 | Performed by: PHYSICIAN ASSISTANT

## 2024-04-05 PROCEDURE — 87651 STREP A DNA AMP PROBE: CPT | Performed by: PHYSICIAN ASSISTANT

## 2024-04-05 PROCEDURE — 93010 ELECTROCARDIOGRAM REPORT: CPT | Performed by: PHYSICIAN ASSISTANT

## 2024-04-05 PROCEDURE — 71046 X-RAY EXAM CHEST 2 VIEWS: CPT

## 2024-04-05 PROCEDURE — 99285 EMERGENCY DEPT VISIT HI MDM: CPT | Mod: 25 | Performed by: PHYSICIAN ASSISTANT

## 2024-04-05 PROCEDURE — 93005 ELECTROCARDIOGRAM TRACING: CPT | Mod: RTG

## 2024-04-05 RX ORDER — ALBUTEROL SULFATE 90 UG/1
2 AEROSOL, METERED RESPIRATORY (INHALATION) EVERY 6 HOURS PRN
Qty: 18 G | Refills: 0 | Status: SHIPPED | OUTPATIENT
Start: 2024-04-05

## 2024-04-05 ASSESSMENT — COLUMBIA-SUICIDE SEVERITY RATING SCALE - C-SSRS
2. HAVE YOU ACTUALLY HAD ANY THOUGHTS OF KILLING YOURSELF IN THE PAST MONTH?: NO
6. HAVE YOU EVER DONE ANYTHING, STARTED TO DO ANYTHING, OR PREPARED TO DO ANYTHING TO END YOUR LIFE?: NO
1. IN THE PAST MONTH, HAVE YOU WISHED YOU WERE DEAD OR WISHED YOU COULD GO TO SLEEP AND NOT WAKE UP?: NO

## 2024-04-05 ASSESSMENT — ACTIVITIES OF DAILY LIVING (ADL)
ADLS_ACUITY_SCORE: 33
ADLS_ACUITY_SCORE: 35

## 2024-04-05 NOTE — ED TRIAGE NOTES
Sore throat for one week. 3 kids with her in triage that all just recovered from influenza. States she is now having SOB.      Triage Assessment (Adult)       Row Name 04/05/24 2940          Triage Assessment    Airway WDL WDL        Respiratory WDL    Respiratory WDL WDL        Skin Circulation/Temperature WDL    Skin Circulation/Temperature WDL WDL        Cardiac WDL    Cardiac WDL WDL        Peripheral/Neurovascular WDL    Peripheral Neurovascular WDL WDL        Cognitive/Neuro/Behavioral WDL    Cognitive/Neuro/Behavioral WDL WDL

## 2024-04-05 NOTE — DISCHARGE INSTRUCTIONS
Here in the emergency room you have reassuring evaluation.  We obtained an x-ray of her chest that shows no obvious pneumonia.  I discussed with the recommendations for blood work including blood counts and cardiac enzymes and you would like to decline these knowing that these would be helpful to help rule out emergent heart problems, I have a low clinical suspicion.  We did obtain a strep test before you left and if this is abnormal I will call you.  Prescription for albuterol and Magic mouthwash were given to you today use these as directed.  Recommend following up with your primary care provider.  I did place a referral.    If you develop any new or worsening symptoms, is important to return right away to the emergency department for further evaluation and management.

## 2024-04-05 NOTE — ED PROVIDER NOTES
[facility] ([facility])    24      ED PROVIDER NOTE     History     Chief Complaint   Patient presents with    Pharyngitis    Shortness of Breath     HPI    Arielle Montenegro is a 39 year old female with history of type 1 diabetes with a history of severe hypoglycemia and hypoglycemia unawareness who presents to the emergency department with sore throat and shortness of breath.  Patient presents with her 3 children.    Patient states about a week ago she started experiencing symptoms of fever headache body aches cough.  Her children tested positive for influenza patient did not get tested.  She states she has had resolution of her fever, body aches and feels like her cough is improving however over the last 3 to 4 days has been experiencing some intermittent dyspnea on exertion as well as sore throat and discomfort with swallowing.  She has not had any chest pain with her symptoms she is able to tolerate orals at home and currently here in the emergency room has a blood sugar of 155.  She notes these have not been abnormal.  She denies any other symptoms at this time.  She states in the past she has benefited with albuterol inhaler and is requesting an inhaler here today.  She denies formal diagnosis of asthma.    Past Medical History  Past Medical History:   Diagnosis Date    Blindness of right eye     Diabetes mellitus type 1 (H)     Diagnosed as a child     Past Surgical History:   Procedure Laterality Date     SECTION  13     SECTION N/A 2015    Procedure:  SECTION;  Surgeon: John Hudson MD;  Location: [facility] L+D     SECTION N/A 7/3/2020    Procedure:  SECTION;  Surgeon: Aparna Atkins MD;  Location: [facility] L+D    ENUCLEATION Right 3/20/2015    Procedure: ENUCLEATION;  Surgeon: Edilson Islas MD;  Location:  [facility]     albuterol (PROAIR HFA/PROVENTIL HFA/VENTOLIN HFA) 108 (90 Base) MCG/ACT inhaler  magic mouthwash  suspension (diphenhydrAMINE, lidocaine, aluminum-magnesium & simethicone)  acetone urine (KETOSTIX) test strip  albuterol (PROAIR HFA/PROVENTIL HFA/VENTOLIN HFA) 108 (90 Base) MCG/ACT inhaler  alcohol swab prep pads  blood glucose (ACCU-CHEK GUIDE) test strip  blood glucose (NO BRAND SPECIFIED) lancets standard  blood glucose (NO BRAND SPECIFIED) test strip  blood glucose monitoring (NO BRAND SPECIFIED) meter device kit  blood glucose monitoring (SOFTCLIX) lancets  budesonide-formoterol (SYMBICORT) 160-4.5 MCG/ACT Inhaler  cetirizine (ZYRTEC) 10 MG tablet  Continuous Blood Gluc Sensor (DEXCOM G6 SENSOR) MISC  Continuous Blood Gluc Transmit (DEXCOM G6 TRANSMITTER) MISC  FLUoxetine (PROZAC) 20 MG capsule  Glucagon (BAQSIMI) 3 MG/DOSE nasal powder  Glucagon (GVOKE HYPOPEN) 1 MG/0.2ML pen  ibuprofen (ADVIL/MOTRIN) 600 MG tablet  insulin degludec (TRESIBA FLEXTOUCH) 100 UNIT/ML pen  insulin pen needle (31G X 5 MM) 31G X 5 MM miscellaneous  NOVOLOG FLEXPEN 100 UNIT/ML soln  tretinoin (RETIN-A) 0.05 % external cream      No Known Allergies  Family History  Family History   Problem Relation Age of Onset    Family History Negative Mother     Family History Negative Father     Diabetes Other         father's side     Social History   Social History     Tobacco Use    Smoking status: Former     Packs/day: 0     Types: Cigarettes    Smokeless tobacco: Never    Tobacco comments:     smokes 2 cigarettes/day-none for several months   Vaping Use    Vaping Use: Never used   Substance Use Topics    Alcohol use: No     Alcohol/week: 0.0 standard drinks of alcohol    Drug use: No         A medically appropriate review of systems was performed with pertinent positives and negatives noted in the HPI, and all other systems negative.    Physical Exam   BP: 121/83  Pulse: 95  Temp: 98.8  F (37.1  C)  Resp: 16  SpO2: 100 %  Physical Exam    GENERAL APPEARANCE: The patient is well developed, well appearing, and in no acute distress.  HEAD:   Normocephalic and atraumatic.   EENT: Voice normal.  Uvula midline without exudates.  No drooling or hot potato voice.  No neck swelling anterior cervical adenopathy no trismus no TMJ tenderness TMs without erythema or bulging bilaterally.  NECK: Trachea is midline.No lymphadenopathy or tenderness.  LUNGS: Breath sounds are equal and clear bilaterally. No wheezes, rhonchi, or rales.  HEART: Regular rate and normal rhythm.    ABDOMEN: Soft, flat, and benign. No mass, tenderness, guarding, or rebound.Bowel sounds are present.  EXTREMITIES: No cyanosis, clubbing, or edema.  NEUROLOGIC: No focal sensory or motor deficits are noted.  PSYCHIATRIC: The patient is awake, alert.  Appropriate mood and affect.  SKIN: Warm, dry, and well perfused. Good turgor.      ED Course, Procedures, & Data     EKG 4/5/2024:  Sinus rhythm, ventricular rate 91 QTc 442.  When interpreted by myself the rhythm is regular.  There is a P wave before every QRS complex.  No visible ST elevation or depression to suggest ischemia.  EKG appears similar to prior from 11/5/2023     Results for orders placed or performed during the hospital encounter of 04/05/24   XR Chest 2 Views     Status: None    Narrative    CHEST TWO VIEWS April 5, 2024 2:50 PM     HISTORY: Shortness of breath.    COMPARISON: June 9, 2023.       Impression    IMPRESSION: There are no acute infiltrates. The cardiac silhouette is  not enlarged. Pulmonary vasculature is unremarkable.    ROSANGELA PAUL MD         SYSTEM ID:  [id number]   EKG 12 lead     Status: None   Result Value Ref Range    Systolic Blood Pressure  mmHg    Diastolic Blood Pressure  mmHg    Ventricular Rate 91 BPM    Atrial Rate 91 BPM    VA Interval 158 ms    QRS Duration 68 ms     ms    QTc 442 ms    P Axis 58 degrees    R AXIS 22 degrees    T Axis 12 degrees    Interpretation ECG       Sinus rhythm  Nonspecific T wave abnormality  Abnormal ECG  Unconfirmed report - interpretation of this ECG is computer  generated - see medical record for final interpretation    Confirmed by - EMERGENCY ROOM, PHYSICIAN ([id number]),  BRANDON WALKER ([id number]) on 4/5/2024 3:41:54 PM     Group A Streptococcus PCR Throat Swab     Status: Normal    Specimen: Throat; Swab   Result Value Ref Range    Group A strep by PCR Not Detected Not Detected    Narrative    The Xpert Xpress Strep A test, performed on the [employer], is a rapid, qualitative in vitro diagnostic test for the detection of Streptococcus pyogenes (Group A ß-hemolytic Streptococcus, Strep A) in throat swab specimens from patients with signs and symptoms of pharyngitis. The Xpert Xpress Strep A test can be used as an aid in the diagnosis of Group A Streptococcal pharyngitis. The assay is not intended to monitor treatment for Group A Streptococcus infections. The Xpert Xpress Strep A test utilizes an automated real-time polymerase chain reaction (PCR) to detect Streptococcus pyogenes DNA.     Medications - No data to display  Labs Ordered and Resulted from Time of ED Arrival to Time of ED Departure - No data to display  XR Chest 2 Views   Final Result   IMPRESSION: There are no acute infiltrates. The cardiac silhouette is   not enlarged. Pulmonary vasculature is unremarkable.      ROSANGELA PAUL MD            SYSTEM ID:  [id number]             Critical care was not performed.     Medical Decision Making  The patient's presentation was of low complexity (an acute and uncomplicated illness or injury).    The patient's evaluation involved:  ordering and/or review of 3+ test(s) in this encounter (see separate area of note for details)  independent interpretation of testing performed by another health professional (chest x-ray)    The patient's management necessitated moderate risk (prescription drug management including medications given in the ED).    Assessment & Plan    This is a 4-year-old female with history of diabetes present with concerns for several days of  sore throat and dyspnea on exertion after recent presumed URI likely influenza.  On presentation she is normoxic she is not tachycardic she is afebrile she has clear breath sounds and has reassuring pharynx on exam.  She has a low Centor criteria score clinically low suspicion of strep throat.  She does not have findings suggest RPA or PTA.  Regards to the dyspnea, consider possibility of pneumonia bronchitis ongoing URI as well as potential cardiac etiology with her reporting the throat discomfort is an anginal equivalent with her history of diabetes.  Consider PE this is unlikely with her description of the symptoms and absence of chest pain or hypoxia.    Discussed with her recommendations obtaining chest x-ray to evaluate for possibility of infiltrate as well as EKG and basic screening labs CBC chemistry troponin BNP.  Strep was also obtained per patient request.    EKG on my interpretation is nonischemic.  I do not see any obvious consolidation on chest x-ray radiologist overread negative.  Patient declined lab work and I did a lengthy discussion with her in regards to further evaluate her dyspnea to help exclude ACS and CHF on the differential clinically lower suspicion with her constellation of symptoms and we discussed should she develop any new or worsening symptoms she return right back to the ED.  She wants to leave the emergency room prior to her strep returning I will call her if this is abnormal.  She is requesting albuterol inhaler and this was given to patient along with Magic mouthwash prescription.  I placed a referral to follow-up with primary care.  Patient has no other questions or concerns at this time.  Red flag signs were addressed, and they were in agreement with the patient care plan provided.    I have reviewed the nursing notes. I have reviewed the findings, diagnosis, plan and need for follow up with the patient.    Discharge Medication List as of 4/5/2024  3:38 PM        START taking  these medications    Details   !! albuterol (PROAIR HFA/PROVENTIL HFA/VENTOLIN HFA) 108 (90 Base) MCG/ACT inhaler Inhale 2 puffs into the lungs every 6 hours as needed for shortness of breath, wheezing or cough, Disp-18 g, R-0, Local PrintPharmacy may dispense brand covered by insurance (Proair, or proventil or ventolin or generic albuterol inhaler)      magic mouthwash suspension (diphenhydrAMINE, lidocaine, aluminum-magnesium & simethicone) Swish and spit 10 mLs in mouth every 6 hours as needed for mouth sores GARGLE AND SPIT, Disp-240 mL, R-0, Local Print       !! - Potential duplicate medications found. Please discuss with provider.          Final diagnoses:   Sore throat   Bronchitis       CESAR Hall  [facility] EMERGENCY DEPARTMENT  4/5/2024

## 2024-04-06 ENCOUNTER — TELEPHONE (OUTPATIENT)
Dept: NURSING | Facility: CLINIC | Age: 39
End: 2024-04-06

## 2024-04-06 NOTE — TELEPHONE ENCOUNTER
Pt is calling to request explanation of ED lab results.  Informed that strep is negative. Pt did have additional questions about other results.  Advised patient to call ED results line in the morning.  Pt verbalized understanding.    Karen Bradley, RN, BSN Nurse Triage Advisor 4/6/2024 2:08 AM    absent

## 2024-05-03 ENCOUNTER — TELEPHONE (OUTPATIENT)
Dept: FAMILY MEDICINE | Facility: CLINIC | Age: 39
End: 2024-05-03
Payer: COMMERCIAL

## 2024-05-03 NOTE — TELEPHONE ENCOUNTER
Patient Quality Outreach    Patient is due for the following:   Diabetes -  A1C, Eye Exam, Microalbumin, and Foot Exam  Physical Preventive Adult Physical    Next Steps:   Schedule a Adult Preventative    Type of outreach:    Sent AP[facility] Green message.      Questions for provider review:    None           Goldy Arteaga MA

## 2024-05-08 ENCOUNTER — TELEPHONE (OUTPATIENT)
Dept: ENDOCRINOLOGY | Facility: CLINIC | Age: 39
End: 2024-05-08
Payer: COMMERCIAL

## 2024-05-08 DIAGNOSIS — E10.649 HYPOGLYCEMIA UNAWARENESS IN TYPE 1 DIABETES MELLITUS (H): ICD-10-CM

## 2024-05-08 DIAGNOSIS — E10.40 TYPE 1 DIABETES MELLITUS WITH DIABETIC NEUROPATHY (H): Primary | ICD-10-CM

## 2024-05-08 NOTE — TELEPHONE ENCOUNTER
Continuous Blood Gluc Sensor (DEXCOM G7 SENSOR) MISC (Discontinued) 3 each 11 2/27/2023 11/14/2023 --   Sig - Route: 1 each every 10 days - Does not apply     Last Office Visit : 2/20/24  Future Office visit:  9/23/24      Routing refill request to provider for review/approval because:  Drug not active on patient's medication list

## 2024-05-08 NOTE — TELEPHONE ENCOUNTER
[facility] Call Center    Phone Message    May a detailed message be left on voicemail: yes     Reason for Call: Medication Refill Request    Has the patient contacted the pharmacy for the refill? Yes   Name of medication being requested: cami COLON  Provider who prescribed the medication: Kym Jang  [facility]: [facility]  Date medication is needed: as soon as able       Action Taken: Message routed to:  [facility] endo    Travel Screening: Not Applicable

## 2024-05-09 RX ORDER — ACYCLOVIR 400 MG/1
TABLET ORAL
Qty: 1 EACH | Refills: 3 | Status: SHIPPED | OUTPATIENT
Start: 2024-05-09

## 2024-05-09 RX ORDER — ACYCLOVIR 400 MG/1
TABLET ORAL
Qty: 9 EACH | Refills: 3 | Status: SHIPPED | OUTPATIENT
Start: 2024-05-09 | End: 2024-09-23

## 2024-05-17 ENCOUNTER — TELEPHONE (OUTPATIENT)
Dept: ENDOCRINOLOGY | Facility: CLINIC | Age: 39
End: 2024-05-17
Payer: COMMERCIAL

## 2024-05-17 DIAGNOSIS — E10.40 TYPE 1 DIABETES MELLITUS WITH DIABETIC NEUROPATHY (H): Primary | ICD-10-CM

## 2024-05-17 NOTE — TELEPHONE ENCOUNTER
Retail Pharmacy Prior Authorization Team   Phone: [phone]    Prior Authorization Retail Medication Request    Medication/Dose: insulin degludec (TRESIBA FLEXTOUCH) 100 UNIT/ML pen  Diagnosis and ICD code (if different than what is on RX):    New/renewal/insurance change PA/secondary ins. PA:  Previously Tried and Failed:    Rationale:      Insurance   Primary:   Insurance ID:      Secondary (if applicable):  Insurance ID:      Pharmacy Information (if different than what is on RX)  Name:    Phone:    Fax:

## 2024-05-21 NOTE — TELEPHONE ENCOUNTER
PA Initiation    Medication: TRESIBA FLEXTOUCH 100 UNIT/ML SC SOPN  Insurance Company: [employer] - Phone [phone] Fax [phone]  Pharmacy Filling the Rx: [id number] - [place], [place] - [address] AT [facility] [address]  Filling Pharmacy Phone: [phone]  Filling Pharmacy Fax:    Start Date: 5/21/2024  Retail Pharmacy Prior Authorization Team   Phone: [phone]

## 2024-05-21 NOTE — TELEPHONE ENCOUNTER
Prior Authorization Approval    Medication: TRESIBA FLEXTOUCH 100 UNIT/ML SC SOPN  Authorization Effective Date: 5/21/2024  Authorization Expiration Date: 5/20/2025  Approved Dose/Quantity:   Reference #:     Insurance Company: [employer] - Phone [phone] Fax [phone]  Expected CoPay: $    CoPay Card Available:      Financial Assistance Needed: No  Which Pharmacy is filling the prescription: [id number] - [place], [place] - [address] AT [facility] & [facility]  Pharmacy Notified: Yes  Patient Notified: Pharmacy will notify the patient.

## 2024-05-22 ENCOUNTER — TELEPHONE (OUTPATIENT)
Dept: ENDOCRINOLOGY | Facility: CLINIC | Age: 39
End: 2024-05-22
Payer: COMMERCIAL

## 2024-05-22 RX ORDER — INSULIN DEGLUDEC 100 U/ML
6 INJECTION, SOLUTION SUBCUTANEOUS DAILY
Qty: 15 ML | Refills: 3 | Status: SHIPPED | OUTPATIENT
Start: 2024-05-22 | End: 2024-06-28

## 2024-05-22 NOTE — TELEPHONE ENCOUNTER
Tresiba PA hard copy document completed, signed and faxed to [facility].   Belkys Carey RN on 5/22/2024 at 2:24 PM

## 2024-06-13 ENCOUNTER — VIRTUAL VISIT (OUTPATIENT)
Dept: FAMILY MEDICINE | Facility: CLINIC | Age: 39
End: 2024-06-13
Payer: COMMERCIAL

## 2024-06-13 DIAGNOSIS — L60.1 ONYCHOLYSIS: Primary | ICD-10-CM

## 2024-06-13 PROCEDURE — 99213 OFFICE O/P EST LOW 20 MIN: CPT | Mod: 95

## 2024-06-13 NOTE — PROGRESS NOTES
Arielle is a 39 year old who is being evaluated via a billable video visit.      Assessment & Plan     Onycholysis  - Adult Dermatology  Referral; Future  Unclear cause of patient's onycholysis.  It was slightly difficult to visualize them on exam through camera today.  Nails do have the appearance of detachment from the finger with a new nail growing at the base.  I am wondering if symptoms could relate to potential psoriasis or may be lichen planus.  Patient does have history of type 1 diabetes which may make her at higher risk for an autoimmune type of onycholysis.  I did place an urgent Derm referral since the symptoms have been very distressing for her.      Subjective   Arielle is a 39 year old, presenting for the following health issues:  No chief complaint on file.  Nails falling off (white on the bottom). This is occurring on all the fingers. This started about 1 month ago.   No pain or trauma  Has not started any new recent medications.  Occupation: works over the phone.  She does not work with chemicals in her job.    HPI       Objective           Vitals:  No vitals were obtained today due to virtual visit.    Physical Exam   GENERAL: alert and no distress  EYES: Eyes grossly normal to inspection.  No discharge or erythema, or obvious scleral/conjunctival abnormalities.  RESP: No audible wheeze, cough, or visible cyanosis.    SKIN: Visible skin clear. No significant rash, abnormal pigmentation or lesions.  NEURO: Cranial nerves grossly intact.  Mentation and speech appropriate for age.  PSYCH: Appropriate affect, tone, and pace of words          Video-Visit Details  15 minutes    Type of service:  Video Visit     Originating Location (pt. Location): Home    Distant Location (provider location):  On-site  Platform used for Video Visit: Dawn  Signed Electronically by: Conrad Bo NP

## 2024-06-20 ENCOUNTER — VIRTUAL VISIT (OUTPATIENT)
Dept: FAMILY MEDICINE | Facility: CLINIC | Age: 39
End: 2024-06-20
Payer: COMMERCIAL

## 2024-06-20 DIAGNOSIS — K64.4 EXTERNAL HEMORRHOIDS: Primary | ICD-10-CM

## 2024-06-20 DIAGNOSIS — L60.9 NAIL ABNORMALITIES: ICD-10-CM

## 2024-06-20 PROBLEM — Z86.39 HISTORY OF DIABETIC KETOACIDOSIS: Status: ACTIVE | Noted: 2023-03-21

## 2024-06-20 PROCEDURE — 99213 OFFICE O/P EST LOW 20 MIN: CPT | Mod: 95 | Performed by: NURSE PRACTITIONER

## 2024-06-20 RX ORDER — POLYETHYLENE GLYCOL 3350 17 G/17G
17 POWDER, FOR SOLUTION ORAL DAILY
COMMUNITY
Start: 2024-06-20

## 2024-06-20 RX ORDER — HYDROCORTISONE 25 MG/G
CREAM TOPICAL 2 TIMES DAILY PRN
Qty: 28 G | Refills: 0 | Status: SHIPPED | OUTPATIENT
Start: 2024-06-20

## 2024-06-20 NOTE — PROGRESS NOTES
Arielle is a 39 year old who is being evaluated via a billable video visit.    How would you like to obtain your AVS? MyChart  If the video visit is dropped, the invitation should be resent by: Text to cell phone: [phone]  Will anyone else be joining your video visit? No      Assessment & Plan     External hemorrhoids  Recurrent hemorrhoids. Patient is very frustrated with the current flare and has reached out to GI for possible banding procedure. They told her she needs a referral. Unable to see hemorrhoid(s) due to nature of video visit, but based on description and hx it sounds like she has 1-2 internal/external hemorrhoids that are protruding and causing pain. She has tried over the counter measures with minimal improvement. Cream prescribed as well as recommended miralax as the other stool softeners and laxatives either don't work or work too well. GI referral placed per patient request.     - hydrocortisone, Perianal, (HYDROCORTISONE) 2.5 % cream; Place rectally 2 times daily as needed for hemorrhoids  - Adult GI  Referral - Consult Only; Future  - polyethylene glycol (MIRALAX) 17 GM/Dose powder; Take 17 g by mouth daily    Nail abnormalities  States she was seen recently for nail concern. Her nails are coming off and new growth is growing in. Unclear etiology. Was not able to get a good view of nails due to video quality. It did look like they were half grown out. She has appt in January with dermatology. Question whether she has a vitamin/mineral deficiency. Labs ordered. Recommended she follow-up in person to better be evaluated.     - Vitamin D Deficiency; Future  - CBC with platelets; Future  - Vitamin B12; Future  - TSH with free T4 reflex; Future  - Comprehensive metabolic panel (BMP + Alb, Alk Phos, ALT, AST, Total. Bili, TP); Future  - Zinc; Future            Patient Instructions   Medication sent to pharmacy.     GI referral placed.     Labs ordered. Please make a lab only appointment via  your mychart.     I would recommend being seen in person to further evaluate your nails and hemorrhoid as it is hard to visualize on video visits.     Subjective   Arielle is a 39 year old, presenting for the following health issues:  No chief complaint on file.        6/20/2024     1:01 PM   Additional Questions   Roomed by Renetta   Accompanied by none         6/20/2024     1:01 PM   Patient Reported Additional Medications   Patient reports taking the following new medications none     Video Start Time:  1:28pm    History of Present Illness       Reason for visit:  Hemorhoids    She eats 2-3 servings of fruits and vegetables daily.She consumes 0 sweetened beverage(s) daily.She exercises with enough effort to increase her heart rate 20 to 29 minutes per day.  She exercises with enough effort to increase her heart rate 5 days per week.   She is taking medications regularly.       Concern - swollen area around anus, feels like is torn and is very painful  Onset: 3 weeks  Description: no itching , no bleeding   Intensity: severe  Progression of Symptoms:  improving, but wondering why it is taking so long   Accompanying Signs & Symptoms: none  Previous history of similar problem: yes, had several years ago, thinks was given a medication to help   Precipitating factors:        Worsened by: going to the bathroom   Alleviating factors:        Improved by: medications as below but wondering why it is taking so long, wondering what can be done to help   Therapies tried and outcome: OTC hemorhoid cream, stool softener helps a little       Additional provider notes: Patient presents virtually via video visit for the following:     Hemorrhoids: she noticed this 3 weeks ago. Denies bleeding and itching. Hx of internal and external hemorrhoids. She was taking some stool softeners to help with constipation and then had a lot of diarrhea which triggered hemorrhoids. She has had hemorrhoids in the past.   -she still has some  constipation. She was using stool softener but it wasn't working so she threw it out.     Nail concern: she states her nails are starting to fall off/grow out and there is a nail underneath. No injury, all her nails are doing this.     No Known Allergies    Current Outpatient Medications   Medication Sig Dispense Refill    albuterol (PROAIR HFA/PROVENTIL HFA/VENTOLIN HFA) 108 (90 Base) MCG/ACT inhaler Inhale 2 puffs into the lungs every 6 hours as needed for shortness of breath, wheezing or cough 18 g 0    Continuous Blood Gluc Sensor (DEXCOM G6 SENSOR) MISC Change every 10 days. 9 each 3    Continuous Blood Gluc Transmit (DEXCOM G6 TRANSMITTER) MISC Change every 3 months. 1 each 3    Continuous Glucose  (DEXCOM G7 ) TOMASA Use to read blood sugars as per 's instructions. 1 each 3    Continuous Glucose Sensor (DEXCOM G7 SENSOR) MISC Change every 10 days. 9 each 3    Glucagon (BAQSIMI) 3 MG/DOSE nasal powder Spray 1 spray (3 mg) in nostril as needed in the event of unconscious hypoglycemia or hypoglycemic seizure. May repeat dose if no response after 15 minutes. 1 each 3    Glucagon (GVOKE HYPOPEN) 1 MG/0.2ML pen Inject the contents of 1 device under the skin into lower abdomen, outer thigh, or outer upper arm as needed for hypoglycemia. If no response after 15 minutes, additional 1 mg dose from a new device may be injected while waiting for emergency assistance. 0.4 mL 3    ibuprofen (ADVIL/MOTRIN) 600 MG tablet Take 1 tablet (600 mg) by mouth every 6 hours as needed for mild pain 20 tablet 0    insulin degludec (TRESIBA FLEXTOUCH) 100 UNIT/ML pen Inject 6 Units Subcutaneous daily 15 mL 3    insulin pen needle (31G X 5 MM) 31G X 5 MM miscellaneous Use 4 pen needles daily or as directed. 300 each 3    NOVOLOG FLEXPEN 100 UNIT/ML soln INJECT 2-10 UNITS WITH MEALS 3 TIMES DAILY AND AT BEDTIME. MAX DAILY DOSE 30 UNITS. Follow up visit needed with Kym Jang. Call  to schedule. 30  mL 1    tretinoin (RETIN-A) 0.05 % external cream Apply topically at bedtime To hands and feet nightly 45 g 1    acetone urine (KETOSTIX) test strip Use as directed in case of high glucose or illness. (Patient not taking: Reported on 6/20/2024) 50 strip 3    albuterol (PROAIR HFA/PROVENTIL HFA/VENTOLIN HFA) 108 (90 Base) MCG/ACT inhaler Two puffs four times daily for two weeks; then two puffs twice daily (Patient not taking: Reported on 6/20/2024) 18 g 0    alcohol swab prep pads Use to swab area of injection/joelle as directed up to 4 times daily 100 each 11    blood glucose (ACCU-CHEK GUIDE) test strip Use to test blood sugar 4 times daily or as directed. For emergencies when Dexcom falls off 100 strip 3    blood glucose (NO BRAND SPECIFIED) lancets standard Use to test blood sugar 4 times daily or as directed. 100 each 11    blood glucose (NO BRAND SPECIFIED) test strip Use to test blood sugar 4 times daily or as directed. (Patient not taking: Reported on 6/20/2024) 200 strip 11    blood glucose monitoring (NO BRAND SPECIFIED) meter device kit Use to test blood sugar 4 times daily or as directed. (Patient not taking: Reported on 6/20/2024) 1 kit 1    blood glucose monitoring (SOFTCLIX) lancets Use to test blood sugar 4 times daily or as directed. (Patient not taking: Reported on 6/20/2024) 100 each 3    budesonide-formoterol (SYMBICORT) 160-4.5 MCG/ACT Inhaler INHALE 2 PUFFS INTO THE LUNGS TWICE A DAY (Patient not taking: Reported on 6/20/2024) 10.2 g 3    cetirizine (ZYRTEC) 10 MG tablet Take 1 tablet (10 mg) by mouth daily (Patient not taking: Reported on 6/20/2024) 90 tablet 0    FLUoxetine (PROZAC) 20 MG capsule Take 1 capsule (20 mg) by mouth daily 90 capsule 0    insulin glargine (LANTUS PEN) 100 UNIT/ML pen Inject subcu 6 units daily. (Patient not taking: Reported on 6/20/2024) 15 mL 0    magic mouthwash suspension (diphenhydrAMINE, lidocaine, aluminum-magnesium & simethicone) Swish and spit 10 mLs in mouth  "every 6 hours as needed for mouth sores GARGLE AND SPIT (Patient not taking: Reported on 6/20/2024) 240 mL 0     No current facility-administered medications for this visit.       Past Medical History:   Diagnosis Date    Blindness of right eye 2007    Diabetes mellitus type 1 (H)     Diagnosed as a child            Review of Systems  Constitutional, HEENT, cardiovascular, pulmonary, gi and gu systems are negative, except as otherwise noted.      Objective    Vitals - Patient Reported  Weight (Patient Reported): 89.8 kg (198 lb)  Height (Patient Reported): 162.6 cm (5' 4\")  BMI (Based on Pt Reported Ht/Wt): 33.99  Pain Score: Worst Pain (10)  Pain Loc: Other - see comment        Physical Exam   GENERAL: alert and no distress  EYES: Eyes grossly normal to inspection.  No discharge or erythema, or obvious scleral/conjunctival abnormalities.  RESP: No audible wheeze, cough, or visible cyanosis.    SKIN: Visible skin clear. No significant rash, abnormal pigmentation or lesions.  NEURO: Cranial nerves grossly intact.  Mentation and speech appropriate for age.  PSYCH: Appropriate affect, tone, and pace of words  Finger nails are starting to grow out and do not look like they are attached (video quality was poor)          Video-Visit Details    Type of service:  Video Visit   Video End Time:1:41 PM  Originating Location (pt. Location): Home    Distant Location (provider location):  On-site  Platform used for Video Visit: Glenys  Signed Electronically by: Megan Leung DNP    "

## 2024-06-20 NOTE — PATIENT INSTRUCTIONS
Medication sent to pharmacy.     GI referral placed.     Labs ordered. Please make a lab only appointment via your mychart.     I would recommend being seen in person to further evaluate your nails and hemorrhoid as it is hard to visualize on video visits.

## 2024-06-22 ENCOUNTER — HEALTH MAINTENANCE LETTER (OUTPATIENT)
Age: 39
End: 2024-06-22

## 2024-06-28 DIAGNOSIS — E10.40 TYPE 1 DIABETES MELLITUS WITH DIABETIC NEUROPATHY (H): ICD-10-CM

## 2024-06-28 RX ORDER — INSULIN DEGLUDEC 100 U/ML
6 INJECTION, SOLUTION SUBCUTANEOUS DAILY
Qty: 15 ML | Refills: 3 | Status: SHIPPED | OUTPATIENT
Start: 2024-06-28 | End: 2024-07-01

## 2024-06-28 NOTE — TELEPHONE ENCOUNTER
[facility] Call Center    Phone Message    May a detailed message be left on voicemail: yes     Reason for Call: Medication Question or concern regarding medication   Prescription Clarification  Name of Medication: insulin degludec (TRESIBA FLEXTOUCH) 100 UNIT/ML pen   Prescribing Provider: Kym Jang PA-C   Pharmacy:      [id number] [place], [place] - [address] [facility] [address]     What on the order needs clarification? Patient lost the one and only pen that was given to her. The pharmacy needs a new prescription so that she can get more than one pen. Please call to discuss further with the patient.     Patient is totally out and has none.      Action Taken: Message routed to:  [facility] Endo    Travel Screening: Not Applicable     Date of Service:

## 2024-07-01 ENCOUNTER — NURSE TRIAGE (OUTPATIENT)
Dept: NURSING | Facility: CLINIC | Age: 39
End: 2024-07-01

## 2024-07-01 DIAGNOSIS — E10.40 TYPE 1 DIABETES MELLITUS WITH DIABETIC NEUROPATHY (H): ICD-10-CM

## 2024-07-01 RX ORDER — INSULIN DEGLUDEC 100 U/ML
INJECTION, SOLUTION SUBCUTANEOUS
Qty: 15 ML | Refills: 3 | Status: SHIPPED | OUTPATIENT
Start: 2024-07-01 | End: 2024-09-23

## 2024-07-01 NOTE — TELEPHONE ENCOUNTER
"Reason for Disposition   Blood glucose > 400 mg/dL (22.2 mmol/L)    Additional Information   Negative: Unconscious or difficult to awaken   Negative: Acting confused (e.g., disoriented, slurred speech)   Negative: Very weak (can't stand)   Negative: Sounds like a life-threatening emergency to the triager   Negative: Vomiting and signs of dehydration (e.g., very dry mouth, lightheaded, dark urine)   Negative: Blood glucose > 240 mg/dL (13.3 mmol/L) and rapid breathing   Negative: Blood glucose > 500 mg/dL (27.8 mmol/L)   Negative: Blood glucose > 240 mg/dL (13.3 mmol/L) AND urine ketones moderate-large (or more than 1+)   Negative: Blood glucose > 240 mg/dL (13.3 mmol/L) and blood ketones > 1.4 mmol/L   Negative: Blood glucose > 240 mg/dL (13.3 mmol/L) AND vomiting AND unable to check for ketones (in blood or urine)   Negative: Vomiting lasting > 4 hours   Negative: Patient sounds very sick or weak to the triager   Negative: Fever > 100.4 F (38.0 C)   Negative: Caller has URGENT medication or insulin pump question and triager unable to answer question    Answer Assessment - Initial Assessment Questions  1. BLOOD GLUCOSE: \"What is your blood glucose level?\"       400  2. ONSET: \"When did you check the blood glucose?\"      30 min ago  3. USUAL RANGE: \"What is your glucose level usually?\" (e.g., usual fasting morning value, usual evening value)      200  4. KETONES: \"Do you check for ketones (urine or blood test strips)?\" If Yes, ask: \"What does the test show now?\"       Doesn't test  5. TYPE 1 or 2:  \"Do you know what type of diabetes you have?\"  (e.g., Type 1, Type 2, Gestational; doesn't know)       Type 1  6. INSULIN: \"Do you take insulin?\" \"What type of insulin(s) do you use? What is the mode of delivery? (syringe, pen; injection or pump)?\"       Tresiba, Novolog insulin also  7. DIABETES PILLS: \"Do you take any pills for your diabetes?\" If Yes, ask: \"Have you missed taking any pills recently?\"        8. OTHER " "SYMPTOMS: \"Do you have any symptoms?\" (e.g., fever, frequent urination, difficulty breathing, dizziness, weakness, vomiting)      NO  9. PREGNANCY: \"Is there any chance you are pregnant?\" \"When was your last menstrual period?\"      No    Protocols used: Diabetes - High Blood Sugar-A-OH    "

## 2024-07-01 NOTE — TELEPHONE ENCOUNTER
[facility] Call Center    Phone Message    May a detailed message be left on voicemail: yes   Patient called reporting red-flag symptom: blood sugar in the 400's  due to not having her Insulin all weekend long    Per the [facility] Red-Flag symptom list, patient was: warm transferred to Triage Nurse               Reason for Call: Other: See above     Action Taken: Message routed to:  [facility] ENDO    Travel Screening: Not Applicable     Date of Service:

## 2024-07-01 NOTE — TELEPHONE ENCOUNTER
Per pt needs this fill asap as she lost her pen. Pt does not understand why her previous RX only has 6 units daily when she takes a total of 14 units daily ? Per pt also would like if she can get more than 1 pen. Per pt talked to Kym before about this so pt doesn't know why this still has not been updated. Per pt also thinking about have a lantus pen instead? Is that ok? Please and thank you!

## 2024-07-01 NOTE — TELEPHONE ENCOUNTER
Patient calling as she still is waiting on refill on her Tresiba. Says she just used her last 5 units today and has none left. Says there is some problem with getting insurance over ride. Reports her BG is 400 30 min ago and is requesting Rx for Lantus insulin to be sent to [facility] in [place] until issue is resolved. Patient reports being inconsistent with amount she gives of Tresiba. Says she gives 8-10 units but Rx says 6 units.     Routing Message to Dr. Jang Care team to address request for replacement for Tresiba be sent to pharmacy. Please call patient back at [phone]         Eladia ROWE RN  [facility]/Red Flag Triage & Med Refill Team

## 2024-08-02 ENCOUNTER — VIRTUAL VISIT (OUTPATIENT)
Dept: FAMILY MEDICINE | Facility: CLINIC | Age: 39
End: 2024-08-02
Payer: COMMERCIAL

## 2024-08-02 DIAGNOSIS — H57.89 DISCHARGE FROM EYE: ICD-10-CM

## 2024-08-02 DIAGNOSIS — T85.9XXS: Primary | ICD-10-CM

## 2024-08-02 DIAGNOSIS — E10.40 TYPE 1 DIABETES MELLITUS WITH DIABETIC NEUROPATHY (H): ICD-10-CM

## 2024-08-02 PROCEDURE — 99213 OFFICE O/P EST LOW 20 MIN: CPT | Mod: 95 | Performed by: NURSE PRACTITIONER

## 2024-08-02 RX ORDER — POLYMYXIN B SULFATE AND TRIMETHOPRIM 1; 10000 MG/ML; [USP'U]/ML
1-2 SOLUTION OPHTHALMIC EVERY 4 HOURS
Qty: 10 ML | Refills: 0 | Status: SHIPPED | OUTPATIENT
Start: 2024-08-02

## 2024-08-02 ASSESSMENT — ENCOUNTER SYMPTOMS: EYE PAIN: 1

## 2024-08-02 NOTE — PATIENT INSTRUCTIONS
Follow up with a prosthetic eye specialist if not improvement after 3 days of use of the antibiotic.       Signs of an Eye Socket Infection  Itching sensation in your eye socket  One of the most prominent signs of an infection is an itchy or scratchy sensation around or at the back of the eye socket when your prosthetic eye is placed.   What causes this? Dust particles and other foreign bodies can enter the eye socket when the prosthetic eye is placed without proper cleaning. This can result in mucus buildup and irritation.  Teary eyes and discharge  If you constantly experience watery eyes, yellow-green discharge, or tear collection in the gaps between your artificial eye and socket, this could also be a potential sign of an infection.  What causes this? Irritants in the environment such as dust, pollen, and other foreign bodies that enter the socket when placing the artificial eye can induce excess tears. Another cause of teary eyes or discharge is when your artificial eye may need maintenance or replacement, especially when it has changed shape and no longer fits your eye socket comfortably.  Signs of an Eye Socket Infection  Itching sensation in your eye socket  One of the most prominent signs of an infection is an itchy or scratchy sensation around or at the back of the eye socket when your prosthetic eye is placed.   What causes this? Dust particles and other foreign bodies can enter the eye socket when the prosthetic eye is placed without proper cleaning. This can result in mucus buildup and irritation.  Teary eyes and discharge  If you constantly experience watery eyes, yellow-green discharge, or tear collection in the gaps between your artificial eye and socket, this could also be a potential sign of an infection.  What causes this? Irritants in the environment such as dust, pollen, and other foreign bodies that enter the socket when placing the artificial eye can induce excess tears. Another cause of teary  eyes or discharge is when your artificial eye may need maintenance or replacement, especially when it has changed shape and no longer fits your eye socket comfortably.       If you re experiencing any of the signs and symptoms mentioned above, the treatment you ll need depends on the type of infection.   Your health care provider can determine if you have any of the following:  Infection caused by irritants  Infection caused by a prosthetic eye in need of maintenance or replacement  Bacterial or viral infections  Allergic reactions          Here are the top prosthetic eye lubricants we recommend:  Sammi-Ophtho - A special medium viscosity silicone oil. Helps control both the irritation and discharge from the socket.        Instructions:    Apply 1-2 drops of SAMMI-OPHTHO 3-4 times daily without removing the artificial eye, blink to spread evenly, rub the excess into the skin of both lids, and remove greasy appearance with cleansing tissue.  TO CLEAN FRONT SURFACE OF EYE. Use a dampened cotton-tipped applicator, without removing the eye; inspect the lower lid by gently pulling it downward and remove any accumulated discharge with minimal rubbing. Do this as often as is necessary to maintain appearance.  LEAVE THE EYE IN PLACE AS LONG AS IT IS COMFORTABLE- but remove it for either:   A. excessive external discharge if noticed soon after cleansing with the applicator (as in No. 2)  B. Unusual irritation that is not relieved with the usual addition of SAMMI-OPHTHO.   IF THE ARTIFICIAL EYE IS TO BE REMOVED FOR ANY REASON follow these four steps:  A. Wash hands thoroughly with special soap taking care to rinse it off completely   B. Remove the eye as usual and wash it with the soap, using finger tips - again rinse thoroughly  C. Dry the eye with cleansing tissue, then apply SAMMI-OPHTHO to the front and back surfaces  D. Replace the eye and add one additional drop externally; then follow step #1 above.   If irritation occurs,  discontinue use and consult your physician. No refrigeration of this lubricant is necessary        Artificial Eye Lubricant - An all-natural aloe vera gel, sunflower oil, and vitamin E lubricant.  Tears Again Eye Ointment - A mineral oil and white petrolatum sterile lubricant. Provides long-lasting nighttime relief from dry eye symptoms.  Tears Again Eye Drops - A polyvinyl alcohol sterile lubricant. For the temporary relief of burning, irritation, and discomfort due to dryness of the eye.  Retaine - Preservative-free lubricant eye drops. Provides long-lasting relief for moderate to severe dry eyes.  Each lubricant works by coating the surface of the prosthetic eye, providing increased comfort, stimulating blinking, and maintaining a natural life-like appearance.

## 2024-08-02 NOTE — PROGRESS NOTES
Arielle is a 39 year old who is being evaluated via a billable video visit.    How would you like to obtain your AVS? MyChart  If the video visit is dropped, the invitation should be resent by: Text to cell phone: [phone]  Will anyone else be joining your video visit? 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  Discussed use of warm moist compress and gentle exfoliation to remove gunk from lashes.   Discussed use of medium density preservative free eye drops for moisturizing use regularly  Given discharge is white and not yellow/green may be related to an allergy or poorly maintained prosthetic - unable to visualize fully the quality of the orbital tissue today therefore will rx antibiotic - discussed with pt if not improvement after 3 days - do not continue this.   -Pt to follow up with specific prosthetic ophthalmologist     - polymixin b-trimethoprim (POLYTRIM) 80318-7.1 UNIT/ML-% ophthalmic solution  Dispense: 10 mL; Refill: 0    Type 1 diabetes mellitus with diabetic neuropathy (H)  Referral placed for ophthalmologist   - Adult Eye  Referral                  Subjective   Arielle is a 39 year old, presenting for the following health issues:  Eye Problem (Blind in her eye, needs eye drops. She has a eye doctor appt next Friday. Eye drops that can help with drainage. She did try OTC eye drops but they did not work.)      Started 1:42pm and ended 2:00pm    Eye Problem      Has a paresthetic eye -  - prosthetic eye she took out - having gunk coming from the socket - has tried rephresh tears and lubricant eye drops. This one is closed now - has not had socket cleaned since November 2023. Discharge is white sticky- not red or inflamed,      Doesn't go to the eye location much - has an appointment next Friday .                       Objective            Vitals:  No vitals were obtained today due to virtual visit.    Physical Exam   GENERAL: alert and no distress  EYES: right eye prosthetic removed, socket tissue appears red and high vascular, no perceived swelling, pt does have moderate amount of white, stick discharge on eye lid and surrounding area.   RESP: No audible wheeze, cough, or visible cyanosis.    SKIN: Visible skin clear. No significant rash, abnormal pigmentation or lesions.  NEURO: Cranial nerves grossly intact.  Mentation and speech appropriate for age.  PSYCH: Appropriate affect, tone, and pace of words          Video-Visit Details    Type of service:  Video Visit   Video length 17 mins and 9 seconds of connection  Originating Location (pt. Location): Home    Distant Location (provider location):  On-site  Platform used for Video Visit: Dawn  Signed Electronically by: MIKALA Willis CNP

## 2024-09-12 NOTE — PROGRESS NOTES
"Virtual Visit Details    Type of service:  Video Visit   Video Start Time: 11:38 AM  Video End Time:12:02 PM    Originating Location (pt. Location): Home    Distant Location (provider location):  Off-site  Platform used for Video Visit: Dawn      Outcome for 09/12/24 12:05 PM: Wibbitz message sent  Karla Corral MA  Outcome for 09/19/24 2:01 PM: Left Voicemail   Karla Corral MA  Outcome for 09/20/24 9:32 AM: Per patient, will upload device before appointment- Pt states she is not logged into dexcom clarity. Pt will try and login to update current data. Pt is not using inpen.   Karla Corral MA  Outcome for 09/23/24 9:53 AM:  Patient unable to upload dexcom. Needs to contact dexcom support for login.    Elida Issa LPN       HPI:   Arielle is a 40 yo woman here for follow up of type 1 diabetes with a history of severe hypoglycemia and hypoglycemia unawareness.  Earlier this year she had another severe hypoglycemic event, however she has had none since our last visit.  She says she is doing well.    Sugars have not been low (except she is low on the video today).  She reports that she has a lot of help.  Not working too much now.  Cousin is living with her.  Really helpful.  Two people helping her now.     Feeling very tired.  \"I'm lazy.\" Gained a lot of weight. 130# before pregnancy, now,     She had been really interested in starting an Omnipod pump, but was told by her diabetes educator that she could not do this unless she is carb counting.  She is no longer on the GLP-1 agonist.  Her goal was to get on the Omnipod 5 pump.  She is now worried.  \"If it falls out, I can't live without long lasting insulin.\"  Tries to take lower doses.     Skin is really bothering her.  Treatment given by dermatology several years ago is not working well.  Wants me to prescribe more cream.  Reviewed note from derm from 2019.     Diabetes history: She reports that she was diagnosed with type 1 dm around age 8 when she " "became sick while living in [place]. She has been on insulin since diagnosis.       Her current treatment regimen is as follows:   Lantus- 20 units daily (previously on 10)   Novolog- \"always taking it.\"  \"I don't count.\" When sensor tells her she is \"up\" she takes Novolog.  Now it is low.   She takes it after eating.  If she remembers, she takes it before she eats. She knows she needs to work on this.     I have no glucose data to review today. She is wearing the dexcom, but it is not connecting to the clinic. She reports not having any known low blood sugars or loss of consciousness since the ER visit.     Past Medical History:  Blindness of right eye  Type 1 diabetes  Hypoglycemia unawareness  Vitamin D deficiency  Anxiety  Depression  Overweight     Past Surgical History:   section - ,   Enucleation right -     Family History:   Diabetes - paternal side of family       Social History:     Single mom.  Kids now 12, 10 and 3 years old.  One son with ADHD and obese with prediabetes, not type 1.  Other might have autism.  Works for a [employer] company overnight from home (remote work).  Tries to sleep during the day. She does not sleep much.     Diabetes Control:   Lab Results   Component Value Date    A1C 5.8 2020    A1C 6.4 2020    A1C 7.5 2020    A1C 7.8 2020    A1C 12.6 2019       Past Medical History:   Diagnosis Date    Blindness of right eye     Diabetes mellitus type 1 (H)     Diagnosed as a child       Past Surgical History:   Procedure Laterality Date     SECTION  13     SECTION N/A 2015    Procedure:  SECTION;  Surgeon: John Hudson MD;  Location: [facility]     SECTION N/A 7/3/2020    Procedure:  SECTION;  Surgeon: Aparna Atkins MD;  Location: [facility]    ENUCLEATION Right 3/20/2015    Procedure: ENUCLEATION;  Surgeon: Edilson Islas MD;  Location: [facility]       Family History   Problem " Relation Age of Onset    Family History Negative Mother     Family History Negative Father     Diabetes Other         father's side       Social History     Socioeconomic History    Marital status: Single    Number of children: 1   Occupational History     Employer: UNEMPLOYED   Tobacco Use    Smoking status: Former Smoker     Packs/day: 0.00     Types: Cigarettes    Smokeless tobacco: Never Used    Tobacco comment: smokes 2 cigarettes/day-none for several months   Substance and Sexual Activity    Alcohol use: No     Alcohol/week: 0.0 standard drinks    Drug use: No    Sexual activity: Yes     Partners: Male     Birth control/protection: None   Social History Narrative    ** Merged History Encounter **         Caffeine intake/servings daily - 0    Calcium intake/servings daily - 3    Exercise 7 times weekly - describe walking    Sunscreen used - No    Seatbelts used - Yes    Guns stored in the home - No    Self Breast Exam - Yes    Pap test up to date -  No    Eye exam up to date -  Yes    Dental exam up to date -  Yes    DEXA scan up to date -  Not Applicable    Flex Sig/Colonoscopy up to date -  Not Applicable    Mammography up to date -  Not Applicable    Immunizations reviewed and up to date - No    Abuse: Current or Past (Physical, Sexual or Emotional) - No    Do you feel safe in your environment - Yes    Do you cope well with stress - Yes    Do you suffer from insomnia - No    Last updated by: KARL PELAEZ  7/18/2012                       Current Outpatient Medications   Medication Sig Dispense Refill    acetone urine (KETOSTIX) test strip Use as directed in case of high glucose or illness. (Patient not taking: Reported on 6/20/2024) 50 strip 3    albuterol (PROAIR HFA/PROVENTIL HFA/VENTOLIN HFA) 108 (90 Base) MCG/ACT inhaler Inhale 2 puffs into the lungs every 6 hours as needed for shortness of breath, wheezing or cough 18 g 0    albuterol (PROAIR HFA/PROVENTIL HFA/VENTOLIN HFA) 108 (90 Base) MCG/ACT  inhaler Two puffs four times daily for two weeks; then two puffs twice daily (Patient not taking: Reported on 6/20/2024) 18 g 0    alcohol swab prep pads Use to swab area of injection/joelle as directed up to 4 times daily 100 each 11    blood glucose (ACCU-CHEK GUIDE) test strip Use to test blood sugar 4 times daily or as directed. For emergencies when Dexcom falls off 100 strip 3    blood glucose (NO BRAND SPECIFIED) lancets standard Use to test blood sugar 4 times daily or as directed. 100 each 11    blood glucose (NO BRAND SPECIFIED) test strip Use to test blood sugar 4 times daily or as directed. (Patient not taking: Reported on 8/2/2024) 200 strip 11    blood glucose monitoring (NO BRAND SPECIFIED) meter device kit Use to test blood sugar 4 times daily or as directed. (Patient not taking: Reported on 6/20/2024) 1 kit 1    blood glucose monitoring (SOFTCLIX) lancets Use to test blood sugar 4 times daily or as directed. (Patient not taking: Reported on 6/20/2024) 100 each 3    budesonide-formoterol (SYMBICORT) 160-4.5 MCG/ACT Inhaler INHALE 2 PUFFS INTO THE LUNGS TWICE A DAY (Patient not taking: Reported on 6/20/2024) 10.2 g 3    cetirizine (ZYRTEC) 10 MG tablet Take 1 tablet (10 mg) by mouth daily (Patient not taking: Reported on 6/20/2024) 90 tablet 0    Continuous Blood Gluc Sensor (DEXCOM G6 SENSOR) MISC Change every 10 days. 9 each 3    Continuous Blood Gluc Transmit (DEXCOM G6 TRANSMITTER) MISC Change every 3 months. 1 each 3    Continuous Glucose  (DEXCOM G7 ) TOMASA Use to read blood sugars as per 's instructions. 1 each 3    Continuous Glucose Sensor (DEXCOM G7 SENSOR) MISC Change every 10 days. 9 each 3    FLUoxetine (PROZAC) 20 MG capsule Take 1 capsule (20 mg) by mouth daily (Patient not taking: Reported on 8/2/2024) 90 capsule 0    Glucagon (BAQSIMI) 3 MG/DOSE nasal powder Spray 1 spray (3 mg) in nostril as needed in the event of unconscious hypoglycemia or hypoglycemic  seizure. May repeat dose if no response after 15 minutes. 1 each 3    Glucagon (GVOKE HYPOPEN) 1 MG/0.2ML pen Inject the contents of 1 device under the skin into lower abdomen, outer thigh, or outer upper arm as needed for hypoglycemia. If no response after 15 minutes, additional 1 mg dose from a new device may be injected while waiting for emergency assistance. 0.4 mL 3    hydrocortisone, Perianal, (HYDROCORTISONE) 2.5 % cream Place rectally 2 times daily as needed for hemorrhoids 28 g 0    ibuprofen (ADVIL/MOTRIN) 600 MG tablet Take 1 tablet (600 mg) by mouth every 6 hours as needed for mild pain 20 tablet 0    insulin degludec (TRESIBA FLEXTOUCH) 100 UNIT/ML pen Use as directed up to 10 units daily. 15 mL 3    insulin glargine (LANTUS PEN) 100 UNIT/ML pen Inject subcu 6 units daily. 15 mL 0    insulin pen needle (31G X 5 MM) 31G X 5 MM miscellaneous Use 4 pen needles daily or as directed. 300 each 3    magic mouthwash suspension (diphenhydrAMINE, lidocaine, aluminum-magnesium & simethicone) Swish and spit 10 mLs in mouth every 6 hours as needed for mouth sores GARGLE AND SPIT (Patient not taking: Reported on 6/20/2024) 240 mL 0    NOVOLOG FLEXPEN 100 UNIT/ML soln INJECT 2-10 UNITS WITH MEALS 3 TIMES DAILY AND AT BEDTIME. MAX DAILY DOSE 30 UNITS. Follow up visit needed with Kym Jang. Call  to schedule. 30 mL 1    polyethylene glycol (MIRALAX) 17 GM/Dose powder Take 17 g by mouth daily      polymixin b-trimethoprim (POLYTRIM) 10328-1.1 UNIT/ML-% ophthalmic solution Place 1-2 drops into the right eye every 4 hours 10 mL 0    tretinoin (RETIN-A) 0.05 % external cream Apply topically at bedtime To hands and feet nightly (Patient not taking: Reported on 8/2/2024) 45 g 1     No current facility-administered medications for this visit.        No Known Allergies    Physical Exam  LMP  (LMP Unknown)   GENERAL: healthy, alert and no distress  RESP: no audible wheeze, cough, or visible cyanosis.  No visible  retractions or increased work of breathing.  Able to speak fully in complete sentences.  PSYCH: mentation appears normal, affect normal/bright, judgement and insight intact, normal speech and appearance well-groomed    RESULTS  Lab Results   Component Value Date    A1C 9.6 (H) 11/05/2023    A1C 10.2 (H) 03/21/2023    A1C 10.1 (H) 06/22/2022    A1C 5.8 (H) 07/04/2020    A1C 6.4 (H) 06/11/2020    A1C 7.5 (H) 01/28/2020    A1C 7.8 (H) 01/02/2020    A1C 12.6 (H) 09/20/2019       TSH   Date Value Ref Range Status   05/30/2019 0.877 0.358 - 3.740 UIU/mL Final   05/11/2017 1.19 0.40 - 4.00 mU/L Final   09/30/2014 1.90 0.40 - 4.00 mU/L Final     Comment:     Effective 7/30/2014, the reference range for this assay has changed to reflect   new instrumentation/methodology.         ALT   Date Value Ref Range Status   11/05/2023 11 0 - 50 U/L Final     Comment:     Reference intervals for this test were updated on 6/12/2023 to more accurately reflect our healthy population. There may be differences in the flagging of prior results with similar values performed with this method. Interpretation of those prior results can be made in the context of the updated reference intervals.     08/18/2023 12 0 - 50 U/L Final     Comment:     Reference intervals for this test were updated on 6/12/2023 to more accurately reflect our healthy population. There may be differences in the flagging of prior results with similar values performed with this method. Interpretation of those prior results can be made in the context of the updated reference intervals.     07/05/2020 24 0 - 50 U/L Final   07/04/2020 27 0 - 50 U/L Final   ]    Recent Labs   Lab Test 09/20/19  1425 03/11/19  0000   CHOL 181 147.6   HDL 71 52.4   * 72   TRIG 45 115.8       Lab Results   Component Value Date     11/05/2023     09/20/2019      Lab Results   Component Value Date    POTASSIUM 4.5 11/05/2023    POTASSIUM 3.8 08/12/2021    POTASSIUM 4.0 01/24/2020      Lab Results   Component Value Date    CHLORIDE 110 11/05/2023    CHLORIDE 100 08/12/2021    CHLORIDE 95 09/20/2019     Lab Results   Component Value Date    ALEXANDRO 9.1 11/05/2023    ALEXANDRO 8.5 09/20/2019     Lab Results   Component Value Date    CO2 22 11/05/2023    CO2 25 08/12/2021    CO2 22 09/20/2019     Lab Results   Component Value Date    BUN 12.5 11/05/2023    BUN 12 08/12/2021    BUN 13 09/20/2019     Lab Results   Component Value Date    CR 0.73 11/05/2023    CR 0.91 07/05/2020       GFR Estimate   Date Value Ref Range Status   11/05/2023 >90 >60 mL/min/1.73m2 Final   08/18/2023 65 >60 mL/min/1.73m2 Final   06/09/2023 >90 >60 mL/min/1.73m2 Final     Comment:     eGFR calculated using 2021 CKD-EPI equation.   07/05/2020 82 >60 mL/min/[1.73_m2] Final     Comment:     Non  GFR Calc  Starting 12/18/2018, serum creatinine based estimated GFR (eGFR) will be   calculated using the Chronic Kidney Disease Epidemiology Collaboration   (CKD-EPI) equation.     07/04/2020 85 >60 mL/min/[1.73_m2] Final     Comment:     Non  GFR Calc  Starting 12/18/2018, serum creatinine based estimated GFR (eGFR) will be   calculated using the Chronic Kidney Disease Epidemiology Collaboration   (CKD-EPI) equation.     07/03/2020 75 >60 mL/min/[1.73_m2] Final     Comment:     Non  GFR Calc  Starting 12/18/2018, serum creatinine based estimated GFR (eGFR) will be   calculated using the Chronic Kidney Disease Epidemiology Collaboration   (CKD-EPI) equation.       GFR Estimate If Black   Date Value Ref Range Status   07/05/2020 >90 >60 mL/min/[1.73_m2] Final     Comment:      GFR Calc  Starting 12/18/2018, serum creatinine based estimated GFR (eGFR) will be   calculated using the Chronic Kidney Disease Epidemiology Collaboration   (CKD-EPI) equation.     07/04/2020 >90 >60 mL/min/[1.73_m2] Final     Comment:      GFR Calc  Starting 12/18/2018, serum creatinine  "based estimated GFR (eGFR) will be   calculated using the Chronic Kidney Disease Epidemiology Collaboration   (CKD-EPI) equation.     07/03/2020 87 >60 mL/min/[1.73_m2] Final     Comment:      GFR Calc  Starting 12/18/2018, serum creatinine based estimated GFR (eGFR) will be   calculated using the Chronic Kidney Disease Epidemiology Collaboration   (CKD-EPI) equation.         Lab Results   Component Value Date    MICROL 172 09/30/2014     No results found for: \"MICROALBUMIN\"  Lab Results   Component Value Date    CPEPT <0.1 (L) 09/30/2014       No results found for: \"B12\"]    Most recent eye exam date: : Not Found     FIB-4 Calculation: 1.13 at 11/5/2023  1:26 PM  Calculated from:  SGOT/AST: 32 U/L at 11/5/2023  1:26 PM  SGPT/ALT: 11 U/L at 11/5/2023  1:26 PM  Platelets: 316 10e3/uL at 11/5/2023  1:26 PM  Age: 38 years  A value of FIB-4 below 1.30 is considered as low risk for advanced fibrosis; a value of FIB-4 over 2.67 is considered as high risk for advanced fibrosis; and FIB-4 values between 1.30 and 2.67 are considered as intermediate risk of advanced fibrosis.      Assessment/Plan:     1.  Type 1 diabetes-  I am unable to assess her level of control.  Currently, major issue is hypoglycemia unawareness and severe hypoglycemia, but she reports she is doing better and has had no further severe hypoglycemia since earlier this year.  She is getting a lto of help in the home now.  Although she is not an ideal candidate for the Omnipod pump (does not currently carb count), I feel like she could do much better with an automated system, provided she take the time to go to all the pre-pump education visits.  I will reach out to DM education- could also consider the Beta bionics for simplicity, but I am concerned about potential hypoglycemia if she is not regularly announcing insulin before eating.  Would suggest staying on a low dose of basal insulin once she starts the pump to prevent DKA in case of " pump failure. She is highly motivated to have more stable glucose control.  We made the following plan today (instructions given to patient):      Meet with diabetes educator, Tracy Ramirez, to review your diabetes, help you get set up on the dexcom to connect with the clinic, and to discuss insulin pump options (likely omnipod 5).     Please have your lab tests done.  Please contact us to schedule at any of our [place] lab locations  Call [phone] ([phone]), select option 1 or schedule via AMX.     Emergency issues: Please contact the clinic as soon as you recognize a problem.  Here are some concerns you should contact us about.  -Vomiting: more than twice.  Please check ketones.  If positive, go to ER. Monitor glucose hourly.   -High glucose (over 300 mg/dL twice in a row): Please check ketones.  If ketones are negative, take an insulin correction and recheck glucose in 1 hour.  If glucose is not coming down, please call the clinic. If ketones are moderate or large, drink lots of water, take an insulin correction, and recheck ketones in 1 hour.  If ketones are still high (or you are vomiting), go to the ER.  -Hypoglycemia (low glucose):   If glucose is less than 70 mg/dL, treat with 15g carb (4 glucose tablets), recheck glucose in 10 minutes.  If low again, repeat.   If glucose is less than 54 mg/dL, treat with 30g carb, recheck glucose in 10 minutes.  If low again, repeat.  Keep glucagon in your home in case of severe hypoglycemia and train someone how to use this.    Emergency kit (please ensure you always have these with you):   Glucose tablets  Glucagon  Insulin  Syringes (if on a pump)  Extra infusion set (if on a pump)  Ketone strips    Contact information:   If you have concerns, please send me a delicious message or call the clinic at [phone].  For more urgent concerns, please call [phone] after hours/weekends and ask to speak with the endocrinologist on call.      Please let me  know if you are having low blood sugars less than 70 or over 350 mg/dL.  Do not wait until your next appointment if this is happening.         2.  Risk factors-     Retinopathy:  No known history.  Scheduled for eye exam, but missed appt.  Encouraged her to reschedule.  Has not had exam in years.    Nephropathy:  BP historically well controlled. No microalbuminuria. Needs to recheck.  Creatinine stable. Overdue for labs.  Asked her to schedule.  Neuropathy: No.    Feet: OK, no ulcers.   Lipids:  LDL at target.   Thyroid screening: will check TSH. Feeling very tired.   Celiac screening: Does not appear to have been screened.  Will check antibodies.   Contraception: did not address today.   Depression: Met with health psychology and it was not a good fit.  Not interested in rescheduling.    She is overdue for labs and would like to reschedule.      3. 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 Asked her to schedule with dermatology for further evaluation.  Placed referral.     4.  F/U in 1 mos with DM education, 3 mos with me, sooner with concerns/hypoglycemia.     42 minutes spent on the date of the encounter doing chart review, review of test results, patient visit and documentation, counseling/coordination of care, and discussion of follow up plan for worsening hyper and hypoglycemia.  The patient understood and is satisfied with today's visit.     Kym Jang PA-C, MPAS   [facility]  Department of Medicine  Division of Endocrinology and Diabetes

## 2024-09-19 ENCOUNTER — TELEPHONE (OUTPATIENT)
Dept: ENDOCRINOLOGY | Facility: CLINIC | Age: 39
End: 2024-09-19
Payer: COMMERCIAL

## 2024-09-19 NOTE — TELEPHONE ENCOUNTER
Called patient and left voicemail. Patient has an appointment on  9/23/24 . Need patient to upload their Dexcom and inpen devices to site for provider to review prior to their appointment.  Karla Corral MA

## 2024-09-20 NOTE — TELEPHONE ENCOUNTER
Spoke with patient. Pt is not using inpen. Pt states she has been wearing dexcom sensor. Advised patient dexcom data is only showing up to 4/26/24. Pt advises she is not logged into dexcom clarity gael. Pt will try to get logged in and will get current data transferred. Karla Corral CMA on 9/20/2024 at 9:37 AM

## 2024-09-23 ENCOUNTER — VIRTUAL VISIT (OUTPATIENT)
Dept: ENDOCRINOLOGY | Facility: CLINIC | Age: 39
End: 2024-09-23
Payer: COMMERCIAL

## 2024-09-23 VITALS — HEIGHT: 63 IN | BODY MASS INDEX: 33.66 KG/M2 | WEIGHT: 190 LBS

## 2024-09-23 DIAGNOSIS — E10.40 TYPE 1 DIABETES MELLITUS WITH DIABETIC NEUROPATHY (H): ICD-10-CM

## 2024-09-23 DIAGNOSIS — E10.649 HYPOGLYCEMIA UNAWARENESS IN TYPE 1 DIABETES MELLITUS (H): ICD-10-CM

## 2024-09-23 DIAGNOSIS — E10.649 TYPE 1 DIABETES MELLITUS WITH HYPOGLYCEMIA AND WITHOUT COMA (H): ICD-10-CM

## 2024-09-23 PROCEDURE — 99215 OFFICE O/P EST HI 40 MIN: CPT | Mod: 95 | Performed by: PHYSICIAN ASSISTANT

## 2024-09-23 RX ORDER — INSULIN DEGLUDEC 100 U/ML
INJECTION, SOLUTION SUBCUTANEOUS
Qty: 30 ML | Refills: 3 | Status: SHIPPED | OUTPATIENT
Start: 2024-09-23

## 2024-09-23 RX ORDER — INSULIN ASPART 100 [IU]/ML
INJECTION, SOLUTION INTRAVENOUS; SUBCUTANEOUS
Qty: 30 ML | Refills: 3 | Status: SHIPPED | OUTPATIENT
Start: 2024-09-23

## 2024-09-23 RX ORDER — URINE ACETONE TEST STRIPS
STRIP MISCELLANEOUS
Qty: 50 STRIP | Refills: 3 | Status: SHIPPED | OUTPATIENT
Start: 2024-09-23

## 2024-09-23 RX ORDER — ACYCLOVIR 400 MG/1
TABLET ORAL
Qty: 9 EACH | Refills: 3 | Status: SHIPPED | OUTPATIENT
Start: 2024-09-23

## 2024-09-23 ASSESSMENT — PAIN SCALES - GENERAL: PAINLEVEL: NO PAIN (0)

## 2024-09-23 NOTE — LETTER
"9/23/2024       RE: Arielle Montenegro  [address]  [place] [address]     Dear Colleague,    Thank you for referring your patient, Arielle Montenegro, to the [facility] ENDOCRINOLOGY CLINIC [place] at [facility]. Please see a copy of my visit note below.    Virtual Visit Details    Type of service:  Video Visit   Video Start Time: 11:38 AM  Video End Time:12:02 PM    Originating Location (pt. Location): Home    Distant Location (provider location):  Off-site  Platform used for Video Visit: Dawn      Outcome for 09/12/24 12:05 PM: Mealnut message sent  Karla Corral MA  Outcome for 09/19/24 2:01 PM: Left Voicemail   Karla Corral MA  Outcome for 09/20/24 9:32 AM: Per patient, will upload device before appointment- Pt states she is not logged into dexcom clarity. Pt will try and login to update current data. Pt is not using inpen.   Karla Corral MA  Outcome for 09/23/24 9:53 AM:  Patient unable to upload dexcom. Needs to contact dexcom support for login.    Elida Issa LPN       HPI:   Arielle is a 38 yo woman here for follow up of type 1 diabetes with a history of severe hypoglycemia and hypoglycemia unawareness.  Earlier this year she had another severe hypoglycemic event, however she has had none since our last visit.  She says she is doing well.    Sugars have not been low (except she is low on the video today).  She reports that she has a lot of help.  Not working too much now.  Cousin is living with her.  Really helpful.  Two people helping her now.     Feeling very tired.  \"I'm lazy.\" Gained a lot of weight. 130# before pregnancy, now,     She had been really interested in starting an Omnipod pump, but was told by her diabetes educator that she could not do this unless she is carb counting.  She is no longer on the GLP-1 agonist.  Her goal was to get on the Omnipod 5 pump.  She is now worried.  \"If it falls out, I can't live without " "long lasting insulin.\"  Tries to take lower doses.     Skin is really bothering her.  Treatment given by dermatology several years ago is not working well.  Wants me to prescribe more cream.  Reviewed note from derm from 2019.     Diabetes history: She reports that she was diagnosed with type 1 dm around age 8 when she became sick while living in [place]. She has been on insulin since diagnosis.       Her current treatment regimen is as follows:   Lantus- 20 units daily (previously on 10)   Novolog- \"always taking it.\"  \"I don't count.\" When sensor tells her she is \"up\" she takes Novolog.  Now it is low.   She takes it after eating.  If she remembers, she takes it before she eats. She knows she needs to work on this.     I have no glucose data to review today. She is wearing the dexcom, but it is not connecting to the clinic. She reports not having any known low blood sugars or loss of consciousness since the ER visit.     Past Medical History:  Blindness of right eye  Type 1 diabetes  Hypoglycemia unawareness  Vitamin D deficiency  Anxiety  Depression  Overweight     Past Surgical History:   section - ,   Enucleation right -     Family History:   Diabetes - paternal side of family       Social History:     Single mom.  Kids now 12, 10 and 3 years old.  One son with ADHD and obese with prediabetes, not type 1.  Other might have autism.  Works for a [employer] company overnight from home (remote work).  Tries to sleep during the day. She does not sleep much.     Diabetes Control:   Lab Results   Component Value Date    A1C 5.8 2020    A1C 6.4 2020    A1C 7.5 2020    A1C 7.8 2020    A1C 12.6 2019       Past Medical History:   Diagnosis Date     Blindness of right eye      Diabetes mellitus type 1 (H)     Diagnosed as a child       Past Surgical History:   Procedure Laterality Date      SECTION  13      SECTION N/A 2015    Procedure:  " SECTION;  Surgeon: John Hudson MD;  Location: [facility]      SECTION N/A 7/3/2020    Procedure:  SECTION;  Surgeon: Aparna Atkins MD;  Location: [facility]     ENUCLEATION Right 3/20/2015    Procedure: ENUCLEATION;  Surgeon: Edilson Islas MD;  Location: [facility]       Family History   Problem Relation Age of Onset     Family History Negative Mother      Family History Negative Father      Diabetes Other         father's side       Social History     Socioeconomic History     Marital status: Single     Number of children: 1   Occupational History     Employer: UNEMPLOYED   Tobacco Use     Smoking status: Former Smoker     Packs/day: 0.00     Types: Cigarettes     Smokeless tobacco: Never Used     Tobacco comment: smokes 2 cigarettes/day-none for several months   Substance and Sexual Activity     Alcohol use: No     Alcohol/week: 0.0 standard drinks     Drug use: No     Sexual activity: Yes     Partners: Male     Birth control/protection: None   Social History Narrative    ** Merged History Encounter **         Caffeine intake/servings daily - 0    Calcium intake/servings daily - 3    Exercise 7 times weekly - describe walking    Sunscreen used - No    Seatbelts used - Yes    Guns stored in the home - No    Self Breast Exam - Yes    Pap test up to date -  No    Eye exam up to date -  Yes    Dental exam up to date -  Yes    DEXA scan up to date -  Not Applicable    Flex Sig/Colonoscopy up to date -  Not Applicable    Mammography up to date -  Not Applicable    Immunizations reviewed and up to date - No    Abuse: Current or Past (Physical, Sexual or Emotional) - No    Do you feel safe in your environment - Yes    Do you cope well with stress - Yes    Do you suffer from insomnia - No    Last updated by: KARL PELAEZ  2012                       Current Outpatient Medications   Medication Sig Dispense Refill     acetone urine (KETOSTIX) test strip Use as directed in case of high glucose  or illness. (Patient not taking: Reported on 6/20/2024) 50 strip 3     albuterol (PROAIR HFA/PROVENTIL HFA/VENTOLIN HFA) 108 (90 Base) MCG/ACT inhaler Inhale 2 puffs into the lungs every 6 hours as needed for shortness of breath, wheezing or cough 18 g 0     albuterol (PROAIR HFA/PROVENTIL HFA/VENTOLIN HFA) 108 (90 Base) MCG/ACT inhaler Two puffs four times daily for two weeks; then two puffs twice daily (Patient not taking: Reported on 6/20/2024) 18 g 0     alcohol swab prep pads Use to swab area of injection/joelle as directed up to 4 times daily 100 each 11     blood glucose (ACCU-CHEK GUIDE) test strip Use to test blood sugar 4 times daily or as directed. For emergencies when Dexcom falls off 100 strip 3     blood glucose (NO BRAND SPECIFIED) lancets standard Use to test blood sugar 4 times daily or as directed. 100 each 11     blood glucose (NO BRAND SPECIFIED) test strip Use to test blood sugar 4 times daily or as directed. (Patient not taking: Reported on 8/2/2024) 200 strip 11     blood glucose monitoring (NO BRAND SPECIFIED) meter device kit Use to test blood sugar 4 times daily or as directed. (Patient not taking: Reported on 6/20/2024) 1 kit 1     blood glucose monitoring (SOFTCLIX) lancets Use to test blood sugar 4 times daily or as directed. (Patient not taking: Reported on 6/20/2024) 100 each 3     budesonide-formoterol (SYMBICORT) 160-4.5 MCG/ACT Inhaler INHALE 2 PUFFS INTO THE LUNGS TWICE A DAY (Patient not taking: Reported on 6/20/2024) 10.2 g 3     cetirizine (ZYRTEC) 10 MG tablet Take 1 tablet (10 mg) by mouth daily (Patient not taking: Reported on 6/20/2024) 90 tablet 0     Continuous Blood Gluc Sensor (DEXCOM G6 SENSOR) MISC Change every 10 days. 9 each 3     Continuous Blood Gluc Transmit (DEXCOM G6 TRANSMITTER) MISC Change every 3 months. 1 each 3     Continuous Glucose  (DEXCOM G7 ) TOMASA Use to read blood sugars as per 's instructions. 1 each 3     Continuous  Glucose Sensor (DEXCOM G7 SENSOR) MISC Change every 10 days. 9 each 3     FLUoxetine (PROZAC) 20 MG capsule Take 1 capsule (20 mg) by mouth daily (Patient not taking: Reported on 8/2/2024) 90 capsule 0     Glucagon (BAQSIMI) 3 MG/DOSE nasal powder Spray 1 spray (3 mg) in nostril as needed in the event of unconscious hypoglycemia or hypoglycemic seizure. May repeat dose if no response after 15 minutes. 1 each 3     Glucagon (GVOKE HYPOPEN) 1 MG/0.2ML pen Inject the contents of 1 device under the skin into lower abdomen, outer thigh, or outer upper arm as needed for hypoglycemia. If no response after 15 minutes, additional 1 mg dose from a new device may be injected while waiting for emergency assistance. 0.4 mL 3     hydrocortisone, Perianal, (HYDROCORTISONE) 2.5 % cream Place rectally 2 times daily as needed for hemorrhoids 28 g 0     ibuprofen (ADVIL/MOTRIN) 600 MG tablet Take 1 tablet (600 mg) by mouth every 6 hours as needed for mild pain 20 tablet 0     insulin degludec (TRESIBA FLEXTOUCH) 100 UNIT/ML pen Use as directed up to 10 units daily. 15 mL 3     insulin glargine (LANTUS PEN) 100 UNIT/ML pen Inject subcu 6 units daily. 15 mL 0     insulin pen needle (31G X 5 MM) 31G X 5 MM miscellaneous Use 4 pen needles daily or as directed. 300 each 3     magic mouthwash suspension (diphenhydrAMINE, lidocaine, aluminum-magnesium & simethicone) Swish and spit 10 mLs in mouth every 6 hours as needed for mouth sores GARGLE AND SPIT (Patient not taking: Reported on 6/20/2024) 240 mL 0     NOVOLOG FLEXPEN 100 UNIT/ML soln INJECT 2-10 UNITS WITH MEALS 3 TIMES DAILY AND AT BEDTIME. MAX DAILY DOSE 30 UNITS. Follow up visit needed with Kym Jang. Call  to schedule. 30 mL 1     polyethylene glycol (MIRALAX) 17 GM/Dose powder Take 17 g by mouth daily       polymixin b-trimethoprim (POLYTRIM) 75607-2.1 UNIT/ML-% ophthalmic solution Place 1-2 drops into the right eye every 4 hours 10 mL 0     tretinoin (RETIN-A) 0.05  % external cream Apply topically at bedtime To hands and feet nightly (Patient not taking: Reported on 8/2/2024) 45 g 1     No current facility-administered medications for this visit.        No Known Allergies    Physical Exam  LMP  (LMP Unknown)   GENERAL: healthy, alert and no distress  RESP: no audible wheeze, cough, or visible cyanosis.  No visible retractions or increased work of breathing.  Able to speak fully in complete sentences.  PSYCH: mentation appears normal, affect normal/bright, judgement and insight intact, normal speech and appearance well-groomed    RESULTS  Lab Results   Component Value Date    A1C 9.6 (H) 11/05/2023    A1C 10.2 (H) 03/21/2023    A1C 10.1 (H) 06/22/2022    A1C 5.8 (H) 07/04/2020    A1C 6.4 (H) 06/11/2020    A1C 7.5 (H) 01/28/2020    A1C 7.8 (H) 01/02/2020    A1C 12.6 (H) 09/20/2019       TSH   Date Value Ref Range Status   05/30/2019 0.877 0.358 - 3.740 UIU/mL Final   05/11/2017 1.19 0.40 - 4.00 mU/L Final   09/30/2014 1.90 0.40 - 4.00 mU/L Final     Comment:     Effective 7/30/2014, the reference range for this assay has changed to reflect   new instrumentation/methodology.         ALT   Date Value Ref Range Status   11/05/2023 11 0 - 50 U/L Final     Comment:     Reference intervals for this test were updated on 6/12/2023 to more accurately reflect our healthy population. There may be differences in the flagging of prior results with similar values performed with this method. Interpretation of those prior results can be made in the context of the updated reference intervals.     08/18/2023 12 0 - 50 U/L Final     Comment:     Reference intervals for this test were updated on 6/12/2023 to more accurately reflect our healthy population. There may be differences in the flagging of prior results with similar values performed with this method. Interpretation of those prior results can be made in the context of the updated reference intervals.     07/05/2020 24 0 - 50 U/L Final    07/04/2020 27 0 - 50 U/L Final   ]    Recent Labs   Lab Test 09/20/19  1425 03/11/19  0000   CHOL 181 147.6   HDL 71 52.4   * 72   TRIG 45 115.8       Lab Results   Component Value Date     11/05/2023     09/20/2019      Lab Results   Component Value Date    POTASSIUM 4.5 11/05/2023    POTASSIUM 3.8 08/12/2021    POTASSIUM 4.0 01/24/2020     Lab Results   Component Value Date    CHLORIDE 110 11/05/2023    CHLORIDE 100 08/12/2021    CHLORIDE 95 09/20/2019     Lab Results   Component Value Date    ALEXANDRO 9.1 11/05/2023    ALEXANDRO 8.5 09/20/2019     Lab Results   Component Value Date    CO2 22 11/05/2023    CO2 25 08/12/2021    CO2 22 09/20/2019     Lab Results   Component Value Date    BUN 12.5 11/05/2023    BUN 12 08/12/2021    BUN 13 09/20/2019     Lab Results   Component Value Date    CR 0.73 11/05/2023    CR 0.91 07/05/2020       GFR Estimate   Date Value Ref Range Status   11/05/2023 >90 >60 mL/min/1.73m2 Final   08/18/2023 65 >60 mL/min/1.73m2 Final   06/09/2023 >90 >60 mL/min/1.73m2 Final     Comment:     eGFR calculated using 2021 CKD-EPI equation.   07/05/2020 82 >60 mL/min/[1.73_m2] Final     Comment:     Non  GFR Calc  Starting 12/18/2018, serum creatinine based estimated GFR (eGFR) will be   calculated using the Chronic Kidney Disease Epidemiology Collaboration   (CKD-EPI) equation.     07/04/2020 85 >60 mL/min/[1.73_m2] Final     Comment:     Non  GFR Calc  Starting 12/18/2018, serum creatinine based estimated GFR (eGFR) will be   calculated using the Chronic Kidney Disease Epidemiology Collaboration   (CKD-EPI) equation.     07/03/2020 75 >60 mL/min/[1.73_m2] Final     Comment:     Non  GFR Calc  Starting 12/18/2018, serum creatinine based estimated GFR (eGFR) will be   calculated using the Chronic Kidney Disease Epidemiology Collaboration   (CKD-EPI) equation.       GFR Estimate If Black   Date Value Ref Range Status   07/05/2020 >90 >60  "mL/min/[1.73_m2] Final     Comment:      GFR Calc  Starting 12/18/2018, serum creatinine based estimated GFR (eGFR) will be   calculated using the Chronic Kidney Disease Epidemiology Collaboration   (CKD-EPI) equation.     07/04/2020 >90 >60 mL/min/[1.73_m2] Final     Comment:      GFR Calc  Starting 12/18/2018, serum creatinine based estimated GFR (eGFR) will be   calculated using the Chronic Kidney Disease Epidemiology Collaboration   (CKD-EPI) equation.     07/03/2020 87 >60 mL/min/[1.73_m2] Final     Comment:      GFR Calc  Starting 12/18/2018, serum creatinine based estimated GFR (eGFR) will be   calculated using the Chronic Kidney Disease Epidemiology Collaboration   (CKD-EPI) equation.         Lab Results   Component Value Date    MICROL 172 09/30/2014     No results found for: \"MICROALBUMIN\"  Lab Results   Component Value Date    CPEPT <0.1 (L) 09/30/2014       No results found for: \"B12\"]    Most recent eye exam date: : Not Found     FIB-4 Calculation: 1.13 at 11/5/2023  1:26 PM  Calculated from:  SGOT/AST: 32 U/L at 11/5/2023  1:26 PM  SGPT/ALT: 11 U/L at 11/5/2023  1:26 PM  Platelets: 316 10e3/uL at 11/5/2023  1:26 PM  Age: 38 years  A value of FIB-4 below 1.30 is considered as low risk for advanced fibrosis; a value of FIB-4 over 2.67 is considered as high risk for advanced fibrosis; and FIB-4 values between 1.30 and 2.67 are considered as intermediate risk of advanced fibrosis.      Assessment/Plan:     1.  Type 1 diabetes-  I am unable to assess her level of control.  Currently, major issue is hypoglycemia unawareness and severe hypoglycemia, but she reports she is doing better and has had no further severe hypoglycemia since earlier this year.  She is getting a lto of help in the home now.  Although she is not an ideal candidate for the Omnipod pump (does not currently carb count), I feel like she could do much better with an automated system, provided " she take the time to go to all the pre-pump education visits.  I will reach out to DM education- could also consider the Beta bionics for simplicity, but I am concerned about potential hypoglycemia if she is not regularly announcing insulin before eating.  Would suggest staying on a low dose of basal insulin once she starts the pump to prevent DKA in case of pump failure. She is highly motivated to have more stable glucose control.  We made the following plan today (instructions given to patient):      Meet with diabetes educator, Tracy Ramirez, to review your diabetes, help you get set up on the dexcom to connect with the clinic, and to discuss insulin pump options (likely omnipod 5).     Please have your lab tests done.  Please contact us to schedule at any of our [facility] lab locations  Call [phone] ([phone]), select option 1 or schedule via [employer].     Emergency issues: Please contact the clinic as soon as you recognize a problem.  Here are some concerns you should contact us about.  -Vomiting: more than twice.  Please check ketones.  If positive, go to ER. Monitor glucose hourly.   -High glucose (over 300 mg/dL twice in a row): Please check ketones.  If ketones are negative, take an insulin correction and recheck glucose in 1 hour.  If glucose is not coming down, please call the clinic. If ketones are moderate or large, drink lots of water, take an insulin correction, and recheck ketones in 1 hour.  If ketones are still high (or you are vomiting), go to the ER.  -Hypoglycemia (low glucose):   If glucose is less than 70 mg/dL, treat with 15g carb (4 glucose tablets), recheck glucose in 10 minutes.  If low again, repeat.   If glucose is less than 54 mg/dL, treat with 30g carb, recheck glucose in 10 minutes.  If low again, repeat.  Keep glucagon in your home in case of severe hypoglycemia and train someone how to use this.    Emergency kit (please ensure you always have these with you):   Glucose  tablets  Glucagon  Insulin  Syringes (if on a pump)  Extra infusion set (if on a pump)  Ketone strips    Contact information:   If you have concerns, please send me a ticketea message or call the clinic at [phone].  For more urgent concerns, please call [phone] after hours/weekends and ask to speak with the endocrinologist on call.      Please let me know if you are having low blood sugars less than 70 or over 350 mg/dL.  Do not wait until your next appointment if this is happening.         2.  Risk factors-     Retinopathy:  No known history.  Scheduled for eye exam, but missed appt.  Encouraged her to reschedule.  Has not had exam in years.    Nephropathy:  BP historically well controlled. No microalbuminuria. Needs to recheck.  Creatinine stable. Overdue for labs.  Asked her to schedule.  Neuropathy: No.    Feet: OK, no ulcers.   Lipids:  LDL at target.   Thyroid screening: will check TSH. Feeling very tired.   Celiac screening: Does not appear to have been screened.  Will check antibodies.   Contraception: did not address today.   Depression: Met with health psychology and it was not a good fit.  Not interested in rescheduling.    She is overdue for labs and would like to reschedule.      3. 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 Asked her to schedule with dermatology for further evaluation.  Placed referral.     4.  F/U in 1 mos with DM education, 3 mos with me, sooner with concerns/hypoglycemia.     42 minutes spent on the date of the encounter doing chart review, review of test results, patient visit and documentation, counseling/coordination of care, and discussion of follow up plan for worsening hyper and hypoglycemia.  The patient understood and is satisfied with today's visit.     Kym Jang PA-C, MPAS   [facility]  Department of Medicine  Division of Endocrinology and Diabetes                                                      Again, thank you for allowing  me to participate in the care of your patient.      Sincerely,    Kym Jang PA-C

## 2024-09-23 NOTE — Clinical Note
Tracy- this is one of my toughest patients. Hx of severe hypoglycemia, poor understanding of diabetes, had threat of losing her kids.  I think a pump (with tresiba) might be worth a try (nothing else works too well!), but she will need a lot of support.  She did not connect with previous educator, so I'm hoping this is a good fit!  I hope she comes! Thanks,  Kym

## 2024-09-23 NOTE — PATIENT INSTRUCTIONS
Meet with diabetes educator, Tracy Ramirez, to review your diabetes, help you get set up on the dexcom to connect with the clinic, and to discuss insulin pump options (likely omnipod 5).     Please have your lab tests done.  Please contact us to schedule at any of our [place] lab locations  Call [phone] ([phone]), select option 1 or schedule via [employer].     Emergency issues: Please contact the clinic as soon as you recognize a problem.  Here are some concerns you should contact us about.  -Vomiting: more than twice.  Please check ketones.  If positive, go to ER. Monitor glucose hourly.   -High glucose (over 300 mg/dL twice in a row): Please check ketones.  If ketones are negative, take an insulin correction and recheck glucose in 1 hour.  If glucose is not coming down, please call the clinic. If ketones are moderate or large, drink lots of water, take an insulin correction, and recheck ketones in 1 hour.  If ketones are still high (or you are vomiting), go to the ER.  -Hypoglycemia (low glucose):   If glucose is less than 70 mg/dL, treat with 15g carb (4 glucose tablets), recheck glucose in 10 minutes.  If low again, repeat.   If glucose is less than 54 mg/dL, treat with 30g carb, recheck glucose in 10 minutes.  If low again, repeat.  Keep glucagon in your home in case of severe hypoglycemia and train someone how to use this.    Emergency kit (please ensure you always have these with you):   Glucose tablets  Glucagon  Insulin  Syringes (if on a pump)  Extra infusion set (if on a pump)  Ketone strips    Contact information:   If you have concerns, please send me a [employer] message or call the clinic at [phone].  For more urgent concerns, please call [phone] after hours/weekends and ask to speak with the endocrinologist on call.      Please let me know if you are having low blood sugars less than 70 or over 350 mg/dL.  Do not wait until your next appointment if this is happening.

## 2024-09-23 NOTE — NURSING NOTE
Current patient location: [address]  [facility] [address]    Is the patient currently in the state of [place]? YES    Visit mode:VIDEO    If the visit is dropped, the patient can be reconnected by: VIDEO VISIT: Text to cell phone:   Telephone Information:   Mobile [phone]       Will anyone else be joining the visit? NO  (If patient encounters technical issues they should call [phone]    How would you like to obtain your AVS? MyChart    Are changes needed to the allergy or medication list? No    Are refills needed on medications prescribed by this physician? NO    Rooming Documentation:  Questionnaire(s) completed    Reason for visit: RECHECK    Gretta ABURTOF

## 2024-10-08 ENCOUNTER — TELEPHONE (OUTPATIENT)
Dept: EDUCATION SERVICES | Facility: CLINIC | Age: 39
End: 2024-10-08
Payer: COMMERCIAL

## 2024-10-08 NOTE — TELEPHONE ENCOUNTER
Patient confirmed scheduled appointment:  Date: 10/30  Time: 9:00  Visit type: diab edu  Provider: Yarelis Matos  Location: [facility]  Testing/imaging:   Additional notes:   Spoke with patient and she preferred an in person visit. Patient is aware of the location of the clinic.    Alivia Yap on 10/8/2024 at 5:03 PM

## 2024-11-04 NOTE — TELEPHONE ENCOUNTER
PA Initiation    Medication: Fiasp Penfill 100Unit/ML SOCT  Insurance Company: [employer] - Phone [phone] Fax [phone]  Pharmacy Filling the Rx:    Filling Pharmacy Phone:    Filling Pharmacy Fax:    Start Date: 10/14/2022    [id number]       Wound  CLINIC NOTE      REASON FOR VISIT:  Chief Complaint   Patient presents with    Wound Check    Office Visit       HPI:  Gayla Puri is a 88 year old female     Had  L pre tib abrasion from bumping her leg  Now recently healed    Walks w walker  MS,has R leg weakness w atrophy     Arterial duplex w moderate PAD bilateral legs from 10/14/24 - report is scanned in epic under media                 No past medical history on file.    No past surgical history on file.    No current outpatient medications on file.     No current facility-administered medications for this visit.     ALLERGIES:   Allergen Reactions    Cephalosporins RASH    Contrast Media RASH          No family history on file.      PHYSICAL EXAMINATION:  Sitting comfortably   Non palp pulses  No ulcers  No edema  Dry skin   Mild chroinic rubor of toes         ASSESSMENT:    Peripheral Arterial Disease ( PAD)       Skin tear has healed on L leg  She is elderly , frail    - counseled on atherosclerosis of the extremity arteries  - risk factors include Diabetes, hypercholesterolemia, smoking, overweight, sedentary lifestyle, hypertension, etc  - we discussed claudication, calf pain with walking relieved by rest, is most common symptom.   - counseled on exercise walking regimen, 4-5 days per week, for minimum 30-45 minutes of walking, and walk through pain as much as possible   - aspirin recommended  - no critical limb ischemia present ( gangrene, ulcer, rest pain)     No role for angiogram     Follow up as needed        cc PCP

## 2024-11-05 ENCOUNTER — TELEPHONE (OUTPATIENT)
Dept: EDUCATION SERVICES | Facility: CLINIC | Age: 39
End: 2024-11-05
Payer: COMMERCIAL

## 2024-11-05 NOTE — TELEPHONE ENCOUNTER
Left Voicemail (1st Attempt) and Sent Mychart (1st Attempt) for the patient to call back and schedule the following:    Appointment type: diabetic edu  Provider: Yarelis Matos  Return date: next avail  Specialty phone number: [phone]  Additional appointment(s) needed:   Additonal Notes:   LVM, MyC x1     Alivia Yap on 11/5/2024 at 5:11 PM

## 2024-12-10 PROBLEM — J06.9 ACUTE UPPER RESPIRATORY INFECTION, UNSPECIFIED: Status: ACTIVE | Noted: 2024-12-10

## 2024-12-11 ENCOUNTER — OFFICE VISIT (OUTPATIENT)
Dept: URGENT CARE | Facility: URGENT CARE | Age: 39
End: 2024-12-11
Payer: COMMERCIAL

## 2024-12-11 ENCOUNTER — ANCILLARY PROCEDURE (OUTPATIENT)
Dept: GENERAL RADIOLOGY | Facility: CLINIC | Age: 39
End: 2024-12-11
Attending: PHYSICIAN ASSISTANT
Payer: COMMERCIAL

## 2024-12-11 VITALS
SYSTOLIC BLOOD PRESSURE: 110 MMHG | RESPIRATION RATE: 24 BRPM | WEIGHT: 196.2 LBS | DIASTOLIC BLOOD PRESSURE: 75 MMHG | BODY MASS INDEX: 34.76 KG/M2 | TEMPERATURE: 97.9 F | HEART RATE: 65 BPM | OXYGEN SATURATION: 100 %

## 2024-12-11 DIAGNOSIS — Z20.822 SUSPECTED 2019 NOVEL CORONAVIRUS INFECTION: ICD-10-CM

## 2024-12-11 DIAGNOSIS — J06.9 VIRAL URI: ICD-10-CM

## 2024-12-11 DIAGNOSIS — R50.9 FEVER, UNSPECIFIED FEVER CAUSE: Primary | ICD-10-CM

## 2024-12-11 DIAGNOSIS — R50.9 FEVER, UNSPECIFIED FEVER CAUSE: ICD-10-CM

## 2024-12-11 LAB
FLUAV AG SPEC QL IA: NEGATIVE
FLUBV AG SPEC QL IA: NEGATIVE

## 2024-12-11 PROCEDURE — 87635 SARS-COV-2 COVID-19 AMP PRB: CPT | Performed by: PHYSICIAN ASSISTANT

## 2024-12-11 PROCEDURE — 87804 INFLUENZA ASSAY W/OPTIC: CPT | Performed by: PHYSICIAN ASSISTANT

## 2024-12-11 PROCEDURE — 99214 OFFICE O/P EST MOD 30 MIN: CPT | Performed by: PHYSICIAN ASSISTANT

## 2024-12-11 PROCEDURE — 71046 X-RAY EXAM CHEST 2 VIEWS: CPT | Mod: TC | Performed by: RADIOLOGY

## 2024-12-11 RX ORDER — BENZONATATE 100 MG/1
CAPSULE ORAL
Qty: 45 CAPSULE | Refills: 0 | Status: SHIPPED | OUTPATIENT
Start: 2024-12-11

## 2024-12-11 RX ORDER — ALBUTEROL SULFATE 90 UG/1
2 INHALANT RESPIRATORY (INHALATION) EVERY 6 HOURS PRN
Qty: 18 G | Refills: 0 | Status: SHIPPED | OUTPATIENT
Start: 2024-12-11

## 2024-12-11 RX ORDER — GUAIFENESIN 600 MG/1
TABLET, EXTENDED RELEASE ORAL
Qty: 30 TABLET | Refills: 0 | Status: SHIPPED | OUTPATIENT
Start: 2024-12-11

## 2024-12-12 LAB — SARS-COV-2 RNA RESP QL NAA+PROBE: NEGATIVE

## 2024-12-12 NOTE — PROGRESS NOTES
Chief Complaint   Patient presents with    Generalized Body Aches     Onset 3 days     Fever    Cough     Productive cough onset last night    Shortness of Breath    Pharyngitis       ASSESSMENT/PLAN:  Arielle was seen today for generalized body aches, fever, cough, shortness of breath and pharyngitis.    Diagnoses and all orders for this visit:    Fever  -     Influenza A & B Antigen - Clinic Collect  -     COVID-19 Virus (Coronavirus) by PCR Nose    Suspected 2019 novel coronavirus infection  -     COVID-19 Virus (Coronavirus) by PCR Nose    Reassuring exam and vitals.  X-ray negative.  Likely viral  Flu negative, COVID pending  Symptomatic cares and expected length of symptoms discussed at length and outlined in AVS  Return precautions also discussed    Yasmany Moody PA-C      SUBJECTIVE:  Arielle is a 39 year old female who presents to urgent care with 3 days of fever, body aches, cough, nasal congestion, sore throat.  Reports some shortness of breath.  No chest pain    ROS: Pertinent ROS neg other than the symptoms noted above in the HPI.     OBJECTIVE:  /75 (BP Location: Left arm, Patient Position: Sitting, Cuff Size: Adult Large)   Pulse 65   Temp 97.9  F (36.6  C) (Tympanic)   Resp 24   Wt 89 kg (196 lb 3.2 oz)   LMP  (LMP Unknown)   SpO2 100%   BMI 34.76 kg/m     GENERAL: alert and no distress  EYES: Eyes grossly normal to inspection, PERRL and conjunctivae and sclerae normal  HENT: Opaque nasal congestion, nose and mouth without ulcers or lesions  RESP: lungs clear to auscultation - no rales, rhonchi or wheezes  CV: regular rate and rhythm, normal S1 S2, no S3 or S4, no murmur, click or rub, no peripheral edema     DIAGNOSTICS  Xray - Reviewed and interpreted by me.  No acute cardiopulmonary abnormality noted  Results for orders placed or performed in visit on 12/11/24   Influenza A & B Antigen - Clinic Collect     Status: Normal    Specimen: Nose; Swab   Result Value Ref Range    Influenza A  antigen Negative Negative    Influenza B antigen Negative Negative    Narrative    Test results must be correlated with clinical data. If necessary, results should be confirmed by a molecular assay or viral culture.        Current Outpatient Medications   Medication Sig Dispense Refill    acetone urine (KETOSTIX) test strip Use as directed in case of high glucose or illness. 50 strip 3    albuterol (PROAIR HFA/PROVENTIL HFA/VENTOLIN HFA) 108 (90 Base) MCG/ACT inhaler Inhale 2 puffs into the lungs every 6 hours as needed for shortness of breath, wheezing or cough. 18 g 0    alcohol swab prep pads Use to swab area of injection/joelle as directed up to 4 times daily 100 each 11    blood glucose (ACCU-CHEK GUIDE) test strip Use to test blood sugar 4 times daily or as directed. For emergencies when Dexcom falls off 100 strip 3    blood glucose (NO BRAND SPECIFIED) lancets standard Use to test blood sugar 4 times daily or as directed. 100 each 11    blood glucose (NO BRAND SPECIFIED) test strip Use to test blood sugar 4 times daily or as directed. 200 strip 11    Glucagon (BAQSIMI) 3 MG/DOSE nasal powder Spray 1 spray (3 mg) in nostril as needed. in the event of unconscious hypoglycemia or hypoglycemic seizure. May repeat dose if no response after 15 minutes. 1 each 3    Glucagon (GVOKE HYPOPEN) 1 MG/0.2ML pen Inject the contents of 1 device under the skin into lower abdomen, outer thigh, or outer upper arm as needed for hypoglycemia. If no response after 15 minutes, additional 1 mg dose from a new device may be injected while waiting for emergency assistance. 0.4 mL 3    ibuprofen (ADVIL/MOTRIN) 600 MG tablet Take 1 tablet (600 mg) by mouth every 6 hours as needed for mild pain 20 tablet 0    insulin degludec (TRESIBA FLEXTOUCH) 100 UNIT/ML pen Use as directed up to 25 units daily. 30 mL 3    insulin pen needle (31G X 5 MM) 31G X 5 MM miscellaneous Use 4 pen needles daily or as directed. 300 each 3    KETOSTIX test strip  Use as directed in case of high glucose, vomiting or illness. 50 strip 3    NOVOLOG FLEXPEN 100 UNIT/ML soln INJECT 2-10 UNITS WITH MEALS 3 TIMES DAILY AND AT BEDTIME. MAX DAILY DOSE 30 UNITS. 30 mL 3    Continuous Glucose Sensor (DEXCOM G7 SENSOR) MISC Change every 10 days. (Patient not taking: Reported on 2024) 9 each 3     No current facility-administered medications for this visit.      Patient Active Problem List   Diagnosis    Blindness of right eye    Diabetes mellitus type 1 (H)    Hypoglycemia unawareness in type 1 diabetes mellitus (H)    Vitamin D deficiency    External hemorrhoids    Medical non-compliance    Heart murmur    DJD (degenerative joint disease), cervical    Fibrocystic breast changes of both breasts    Endophthalmitis    Uveitis    Generalized anxiety disorder    History of pre-eclampsia    Cannabis abuse    Cough    Depression, major, in remission (H)    Previous  delivery, antepartum condition or complication    Postpartum depression    Closed head injury, initial encounter    History of diabetic ketoacidosis    Acute upper respiratory infection, unspecified      Past Medical History:   Diagnosis Date    Acute upper respiratory infection, unspecified 12/10/2024    Blindness of right eye     Diabetes mellitus type 1 (H)     Diagnosed as a child     Past Surgical History:   Procedure Laterality Date     SECTION  13     SECTION N/A 2015    Procedure:  SECTION;  Surgeon: John Hudson MD;  Location: [facility]     SECTION N/A 7/3/2020    Procedure:  SECTION;  Surgeon: Aparna Atkins MD;  Location: [facility]    ENUCLEATION Right 3/20/2015    Procedure: ENUCLEATION;  Surgeon: Edilson Islas MD;  Location: [facility]     Family History   Problem Relation Age of Onset    Family History Negative Mother     Family History Negative Father     Diabetes Other         father's side     Social History     Tobacco Use    Smoking  status: Former     Types: Cigarettes     Passive exposure: Never    Smokeless tobacco: Never    Tobacco comments:     smokes 2 cigarettes/day-none for several months   Substance Use Topics    Alcohol use: No     Alcohol/week: 0.0 standard drinks of alcohol              The plan of care was discussed with the patient. They understand and agree with the course of treatment prescribed. A printed summary was given including instructions and medications.  The use of Dragon/RoboD[employer] dictation services may have been used to construct the content in this note; any grammatical or spelling errors are non-intentional. Please contact the author of this note directly if you are in need of any clarification.

## 2025-01-03 DIAGNOSIS — J06.9 ACUTE UPPER RESPIRATORY INFECTION, UNSPECIFIED: ICD-10-CM

## 2025-01-03 DIAGNOSIS — E10.40 TYPE 1 DIABETES MELLITUS WITH DIABETIC NEUROPATHY (H): ICD-10-CM

## 2025-01-03 NOTE — TELEPHONE ENCOUNTER
First attempt. Left message for patient to return call to clinic per refill team as requested:        Patient does not have history of lung disorders noted in problem list. Patient was seen for virtual visit and followed up in Urgent care beginning of December for URI.     RN, if/when patient returns call, please assess for need of refill and triage as appropriate. Willian De León RN, BSN     Lipid profile today

## 2025-01-03 NOTE — TELEPHONE ENCOUNTER
Clinic RN: Please investigate patient's chart or contact patient if the information cannot be found because this medication was prescribed for an acute condition. Confirm current symptoms/need for medication and possible need for appointment. If necessary, document reason and route refill encounter to provider for approval or denial.    Yoly Fung, RN

## 2025-01-04 ENCOUNTER — HEALTH MAINTENANCE LETTER (OUTPATIENT)
Age: 40
End: 2025-01-04

## 2025-01-07 RX ORDER — ALBUTEROL SULFATE 90 UG/1
AEROSOL, METERED RESPIRATORY (INHALATION)
Qty: 18 G | Refills: 0 | OUTPATIENT
Start: 2025-01-07

## 2025-01-07 NOTE — TELEPHONE ENCOUNTER
Spoke with patient per refill team as requested. Patient states that she is feeling much better than previously but would like a refill to have on hand if gets sick again. Routing to provider, please review and advise. Willian De León RN, BSN

## 2025-01-31 ENCOUNTER — VIRTUAL VISIT (OUTPATIENT)
Dept: FAMILY MEDICINE | Facility: CLINIC | Age: 40
End: 2025-01-31
Payer: COMMERCIAL

## 2025-01-31 DIAGNOSIS — R53.83 FEELING TIRED: ICD-10-CM

## 2025-01-31 PROCEDURE — 98014 SYNCH AUDIO-ONLY EST MOD 30: CPT | Performed by: PHYSICIAN ASSISTANT

## 2025-01-31 NOTE — PROGRESS NOTES
"Arielle is a 40 year old who is being evaluated via a billable telephone visit.    What phone number would you like to be contacted at? [phone]   How would you like to obtain your AVS? Brittani  Originating Location (pt. Location): Home    Distant Location (provider location):  On-site  Telephone visit completed due to the patient did not have access to video, while the distant provider did.    Assessment & Plan     -restart fluoxetine.  Side effects of medication(s) discussed as well as risks/benefits of taking    -plan to follow-up with PCP in 3-4 weeks    Postpartum depression  - FLUoxetine (PROZAC) 20 MG capsule; Take 1 capsule (20 mg) by mouth daily.    Feeling tired  - FLUoxetine (PROZAC) 20 MG capsule; Take 1 capsule (20 mg) by mouth daily.          BMI  Estimated body mass index is 34.76 kg/m  as calculated from the following:    Height as of 9/23/24: 1.6 m (5' 3\").    Weight as of 12/11/24: 89 kg (196 lb 3.2 oz).         Subjective   Arielle is a 40 year old, presenting for the following health issues:  Recheck Medication      1/31/2025    11:52 AM   Additional Questions   Roomed by Shivani   Accompanied by self     -was taking depression medications in the past but has not taken this, would like to restart    -has been feeling \"lazy\", depressed  -crying more  -sleeping well but sleeping more than she would like  -no SI/HI  -worries about bad things that could happen to her  -sxs have been present for \"a while\", h/o type 1 diabetes and feels this is part of it.  She has gained weight and feels this is also part of her depression    History of Present Illness       Reason for visit:  Cold like Sx , headache, vomiting , fever SOB  Symptom onset:  1-3 days ago  Symptoms include:  Headache, vomitting  Symptom intensity:  Severe  Symptom progression:  Worsening  Had these symptoms before:  Yes  Has tried/received treatment for these symptoms:  No  What makes it worse:  N/a  What makes it better:  N/a   She is " taking medications regularly.     -visit today is to get back on fluoxetine  -h/o PPD and did well on this.  Is 4 years PP  -noticed more anhedonia in the past few months.  Feeling down, sad  -crying more  -no SI/HI      Objective           Vitals:  No vitals were obtained today due to virtual visit.    Physical Exam   General: Alert and no distress //Respiratory: No audible wheeze, cough, or shortness of breath // Psychiatric:  Appropriate affect, tone, and pace of words      No results found for this or any previous visit (from the past 24 hours).      Phone call duration: 15 minutes  Signed Electronically by: Laura Moore PA-C

## 2025-02-13 DIAGNOSIS — E10.649 TYPE 1 DIABETES MELLITUS WITH HYPOGLYCEMIA AND WITHOUT COMA (H): ICD-10-CM

## 2025-02-19 ENCOUNTER — VIRTUAL VISIT (OUTPATIENT)
Dept: FAMILY MEDICINE | Facility: CLINIC | Age: 40
End: 2025-02-19
Payer: COMMERCIAL

## 2025-02-19 DIAGNOSIS — T85.9XXA: Primary | ICD-10-CM

## 2025-02-19 DIAGNOSIS — E10.40 TYPE 1 DIABETES MELLITUS WITH DIABETIC NEUROPATHY (H): ICD-10-CM

## 2025-02-19 PROCEDURE — 98006 SYNCH AUDIO-VIDEO EST MOD 30: CPT | Performed by: NURSE PRACTITIONER

## 2025-02-19 RX ORDER — CIPROFLOXACIN HYDROCHLORIDE 3.5 MG/ML
1-2 SOLUTION/ DROPS TOPICAL EVERY 4 HOURS
Qty: 5 ML | Refills: 0 | Status: SHIPPED | OUTPATIENT
Start: 2025-02-19

## 2025-02-19 ASSESSMENT — ENCOUNTER SYMPTOMS: EYE PAIN: 1

## 2025-02-19 NOTE — PROGRESS NOTES
"Arielle is a 40 year old who is being evaluated via a billable video visit.    How would you like to obtain your AVS? MyChart  If the video visit is dropped, the invitation should be resent by: Text to cell phone: [phone]  Will anyone else be joining your video visit? 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 No pain. No fever. Has appointment with prosthetic ophthalmologist next week. Will cover with cipro, but difficult to assess via video. She notes this has worked well in the past. She is aware of s/s that should prompt emergent evaluation.  - ciprofloxacin (CILOXAN) 0.3 % ophthalmic solution; Place 1-2 drops into the right eye every 4 hours.    Type 1 diabetes mellitus with diabetic neuropathy (H)  Follows with endocrine.           BMI  Estimated body mass index is 34.76 kg/m  as calculated from the following:    Height as of 9/23/24: 1.6 m (5' 3\").    Weight as of 12/11/24: 89 kg (196 lb 3.2 oz).         See Patient Instructions    Subjective   Arielle is a 40 year old, presenting for the following health issues:  Eye Problem (Dry eye)      2/19/2025    10:46 AM   Additional Questions   Roomed by Leatha OLMSTEAD   Accompanied by None         2/19/2025    10:46 AM   Patient Reported Additional Medications   Patient reports taking the following new medications None     Video Start Time:  1139    Eye Problem     History of Present Illness       Reason for visit:  Took prostetic right eye out, and it's very dry.  Symptom onset:  1-3 days ago  Symptoms include:  Dry right eye  Symptom intensity:  Mild  Symptom progression:  Staying the same  Had these symptoms before:  No  What makes it worse:  Nothing  What makes it better:  OTC eye drops   She is taking medications regularly.       Eye(s) Problem  Onset/Duration: 3 days  Description:   Location: Right   Pain: No  Redness: No  Accompanying Signs & " Symptoms:  Discharge/mattering: YES  Swelling: No  Visual changes: N/A  Fever: No  Nasal Congestion: No  Bothered by bright lights: N/A  History:  Trauma: No  Foreign body exposure: No  Wearing contacts: N/A  Precipitating or alleviating factors: OTC eye drops  Therapies tried and outcome: OTC Eye drops        Review of Systems  Constitutional, neuro, ENT, endocrine, pulmonary, cardiac, gastrointestinal, genitourinary, musculoskeletal, integument and psychiatric systems are negative, except as otherwise noted.      Objective    Vitals - Patient Reported  Pain Score: No Pain (0)        Physical Exam   GENERAL: alert and no distress  EYES: prosthetic globe removed, no evidence of a periorbital cellulitis, no tenderness when patient palpates area. Does appear to be white discharge to upper and lower lashes.  RESP: No audible wheeze, cough, or visible cyanosis.    SKIN: Visible skin clear. No significant rash, abnormal pigmentation or lesions.  NEURO: Cranial nerves grossly intact.  Mentation and speech appropriate for age.  PSYCH: Appropriate affect, tone, and pace of words          Video-Visit Details    Type of service:  Video Visit   Video End Time: 1147  Originating Location (pt. Location): Home    Distant Location (provider location):  On-site  Platform used for Video Visit: Dawn  Signed Electronically by: MIKALA Cadet CNP

## 2025-03-15 ENCOUNTER — OFFICE VISIT (OUTPATIENT)
Dept: URGENT CARE | Facility: URGENT CARE | Age: 40
End: 2025-03-15
Payer: COMMERCIAL

## 2025-03-15 VITALS
BODY MASS INDEX: 35.06 KG/M2 | DIASTOLIC BLOOD PRESSURE: 74 MMHG | RESPIRATION RATE: 16 BRPM | SYSTOLIC BLOOD PRESSURE: 90 MMHG | OXYGEN SATURATION: 97 % | TEMPERATURE: 98.4 F | HEART RATE: 97 BPM | WEIGHT: 197.9 LBS

## 2025-03-15 DIAGNOSIS — E10.649 TYPE 1 DIABETES MELLITUS WITH HYPOGLYCEMIA AND WITHOUT COMA (H): ICD-10-CM

## 2025-03-15 DIAGNOSIS — E10.40 TYPE 1 DIABETES MELLITUS WITH DIABETIC NEUROPATHY (H): ICD-10-CM

## 2025-03-15 PROCEDURE — 1126F AMNT PAIN NOTED NONE PRSNT: CPT

## 2025-03-15 PROCEDURE — 3078F DIAST BP <80 MM HG: CPT

## 2025-03-15 PROCEDURE — 99214 OFFICE O/P EST MOD 30 MIN: CPT

## 2025-03-15 PROCEDURE — 3074F SYST BP LT 130 MM HG: CPT

## 2025-03-15 RX ORDER — INSULIN ASPART 100 [IU]/ML
INJECTION, SOLUTION INTRAVENOUS; SUBCUTANEOUS
Qty: 15 ML | Refills: 0 | Status: SHIPPED | OUTPATIENT
Start: 2025-03-15

## 2025-03-15 RX ORDER — INSULIN DEGLUDEC 100 U/ML
INJECTION, SOLUTION SUBCUTANEOUS
Qty: 15 ML | Refills: 0 | Status: SHIPPED | OUTPATIENT
Start: 2025-03-15

## 2025-03-15 ASSESSMENT — PAIN SCALES - GENERAL: PAINLEVEL_OUTOF10: NO PAIN (0)

## 2025-03-15 NOTE — PROGRESS NOTES
URGENT CARE  Assessment & Plan   Assessment:   Arielle Montenegro is a 40 year old female who's clinical presentation today is consistent with:   1. Type 1 diabetes mellitus  - NOVOLOG FLEXPEN 100 UNIT/ML soln;    - insulin degludec (TRESIBA FLEXTOUCH) 100 UNIT/ML pen;   - Primary Care Referral; Future  Plan:  Patient has a chronic history of type 1 diabetes, patient here today requesting refill of her long and short acting insulin as she states she ran out, patient states she thought she had refills but she does not.  Discussed with patient I can refill her medications for 1 week but that this does not take the place of the primary care visit and she will need to follow-up with her primary care provider for further evaluation and treatment of her type 1 diabetes.  Patient is agreeable, urgent referral placed to primary care, so that she can get in with a pcp.  Patient is asymptomatic today, no alarm signs and symptoms are present  Per electronic medical record review patient did see endocrinology in September 2024 however it does not appear the patient has had labs drawn recently, the last hemoglobin A1c that I can see is November 2023 and was 9 at that time, discussed with patient that it is likely insulin changes might be needed and she will need to follow-up with her primary care provider    MIKALA Severino [facility]NA      ______________________________________________________________________      Subjective     HPI: Arielle Montenegro  is a 40 year old  female who presents today for evaluation the following concerns:   Patient presents today with a history of type 1 diabetes who is insulin-dependent, patient requesting refills of her insulin as she states she ran out.  Patient states she thought she had refills but she does not.  Patient states she does see a primary care but has not been in a while.  Patient states she does also follow with endocrinology who she saw last  September 2024    Review of Systems:  Pertinent review of systems as reflected in HPI, otherwise negative.     Objective    Physical Exam:  Vitals:    03/15/25 1441   BP: 90/74   BP Location: Left arm   Patient Position: Sitting   Cuff Size: Adult Large   Pulse: 97   Resp: 16   Temp: 98.4  F (36.9  C)   TempSrc: Oral   SpO2: 97%   Weight: 89.8 kg (197 lb 14.4 oz)      General: alert and in no acute distress, VS stable/reviewed    Head: atraumatic, normocephalic, PERRLA   Lungs:  nonlabored  CV:  extremities warm and perfused  Skin: no rashes, no diaphoresis and skin color normal  Neuro: Patient awake, alert, speech is fluent,         ______________________________________________________________________    I explained my diagnostic considerations and recommendations to the patient  All questions were answered.   Please see AVS for any patient instructions & handouts given.

## 2025-04-11 DIAGNOSIS — E10.40 TYPE 1 DIABETES MELLITUS WITH DIABETIC NEUROPATHY (H): ICD-10-CM

## 2025-04-11 DIAGNOSIS — E10.649 TYPE 1 DIABETES MELLITUS WITH HYPOGLYCEMIA AND WITHOUT COMA (H): ICD-10-CM

## 2025-04-11 DIAGNOSIS — E10.649 HYPOGLYCEMIA UNAWARENESS IN TYPE 1 DIABETES MELLITUS (H): ICD-10-CM

## 2025-04-11 RX ORDER — ACYCLOVIR 400 MG/1
TABLET ORAL
Qty: 9 EACH | Refills: 3 | Status: CANCELLED | OUTPATIENT
Start: 2025-04-11

## 2025-04-11 NOTE — TELEPHONE ENCOUNTER
[facility] Call Center    Phone Message    May a detailed message be left on voicemail: yes     Reason for Call: Medication Refill Request    Has the patient contacted the pharmacy for the refill? Yes   Name of medication being requested:   NOVOLOG FLEXPEN 100 UNIT/ML soln, Lantus and   Continuous Glucose Sensor (DEXCOM G7 SENSOR) [employer] [facility]   Provider who prescribed the medication: Kym Jang  Pharmacy:   [id number] - [place], [place] - [address] AT [facility] OF [facility] [address]     Date medication is needed: 4/11/25

## 2025-04-11 NOTE — TELEPHONE ENCOUNTER
Last Written Prescription:  NOVOLOG FLEXPEN 100 UNIT/ML soln 15 mL 0 3/15/2025 -- Yes   Sig: INJECT 2-10 UNITS WITH MEALS 3 TIMES DAILY AND AT BEDTIME. MAX DAILY DOSE 30 UNITS.     Neelam Barry APRN CNP [facility]     ----------------------  Last Visit Date: 9-23-24  Future Visit Date: none      Refill decision: Medication unable to be refilled by RN due to: Other:  Insulin and insulin pump supplies - refilled per Endocrine clinic.   Last fill by other provider  Pt call: pt asking for Lantus- not on active med list        Duplicate: pharm called - confirmed, will process rf for pt  Continuous Glucose Sensor (DEXCOM G7 SENSOR) MISC 9 each 3 9/23/2024 -- No   Sig: Change every 10 days.       Request from pharmacy:  Requested Prescriptions   Pending Prescriptions Disp Refills    Continuous Glucose Sensor (DEXCOM G7 SENSOR) MISC 9 each 3     Sig: Change every 10 days.       There is no refill protocol information for this order       NOVOLOG FLEXPEN 100 UNIT/ML soln 15 mL 0     Sig: INJECT 2-10 UNITS WITH MEALS 3 TIMES DAILY AND AT BEDTIME. MAX DAILY DOSE 30 UNITS.       Insulin Protocol Failed - 4/11/2025  3:18 PM        Failed - HgbA1C in past 3 or 6 months     If HgbA1C is 8 or greater, it needs to be on file within the past 3 months.  If less than 8, must be on file within the past 6 months.     Recent Labs   Lab Test 11/05/23  1326   A1C 9.6*             Failed - Recent (6 mo) or future (90 days) visit within the authorizing provider's specialty     The patient must have completed an in-person or virtual visit within the past 6 months or has a future visit scheduled within the next 90 days with the authorizing provider s specialty.  Urgent care and e-visits do not quality as an office visit for this protocol.          Failed - Chart review required     Review Chart.    Do not approve if insulin is used in a pump.  Instead, direct refill request to the patient's endocrinologist.  If the patient doesn't have an  endocrinologist, then send the refill to the patient's PCP for review            Passed - Medication is active on med list and the sig matches. RN to manually verify dose and sig if red X/fail.     If the protocol passes (green check), you do not need to verify med dose and sig.    A prescription matches if they are the same clinical intention.    For Example: once daily and every morning are the same.    The protocol can not identify upper and lower case letters as matching and will fail.     For Example: Take 1 tablet (50 mg) by mouth daily     TAKE 1 TABLET (50 MG) BY MOUTH DAILY    For all fails (red x), verify dose and sig.    If the refill does match what is on file, the RN can still proceed to approve the refill request.       If they do not match, route to the appropriate provider.             Passed - Medication indicated for associated diagnosis     Medication is associated with one or more of the following diagnoses:   - Type 1 diabetes mellitus  - Type 2 diabetes mellitus  - Diabetic nephropathy; Prophylaxis  - Neuropathy due to diabetes mellitus; Prophylaxis  - Retinopathy due to diabetes mellitus; Prophylaxis  - Diabetes mellitus associated with cystic fibrosis  - Disorder of cardiovascular system; Prophylaxis - Type 1 diabetes mellitus   - Disorder of cardiovascular system; Prophylaxis - Type 2 diabetes mellitus            Passed - Patient is 18 years of age or older

## 2025-04-13 RX ORDER — INSULIN ASPART 100 [IU]/ML
INJECTION, SOLUTION INTRAVENOUS; SUBCUTANEOUS
Qty: 30 ML | Refills: 0 | Status: SHIPPED | OUTPATIENT
Start: 2025-04-13

## 2025-04-23 ENCOUNTER — TELEPHONE (OUTPATIENT)
Dept: ENDOCRINOLOGY | Facility: CLINIC | Age: 40
End: 2025-04-23
Payer: COMMERCIAL

## 2025-04-23 DIAGNOSIS — E10.40 TYPE 1 DIABETES MELLITUS WITH DIABETIC NEUROPATHY (H): ICD-10-CM

## 2025-04-23 RX ORDER — INSULIN DEGLUDEC 100 U/ML
INJECTION, SOLUTION SUBCUTANEOUS
Qty: 15 ML | Refills: 3 | Status: SHIPPED | OUTPATIENT
Start: 2025-04-23

## 2025-04-23 NOTE — TELEPHONE ENCOUNTER
[facility] Call Center    Phone Message    May a detailed message be left on voicemail: yes     Reason for Call: Medication Refill Request    Has the patient contacted the pharmacy for the refill? Yes   Name of medication being requested: insulin degludec (TRESIBA FLEXTOUCH) 100 UNIT/ML pen [[id number]]  Provider who prescribed the medication: Rx'd by Neelam Barry APRN CNP /  No Record of Rx'd by Suhail  [facility]armacy: [id number] [place], [place] - [address] AT [facility] OF [facility] [address]  Date medication is needed: asap - Patient has been advised that writer could not locate Rx for this medication written by Suhail and that it may require a new Rx.  Pls call if necessary       Action Taken: Other: endo    Travel Screening: Not Applicable     Date of Service:

## 2025-05-05 ENCOUNTER — TELEPHONE (OUTPATIENT)
Dept: EDUCATION SERVICES | Facility: CLINIC | Age: 40
End: 2025-05-05

## 2025-05-05 NOTE — TELEPHONE ENCOUNTER
Left Voicemail (1st Attempt) and Sent Mychart (1st Attempt) for the patient to call back and schedule the following:    Appointment type: Diabetes Edu  Provider: Tracy Ramirez  Return date: next available video  Specialty phone number: [phone]  Additional appointment(s) needed: Labs next available, [facility] preferred.  Additonal Notes: LVM, MyCx1. Pt no showed lab appt that was scheduled in 04/11/25 TE.    Follow-Up with Whom?   Me            Follow-Up for What?   Diabetes            Service:   60 Minutes            How?   Video            Can this be self-scheduled online?   Yes        Luisana Hanley on 5/5/2025 at 10:41 AM

## 2025-05-07 NOTE — TELEPHONE ENCOUNTER
Patient confirmed scheduled appointment:  Labs 05/16/25 at 9:15 at [facility]  CDE with Tracy 05/21/25 at 9:30  Visit type: Diabetes Edu  Provider: Tracy Ramirez  Location: [facility] DIABETES  Testing/imaging: N/A  Additional notes: Scheduled per pt. Pt thought her lab appt was scheduled for the future and didn't know that it was scheduled for this past Monday the 7th. Pt said she doesn't get any reminders, but CC verified she is signed up for text/MyC/email reminders.     Luisana Hanley on 5/7/2025 at 10:23 AM

## 2025-05-19 ENCOUNTER — TELEPHONE (OUTPATIENT)
Dept: ENDOCRINOLOGY | Facility: CLINIC | Age: 40
End: 2025-05-19

## 2025-05-19 ENCOUNTER — OFFICE VISIT (OUTPATIENT)
Dept: ENDOCRINOLOGY | Facility: CLINIC | Age: 40
End: 2025-05-19
Payer: COMMERCIAL

## 2025-05-19 VITALS
BODY MASS INDEX: 36.25 KG/M2 | WEIGHT: 204.6 LBS | OXYGEN SATURATION: 97 % | HEIGHT: 63 IN | DIASTOLIC BLOOD PRESSURE: 80 MMHG | HEART RATE: 100 BPM | SYSTOLIC BLOOD PRESSURE: 116 MMHG

## 2025-05-19 DIAGNOSIS — E66.01 MORBID OBESITY (H): Primary | ICD-10-CM

## 2025-05-19 PROCEDURE — 3074F SYST BP LT 130 MM HG: CPT | Performed by: INTERNAL MEDICINE

## 2025-05-19 PROCEDURE — 99213 OFFICE O/P EST LOW 20 MIN: CPT | Performed by: INTERNAL MEDICINE

## 2025-05-19 PROCEDURE — 3079F DIAST BP 80-89 MM HG: CPT | Performed by: INTERNAL MEDICINE

## 2025-05-19 PROCEDURE — 1126F AMNT PAIN NOTED NONE PRSNT: CPT | Performed by: INTERNAL MEDICINE

## 2025-05-19 ASSESSMENT — PAIN SCALES - GENERAL: PAINLEVEL_OUTOF10: NO PAIN (0)

## 2025-05-19 NOTE — NURSING NOTE
"Chief Complaint   Patient presents with    Follow Up     Return [facility]       Vitals:    05/19/25 1045   BP: 116/80   BP Location: Left arm   Patient Position: Chair   Cuff Size: Adult Regular   Pulse: 100   SpO2: 97%   Weight: 92.8 kg (204 lb 9.6 oz)   Height: 1.6 m (5' 3\")       Body mass index is 36.24 kg/m .                             "

## 2025-05-19 NOTE — PROGRESS NOTES
"    Return Medical Weight Management Note     Arielle Montenegro  MRN:  [id number]  :  1985  ALEX:  2025    Dear [facility],    I had the pleasure of seeing your patient Arielle Montenegro.  She is a 40 year old female who I am continuing to see for treatment of obesity related to:      6/3/2022    10:16 AM   --   I have the following health issues associated with obesity Asthma   I have the following symptoms associated with obesity Knee Pain    Back Pain    Fatigue     CURRENT WEIGHT:   204 lbs 9.6 oz    Wt Readings from Last 4 Encounters:   25 92.8 kg (204 lb 9.6 oz)   03/15/25 89.8 kg (197 lb 14.4 oz)   24 89 kg (196 lb 3.2 oz)   24 86.2 kg (190 lb)     Height:  5' 3\"  Body Mass Index:  Body mass index is 36.24 kg/m .  Vitals:  B/P: 116/80, P: 100    Initial consult weight was 170 on 6/3/22.  Weight change since last seen on  is up 34 pounds.   Total gain is 34 pounds.    INTERVAL HISTORY:  Says she has gained so much weight that her life is very unpleasant. Does not recall ever taking phentermine. Says did take Saxenda and Ozempic for some time but now is not taking those, not sure why. Does not feel is eating too much and is gaining weight while \"eating healthy\".         6/3/2022    10:16 AM   Diet Recall Review with Patient   Do you typically eat breakfast? No   Do you typically eat lunch? No   Do you typically eat supper? Yes   If you do eat supper, what types of food do you typically eat? Meat, fish, spicy food, vegetables   Do you typically eat snacks? No   If you do snack, what types of food do you typically eat? Eating chips with kids   Do you like vegetables?  Yes   Do you drink water? Yes   How many glasses of juice do you drink in a typical day? 0   How many of glasses of milk do you drink in a typical day? 0   How many 8oz glasses of sugar containing drinks such as Santos-Aid/sweet tea do you drink in a day? 0   How many cans/bottles of sugar " pop/soda/tea/sports drinks do you drink in a day? 1   How many cans/bottles of diet pop/soda/tea or sports drink do you drink in a day? 0   How often do you have a drink of alcohol? Never       MEDICATIONS:   Current Outpatient Medications   Medication Sig Dispense Refill    acetone urine (KETOSTIX) test strip Use as directed in case of high glucose or illness. 50 strip 3    albuterol (PROAIR HFA/PROVENTIL HFA/VENTOLIN HFA) 108 (90 Base) MCG/ACT inhaler Inhale 2 puffs into the lungs every 6 hours as needed for shortness of breath or wheezing. 18 g 0    albuterol (PROAIR HFA/PROVENTIL HFA/VENTOLIN HFA) 108 (90 Base) MCG/ACT inhaler Inhale 2 puffs into the lungs every 6 hours as needed for shortness of breath, wheezing or cough. 18 g 0    alcohol swab prep pads Use to swab area of injection/joelle as directed up to 4 times daily 100 each 11    blood glucose (ACCU-CHEK GUIDE) test strip Use to test blood sugar 4 times daily or as directed. For emergencies when Dexcom falls off 100 strip 3    blood glucose (NO BRAND SPECIFIED) lancets standard Use to test blood sugar 4 times daily or as directed. 100 each 11    blood glucose (NO BRAND SPECIFIED) test strip Use to test blood sugar 4 times daily or as directed. 200 strip 11    Continuous Glucose Sensor (DEXCOM G7 SENSOR) MISC Change every 10 days. 9 each 3    Glucagon (BAQSIMI) 3 MG/DOSE nasal powder Spray 1 spray (3 mg) in nostril as needed. in the event of unconscious hypoglycemia or hypoglycemic seizure. May repeat dose if no response after 15 minutes. 1 each 3    Glucagon (GVOKE HYPOPEN) 1 MG/0.2ML pen Inject the contents of 1 device under the skin into lower abdomen, outer thigh, or outer upper arm as needed for hypoglycemia. If no response after 15 minutes, additional 1 mg dose from a new device may be injected while waiting for emergency assistance. 0.4 mL 3    ibuprofen (ADVIL/MOTRIN) 600 MG tablet Take 1 tablet (600 mg) by mouth every 6 hours as needed for mild  pain 20 tablet 0    insulin degludec (TRESIBA FLEXTOUCH) 100 UNIT/ML pen Use as directed up to 25 units daily. 15 mL 3    insulin pen needle (31G X 5 MM) 31G X 5 MM miscellaneous Use 4 pen needles daily or as directed. 400 each 1    KETOSTIX test strip Use as directed in case of high glucose, vomiting or illness. 50 strip 3    NOVOLOG FLEXPEN 100 UNIT/ML soln INJECT 2-10 UNITS WITH MEALS 3 TIMES DAILY AND AT BEDTIME. MAX DAILY DOSE 30 UNITS. 30 mL 0    benzonatate (TESSALON) 100 MG capsule Take 1-2 capsules by mouth up to 3 x a day as needed with food (Patient not taking: Reported on 5/19/2025) 45 capsule 0    ciprofloxacin (CILOXAN) 0.3 % ophthalmic solution Place 1-2 drops into the right eye every 4 hours. (Patient not taking: Reported on 5/19/2025) 5 mL 0    FLUoxetine (PROZAC) 20 MG capsule Take 1 capsule (20 mg) by mouth daily. (Patient not taking: Reported on 5/19/2025) 30 capsule 1    guaiFENesin (MUCINEX) 600 MG 12 hr tablet Take 1-2 tablets as needed up to twice a day (Patient not taking: Reported on 5/19/2025) 30 tablet 0     No current facility-administered medications for this visit.           8/19/2022    10:04 AM   Weight Loss Medication History Reviewed With Patient   Which weight loss medications are you currently taking on a regular basis? None   If you are not taking a weight loss medication that was prescribed to you, please indicate why: I did not like the side effects   If you are having side effects please describe: BP low, nervousness, changed personality       ASSESSMENT:   Dm-1 with serious weight gain and diminuition of health, increased risk for diabetes complications. Will trial treatment with Zepbound. This is a proper treatment in DM-1 with weight and unstable sugars, there is no increased risk if managed with insulin adjustments and is not contra-indicated when under care of Endocrinology.    FOLLOW-UP:    12 weeks.  I spent 15 minutes with this patient face to face and explained the  conditions and plans (more than 50% of time was counseling/coordination of weight management).    Sincerely,    Jonah Stevenson MD

## 2025-05-19 NOTE — LETTER
"2025       RE: Arielle Montenegro  [address]  [place] [address]     Dear Colleague,    Thank you for referring your patient, Arielle Montenegro, to the [facility] WEIGHT MANAGEMENT CLINIC [place] at [facility]. Please see a copy of my visit note below.        Return Medical Weight Management Note     Arielle Montenegro  MRN:  [id number]  :  1985  ALEX:  2025    Dear [facility],    I had the pleasure of seeing your patient Arielle Montenegro.  She is a 40 year old female who I am continuing to see for treatment of obesity related to:      6/3/2022    10:16 AM   --   I have the following health issues associated with obesity Asthma   I have the following symptoms associated with obesity Knee Pain    Back Pain    Fatigue     CURRENT WEIGHT:   204 lbs 9.6 oz    Wt Readings from Last 4 Encounters:   25 92.8 kg (204 lb 9.6 oz)   03/15/25 89.8 kg (197 lb 14.4 oz)   24 89 kg (196 lb 3.2 oz)   24 86.2 kg (190 lb)     Height:  5' 3\"  Body Mass Index:  Body mass index is 36.24 kg/m .  Vitals:  B/P: 116/80, P: 100    Initial consult weight was 170 on 6/3/22.  Weight change since last seen on  is up 34 pounds.   Total gain is 34 pounds.    INTERVAL HISTORY:  Says she has gained so much weight that her life is very unpleasant. Does not recall ever taking phentermine. Says did take Saxenda and Ozempic for some time but now is not taking those, not sure why. Does not feel is eating too much and is gaining weight while \"eating healthy\".         6/3/2022    10:16 AM   Diet Recall Review with Patient   Do you typically eat breakfast? No   Do you typically eat lunch? No   Do you typically eat supper? Yes   If you do eat supper, what types of food do you typically eat? Meat, fish, spicy food, vegetables   Do you typically eat snacks? No   If you do snack, what types of food do you typically eat? Eating chips with " kids   Do you like vegetables?  Yes   Do you drink water? Yes   How many glasses of juice do you drink in a typical day? 0   How many of glasses of milk do you drink in a typical day? 0   How many 8oz glasses of sugar containing drinks such as Santos-Aid/sweet tea do you drink in a day? 0   How many cans/bottles of sugar pop/soda/tea/sports drinks do you drink in a day? 1   How many cans/bottles of diet pop/soda/tea or sports drink do you drink in a day? 0   How often do you have a drink of alcohol? Never       MEDICATIONS:   Current Outpatient Medications   Medication Sig Dispense Refill     acetone urine (KETOSTIX) test strip Use as directed in case of high glucose or illness. 50 strip 3     albuterol (PROAIR HFA/PROVENTIL HFA/VENTOLIN HFA) 108 (90 Base) MCG/ACT inhaler Inhale 2 puffs into the lungs every 6 hours as needed for shortness of breath or wheezing. 18 g 0     albuterol (PROAIR HFA/PROVENTIL HFA/VENTOLIN HFA) 108 (90 Base) MCG/ACT inhaler Inhale 2 puffs into the lungs every 6 hours as needed for shortness of breath, wheezing or cough. 18 g 0     alcohol swab prep pads Use to swab area of injection/joelle as directed up to 4 times daily 100 each 11     blood glucose (ACCU-CHEK GUIDE) test strip Use to test blood sugar 4 times daily or as directed. For emergencies when Dexcom falls off 100 strip 3     blood glucose (NO BRAND SPECIFIED) lancets standard Use to test blood sugar 4 times daily or as directed. 100 each 11     blood glucose (NO BRAND SPECIFIED) test strip Use to test blood sugar 4 times daily or as directed. 200 strip 11     Continuous Glucose Sensor (DEXCOM G7 SENSOR) MISC Change every 10 days. 9 each 3     Glucagon (BAQSIMI) 3 MG/DOSE nasal powder Spray 1 spray (3 mg) in nostril as needed. in the event of unconscious hypoglycemia or hypoglycemic seizure. May repeat dose if no response after 15 minutes. 1 each 3     Glucagon (GVOKE HYPOPEN) 1 MG/0.2ML pen Inject the contents of 1 device under the  skin into lower abdomen, outer thigh, or outer upper arm as needed for hypoglycemia. If no response after 15 minutes, additional 1 mg dose from a new device may be injected while waiting for emergency assistance. 0.4 mL 3     ibuprofen (ADVIL/MOTRIN) 600 MG tablet Take 1 tablet (600 mg) by mouth every 6 hours as needed for mild pain 20 tablet 0     insulin degludec (TRESIBA FLEXTOUCH) 100 UNIT/ML pen Use as directed up to 25 units daily. 15 mL 3     insulin pen needle (31G X 5 MM) 31G X 5 MM miscellaneous Use 4 pen needles daily or as directed. 400 each 1     KETOSTIX test strip Use as directed in case of high glucose, vomiting or illness. 50 strip 3     NOVOLOG FLEXPEN 100 UNIT/ML soln INJECT 2-10 UNITS WITH MEALS 3 TIMES DAILY AND AT BEDTIME. MAX DAILY DOSE 30 UNITS. 30 mL 0     benzonatate (TESSALON) 100 MG capsule Take 1-2 capsules by mouth up to 3 x a day as needed with food (Patient not taking: Reported on 5/19/2025) 45 capsule 0     ciprofloxacin (CILOXAN) 0.3 % ophthalmic solution Place 1-2 drops into the right eye every 4 hours. (Patient not taking: Reported on 5/19/2025) 5 mL 0     FLUoxetine (PROZAC) 20 MG capsule Take 1 capsule (20 mg) by mouth daily. (Patient not taking: Reported on 5/19/2025) 30 capsule 1     guaiFENesin (MUCINEX) 600 MG 12 hr tablet Take 1-2 tablets as needed up to twice a day (Patient not taking: Reported on 5/19/2025) 30 tablet 0     No current facility-administered medications for this visit.           8/19/2022    10:04 AM   Weight Loss Medication History Reviewed With Patient   Which weight loss medications are you currently taking on a regular basis? None   If you are not taking a weight loss medication that was prescribed to you, please indicate why: I did not like the side effects   If you are having side effects please describe: BP low, nervousness, changed personality       ASSESSMENT:   Dm-1 with serious weight gain and diminuition of health, increased risk for diabetes  complications. Will trial treatment with Zepbound. This is a proper treatment in DM-1 with weight and unstable sugars, there is no increased risk if managed with insulin adjustments and is not contra-indicated when under care of Endocrinology.    FOLLOW-UP:    12 weeks.  I spent 15 minutes with this patient face to face and explained the conditions and plans (more than 50% of time was counseling/coordination of weight management).    Sincerely,    Jonah Stevenson MD    Again, thank you for allowing me to participate in the care of your patient.      Sincerely,    Jonah Stevenson MD

## 2025-05-19 NOTE — TELEPHONE ENCOUNTER
PA Initiation    Medication: ZEPBOUND 2.5 MG/0.5ML SC SOAJ  Insurance Company: [employer] - Phone [phone] Fax [phone]  Pharmacy Filling the Rx:    Filling Pharmacy Phone:    Filling Pharmacy Fax:    Start Date: 5/19/2025    [id number]

## 2025-05-20 NOTE — TELEPHONE ENCOUNTER
Prior Authorization Approval    Medication: ZEPBOUND 2.5 MG/0.5ML SC SOAJ  Authorization Effective Date: 5/20/2025  Authorization Expiration Date: 11/20/2025  Approved Dose/Quantity: 2ml per 28 days  Reference #: [id number]   Insurance Company: [employer] - Phone [phone] Fax [phone]  Expected CoPay: $    CoPay Card Available:      Financial Assistance Needed:   Which Pharmacy is filling the prescription:    Pharmacy Notified:   Patient Notified:

## 2025-05-21 ENCOUNTER — VIRTUAL VISIT (OUTPATIENT)
Dept: EDUCATION SERVICES | Facility: CLINIC | Age: 40
End: 2025-05-21
Payer: COMMERCIAL

## 2025-05-21 ENCOUNTER — LAB (OUTPATIENT)
Dept: LAB | Facility: CLINIC | Age: 40
End: 2025-05-21
Payer: COMMERCIAL

## 2025-05-21 ENCOUNTER — RESULTS FOLLOW-UP (OUTPATIENT)
Dept: FAMILY MEDICINE | Facility: CLINIC | Age: 40
End: 2025-05-21

## 2025-05-21 ENCOUNTER — TELEPHONE (OUTPATIENT)
Dept: EDUCATION SERVICES | Facility: CLINIC | Age: 40
End: 2025-05-21

## 2025-05-21 DIAGNOSIS — E10.649 HYPOGLYCEMIA UNAWARENESS IN TYPE 1 DIABETES MELLITUS (H): ICD-10-CM

## 2025-05-21 DIAGNOSIS — E55.9 VITAMIN D DEFICIENCY: Primary | ICD-10-CM

## 2025-05-21 DIAGNOSIS — E10.649 TYPE 1 DIABETES MELLITUS WITH HYPOGLYCEMIA AND WITHOUT COMA (H): ICD-10-CM

## 2025-05-21 DIAGNOSIS — N18.2 CHRONIC KIDNEY DISEASE (CKD) STAGE G2/A3, MILDLY DECREASED GLOMERULAR FILTRATION RATE (GFR) BETWEEN 60-89 ML/MIN/1.73 SQUARE METER AND ALBUMINURIA CREATININE RATIO GREATER THAN 300 MG/G: ICD-10-CM

## 2025-05-21 DIAGNOSIS — E10.40 TYPE 1 DIABETES MELLITUS WITH DIABETIC NEUROPATHY (H): ICD-10-CM

## 2025-05-21 DIAGNOSIS — Z11.59 NEED FOR HEPATITIS C SCREENING TEST: ICD-10-CM

## 2025-05-21 DIAGNOSIS — L60.9 NAIL ABNORMALITIES: ICD-10-CM

## 2025-05-21 DIAGNOSIS — E10.649 TYPE 1 DIABETES MELLITUS WITH HYPOGLYCEMIA AND WITHOUT COMA (H): Primary | ICD-10-CM

## 2025-05-21 DIAGNOSIS — R89.9 ABNORMAL LABORATORY TEST RESULT: ICD-10-CM

## 2025-05-21 LAB
ALBUMIN SERPL BCG-MCNC: 3.5 G/DL (ref 3.5–5.2)
ALP SERPL-CCNC: 99 U/L (ref 40–150)
ALT SERPL W P-5'-P-CCNC: 8 U/L (ref 0–50)
ANION GAP SERPL CALCULATED.3IONS-SCNC: 11 MMOL/L (ref 7–15)
AST SERPL W P-5'-P-CCNC: 16 U/L (ref 0–45)
BILIRUB SERPL-MCNC: <0.2 MG/DL
BUN SERPL-MCNC: 23.9 MG/DL (ref 6–20)
CALCIUM SERPL-MCNC: 9.2 MG/DL (ref 8.8–10.4)
CHLORIDE SERPL-SCNC: 102 MMOL/L (ref 98–107)
CHOLEST SERPL-MCNC: 189 MG/DL
CREAT SERPL-MCNC: 1.05 MG/DL (ref 0.51–0.95)
CREAT UR-MCNC: 67.2 MG/DL
EGFRCR SERPLBLD CKD-EPI 2021: 69 ML/MIN/1.73M2
ERYTHROCYTE [DISTWIDTH] IN BLOOD BY AUTOMATED COUNT: 14.2 % (ref 10–15)
FASTING STATUS PATIENT QL REPORTED: NO
FASTING STATUS PATIENT QL REPORTED: NO
GLUCOSE SERPL-MCNC: 132 MG/DL (ref 70–99)
HCO3 SERPL-SCNC: 23 MMOL/L (ref 22–29)
HCT VFR BLD AUTO: 36.8 % (ref 35–47)
HCV AB SERPL QL IA: NONREACTIVE
HDLC SERPL-MCNC: 55 MG/DL
HGB BLD-MCNC: 12.3 G/DL (ref 11.7–15.7)
LDLC SERPL CALC-MCNC: 108 MG/DL
MCH RBC QN AUTO: 27.6 PG (ref 26.5–33)
MCHC RBC AUTO-ENTMCNC: 33.4 G/DL (ref 31.5–36.5)
MCV RBC AUTO: 83 FL (ref 78–100)
MICROALBUMIN UR-MCNC: 638 MG/L
MICROALBUMIN/CREAT UR: 949.4 MG/G CR (ref 0–25)
NONHDLC SERPL-MCNC: 134 MG/DL
PLATELET # BLD AUTO: 283 10E3/UL (ref 150–450)
POTASSIUM SERPL-SCNC: 4.1 MMOL/L (ref 3.4–5.3)
PROT SERPL-MCNC: 6.9 G/DL (ref 6.4–8.3)
RBC # BLD AUTO: 4.46 10E6/UL (ref 3.8–5.2)
SODIUM SERPL-SCNC: 136 MMOL/L (ref 135–145)
TRIGL SERPL-MCNC: 132 MG/DL
TSH SERPL DL<=0.005 MIU/L-ACNC: 2.37 UIU/ML (ref 0.3–4.2)
VIT B12 SERPL-MCNC: 534 PG/ML (ref 232–1245)
VIT D+METAB SERPL-MCNC: 18 NG/ML (ref 20–50)
WBC # BLD AUTO: 8.4 10E3/UL (ref 4–11)

## 2025-05-21 PROCEDURE — 82306 VITAMIN D 25 HYDROXY: CPT

## 2025-05-21 PROCEDURE — 36415 COLL VENOUS BLD VENIPUNCTURE: CPT

## 2025-05-21 PROCEDURE — 86803 HEPATITIS C AB TEST: CPT

## 2025-05-21 PROCEDURE — 80061 LIPID PANEL: CPT

## 2025-05-21 PROCEDURE — 84630 ASSAY OF ZINC: CPT | Mod: 90

## 2025-05-21 PROCEDURE — 85027 COMPLETE CBC AUTOMATED: CPT

## 2025-05-21 PROCEDURE — 80053 COMPREHEN METABOLIC PANEL: CPT

## 2025-05-21 PROCEDURE — 84443 ASSAY THYROID STIM HORMONE: CPT

## 2025-05-21 PROCEDURE — 82607 VITAMIN B-12: CPT

## 2025-05-21 PROCEDURE — 82043 UR ALBUMIN QUANTITATIVE: CPT

## 2025-05-21 PROCEDURE — 86364 TISS TRNSGLTMNASE EA IG CLAS: CPT

## 2025-05-21 PROCEDURE — 82570 ASSAY OF URINE CREATININE: CPT

## 2025-05-21 PROCEDURE — 99000 SPECIMEN HANDLING OFFICE-LAB: CPT

## 2025-05-21 RX ORDER — CHOLECALCIFEROL (VITAMIN D3) 50 MCG
1 TABLET ORAL DAILY
Qty: 90 TABLET | Refills: 0 | Status: SHIPPED | OUTPATIENT
Start: 2025-05-21

## 2025-05-21 RX ORDER — FAMOTIDINE 20 MG
1 TABLET ORAL DAILY
Qty: 90 CAPSULE | Refills: 2 | Status: SHIPPED | OUTPATIENT
Start: 2025-08-21

## 2025-05-21 NOTE — PROGRESS NOTES
Virtual Visit Details    Type of service:  Video Visit   Video Start Time: 9:29 AM  Video End Time:10:14 AM    Originating Location (pt. Location): Home    Distant Location (provider location):  Off-site  Platform used for Video Visit: [employer] gael with Dexcom G6.    Diabetes is Managed With:  Tresiba:  20 units (increased on her own from 14 units)  Novolog:  I just take what I think is right.          Diabetes Self-Management Education & Support    Arielle Montenegro is a 40 year old who presents today for education related to Type 1 diabetes  Patient is being treated with:  insulin injections  She is accompanied by self and 5 year old son    Year of diagnosis: about age 8 in [place]  Referring provider:  Kym Jang  Living Situation: Single mom. Kids now 12, 10 and 3 years old. One son with ADHD and obese with prediabetes, not type 1. Other might have autism.   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 Tries to sleep during the day. She does not sleep much.     PATIENT CONCERNS RELATED TO DIABETES SELF MANAGEMENT  Desires weight loss.  UBW  130-150 lbs.  Now over 200 lbs.  Read that insulin causes weight gain.  Wants to see if an insulin pump will help her lose weight.  Used to eat 1 meal a day, now trying to eat 3 meals a day and to eat healthier.  Sugars are better but still gaining weight.    GLUCOSE REPORTS        ASSESSMENT    This is an initial visit for Type 1 Diabetes and Pre-Pump    Patient is not meeting A1C goal of: <8.0  Patient's most recent   Lab Results   Component Value Date    A1C 9.6 11/05/2023    A1C 10.2 03/21/2023    A1C 5.8 07/04/2020    A1C 6.4 06/11/2020        Glucose Sensor Reports reviewed:    Glucose 47% within range, 35% above range, 18% below range.  Avg glucose:  159 mg/dl.  CV: 50%    Sugars show a pattern of high variability from extremely low to extremely high daily.  Irratic sugars likely due to guessing Novolog insulin doses, not taking  pre-meal insulin and overtreating low sugars.    Significant history of hypoglycemia unawareness requiring assistance.  One year ago, passed out from low sugar and 'they tried to kids away from me'  She feels good about the fact that she has no passed out in the past year and 2 months from low sugars.  She is experiencing hypoglycemia now 'because I take too much insulin but I'm not passing out'.    Arielle had to leave the visit early today because she had a lab appointment.  Unable to assess the need for insulin changes.      Agree with Kym Jang PA-C that the Omnipod is worth trying as it will provided recommend mealtime and correction insulin doses.  Pre-meal insulin dosing can be based on meal size.  I did stress the importance of immediately replacing the pod to prevent going long periods of time without insulin.  Continuing a small dose of Tresiba for DKA prevention is also advised by Kym Jang PA-C.  If Arielle is approved for the Omnipod,  she prefers to use the [employer] gael and Dexcom G6.  She is anxious to start the Omnipod.    Suspect weight gain may be related to a lack of physical activity and having to treat frequent hypoglycemia.  Stress and lack of sleep could also be a factor.  Arielle met with the weight management clinic and she is waiting to hear of her insurance will cover 'Zepbound'      PLAN    *Recommend one more pre-pump visit to determine mealtime insulin dosing, review DKA prevention again, discuss hypoglycemia treatment with AID system and answer questions.  *Will route recommendation to start Omnipod to Kym Jang PA-C  *Omnipod training will likely be done by [employer] [facility] [place] as she has sooner availability than this provider.    FOLLOW UP: Virtual visit, diabetes education in 1 week.    INTERVENTION    Pump Hardware & Software:   -- Importance of changing infusion set/pod every 3 days  -- Navigating insulin pump screen/functions/features  -- Automated insulin delivery  functions/features  Bolusing:  -- How to use bolus calculator  -- Importance of bolusing before meals  Problem Solving:  Need to review Hypo in AID system and DKA at next visit.  Continuous Glucose Monitoring:  --relationship to AID system  Activity & Lifestyle:  -- Weight management     Education Materials Provided:  - Provided informational packets for Omnipod insulin pump.       SUBJECTIVE / OBJECTIVE:       Diabetes Symptoms & Complications:  Weight trend: Increasing (Past 2 years.  HBW 204lb.  UBW:  130-150 lb.)       Monitoring:  Blood Glucose Meter: CGM  CGM: Dexcom G6 and Symptom.lyhone    Nutrition:     Does not count carbs.  Trying to eat 3 meals versus 1 meal at night  Had johnny tea this morning, 'because I'm not losing weight eating healthy so I said the heck with it'.     Being Active:  Barrier to exercise: Physical limitation (Weight and back pain.)    Taking Medications:  Diabetes Medication(s)       Diabetic Other       Glucagon (BAQSIMI) 3 MG/DOSE nasal powder Spray 1 spray (3 mg) in nostril as needed. in the event of unconscious hypoglycemia or hypoglycemic seizure. May repeat dose if no response after 15 minutes.     Glucagon (GVOKE HYPOPEN) 1 MG/0.2ML pen Inject the contents of 1 device under the skin into lower abdomen, outer thigh, or outer upper arm as needed for hypoglycemia. If no response after 15 minutes, additional 1 mg dose from a new device may be injected while waiting for emergency assistance.       Insulin       insulin degludec (TRESIBA FLEXTOUCH) 100 UNIT/ML pen Use as directed up to 25 units daily.     NOVOLOG FLEXPEN 100 UNIT/ML soln INJECT 2-10 UNITS WITH MEALS 3 TIMES DAILY AND AT BEDTIME. MAX DAILY DOSE 30 UNITS.               Problem Solving:     History of hospitalizations for hyper or hypoglycemia: Yes: hypoglycemia unawareness    Healthy Coping:  Has help at home    Patient Problem List and Family Medical History reviewed for relevant medical history, current medical status, and  diabetes risk factors.      Tracy Ramirez RDN, JUAN JOSE, [facility]  Dietitian and Diabetes  - Endocrinology  [facility]      Time Spent: 30 minutes  Encounter Type: Individual    Any diabetes medication dose changes were made via the CDE Protocol per the patient's endocrinology provider. A copy of this encounter was shared with the provider.

## 2025-05-21 NOTE — NURSING NOTE
Current patient location: [address] [place] [address] ANNIKA DAMIAN  [facility] [address]    Is the patient currently in the state of [place]? YES    Visit mode: VIDEO    If the visit is dropped, the patient can be reconnected by:VIDEO VISIT: Text to cell phone:   Telephone Information:   Mobile [phone]       Will anyone else be joining the visit? NO  (If patient encounters technical issues they should call [phone])    Are changes needed to the allergy or medication list? Pt stated no med changes    Are refills needed on medications prescribed by this physician? NO    Rooming Documentation:  Not applicable    Reason for visit: Diabetes Education    Magy PEOLPES

## 2025-05-21 NOTE — PATIENT INSTRUCTIONS
Syed Guzmán,    It was a pleasure meeting with you today!    Here is a summary of our visit:    *Here is more information about the Omnipod:  [email or web address].com  *We will meet 1 more time to review more insulin pump information prior to you getting started.  *I will reach out to Kym Jang PA-C to get her approval for the Omnipod 5.  If she approves, a prescription for the Omnipod 5 starter kit and for the pods (2 boxes of 5 pods) will be sent to your [facility] pharmacy.    Follow Up Plan:    May 28th @ 10:30 am - Virtual Visit to determine your insulin pump settings.  I will connect you with Debby Mercer from [employer] for your training.     You can reply to this message with any diabetes related questions or concerns.    Thank you,    Tracy Ramirez RDN, JUAN JOSEN, [facility]  Dietitian and Diabetes Education - Endocrinology  [facility]  [address] [place] [address]  Phone: [phone]  Email: [email or web address]    Contact information:   To schedule a diabetes education appointment:[phone].  For questions or concerns, please send a [employer] message or call the clinic at [phone].    For more urgent concerns that do not require 291, please call [phone] after hours/weekends and ask to speak with the Endocrinologist on call.       Please let us know if you are having low blood sugars less than 70 or over 300 mg/dL.  Do not wait until your next appointment if this is happening.

## 2025-05-21 NOTE — TELEPHONE ENCOUNTER
Arielle Sage would like to try the Omnipod 5 with the iOs mobile gael and Dexcom G6.    If you approve, can you sign pending prescriptions for the Omnipod and Dexcom G6?    Also, you mentioned in her note, continuing Tresiba with insulin pump therapy.  She is currently taking 20 units.  How much Tresiba should she take once she starts the Omnipod?    Omnipod  will do the training.  I will get insulin orders to you.  We will set up the controller with 'small' 'medium' 'large' meals for meal bolusing.    Thanks,  Tracy Ramirez RDN, LDN, [facility]  Dietitian and Diabetes  - Endocrinology  [facility]

## 2025-05-22 LAB
TTG IGA SER-ACNC: 0.4 U/ML
TTG IGG SER-ACNC: <0.6 U/ML
ZINC SERPL-MCNC: 53.8 UG/DL

## 2025-05-22 RX ORDER — INSULIN ASPART 100 [IU]/ML
INJECTION, SOLUTION INTRAVENOUS; SUBCUTANEOUS
Qty: 30 ML | Refills: 3 | Status: SHIPPED | OUTPATIENT
Start: 2025-05-22

## 2025-05-22 RX ORDER — LISINOPRIL 5 MG/1
5 TABLET ORAL DAILY
Qty: 90 TABLET | Refills: 3 | Status: SHIPPED | OUTPATIENT
Start: 2025-05-22 | End: 2025-06-30

## 2025-05-22 RX ORDER — ACYCLOVIR 400 MG/1
TABLET ORAL
Qty: 9 EACH | Refills: 3 | Status: SHIPPED | OUTPATIENT
Start: 2025-05-22

## 2025-05-23 NOTE — RESULT ENCOUNTER NOTE
"I spoke to to her  with repeat questions on what I had just told her \"what is lisinopril\" \" \"Why did you call me\" \"I have kidney issues ?\" like 3 times after explaining it  She picked up the lisinopril yesterday and told me she is taking it and she will get labs in 2 weeks. But then asked again why I called .   "

## 2025-05-24 RX ORDER — PROCHLORPERAZINE 25 MG/1
SUPPOSITORY RECTAL
Qty: 1 EACH | Refills: 1 | Status: SHIPPED | OUTPATIENT
Start: 2025-05-24 | End: 2025-06-26

## 2025-05-24 RX ORDER — INSULIN PMP CART,AUT,G6/7,CNTR
1 EACH SUBCUTANEOUS PRN
Qty: 1 KIT | Refills: 0 | Status: SHIPPED | OUTPATIENT
Start: 2025-05-24

## 2025-05-24 RX ORDER — PROCHLORPERAZINE 25 MG/1
SUPPOSITORY RECTAL
Qty: 3 EACH | Refills: 5 | Status: SHIPPED | OUTPATIENT
Start: 2025-05-24 | End: 2025-06-26

## 2025-05-24 RX ORDER — INSULIN PMP CART,AUT,G6/7,CNTR
1 EACH SUBCUTANEOUS
Qty: 10 EACH | Refills: 11 | Status: SHIPPED | OUTPATIENT
Start: 2025-05-24

## 2025-05-26 ENCOUNTER — PATIENT OUTREACH (OUTPATIENT)
Dept: CARE COORDINATION | Facility: CLINIC | Age: 40
End: 2025-05-26
Payer: COMMERCIAL

## 2025-05-27 ENCOUNTER — TELEPHONE (OUTPATIENT)
Dept: ENDOCRINOLOGY | Facility: CLINIC | Age: 40
End: 2025-05-27
Payer: COMMERCIAL

## 2025-05-27 NOTE — TELEPHONE ENCOUNTER
MTM referral from: [facility] visit (referral by provider)    MTM referral outreach attempt #1 on May 27, 2025 at 2:10 PM      Outcome: Spoke with patient She states that she is going to discuss this with her Endo provider. She does not want to wait until July 1st for an appointment with MTM.    Elvi Lyle  [facility]

## 2025-05-29 ENCOUNTER — TELEPHONE (OUTPATIENT)
Dept: ENDOCRINOLOGY | Facility: CLINIC | Age: 40
End: 2025-05-29

## 2025-05-29 ENCOUNTER — OFFICE VISIT (OUTPATIENT)
Dept: OPTOMETRY | Facility: CLINIC | Age: 40
End: 2025-05-29
Payer: COMMERCIAL

## 2025-05-29 DIAGNOSIS — H54.40 BLINDNESS OF RIGHT EYE, UNSPECIFIED LEFT EYE VISUAL IMPAIRMENT CATEGORY: ICD-10-CM

## 2025-05-29 DIAGNOSIS — H52.222 REGULAR ASTIGMATISM OF LEFT EYE: ICD-10-CM

## 2025-05-29 DIAGNOSIS — H52.4 PRESBYOPIA: ICD-10-CM

## 2025-05-29 DIAGNOSIS — E10.40 TYPE 1 DIABETES MELLITUS WITH DIABETIC NEUROPATHY (H): Primary | ICD-10-CM

## 2025-05-29 ASSESSMENT — REFRACTION_MANIFEST
OS_AXIS: 080
OS_SPHERE: -1.50
OS_AXIS: 079
OS_SPHERE: -1.25
METHOD_AUTOREFRACTION: 1
OS_CYLINDER: +1.00
OS_ADD: +1.75
OS_CYLINDER: +1.25
OD_SPHERE: BALANCE

## 2025-05-29 ASSESSMENT — VISUAL ACUITY
OS_SC: 20/40
METHOD: SNELLEN - LINEAR
OS_SC: J3

## 2025-05-29 ASSESSMENT — CUP TO DISC RATIO: OS_RATIO: 0.15

## 2025-05-29 ASSESSMENT — TONOMETRY
OS_IOP_MMHG: 18
IOP_METHOD: TONOPEN

## 2025-05-29 ASSESSMENT — KERATOMETRY
OS_K1POWER_DIOPTERS: 45.50
OS_K2POWER_DIOPTERS: 46.25
OS_AXISANGLE_DEGREES: 107
OS_AXISANGLE2_DEGREES: 17

## 2025-05-29 ASSESSMENT — CONF VISUAL FIELD
OS_INFERIOR_TEMPORAL_RESTRICTION: 0
OS_SUPERIOR_TEMPORAL_RESTRICTION: 0
OS_NORMAL: 1
OS_SUPERIOR_NASAL_RESTRICTION: 0
OS_INFERIOR_NASAL_RESTRICTION: 0

## 2025-05-29 ASSESSMENT — SLIT LAMP EXAM - LIDS: COMMENTS: NORMAL

## 2025-05-29 ASSESSMENT — EXTERNAL EXAM - LEFT EYE: OS_EXAM: NORMAL

## 2025-05-29 NOTE — TELEPHONE ENCOUNTER
Patient Contacted and scheduled the following:    Appointment type: Return   Provider:    Return date: 11/17  Specialty phone number: [phone]

## 2025-05-29 NOTE — PATIENT INSTRUCTIONS
Polycarbonate lenses only which is the safest most impact resistance available.   It is important that you protect your good eye.    Patient Education   Diabetes weakens the blood vessels all over the body, including the eyes. Damage to the blood vessels in the eyes can cause swelling or bleeding into part of the eye (called the retina). This is called diabetic retinopathy (YVROSE-tin-AH-puh-thee). If not treated, this disease can cause vision loss or blindness.   Symptoms may include blurred or distorted vision, but many people have no symptoms. It's important to see your eye doctor regularly to check for problems.   Early treatment and good control can help protect your vision. Here are the things you can do to help prevent vision loss:      1. Keep your blood sugar levels under tight control.      2. Bring high blood pressure under control.      3. No smoking.      4. Have yearly dilated eye exams.     Astigmatism results from curvature differential in the cornea and crystalline lens which can cause a distorted image, as light rays are prevented from meeting at a common focus.    Presbyopia is the diagnosis. Presbyopia is an age-related condition where the eye's crystalline lens doesn't change shape as easily as it once did.    Myopia is a result of long eyes. It is commonly referred to as near-sightedness. Seeing clearly in the distance is the main challenge.    Eyeglass prescription given.    The affects of the dilating drops last for 4- 6 hours.  You will be more sensitive to light and vision will be blurry up close.  Do not drive if you do not feel comfortable.  Mydriatic sunglasses were given if needed.    Jillian Ramirez O.D.  [address] [address] [phone]

## 2025-05-29 NOTE — PROGRESS NOTES
"Chief Complaint   Patient presents with    Diabetic Eye Exam     Accompanied by son  Chief Complaint(s) and History of Present Illness(es)       Diabetic Eye Exam              Diabetes Type: Type 1                   Lab Results   Component Value Date    A1C 9.6 11/05/2023    A1C 10.2 03/21/2023    A1C 10.1 06/22/2022    A1C 5.8 07/04/2020    A1C 6.4 06/11/2020    A1C 7.5 01/28/2020    A1C 7.8 01/02/2020    A1C 12.6 09/20/2019            Last Eye Exam: Its been a while, can't remember.   Dilated Previously: Yes    What are you currently using to see?  Glasses but has not had them in over a year     Distance Vision Acuity: Satisfied with vision- does not drive, mostly stays home and can see everything in the home.     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   Do you use eye drops? : No      Denelle Len - Optometric Assistant     Medical, surgical and family histories reviewed and updated 5/29/2025.       OBJECTIVE: See Ophthalmology exam    ASSESSMENT:    ICD-10-CM    1. Type 1 diabetes mellitus with diabetic neuropathy (H)  E10.40 REFRACTION     EYE EXAM (SIMPLE-NONBILLABLE)      2. Blindness of right eye, unspecified left eye visual impairment category  H54.40 REFRACTION     EYE EXAM (SIMPLE-NONBILLABLE)      3. Regular astigmatism of left eye  H52.222 REFRACTION     EYE EXAM (SIMPLE-NONBILLABLE)      4. Presbyopia  H52.4 REFRACTION     EYE EXAM (SIMPLE-NONBILLABLE)          PLAN:    Arielle Montenegro aware  eye exam results will be sent to Clinic - [facility].  Patient Instructions    Polycarbonate lenses only which is the safest most impact resistance available.   It is important that you protect your good " eye.    Patient Education  Diabetes weakens the blood vessels all over the body, including the eyes. Damage to the blood vessels in the eyes can cause swelling or bleeding into part of the eye (called the retina). This is called diabetic retinopathy (YVROSE-tin-AH-pu-thee). If not treated, this disease can cause vision loss or blindness.   Symptoms may include blurred or distorted vision, but many people have no symptoms. It's important to see your eye doctor regularly to check for problems.   Early treatment and good control can help protect your vision. Here are the things you can do to help prevent vision loss:      1. Keep your blood sugar levels under tight control.      2. Bring high blood pressure under control.      3. No smoking.      4. Have yearly dilated eye exams.     Astigmatism results from curvature differential in the cornea and crystalline lens which can cause a distorted image, as light rays are prevented from meeting at a common focus.    Presbyopia is the diagnosis. Presbyopia is an age-related condition where the eye's crystalline lens doesn't change shape as easily as it once did.    Myopia is a result of long eyes. It is commonly referred to as near-sightedness. Seeing clearly in the distance is the main challenge.    Eyeglass prescription given.    The affects of the dilating drops last for 4- 6 hours.  You will be more sensitive to light and vision will be blurry up close.  Do not drive if you do not feel comfortable.  Mydriatic sunglasses were given if needed.    Jillian Ramirez O.D.  [address] [address]    [phone]

## 2025-05-29 NOTE — LETTER
"5/29/2025      Arielle Montenegro  [address]  [facility] [place] [facility] [address]      Dear Colleague,    Thank you for referring your patient, Arielle Montenegro, to the [facility]. Please see a copy of my visit note below.    Chief Complaint   Patient presents with     Diabetic Eye Exam     Accompanied by son  Chief Complaint(s) and History of Present Illness(es)       Diabetic Eye Exam              Diabetes Type: Type 1                   Lab Results   Component Value Date    A1C 9.6 11/05/2023    A1C 10.2 03/21/2023    A1C 10.1 06/22/2022    A1C 5.8 07/04/2020    A1C 6.4 06/11/2020    A1C 7.5 01/28/2020    A1C 7.8 01/02/2020    A1C 12.6 09/20/2019            Last Eye Exam: Its been a while, can't remember.   Dilated Previously: Yes    What are you currently using to see?  Glasses but has not had them in over a year     Distance Vision Acuity: Satisfied with vision- does not drive, mostly stays home and can see everything in the home.     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   Do you use eye drops? : No      Denelle Len - Optometric Assistant     Medical, surgical and family histories reviewed and updated 5/29/2025.       OBJECTIVE: See Ophthalmology exam    ASSESSMENT:    ICD-10-CM    1. Type 1 diabetes mellitus with diabetic neuropathy (H)  E10.40 REFRACTION     EYE EXAM (SIMPLE-NONBILLABLE)      2. Blindness of right eye, unspecified left eye visual impairment category  H54.40 REFRACTION     EYE EXAM (SIMPLE-NONBILLABLE)      3. Regular astigmatism of left eye  H52.222 REFRACTION     EYE EXAM (SIMPLE-NONBILLABLE)      4. Presbyopia  H52.4 REFRACTION     EYE EXAM (SIMPLE-NONBILLABLE)          PLAN:    Arielle BARNES" Lan aware  eye exam results will be sent to Clinic - [facility].  Patient Instructions    Polycarbonate lenses only which is the safest most impact resistance available.   It is important that you protect your good eye.    Patient Education  Diabetes weakens the blood vessels all over the body, including the eyes. Damage to the blood vessels in the eyes can cause swelling or bleeding into part of the eye (called the retina). This is called diabetic retinopathy (YVROSE-tin--puh-thee). If not treated, this disease can cause vision loss or blindness.   Symptoms may include blurred or distorted vision, but many people have no symptoms. It's important to see your eye doctor regularly to check for problems.   Early treatment and good control can help protect your vision. Here are the things you can do to help prevent vision loss:      1. Keep your blood sugar levels under tight control.      2. Bring high blood pressure under control.      3. No smoking.      4. Have yearly dilated eye exams.     Astigmatism results from curvature differential in the cornea and crystalline lens which can cause a distorted image, as light rays are prevented from meeting at a common focus.    Presbyopia is the diagnosis. Presbyopia is an age-related condition where the eye's crystalline lens doesn't change shape as easily as it once did.    Myopia is a result of long eyes. It is commonly referred to as near-sightedness. Seeing clearly in the distance is the main challenge.    Eyeglass prescription given.    The affects of the dilating drops last for 4- 6 hours.  You will be more sensitive to light and vision will be blurry up close.  Do not drive if you do not feel comfortable.  Mydriatic sunglasses were given if needed.    Jillian Ramirez O.D.  [facility]   [address] [place] [address]    [phone]           Again, thank you for allowing me to participate in the care of your patient.         Sincerely,        Jillian Ramirez OD    Electronically signed

## 2025-06-11 ENCOUNTER — VIRTUAL VISIT (OUTPATIENT)
Dept: EDUCATION SERVICES | Facility: CLINIC | Age: 40
End: 2025-06-11
Attending: DIETITIAN, REGISTERED
Payer: COMMERCIAL

## 2025-06-11 DIAGNOSIS — E10.649 HYPOGLYCEMIA UNAWARENESS IN TYPE 1 DIABETES MELLITUS (H): ICD-10-CM

## 2025-06-11 DIAGNOSIS — E10.649 TYPE 1 DIABETES MELLITUS WITH HYPOGLYCEMIA AND WITHOUT COMA (H): Primary | ICD-10-CM

## 2025-06-11 RX ORDER — INSULIN ASPART 100 [IU]/ML
INJECTION, SOLUTION INTRAVENOUS; SUBCUTANEOUS
Qty: 50 ML | Refills: 4 | Status: SHIPPED | OUTPATIENT
Start: 2025-06-11

## 2025-06-11 RX ORDER — INSULIN PMP CART,AUT,G6/7,CNTR
1 EACH SUBCUTANEOUS
Qty: 10 EACH | Refills: 11 | Status: SHIPPED | OUTPATIENT
Start: 2025-06-11

## 2025-06-11 RX ORDER — INSULIN PMP CART,AUT,G6/7,CNTR
1 EACH SUBCUTANEOUS PRN
Qty: 1 KIT | Refills: 0 | Status: SHIPPED | OUTPATIENT
Start: 2025-06-11

## 2025-06-11 NOTE — PROGRESS NOTES
Virtual Visit Details    Type of service:  Video Visit   Video Start Time: 1:07 PM  Video End Time:2:01 PM    Originating Location (pt. Location): Home    Distant Location (provider location):  Off-site  Platform used for Video Visit: [employer]      Diabetes Self-Management Education & Support    Arielle Montenegro is a 40 year old who presents today for education related to Type 1 diabetes and hypoglycemia unawareenss    Diabetes is Managed With:  Tresiba:  20 units (increased on her own from 14 units)  Novolog:  I just take what I think is right.      She is accompanied by self and 5 year old son    Year of diagnosis: about age 8 in [place]  Referring provider:  Kym Jang  Living Situation: Single mom. Kids now 12, 10 and 3 years old. One son with ADHD and obese with prediabetes, not type 1. Other might have autism.   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 Tries to sleep during the day. She does not sleep much.     PATIENT CONCERNS RELATED TO DIABETES SELF MANAGEMENT  Desires weight loss.  UBW  130-150 lbs.  Now over 200 lbs.  Read that insulin causes weight gain.  Wants to see if an insulin pump will help her lose weight.  Used to eat 1 meal a day, now trying to eat 3 meals a day and to eat healthier.  Sugars are better but still gaining weight.    Started Zepbound, 2.5 mg since our last visit. Some nausea the day of and the day after the injection.  Takes the injection on Thursdays.   Weight today is 197 lbs.  Fluctuates from 194 - 199 lbs.    Has the Dexcom G6 transmitter and sensors and plans to change from the G7 to the G6 when starting the Omnipod.      GLUCOSE REPORTS    Tresiba:  10 - 30 units, takes in the morning.  Yesterday, took 20 units.  Novolog:  Takes after meals,     Breakfast:  Oatmeal, apple, strawberry smoothie  Lunch:  Shrimp, onions, green peppers with 1 bananas with ranch.  Sugars went really high after eating.    Low blood sugar:  1 apple.  Makes sugars go  really high.            ASSESSMENT    This is a follow up visit for Type 1 Diabetes, hypoglycemia unawareness and Pre-Pump    No recent A1c    Glucose Sensor Reports reviewed:    Glucose 47% >> 67% within range, 27% above range, 7% below range.  Avg glucose:  152 mg/dl.  CV: 45%    Sugars show improvement with the addition of Zepbound, but still highly variable because of haphazard long acting and mealtime insulin dose.  Has had pre-pump education and will move forward with the Omnipod in hopes to reduce blood glucose variability.  Will continue a small dose of Tresiba while on the Omnipod to prevent DKA, which Arielle is fearful of because she has children and can't stay overnight in the hospital.      Significant history of hypoglycemia unawareness requiring assistance.  One year ago, passed out from low sugar and 'they tried to kids away from me'  She feels good about the fact that she has no passed out in the past year and 2 months from low sugars.  She is experiencing hypoglycemia now 'because I take too much insulin but I'm not passing out'.    Suspect weight gain may be related to a lack of physical activity and having to treat frequent hypoglycemia.  Stress and lack of sleep could also be a factor.  Arielle met with the weight management clinic and prescribed Zepbound.  It seems to be helping as evidenced by a small weight loss, however Arielle is concerned about side effects on higher doses.      PLAN    *Increase Zepbound to 5 mg and the day you take this dose, decrease Tresiba from 20 units to 16 units.  *For low blood sugars, eat 1/2 apple instead of a whole apple.    *A prescription for Novolog vial, Omnipod starter kit and Omnipod pods was sent to your pharmacy  *Debby from [employer] will contact you to schedule your training.    *Today we reviewed how to prevent DKA when using the Omnipod.  See below for a review.    FOLLOW UP: Omnipod training by [facility] [place].  Diabetes education visit 2 weeks  after Omnipod start.    INTERVENTION    Problem Solving:  -- Treating hypoglycemia with use of automated insulin delivery systems  -- DKA prevention & steps to take when glucose is above 240 mg/dL     Education Materials Provided:  -- Diabetic Ketoacidosis (DKA)  SUBJECTIVE / OBJECTIVE:       Diabetes Symptoms & Complications:  Weight trend: Increasing (Past 2 years.  HBW 204lb.  UBW:  130-150 lb.)       Monitoring:  Blood Glucose Meter: CGM  CGM: Dexcom G6 and ZupCathone    Nutrition:     Does not count carbs.  Trying to eat 3 meals versus 1 meal at night  Had johnny tea this morning, 'because I'm not losing weight eating healthy so I said the heck with it'.   Avoids spaghetti, rice, bread and doesn't know why she is still fat.    Being Active:  Barrier to exercise: Physical limitation (Weight and back pain.)    Taking Medications:  Diabetes Medication(s)       Diabetic Other       Glucagon (BAQSIMI) 3 MG/DOSE nasal powder Spray 1 spray (3 mg) in nostril as needed. in the event of unconscious hypoglycemia or hypoglycemic seizure. May repeat dose if no response after 15 minutes.     Glucagon (GVOKE HYPOPEN) 1 MG/0.2ML pen Inject the contents of 1 device under the skin into lower abdomen, outer thigh, or outer upper arm as needed for hypoglycemia. If no response after 15 minutes, additional 1 mg dose from a new device may be injected while waiting for emergency assistance.       Insulin       insulin degludec (TRESIBA FLEXTOUCH) 100 UNIT/ML pen Use as directed up to 25 units daily.     NOVOLOG FLEXPEN 100 UNIT/ML soln INJECT 2-10 UNITS WITH MEALS 3 TIMES DAILY AND AT BEDTIME. MAX DAILY DOSE 30 UNITS.               Problem Solving:     History of hospitalizations for hyper or hypoglycemia: Yes: hypoglycemia unawareness    Healthy Coping:  Has help at home    Patient Problem List and Family Medical History reviewed for relevant medical history, current medical status, and diabetes risk factors.      Tracy Ramirez RDN, LD,  CAROLYN  Dietitian and Diabetes  - Endocrinology  [facility]      Time Spent: 30 minutes  Encounter Type: Individual    Any diabetes medication dose changes were made via the CDE Protocol per the patient's endocrinology provider. A copy of this encounter was shared with the provider.

## 2025-06-11 NOTE — PATIENT INSTRUCTIONS
Syed Guzmán,    It was a pleasure meeting with you today!    Here is a summary of our visit:    *Increase Zepbound to 5 mg and the day you take this dose, decrease Tresiba from 20 units to 16 units.  *For low blood sugars, eat 1/2 apple instead of a whole apple.    *A prescription for Novolog vial, Omnipod starter kit and Omnipod pods was sent to your pharmacy  *Debby from [employer] will contact you to schedule your training.    *Today we reviewed how to prevent DKA when using the Omnipod.  See below for a review.    Ketone Testing and Diabetic Ketoacidosis (DKA)    Ketones are acids that are produced when your body does not have enough insulin, and burns fat for fuel. Testing for ketones gives an early warning of a medical emergency, called diabetic ketoacidosis (DKA). DKA can lead to coma or death. DKA can occur even if the blood sugars are not high.   What causes a person with diabetes to have ketones?   -Not taking your insulin injections, especially your long-acting insulin  -If on an insulin pump, you could have a problem with your infusion set (bent cannula under your skin, bad site, bad connection)  -Insulin that is not good anymore  -Illness, infection, injuries, stress    When Should I Test for Ketones? People with Type 1 diabetes should test if:   -Blood glucose is 250 mg/dl or higher for two tests in a row with no explanation.  -Any time you are sick (even with a cold).   -If you are vomiting and/or have diarrhea.      How Do I Test for Ketones in My Urine? Dip a test strip into a sample of your urine. Compare to the color chart that comes with the ketone strips. You can get these strips at your pharmacy without a prescription. Ketone test strips are available in bottles or as individually foil-wrapped strips. Store bottled strips in their container with the lid on tight. Bottles of ketone test trips outdate 6 months after opening. Foil-wrapped strips are good until the expiration date printed on the foil.      How Do I Test for Ketones in my Blood?   You can also do ketone testing with a blood test. NovaMax Plus and Precision Xtra meters can do both blood sugar and ketone tests. You need to use the brand of ketone test strips for the meter being used. For this blood test, below 0.6 mmol/L is normal.     What Do I Do if My Ketone Test Is Positive?     Ketoacidosis can quickly develop into a very serious problem.  Be sure to follow the following guidelines and contact your provider right away if you need further assistance or have questions.   **if you ever have symptoms of DKA such as: stomach pain, nausea or vomiting, trouble breathing/breathing fast or fruity smelling breath along with any amount of ketones, report directly to your nearest emergency room.  **In all cases, drink a glass of water every hour and DO NOT EXERCISE as this can worsen your ketone level.  **If on an insulin pump, be sure to follow your insulin pump high blood sugar protocol and change your infusion set and move it to a new site as well as taking an insulin injection with an insulin pen or syringe per the guidelines below.  On initial check, if your urine ketones are at a trace amount (blood ketones 0.6-0.9mmol/L) or small amount (blood ketones 1-1.4 mmol/L):    Take your normal correction dose of insulin   Check blood glucose and ketones again in 2 to 3 hours  If your blood sugar is still over 250mg/dL AND you still have ketones, you need additional insulin with your next correction bolus:  If your urine ketones are at a small amount (blood ketones 1-1.4 mmol/L), take 1 unit extra for your next correction bolus  If your urine ketones are at a moderate amount (blood ketones 1.5-2.4 mmol/L), take 2 units extra for your next correction bolus  If your urine ketones are at a large amount (blood ketones 2.5 mmol/L or more), take 3 to 4 units extra for your next correction bolus  Continue to monitor ketones and blood sugar closely (every 1 to 2  hours) until they normalize.  If they are not returning to normal, call your doctor right way. Remember to report to the nearest emergency room if you start having any of the DKA symptoms listed above.    On initial check, if your urine ketones are at a moderate (blood ketones 1.5 - 2.4 mmol/L) or large amount (blood ketones 2.5mmol/L or more):  Follow the steps above under #1 for how much insulin to take and call your doctor IMMEDIATELY.  You will need advice on how much extra insulin to take, based on your situation.       Follow Up Plan:    Insulin pump training in a couple weeks, then follow up with diabetes education.    You can reply to this message with any diabetes related questions or concerns.    Thank you,    Tracy Ramirez RDN, ETHAN, [facility]  Dietitian and Diabetes Education - Endocrinology  [facility]  [address] [place] [address]  Phone: [phone]  Email: [email or web address]    Contact information:   To schedule a diabetes education appointment:[phone].  For questions or concerns, please send a [employer] message or call the clinic at [phone].    For more urgent concerns that do not require 140, please call [phone] after hours/weekends and ask to speak with the Endocrinologist on call.       Please let us know if you are having low blood sugars less than 70 or over 300 mg/dL.  Do not wait until your next appointment if this is happening.

## 2025-06-11 NOTE — NURSING NOTE
Current patient location: [place]    Is the patient currently in the state of [place]? YES    Visit mode: VIDEO    If the visit is dropped, the patient can be reconnected by:VIDEO VISIT: Text to cell phone:   Telephone Information:   Mobile [phone]       Will anyone else be joining the visit? NO  (If patient encounters technical issues they should call [phone] :[id number])    Are changes needed to the allergy or medication list? VF unable to review registration or medications at the time of rooming. Patient requested to end call before VF could ask these questions.    Are refills needed on medications prescribed by this physician? Discuss with provider    Rooming Documentation:  Not applicable    Reason for visit: Diabetes Education    [employer]

## 2025-06-13 NOTE — PROGRESS NOTES
Outcome for 06/13/25 11:49 AM: Data uploaded on Dexcom  Jose Smith MA  Outcome for 06/26/25 2:30 PM: Breeze message sent  Karla Corral MA  Outcome for 06/26/25 2:31 PM: Left Voicemail   Karla Corral MA  Outcome for 06/27/25 8:56 AM: Left Voicemail   Jose Smith MA  Outcome for 06/27/25 8:56 AM: Data obtained via Dexcom website  Jose Smith MA  Outcome for 06/30/25 12:42 PM: Pt reports she has not started omnipod insulin pump and is not ready to.   Karla Corral MA    Start time: 1309  End time: 1333  Provider location: off site- home  Patient location: off site- home.      HPI:   Arielle is a 39 yo woman here for follow up of type 1 diabetes with a history of severe hypoglycemia and hypoglycemia unawareness.  She also has CKD (albuminuria, GFR 69), obesity, blindness in one eye, and the problems listed below.  I last saw Arielle 11/24.  No severe hypos in over a year.  Started zepbound to try to lose weight.  Will be starting OP5 pump soon.      She says she is doing well.  She reports that she has a lot of help.  Not working too much now.  Cousin is living with her.  Really helpful.  Two people helping her now.     Zepbound 5 mg weekly-  helping her appetite- went to the [employer].  Drink the soup- couldn't eat anything after. She likes that she is feeling more full now.  She is supposed to start the Omnipod pump today.  She is excited, but nervous about this.   Recently lost 12 pounds. Recently, glucose has been under good control- still going low, but nothing severe.        Saw Tracy Ramirez 6/20/25 Plan:     *Increase Zepbound to 5 mg and the day you take this dose, decrease Tresiba from 20 units to 16 units.  *For low blood sugars, eat 1/2 apple instead of a whole apple.    *A prescription for Novolog vial, Omnipod starter kit and Omnipod pods was sent to your pharmacy  *Debby from [employer] will contact you to schedule your training.    *Today we reviewed how to prevent DKA when using the Omnipod.   "See below for a review.    Diabetes history: She reports that she was diagnosed with type 1 dm around age 8 when she became sick while living in [place]. She has been on insulin since diagnosis.       Her current treatment regimen is as follows:   Lantus- 16 units daily   Novolog- \"always taking it.\"  \"I don't count.\" When sensor tells her she is \"up\" she takes Novolog.  Now it is low.   She takes it after eating.  If she remembers, she takes it before she eats. She knows she needs to work on this.     Recent glucose:                   Past Medical History:  Blindness of right eye  Type 1 diabetes  Hypoglycemia unawareness  Vitamin D deficiency  Anxiety  Depression  Overweight     Past Surgical History:   section - , 2015  Enucleation right -     Family History:   Diabetes - paternal side of family       Social History:     Single mom.  Kids now 12, 10 and 3 years old.  One son with ADHD and obese with prediabetes, not type 1.  Other might have autism.  Works for a [employer] company overnight from home (remote work).  Tries to sleep during the day. She does not sleep much.     Diabetes Control:   Lab Results   Component Value Date    A1C 5.8 2020    A1C 6.4 2020    A1C 7.5 2020    A1C 7.8 2020    A1C 12.6 2019       Past Medical History:   Diagnosis Date    Acute upper respiratory infection, unspecified 12/10/2024    Blindness of right eye     Diabetes mellitus type 1 (H)     Diagnosed as a child       Past Surgical History:   Procedure Laterality Date     SECTION  13     SECTION N/A 2015    Procedure:  SECTION;  Surgeon: John Hudson MD;  Location: [facility]     SECTION N/A 7/3/2020    Procedure:  SECTION;  Surgeon: Aparna Atkins MD;  Location: [facility]    ENUCLEATION Right 3/20/2015    Procedure: ENUCLEATION;  Surgeon: Edilson Islas MD;  Location: [facility]       Family History   Problem Relation Age of " Onset    Family History Negative Mother     Family History Negative Father     Diabetes Other         father's side       Social History     Socioeconomic History    Marital status: Single    Number of children: 1   Occupational History     Employer: UNEMPLOYED   Tobacco Use    Smoking status: Former Smoker     Packs/day: 0.00     Types: Cigarettes    Smokeless tobacco: Never Used    Tobacco comment: smokes 2 cigarettes/day-none for several months   Substance and Sexual Activity    Alcohol use: No     Alcohol/week: 0.0 standard drinks    Drug use: No    Sexual activity: Yes     Partners: Male     Birth control/protection: None   Social History Narrative    ** Merged History Encounter **         Caffeine intake/servings daily - 0    Calcium intake/servings daily - 3    Exercise 7 times weekly - describe walking    Sunscreen used - No    Seatbelts used - Yes    Guns stored in the home - No    Self Breast Exam - Yes    Pap test up to date -  No    Eye exam up to date -  Yes    Dental exam up to date -  Yes    DEXA scan up to date -  Not Applicable    Flex Sig/Colonoscopy up to date -  Not Applicable    Mammography up to date -  Not Applicable    Immunizations reviewed and up to date - No    Abuse: Current or Past (Physical, Sexual or Emotional) - No    Do you feel safe in your environment - Yes    Do you cope well with stress - Yes    Do you suffer from insomnia - No    Last updated by: KARL PELAEZ  7/18/2012                       Current Outpatient Medications   Medication Sig Dispense Refill    acetone urine (KETOSTIX) test strip Use as directed in case of high glucose or illness. 50 strip 3    albuterol (PROAIR HFA/PROVENTIL HFA/VENTOLIN HFA) 108 (90 Base) MCG/ACT inhaler Inhale 2 puffs into the lungs every 6 hours as needed for shortness of breath or wheezing. 18 g 0    albuterol (PROAIR HFA/PROVENTIL HFA/VENTOLIN HFA) 108 (90 Base) MCG/ACT inhaler Inhale 2 puffs into the lungs every 6 hours as needed for  shortness of breath, wheezing or cough. 18 g 0    alcohol swab prep pads Use to swab area of injection/joelle as directed up to 4 times daily 100 each 11    benzonatate (TESSALON) 100 MG capsule Take 1-2 capsules by mouth up to 3 x a day as needed with food (Patient not taking: Reported on 5/19/2025) 45 capsule 0    blood glucose (ACCU-CHEK GUIDE) test strip Use to test blood sugar 4 times daily or as directed. For emergencies when Dexcom falls off 100 strip 3    blood glucose (NO BRAND SPECIFIED) lancets standard Use to test blood sugar 4 times daily or as directed. 100 each 11    blood glucose (NO BRAND SPECIFIED) test strip Use to test blood sugar 4 times daily or as directed. 200 strip 11    ciprofloxacin (CILOXAN) 0.3 % ophthalmic solution Place 1-2 drops into the right eye every 4 hours. (Patient not taking: Reported on 5/19/2025) 5 mL 0    Continuous Glucose Sensor (DEXCOM G7 SENSOR) MISC Change every 10 days. 9 each 3    FLUoxetine (PROZAC) 20 MG capsule Take 1 capsule (20 mg) by mouth daily. (Patient not taking: Reported on 5/19/2025) 30 capsule 1    Glucagon (BAQSIMI) 3 MG/DOSE nasal powder Spray 1 spray (3 mg) in nostril as needed. in the event of unconscious hypoglycemia or hypoglycemic seizure. May repeat dose if no response after 15 minutes. 1 each 3    Glucagon (GVOKE HYPOPEN) 1 MG/0.2ML pen Inject the contents of 1 device under the skin into lower abdomen, outer thigh, or outer upper arm as needed for hypoglycemia. If no response after 15 minutes, additional 1 mg dose from a new device may be injected while waiting for emergency assistance. 0.4 mL 3    guaiFENesin (MUCINEX) 600 MG 12 hr tablet Take 1-2 tablets as needed up to twice a day (Patient not taking: Reported on 5/19/2025) 30 tablet 0    ibuprofen (ADVIL/MOTRIN) 600 MG tablet Take 1 tablet (600 mg) by mouth every 6 hours as needed for mild pain 20 tablet 0    insulin aspart (NOVOLOG VIAL) 100 UNITS/ML vial For use in the Omnipod.  Up to 55  units a day. 50 mL 4    insulin degludec (TRESIBA FLEXTOUCH) 100 UNIT/ML pen Use as directed up to 25 units daily. 15 mL 3    Insulin Disposable Pump (OMNIPOD 5 INTRO, GEN 5,) KIT 1 kit as needed. 1 kit 0    Insulin Disposable Pump (OMNIPOD 5 INTRO, GEN 5,) KIT 1 kit as needed. 1 kit 0    Insulin Disposable Pump (OMNIPOD 5 PODS, GEN 5,) MISC 1 pod every 72 hours. 10 each 11    Insulin Disposable Pump (OMNIPOD 5 PODS, GEN 5,) MISC 1 pod every 3 days. 10 each 11    insulin pen needle (31G X 5 MM) 31G X 5 MM miscellaneous Use 4 pen needles daily or as directed. 400 each 1    KETOSTIX test strip Use as directed in case of high glucose, vomiting or illness. 50 strip 3    lisinopril (ZESTRIL) 5 MG tablet Take 1 tablet (5 mg) by mouth daily. 90 tablet 3    NOVOLOG FLEXPEN 100 UNIT/ML soln INJECT 2-10 UNITS WITH MEALS 3 TIMES DAILY AND AT BEDTIME. MAX DAILY DOSE 30 UNITS. 30 mL 3    tirzepatide-Weight Management (ZEPBOUND) 2.5 MG/0.5ML prefilled pen Inject 0.5 mLs (2.5 mg) subcutaneously every 7 days. For the first 4 weeks. 2 mL 0    tirzepatide-Weight Management (ZEPBOUND) 5 MG/0.5ML prefilled pen Inject 0.5 mLs (5 mg) subcutaneously every 7 days. After completing 2.5mg dose. 2 mL 1    [START ON 8/21/2025] Vitamin D, Cholecalciferol, 25 MCG (1000 UT) CAPS Take 1 capsule by mouth daily. 90 capsule 2    vitamin D3 (CHOLECALCIFEROL) 50 mcg (2000 units) tablet Take 1 tablet (50 mcg) by mouth daily. Come in for lab work when this bottle is almost finished. 90 tablet 0     No current facility-administered medications for this visit.        No Known Allergies    Physical Exam  There were no vitals taken for this visit.  GENERAL: healthy, alert and no distress  RESP: no audible wheeze, cough, or visible cyanosis.  No visible retractions or increased work of breathing.  Able to speak fully in complete sentences.  PSYCH: mentation appears normal, affect normal/bright, judgement and insight intact, normal speech and appearance  well-groomed  RESULTS  Lab Results   Component Value Date    A1C 9.6 (H) 11/05/2023    A1C 10.2 (H) 03/21/2023    A1C 10.1 (H) 06/22/2022    A1C 5.8 (H) 07/04/2020    A1C 6.4 (H) 06/11/2020    A1C 7.5 (H) 01/28/2020    A1C 7.8 (H) 01/02/2020    A1C 12.6 (H) 09/20/2019       TSH   Date Value Ref Range Status   05/21/2025 2.37 0.30 - 4.20 uIU/mL Final   05/30/2019 0.877 0.358 - 3.740 UIU/mL Final   05/11/2017 1.19 0.40 - 4.00 mU/L Final   09/30/2014 1.90 0.40 - 4.00 mU/L Final     Comment:     Effective 7/30/2014, the reference range for this assay has changed to reflect   new instrumentation/methodology.         ALT   Date Value Ref Range Status   05/21/2025 8 0 - 50 U/L Final   11/05/2023 11 0 - 50 U/L Final     Comment:     Reference intervals for this test were updated on 6/12/2023 to more accurately reflect our healthy population. There may be differences in the flagging of prior results with similar values performed with this method. Interpretation of those prior results can be made in the context of the updated reference intervals.     07/05/2020 24 0 - 50 U/L Final   07/04/2020 27 0 - 50 U/L Final   ]    Recent Labs   Lab Test 05/21/25  1038 09/20/19  1425   CHOL 189 181   HDL 55 71   * 101*   TRIG 132 45       Lab Results   Component Value Date     05/21/2025     09/20/2019      Lab Results   Component Value Date    POTASSIUM 4.1 05/21/2025    POTASSIUM 3.8 08/12/2021    POTASSIUM 4.0 01/24/2020     Lab Results   Component Value Date    CHLORIDE 102 05/21/2025    CHLORIDE 100 08/12/2021    CHLORIDE 95 09/20/2019     Lab Results   Component Value Date    ALEXANDRO 9.2 05/21/2025    ALEXANDRO 8.5 09/20/2019     Lab Results   Component Value Date    CO2 23 05/21/2025    CO2 25 08/12/2021    CO2 22 09/20/2019     Lab Results   Component Value Date    BUN 23.9 05/21/2025    BUN 12 08/12/2021    BUN 13 09/20/2019     Lab Results   Component Value Date    CR 1.05 05/21/2025    CR 0.91 07/05/2020       GFR  Estimate   Date Value Ref Range Status   05/21/2025 69 >60 mL/min/1.73m2 Final     Comment:     eGFR calculated using 2021 CKD-EPI equation.   11/05/2023 >90 >60 mL/min/1.73m2 Final   08/18/2023 65 >60 mL/min/1.73m2 Final   07/05/2020 82 >60 mL/min/[1.73_m2] Final     Comment:     Non  GFR Calc  Starting 12/18/2018, serum creatinine based estimated GFR (eGFR) will be   calculated using the Chronic Kidney Disease Epidemiology Collaboration   (CKD-EPI) equation.     07/04/2020 85 >60 mL/min/[1.73_m2] Final     Comment:     Non  GFR Calc  Starting 12/18/2018, serum creatinine based estimated GFR (eGFR) will be   calculated using the Chronic Kidney Disease Epidemiology Collaboration   (CKD-EPI) equation.     07/03/2020 75 >60 mL/min/[1.73_m2] Final     Comment:     Non  GFR Calc  Starting 12/18/2018, serum creatinine based estimated GFR (eGFR) will be   calculated using the Chronic Kidney Disease Epidemiology Collaboration   (CKD-EPI) equation.       GFR Estimate If Black   Date Value Ref Range Status   07/05/2020 >90 >60 mL/min/[1.73_m2] Final     Comment:      GFR Calc  Starting 12/18/2018, serum creatinine based estimated GFR (eGFR) will be   calculated using the Chronic Kidney Disease Epidemiology Collaboration   (CKD-EPI) equation.     07/04/2020 >90 >60 mL/min/[1.73_m2] Final     Comment:      GFR Calc  Starting 12/18/2018, serum creatinine based estimated GFR (eGFR) will be   calculated using the Chronic Kidney Disease Epidemiology Collaboration   (CKD-EPI) equation.     07/03/2020 87 >60 mL/min/[1.73_m2] Final     Comment:      GFR Calc  Starting 12/18/2018, serum creatinine based estimated GFR (eGFR) will be   calculated using the Chronic Kidney Disease Epidemiology Collaboration   (CKD-EPI) equation.         Lab Results   Component Value Date    MICROL 638.0 05/21/2025    MICROL 172 09/30/2014     No results found  "for: \"MICROALBUMIN\"  Lab Results   Component Value Date    CPEPT <0.1 (L) 09/30/2014       Vitamin B12   Date Value Ref Range Status   05/21/2025 534 232 - 1,245 pg/mL Final   ]    Tissue Transglutaminase Antibody IgA   Date Value Ref Range Status   05/21/2025 0.4 <7.0 U/mL Final     Comment:     Negative- The tTG-IgA assay has limited utility for patients with decreased levels of IgA. Screening for celiac disease should include IgA testing to rule out selective IgA deficiency and to guide selection and interpretation of serological testing. tTG-IgG testing may be positive in celiac disease patients with IgA deficiency.     Tissue Transglutaminase Antibody IgG   Date Value Ref Range Status   05/21/2025 <0.6 <7.0 U/mL Final     Comment:     Negative       Most recent eye exam date: : Not Found     FIB-4 Calculation: 0.78 at 5/21/2025 10:38 AM  Calculated from:  SGOT/AST: 16 U/L at 5/21/2025 10:38 AM  SGPT/ALT: 8 U/L at 5/21/2025 10:38 AM  Platelets: 283 10e3/uL at 5/21/2025 10:38 AM  Age: 40 years  A value of FIB-4 below 1.30 is considered as low risk for advanced fibrosis; a value of FIB-4 over 2.67 is considered as high risk for advanced fibrosis; and FIB-4 values between 1.30 and 2.67 are considered as intermediate risk of advanced fibrosis.    No results found for: \"GADAB\"  C Peptide   Date Value Ref Range Status   09/30/2014 <0.1 (L) 0.9 - 6.9 ng/mL Final     Assessment/Plan:     1.  Type 1 diabetes- Doing much better, but still having hypoglycemia and glucose is highly variable.  Nothing severe for the past year.  Will be starting Omnipod 5 pump today.  Would like her to remain on 7 units Tresiba IN ADDITION to her pump for DKA prevention.    We made the following plan today (instructions given to patient):      Increase lisinopril to 10 mg daily (ok to take 2 of the 5 mg tablets).      Lower Tresiba to 13 units daily.      When you start your pump, take Tresiba (or lantus) 7 units daily IN ADDITION to your " pump.     Start rosuvastatin 10 mg daily for cholesterol.      Please have your lab tests done.  Please contact us to schedule at any of our [place] lab locations  Call [phone] ([phone]), select option 1 or schedule via [employer].       Emergency issues: Here are some concerns you should contact us about.  -Vomiting: more than twice.  Please check ketones.  If positive, go to ER. Monitor glucose hourly.   -High glucose (over 300 mg/dL twice in a row): Please check ketones.  If ketones are negative, take an insulin correction and recheck glucose in 1 hour.  If glucose is not coming down, please call the clinic. If ketones are moderate or large, drink lots of water, take an insulin correction 1.5 times your usual correction, and recheck ketones in 1 hour.  If ketones are still present (or you are vomiting), go to the ER.  -High glucose (over 300 mg/dL twice in a row) with a pump:  Twice in doubt, take it out.  Change your pump site. Check ketones.  If ketones are negative, take an insulin correction by syringe/pen and recheck glucose in 1 hour.  If glucose is not coming down, please call the clinic. If ketones are moderate or large, drink lots of water, take an insulin correction 1.5 times your usual correction by syringe/pen, and recheck ketones in 1 hour.  If ketones are still present (or you are vomiting), go to the ER.  -Hypoglycemia (low glucose):   If glucose is less than 70 mg/dL, treat with 15g carb (4 glucose tablets), recheck glucose in 15 minutes.  If low again, repeat.   If glucose is less than 54 mg/dL, treat with 30g carb, recheck glucose in 15 minutes.  If low again, repeat.  Keep glucagon in your home in case of severe hypoglycemia and train someone how to use this.    Emergency kit (please ensure you always have these with you):   Glucose tablets  Glucagon  Insulin  Syringes/needles   Extra infusion set (if on a pump)  Ketone strips    Backup insulin plan (in case of pump failure):   If  your insulin pump fails, your body will not have any insulin available and your blood glucose levels can get dangerously high. This can result in diabetic ketoacidosis making you very sick (abdominal pain, confusion, vomiting, dehydration, positive urine ketones).    You have an active long acting basal insulin (Degludec: Tresiba / Detemir: Levemir / Glargine: Lantus / Toujeo / Semglee / Basaglar) prescription available. Please pick this up from your pharmacy in case your pump fails.  Basal insulin dose:_______15_____ units once per day.    Immediately after your pump fails and until you can  the long acting insulin, use short acting insulin (Aspart: Novolog/Fiasp / Lispro: Humalog / Glulisine:Apidra) injections (pen or vial and syringe) per your correction scale every 4 hours and continue to cover carbohydrates. You can stop this 4 hourly correction after you give yourself the basal insulin dose.    You should also administer short acting insulin subcutaneously to cover carbohydrates and per your correction scale prior to meals.     Contact us for help transitioning back to your pump when this is available.    Contact information:   If you have concerns, please send me a [employer] message or call the clinic at [phone].  For more urgent concerns, please call [phone] after hours/weekends and ask to speak with the endocrinologist on call.      Please let me know if you are having low blood sugars less than 70 or over 350 mg/dL.  Do not wait until your next appointment if this is happening.           2.  Risk factors-     Retinopathy:  No known history. Had recent eye exam.   Nephropathy:  Patient now has CKD with albuminuria.  Referred to nephrology.  Increase lisinopril to 10 mg daily.  Check potassium/creatinine in 2 weeks.   Neuropathy: No.    Feet: OK, no ulcers.   Lipids:  LDL above target. Statin medications are standard recommendations from the American Diabetes Association in all patients >40  with diabetes, as people with diabetes have increased risk of cardiovascular disease than people without diabetes. Agreed to start rosuvastatin 10 mg daily.  Recheck lipids in 3 mos.   Thyroid screening: TSH normal 2025.  Celiac screening: Negative antibodies.   Contraception:not sexually active.     3. F/U in 1 mos with DM education, 3 mos with me, sooner with concerns/hypoglycemia.     I spent a total of 41 minutes on the date of encounter reviewing medical records, evaluating the patient, coordinating care and  documenting in the EHR, as detailed above, exclusive of CGM time.      Kym Jang PA-C, MPAS   [facility]  Department of Medicine  Division of Endocrinology and Diabetes

## 2025-06-18 ENCOUNTER — MYC MEDICAL ADVICE (OUTPATIENT)
Dept: EDUCATION SERVICES | Facility: CLINIC | Age: 40
End: 2025-06-18
Payer: COMMERCIAL

## 2025-06-25 ENCOUNTER — TELEPHONE (OUTPATIENT)
Dept: OPTOMETRY | Facility: CLINIC | Age: 40
End: 2025-06-25
Payer: COMMERCIAL

## 2025-06-25 NOTE — TELEPHONE ENCOUNTER
[facility] Call Center    Phone Message    May a detailed message be left on voicemail: yes     Reason for Call: [facility] in [place] advised she has sent over a fax several times and is wanting to make sure this has been received by clinic. The fax is an order that was faxed over for pt to get the prosthetic eye.. Caller advised she is in office all day on 6/26/25 but out of office on 6/27/25 caller advised pt is scheduled for 7/1/25 and this order needs completed and returned prior to pts appt. Please contact Laura to discuss getting this completed. Thank you     Action Taken: Message routed to:  [facility]    Travel Screening: Not Applicable     Date of Service:     Requested fax has been sent 06/26/2025    Jillian Ramirez O.D.  [employer]   [address] [place] [address]    [phone]

## 2025-06-26 ENCOUNTER — TELEPHONE (OUTPATIENT)
Dept: ENDOCRINOLOGY | Facility: CLINIC | Age: 40
End: 2025-06-26
Payer: COMMERCIAL

## 2025-06-26 NOTE — TELEPHONE ENCOUNTER
Diabetes Educator Note:    Arielle has not returned calls/emails/text from [facility] with Omnipod for pump start training.    Reached out to Arielle.  She reports she lost her Dexcom G6 transmitter and so she cannot start the Omnipod.  Arielle plans to use the InToTally5 gael and I informed her that it is now compatible with the G7.  Arielle has G7 sensors and agrees to proceed with the training.     I provided Arielle with Debby, Omnipod , phone number. Arielle requests a call in the afternoon in 1 day.  Will relay this to [facility].  Plan to schedule diabetes education follow up after pump start.    Of note, Arielle reports she does not know how to send a message in [employer].    Tracy Ramirez RDN, LDN, [facility]  Dietitian and Diabetes  - Endocrinology  [facility] [place]

## 2025-06-26 NOTE — TELEPHONE ENCOUNTER
Called patient and left voicemail. Patient has an appointment on 6/30/25. Need patient to upload their Omnipod device, if patient has started pump ,to the site for provider to review prior to their appointment.  Karla Corral MA

## 2025-06-30 ENCOUNTER — VIRTUAL VISIT (OUTPATIENT)
Dept: ENDOCRINOLOGY | Facility: CLINIC | Age: 40
End: 2025-06-30
Payer: COMMERCIAL

## 2025-06-30 DIAGNOSIS — R80.9 ALBUMINURIA: ICD-10-CM

## 2025-06-30 DIAGNOSIS — N18.2 CKD (CHRONIC KIDNEY DISEASE) STAGE 2, GFR 60-89 ML/MIN: Primary | ICD-10-CM

## 2025-06-30 DIAGNOSIS — E10.40 TYPE 1 DIABETES MELLITUS WITH DIABETIC NEUROPATHY (H): ICD-10-CM

## 2025-06-30 PROCEDURE — 1126F AMNT PAIN NOTED NONE PRSNT: CPT | Mod: 95 | Performed by: PHYSICIAN ASSISTANT

## 2025-06-30 PROCEDURE — 95251 CONT GLUC MNTR ANALYSIS I&R: CPT | Performed by: PHYSICIAN ASSISTANT

## 2025-06-30 PROCEDURE — 98007 SYNCH AUDIO-VIDEO EST HI 40: CPT | Mod: 25 | Performed by: PHYSICIAN ASSISTANT

## 2025-06-30 RX ORDER — INSULIN DEGLUDEC 100 U/ML
INJECTION, SOLUTION SUBCUTANEOUS
Qty: 30 ML | Refills: 3 | Status: SHIPPED | OUTPATIENT
Start: 2025-06-30 | End: 2025-06-30

## 2025-06-30 RX ORDER — ROSUVASTATIN CALCIUM 10 MG/1
10 TABLET, COATED ORAL DAILY
Qty: 90 TABLET | Refills: 3 | Status: SHIPPED | OUTPATIENT
Start: 2025-06-30

## 2025-06-30 RX ORDER — INSULIN DEGLUDEC 100 U/ML
INJECTION, SOLUTION SUBCUTANEOUS
COMMUNITY
Start: 2025-06-30 | End: 2025-07-02

## 2025-06-30 RX ORDER — LISINOPRIL 10 MG/1
10 TABLET ORAL DAILY
Qty: 90 TABLET | Refills: 3 | Status: SHIPPED | OUTPATIENT
Start: 2025-06-30

## 2025-06-30 ASSESSMENT — PAIN SCALES - GENERAL: PAINLEVEL_OUTOF10: NO PAIN (0)

## 2025-06-30 NOTE — PATIENT INSTRUCTIONS
Increase lisinopril to 10 mg daily (ok to take 2 of the 5 mg tablets).      Lower Tresiba to 13 units daily.      When you start your pump, take Tresiba (or lantus) 7 units daily IN ADDITION to your pump.     Start rosuvastatin 10 mg daily for cholesterol.      Please have your lab tests done.  Please contact us to schedule at any of our [place] lab locations  Call [phone] ([phone]), select option 1 or schedule via [employer].       Emergency issues: Here are some concerns you should contact us about.  -Vomiting: more than twice.  Please check ketones.  If positive, go to ER. Monitor glucose hourly.   -High glucose (over 300 mg/dL twice in a row): Please check ketones.  If ketones are negative, take an insulin correction and recheck glucose in 1 hour.  If glucose is not coming down, please call the clinic. If ketones are moderate or large, drink lots of water, take an insulin correction 1.5 times your usual correction, and recheck ketones in 1 hour.  If ketones are still present (or you are vomiting), go to the ER.  -High glucose (over 300 mg/dL twice in a row) with a pump:  Twice in doubt, take it out.  Change your pump site. Check ketones.  If ketones are negative, take an insulin correction by syringe/pen and recheck glucose in 1 hour.  If glucose is not coming down, please call the clinic. If ketones are moderate or large, drink lots of water, take an insulin correction 1.5 times your usual correction by syringe/pen, and recheck ketones in 1 hour.  If ketones are still present (or you are vomiting), go to the ER.  -Hypoglycemia (low glucose):   If glucose is less than 70 mg/dL, treat with 15g carb (4 glucose tablets), recheck glucose in 15 minutes.  If low again, repeat.   If glucose is less than 54 mg/dL, treat with 30g carb, recheck glucose in 15 minutes.  If low again, repeat.  Keep glucagon in your home in case of severe hypoglycemia and train someone how to use this.    Emergency kit (please  ensure you always have these with you):   Glucose tablets  Glucagon  Insulin  Syringes/needles   Extra infusion set (if on a pump)  Ketone strips    Backup insulin plan (in case of pump failure):   If your insulin pump fails, your body will not have any insulin available and your blood glucose levels can get dangerously high. This can result in diabetic ketoacidosis making you very sick (abdominal pain, confusion, vomiting, dehydration, positive urine ketones).    You have an active long acting basal insulin (Degludec: Tresiba / Detemir: Levemir / Glargine: Lantus / Toujeo / Semglee / Basaglar) prescription available. Please pick this up from your pharmacy in case your pump fails.  Basal insulin dose:_______15_____ units once per day.    Immediately after your pump fails and until you can  the long acting insulin, use short acting insulin (Aspart: Novolog/Fiasp / Lispro: Humalog / Glulisine:Apidra) injections (pen or vial and syringe) per your correction scale every 4 hours and continue to cover carbohydrates. You can stop this 4 hourly correction after you give yourself the basal insulin dose.    You should also administer short acting insulin subcutaneously to cover carbohydrates and per your correction scale prior to meals.     Contact us for help transitioning back to your pump when this is available.    Contact information:   If you have concerns, please send me a [employer] message or call the clinic at [phone].  For more urgent concerns, please call [phone] after hours/weekends and ask to speak with the endocrinologist on call.      Please let me know if you are having low blood sugars less than 70 or over 350 mg/dL.  Do not wait until your next appointment if this is happening.

## 2025-06-30 NOTE — LETTER
6/30/2025       RE: Arielle Montenegro  [address]  [place] [address]     Dear Colleague,    Thank you for referring your patient, Arielle Montenegro, to the [facility] ENDOCRINOLOG[facility] [place] [facility]. Please see a copy of my visit note below.    Outcome for 06/13/25 11:49 AM: Data uploaded on Dexcom  Jose Smith MA  Outcome for 06/26/25 2:30 PM: SnapUp message sent  Karla Corral MA  Outcome for 06/26/25 2:31 PM: Left Voicemail   Karla Corral MA  Outcome for 06/27/25 8:56 AM: Left Voicemail   Jose Smith MA  Outcome for 06/27/25 8:56 AM: Data obtained via Dexcom website  Jose Smith MA  Outcome for 06/30/25 12:42 PM: Pt reports she has not started omnipod insulin pump and is not ready to.   Karla Corral MA    Start time: 1309  End time: 1333  Provider location: off site- home  Patient location: off site- home.      HPI:   Arielle is a 41 yo woman here for follow up of type 1 diabetes with a history of severe hypoglycemia and hypoglycemia unawareness.  She also has CKD (albuminuria, GFR 69), obesity, blindness in one eye, and the problems listed below.  I last saw Arielle 11/24.  No severe hypos in over a year.  Started zepbound to try to lose weight.  Will be starting OP5 pump soon.      She says she is doing well.  She reports that she has a lot of help.  Not working too much now.  Cousin is living with her.  Really helpful.  Two people helping her now.     Zepbound 5 mg weekly-  helping her appetite- went to the [employer].  Drink the soup- couldn't eat anything after. She likes that she is feeling more full now.  She is supposed to start the Omnipod pump today.  She is excited, but nervous about this.   Recently lost 12 pounds. Recently, glucose has been under good control- still going low, but nothing severe.        Saw Tracy Ramirez 6/20/25 Plan:     *Increase Zepbound to 5 mg and the day you take this dose, decrease Tresiba from  "20 units to 16 units.  *For low blood sugars, eat 1/2 apple instead of a whole apple.    *A prescription for Novolog vial, Omnipod starter kit and Omnipod pods was sent to your pharmacy  *Debby from [employer] will contact you to schedule your training.    *Today we reviewed how to prevent DKA when using the Omnipod.  See below for a review.    Diabetes history: She reports that she was diagnosed with type 1 dm around age 8 when she became sick while living in [place]. She has been on insulin since diagnosis.       Her current treatment regimen is as follows:   Lantus- 16 units daily   Novolog- \"always taking it.\"  \"I don't count.\" When sensor tells her she is \"up\" she takes Novolog.  Now it is low.   She takes it after eating.  If she remembers, she takes it before she eats. She knows she needs to work on this.     Recent glucose:                   Past Medical History:  Blindness of right eye  Type 1 diabetes  Hypoglycemia unawareness  Vitamin D deficiency  Anxiety  Depression  Overweight     Past Surgical History:   section - ,   Enucleation right -     Family History:   Diabetes - paternal side of family       Social History:     Single mom.  Kids now 12, 10 and 3 years old.  One son with ADHD and obese with prediabetes, not type 1.  Other might have autism.  Works for a [employer] company overnight from home (remote work).  Tries to sleep during the day. She does not sleep much.     Diabetes Control:   Lab Results   Component Value Date    A1C 5.8 2020    A1C 6.4 2020    A1C 7.5 2020    A1C 7.8 2020    A1C 12.6 2019       Past Medical History:   Diagnosis Date     Acute upper respiratory infection, unspecified 12/10/2024     Blindness of right eye      Diabetes mellitus type 1 (H)     Diagnosed as a child       Past Surgical History:   Procedure Laterality Date      SECTION  13      SECTION N/A 2015    Procedure:  SECTION;  " Surgeon: John Hudson MD;  Location: [facility] L+D      SECTION N/A 7/3/2020    Procedure:  SECTION;  Surgeon: Aparna Atkisn MD;  Location: [facility] L+[facility]     ENUCLEATION Right 3/20/2015    Procedure: ENUCLEATION;  Surgeon: Edilson Islas MD;  Location: [facility]       Family History   Problem Relation Age of Onset     Family History Negative Mother      Family History Negative Father      Diabetes Other         father's side       Social History     Socioeconomic History     Marital status: Single     Number of children: 1   Occupational History     Employer: UNEMPLOYED   Tobacco Use     Smoking status: Former Smoker     Packs/day: 0.00     Types: Cigarettes     Smokeless tobacco: Never Used     Tobacco comment: smokes 2 cigarettes/day-none for several months   Substance and Sexual Activity     Alcohol use: No     Alcohol/week: 0.0 standard drinks     Drug use: No     Sexual activity: Yes     Partners: Male     Birth control/protection: None   Social History Narrative    ** Merged History Encounter **         Caffeine intake/servings daily - 0    Calcium intake/servings daily - 3    Exercise 7 times weekly - describe walking    Sunscreen used - No    Seatbelts used - Yes    Guns stored in the home - No    Self Breast Exam - Yes    Pap test up to date -  No    Eye exam up to date -  Yes    Dental exam up to date -  Yes    DEXA scan up to date -  Not Applicable    Flex Sig/Colonoscopy up to date -  Not Applicable    Mammography up to date -  Not Applicable    Immunizations reviewed and up to date - No    Abuse: Current or Past (Physical, Sexual or Emotional) - No    Do you feel safe in your environment - Yes    Do you cope well with stress - Yes    Do you suffer from insomnia - No    Last updated by: KARL PELAEZ  2012                       Current Outpatient Medications   Medication Sig Dispense Refill     acetone urine (KETOSTIX) test strip Use as directed in case of high glucose or  illness. 50 strip 3     albuterol (PROAIR HFA/PROVENTIL HFA/VENTOLIN HFA) 108 (90 Base) MCG/ACT inhaler Inhale 2 puffs into the lungs every 6 hours as needed for shortness of breath or wheezing. 18 g 0     albuterol (PROAIR HFA/PROVENTIL HFA/VENTOLIN HFA) 108 (90 Base) MCG/ACT inhaler Inhale 2 puffs into the lungs every 6 hours as needed for shortness of breath, wheezing or cough. 18 g 0     alcohol swab prep pads Use to swab area of injection/joelle as directed up to 4 times daily 100 each 11     benzonatate (TESSALON) 100 MG capsule Take 1-2 capsules by mouth up to 3 x a day as needed with food (Patient not taking: Reported on 5/19/2025) 45 capsule 0     blood glucose (ACCU-CHEK GUIDE) test strip Use to test blood sugar 4 times daily or as directed. For emergencies when Dexcom falls off 100 strip 3     blood glucose (NO BRAND SPECIFIED) lancets standard Use to test blood sugar 4 times daily or as directed. 100 each 11     blood glucose (NO BRAND SPECIFIED) test strip Use to test blood sugar 4 times daily or as directed. 200 strip 11     ciprofloxacin (CILOXAN) 0.3 % ophthalmic solution Place 1-2 drops into the right eye every 4 hours. (Patient not taking: Reported on 5/19/2025) 5 mL 0     Continuous Glucose Sensor (DEXCOM G7 SENSOR) MISC Change every 10 days. 9 each 3     FLUoxetine (PROZAC) 20 MG capsule Take 1 capsule (20 mg) by mouth daily. (Patient not taking: Reported on 5/19/2025) 30 capsule 1     Glucagon (BAQSIMI) 3 MG/DOSE nasal powder Spray 1 spray (3 mg) in nostril as needed. in the event of unconscious hypoglycemia or hypoglycemic seizure. May repeat dose if no response after 15 minutes. 1 each 3     Glucagon (GVOKE HYPOPEN) 1 MG/0.2ML pen Inject the contents of 1 device under the skin into lower abdomen, outer thigh, or outer upper arm as needed for hypoglycemia. If no response after 15 minutes, additional 1 mg dose from a new device may be injected while waiting for emergency assistance. 0.4 mL 3      guaiFENesin (MUCINEX) 600 MG 12 hr tablet Take 1-2 tablets as needed up to twice a day (Patient not taking: Reported on 5/19/2025) 30 tablet 0     ibuprofen (ADVIL/MOTRIN) 600 MG tablet Take 1 tablet (600 mg) by mouth every 6 hours as needed for mild pain 20 tablet 0     insulin aspart (NOVOLOG VIAL) 100 UNITS/ML vial For use in the Omnipod.  Up to 55 units a day. 50 mL 4     insulin degludec (TRESIBA FLEXTOUCH) 100 UNIT/ML pen Use as directed up to 25 units daily. 15 mL 3     Insulin Disposable Pump (OMNIPOD 5 INTRO, GEN 5,) KIT 1 kit as needed. 1 kit 0     Insulin Disposable Pump (OMNIPOD 5 INTRO, GEN 5,) KIT 1 kit as needed. 1 kit 0     Insulin Disposable Pump (OMNIPOD 5 PODS, GEN 5,) MISC 1 pod every 72 hours. 10 each 11     Insulin Disposable Pump (OMNIPOD 5 PODS, GEN 5,) MISC 1 pod every 3 days. 10 each 11     insulin pen needle (31G X 5 MM) 31G X 5 MM miscellaneous Use 4 pen needles daily or as directed. 400 each 1     KETOSTIX test strip Use as directed in case of high glucose, vomiting or illness. 50 strip 3     lisinopril (ZESTRIL) 5 MG tablet Take 1 tablet (5 mg) by mouth daily. 90 tablet 3     NOVOLOG FLEXPEN 100 UNIT/ML soln INJECT 2-10 UNITS WITH MEALS 3 TIMES DAILY AND AT BEDTIME. MAX DAILY DOSE 30 UNITS. 30 mL 3     tirzepatide-Weight Management (ZEPBOUND) 2.5 MG/0.5ML prefilled pen Inject 0.5 mLs (2.5 mg) subcutaneously every 7 days. For the first 4 weeks. 2 mL 0     tirzepatide-Weight Management (ZEPBOUND) 5 MG/0.5ML prefilled pen Inject 0.5 mLs (5 mg) subcutaneously every 7 days. After completing 2.5mg dose. 2 mL 1     [START ON 8/21/2025] Vitamin D, Cholecalciferol, 25 MCG (1000 UT) CAPS Take 1 capsule by mouth daily. 90 capsule 2     vitamin D3 (CHOLECALCIFEROL) 50 mcg (2000 units) tablet Take 1 tablet (50 mcg) by mouth daily. Come in for lab work when this bottle is almost finished. 90 tablet 0     No current facility-administered medications for this visit.        No Known  Allergies    Physical Exam  There were no vitals taken for this visit.  GENERAL: healthy, alert and no distress  RESP: no audible wheeze, cough, or visible cyanosis.  No visible retractions or increased work of breathing.  Able to speak fully in complete sentences.  PSYCH: mentation appears normal, affect normal/bright, judgement and insight intact, normal speech and appearance well-groomed  RESULTS  Lab Results   Component Value Date    A1C 9.6 (H) 11/05/2023    A1C 10.2 (H) 03/21/2023    A1C 10.1 (H) 06/22/2022    A1C 5.8 (H) 07/04/2020    A1C 6.4 (H) 06/11/2020    A1C 7.5 (H) 01/28/2020    A1C 7.8 (H) 01/02/2020    A1C 12.6 (H) 09/20/2019       TSH   Date Value Ref Range Status   05/21/2025 2.37 0.30 - 4.20 uIU/mL Final   05/30/2019 0.877 0.358 - 3.740 UIU/mL Final   05/11/2017 1.19 0.40 - 4.00 mU/L Final   09/30/2014 1.90 0.40 - 4.00 mU/L Final     Comment:     Effective 7/30/2014, the reference range for this assay has changed to reflect   new instrumentation/methodology.         ALT   Date Value Ref Range Status   05/21/2025 8 0 - 50 U/L Final   11/05/2023 11 0 - 50 U/L Final     Comment:     Reference intervals for this test were updated on 6/12/2023 to more accurately reflect our healthy population. There may be differences in the flagging of prior results with similar values performed with this method. Interpretation of those prior results can be made in the context of the updated reference intervals.     07/05/2020 24 0 - 50 U/L Final   07/04/2020 27 0 - 50 U/L Final   ]    Recent Labs   Lab Test 05/21/25  1038 09/20/19  1425   CHOL 189 181   HDL 55 71   * 101*   TRIG 132 45       Lab Results   Component Value Date     05/21/2025     09/20/2019      Lab Results   Component Value Date    POTASSIUM 4.1 05/21/2025    POTASSIUM 3.8 08/12/2021    POTASSIUM 4.0 01/24/2020     Lab Results   Component Value Date    CHLORIDE 102 05/21/2025    CHLORIDE 100 08/12/2021    CHLORIDE 95 09/20/2019      Lab Results   Component Value Date    ALEXANDRO 9.2 05/21/2025    ALEXANDRO 8.5 09/20/2019     Lab Results   Component Value Date    CO2 23 05/21/2025    CO2 25 08/12/2021    CO2 22 09/20/2019     Lab Results   Component Value Date    BUN 23.9 05/21/2025    BUN 12 08/12/2021    BUN 13 09/20/2019     Lab Results   Component Value Date    CR 1.05 05/21/2025    CR 0.91 07/05/2020       GFR Estimate   Date Value Ref Range Status   05/21/2025 69 >60 mL/min/1.73m2 Final     Comment:     eGFR calculated using 2021 CKD-EPI equation.   11/05/2023 >90 >60 mL/min/1.73m2 Final   08/18/2023 65 >60 mL/min/1.73m2 Final   07/05/2020 82 >60 mL/min/[1.73_m2] Final     Comment:     Non  GFR Calc  Starting 12/18/2018, serum creatinine based estimated GFR (eGFR) will be   calculated using the Chronic Kidney Disease Epidemiology Collaboration   (CKD-EPI) equation.     07/04/2020 85 >60 mL/min/[1.73_m2] Final     Comment:     Non  GFR Calc  Starting 12/18/2018, serum creatinine based estimated GFR (eGFR) will be   calculated using the Chronic Kidney Disease Epidemiology Collaboration   (CKD-EPI) equation.     07/03/2020 75 >60 mL/min/[1.73_m2] Final     Comment:     Non  GFR Calc  Starting 12/18/2018, serum creatinine based estimated GFR (eGFR) will be   calculated using the Chronic Kidney Disease Epidemiology Collaboration   (CKD-EPI) equation.       GFR Estimate If Black   Date Value Ref Range Status   07/05/2020 >90 >60 mL/min/[1.73_m2] Final     Comment:      GFR Calc  Starting 12/18/2018, serum creatinine based estimated GFR (eGFR) will be   calculated using the Chronic Kidney Disease Epidemiology Collaboration   (CKD-EPI) equation.     07/04/2020 >90 >60 mL/min/[1.73_m2] Final     Comment:      GFR Calc  Starting 12/18/2018, serum creatinine based estimated GFR (eGFR) will be   calculated using the Chronic Kidney Disease Epidemiology Collaboration   (CKD-EPI)  "equation.     07/03/2020 87 >60 mL/min/[1.73_m2] Final     Comment:      GFR Calc  Starting 12/18/2018, serum creatinine based estimated GFR (eGFR) will be   calculated using the Chronic Kidney Disease Epidemiology Collaboration   (CKD-EPI) equation.         Lab Results   Component Value Date    MICROL 638.0 05/21/2025    MICROL 172 09/30/2014     No results found for: \"MICROALBUMIN\"  Lab Results   Component Value Date    CPEPT <0.1 (L) 09/30/2014       Vitamin B12   Date Value Ref Range Status   05/21/2025 534 232 - 1,245 pg/mL Final   ]    Tissue Transglutaminase Antibody IgA   Date Value Ref Range Status   05/21/2025 0.4 <7.0 U/mL Final     Comment:     Negative- The tTG-IgA assay has limited utility for patients with decreased levels of IgA. Screening for celiac disease should include IgA testing to rule out selective IgA deficiency and to guide selection and interpretation of serological testing. tTG-IgG testing may be positive in celiac disease patients with IgA deficiency.     Tissue Transglutaminase Antibody IgG   Date Value Ref Range Status   05/21/2025 <0.6 <7.0 U/mL Final     Comment:     Negative       Most recent eye exam date: : Not Found     FIB-4 Calculation: 0.78 at 5/21/2025 10:38 AM  Calculated from:  SGOT/AST: 16 U/L at 5/21/2025 10:38 AM  SGPT/ALT: 8 U/L at 5/21/2025 10:38 AM  Platelets: 283 10e3/uL at 5/21/2025 10:38 AM  Age: 40 years  A value of FIB-4 below 1.30 is considered as low risk for advanced fibrosis; a value of FIB-4 over 2.67 is considered as high risk for advanced fibrosis; and FIB-4 values between 1.30 and 2.67 are considered as intermediate risk of advanced fibrosis.    No results found for: \"GADAB\"  C Peptide   Date Value Ref Range Status   09/30/2014 <0.1 (L) 0.9 - 6.9 ng/mL Final     Assessment/Plan:     1.  Type 1 diabetes- Doing much better, but still having hypoglycemia and glucose is highly variable.  Nothing severe for the past year.  Will be starting " Omnipod 5 pump today.  Would like her to remain on 7 units Tresiba IN ADDITION to her pump for DKA prevention.    We made the following plan today (instructions given to patient):      Increase lisinopril to 10 mg daily (ok to take 2 of the 5 mg tablets).      Lower Tresiba to 13 units daily.      When you start your pump, take Tresiba (or lantus) 7 units daily IN ADDITION to your pump.     Start rosuvastatin 10 mg daily for cholesterol.      Please have your lab tests done.  Please contact us to schedule at any of our [place] lab locations  Call [phone] ([phone]), select option 1 or schedule via [employer].       Emergency issues: Here are some concerns you should contact us about.  -Vomiting: more than twice.  Please check ketones.  If positive, go to ER. Monitor glucose hourly.   -High glucose (over 300 mg/dL twice in a row): Please check ketones.  If ketones are negative, take an insulin correction and recheck glucose in 1 hour.  If glucose is not coming down, please call the clinic. If ketones are moderate or large, drink lots of water, take an insulin correction 1.5 times your usual correction, and recheck ketones in 1 hour.  If ketones are still present (or you are vomiting), go to the ER.  -High glucose (over 300 mg/dL twice in a row) with a pump:  Twice in doubt, take it out.  Change your pump site. Check ketones.  If ketones are negative, take an insulin correction by syringe/pen and recheck glucose in 1 hour.  If glucose is not coming down, please call the clinic. If ketones are moderate or large, drink lots of water, take an insulin correction 1.5 times your usual correction by syringe/pen, and recheck ketones in 1 hour.  If ketones are still present (or you are vomiting), go to the ER.  -Hypoglycemia (low glucose):   If glucose is less than 70 mg/dL, treat with 15g carb (4 glucose tablets), recheck glucose in 15 minutes.  If low again, repeat.   If glucose is less than 54 mg/dL, treat with  30g carb, recheck glucose in 15 minutes.  If low again, repeat.  Keep glucagon in your home in case of severe hypoglycemia and train someone how to use this.    Emergency kit (please ensure you always have these with you):   Glucose tablets  Glucagon  Insulin  Syringes/needles   Extra infusion set (if on a pump)  Ketone strips    Backup insulin plan (in case of pump failure):   If your insulin pump fails, your body will not have any insulin available and your blood glucose levels can get dangerously high. This can result in diabetic ketoacidosis making you very sick (abdominal pain, confusion, vomiting, dehydration, positive urine ketones).    You have an active long acting basal insulin (Degludec: Tresiba / Detemir: Levemir / Glargine: Lantus / Toujeo / Semglee / Basaglar) prescription available. Please pick this up from your pharmacy in case your pump fails.  Basal insulin dose:_______15_____ units once per day.    Immediately after your pump fails and until you can  the long acting insulin, use short acting insulin (Aspart: Novolog/Fiasp / Lispro: Humalog / Glulisine:Apidra) injections (pen or vial and syringe) per your correction scale every 4 hours and continue to cover carbohydrates. You can stop this 4 hourly correction after you give yourself the basal insulin dose.    You should also administer short acting insulin subcutaneously to cover carbohydrates and per your correction scale prior to meals.     Contact us for help transitioning back to your pump when this is available.    Contact information:   If you have concerns, please send me a [employer] message or call the clinic at [phone].  For more urgent concerns, please call [phone] after hours/weekends and ask to speak with the endocrinologist on call.      Please let me know if you are having low blood sugars less than 70 or over 350 mg/dL.  Do not wait until your next appointment if this is happening.           2.  Risk factors-      Retinopathy:  No known history. Had recent eye exam.   Nephropathy:  Patient now has CKD with albuminuria.  Referred to nephrology.  Increase lisinopril to 10 mg daily.  Check potassium/creatinine in 2 weeks.   Neuropathy: No.    Feet: OK, no ulcers.   Lipids:  LDL above target. Statin medications are standard recommendations from the American Diabetes Association in all patients >40 with diabetes, as people with diabetes have increased risk of cardiovascular disease than people without diabetes. Agreed to start rosuvastatin 10 mg daily.  Recheck lipids in 3 mos.   Thyroid screening: TSH normal 2025.  Celiac screening: Negative antibodies.   Contraception:not sexually active.     3. F/U in 1 mos with DM education, 3 mos with me, sooner with concerns/hypoglycemia.     I spent a total of 41 minutes on the date of encounter reviewing medical records, evaluating the patient, coordinating care and  documenting in the EHR, as detailed above, exclusive of CGM time.      Kym Jang PA-C, MPAS   [facility]  Department of Medicine  Division of Endocrinology and Diabetes                                          Again, thank you for allowing me to participate in the care of your patient.      Sincerely,    Kym Jang PA-C

## 2025-06-30 NOTE — NURSING NOTE
Current patient location: [facility] [address]  [facility] [address]    Is the patient currently in the state of [place]? YES    Visit mode: VIDEO    If the visit is dropped, the patient can be reconnected by:VIDEO VISIT: Text to cell phone:   Telephone Information:   Mobile [phone]       Will anyone else be joining the visit? NO  (If patient encounters technical issues they should call [phone]    Are changes needed to the allergy or medication list? Yes Pt states she has not started omnipod insulin pump and is not ready to use it. Pt also states she is not taking tessalon, cicloxan, prozac, and mucinex. Pt also advises she is no longer taking zepbound 2.5mg/0.5mL and is using zepbound 5mg/0.5mL.     Are refills needed on medications prescribed by this physician? Discuss with provider    Rooming Documentation:  Not applicable    Reason for visit: RECHJOSS Corral, DOV VVF

## 2025-07-01 ENCOUNTER — PATIENT OUTREACH (OUTPATIENT)
Dept: CARE COORDINATION | Facility: CLINIC | Age: 40
End: 2025-07-01
Payer: COMMERCIAL

## 2025-07-01 ENCOUNTER — TELEPHONE (OUTPATIENT)
Dept: ENDOCRINOLOGY | Facility: CLINIC | Age: 40
End: 2025-07-01
Payer: COMMERCIAL

## 2025-07-01 DIAGNOSIS — E10.40 TYPE 1 DIABETES MELLITUS WITH DIABETIC NEUROPATHY (H): ICD-10-CM

## 2025-07-01 NOTE — TELEPHONE ENCOUNTER
Noted for Tresiba instruction and communicated to Debby with [employer] who will be training Arielle.    Tracy Ramirez RDN, LDN, [facility]  Dietitian and Diabetes  - Endocrinology  [facility] [place]

## 2025-07-01 NOTE — TELEPHONE ENCOUNTER
Lantus order discontinued. Tresiba order sent as historical.   Provider notified.   Belkys Carey RN on 7/2/2025 at 8:55 AM             [facility] Call Center    Phone Message    May a detailed message be left on voicemail: yes     Reason for Call: Other: Pt is requesting for her Lantus to be sent out to her [facility] pharmacy that is in [place] as the Pt called and they stated that it was cancelled by her provider. Does her provider still want her to stay on the Lantus? Please advise.       Action Taken: Other: Endo    Travel Screening: Not Applicable     Date of Service: 7/01/25

## 2025-07-02 ENCOUNTER — TELEPHONE (OUTPATIENT)
Dept: ENDOCRINOLOGY | Facility: CLINIC | Age: 40
End: 2025-07-02
Payer: COMMERCIAL

## 2025-07-02 RX ORDER — INSULIN DEGLUDEC 100 U/ML
INJECTION, SOLUTION SUBCUTANEOUS
Qty: 15 ML | Refills: 1 | Status: SHIPPED | OUTPATIENT
Start: 2025-07-02

## 2025-07-02 NOTE — TELEPHONE ENCOUNTER
Prior Authorization Specialty Medication Request    Medication/Dose:   insulin degludec (TRESIBA FLEXTOUCH) 100 UNIT/ML pen 15 mL 1 7/2/2025 -- No   Sig: Take 7 units every day when using the Omnipod pump.  Take 13 units daily in case of pump failure.     Diagnosis and ICD code (if different than what is on RX):    New/renewal/insurance change PA/secondary ins. PA:  Previously Tried and Failed:      Important Lab Values: A1c: 9.6  Rationale: This patient has been diagnosed with type 1 diabetes mellitus and is insulin-dependent. Despite appropriate use of Lantus (insulin glargine), the patient continues to experience significant fluctuations in blood glucose levels, including frequent hypoglycemia and hyperglycemia, particularly overnight and between doses.    Tresiba (insulin degludec) is a long-acting basal insulin with an ultra-long duration of action (>42 hours) and a flatter, more stable pharmacodynamic profile compared to Lantus. Clinical studies, including the BEGIN and SWITCH trials, have demonstrated that Tresiba provides:    Reduced glycemic variability    Lower rates of nocturnal and overall hypoglycemia    More consistent and prolonged basal insulin coverage    Flexible dosing time, which is especially beneficial for patients with variable schedules or inconsistent glucose patterns    Given the patient s ongoing difficulty achieving stable glycemic control on Lantus, switching to Tresiba is clinically warranted to improve outcomes and reduce the risk of complications from hypoglycemia or persistent hyperglycemia.    Tresiba is not being requested for convenience or preference alone, but as a medically necessary alternative to Lantus in the setting of inadequate response and safety concerns. Coverage of Tresiba will allow for improved glycemic control, reduced risk of acute complications, and better long-term outcomes.    Insurance   Primary:   Insurance ID:      Secondary (if applicable):  Insurance ID:       Pharmacy Information (if different than what is on RX)  Name:    Phone:    Fax:    Clinic Information  Preferred routing pool for dept communication:

## 2025-07-03 ENCOUNTER — PATIENT OUTREACH (OUTPATIENT)
Dept: CARE COORDINATION | Facility: CLINIC | Age: 40
End: 2025-07-03
Payer: COMMERCIAL

## 2025-07-03 NOTE — TELEPHONE ENCOUNTER
Prior Authorization Approval    Medication: TRESIBA FLEXTOUCH 100 UNIT/ML SC SOPN  Authorization Effective Date: 7/3/2025  Authorization Expiration Date: 7/3/2026  Approved Dose/Quantity:   Reference #:     Insurance Company: [employer] - Phone [phone] Fax [phone]  Expected CoPay: $    CoPay Card Available:      Financial Assistance Needed:   Which Pharmacy is filling the prescription: [id number] - [place], [place] - [address] AT [facility] & [facility]  Pharmacy Notified: YES  Patient Notified: **Instructed pharmacy to notify patient when script is ready to /ship.**

## 2025-07-03 NOTE — TELEPHONE ENCOUNTER
PA Initiation    Medication: TRESIBA FLEXTOUCH 100 UNIT/ML SC SOPN  Insurance Company: [employer] - Phone [phone] Fax [phone]  Pharmacy Filling the Rx: [id number] - [place] - [address] AT [facility] [address]  Filling Pharmacy Phone: [phone]  Filling Pharmacy Fax: [phone]  Start Date: 7/3/2025

## 2025-07-12 ENCOUNTER — HEALTH MAINTENANCE LETTER (OUTPATIENT)
Age: 40
End: 2025-07-12

## 2025-07-17 ENCOUNTER — MYC MEDICAL ADVICE (OUTPATIENT)
Dept: ENDOCRINOLOGY | Facility: CLINIC | Age: 40
End: 2025-07-17
Payer: COMMERCIAL

## 2025-07-17 NOTE — TELEPHONE ENCOUNTER
Called out to patient.  She confirmed she will decrease Tresiba to 10 units daily.  States she will be meeting with an omnipod  on 7/22/25 to start her omnipod.    Maricruz Kumar MS, RD, LD, CDE

## 2025-07-22 ENCOUNTER — TELEPHONE (OUTPATIENT)
Dept: ENDOCRINOLOGY | Facility: CLINIC | Age: 40
End: 2025-07-22
Payer: COMMERCIAL

## 2025-07-22 ENCOUNTER — TELEPHONE (OUTPATIENT)
Dept: EDUCATION SERVICES | Facility: CLINIC | Age: 40
End: 2025-07-22
Payer: COMMERCIAL

## 2025-07-22 NOTE — TELEPHONE ENCOUNTER
Arielle was scheduled with Debby from [facility] for training on the Omnipod 5 today and did not show for the training.    Spoke to Arielle:    She reports her 'medication' (Zepbound) caused her to be too tired to go to Omnipod training.  She watches Tik [place] and knows fatigue is a side effect.  Likes the medication so doesn't want to stop.  Has lost 15 lbs.  Interested in increasing the dose.  Recommend she speak to her weight management provider.    I also followed up on her Tresiba doses in light of frequent hypoglycemia and the recommendation to reduce her Tresiba from 13 units to 10 units.    Arielle admits she did not reduce her Tresiba and is actually taking 20 units.  Will not lower it because high sugars make her feel sick.  Has 2 girls that work with her son who has disability, 16 hours, 7 days a week, but also watch over her because they know she has diabetes.  She 'wakes up' so not concerned about the low sugars.  She wants her body to get used to being low.      I congratulated Arielle on her weight loss.  I shared her body likely requires less insulin now that she weighs less.  She agrees to lower Tresiba to 17 units.  She also agrees to reach out to Debby with Omnipod and reschedule her training.  I confirmed with her to reduce Tresiba to 7 units when she starts the Omnipod, which she agrees to do as well.    Tracy Ramirez RDN, LDN, [facility] D  Dietitian and Diabetes  - Endocrinology  [facility], [place]

## 2025-07-22 NOTE — TELEPHONE ENCOUNTER
Left Voicemail (1st Attempt) and Sent Mychart (1st Attempt) for the patient to call back and schedule the following:  RENARD Jang 3m F/U ~09/30/2025 HEATH PEACOCK  DIABETES ED Tracy Ramirez 60m virtual ~07/30/2025  Specialty phone number: [phone]  Additional appointment(s) needed: Labs next available  Additonal Notes: LVMx1 MyCx1.    From: Tracy Ramirez   Sent: 7/21/2025  12:22 PM CDT   To: Clinic [id number]   She is scheduled to be trained on the Omnipod tomorrow, 7.22.  Please schedule a 2 week post-pump start, 60 min virtual follow up visit with me.      Return in about 3 months (around 9/30/2025) for HEATH peacock.    Appt options:   09/15 7:00 virtual  10/27 7/7:30/2:00 virtual  11/03 7/7:30 virtual  11/10 2:30 virtual  11/17 7/7:30 virtual    Rodrigo Cable on 7/22/2025 at 10:57 AM

## 2025-07-28 ENCOUNTER — TELEPHONE (OUTPATIENT)
Dept: ENDOCRINOLOGY | Facility: CLINIC | Age: 40
End: 2025-07-28
Payer: COMMERCIAL

## 2025-07-28 NOTE — TELEPHONE ENCOUNTER
Patient confirmed scheduled appointment:  Date: 8/18 James and 12/1 Suhail  Time: 1:30 pm and 11:00 am  Visit type: Diabetes Education and RETURN DIABETES  Provider: Tracy HOOPER and Kym Jang PA-C  Location: [facility] Virtual  Testing/imaging: N/A  Additional notes:  Spoke to patient and scheduled follow up appointments per message below.    Great question!     She is scheduled to be trained on the Omnipod tomorrow, 7.22.  Please schedule a 2 week post-pump start, 60 min virtual follow up visit with me.     Thank you!   Tracy   ----- Message -----   From: Luisana Hanley   Sent: 7/16/2025   5:12 PM CDT   To: Tracy Ramirez     Hi Kym Molina asked us to get a 1-month visit with you in place, and a 3-month visit with herself. I see that you've been waiting for her to connect with an Omnipod ; did you want us to schedule with you at the next available or does this need to happen first?     Thanks for your help!     Herber Cota on 7/28/2025 at 11:28 AM

## 2025-07-30 ENCOUNTER — TELEPHONE (OUTPATIENT)
Dept: EDUCATION SERVICES | Facility: CLINIC | Age: 40
End: 2025-07-30
Payer: COMMERCIAL

## 2025-07-30 NOTE — TELEPHONE ENCOUNTER
Arielle received training on the Omnipod 5 1 day ago.    Call out for 24 hour follow up and Omnipod report review:        Arielle reports she likes the Omnipod.  Her sugars come down faster when they are high.  She likes having the insulin always with her.     Taking insulin according to meal size, small, medium, large.  Finds recommended correction is too much, so takes 1 unit correction.    Did not Tresiba yet today, was wondering if she should not take it.  I encouraged her to take Tresiba 7 units, and she agrees to do this.  She wonders if she can eventually stop Tresiba.  Will follow up with her provider on this.    Arielle will continue the Omnipod.  She's encouraged to enter meal boluses before eating rather than after eating.  Will follow up with her in 1 week.    Tracy Ramirez RDN, LDN, [facility]  Dietitian and Diabetes  - Endocrinology  [facility], [place]

## 2025-08-05 ENCOUNTER — TELEPHONE (OUTPATIENT)
Dept: EDUCATION SERVICES | Facility: CLINIC | Age: 40
End: 2025-08-05
Payer: COMMERCIAL

## 2025-08-11 ENCOUNTER — VIRTUAL VISIT (OUTPATIENT)
Dept: PHARMACY | Facility: CLINIC | Age: 40
End: 2025-08-11
Attending: INTERNAL MEDICINE
Payer: COMMERCIAL

## 2025-08-11 VITALS — BODY MASS INDEX: 33.3 KG/M2 | WEIGHT: 188 LBS

## 2025-08-11 DIAGNOSIS — E55.9 VITAMIN D DEFICIENCY: ICD-10-CM

## 2025-08-11 DIAGNOSIS — F32.5 DEPRESSION, MAJOR, IN REMISSION: ICD-10-CM

## 2025-08-11 DIAGNOSIS — E66.01 MORBID OBESITY (H): ICD-10-CM

## 2025-08-11 DIAGNOSIS — E10.649 TYPE 1 DIABETES MELLITUS WITH HYPOGLYCEMIA AND WITHOUT COMA (H): Primary | ICD-10-CM

## 2025-08-11 DIAGNOSIS — J98.01 COLD INDUCED BRONCHOSPASM: ICD-10-CM

## 2025-08-11 DIAGNOSIS — E66.9 OBESITY: ICD-10-CM

## 2025-08-11 DIAGNOSIS — E78.5 HYPERLIPIDEMIA, UNSPECIFIED HYPERLIPIDEMIA TYPE: ICD-10-CM

## 2025-08-11 DIAGNOSIS — R52 PAIN: ICD-10-CM

## 2025-08-11 DIAGNOSIS — F41.1 GENERALIZED ANXIETY DISORDER: ICD-10-CM

## 2025-08-11 DIAGNOSIS — R80.9 MICROALBUMINURIA: ICD-10-CM

## (undated) DEVICE — SUCTION CANISTER MEDIVAC LINER 1500ML W/LID 65651-515

## (undated) DEVICE — BARRIER SEPRAFILM 5X6" SINGLE SHEET 4301-02

## (undated) DEVICE — BNDG ABDOMINAL BINDER 10X26-50" 08140145

## (undated) DEVICE — SOL NACL 0.9% IRRIG 1000ML BOTTLE 07138-09

## (undated) DEVICE — GLOVE ESTEEM POWDER FREE SMT 7.0  2D72PT70

## (undated) DEVICE — PACK C-SECTION LF PL15OTA83B

## (undated) DEVICE — STPL SKIN 35W 059037

## (undated) DEVICE — BASIN SET MAJOR

## (undated) DEVICE — DRSG ADAPTIC 3X8" 6113

## (undated) DEVICE — SU VICRYL 3-0 SH 27" J316H

## (undated) DEVICE — SU MONOCRYL 0 CT-1 36" Y346H

## (undated) DEVICE — GLOVE SENSICARE PI POWDER FREE 7.5 LATEX FREE MSG9075

## (undated) DEVICE — CATH TRAY FOLEY 16FR BARDEX W/DRAIN BAG STATLOCK 300316A

## (undated) DEVICE — STRAP KNEE/BODY 31143004

## (undated) DEVICE — SU VICRYL 0 CT-1 36" J346H

## (undated) DEVICE — SOL WATER IRRIG 1000ML BOTTLE 07139-09

## (undated) DEVICE — PREP CHLORAPREP 26ML TINTED ORANGE  260815

## (undated) DEVICE — ESU GROUND PAD UNIVERSAL W/O CORD

## (undated) DEVICE — STOCKING SLEEVE COMPRESSION CALF LG

## (undated) RX ORDER — KETOROLAC TROMETHAMINE 30 MG/ML
INJECTION, SOLUTION INTRAMUSCULAR; INTRAVENOUS
Status: DISPENSED
Start: 2020-07-03

## (undated) RX ORDER — HYDROMORPHONE HCL/0.9% NACL/PF 0.2MG/0.2
SYRINGE (ML) INTRAVENOUS
Status: DISPENSED
Start: 2020-07-03

## (undated) RX ORDER — DIPHENHYDRAMINE HYDROCHLORIDE 50 MG/ML
INJECTION INTRAMUSCULAR; INTRAVENOUS
Status: DISPENSED
Start: 2020-07-03

## (undated) RX ORDER — FENTANYL CITRATE 50 UG/ML
INJECTION, SOLUTION INTRAMUSCULAR; INTRAVENOUS
Status: DISPENSED
Start: 2020-07-03

## (undated) RX ORDER — MORPHINE SULFATE 1 MG/ML
INJECTION, SOLUTION EPIDURAL; INTRATHECAL; INTRAVENOUS
Status: DISPENSED
Start: 2020-07-03

## (undated) RX ORDER — DEXTROSE MONOHYDRATE 25 G/50ML
INJECTION, SOLUTION INTRAVENOUS
Status: DISPENSED
Start: 2020-07-03

## (undated) RX ORDER — OXYTOCIN/0.9 % SODIUM CHLORIDE 30/500 ML
PLASTIC BAG, INJECTION (ML) INTRAVENOUS
Status: DISPENSED
Start: 2020-07-03

## (undated) RX ORDER — PHENYLEPHRINE HCL IN 0.9% NACL 1 MG/10 ML
SYRINGE (ML) INTRAVENOUS
Status: DISPENSED
Start: 2020-07-03

## (undated) RX ORDER — LABETALOL 20 MG/4 ML (5 MG/ML) INTRAVENOUS SYRINGE
Status: DISPENSED
Start: 2020-07-03